# Patient Record
Sex: MALE | Race: OTHER | Employment: OTHER | URBAN - METROPOLITAN AREA
[De-identification: names, ages, dates, MRNs, and addresses within clinical notes are randomized per-mention and may not be internally consistent; named-entity substitution may affect disease eponyms.]

---

## 2018-03-29 ENCOUNTER — HOSPITAL ENCOUNTER (OUTPATIENT)
Age: 68
Discharge: HOME | End: 2018-03-31
Payer: COMMERCIAL

## 2018-03-29 PROCEDURE — 87088 URINE BACTERIA CULTURE: CPT

## 2018-03-29 PROCEDURE — 88112 CYTOPATH CELL ENHANCE TECH: CPT

## 2018-04-01 LAB — URINE CULTURE, ROUTINE: NORMAL

## 2018-10-18 ENCOUNTER — APPOINTMENT (OUTPATIENT)
Dept: CT IMAGING | Age: 68
End: 2018-10-18
Payer: COMMERCIAL

## 2018-10-18 ENCOUNTER — HOSPITAL ENCOUNTER (EMERGENCY)
Age: 68
Discharge: HOME | End: 2018-10-18
Attending: EMERGENCY MEDICINE
Payer: COMMERCIAL

## 2018-10-18 VITALS
DIASTOLIC BLOOD PRESSURE: 89 MMHG | HEIGHT: 74 IN | HEART RATE: 71 BPM | TEMPERATURE: 97.7 F | OXYGEN SATURATION: 97 % | SYSTOLIC BLOOD PRESSURE: 186 MMHG | RESPIRATION RATE: 18 BRPM | BODY MASS INDEX: 19.89 KG/M2 | WEIGHT: 155 LBS

## 2018-10-18 DIAGNOSIS — N20.0 KIDNEY STONES: Primary | ICD-10-CM

## 2018-10-18 LAB
ALBUMIN SERPL-MCNC: 4.1 G/DL (ref 3.5–5.2)
ALP BLD-CCNC: 145 U/L (ref 40–129)
ALT SERPL-CCNC: 24 U/L (ref 0–40)
ANION GAP SERPL CALCULATED.3IONS-SCNC: 17 MMOL/L (ref 7–16)
AST SERPL-CCNC: 32 U/L (ref 0–39)
BACTERIA: NORMAL /HPF
BASOPHILS ABSOLUTE: 0.03 E9/L (ref 0–0.2)
BASOPHILS RELATIVE PERCENT: 0.5 % (ref 0–2)
BILIRUB SERPL-MCNC: 0.4 MG/DL (ref 0–1.2)
BILIRUBIN URINE: NEGATIVE
BLOOD, URINE: ABNORMAL
BUN BLDV-MCNC: 35 MG/DL (ref 8–23)
CALCIUM SERPL-MCNC: 9.4 MG/DL (ref 8.6–10.2)
CHLORIDE BLD-SCNC: 101 MMOL/L (ref 98–107)
CLARITY: CLEAR
CO2: 19 MMOL/L (ref 22–29)
COLOR: YELLOW
CREAT SERPL-MCNC: 2.1 MG/DL (ref 0.7–1.2)
EOSINOPHILS ABSOLUTE: 0.02 E9/L (ref 0.05–0.5)
EOSINOPHILS RELATIVE PERCENT: 0.3 % (ref 0–6)
GFR AFRICAN AMERICAN: 38
GFR NON-AFRICAN AMERICAN: 38 ML/MIN/1.73
GLUCOSE BLD-MCNC: 202 MG/DL (ref 74–109)
GLUCOSE URINE: 100 MG/DL
HCT VFR BLD CALC: 35.5 % (ref 37–54)
HEMOGLOBIN: 12.2 G/DL (ref 12.5–16.5)
IMMATURE GRANULOCYTES #: 0.03 E9/L
IMMATURE GRANULOCYTES %: 0.5 % (ref 0–5)
KETONES, URINE: NEGATIVE MG/DL
LACTIC ACID: 2.9 MMOL/L (ref 0.5–2.2)
LEUKOCYTE ESTERASE, URINE: NEGATIVE
LIPASE: 108 U/L (ref 13–60)
LYMPHOCYTES ABSOLUTE: 0.58 E9/L (ref 1.5–4)
LYMPHOCYTES RELATIVE PERCENT: 9 % (ref 20–42)
MCH RBC QN AUTO: 30 PG (ref 26–35)
MCHC RBC AUTO-ENTMCNC: 34.4 % (ref 32–34.5)
MCV RBC AUTO: 87.4 FL (ref 80–99.9)
MONOCYTES ABSOLUTE: 0.47 E9/L (ref 0.1–0.95)
MONOCYTES RELATIVE PERCENT: 7.3 % (ref 2–12)
NEUTROPHILS ABSOLUTE: 5.35 E9/L (ref 1.8–7.3)
NEUTROPHILS RELATIVE PERCENT: 82.4 % (ref 43–80)
NITRITE, URINE: NEGATIVE
PDW BLD-RTO: 14 FL (ref 11.5–15)
PH UA: 7 (ref 5–9)
PLATELET # BLD: 188 E9/L (ref 130–450)
PMV BLD AUTO: 10.7 FL (ref 7–12)
POTASSIUM SERPL-SCNC: 4.2 MMOL/L (ref 3.5–5)
PROTEIN UA: 100 MG/DL
RBC # BLD: 4.06 E12/L (ref 3.8–5.8)
RBC # BLD: NORMAL 10*6/UL
RBC UA: NORMAL /HPF (ref 0–2)
SODIUM BLD-SCNC: 137 MMOL/L (ref 132–146)
SPECIFIC GRAVITY UA: 1.02 (ref 1–1.03)
TOTAL PROTEIN: 9 G/DL (ref 6.4–8.3)
UROBILINOGEN, URINE: 0.2 E.U./DL
WBC # BLD: 6.5 E9/L (ref 4.5–11.5)
WBC UA: NORMAL /HPF (ref 0–5)

## 2018-10-18 PROCEDURE — 74176 CT ABD & PELVIS W/O CONTRAST: CPT

## 2018-10-18 PROCEDURE — 6360000002 HC RX W HCPCS: Performed by: EMERGENCY MEDICINE

## 2018-10-18 PROCEDURE — 96375 TX/PRO/DX INJ NEW DRUG ADDON: CPT

## 2018-10-18 PROCEDURE — 83690 ASSAY OF LIPASE: CPT

## 2018-10-18 PROCEDURE — 96376 TX/PRO/DX INJ SAME DRUG ADON: CPT

## 2018-10-18 PROCEDURE — 2580000003 HC RX 258: Performed by: EMERGENCY MEDICINE

## 2018-10-18 PROCEDURE — 99284 EMERGENCY DEPT VISIT MOD MDM: CPT

## 2018-10-18 PROCEDURE — 96374 THER/PROPH/DIAG INJ IV PUSH: CPT

## 2018-10-18 PROCEDURE — 80053 COMPREHEN METABOLIC PANEL: CPT

## 2018-10-18 PROCEDURE — 85025 COMPLETE CBC W/AUTO DIFF WBC: CPT

## 2018-10-18 PROCEDURE — 83605 ASSAY OF LACTIC ACID: CPT

## 2018-10-18 PROCEDURE — 81001 URINALYSIS AUTO W/SCOPE: CPT

## 2018-10-18 RX ORDER — ONDANSETRON 2 MG/ML
4 INJECTION INTRAMUSCULAR; INTRAVENOUS ONCE
Status: COMPLETED | OUTPATIENT
Start: 2018-10-18 | End: 2018-10-18

## 2018-10-18 RX ORDER — ONDANSETRON 4 MG/1
4 TABLET, ORALLY DISINTEGRATING ORAL EVERY 8 HOURS PRN
Qty: 10 TABLET | Refills: 0 | Status: SHIPPED | OUTPATIENT
Start: 2018-10-18 | End: 2019-10-18

## 2018-10-18 RX ORDER — 0.9 % SODIUM CHLORIDE 0.9 %
1000 INTRAVENOUS SOLUTION INTRAVENOUS ONCE
Status: COMPLETED | OUTPATIENT
Start: 2018-10-18 | End: 2018-10-18

## 2018-10-18 RX ORDER — HYDROCODONE BITARTRATE AND ACETAMINOPHEN 5; 325 MG/1; MG/1
1 TABLET ORAL EVERY 6 HOURS PRN
Qty: 10 TABLET | Refills: 0 | Status: SHIPPED | OUTPATIENT
Start: 2018-10-18 | End: 2018-10-21

## 2018-10-18 RX ORDER — FENTANYL CITRATE 50 UG/ML
100 INJECTION, SOLUTION INTRAMUSCULAR; INTRAVENOUS ONCE
Status: COMPLETED | OUTPATIENT
Start: 2018-10-18 | End: 2018-10-18

## 2018-10-18 RX ORDER — TAMSULOSIN HYDROCHLORIDE 0.4 MG/1
0.4 CAPSULE ORAL DAILY
Qty: 10 CAPSULE | Refills: 0 | Status: ON HOLD | OUTPATIENT
Start: 2018-10-18 | End: 2021-12-07

## 2018-10-18 RX ORDER — KETOROLAC TROMETHAMINE 30 MG/ML
15 INJECTION, SOLUTION INTRAMUSCULAR; INTRAVENOUS ONCE
Status: COMPLETED | OUTPATIENT
Start: 2018-10-18 | End: 2018-10-18

## 2018-10-18 RX ORDER — DICLOFENAC SODIUM 75 MG/1
75 TABLET, DELAYED RELEASE ORAL 2 TIMES DAILY
Qty: 20 TABLET | Refills: 0 | Status: ON HOLD | OUTPATIENT
Start: 2018-10-18 | End: 2021-12-07

## 2018-10-18 RX ADMIN — HYDROMORPHONE HYDROCHLORIDE 1 MG: 1 INJECTION, SOLUTION INTRAMUSCULAR; INTRAVENOUS; SUBCUTANEOUS at 14:32

## 2018-10-18 RX ADMIN — ONDANSETRON 4 MG: 2 INJECTION INTRAMUSCULAR; INTRAVENOUS at 14:29

## 2018-10-18 RX ADMIN — KETOROLAC TROMETHAMINE 15 MG: 30 INJECTION INTRAMUSCULAR; INTRAVENOUS at 14:30

## 2018-10-18 RX ADMIN — FENTANYL CITRATE 100 MCG: 50 INJECTION, SOLUTION INTRAMUSCULAR; INTRAVENOUS at 16:21

## 2018-10-18 RX ADMIN — SODIUM CHLORIDE 1000 ML: 9 INJECTION, SOLUTION INTRAVENOUS at 14:28

## 2018-10-18 RX ADMIN — SODIUM CHLORIDE 1000 ML: 9 INJECTION, SOLUTION INTRAVENOUS at 15:19

## 2018-10-18 RX ADMIN — KETOROLAC TROMETHAMINE 15 MG: 30 INJECTION INTRAMUSCULAR; INTRAVENOUS at 16:19

## 2018-10-18 ASSESSMENT — PAIN SCALES - GENERAL
PAINLEVEL_OUTOF10: 4
PAINLEVEL_OUTOF10: 10
PAINLEVEL_OUTOF10: 4
PAINLEVEL_OUTOF10: 10
PAINLEVEL_OUTOF10: 10
PAINLEVEL_OUTOF10: 2

## 2018-10-18 ASSESSMENT — PAIN DESCRIPTION - LOCATION: LOCATION: ABDOMEN

## 2018-10-18 ASSESSMENT — PAIN DESCRIPTION - PAIN TYPE: TYPE: ACUTE PAIN

## 2018-10-18 NOTE — ED PROVIDER NOTES
34.4 32.0 - 34.5 %    RDW 14.0 11.5 - 15.0 fL    Platelets 656 983 - 571 E9/L    MPV 10.7 7.0 - 12.0 fL    Neutrophils % 82.4 (H) 43.0 - 80.0 %    Immature Granulocytes % 0.5 0.0 - 5.0 %    Lymphocytes % 9.0 (L) 20.0 - 42.0 %    Monocytes % 7.3 2.0 - 12.0 %    Eosinophils % 0.3 0.0 - 6.0 %    Basophils % 0.5 0.0 - 2.0 %    Neutrophils # 5.35 1.80 - 7.30 E9/L    Immature Granulocytes # 0.03 E9/L    Lymphocytes # 0.58 (L) 1.50 - 4.00 E9/L    Monocytes # 0.47 0.10 - 0.95 E9/L    Eosinophils # 0.02 (L) 0.05 - 0.50 E9/L    Basophils # 0.03 0.00 - 0.20 E9/L    RBC Morphology Normal    Comprehensive Metabolic Panel   Result Value Ref Range    Sodium 137 132 - 146 mmol/L    Potassium 4.2 3.5 - 5.0 mmol/L    Chloride 101 98 - 107 mmol/L    CO2 19 (L) 22 - 29 mmol/L    Anion Gap 17 (H) 7 - 16 mmol/L    Glucose 202 (H) 74 - 109 mg/dL    BUN 35 (H) 8 - 23 mg/dL    CREATININE 2.1 (H) 0.7 - 1.2 mg/dL    GFR Non-African American 38 >=60 mL/min/1.73    GFR African American 38     Calcium 9.4 8.6 - 10.2 mg/dL    Total Protein 9.0 (H) 6.4 - 8.3 g/dL    Alb 4.1 3.5 - 5.2 g/dL    Total Bilirubin 0.4 0.0 - 1.2 mg/dL    Alkaline Phosphatase 145 (H) 40 - 129 U/L    ALT 24 0 - 40 U/L    AST 32 0 - 39 U/L   Urinalysis   Result Value Ref Range    Color, UA Yellow Straw/Yellow    Clarity, UA Clear Clear    Glucose, Ur 100 (A) Negative mg/dL    Bilirubin Urine Negative Negative    Ketones, Urine Negative Negative mg/dL    Specific Gravity, UA 1.020 1.005 - 1.030    Blood, Urine MODERATE (A) Negative    pH, UA 7.0 5.0 - 9.0    Protein,  (A) Negative mg/dL    Urobilinogen, Urine 0.2 <2.0 E.U./dL    Nitrite, Urine Negative Negative    Leukocyte Esterase, Urine Negative Negative   Lipase   Result Value Ref Range    Lipase 108 (H) 13 - 60 U/L   Lactic Acid, Plasma   Result Value Ref Range    Lactic Acid 2.9 (H) 0.5 - 2.2 mmol/L   Microscopic Urinalysis   Result Value Ref Range    WBC, UA 0-1 0 - 5 /HPF    RBC, UA 1-3 0 - 2 /HPF    Bacteria,

## 2018-12-04 ENCOUNTER — HOSPITAL ENCOUNTER (OUTPATIENT)
Age: 68
Discharge: HOME | End: 2018-12-04
Payer: COMMERCIAL

## 2018-12-04 ENCOUNTER — HOSPITAL ENCOUNTER (OUTPATIENT)
Age: 68
Discharge: HOME | End: 2018-12-06
Payer: COMMERCIAL

## 2018-12-04 ENCOUNTER — HOSPITAL ENCOUNTER (OUTPATIENT)
Dept: ULTRASOUND IMAGING | Age: 68
Discharge: HOME | End: 2018-12-06
Payer: COMMERCIAL

## 2018-12-04 DIAGNOSIS — R18.8 CIRRHOSIS OF LIVER WITH ASCITES, UNSPECIFIED HEPATIC CIRRHOSIS TYPE (HCC): ICD-10-CM

## 2018-12-04 DIAGNOSIS — K74.60 CIRRHOSIS OF LIVER WITH ASCITES, UNSPECIFIED HEPATIC CIRRHOSIS TYPE (HCC): ICD-10-CM

## 2018-12-04 LAB
BACTERIA: NORMAL /HPF
BILIRUBIN URINE: NEGATIVE
BLOOD, URINE: ABNORMAL
CLARITY: CLEAR
COLOR: YELLOW
CREATININE URINE: 109 MG/DL (ref 40–278)
GLUCOSE URINE: NEGATIVE MG/DL
INR BLD: 1.1
KETONES, URINE: NEGATIVE MG/DL
LEUKOCYTE ESTERASE, URINE: NEGATIVE
NITRITE, URINE: NEGATIVE
PH UA: 6 (ref 5–9)
PROTEIN PROTEIN: 301 MG/DL (ref 0–12)
PROTEIN UA: >=300 MG/DL
PROTEIN/CREAT RATIO: 2.8
PROTEIN/CREAT RATIO: 2.8 (ref 0–0.2)
PROTHROMBIN TIME: 12.2 SEC (ref 9.3–12.4)
RBC UA: NORMAL /HPF (ref 0–2)
SPECIFIC GRAVITY UA: 1.02 (ref 1–1.03)
UROBILINOGEN, URINE: 0.2 E.U./DL
WBC UA: NORMAL /HPF (ref 0–5)

## 2018-12-04 PROCEDURE — 81001 URINALYSIS AUTO W/SCOPE: CPT

## 2018-12-04 PROCEDURE — 82247 BILIRUBIN TOTAL: CPT

## 2018-12-04 PROCEDURE — 36415 COLL VENOUS BLD VENIPUNCTURE: CPT

## 2018-12-04 PROCEDURE — 87340 HEPATITIS B SURFACE AG IA: CPT

## 2018-12-04 PROCEDURE — 84075 ASSAY ALKALINE PHOSPHATASE: CPT

## 2018-12-04 PROCEDURE — 84450 TRANSFERASE (AST) (SGOT): CPT

## 2018-12-04 PROCEDURE — 84460 ALANINE AMINO (ALT) (SGPT): CPT

## 2018-12-04 PROCEDURE — 76705 ECHO EXAM OF ABDOMEN: CPT

## 2018-12-04 PROCEDURE — 87522 HEPATITIS C REVRS TRNSCRPJ: CPT

## 2018-12-04 PROCEDURE — 84165 PROTEIN E-PHORESIS SERUM: CPT

## 2018-12-04 PROCEDURE — 84155 ASSAY OF PROTEIN SERUM: CPT

## 2018-12-04 PROCEDURE — 86803 HEPATITIS C AB TEST: CPT

## 2018-12-04 PROCEDURE — 82248 BILIRUBIN DIRECT: CPT

## 2018-12-04 PROCEDURE — 85610 PROTHROMBIN TIME: CPT

## 2018-12-04 PROCEDURE — 82570 ASSAY OF URINE CREATININE: CPT

## 2018-12-04 PROCEDURE — 82105 ALPHA-FETOPROTEIN SERUM: CPT

## 2018-12-04 PROCEDURE — 84156 ASSAY OF PROTEIN URINE: CPT

## 2018-12-04 PROCEDURE — 80069 RENAL FUNCTION PANEL: CPT

## 2018-12-04 PROCEDURE — 84166 PROTEIN E-PHORESIS/URINE/CSF: CPT

## 2018-12-04 PROCEDURE — 86706 HEP B SURFACE ANTIBODY: CPT

## 2018-12-05 LAB
ALBUMIN SERPL-MCNC: 3.4 G/DL (ref 3.5–4.7)
ALPHA-1-GLOBULIN: 0.3 G/DL (ref 0.2–0.4)
ALPHA-2-GLOBULIN: 1.2 G/FL (ref 0.5–1)
BETA GLOBULIN: 1.2 G/DL (ref 0.8–1.3)
ELECTROPHORESIS: ABNORMAL
GAMMA GLOBULIN: 2.4 G/DL (ref 0.7–1.6)
HBV SURFACE AB TITR SER: NORMAL {TITER}
HEPATITIS B SURFACE ANTIGEN INTERPRETATION: NORMAL
HEPATITIS C ANTIBODY INTERPRETATION: REACTIVE
TOTAL PROTEIN: 8.6 G/DL (ref 6.4–8.3)

## 2018-12-07 LAB
ADDENDUM ELECTROPHORESIS URINE RANDOM: NORMAL
AFP-TUMOR MARKER: 14 NG/ML (ref 0–9)
HCV QNT BY NAAT IU/ML: NOT DETECTED IU/ML
HCV QNT BY NAAT LOG IU/ML: NOT DETECTED LOG IU/ML
INTERPRETATION: NOT DETECTED

## 2018-12-09 LAB
ALBUMIN SERPL-MCNC: 4.2 G/DL (ref 3.5–5.2)
ALP BLD-CCNC: 160 U/L (ref 40–129)
ALT SERPL-CCNC: 16 U/L (ref 0–40)
ANION GAP SERPL CALCULATED.3IONS-SCNC: 20 MMOL/L (ref 7–16)
AST SERPL-CCNC: 24 U/L (ref 0–39)
BILIRUB SERPL-MCNC: 0.5 MG/DL (ref 0–1.2)
BILIRUBIN DIRECT: <0.2 MG/DL (ref 0–0.3)
BILIRUBIN, INDIRECT: ABNORMAL MG/DL (ref 0–1)
BUN BLDV-MCNC: 30 MG/DL (ref 8–23)
CALCIUM SERPL-MCNC: 9.8 MG/DL (ref 8.6–10.2)
CHLORIDE BLD-SCNC: 103 MMOL/L (ref 98–107)
CO2: 18 MMOL/L (ref 22–29)
CREAT SERPL-MCNC: 2.1 MG/DL (ref 0.7–1.2)
GFR AFRICAN AMERICAN: 38
GFR NON-AFRICAN AMERICAN: 38 ML/MIN/1.73
GLUCOSE BLD-MCNC: 152 MG/DL (ref 74–99)
PHOSPHORUS: 3.4 MG/DL (ref 2.5–4.5)
POTASSIUM SERPL-SCNC: 5.1 MMOL/L (ref 3.5–5)
SODIUM BLD-SCNC: 141 MMOL/L (ref 132–146)
TOTAL PROTEIN: 8.7 G/DL (ref 6.4–8.3)

## 2020-06-02 ENCOUNTER — HOSPITAL ENCOUNTER (OUTPATIENT)
Dept: ULTRASOUND IMAGING | Age: 70
Discharge: HOME | End: 2020-06-04
Payer: COMMERCIAL

## 2020-06-02 ENCOUNTER — HOSPITAL ENCOUNTER (OUTPATIENT)
Age: 70
Discharge: HOME | End: 2020-06-02
Payer: COMMERCIAL

## 2020-06-02 LAB
ALBUMIN SERPL-MCNC: 3.9 G/DL (ref 3.5–5.2)
ALP BLD-CCNC: 143 U/L (ref 40–129)
ALT SERPL-CCNC: 20 U/L (ref 0–40)
ANION GAP SERPL CALCULATED.3IONS-SCNC: 11 MMOL/L (ref 7–16)
AST SERPL-CCNC: 27 U/L (ref 0–39)
BACTERIA: ABNORMAL /HPF
BASOPHILS ABSOLUTE: 0.06 E9/L (ref 0–0.2)
BASOPHILS RELATIVE PERCENT: 1.3 % (ref 0–2)
BILIRUB SERPL-MCNC: 0.4 MG/DL (ref 0–1.2)
BILIRUBIN URINE: NEGATIVE
BLOOD, URINE: ABNORMAL
BUN BLDV-MCNC: 43 MG/DL (ref 8–23)
CALCIUM SERPL-MCNC: 8.5 MG/DL (ref 8.6–10.2)
CHLORIDE BLD-SCNC: 109 MMOL/L (ref 98–107)
CHOLESTEROL, FASTING: 250 MG/DL (ref 0–199)
CLARITY: CLEAR
CO2: 17 MMOL/L (ref 22–29)
COLOR: YELLOW
CREAT SERPL-MCNC: 3 MG/DL (ref 0.7–1.2)
CREATININE URINE: 97 MG/DL (ref 40–278)
EOSINOPHILS ABSOLUTE: 0.23 E9/L (ref 0.05–0.5)
EOSINOPHILS RELATIVE PERCENT: 5.1 % (ref 0–6)
EPITHELIAL CELLS, UA: ABNORMAL /HPF
GFR AFRICAN AMERICAN: 25
GFR NON-AFRICAN AMERICAN: 25 ML/MIN/1.73
GLUCOSE BLD-MCNC: 144 MG/DL (ref 74–99)
GLUCOSE URINE: 100 MG/DL
HCT VFR BLD CALC: 32.4 % (ref 37–54)
HDLC SERPL-MCNC: 38 MG/DL
HEMOGLOBIN: 11 G/DL (ref 12.5–16.5)
HYALINE CASTS: ABNORMAL /LPF (ref 0–2)
IMMATURE GRANULOCYTES #: 0.02 E9/L
IMMATURE GRANULOCYTES %: 0.4 % (ref 0–5)
INR BLD: 1
KETONES, URINE: NEGATIVE MG/DL
LDL CHOLESTEROL CALCULATED: 178 MG/DL (ref 0–99)
LEUKOCYTE ESTERASE, URINE: NEGATIVE
LYMPHOCYTES ABSOLUTE: 0.72 E9/L (ref 1.5–4)
LYMPHOCYTES RELATIVE PERCENT: 15.9 % (ref 20–42)
MAGNESIUM: 1.5 MG/DL (ref 1.6–2.6)
MCH RBC QN AUTO: 31.1 PG (ref 26–35)
MCHC RBC AUTO-ENTMCNC: 34 % (ref 32–34.5)
MCV RBC AUTO: 91.5 FL (ref 80–99.9)
MONOCYTES ABSOLUTE: 0.59 E9/L (ref 0.1–0.95)
MONOCYTES RELATIVE PERCENT: 13 % (ref 2–12)
NEUTROPHILS ABSOLUTE: 2.92 E9/L (ref 1.8–7.3)
NEUTROPHILS RELATIVE PERCENT: 64.3 % (ref 43–80)
NITRITE, URINE: NEGATIVE
PDW BLD-RTO: 13.8 FL (ref 11.5–15)
PH UA: 5.5 (ref 5–9)
PHOSPHORUS: 3.6 MG/DL (ref 2.5–4.5)
PLATELET # BLD: 174 E9/L (ref 130–450)
PMV BLD AUTO: 10.9 FL (ref 7–12)
POTASSIUM SERPL-SCNC: 4.5 MMOL/L (ref 3.5–5)
PROTEIN PROTEIN: ABNORMAL MG/DL (ref 0–12)
PROTEIN UA: >=300 MG/DL
PROTEIN/CREAT RATIO: ABNORMAL (ref 0–0.2)
PROTHROMBIN TIME: 11.2 SEC (ref 9.3–12.4)
RBC # BLD: 3.54 E12/L (ref 3.8–5.8)
RBC UA: ABNORMAL /HPF (ref 0–2)
SODIUM BLD-SCNC: 137 MMOL/L (ref 132–146)
SPECIFIC GRAVITY UA: 1.02 (ref 1–1.03)
TOTAL CK: 2112 U/L (ref 20–200)
TOTAL PROTEIN: 8 G/DL (ref 6.4–8.3)
TRIGLYCERIDE, FASTING: 170 MG/DL (ref 0–149)
TSH SERPL DL<=0.05 MIU/L-ACNC: 0.76 UIU/ML (ref 0.27–4.2)
UROBILINOGEN, URINE: 0.2 E.U./DL
VLDLC SERPL CALC-MCNC: 34 MG/DL
WBC # BLD: 4.5 E9/L (ref 4.5–11.5)
WBC UA: ABNORMAL /HPF (ref 0–5)

## 2020-06-02 PROCEDURE — 85610 PROTHROMBIN TIME: CPT

## 2020-06-02 PROCEDURE — 84443 ASSAY THYROID STIM HORMONE: CPT

## 2020-06-02 PROCEDURE — 82550 ASSAY OF CK (CPK): CPT

## 2020-06-02 PROCEDURE — 82570 ASSAY OF URINE CREATININE: CPT

## 2020-06-02 PROCEDURE — 85025 COMPLETE CBC W/AUTO DIFF WBC: CPT

## 2020-06-02 PROCEDURE — 87340 HEPATITIS B SURFACE AG IA: CPT

## 2020-06-02 PROCEDURE — 36415 COLL VENOUS BLD VENIPUNCTURE: CPT

## 2020-06-02 PROCEDURE — 84100 ASSAY OF PHOSPHORUS: CPT

## 2020-06-02 PROCEDURE — 83735 ASSAY OF MAGNESIUM: CPT

## 2020-06-02 PROCEDURE — 82105 ALPHA-FETOPROTEIN SERUM: CPT

## 2020-06-02 PROCEDURE — 84156 ASSAY OF PROTEIN URINE: CPT

## 2020-06-02 PROCEDURE — 76705 ECHO EXAM OF ABDOMEN: CPT

## 2020-06-02 PROCEDURE — 86706 HEP B SURFACE ANTIBODY: CPT

## 2020-06-02 PROCEDURE — 80061 LIPID PANEL: CPT

## 2020-06-02 PROCEDURE — 81001 URINALYSIS AUTO W/SCOPE: CPT

## 2020-06-02 PROCEDURE — 80053 COMPREHEN METABOLIC PANEL: CPT

## 2020-06-03 LAB
HBV SURFACE AB TITR SER: NORMAL {TITER}
HEPATITIS B SURFACE ANTIGEN INTERPRETATION: NORMAL

## 2020-06-04 LAB — AFP-TUMOR MARKER: 11 NG/ML (ref 0–9)

## 2020-06-26 ENCOUNTER — HOSPITAL ENCOUNTER (OUTPATIENT)
Age: 70
Discharge: HOME | End: 2020-06-26
Payer: COMMERCIAL

## 2020-06-26 LAB
ALBUMIN SERPL-MCNC: 4 G/DL (ref 3.5–5.2)
ALP BLD-CCNC: 142 U/L (ref 40–129)
ALT SERPL-CCNC: 20 U/L (ref 0–40)
ANION GAP SERPL CALCULATED.3IONS-SCNC: 9 MMOL/L (ref 7–16)
AST SERPL-CCNC: 26 U/L (ref 0–39)
BACTERIA: ABNORMAL /HPF
BASOPHILS ABSOLUTE: 0.09 E9/L (ref 0–0.2)
BASOPHILS RELATIVE PERCENT: 1.5 % (ref 0–2)
BILIRUB SERPL-MCNC: 0.5 MG/DL (ref 0–1.2)
BILIRUBIN URINE: NEGATIVE
BLOOD, URINE: ABNORMAL
BUN BLDV-MCNC: 46 MG/DL (ref 8–23)
CALCIUM SERPL-MCNC: 8.6 MG/DL (ref 8.6–10.2)
CHLORIDE BLD-SCNC: 105 MMOL/L (ref 98–107)
CLARITY: CLEAR
CO2: 24 MMOL/L (ref 22–29)
COLOR: YELLOW
CREAT SERPL-MCNC: 3.6 MG/DL (ref 0.7–1.2)
CREATININE URINE: 205 MG/DL (ref 40–278)
EOSINOPHILS ABSOLUTE: 0.17 E9/L (ref 0.05–0.5)
EOSINOPHILS RELATIVE PERCENT: 2.9 % (ref 0–6)
GFR AFRICAN AMERICAN: 20
GFR NON-AFRICAN AMERICAN: 20 ML/MIN/1.73
GLUCOSE BLD-MCNC: 103 MG/DL (ref 74–99)
GLUCOSE URINE: NEGATIVE MG/DL
HBA1C MFR BLD: 6 % (ref 4–5.6)
HCT VFR BLD CALC: 33.6 % (ref 37–54)
HEMOGLOBIN: 11.2 G/DL (ref 12.5–16.5)
IMMATURE GRANULOCYTES #: 0.02 E9/L
IMMATURE GRANULOCYTES %: 0.3 % (ref 0–5)
IRON SATURATION: 33 % (ref 20–55)
IRON: 96 MCG/DL (ref 59–158)
KETONES, URINE: NEGATIVE MG/DL
LEUKOCYTE ESTERASE, URINE: NEGATIVE
LYMPHOCYTES ABSOLUTE: 0.88 E9/L (ref 1.5–4)
LYMPHOCYTES RELATIVE PERCENT: 15.1 % (ref 20–42)
MAGNESIUM: 1.8 MG/DL (ref 1.6–2.6)
MCH RBC QN AUTO: 30.8 PG (ref 26–35)
MCHC RBC AUTO-ENTMCNC: 33.3 % (ref 32–34.5)
MCV RBC AUTO: 92.3 FL (ref 80–99.9)
MONOCYTES ABSOLUTE: 0.64 E9/L (ref 0.1–0.95)
MONOCYTES RELATIVE PERCENT: 11 % (ref 2–12)
NEUTROPHILS ABSOLUTE: 4.03 E9/L (ref 1.8–7.3)
NEUTROPHILS RELATIVE PERCENT: 69.2 % (ref 43–80)
NITRITE, URINE: NEGATIVE
PARATHYROID HORMONE INTACT: 272 PG/ML (ref 15–65)
PDW BLD-RTO: 13.2 FL (ref 11.5–15)
PH UA: 6 (ref 5–9)
PHOSPHORUS: 3.9 MG/DL (ref 2.5–4.5)
PLATELET # BLD: 185 E9/L (ref 130–450)
PMV BLD AUTO: 10.4 FL (ref 7–12)
POTASSIUM SERPL-SCNC: 4.5 MMOL/L (ref 3.5–5)
PROTEIN PROTEIN: 71 MG/DL (ref 0–12)
PROTEIN UA: >=300 MG/DL
PROTEIN/CREAT RATIO: 0.3
PROTEIN/CREAT RATIO: 0.3 (ref 0–0.2)
RBC # BLD: 3.64 E12/L (ref 3.8–5.8)
RBC UA: ABNORMAL /HPF (ref 0–2)
SODIUM BLD-SCNC: 138 MMOL/L (ref 132–146)
SPECIFIC GRAVITY UA: 1.02 (ref 1–1.03)
TOTAL CK: 1503 U/L (ref 20–200)
TOTAL IRON BINDING CAPACITY: 289 MCG/DL (ref 250–450)
TOTAL PROTEIN: 8.4 G/DL (ref 6.4–8.3)
URIC ACID, SERUM: 6.7 MG/DL (ref 3.4–7)
UROBILINOGEN, URINE: 0.2 E.U./DL
VITAMIN D 25-HYDROXY: 14 NG/ML (ref 30–100)
WBC # BLD: 5.8 E9/L (ref 4.5–11.5)
WBC UA: ABNORMAL /HPF (ref 0–5)

## 2020-06-26 PROCEDURE — 83970 ASSAY OF PARATHORMONE: CPT

## 2020-06-26 PROCEDURE — 84100 ASSAY OF PHOSPHORUS: CPT

## 2020-06-26 PROCEDURE — 82570 ASSAY OF URINE CREATININE: CPT

## 2020-06-26 PROCEDURE — 36415 COLL VENOUS BLD VENIPUNCTURE: CPT

## 2020-06-26 PROCEDURE — 83550 IRON BINDING TEST: CPT

## 2020-06-26 PROCEDURE — 82306 VITAMIN D 25 HYDROXY: CPT

## 2020-06-26 PROCEDURE — 85025 COMPLETE CBC W/AUTO DIFF WBC: CPT

## 2020-06-26 PROCEDURE — 83036 HEMOGLOBIN GLYCOSYLATED A1C: CPT

## 2020-06-26 PROCEDURE — 83874 ASSAY OF MYOGLOBIN: CPT

## 2020-06-26 PROCEDURE — 83735 ASSAY OF MAGNESIUM: CPT

## 2020-06-26 PROCEDURE — 80053 COMPREHEN METABOLIC PANEL: CPT

## 2020-06-26 PROCEDURE — 84550 ASSAY OF BLOOD/URIC ACID: CPT

## 2020-06-26 PROCEDURE — 82550 ASSAY OF CK (CPK): CPT

## 2020-06-26 PROCEDURE — 83540 ASSAY OF IRON: CPT

## 2020-06-26 PROCEDURE — 84156 ASSAY OF PROTEIN URINE: CPT

## 2020-06-26 PROCEDURE — 81001 URINALYSIS AUTO W/SCOPE: CPT

## 2020-06-26 PROCEDURE — 84166 PROTEIN E-PHORESIS/URINE/CSF: CPT

## 2020-06-30 LAB
ADDENDUM ELECTROPHORESIS URINE RANDOM: NORMAL
MYOGLOBIN URINE: 1 MG/L (ref 0–1)

## 2020-07-08 ENCOUNTER — HOSPITAL ENCOUNTER (OUTPATIENT)
Age: 70
Discharge: HOME | End: 2020-07-08
Payer: COMMERCIAL

## 2020-07-08 LAB
BASOPHILS ABSOLUTE: 0.07 E9/L (ref 0–0.2)
BASOPHILS RELATIVE PERCENT: 1.4 % (ref 0–2)
EOSINOPHILS ABSOLUTE: 0.12 E9/L (ref 0.05–0.5)
EOSINOPHILS RELATIVE PERCENT: 2.3 % (ref 0–6)
HCT VFR BLD CALC: 34 % (ref 37–54)
HEMOGLOBIN: 11.5 G/DL (ref 12.5–16.5)
IMMATURE GRANULOCYTES #: 0.02 E9/L
IMMATURE GRANULOCYTES %: 0.4 % (ref 0–5)
LYMPHOCYTES ABSOLUTE: 0.95 E9/L (ref 1.5–4)
LYMPHOCYTES RELATIVE PERCENT: 18.3 % (ref 20–42)
MCH RBC QN AUTO: 31.1 PG (ref 26–35)
MCHC RBC AUTO-ENTMCNC: 33.8 % (ref 32–34.5)
MCV RBC AUTO: 91.9 FL (ref 80–99.9)
MONOCYTES ABSOLUTE: 0.53 E9/L (ref 0.1–0.95)
MONOCYTES RELATIVE PERCENT: 10.2 % (ref 2–12)
NEUTROPHILS ABSOLUTE: 3.49 E9/L (ref 1.8–7.3)
NEUTROPHILS RELATIVE PERCENT: 67.4 % (ref 43–80)
PDW BLD-RTO: 13.2 FL (ref 11.5–15)
PLATELET # BLD: 196 E9/L (ref 130–450)
PMV BLD AUTO: 10.7 FL (ref 7–12)
RBC # BLD: 3.7 E12/L (ref 3.8–5.8)
WBC # BLD: 5.2 E9/L (ref 4.5–11.5)

## 2020-07-08 PROCEDURE — 82955 ASSAY OF G6PD ENZYME: CPT

## 2020-07-08 PROCEDURE — 85025 COMPLETE CBC W/AUTO DIFF WBC: CPT

## 2020-07-08 PROCEDURE — 36415 COLL VENOUS BLD VENIPUNCTURE: CPT

## 2020-07-11 LAB — G-6-PD, QUANT: 12.7 U/G HB (ref 9.9–16.6)

## 2020-08-18 ENCOUNTER — TELEPHONE (OUTPATIENT)
Dept: ADMINISTRATIVE | Age: 70
End: 2020-08-18

## 2020-08-24 NOTE — PROGRESS NOTES
[facility]    [facility]  Mac Purvis. DARCY Benavidez M.D. Nicolas Marine. The [employer].fmDARCY Kieran Row, M.D. Wynette Alt, Jhonnie Puller, M.D. MD Denisa Huerta [place]   1950  Deysi Contreras MD      This 56-year-old man is seen today for outpatient cardiac consultation. He has no history of cardiovascular disease. He may be a candidate for renal transplant eventually. He is under some stress recently because of the upcoming election. He reports gradually decreasing his exercise program because of fatigue and dyspnea. He has not had any typical anginal symptoms. He has noted ED. Medical History:  1. Diabetes  2 BPH  3 hypertension  4 hypothyroid  5 history of hepatitis C  6 DJD  7 lipid panel 2020: Total cholesterol 250, triglycerides 170, HDL 38,   8 CKD. History of medullary sponge kidney  9 surgical history: Tonsillectomy      The patient is . He has worked for the [employer] non-smoker. Denies use of any significant alcohol. Family history: Mother and father  at advanced age of unknown cause. Review of Systems:  Constitutional: negative for fever and chills  Respiratory: negative for cough and hemoptysis  Cardiovascular:   Gastrointestinal: negative for abdominal pain, diarrhea, nausea and vomiting  Genitourinary:negative for dysuria and hematuria  Derm: negative for rash and skin lesion(s)  Neurological: negative for seizures and tremors  Endocrine: negative for diabetic symptoms including polydipsia and polyuria  Musculoskeletal: negative for CTD  Psychiatric: negative for psychosis and major depression    On examination, he is an alert pleasant elderly -American man in no distress. Skin is warm and dry. Respirations are unlabored.   BP (!) 150/84   Pulse 101   Resp 16   Ht 6' 2\" (1.88 m)   Wt 166 lb (75.3 kg)   BMI 21.31 kg/m² . HEENT negative for scleral icterus. Extraocular muscles intact. No facial asymmetry or central cyanosis. Neck without masses or goiter. No bruit or JVD. Cardiac apex not displaced. Rhythm regular. Abdomen normal.  Extremities without edema. Lungs are clear    EKG today shows sinus mechanism 101. APCs. Borderline QRS voltage. The patient has risk factors for both ischemic and structural heart disease. He has dyspnea. He feels he cannot be evaluated a treadmill. Both a Lexiscan perfusion study and echo will be performed and further recommendations made.   It is recommended he return to his urologist, Dr. Julee De La O, for urological assessment prior to considering any intervention for ED Further recommendations regarding his cardiac status will be summarized following the completion of noninvasive testing    At completion of today's visit, medications include the following:    Current Outpatient Medications:     glimepiride (AMARYL) 1 MG tablet, Take 1 mg by mouth every morning (before breakfast), Disp: , Rfl:     dapsone 25 MG tablet, TK 1 T PO BID, Disp: , Rfl:     allopurinol (ZYLOPRIM) 100 MG tablet, TK 1 T PO D, Disp: , Rfl:     sodium bicarbonate 650 MG tablet, TK 2 TS PO Q 8 H, Disp: , Rfl:     levothyroxine (SYNTHROID) 112 MCG tablet, Take 112 mcg by mouth Daily, Disp: , Rfl:     PROCARDIA XL 60 MG extended release tablet, TK 1 T PO QAM, Disp: , Rfl:     magnesium oxide (MAG-OX) 400 MG tablet, Take 400 mg by mouth daily, Disp: , Rfl:     vitamin D (ERGOCALCIFEROL) 1.25 MG (51249 UT) CAPS capsule, TK 1 C PO 1 TIME Q WK, Disp: , Rfl:     clobetasol (TEMOVATE) 0.05 % ointment, JOHN EXT AA BID, Disp: , Rfl:     colchicine (COLCRYS) 0.6 MG tablet, Take 1 tablet by mouth 2 times daily (Patient taking differently: Take 0.6 mg by mouth as needed ), Disp: 20 tablet, Rfl: 0    calcium carbonate (TUMS) 500 MG chewable tablet, Take 1 tablet by mouth 3 times daily as needed for Heartburn., Disp: , Rfl:     diclofenac (VOLTAREN) 75 MG EC tablet, Take 1 tablet by mouth 2 times daily (Patient not taking: Reported on 8/25/2020), Disp: 20 tablet, Rfl: 0    tamsulosin (FLOMAX) 0.4 MG capsule, Take 1 capsule by mouth daily for 10 days, Disp: 10 capsule, Rfl: 0    glimepiride (AMARYL) 4 MG tablet, Take 1 tablet by mouth Daily with supper. (Patient not taking: Reported on 8/25/2020), Disp: 30 tablet, Rfl: 3    sitaGLIPtan (JANUVIA) 100 MG tablet, Take 1 tablet by mouth daily. (Patient not taking: Reported on 8/25/2020), Disp: 30 tablet, Rfl: 3    levothyroxine (SYNTHROID) 75 MCG tablet, Take 75 mcg by mouth Daily. , Disp: , Rfl:     amLODIPine (NORVASC) 5 MG tablet, Take 5 mg by mouth daily. , Disp: , Rfl:       Note: This report was completed utilizing computer voice recognition software. Every effort has been made to ensure accuracy, however; inadvertent computerized transcription errors may be present.     --García Tolentino MD on 8/25/2020 at 2:09 PM     CC Dr Naz Hamilton

## 2020-08-25 ENCOUNTER — OFFICE VISIT (OUTPATIENT)
Dept: CARDIOLOGY CLINIC | Age: 70
End: 2020-08-25
Payer: COMMERCIAL

## 2020-08-25 VITALS
RESPIRATION RATE: 16 BRPM | SYSTOLIC BLOOD PRESSURE: 150 MMHG | WEIGHT: 166 LBS | BODY MASS INDEX: 21.3 KG/M2 | HEART RATE: 101 BPM | DIASTOLIC BLOOD PRESSURE: 84 MMHG | HEIGHT: 74 IN

## 2020-08-25 PROCEDURE — 4040F PNEUMOC VAC/ADMIN/RCVD: CPT | Performed by: INTERNAL MEDICINE

## 2020-08-25 PROCEDURE — 1123F ACP DISCUSS/DSCN MKR DOCD: CPT | Performed by: INTERNAL MEDICINE

## 2020-08-25 PROCEDURE — G8420 CALC BMI NORM PARAMETERS: HCPCS | Performed by: INTERNAL MEDICINE

## 2020-08-25 PROCEDURE — 93000 ELECTROCARDIOGRAM COMPLETE: CPT | Performed by: INTERNAL MEDICINE

## 2020-08-25 PROCEDURE — 3017F COLORECTAL CA SCREEN DOC REV: CPT | Performed by: INTERNAL MEDICINE

## 2020-08-25 PROCEDURE — 99203 OFFICE O/P NEW LOW 30 MIN: CPT | Performed by: INTERNAL MEDICINE

## 2020-08-25 PROCEDURE — 1036F TOBACCO NON-USER: CPT | Performed by: INTERNAL MEDICINE

## 2020-08-25 PROCEDURE — G8427 DOCREV CUR MEDS BY ELIG CLIN: HCPCS | Performed by: INTERNAL MEDICINE

## 2020-08-25 RX ORDER — DAPSONE 25 MG/1
TABLET ORAL
Status: ON HOLD | COMMUNITY
Start: 2020-07-21 | End: 2021-12-07

## 2020-08-25 RX ORDER — ERGOCALCIFEROL 1.25 MG/1
50000 CAPSULE ORAL WEEKLY
COMMUNITY
Start: 2020-07-07

## 2020-08-25 RX ORDER — NIFEDIPINE 60 MG
TABLET, EXTENDED RELEASE 24 HR ORAL
Status: ON HOLD | COMMUNITY
Start: 2020-08-07 | End: 2021-12-07

## 2020-08-25 RX ORDER — ALLOPURINOL 100 MG/1
TABLET ORAL
COMMUNITY
Start: 2020-08-03 | End: 2021-12-22 | Stop reason: ALTCHOICE

## 2020-08-25 RX ORDER — GLIMEPIRIDE 1 MG/1
1 TABLET ORAL
Status: ON HOLD | COMMUNITY
End: 2021-12-13 | Stop reason: HOSPADM

## 2020-08-25 RX ORDER — LEVOTHYROXINE SODIUM 112 UG/1
112 TABLET ORAL DAILY
Status: ON HOLD | COMMUNITY
End: 2022-04-26 | Stop reason: HOSPADM

## 2020-08-25 RX ORDER — SODIUM BICARBONATE 650 MG/1
TABLET ORAL
Status: ON HOLD | COMMUNITY
Start: 2020-07-11 | End: 2022-08-16 | Stop reason: HOSPADM

## 2020-08-25 RX ORDER — MAGNESIUM OXIDE 400 MG/1
400 TABLET ORAL DAILY
Status: ON HOLD | COMMUNITY
End: 2021-12-07

## 2020-08-25 RX ORDER — CLOBETASOL PROPIONATE 0.5 MG/G
OINTMENT TOPICAL
Status: ON HOLD | COMMUNITY
Start: 2020-07-01 | End: 2021-12-07

## 2020-09-28 ENCOUNTER — TELEPHONE (OUTPATIENT)
Dept: CARDIOLOGY | Age: 70
End: 2020-09-28

## 2020-09-28 NOTE — TELEPHONE ENCOUNTER
Spoke with patient and confirmed Lexiscan stress test and echocardiogram appointments on Sept. 30, 2020 starting at 0800. Instructions for testing and COVID-19 checklist reviewed.

## 2020-09-29 ENCOUNTER — TELEPHONE (OUTPATIENT)
Dept: CARDIOLOGY | Age: 70
End: 2020-09-29

## 2020-09-30 ENCOUNTER — TELEPHONE (OUTPATIENT)
Dept: CARDIOLOGY | Age: 70
End: 2020-09-30

## 2020-09-30 NOTE — TELEPHONE ENCOUNTER
CALLED PATIENT TO RESCHEDULE STRESS TEST AND ECHO THAT WAS SCHEDULED FOR THIS MORNING. PATIENT WASN'T FEELING GOOD.  PATIENT WANTED TO RESCHEDULE AFTER THE ELECTION FOR 11-23-20    Electronically signed by Anita Bermeo on 9/30/2020 at 8:55 AM

## 2020-11-19 ENCOUNTER — TELEPHONE (OUTPATIENT)
Dept: CARDIOLOGY | Age: 70
End: 2020-11-19

## 2021-01-14 ENCOUNTER — TELEPHONE (OUTPATIENT)
Dept: CARDIOLOGY | Age: 71
End: 2021-01-14

## 2021-05-28 ENCOUNTER — TELEPHONE (OUTPATIENT)
Dept: CARDIOLOGY | Age: 71
End: 2021-05-28

## 2021-05-28 NOTE — TELEPHONE ENCOUNTER
Patient cancelled multiple appointments for Para Oliver and echocardiogram, send new request if needed.

## 2021-08-24 ENCOUNTER — HOSPITAL ENCOUNTER (OUTPATIENT)
Age: 71
Discharge: HOME | End: 2021-08-24
Payer: COMMERCIAL

## 2021-08-24 LAB
ALBUMIN SERPL-MCNC: 3.8 G/DL (ref 3.5–5.2)
ALP BLD-CCNC: 160 U/L (ref 40–129)
ALT SERPL-CCNC: 16 U/L (ref 0–40)
AMORPHOUS: PRESENT
ANION GAP SERPL CALCULATED.3IONS-SCNC: 16 MMOL/L (ref 7–16)
AST SERPL-CCNC: 22 U/L (ref 0–39)
BACTERIA: ABNORMAL /HPF
BILIRUB SERPL-MCNC: 0.3 MG/DL (ref 0–1.2)
BILIRUBIN URINE: NEGATIVE
BLOOD, URINE: ABNORMAL
BUN BLDV-MCNC: 73 MG/DL (ref 6–23)
CALCIUM SERPL-MCNC: 7.1 MG/DL (ref 8.6–10.2)
CHLORIDE BLD-SCNC: 103 MMOL/L (ref 98–107)
CHOLESTEROL, TOTAL: 200 MG/DL (ref 0–199)
CLARITY: CLEAR
CO2: 18 MMOL/L (ref 22–29)
COLOR: YELLOW
CREAT SERPL-MCNC: 8.6 MG/DL (ref 0.7–1.2)
CREATININE URINE: 133 MG/DL (ref 40–278)
GFR AFRICAN AMERICAN: 7
GFR NON-AFRICAN AMERICAN: 7 ML/MIN/1.73
GLUCOSE BLD-MCNC: 97 MG/DL (ref 74–99)
GLUCOSE URINE: NEGATIVE MG/DL
HBA1C MFR BLD: 5.1 % (ref 4–5.6)
HCT VFR BLD CALC: 23.3 % (ref 37–54)
HDLC SERPL-MCNC: 51 MG/DL
HEMOGLOBIN: 7.9 G/DL (ref 12.5–16.5)
INR BLD: 1.1
KETONES, URINE: NEGATIVE MG/DL
LDL CHOLESTEROL CALCULATED: 127 MG/DL (ref 0–99)
LEUKOCYTE ESTERASE, URINE: NEGATIVE
MCH RBC QN AUTO: 31.6 PG (ref 26–35)
MCHC RBC AUTO-ENTMCNC: 33.9 % (ref 32–34.5)
MCV RBC AUTO: 93.2 FL (ref 80–99.9)
NITRITE, URINE: NEGATIVE
PARATHYROID HORMONE INTACT: 386 PG/ML (ref 15–65)
PDW BLD-RTO: 14.6 FL (ref 11.5–15)
PH UA: 6 (ref 5–9)
PLATELET # BLD: 180 E9/L (ref 130–450)
PMV BLD AUTO: 10.7 FL (ref 7–12)
POTASSIUM SERPL-SCNC: 5 MMOL/L (ref 3.5–5)
PROSTATE SPECIFIC ANTIGEN: 19.48 NG/ML (ref 0–4)
PROTEIN UA: >=300 MG/DL
PROTHROMBIN TIME: 12.1 SEC (ref 9.3–12.4)
RBC # BLD: 2.5 E12/L (ref 3.8–5.8)
RBC UA: ABNORMAL /HPF (ref 0–2)
SODIUM BLD-SCNC: 137 MMOL/L (ref 132–146)
SPECIFIC GRAVITY UA: 1.02 (ref 1–1.03)
TOTAL PROTEIN: 8.4 G/DL (ref 6.4–8.3)
TRIGL SERPL-MCNC: 109 MG/DL (ref 0–149)
TSH SERPL DL<=0.05 MIU/L-ACNC: 9.75 UIU/ML (ref 0.27–4.2)
URIC ACID, SERUM: 6.4 MG/DL (ref 3.4–7)
UROBILINOGEN, URINE: 0.2 E.U./DL
VITAMIN D 25-HYDROXY: 13 NG/ML (ref 30–100)
VLDLC SERPL CALC-MCNC: 22 MG/DL
WBC # BLD: 5.3 E9/L (ref 4.5–11.5)
WBC UA: ABNORMAL /HPF (ref 0–5)

## 2021-08-24 PROCEDURE — 82306 VITAMIN D 25 HYDROXY: CPT

## 2021-08-24 PROCEDURE — 83036 HEMOGLOBIN GLYCOSYLATED A1C: CPT

## 2021-08-24 PROCEDURE — 84153 ASSAY OF PSA TOTAL: CPT

## 2021-08-24 PROCEDURE — 85027 COMPLETE CBC AUTOMATED: CPT

## 2021-08-24 PROCEDURE — 82105 ALPHA-FETOPROTEIN SERUM: CPT

## 2021-08-24 PROCEDURE — 81001 URINALYSIS AUTO W/SCOPE: CPT

## 2021-08-24 PROCEDURE — 84443 ASSAY THYROID STIM HORMONE: CPT

## 2021-08-24 PROCEDURE — 86341 ISLET CELL ANTIBODY: CPT

## 2021-08-24 PROCEDURE — 83970 ASSAY OF PARATHORMONE: CPT

## 2021-08-24 PROCEDURE — 36415 COLL VENOUS BLD VENIPUNCTURE: CPT

## 2021-08-24 PROCEDURE — 80061 LIPID PANEL: CPT

## 2021-08-24 PROCEDURE — 80053 COMPREHEN METABOLIC PANEL: CPT

## 2021-08-24 PROCEDURE — 82570 ASSAY OF URINE CREATININE: CPT

## 2021-08-24 PROCEDURE — 84550 ASSAY OF BLOOD/URIC ACID: CPT

## 2021-08-24 PROCEDURE — 85610 PROTHROMBIN TIME: CPT

## 2021-08-26 ENCOUNTER — HOSPITAL ENCOUNTER (OUTPATIENT)
Age: 71
Discharge: HOME | End: 2021-08-26
Payer: COMMERCIAL

## 2021-08-26 LAB
AFP-TUMOR MARKER: 8 NG/ML (ref 0–9)
ANION GAP SERPL CALCULATED.3IONS-SCNC: 19 MMOL/L (ref 7–16)
BUN BLDV-MCNC: 84 MG/DL (ref 6–23)
CALCIUM SERPL-MCNC: 7 MG/DL (ref 8.6–10.2)
CHLORIDE BLD-SCNC: 100 MMOL/L (ref 98–107)
CO2: 17 MMOL/L (ref 22–29)
CREAT SERPL-MCNC: 8.9 MG/DL (ref 0.7–1.2)
GFR AFRICAN AMERICAN: 7
GFR NON-AFRICAN AMERICAN: 7 ML/MIN/1.73
GLUCOSE BLD-MCNC: 69 MG/DL (ref 74–99)
POTASSIUM SERPL-SCNC: 4.8 MMOL/L (ref 3.5–5)
SODIUM BLD-SCNC: 136 MMOL/L (ref 132–146)

## 2021-08-26 PROCEDURE — 80048 BASIC METABOLIC PNL TOTAL CA: CPT

## 2021-08-26 PROCEDURE — 36415 COLL VENOUS BLD VENIPUNCTURE: CPT

## 2021-08-29 LAB
Lab: NORMAL
REPORT: NORMAL
THIS TEST SENT TO: NORMAL

## 2021-08-30 ENCOUNTER — HOSPITAL ENCOUNTER (OUTPATIENT)
Dept: ULTRASOUND IMAGING | Age: 71
Discharge: HOME | End: 2021-09-01
Payer: COMMERCIAL

## 2021-08-30 DIAGNOSIS — K74.60 HEPATIC CIRRHOSIS, UNSPECIFIED HEPATIC CIRRHOSIS TYPE, UNSPECIFIED WHETHER ASCITES PRESENT (HCC): ICD-10-CM

## 2021-08-30 DIAGNOSIS — B18.2 CHRONIC HEPATITIS C WITHOUT HEPATIC COMA (HCC): ICD-10-CM

## 2021-08-30 PROCEDURE — 76705 ECHO EXAM OF ABDOMEN: CPT

## 2021-11-12 ENCOUNTER — HOSPITAL ENCOUNTER (OUTPATIENT)
Age: 71
Discharge: HOME | End: 2021-11-12
Payer: COMMERCIAL

## 2021-11-12 LAB
ALBUMIN SERPL-MCNC: 3.6 G/DL (ref 3.5–5.2)
ANION GAP SERPL CALCULATED.3IONS-SCNC: 18 MMOL/L (ref 7–16)
BASOPHILS ABSOLUTE: 0.06 E9/L (ref 0–0.2)
BASOPHILS RELATIVE PERCENT: 1.1 % (ref 0–2)
BUN BLDV-MCNC: 81 MG/DL (ref 6–23)
CALCIUM SERPL-MCNC: 7.6 MG/DL (ref 8.6–10.2)
CHLORIDE BLD-SCNC: 108 MMOL/L (ref 98–107)
CHOLESTEROL, FASTING: 230 MG/DL (ref 0–199)
CO2: 14 MMOL/L (ref 22–29)
CREAT SERPL-MCNC: 7.8 MG/DL (ref 0.7–1.2)
CREATININE URINE: 89 MG/DL (ref 40–278)
EOSINOPHILS ABSOLUTE: 0.23 E9/L (ref 0.05–0.5)
EOSINOPHILS RELATIVE PERCENT: 4.3 % (ref 0–6)
FERRITIN: 425 NG/ML
GFR AFRICAN AMERICAN: 8
GFR NON-AFRICAN AMERICAN: 8 ML/MIN/1.73
GLUCOSE BLD-MCNC: 114 MG/DL (ref 74–99)
HBA1C MFR BLD: 5.3 % (ref 4–5.6)
HCT VFR BLD CALC: 23.7 % (ref 37–54)
HDLC SERPL-MCNC: 53 MG/DL
HEMOGLOBIN: 8 G/DL (ref 12.5–16.5)
IMMATURE GRANULOCYTES #: 0.01 E9/L
IMMATURE GRANULOCYTES %: 0.2 % (ref 0–5)
IRON SATURATION: 16 % (ref 20–55)
IRON: 38 MCG/DL (ref 59–158)
LDL CHOLESTEROL CALCULATED: 147 MG/DL (ref 0–99)
LYMPHOCYTES ABSOLUTE: 0.66 E9/L (ref 1.5–4)
LYMPHOCYTES RELATIVE PERCENT: 12.3 % (ref 20–42)
MCH RBC QN AUTO: 31.6 PG (ref 26–35)
MCHC RBC AUTO-ENTMCNC: 33.8 % (ref 32–34.5)
MCV RBC AUTO: 93.7 FL (ref 80–99.9)
MICROALBUMIN UR-MCNC: 3516.4 MG/L
MICROALBUMIN/CREAT UR-RTO: 3951 (ref 0–30)
MONOCYTES ABSOLUTE: 0.65 E9/L (ref 0.1–0.95)
MONOCYTES RELATIVE PERCENT: 12.1 % (ref 2–12)
NEUTROPHILS ABSOLUTE: 3.77 E9/L (ref 1.8–7.3)
NEUTROPHILS RELATIVE PERCENT: 70 % (ref 43–80)
PARATHYROID HORMONE INTACT: 420 PG/ML (ref 15–65)
PDW BLD-RTO: 15 FL (ref 11.5–15)
PHOSPHORUS: 5.9 MG/DL (ref 2.5–4.5)
PLATELET # BLD: 144 E9/L (ref 130–450)
PMV BLD AUTO: 10 FL (ref 7–12)
POTASSIUM SERPL-SCNC: 4.9 MMOL/L (ref 3.5–5)
RBC # BLD: 2.53 E12/L (ref 3.8–5.8)
SODIUM BLD-SCNC: 140 MMOL/L (ref 132–146)
TOTAL IRON BINDING CAPACITY: 240 MCG/DL (ref 250–450)
TRIGLYCERIDE, FASTING: 150 MG/DL (ref 0–149)
URIC ACID, SERUM: 9.7 MG/DL (ref 3.4–7)
VITAMIN D 25-HYDROXY: 13 NG/ML (ref 30–100)
VLDLC SERPL CALC-MCNC: 30 MG/DL
WBC # BLD: 5.4 E9/L (ref 4.5–11.5)

## 2021-11-12 PROCEDURE — 82728 ASSAY OF FERRITIN: CPT

## 2021-11-12 PROCEDURE — 83036 HEMOGLOBIN GLYCOSYLATED A1C: CPT

## 2021-11-12 PROCEDURE — 85025 COMPLETE CBC W/AUTO DIFF WBC: CPT

## 2021-11-12 PROCEDURE — 83550 IRON BINDING TEST: CPT

## 2021-11-12 PROCEDURE — 83970 ASSAY OF PARATHORMONE: CPT

## 2021-11-12 PROCEDURE — 80061 LIPID PANEL: CPT

## 2021-11-12 PROCEDURE — 80069 RENAL FUNCTION PANEL: CPT

## 2021-11-12 PROCEDURE — 82044 UR ALBUMIN SEMIQUANTITATIVE: CPT

## 2021-11-12 PROCEDURE — 82570 ASSAY OF URINE CREATININE: CPT

## 2021-11-12 PROCEDURE — 83540 ASSAY OF IRON: CPT

## 2021-11-12 PROCEDURE — 36415 COLL VENOUS BLD VENIPUNCTURE: CPT

## 2021-11-12 PROCEDURE — 84550 ASSAY OF BLOOD/URIC ACID: CPT

## 2021-11-12 PROCEDURE — 82306 VITAMIN D 25 HYDROXY: CPT

## 2021-11-19 ENCOUNTER — APPOINTMENT (OUTPATIENT)
Dept: GENERAL RADIOLOGY | Age: 71
DRG: 673 | End: 2021-11-19
Payer: MEDICARE

## 2021-11-19 ENCOUNTER — HOSPITAL ENCOUNTER (INPATIENT)
Age: 71
LOS: 4 days | Discharge: HOME | DRG: 673 | End: 2021-11-23
Attending: EMERGENCY MEDICINE | Admitting: INTERNAL MEDICINE
Payer: MEDICARE

## 2021-11-19 DIAGNOSIS — E87.20 METABOLIC ACIDOSIS: Primary | ICD-10-CM

## 2021-11-19 DIAGNOSIS — R77.8 ELEVATED TROPONIN: ICD-10-CM

## 2021-11-19 DIAGNOSIS — R06.02 SHORTNESS OF BREATH: ICD-10-CM

## 2021-11-19 DIAGNOSIS — N18.6 ESRD (END STAGE RENAL DISEASE) (HCC): ICD-10-CM

## 2021-11-19 DIAGNOSIS — I10 HYPERTENSION, UNSPECIFIED TYPE: ICD-10-CM

## 2021-11-19 PROBLEM — N18.9 ACUTE KIDNEY INJURY SUPERIMPOSED ON CKD (HCC): Status: ACTIVE | Noted: 2021-11-19

## 2021-11-19 PROBLEM — I16.1 HYPERTENSIVE EMERGENCY: Status: ACTIVE | Noted: 2021-11-19

## 2021-11-19 PROBLEM — E87.70 VOLUME OVERLOAD: Status: ACTIVE | Noted: 2021-11-19

## 2021-11-19 PROBLEM — J96.01 ACUTE RESPIRATORY FAILURE WITH HYPOXIA (HCC): Status: ACTIVE | Noted: 2021-11-19

## 2021-11-19 PROBLEM — E87.5 HYPERKALEMIA: Status: ACTIVE | Noted: 2021-11-19

## 2021-11-19 PROBLEM — R79.89 ELEVATED TROPONIN: Status: ACTIVE | Noted: 2021-11-19

## 2021-11-19 PROBLEM — N17.9 ACUTE KIDNEY INJURY SUPERIMPOSED ON CKD (HCC): Status: ACTIVE | Noted: 2021-11-19

## 2021-11-19 LAB
ALBUMIN SERPL-MCNC: 4.1 G/DL (ref 3.5–5.2)
ALP BLD-CCNC: 115 U/L (ref 40–129)
ALT SERPL-CCNC: 12 U/L (ref 0–40)
ANION GAP SERPL CALCULATED.3IONS-SCNC: 19 MMOL/L (ref 7–16)
AST SERPL-CCNC: 25 U/L (ref 0–39)
B.E.: -13.2 MMOL/L (ref -3–3)
BASOPHILS ABSOLUTE: 0.06 E9/L (ref 0–0.2)
BASOPHILS RELATIVE PERCENT: 1 % (ref 0–2)
BILIRUB SERPL-MCNC: 0.3 MG/DL (ref 0–1.2)
BUN BLDV-MCNC: 80 MG/DL (ref 6–23)
CALCIUM SERPL-MCNC: 8 MG/DL (ref 8.6–10.2)
CHLORIDE BLD-SCNC: 104 MMOL/L (ref 98–107)
CHP ED QC CHECK: NORMAL
CO2: 14 MMOL/L (ref 22–29)
COHB: 0.2 % (ref 0–1.5)
CREAT SERPL-MCNC: 7.9 MG/DL (ref 0.7–1.2)
CRITICAL: ABNORMAL
DATE ANALYZED: ABNORMAL
DATE OF COLLECTION: ABNORMAL
EOSINOPHILS ABSOLUTE: 0.03 E9/L (ref 0.05–0.5)
EOSINOPHILS RELATIVE PERCENT: 0.5 % (ref 0–6)
GFR AFRICAN AMERICAN: 8
GFR NON-AFRICAN AMERICAN: 8 ML/MIN/1.73
GLUCOSE BLD-MCNC: 148 MG/DL
GLUCOSE BLD-MCNC: 154 MG/DL (ref 74–99)
GLUCOSE BLD-MCNC: 215 MG/DL
GLUCOSE BLD-MCNC: 72 MG/DL
GLUCOSE BLD-MCNC: 90 MG/DL
HCO3: 12.9 MMOL/L (ref 22–26)
HCT VFR BLD CALC: 27.2 % (ref 37–54)
HEMOGLOBIN: 8.9 G/DL (ref 12.5–16.5)
HHB: 6 % (ref 0–5)
IMMATURE GRANULOCYTES #: 0.02 E9/L
IMMATURE GRANULOCYTES %: 0.3 % (ref 0–5)
LAB: ABNORMAL
LACTIC ACID, SEPSIS: 1.7 MMOL/L (ref 0.5–1.9)
LYMPHOCYTES ABSOLUTE: 0.74 E9/L (ref 1.5–4)
LYMPHOCYTES RELATIVE PERCENT: 12.3 % (ref 20–42)
Lab: ABNORMAL
MCH RBC QN AUTO: 30.8 PG (ref 26–35)
MCHC RBC AUTO-ENTMCNC: 32.7 % (ref 32–34.5)
MCV RBC AUTO: 94.1 FL (ref 80–99.9)
METER GLUCOSE: 148 MG/DL (ref 74–99)
METER GLUCOSE: 215 MG/DL (ref 74–99)
METER GLUCOSE: 72 MG/DL (ref 74–99)
METER GLUCOSE: 90 MG/DL (ref 74–99)
METHB: 0.2 % (ref 0–1.5)
MODE: ABNORMAL
MONOCYTES ABSOLUTE: 0.44 E9/L (ref 0.1–0.95)
MONOCYTES RELATIVE PERCENT: 7.3 % (ref 2–12)
NEUTROPHILS ABSOLUTE: 4.75 E9/L (ref 1.8–7.3)
NEUTROPHILS RELATIVE PERCENT: 78.6 % (ref 43–80)
O2 CONTENT: 11.4 ML/DL
O2 SATURATION: 94 % (ref 92–98.5)
O2HB: 93.6 % (ref 94–97)
OPERATOR ID: 1741
PATIENT TEMP: 37 C
PCO2: 30.2 MMHG (ref 35–45)
PDW BLD-RTO: 15.6 FL (ref 11.5–15)
PH BLOOD GAS: 7.25 (ref 7.35–7.45)
PLATELET # BLD: 311 E9/L (ref 130–450)
PMV BLD AUTO: 9.9 FL (ref 7–12)
PO2: 81.7 MMHG (ref 75–100)
POTASSIUM REFLEX MAGNESIUM: 5.8 MMOL/L (ref 3.5–5)
PRO-BNP: ABNORMAL PG/ML (ref 0–125)
RBC # BLD: 2.89 E12/L (ref 3.8–5.8)
SARS-COV-2, NAAT: NOT DETECTED
SODIUM BLD-SCNC: 137 MMOL/L (ref 132–146)
SOURCE, BLOOD GAS: ABNORMAL
THB: 8.6 G/DL (ref 11.5–16.5)
TIME ANALYZED: 1609
TOTAL PROTEIN: 8.6 G/DL (ref 6.4–8.3)
TROPONIN, HIGH SENSITIVITY: 864 NG/L (ref 0–11)
WBC # BLD: 6 E9/L (ref 4.5–11.5)

## 2021-11-19 PROCEDURE — 2060000000 HC ICU INTERMEDIATE R&B

## 2021-11-19 PROCEDURE — 82805 BLOOD GASES W/O2 SATURATION: CPT

## 2021-11-19 PROCEDURE — 5A1D70Z PERFORMANCE OF URINARY FILTRATION, INTERMITTENT, LESS THAN 6 HOURS PER DAY: ICD-10-PCS | Performed by: EMERGENCY MEDICINE

## 2021-11-19 PROCEDURE — 96374 THER/PROPH/DIAG INJ IV PUSH: CPT

## 2021-11-19 PROCEDURE — 90935 HEMODIALYSIS ONE EVALUATION: CPT

## 2021-11-19 PROCEDURE — 87635 SARS-COV-2 COVID-19 AMP PRB: CPT

## 2021-11-19 PROCEDURE — 87040 BLOOD CULTURE FOR BACTERIA: CPT

## 2021-11-19 PROCEDURE — 83880 ASSAY OF NATRIURETIC PEPTIDE: CPT

## 2021-11-19 PROCEDURE — 99223 1ST HOSP IP/OBS HIGH 75: CPT | Performed by: INTERNAL MEDICINE

## 2021-11-19 PROCEDURE — 82962 GLUCOSE BLOOD TEST: CPT

## 2021-11-19 PROCEDURE — 96375 TX/PRO/DX INJ NEW DRUG ADDON: CPT

## 2021-11-19 PROCEDURE — 93005 ELECTROCARDIOGRAM TRACING: CPT | Performed by: STUDENT IN AN ORGANIZED HEALTH CARE EDUCATION/TRAINING PROGRAM

## 2021-11-19 PROCEDURE — 6360000002 HC RX W HCPCS: Performed by: EMERGENCY MEDICINE

## 2021-11-19 PROCEDURE — 94660 CPAP INITIATION&MGMT: CPT

## 2021-11-19 PROCEDURE — 83605 ASSAY OF LACTIC ACID: CPT

## 2021-11-19 PROCEDURE — 99284 EMERGENCY DEPT VISIT MOD MDM: CPT

## 2021-11-19 PROCEDURE — 2500000003 HC RX 250 WO HCPCS: Performed by: EMERGENCY MEDICINE

## 2021-11-19 PROCEDURE — 2580000003 HC RX 258: Performed by: STUDENT IN AN ORGANIZED HEALTH CARE EDUCATION/TRAINING PROGRAM

## 2021-11-19 PROCEDURE — 36556 INSERT NON-TUNNEL CV CATH: CPT

## 2021-11-19 PROCEDURE — 2500000003 HC RX 250 WO HCPCS: Performed by: STUDENT IN AN ORGANIZED HEALTH CARE EDUCATION/TRAINING PROGRAM

## 2021-11-19 PROCEDURE — 71046 X-RAY EXAM CHEST 2 VIEWS: CPT

## 2021-11-19 PROCEDURE — 85025 COMPLETE CBC W/AUTO DIFF WBC: CPT

## 2021-11-19 PROCEDURE — 80053 COMPREHEN METABOLIC PANEL: CPT

## 2021-11-19 PROCEDURE — 84484 ASSAY OF TROPONIN QUANT: CPT

## 2021-11-19 PROCEDURE — 6370000000 HC RX 637 (ALT 250 FOR IP): Performed by: EMERGENCY MEDICINE

## 2021-11-19 RX ORDER — AMLODIPINE BESYLATE 5 MG/1
10 TABLET ORAL DAILY
Status: DISCONTINUED | OUTPATIENT
Start: 2021-11-20 | End: 2021-11-20 | Stop reason: ALTCHOICE

## 2021-11-19 RX ORDER — DEXTROSE MONOHYDRATE 25 G/50ML
12.5 INJECTION, SOLUTION INTRAVENOUS PRN
Status: DISCONTINUED | OUTPATIENT
Start: 2021-11-19 | End: 2021-11-20 | Stop reason: SDUPTHER

## 2021-11-19 RX ORDER — NICOTINE POLACRILEX 4 MG
15 LOZENGE BUCCAL PRN
Status: DISCONTINUED | OUTPATIENT
Start: 2021-11-19 | End: 2021-11-20 | Stop reason: SDUPTHER

## 2021-11-19 RX ORDER — BUMETANIDE 0.25 MG/ML
2 INJECTION, SOLUTION INTRAMUSCULAR; INTRAVENOUS ONCE
Status: COMPLETED | OUTPATIENT
Start: 2021-11-19 | End: 2021-11-19

## 2021-11-19 RX ORDER — DEXTROSE MONOHYDRATE 25 G/50ML
25 INJECTION, SOLUTION INTRAVENOUS ONCE
Status: COMPLETED | OUTPATIENT
Start: 2021-11-19 | End: 2021-11-19

## 2021-11-19 RX ORDER — CARVEDILOL 6.25 MG/1
6.25 TABLET ORAL 2 TIMES DAILY WITH MEALS
Status: DISCONTINUED | OUTPATIENT
Start: 2021-11-20 | End: 2021-11-23 | Stop reason: HOSPADM

## 2021-11-19 RX ORDER — CALCIUM GLUCONATE 94 MG/ML
1000 INJECTION, SOLUTION INTRAVENOUS ONCE
Status: COMPLETED | OUTPATIENT
Start: 2021-11-19 | End: 2021-11-19

## 2021-11-19 RX ORDER — BUMETANIDE 1 MG/1
2 TABLET ORAL 2 TIMES DAILY
Status: DISCONTINUED | OUTPATIENT
Start: 2021-11-20 | End: 2021-11-23 | Stop reason: HOSPADM

## 2021-11-19 RX ORDER — DEXTROSE MONOHYDRATE 50 MG/ML
100 INJECTION, SOLUTION INTRAVENOUS PRN
Status: DISCONTINUED | OUTPATIENT
Start: 2021-11-19 | End: 2021-11-20 | Stop reason: SDUPTHER

## 2021-11-19 RX ADMIN — BUMETANIDE 1 MG/HR: 0.25 INJECTION INTRAMUSCULAR; INTRAVENOUS at 17:51

## 2021-11-19 RX ADMIN — DEXTROSE MONOHYDRATE 25 G: 25 INJECTION, SOLUTION INTRAVENOUS at 17:33

## 2021-11-19 RX ADMIN — INSULIN HUMAN 10 UNITS: 100 INJECTION, SOLUTION PARENTERAL at 17:34

## 2021-11-19 RX ADMIN — BUMETANIDE 2 MG: 0.25 INJECTION INTRAMUSCULAR; INTRAVENOUS at 17:44

## 2021-11-19 RX ADMIN — SODIUM BICARBONATE: 84 INJECTION, SOLUTION INTRAVENOUS at 17:51

## 2021-11-19 RX ADMIN — CALCIUM GLUCONATE 1000 MG: 98 INJECTION, SOLUTION INTRAVENOUS at 17:30

## 2021-11-19 RX ADMIN — SODIUM BICARBONATE 50 MEQ: 84 INJECTION, SOLUTION INTRAVENOUS at 17:41

## 2021-11-19 ASSESSMENT — ENCOUNTER SYMPTOMS
BACK PAIN: 0
SHORTNESS OF BREATH: 1
SORE THROAT: 0
ABDOMINAL PAIN: 0
EYE PAIN: 0
SINUS PAIN: 0
NAUSEA: 0
COUGH: 1
VOMITING: 0
DIARRHEA: 0
RHINORRHEA: 0
CHEST TIGHTNESS: 0

## 2021-11-19 ASSESSMENT — PAIN DESCRIPTION - LOCATION: LOCATION: ABDOMEN

## 2021-11-19 NOTE — ED PROVIDER NOTES
25-year-old male recently diagnosed with end-stage renal disease presents today to the emergency department with complaints of shortness of breath onset 36 hours prior to arrival.  Symptoms are moderate in severity, sudden in onset and worse when laying down, better when sitting up. He has never had similar symptoms in the past.  He states that for the past couple weeks he has had mild URI symptoms which has been slowly getting better supportive care at home. Denies any CP, fevers or chills. He was seen by a nephrologist and informed that he needs to start on dialysis but he has elected to delay this as he would like a second opinion. He states that his diabetes has been well controlled and he is compliant with his other medications. He is not on any blood thinners and has not been vaccinated for COVID-19. He has never required oxygen. Review of Systems   Constitutional: Negative for chills, diaphoresis and fever. HENT: Negative for ear pain, rhinorrhea, sinus pain and sore throat. Eyes: Negative for pain. Respiratory: Positive for cough and shortness of breath. Negative for chest tightness. Cardiovascular: Negative for chest pain and leg swelling. Gastrointestinal: Negative for abdominal pain, diarrhea, nausea and vomiting. Genitourinary: Negative for dysuria, flank pain and frequency. Musculoskeletal: Negative for back pain, neck pain and neck stiffness. Neurological: Negative for dizziness, weakness, light-headedness and headaches. Psychiatric/Behavioral: Negative for agitation and confusion. Physical Exam  Vitals reviewed. Constitutional:       General: He is not in acute distress. Appearance: Normal appearance. He is not toxic-appearing. HENT:      Head: Normocephalic and atraumatic. Nose: No congestion or rhinorrhea. Eyes:      General: No scleral icterus. Right eye: No discharge. Left eye: No discharge.       Extraocular Movements: Extraocular movements intact. Pupils: Pupils are equal, round, and reactive to light. Cardiovascular:      Rate and Rhythm: Normal rate and regular rhythm. Heart sounds: Normal heart sounds. No murmur heard. No friction rub. No gallop. Pulmonary:      Effort: Pulmonary effort is normal. No respiratory distress. Breath sounds: No wheezing, rhonchi or rales. Abdominal:      General: Abdomen is flat. There is no distension. Tenderness: There is no abdominal tenderness. Musculoskeletal:         General: No deformity or signs of injury. Cervical back: Normal range of motion and neck supple. No rigidity or tenderness. Right lower leg: No edema. Left lower leg: No edema. Comments: Bilateral lower extremity edema   Skin:     General: Skin is warm and dry. Coloration: Skin is not jaundiced or pale. Neurological:      General: No focal deficit present. Mental Status: He is alert. Psychiatric:         Mood and Affect: Mood normal.          Procedures     PROCEDURE  11/19/21       Time: 2200    CENTRAL LINE INSERTION  Risks, benefits and alternatives (for applicable procedures below) described. Performed By: EM Attending Physician and EM Resident. Indication: Dialysis. Informed consent: Verbal consent obtained. The patient was counseled regarding the procedure in person, it's indications, risks, potential complications and alternatives and any questions were answered. Verbal consent was obtained. Procedure: After routine sterile preparation, local anesthesia obtained by infiltration using 1% Lidocaine without epinephrine. A right 3-Lumen 7F Central Venous Catheter was placed by femoral vein approach and secured by standard fashion. Ultrasound Guidance:   used. Number of Attempts: 1  Post-procedure Findings: A post procedural chest x-ray  was not indicated. Patient tolerated the procedure well. EKG: This EKG is signed and interpreted by me. Rate: 97  Rhythm: Sinus  Interpretation: T wave flattening, nonspecific. Normal intervals   Comparison: changes compared to previous EKG. MDM  Number of Diagnoses or Management Options  Elevated troponin  ESRD (end stage renal disease) (HCC)  Hypertension, unspecified type  Metabolic acidosis  Shortness of breath  Diagnosis management comments: This is a 65-year-old male with a past medical history of hypertension and diabetes, recently diagnosed with end-stage renal disease who presents today to the emergency department with complaints of shortness of breath onset 36 hours prior to arrival.  He states that he was that he needs to start dialysis but has elected to delay this as he would like a second opinion. On arrival to the emergency department patient is hypertensive with a blood pressure of 200/118. He was placed on 6 L of oxygen nasal cannula as he was satting 86% on room air. On exam he has bilateral lower extremity edema. Lungs are clear to auscultation bilaterally. Otherwise normal physical exam.  Lab work was obtained and CBC a hemoglobin of 8.9. This is improved from his previous hemoglobin 1 week ago which was 8. CMP showed hyperkalemia with a potassium of 5.8. BUN of 80 and creatinine elevated at 7.9. BG shows metabolic acidosis. Discussed findings with Dr. Enedina Padilla, nephrology. Recommended starting the patient on Bumex as well as bicarb and units of insulin. Hyperkalemia protocols were started. Patient will ultimately need a dialysis catheter to be initiated on dialysis in the emergency department. Discussed this with the patient and he agrees to dialysis at this time. Patient was reporting increased work of breathing. He was placed on BiPAP. He remains hemodynamically stable at this time. Alysis catheter was inserted in the emergency department. Patient was admitted to medicine for further management.        ED Course as of 11/19/21 1703   Fri Nov 19, 2021   1640 Labs reviewed. Call placed to Nephrology team [BP]   1648 Bumex drip   Hyperkalemia   Every   Loera catch   Bicarb 1 amp   2 mg bumex x1   1 mg bumex x hr   Localma   Bicarb drip 30 cc hr   10 units of insulin   BNP repeat 2 hours   Accucheck navdeep hour  [NS]      ED Course User Index  [BP] Romario Mendes DO  [NS] Sonia PriceDO          ED Course as of 11/19/21 2215 Fri Nov 19, 2021   1640 Labs reviewed. Call placed to Nephrology team [BP]   1648 Bumex drip   Hyperkalemia   Every   Loera catch   Bicarb 1 amp   2 mg bumex x1   1 mg bumex x hr   Localma   Bicarb drip 30 cc hr   10 units of insulin   BNP repeat 2 hours   Accucheck navdeep hour  [NS]      ED Course User Index  [BP] Romario Mendes DO  [NS] Sonia Price        --------------------------------------------- PAST HISTORY ---------------------------------------------  Past Medical History:  has a past medical history of Chronic kidney disease, Diabetes mellitus ([address] Hepatitis C, Hypertension, Liver disease, Thyroid disease, and Unspecified diseases of blood and blood-forming organs. Past Surgical History:  has a past surgical history that includes Tonsillectomy. Social History:  reports that he quit smoking about 48 years ago. His smoking use included cigarettes. He has a 20.00 pack-year smoking history. He has never used smokeless tobacco. He reports current drug use. He reports that he does not drink alcohol. Family History: family history includes Cancer in his father; Diabetes in his mother. The patients home medications have been reviewed.     Allergies: Penicillins    -------------------------------------------------- RESULTS -------------------------------------------------    LABS:  Results for orders placed or performed during the hospital encounter of 11/19/21   COVID-19, Rapid    Specimen: Nasopharyngeal Swab   Result Value Ref Range    SARS-CoV-2, NAAT Not Detected Not Detected   CBC Auto Differential   Result Value Ref Range WBC 6.0 4.5 - 11.5 E9/L    RBC 2.89 (L) 3.80 - 5.80 E12/L    Hemoglobin 8.9 (L) 12.5 - 16.5 g/dL    Hematocrit 27.2 (L) 37.0 - 54.0 %    MCV 94.1 80.0 - 99.9 fL    MCH 30.8 26.0 - 35.0 pg    MCHC 32.7 32.0 - 34.5 %    RDW 15.6 (H) 11.5 - 15.0 fL    Platelets 586 369 - 353 E9/L    MPV 9.9 7.0 - 12.0 fL    Neutrophils % 78.6 43.0 - 80.0 %    Immature Granulocytes % 0.3 0.0 - 5.0 %    Lymphocytes % 12.3 (L) 20.0 - 42.0 %    Monocytes % 7.3 2.0 - 12.0 %    Eosinophils % 0.5 0.0 - 6.0 %    Basophils % 1.0 0.0 - 2.0 %    Neutrophils Absolute 4.75 1.80 - 7.30 E9/L    Immature Granulocytes # 0.02 E9/L    Lymphocytes Absolute 0.74 (L) 1.50 - 4.00 E9/L    Monocytes Absolute 0.44 0.10 - 0.95 E9/L    Eosinophils Absolute 0.03 (L) 0.05 - 0.50 E9/L    Basophils Absolute 0.06 0.00 - 0.20 E9/L   Comprehensive Metabolic Panel w/ Reflex to MG   Result Value Ref Range    Sodium 137 132 - 146 mmol/L    Potassium reflex Magnesium 5.8 (H) 3.5 - 5.0 mmol/L    Chloride 104 98 - 107 mmol/L    CO2 14 (L) 22 - 29 mmol/L    Anion Gap 19 (H) 7 - 16 mmol/L    Glucose 154 (H) 74 - 99 mg/dL    BUN 80 (H) 6 - 23 mg/dL    CREATININE 7.9 (H) 0.7 - 1.2 mg/dL    GFR Non-African American 8 >=60 mL/min/1.73    GFR African American 8     Calcium 8.0 (L) 8.6 - 10.2 mg/dL    Total Protein 8.6 (H) 6.4 - 8.3 g/dL    Albumin 4.1 3.5 - 5.2 g/dL    Total Bilirubin 0.3 0.0 - 1.2 mg/dL    Alkaline Phosphatase 115 40 - 129 U/L    ALT 12 0 - 40 U/L    AST 25 0 - 39 U/L   Troponin   Result Value Ref Range    Troponin, High Sensitivity 864 (H) 0 - 11 ng/L   Brain Natriuretic Peptide   Result Value Ref Range    Pro-BNP >70,000 (H) 0 - 125 pg/mL   Lactate, Sepsis   Result Value Ref Range    Lactic Acid, Sepsis 1.7 0.5 - 1.9 mmol/L   Blood Gas, Arterial   Result Value Ref Range    Date Analyzed [id number]     Time Analyzed 1609     Source: Blood Arterial     pH, Blood Gas 7.248 (L) 7.350 - 7.450    PCO2 30.2 (L) 35.0 - 45.0 mmHg    PO2 81.7 75.0 - 100.0 mmHg    HCO3 12.9 (L) 22.0 - 26.0 mmol/L    B.E. -13.2 (L) -3.0 - 3.0 mmol/L    O2 Sat 94.0 92.0 - 98.5 %    O2Hb 93.6 (L) 94.0 - 97.0 %    COHb 0.2 0.0 - 1.5 %    MetHb 0.2 0.0 - 1.5 %    O2 Content 11.4 mL/dL    HHb 6.0 (H) 0.0 - 5.0 %    tHb (est) 8.6 (L) 11.5 - 16.5 g/dL    Mode NC-  6L     Date Of Collection      Time Collected      Pt Temp 37.0 C     ID [id number]     Lab [id number]     Critical(s) Notified . No Critical Values    POCT Glucose   Result Value Ref Range    Glucose 148 mg/dL    QC OK? ok    POCT Glucose   Result Value Ref Range    Meter Glucose 148 (H) 74 - 99 mg/dL   POCT Glucose   Result Value Ref Range    Glucose 215 mg/dL    QC OK? ok    POCT Glucose   Result Value Ref Range    Glucose 90 mg/dL    QC OK? ok    POCT Glucose   Result Value Ref Range    Meter Glucose 215 (H) 74 - 99 mg/dL   POCT Glucose   Result Value Ref Range    Meter Glucose 90 74 - 99 mg/dL   EKG 12 Lead   Result Value Ref Range    Ventricular Rate 97 BPM    Atrial Rate 97 BPM    P-R Interval 146 ms    QRS Duration 90 ms    Q-T Interval 388 ms    QTc Calculation (Bazett) 492 ms    P Axis 37 degrees    R Axis -28 degrees    T Axis 111 degrees       RADIOLOGY:  XR CHEST (2 VW)   Final Result   Pulmonary edema although superimposed pneumonia at the lung bases cannot be   excluded. Bilateral pleural effusions. Enlargement of the cardiac silhouette which is new and consistent with   cardiomegaly and/or pericardial effusion.                 ------------------------- NURSING NOTES AND VITALS REVIEWED ---------------------------  Date / Time Roomed:  11/19/2021  2:49 PM  ED Bed Assignment:  16/16    The nursing notes within the ED encounter and vital signs as below have been reviewed.      Patient Vitals for the past 24 hrs:   BP Temp Temp src Pulse Resp SpO2 Height Weight   11/19/21 2047 (!) 180/102 -- -- 91 -- 98 % -- --   11/19/21 1950 (!) 170/122 -- -- 101 20 98 % -- --   11/19/21 1947 -- -- -- -- 20 -- -- --   11/19/21 1814 (!) 193/117 -- -- 107 -- 96 % -- --   11/19/21 1745 (!) 202/107 -- -- 107 -- -- -- --   11/19/21 1503 -- -- -- -- -- 95 % -- --   11/19/21 1502 -- -- -- -- -- (!) 89 % -- --   11/19/21 1458 (!) 200/118 97.6 °F (36.4 °C) Oral 98 22 -- 6' 2\" (1.88 m) 155 lb (70.3 kg)       Oxygen Saturation Interpretation: Normal    ------------------------------------------ PROGRESS NOTES ------------------------------------------  Re-evaluation(s):  Time: 2000  Patients symptoms show no change  Repeat physical examination is not changed    Counseling:  I have spoken with the patient and discussed todays results, in addition to providing specific details for the plan of care and counseling regarding the diagnosis and prognosis. Their questions are answered at this time and they are agreeable with the plan of admission.    --------------------------------- ADDITIONAL PROVIDER NOTES ---------------------------------  Consultations:  Time: 2300. Spoke with Dr. Neftaly Daley. Discussed case. They will admit the patient. This patient's ED course included: a personal history and physicial examination, re-evaluation prior to disposition, multiple bedside re-evaluations, IV medications, cardiac monitoring, continuous pulse oximetry and complex medical decision making and emergency management    This patient has remained hemodynamically stable during their ED course. Diagnosis:  1. Metabolic acidosis    2. ESRD (end stage renal disease) ([address]    3. Shortness of breath    4. Elevated troponin    5. Hypertension, unspecified type        Disposition:  Patient's disposition: Admit to med/surg floor  Patient's condition is stable.          Paulina Nolasco DO  Resident  11/19/21 2886

## 2021-11-20 ENCOUNTER — ANESTHESIA EVENT (OUTPATIENT)
Dept: OPERATING ROOM | Age: 71
DRG: 673 | End: 2021-11-20
Payer: MEDICARE

## 2021-11-20 LAB
ALBUMIN SERPL-MCNC: 3.2 G/DL (ref 3.5–5.2)
ANION GAP SERPL CALCULATED.3IONS-SCNC: 14 MMOL/L (ref 7–16)
ANION GAP SERPL CALCULATED.3IONS-SCNC: 21 MMOL/L (ref 7–16)
BASOPHILS ABSOLUTE: 0.07 E9/L (ref 0–0.2)
BASOPHILS RELATIVE PERCENT: 1.5 % (ref 0–2)
BUN BLDV-MCNC: 47 MG/DL (ref 6–23)
BUN BLDV-MCNC: 64 MG/DL (ref 6–23)
CALCIUM SERPL-MCNC: 7.6 MG/DL (ref 8.6–10.2)
CALCIUM SERPL-MCNC: 8.1 MG/DL (ref 8.6–10.2)
CHLORIDE BLD-SCNC: 102 MMOL/L (ref 98–107)
CHLORIDE BLD-SCNC: 103 MMOL/L (ref 98–107)
CO2: 15 MMOL/L (ref 22–29)
CO2: 19 MMOL/L (ref 22–29)
CREAT SERPL-MCNC: 5.7 MG/DL (ref 0.7–1.2)
CREAT SERPL-MCNC: 6.7 MG/DL (ref 0.7–1.2)
EKG ATRIAL RATE: 97 BPM
EKG P AXIS: 37 DEGREES
EKG P-R INTERVAL: 146 MS
EKG Q-T INTERVAL: 388 MS
EKG QRS DURATION: 90 MS
EKG QTC CALCULATION (BAZETT): 492 MS
EKG R AXIS: -28 DEGREES
EKG T AXIS: 111 DEGREES
EKG VENTRICULAR RATE: 97 BPM
EOSINOPHILS ABSOLUTE: 0.07 E9/L (ref 0.05–0.5)
EOSINOPHILS RELATIVE PERCENT: 1.5 % (ref 0–6)
FERRITIN: 499 NG/ML
GFR AFRICAN AMERICAN: 10
GFR AFRICAN AMERICAN: 12
GFR NON-AFRICAN AMERICAN: 10 ML/MIN/1.73
GFR NON-AFRICAN AMERICAN: 12 ML/MIN/1.73
GLUCOSE BLD-MCNC: 148 MG/DL (ref 74–99)
GLUCOSE BLD-MCNC: 212 MG/DL (ref 74–99)
HCT VFR BLD CALC: 23.4 % (ref 37–54)
HEMOGLOBIN: 7.8 G/DL (ref 12.5–16.5)
IMMATURE GRANULOCYTES #: 0.01 E9/L
IMMATURE GRANULOCYTES %: 0.2 % (ref 0–5)
IRON SATURATION: 26 % (ref 20–55)
IRON: 80 MCG/DL (ref 59–158)
LYMPHOCYTES ABSOLUTE: 0.7 E9/L (ref 1.5–4)
LYMPHOCYTES RELATIVE PERCENT: 15.2 % (ref 20–42)
MCH RBC QN AUTO: 30.6 PG (ref 26–35)
MCHC RBC AUTO-ENTMCNC: 33.3 % (ref 32–34.5)
MCV RBC AUTO: 91.8 FL (ref 80–99.9)
METER GLUCOSE: 182 MG/DL (ref 74–99)
METER GLUCOSE: 188 MG/DL (ref 74–99)
METER GLUCOSE: 200 MG/DL (ref 74–99)
METER GLUCOSE: 89 MG/DL (ref 74–99)
MONOCYTES ABSOLUTE: 0.72 E9/L (ref 0.1–0.95)
MONOCYTES RELATIVE PERCENT: 15.6 % (ref 2–12)
NEUTROPHILS ABSOLUTE: 3.04 E9/L (ref 1.8–7.3)
NEUTROPHILS RELATIVE PERCENT: 66 % (ref 43–80)
PARATHYROID HORMONE INTACT: 447 PG/ML (ref 15–65)
PDW BLD-RTO: 15.1 FL (ref 11.5–15)
PHOSPHORUS: 4.5 MG/DL (ref 2.5–4.5)
PHOSPHORUS: 5 MG/DL (ref 2.5–4.5)
PLATELET # BLD: 226 E9/L (ref 130–450)
PMV BLD AUTO: 10 FL (ref 7–12)
POTASSIUM SERPL-SCNC: 4.8 MMOL/L (ref 3.5–5)
POTASSIUM SERPL-SCNC: 5 MMOL/L (ref 3.5–5)
RBC # BLD: 2.55 E12/L (ref 3.8–5.8)
SODIUM BLD-SCNC: 135 MMOL/L (ref 132–146)
SODIUM BLD-SCNC: 139 MMOL/L (ref 132–146)
T4 FREE: 0.7 NG/DL (ref 0.93–1.7)
TOTAL IRON BINDING CAPACITY: 307 MCG/DL (ref 250–450)
TSH SERPL DL<=0.05 MIU/L-ACNC: 106.6 UIU/ML (ref 0.27–4.2)
WBC # BLD: 4.6 E9/L (ref 4.5–11.5)

## 2021-11-20 PROCEDURE — 36415 COLL VENOUS BLD VENIPUNCTURE: CPT

## 2021-11-20 PROCEDURE — 6360000002 HC RX W HCPCS: Performed by: NURSE PRACTITIONER

## 2021-11-20 PROCEDURE — 6360000002 HC RX W HCPCS: Performed by: INTERNAL MEDICINE

## 2021-11-20 PROCEDURE — 82728 ASSAY OF FERRITIN: CPT

## 2021-11-20 PROCEDURE — 2060000000 HC ICU INTERMEDIATE R&B

## 2021-11-20 PROCEDURE — 83970 ASSAY OF PARATHORMONE: CPT

## 2021-11-20 PROCEDURE — 2580000003 HC RX 258: Performed by: INTERNAL MEDICINE

## 2021-11-20 PROCEDURE — 99233 SBSQ HOSP IP/OBS HIGH 50: CPT | Performed by: INTERNAL MEDICINE

## 2021-11-20 PROCEDURE — 94660 CPAP INITIATION&MGMT: CPT

## 2021-11-20 PROCEDURE — 82962 GLUCOSE BLOOD TEST: CPT

## 2021-11-20 PROCEDURE — 83550 IRON BINDING TEST: CPT

## 2021-11-20 PROCEDURE — 83540 ASSAY OF IRON: CPT

## 2021-11-20 PROCEDURE — 2500000003 HC RX 250 WO HCPCS: Performed by: NURSE PRACTITIONER

## 2021-11-20 PROCEDURE — 84439 ASSAY OF FREE THYROXINE: CPT

## 2021-11-20 PROCEDURE — 90935 HEMODIALYSIS ONE EVALUATION: CPT

## 2021-11-20 PROCEDURE — 84443 ASSAY THYROID STIM HORMONE: CPT

## 2021-11-20 PROCEDURE — 6370000000 HC RX 637 (ALT 250 FOR IP): Performed by: INTERNAL MEDICINE

## 2021-11-20 PROCEDURE — 2580000003 HC RX 258: Performed by: NURSE PRACTITIONER

## 2021-11-20 PROCEDURE — 84100 ASSAY OF PHOSPHORUS: CPT

## 2021-11-20 PROCEDURE — P9047 ALBUMIN (HUMAN), 25%, 50ML: HCPCS | Performed by: INTERNAL MEDICINE

## 2021-11-20 PROCEDURE — APPSS30 APP SPLIT SHARED TIME 16-30 MINUTES: Performed by: PHYSICIAN ASSISTANT

## 2021-11-20 PROCEDURE — 80048 BASIC METABOLIC PNL TOTAL CA: CPT

## 2021-11-20 PROCEDURE — 85025 COMPLETE CBC W/AUTO DIFF WBC: CPT

## 2021-11-20 PROCEDURE — 80069 RENAL FUNCTION PANEL: CPT

## 2021-11-20 RX ORDER — POLYETHYLENE GLYCOL 3350 17 G/17G
17 POWDER, FOR SOLUTION ORAL DAILY PRN
Status: DISCONTINUED | OUTPATIENT
Start: 2021-11-20 | End: 2021-11-23 | Stop reason: HOSPADM

## 2021-11-20 RX ORDER — ALBUMIN (HUMAN) 12.5 G/50ML
25 SOLUTION INTRAVENOUS ONCE
Status: COMPLETED | OUTPATIENT
Start: 2021-11-20 | End: 2021-11-20

## 2021-11-20 RX ORDER — SODIUM CHLORIDE 9 MG/ML
25 INJECTION, SOLUTION INTRAVENOUS PRN
Status: DISCONTINUED | OUTPATIENT
Start: 2021-11-20 | End: 2021-11-23 | Stop reason: HOSPADM

## 2021-11-20 RX ORDER — ACETAMINOPHEN 325 MG/1
650 TABLET ORAL EVERY 6 HOURS PRN
Status: DISCONTINUED | OUTPATIENT
Start: 2021-11-20 | End: 2021-11-23 | Stop reason: HOSPADM

## 2021-11-20 RX ORDER — METOPROLOL TARTRATE 5 MG/5ML
5 INJECTION INTRAVENOUS EVERY 6 HOURS PRN
Status: DISCONTINUED | OUTPATIENT
Start: 2021-11-20 | End: 2021-11-23 | Stop reason: HOSPADM

## 2021-11-20 RX ORDER — HEPARIN SODIUM 10000 [USP'U]/ML
5000 INJECTION, SOLUTION INTRAVENOUS; SUBCUTANEOUS EVERY 8 HOURS SCHEDULED
Status: DISCONTINUED | OUTPATIENT
Start: 2021-11-20 | End: 2021-11-23 | Stop reason: HOSPADM

## 2021-11-20 RX ORDER — SODIUM BICARBONATE 650 MG/1
1300 TABLET ORAL 3 TIMES DAILY
Status: DISCONTINUED | OUTPATIENT
Start: 2021-11-20 | End: 2021-11-22

## 2021-11-20 RX ORDER — SODIUM CHLORIDE 0.9 % (FLUSH) 0.9 %
5-40 SYRINGE (ML) INJECTION PRN
Status: DISCONTINUED | OUTPATIENT
Start: 2021-11-20 | End: 2021-11-23 | Stop reason: HOSPADM

## 2021-11-20 RX ORDER — TAMSULOSIN HYDROCHLORIDE 0.4 MG/1
0.4 CAPSULE ORAL DAILY
Status: DISCONTINUED | OUTPATIENT
Start: 2021-11-20 | End: 2021-11-23 | Stop reason: HOSPADM

## 2021-11-20 RX ORDER — AMLODIPINE BESYLATE 5 MG/1
5 TABLET ORAL DAILY
Status: DISCONTINUED | OUTPATIENT
Start: 2021-11-20 | End: 2021-11-23 | Stop reason: HOSPADM

## 2021-11-20 RX ORDER — NICOTINE POLACRILEX 4 MG
15 LOZENGE BUCCAL PRN
Status: DISCONTINUED | OUTPATIENT
Start: 2021-11-20 | End: 2021-11-23 | Stop reason: HOSPADM

## 2021-11-20 RX ORDER — LEVOTHYROXINE SODIUM 112 UG/1
112 TABLET ORAL DAILY
Status: DISCONTINUED | OUTPATIENT
Start: 2021-11-20 | End: 2021-11-23 | Stop reason: HOSPADM

## 2021-11-20 RX ORDER — ONDANSETRON 4 MG/1
4 TABLET, ORALLY DISINTEGRATING ORAL EVERY 8 HOURS PRN
Status: DISCONTINUED | OUTPATIENT
Start: 2021-11-20 | End: 2021-11-23 | Stop reason: HOSPADM

## 2021-11-20 RX ORDER — DEXTROSE MONOHYDRATE 50 MG/ML
100 INJECTION, SOLUTION INTRAVENOUS PRN
Status: DISCONTINUED | OUTPATIENT
Start: 2021-11-20 | End: 2021-11-23 | Stop reason: HOSPADM

## 2021-11-20 RX ORDER — ONDANSETRON 2 MG/ML
4 INJECTION INTRAMUSCULAR; INTRAVENOUS EVERY 6 HOURS PRN
Status: DISCONTINUED | OUTPATIENT
Start: 2021-11-20 | End: 2021-11-23 | Stop reason: HOSPADM

## 2021-11-20 RX ORDER — DEXTROSE MONOHYDRATE 25 G/50ML
12.5 INJECTION, SOLUTION INTRAVENOUS PRN
Status: DISCONTINUED | OUTPATIENT
Start: 2021-11-20 | End: 2021-11-23 | Stop reason: HOSPADM

## 2021-11-20 RX ORDER — SODIUM CHLORIDE 0.9 % (FLUSH) 0.9 %
5-40 SYRINGE (ML) INJECTION EVERY 12 HOURS SCHEDULED
Status: DISCONTINUED | OUTPATIENT
Start: 2021-11-20 | End: 2021-11-23 | Stop reason: HOSPADM

## 2021-11-20 RX ORDER — ACETAMINOPHEN 650 MG/1
650 SUPPOSITORY RECTAL EVERY 6 HOURS PRN
Status: DISCONTINUED | OUTPATIENT
Start: 2021-11-20 | End: 2021-11-23 | Stop reason: HOSPADM

## 2021-11-20 RX ADMIN — METOPROLOL TARTRATE 5 MG: 5 INJECTION INTRAVENOUS at 03:01

## 2021-11-20 RX ADMIN — CARVEDILOL 6.25 MG: 6.25 TABLET, FILM COATED ORAL at 11:52

## 2021-11-20 RX ADMIN — BUMETANIDE 2 MG: 1 TABLET ORAL at 22:22

## 2021-11-20 RX ADMIN — SODIUM CHLORIDE 100 MG: 9 INJECTION, SOLUTION INTRAVENOUS at 16:13

## 2021-11-20 RX ADMIN — LEVOTHYROXINE SODIUM 112 MCG: 0.11 TABLET ORAL at 05:58

## 2021-11-20 RX ADMIN — AMLODIPINE BESYLATE 5 MG: 5 TABLET ORAL at 11:52

## 2021-11-20 RX ADMIN — SODIUM BICARBONATE 1300 MG: 650 TABLET ORAL at 22:22

## 2021-11-20 RX ADMIN — HEPARIN SODIUM 5000 UNITS: 10000 INJECTION INTRAVENOUS; SUBCUTANEOUS at 12:38

## 2021-11-20 RX ADMIN — EPOETIN ALFA-EPBX 3000 UNITS: 3000 INJECTION, SOLUTION INTRAVENOUS; SUBCUTANEOUS at 03:24

## 2021-11-20 RX ADMIN — Medication 5 ML: at 11:53

## 2021-11-20 RX ADMIN — ALBUMIN (HUMAN) 25 G: 0.25 INJECTION, SOLUTION INTRAVENOUS at 18:21

## 2021-11-20 RX ADMIN — Medication 10 ML: at 22:28

## 2021-11-20 RX ADMIN — SODIUM BICARBONATE 1300 MG: 650 TABLET ORAL at 11:52

## 2021-11-20 RX ADMIN — BUMETANIDE 2 MG: 1 TABLET ORAL at 03:01

## 2021-11-20 RX ADMIN — CARVEDILOL 6.25 MG: 6.25 TABLET, FILM COATED ORAL at 16:27

## 2021-11-20 RX ADMIN — INSULIN LISPRO 1 UNITS: 100 INJECTION, SOLUTION INTRAVENOUS; SUBCUTANEOUS at 06:00

## 2021-11-20 RX ADMIN — BUMETANIDE 2 MG: 1 TABLET ORAL at 11:53

## 2021-11-20 RX ADMIN — INSULIN LISPRO 1 UNITS: 100 INJECTION, SOLUTION INTRAVENOUS; SUBCUTANEOUS at 22:23

## 2021-11-20 RX ADMIN — INSULIN LISPRO 2 UNITS: 100 INJECTION, SOLUTION INTRAVENOUS; SUBCUTANEOUS at 16:27

## 2021-11-20 RX ADMIN — SODIUM CHLORIDE 25 MG: 9 INJECTION, SOLUTION INTRAVENOUS at 15:24

## 2021-11-20 RX ADMIN — SODIUM ZIRCONIUM CYCLOSILICATE 10 G: 10 POWDER, FOR SUSPENSION ORAL at 11:59

## 2021-11-20 RX ADMIN — HEPARIN SODIUM 5000 UNITS: 10000 INJECTION INTRAVENOUS; SUBCUTANEOUS at 22:22

## 2021-11-20 RX ADMIN — SODIUM ZIRCONIUM CYCLOSILICATE 10 G: 10 POWDER, FOR SUSPENSION ORAL at 03:25

## 2021-11-20 RX ADMIN — HEPARIN SODIUM 5000 UNITS: 10000 INJECTION INTRAVENOUS; SUBCUTANEOUS at 06:00

## 2021-11-20 RX ADMIN — TAMSULOSIN HYDROCHLORIDE 0.4 MG: 0.4 CAPSULE ORAL at 11:52

## 2021-11-20 ASSESSMENT — PAIN SCALES - GENERAL
PAINLEVEL_OUTOF10: 0

## 2021-11-20 NOTE — ED NOTES
Dialysis called, talked to Elsy Lara, is coming down to see patient     Manan Braden RN  11/19/21 0034

## 2021-11-20 NOTE — H&P
[address] [place], [place] [address]ist Group   History and Physical      CHIEF COMPLAINT: Shortness of breath    History of Present Illness:  66 y.o. male with a history of stage 4 chronic kidney disease presents with progressive shortness of breath. Over the last couple of days, he has had worsening dyspnea on exertion and orthopnea. He also notes swelling in his lower extremities. He denies any chest pain, dizziness, or lightheadedness. He was previously following with nephrology, and it was recommended that he be prepared for hemodialysis, however he was very hesitant. He states he also stopped taking all of his medications several months ago. In the ED, vital signs significant for hypertension with blood pressure 200/118 and slight tachypnea with respiratory rate 22. Chest x-ray consistent with pulmonary edema. proBNP greater than 70,000. High-sensitivity troponin 864. EKG shows sinus rhythm with nonspecific T wave changes, but no acute ischemic changes. A right femoral temporary hemodialysis catheter was placed, and nephrology recommended the patient be dialyzed tonight. Informant(s) for H&P: Patient, ED physician, chart review    REVIEW OF SYSTEMS:  no fevers, chills, cp, n/v, ha, vision/hearing changes, wt changes, hot/cold flashes, other open skin lesions, diarrhea, constipation, dysuria/hematuria unless noted in HPI. Complete ROS performed with the patient and is otherwise negative. PMH:  Past Medical History:   Diagnosis Date    Chronic kidney disease     Diabetes mellitus ([address]     Hepatitis C     Hypertension     Liver disease     Thyroid disease     Unspecified diseases of blood and blood-forming organs        Surgical History:  Past Surgical History:   Procedure Laterality Date    TONSILLECTOMY         Medications Prior to Admission:    Prior to Admission medications    Medication Sig Start Date End Date Taking?  Authorizing Provider   glimepiride (AMARYL) 1 MG tablet Take 1 mg by mouth every morning (before breakfast)    Historical Provider, MD   dapsone 25 MG tablet TK 1 T PO BID 7/21/20   Historical Provider, MD   allopurinol (ZYLOPRIM) 100 MG tablet TK 1 T PO D 8/3/20   Historical Provider, MD   sodium bicarbonate 650 MG tablet TK 2 TS PO Q 8 H 7/11/20   Historical Provider, MD   levothyroxine (SYNTHROID) 112 MCG tablet Take 112 mcg by mouth Daily    Historical Provider, MD   PROCARDIA XL 60 MG extended release tablet TK 1 T PO QAM 8/7/20   Historical Provider, MD   magnesium oxide (MAG-OX) 400 MG tablet Take 400 mg by mouth daily    Historical Provider, MD   vitamin D (ERGOCALCIFEROL) 1.25 MG (33002 UT) CAPS capsule TK 1 C PO 1 TIME Q WK 7/7/20   Historical Provider, MD   clobetasol (TEMOVATE) 0.05 % ointment JOHN EXT AA BID 7/1/20   Historical Provider, MD   diclofenac (VOLTAREN) 75 MG EC tablet Take 1 tablet by mouth 2 times daily  Patient not taking: Reported on 8/25/2020 10/18/18   Zoraida Castillo MD   tamsulosin [facility]) 0.4 MG capsule Take 1 capsule by mouth daily for 10 days 10/18/18 10/28/18  Zoraida Castillo MD   colchicine (COLCRYS) 0.6 MG tablet Take 1 tablet by mouth 2 times daily  Patient taking differently: Take 0.6 mg by mouth as needed  4/28/16   KIANNA Szymanski - ANGIE   calcium carbonate (TUMS) 500 MG chewable tablet Take 1 tablet by mouth 3 times daily as needed for Heartburn. 6/11/13   Bettie Cabot, MD   glimepiride (AMARYL) 4 MG tablet Take 1 tablet by mouth Daily with supper. Patient not taking: Reported on 8/25/2020 6/11/13   Bettie Cabot, MD   sitaGLIPtan (JANUVIA) 100 MG tablet Take 1 tablet by mouth daily. Patient not taking: Reported on 8/25/2020 6/11/13   Bettie Cabot, MD   levothyroxine (SYNTHROID) 75 MCG tablet Take 75 mcg by mouth Daily. Historical Provider, MD   amLODIPine (NORVASC) 5 MG tablet Take 5 mg by mouth daily.     Historical Provider, MD       Allergies:    Penicillins    Social History:    reports that he quit smoking about 48 years ago. His smoking use included cigarettes. He has a 20.00 pack-year smoking history. He has never used smokeless tobacco. He reports current drug use. He reports that he does not drink alcohol. Family History:   family history includes Cancer in his father; Diabetes in his mother. PHYSICAL EXAM:  Vitals:  BP (!) 193/117   Pulse 94   Temp 97.6 °F (36.4 °C) (Oral)   Resp 20   Ht 6' 2\" (1.88 m)   Wt 154 lb 15.7 oz (70.3 kg)   SpO2 99%   BMI 19.90 kg/m²   General Appearance: alert and oriented to person, place and time. Appears somewhat anxious, on BiPAP  Skin: warm and dry  Head: normocephalic and atraumatic  Eyes: pupils equal, round, and reactive to light, extraocular eye movements intact, conjunctivae normal  Neck: neck supple and non tender without mass   Pulmonary/Chest: On BiPAP but without accessory muscle use. Bibasilar crackles and bilateral rhonchi. Cardiovascular: normal rate, normal S1 and S2 with S3 and no carotid bruits  Abdomen: soft, non-tender, non-distended, normal bowel sounds, no masses or organomegaly  Extremities: Mild bilateral lower extremity edema. No cyanosis or clubbing. Neurologic: no cranial nerve deficit and speech normal    LABS:  Recent Labs     11/19/21  1526 11/19/21  1545 11/19/21  1814 11/19/21  1946 11/19/21  2217     --   --   --   --    K 5.8*  --   --   --   --      --   --   --   --    CO2 14*  --   --   --   --    BUN 80*  --   --   --   --    CREATININE 7.9*  --   --   --   --    GLUCOSE 154*   < > 215 90 72   CALCIUM 8.0*  --   --   --   --     < > = values in this interval not displayed. Recent Labs     11/19/21  1526   WBC 6.0   RBC 2.89*   HGB 8.9*   HCT 27.2*   MCV 94.1   MCH 30.8   MCHC 32.7   RDW 15.6*      MPV 9.9       No results for input(s): POCGLU in the last 72 hours.         Radiology: XR CHEST (2 VW)    Result Date: 11/19/2021  EXAMINATION: TWO XRAY VIEWS OF THE CHEST 11/19/2021 4:44 pm COMPARISON: 06/06/2013 chest radiograph. HISTORY: ORDERING SYSTEM PROVIDED HISTORY: Shortness of breath TECHNOLOGIST PROVIDED HISTORY: Reason for shortness of breath FINDINGS: The lungs are mildly hypoinflated. There are interstitial and airspace opacities in a perihilar and lower lobe distribution. There is blunting of the costophrenic sulci. Cardiac silhouette is enlarged. Pulmonary vasculature is indistinct centrally. There is atherosclerotic calcification of the thoracic aorta. No pneumothorax. Pulmonary edema although superimposed pneumonia at the lung bases cannot be excluded. Bilateral pleural effusions. Enlargement of the cardiac silhouette which is new and consistent with cardiomegaly and/or pericardial effusion. EKG: Sinus rhythm with nonspecific T wave changes    ASSESSMENT/PLAN:      Principal Problem:    Acute respiratory failure with hypoxia (HCC)  Active Problems:    ESRD (end stage renal disease) (HCC)    Diabetes mellitus (HCC)    Metabolic acidosis    Hypothyroid    Volume overload    Elevated troponin    Hypertensive emergency    Hyperkalemia  Resolved Problems:    * No resolved hospital problems. *      1. Acute respiratory failure with hypoxia  -Covid negative in ED  -Most likely due to volume overload in the setting of progressive chronic kidney disease  -Dialysis per nephrology  -Obtain echocardiogram to assess for component of heart failure    2. Volume overload in the setting of newly diagnosed end-stage renal disease  -Volume removal with hemodialysis  -Started on Bumex drip in the ED  -echo as noted above    3. Hypertensive emergency  -Due to volume overload and medication nonadherence  -Dialysis per nephrology  -Restart antihypertensives    4. Metabolic acidosis  -Due to progression of chronic kidney disease  -Bicarbonate drip at low rate started in ED  -Dialysis per nephrology    5.   Elevated troponin  -No acute ischemic changes on EKG  -Likely due to hypertensive emergency and end-stage renal disease  -echo as noted above    6. Hypothyroidism  -Check TSH  -Restart levothyroxine    7. Hyperkalemia  -due to ESRD  -treated with IV insulin in the ED  -dialysis as noted above     8. Type II diabetes mellitus  -Most recent hemoglobin A1c earlier this month was 5.3 indicating very tight control  -Hold oral antihyperglycemic's and use corrective scale insulin for now    Code Status: Full code   DVT prophylaxis: subcutaneous heparin    NOTE: This report was transcribed using voice recognition software.  Every effort was made to ensure accuracy; however, inadvertent computerized transcription errors may be present.     Electronically signed by Mónica Romero DO on 11/19/2021 at 11:42 PM

## 2021-11-20 NOTE — PROGRESS NOTES
Department of Internal Medicine  Nephrology Progress Note    Events reviewed. SUBJECTIVE:  We are following Mr. Vola Brittle for uremia and hyperkalemia. He reports feeling tired.      PHYSICAL EXAM:      Vitals:    VITALS:  BP (!) 140/95   Pulse 95   Temp 98.8 °F (37.1 °C)   Resp 16   Ht 6' 2\" (1.88 m)   Wt 138 lb 0.1 oz (62.6 kg)   SpO2 100%   BMI 17.72 kg/m²   24HR INTAKE/OUTPUT:      Intake/Output Summary (Last 24 hours) at 11/20/2021 1136  Last data filed at 11/20/2021 0331  Gross per 24 hour   Intake 300 ml   Output 2500 ml   Net -2200 ml       Constitutional:  Awake in respiratory distress  HEENT:  PERRLA  Respiratory:  Decreased breath sounds at the bases  Cardiovascular/Edema:  Heart sounds regular  Gastrointestinal:  Abdomen  Neurologic:  Non focal   Skin:  No lesions   Other:  ++ edema     Scheduled Meds:   amLODIPine  5 mg Oral Daily    levothyroxine  112 mcg Oral Daily    sodium bicarbonate  1,300 mg Oral TID    tamsulosin  0.4 mg Oral Daily    sodium chloride flush  5-40 mL IntraVENous 2 times per day    heparin (porcine)  5,000 Units SubCUTAneous 3 times per day    insulin lispro  0-6 Units SubCUTAneous TID WC    insulin lispro  0-3 Units SubCUTAneous Nightly    bumetanide  2 mg Oral BID    carvedilol  6.25 mg Oral BID WC    epoetin sabiha-epbx  3,000 Units SubCUTAneous Once per day on Mon Wed Fri    sodium zirconium cyclosilicate  10 g Oral TID     Continuous Infusions:   dextrose      sodium chloride       PRN Meds:.glucose, dextrose, glucagon (rDNA), dextrose, sodium chloride flush, sodium chloride, ondansetron **OR** ondansetron, polyethylene glycol, acetaminophen **OR** acetaminophen, metoprolol, perflutren lipid microspheres    DATA:    CBC:   Lab Results   Component Value Date    WBC 6.0 11/19/2021    RBC 2.89 11/19/2021    HGB 8.9 11/19/2021    HCT 27.2 11/19/2021    MCV 94.1 11/19/2021    MCH 30.8 11/19/2021    MCHC 32.7 11/19/2021    RDW 15.6 11/19/2021     11/19/2021 MPV 9.9 11/19/2021     CMP:    Lab Results   Component Value Date     11/20/2021    K 5.0 11/20/2021    K 5.8 11/19/2021     11/20/2021    CO2 15 11/20/2021    BUN 64 11/20/2021    CREATININE 6.7 11/20/2021    GFRAA 10 11/20/2021    LABGLOM 10 11/20/2021    GLUCOSE 148 11/20/2021    PROT 8.6 11/19/2021    LABALBU 4.1 11/19/2021    CALCIUM 8.1 11/20/2021    BILITOT 0.3 11/19/2021    ALKPHOS 115 11/19/2021    AST 25 11/19/2021    ALT 12 11/19/2021     Magnesium:    Lab Results   Component Value Date    MG 1.8 06/26/2020     Phosphorus:    Lab Results   Component Value Date    PHOS 5.0 11/20/2021     Radiology Review:      CXR 11/19/2021   Pulmonary edema although superimposed pneumonia at the lung bases cannot be   excluded.       Bilateral pleural effusions.       Enlargement of the cardiac silhouette which is new and consistent with   cardiomegaly and/or pericardial effusion.             BRIEF SUMMARY OF INITIAL CONSULT:    Briefly Mr. Rosalee Chinchilla is a 66year old man with h/o CKD stage 5, with severe nephrotic proteinuria 2/2 to diabetic nephropathy, type 2 DM, HTN, hepatitis C, hypothyroidism, BPH, who was admitted on 11/19/2021 after he presented to the ER with increasing shortness of breath. He was found with pro-BNP levels of >70,000 and CXR consistent with pulmonary edema. His creatinine was 7.9 mg/dL, K 5.8 mEq/L, bicarbonate 14 mEq/L reasons for this consultation. IMPRESSION/RECOMMENDATIONS:      CKD stage 5, with severe nephrotic proteinuria, 2/2 diabetic nephropathy. Presented with severely volume overload, pulmonary edema, hyperkalemia and metabolic acidosis. Patient started on RRT on 11/19/21. S/p right groin temporary dialysis catheter. Hyperkalemia, 2/2 advanced CKD. Had dialysis last night and again today. High anion gap metabolic acidosis, 2/2 uremia, on sodium bicarbonate   Decompensated heart failure, unknown EF, with pulmonary edema, proBNP >70,000, on Bumex. For echo.    HTN, on amlodipine and carvedilol  History of hepatitis C  Normocytic anemia, 2/2 CKD. Ferritin 425, iron 38, and iron saturation 16%. On epoetin alpha. To start on IV iron.      Plan:    HD 2.5 hours tomorrow  Start IV iron   Continue sodium bicarbonate 1,300 mg PO TID  Continue Bumex 2 mg p.o. twice daily  Continue epoetin alpha 3000 units three times weekly   Continue amlodipine 10 mg PO daily  Continue carvedilol 6.35 mg twice daily   Continue to monitor potassium levels  Consult vascular for tunneled dialysis catheter placement  Awaiting echocardiogram  SPA 25 gm with each HD    Case and plan discussed with Dr. Scott Diamond    Electronically signed by KIANNA Hines CNP on 11/20/2021 at 1:21 PM    Agree as annotated  Garry Vargas MD

## 2021-11-20 NOTE — PROGRESS NOTES
[facility] Progress Note    Admitting Date and Time: 11/19/2021  2:49 PM  Admit Dx: Shortness of breath [H84.26]  Metabolic acidosis [Q81.1]  ESRD (end stage renal disease) ([facility] [address] [N18.6]  Elevated troponin [R77.8]  Acute kidney injury superimposed on CKD ([facility] [address] [N17.9, N18.9]  Hypertension, unspecified type [I10]    Subjective:  Patient is being followed for Shortness of breath [I01.12]  Metabolic acidosis [M36.8]  ESRD (end stage renal disease) ([facility] [address] [N18.6]  Elevated troponin [R77.8]  Acute kidney injury superimposed on CKD ([facility] [address] [N17.9, N18.9]  Hypertension, unspecified type [I10]     Patient was lying in bed in no acute distress. Denies any new concerns. Reports improvement of his SOB and chest tightness with dialysis. ROS: denies fever, chills, cp, sob, n/v, HA unless stated above.      amLODIPine  5 mg Oral Daily    levothyroxine  112 mcg Oral Daily    sodium bicarbonate  1,300 mg Oral TID    tamsulosin  0.4 mg Oral Daily    sodium chloride flush  5-40 mL IntraVENous 2 times per day    heparin (porcine)  5,000 Units SubCUTAneous 3 times per day    insulin lispro  0-6 Units SubCUTAneous TID     insulin lispro  0-3 Units SubCUTAneous Nightly    ferric gluconate (FERRLECIT) test dose IVPB  25 mg IntraVENous Once    Followed by    ferric gluconate (FERRLECIT) IVPB  100 mg IntraVENous Once    Followed by   Pippa García ON 11/21/2021] ferric gluconate (FERRLECIT) IVPB  125 mg IntraVENous Once    bumetanide  2 mg Oral BID    carvedilol  6.25 mg Oral BID     epoetin sabiha-epbx  3,000 Units SubCUTAneous Once per day on Mon Wed Fri    sodium zirconium cyclosilicate  10 g Oral TID     glucose, 15 g, PRN  dextrose, 12.5 g, PRN  glucagon (rDNA), 1 mg, PRN  dextrose, 100 mL/hr, PRN  sodium chloride flush, 5-40 mL, PRN  sodium chloride, 25 mL, PRN  ondansetron, 4 mg, Q8H PRN   Or  ondansetron, 4 mg, Q6H PRN  polyethylene glycol, 17 g, Daily PRN  acetaminophen, 650 mg, Q6H PRN Or  acetaminophen, 650 mg, Q6H PRN  metoprolol, 5 mg, Q6H PRN  perflutren lipid microspheres, 1.5 mL, ONCE PRN         Objective:    BP (!) 159/106   Pulse 92   Temp 97.7 °F (36.5 °C)   Resp 18   Ht 6' 2\" (1.88 m)   Wt 132 lb 11.5 oz (60.2 kg)   SpO2 100%   BMI 17.04 kg/m²   General Appearance: alert and oriented to person, place and time and in no acute distress  Skin: warm and dry  Head: normocephalic and atraumatic  Eyes: pupils equal, round, and reactive to light, extraocular eye movements intact, conjunctivae normal  Neck: neck supple and non tender without mass   Pulmonary/Chest: clear to auscultation bilaterally- no wheezes, rales or rhonchi, normal air movement, no respiratory distress  Cardiovascular: normal rate, normal S1 and S2 and no carotid bruits  Abdomen: soft, non-tender, non-distended, normal bowel sounds, no masses or organomegaly  Extremities: no cyanosis, no clubbing and no edema  Neurologic: no cranial nerve deficit and speech normal        Recent Labs     11/19/21  1526 11/19/21  1545 11/19/21  1946 11/19/21  2217 11/20/21  0356     --   --   --  139   K 5.8*  --   --   --  5.0     --   --   --  103   CO2 14*  --   --   --  15*   BUN 80*  --   --   --  64*   CREATININE 7.9*  --   --   --  6.7*   GLUCOSE 154*   < > 90 72 148*   CALCIUM 8.0*  --   --   --  8.1*    < > = values in this interval not displayed. Recent Labs     11/19/21  1526   WBC 6.0   RBC 2.89*   HGB 8.9*   HCT 27.2*   MCV 94.1   MCH 30.8   MCHC 32.7   RDW 15.6*      MPV 9.9       Radiology:   EXAMINATION:  TWO XRAY VIEWS OF THE CHEST     11/19/2021 4:44 pm     COMPARISON:  06/06/2013 chest radiograph.     HISTORY:  ORDERING SYSTEM PROVIDED HISTORY: Shortness of breath  TECHNOLOGIST PROVIDED HISTORY:  Reason for shortness of breath     FINDINGS:  The lungs are mildly hypoinflated. There are interstitial and airspace  opacities in a perihilar and lower lobe distribution.   There is blunting of  the costophrenic sulci. Cardiac silhouette is enlarged. Pulmonary  vasculature is indistinct centrally. There is atherosclerotic calcification  of the thoracic aorta. No pneumothorax.     IMPRESSION:  Pulmonary edema although superimposed pneumonia at the lung bases cannot be  excluded.     Bilateral pleural effusions.     Enlargement of the cardiac silhouette which is new and consistent with  cardiomegaly and/or pericardial effusion.       Assessment:    Principal Problem:    Acute respiratory failure with hypoxia (HCC)  Active Problems:    Diabetes mellitus (HCC)    Metabolic acidosis    Hypothyroid    ESRD (end stage renal disease) (HCC)    Volume overload    Elevated troponin    Hypertensive emergency    Hyperkalemia  Resolved Problems:    * No resolved hospital problems. *      Plan:  1. Acute respiratory failure with hypoxia: Patient was having SOB when presenting to the ED. Covid negative. Likely secondary to volume overload from progressive CKD. Has improved with dialysis. Echo pending.      2.  End-stage renal disease with volume overload: HD tomorrow. Continue Bumex 2 mg PO BID. Echo pending. Consult for tunneled dialysis catheter placement.      3. Hypertensive emergency: Secondary to volume overload and poor medication compliance. Continue Norvasc and Coreg. Lopressor prn SBP>170 and HR >70.      4.  Metabolic acidosis: Secondary to chronic kidney disease. Continue Sodium bicarbonate 1,3000 mg PO TID. Dialysis per nephrology     5.  Elevated troponin: Likely due to hypertensive emergency and end-stage renal disease. No acute ischemic changes on EKG. Echo pending.      6. Hypothyroidism: Continue levothyroxine. . T4 ordered. Consulted Endocrinology.      7. Hyperkalemia: secondary to ESRD. Treated with IV insulin in the ED. HD.      8. Type II diabetes mellitus: Hemoglobin A1c was 5.3. Holding oral agents.  Low dose ssi.      Code Status: Full code   DVT prophylaxis: subcutaneous heparin      NOTE: This report was transcribed using voice recognition software. Every effort was made to ensure accuracy; however, inadvertent computerized transcription errors may be present. Electronically signed by David Solo PA-C on 11/20/2021 at 1:32 PM     Addendum: I have personally participated in the history, exam, medical decision making with Sandra Glass on the date of service and I agree with all of the pertinent clinical information unless otherwise noted. I have also reviewed and agree with the past medical, family, and social history unless otherwise noted. Patient was admitted with shortness of breath     PHYSICAL EXAM:  Vitals:  BP (!) 159/106   Pulse 92   Temp 97.7 °F (36.5 °C)   Resp 18   Ht 6' 2\" (1.88 m)   Wt 132 lb 11.5 oz (60.2 kg)   SpO2 100%   BMI 17.04 kg/m²   Gen: awake, alert, NAD  Lungs: clear to auscultation bilaterally no crackles no wheezing. Heart: RRR, no murmur   Abdomen: soft nontender nondistended positive bowel sounds. Extremities: full range of motion no peripheral edema. Impression:  Principal Problem:    Acute respiratory failure with hypoxia (HCC)  Active Problems:    Diabetes mellitus (HCC)    Metabolic acidosis    Hypothyroid    ESRD (end stage renal disease) (HCC)    Volume overload    Elevated troponin    Hypertensive emergency    Hyperkalemia  Resolved Problems:    * No resolved hospital problems. *      My findings/plan include:    66year old male with shortness of breath due to fluid overload. He was found to be in hypertensive emergency, hyperkalemia, metabolic acidosis which have improved after dialysis. Patient is on sodium bicarb tablets. NSTEMI II likely from CKD. Will continue to monitor labs. NOTE: This report was transcribed using voice recognition software. Every effort was made to ensure accuracy; however, inadvertent computerized transcription errors may be present.   Electronically signed by Gus Escudero DO on 11/20/2021 at 3:25 PM

## 2021-11-20 NOTE — PROGRESS NOTES
On call nurse in, spoke with Radha Overton in ER ready for patient. Awaiting patient arrival at this time.

## 2021-11-20 NOTE — CONSULTS
Department of Internal Medicine  Nephrology Attending Consult Note      Reason for Consult:  Uremia, hyperkalemia   Requesting Physician:  Dr. Rene Neil:  Shortness of breath    History Obtained From:  patient, electronic medical record    HISTORY OF PRESENT ILLNESS:  Briefly Mr. Isaiah Santo is a 66year old man with h/o CKD stage 5, with severe nephrotic proteinuria 2/2 to diabetic nephropathy, type 2 DM, HTN, hepatitis C, hypothyroidism, BPH, who was admitted on 11/19/2021 after he presented to the ER with increasing shortness of breath. He was found with pro-BNP levels of >70,000 and CXR consistent with pulmonary edema. His creatinine was 7.9 mg/dL, K 5.8 mEq/L, bicarbonate 14 mEq/L reasons for this consultation. Past Medical History:        Diagnosis Date    Chronic kidney disease     Diabetes mellitus ([address]     Hepatitis C     Hypertension     Liver disease     Thyroid disease     Unspecified diseases of blood and blood-forming organs      Past Surgical History:        Procedure Laterality Date    TONSILLECTOMY       Current Medications:    No current facility-administered medications for this encounter. Allergies:  Penicillins    Social History:    TOBACCO:   reports that he quit smoking about 48 years ago. His smoking use included cigarettes. He has a 20.00 pack-year smoking history. He has never used smokeless tobacco.  ETOH:   reports no history of alcohol use.     Family History:       Problem Relation Age of Onset    Diabetes Mother     Cancer Father      REVIEW OF SYSTEMS:    CONSTITUTIONAL:  positive for  fatigue  EYES:  negative  HEENT:  negative  RESPIRATORY:  positive for  dyspnea  CARDIOVASCULAR:  positive for  dyspnea  GASTROINTESTINAL:  positive for nausea  GENITOURINARY:  negative  INTEGUMENT/BREAST:  negative  HEMATOLOGIC/LYMPHATIC:  negative  ALLERGIC/IMMUNOLOGIC:  positive for drug reactions  ENDOCRINE:  negative  MUSCULOSKELETAL:  negative  NEUROLOGICAL: negative  PHYSICAL EXAM:      Vitals:    VITALS:  BP (S) (!) 200/118 Comment: patient states he has been hypertensive since his renal failure for roughly 2 months  Pulse 98   Temp 97.6 °F (36.4 °C) (Oral)   Resp 22   Ht 6' 2\" (1.88 m)   Wt 155 lb (70.3 kg)   SpO2 95%   BMI 19.90 kg/m²   24HR INTAKE/OUTPUT:  No intake or output data in the 24 hours ending 11/19/21 1653    Constitutional:  Awake in respiratory distress  HEENT:  PERRLA  Respiratory:  Decreased breath sounds at the bases  Cardiovascular/Edema:  Heart sounds regular  Gastrointestinal:  Abdomen  Neurologic:  Non focal   Skin:  No lesions   Other:  ++ edema     DATA:    CBC:   Lab Results   Component Value Date    WBC 6.0 11/19/2021    RBC 2.89 11/19/2021    HGB 8.9 11/19/2021    HCT 27.2 11/19/2021    MCV 94.1 11/19/2021    MCH 30.8 11/19/2021    MCHC 32.7 11/19/2021    RDW 15.6 11/19/2021     11/19/2021    MPV 9.9 11/19/2021     CMP:    Lab Results   Component Value Date     11/19/2021    K 5.8 11/19/2021     11/19/2021    CO2 14 11/19/2021    BUN 80 11/19/2021    CREATININE 7.9 11/19/2021    GFRAA 8 11/19/2021    LABGLOM 8 11/19/2021    GLUCOSE 148 11/19/2021    PROT 8.6 11/19/2021    LABALBU 4.1 11/19/2021    CALCIUM 8.0 11/19/2021    BILITOT 0.3 11/19/2021    ALKPHOS 115 11/19/2021    AST 25 11/19/2021    ALT 12 11/19/2021     Magnesium:    Lab Results   Component Value Date    MG 1.8 06/26/2020     Phosphorus:    Lab Results   Component Value Date    PHOS 5.9 11/12/2021     Radiology Review:      CXR 11/19/2021   Pulmonary edema although superimposed pneumonia at the lung bases cannot be   excluded.       Bilateral pleural effusions.       Enlargement of the cardiac silhouette which is new and consistent with   cardiomegaly and/or pericardial effusion.               IMPRESSION/RECOMMENDATIONS:      Briefly Mr. Luis Alfredo Stone is a 66year old man with h/o CKD stage 5, with severe nephrotic proteinuria 2/2 to diabetic nephropathy, type 2 DM, HTN, hepatitis C, hypothyroidism, BPH, who was admitted on 11/19/2021 after he presented to the ER with increasing shortness of breath. He was found with pro-BNP levels of >70,000 and CXR consistent with pulmonary edema. His creatinine was 7.9 mg/dL, K 5.8 mEq/L, bicarbonate 14 mEq/L reasons for this consultation. CKD stage 5, with severe nephrotic proteinuria, 2/2 diabetic nephropathy. Presented with severely volume overload, pulmonary edema, hyperkalemia and metabolic acidosis. Patient is agreeable to start renal replacement therapy. Hyperkalemia, 2/2 advanced CKD  High anion gap metabolic acidosis, 2/2 uremia  Decompensated heart failure, unknown EF, with pulmonary edema, proBNP >70,000  HTN,   History of hepatitis C  Normocytic anemia, 2/2 CKD    Plan:    Urgent HD tonight x2 hours and 2.5 hours tomorrow  Temporary dialysis catheter placement by ER physician  Bumex 2 mg p.o. twice daily  Monitor potassium levels  Consult vascular for tunneled dialysis catheter placement  Obtain iron studies  Epoetin alpha 3000 units 3 times a week  Amlodipine 10 mg daily  Carvedilol 6.25 g twice daily  Obtain echocardiogram  Obtain PTH level, folate levels    Thank you very much Dr. Mook Lee for allowing us to participate in the care of Mr. Nikky Childers.

## 2021-11-20 NOTE — FLOWSHEET NOTE
11/20/21 0116   Vital Signs   BP (!) 178/98   Temp 97.8 °F (36.6 °C)   Pulse 88   Resp 20   Post-Hemodialysis Assessment   Post-Treatment Procedures Blood returned; Catheter capped, clamped and heparinized x 2 ports   Machine Disinfection Process Acid/Vinegar Clean; Bleach; Exterior Machine Disinfection   Rinseback Volume (ml) 300 ml   Dialyzer Clearance Heavily streaked   Duration of Treatment (minutes) 120 minutes   Hemodialysis Intake (ml) 300 ml   Hemodialysis Output (ml) 2300 ml   NET Removed (ml) 2000 ml   Tolerated Treatment Good   Patient Response to Treatment bri tx well with net uf of 2000cc. Post catheter care completed. Lines flushed, heparinized and end caps secured. Report given to floor MARCIA Soto from [address] [facility]. going to room 423   Bilateral Breath Sounds Diminished   Edema Right lower extremity; Left lower extremity   RLE Edema +1   LLE Edema +1   Physician Notified?  No

## 2021-11-21 ENCOUNTER — APPOINTMENT (OUTPATIENT)
Dept: GENERAL RADIOLOGY | Age: 71
DRG: 673 | End: 2021-11-21
Payer: MEDICARE

## 2021-11-21 ENCOUNTER — ANESTHESIA (OUTPATIENT)
Dept: OPERATING ROOM | Age: 71
DRG: 673 | End: 2021-11-21
Payer: MEDICARE

## 2021-11-21 ENCOUNTER — APPOINTMENT (OUTPATIENT)
Dept: ULTRASOUND IMAGING | Age: 71
DRG: 673 | End: 2021-11-21
Payer: MEDICARE

## 2021-11-21 VITALS — DIASTOLIC BLOOD PRESSURE: 81 MMHG | OXYGEN SATURATION: 97 % | SYSTOLIC BLOOD PRESSURE: 118 MMHG

## 2021-11-21 LAB
ALBUMIN SERPL-MCNC: 3.4 G/DL (ref 3.5–5.2)
ANION GAP SERPL CALCULATED.3IONS-SCNC: 17 MMOL/L (ref 7–16)
BUN BLDV-MCNC: 51 MG/DL (ref 6–23)
CALCIUM SERPL-MCNC: 7.6 MG/DL (ref 8.6–10.2)
CHLORIDE BLD-SCNC: 100 MMOL/L (ref 98–107)
CO2: 19 MMOL/L (ref 22–29)
CREAT SERPL-MCNC: 6.4 MG/DL (ref 0.7–1.2)
GFR AFRICAN AMERICAN: 10
GFR NON-AFRICAN AMERICAN: 10 ML/MIN/1.73
GLUCOSE BLD-MCNC: 91 MG/DL (ref 74–99)
LV EF: 28 %
LVEF MODALITY: NORMAL
METER GLUCOSE: 167 MG/DL (ref 74–99)
METER GLUCOSE: 179 MG/DL (ref 74–99)
METER GLUCOSE: 88 MG/DL (ref 74–99)
PHOSPHORUS: 5.1 MG/DL (ref 2.5–4.5)
POTASSIUM SERPL-SCNC: 4.6 MMOL/L (ref 3.5–5)
SODIUM BLD-SCNC: 136 MMOL/L (ref 132–146)

## 2021-11-21 PROCEDURE — 7100000000 HC PACU RECOVERY - FIRST 15 MIN: Performed by: SURGERY

## 2021-11-21 PROCEDURE — 2500000003 HC RX 250 WO HCPCS: Performed by: SURGERY

## 2021-11-21 PROCEDURE — 36415 COLL VENOUS BLD VENIPUNCTURE: CPT

## 2021-11-21 PROCEDURE — 71045 X-RAY EXAM CHEST 1 VIEW: CPT

## 2021-11-21 PROCEDURE — 6360000002 HC RX W HCPCS: Performed by: SURGERY

## 2021-11-21 PROCEDURE — 2060000000 HC ICU INTERMEDIATE R&B

## 2021-11-21 PROCEDURE — 80069 RENAL FUNCTION PANEL: CPT

## 2021-11-21 PROCEDURE — 2580000003 HC RX 258: Performed by: NURSE ANESTHETIST, CERTIFIED REGISTERED

## 2021-11-21 PROCEDURE — 99222 1ST HOSP IP/OBS MODERATE 55: CPT | Performed by: INTERNAL MEDICINE

## 2021-11-21 PROCEDURE — 99222 1ST HOSP IP/OBS MODERATE 55: CPT | Performed by: SURGERY

## 2021-11-21 PROCEDURE — 7100000001 HC PACU RECOVERY - ADDTL 15 MIN: Performed by: SURGERY

## 2021-11-21 PROCEDURE — 3700000001 HC ADD 15 MINUTES (ANESTHESIA): Performed by: SURGERY

## 2021-11-21 PROCEDURE — 3600000013 HC SURGERY LEVEL 3 ADDTL 15MIN: Performed by: SURGERY

## 2021-11-21 PROCEDURE — 3209999900 FLUORO FOR SURGICAL PROCEDURES

## 2021-11-21 PROCEDURE — 93970 EXTREMITY STUDY: CPT

## 2021-11-21 PROCEDURE — 6360000002 HC RX W HCPCS: Performed by: NURSE ANESTHETIST, CERTIFIED REGISTERED

## 2021-11-21 PROCEDURE — 6370000000 HC RX 637 (ALT 250 FOR IP): Performed by: SURGERY

## 2021-11-21 PROCEDURE — 82962 GLUCOSE BLOOD TEST: CPT

## 2021-11-21 PROCEDURE — 99233 SBSQ HOSP IP/OBS HIGH 50: CPT | Performed by: INTERNAL MEDICINE

## 2021-11-21 PROCEDURE — 02HV33Z INSERTION OF INFUSION DEVICE INTO SUPERIOR VENA CAVA, PERCUTANEOUS APPROACH: ICD-10-PCS | Performed by: SURGERY

## 2021-11-21 PROCEDURE — 2709999900 HC NON-CHARGEABLE SUPPLY: Performed by: SURGERY

## 2021-11-21 PROCEDURE — 3600000003 HC SURGERY LEVEL 3 BASE: Performed by: SURGERY

## 2021-11-21 PROCEDURE — 94660 CPAP INITIATION&MGMT: CPT

## 2021-11-21 PROCEDURE — 77001 FLUOROGUIDE FOR VEIN DEVICE: CPT | Performed by: SURGERY

## 2021-11-21 PROCEDURE — 2580000003 HC RX 258: Performed by: SURGERY

## 2021-11-21 PROCEDURE — C1750 CATH, HEMODIALYSIS,LONG-TERM: HCPCS | Performed by: SURGERY

## 2021-11-21 PROCEDURE — 0JH63XZ INSERTION OF TUNNELED VASCULAR ACCESS DEVICE INTO CHEST SUBCUTANEOUS TISSUE AND FASCIA, PERCUTANEOUS APPROACH: ICD-10-PCS | Performed by: SURGERY

## 2021-11-21 PROCEDURE — APPSS30 APP SPLIT SHARED TIME 16-30 MINUTES: Performed by: PHYSICIAN ASSISTANT

## 2021-11-21 PROCEDURE — 93306 TTE W/DOPPLER COMPLETE: CPT

## 2021-11-21 PROCEDURE — 2500000003 HC RX 250 WO HCPCS: Performed by: NURSE ANESTHETIST, CERTIFIED REGISTERED

## 2021-11-21 PROCEDURE — 3700000000 HC ANESTHESIA ATTENDED CARE: Performed by: SURGERY

## 2021-11-21 PROCEDURE — 36558 INSERT TUNNELED CV CATH: CPT | Performed by: SURGERY

## 2021-11-21 PROCEDURE — 76937 US GUIDE VASCULAR ACCESS: CPT | Performed by: SURGERY

## 2021-11-21 PROCEDURE — 02PAX3Z REMOVAL OF INFUSION DEVICE FROM HEART, EXTERNAL APPROACH: ICD-10-PCS | Performed by: SURGERY

## 2021-11-21 PROCEDURE — 80074 ACUTE HEPATITIS PANEL: CPT

## 2021-11-21 PROCEDURE — 90935 HEMODIALYSIS ONE EVALUATION: CPT

## 2021-11-21 DEVICE — 15.5F X 28CM PRE-CURVED15.5F X 2 TITAN HD CATHETERTITAN HD CATHET FULL SET W/SIDEHOLESFULL SET W/S (CUFF 23CM FROM TIP)(CUFF 23CM F
Type: IMPLANTABLE DEVICE | Status: FUNCTIONAL
Brand: MEDCOMP HEMO-FLOW DOUBLE LUMEN CATHETERMEDCOMP HEMO-FLOW DOUBLE LUMEN CATHETER

## 2021-11-21 RX ORDER — CLINDAMYCIN PHOSPHATE 600 MG/50ML
INJECTION INTRAVENOUS
Status: DISPENSED
Start: 2021-11-21 | End: 2021-11-21

## 2021-11-21 RX ORDER — HYDROCODONE BITARTRATE AND ACETAMINOPHEN 5; 325 MG/1; MG/1
1 TABLET ORAL ONCE
Status: COMPLETED | OUTPATIENT
Start: 2021-11-21 | End: 2021-11-21

## 2021-11-21 RX ORDER — SODIUM CHLORIDE 9 MG/ML
INJECTION, SOLUTION INTRAVENOUS CONTINUOUS PRN
Status: DISCONTINUED | OUTPATIENT
Start: 2021-11-21 | End: 2021-11-21 | Stop reason: SDUPTHER

## 2021-11-21 RX ORDER — LIDOCAINE HYDROCHLORIDE 20 MG/ML
INJECTION, SOLUTION EPIDURAL; INFILTRATION; INTRACAUDAL; PERINEURAL PRN
Status: DISCONTINUED | OUTPATIENT
Start: 2021-11-21 | End: 2021-11-21 | Stop reason: SDUPTHER

## 2021-11-21 RX ORDER — FENTANYL CITRATE 50 UG/ML
50 INJECTION, SOLUTION INTRAMUSCULAR; INTRAVENOUS EVERY 5 MIN PRN
Status: DISCONTINUED | OUTPATIENT
Start: 2021-11-21 | End: 2021-11-21 | Stop reason: HOSPADM

## 2021-11-21 RX ORDER — HEPARIN SODIUM 1000 [USP'U]/ML
INJECTION, SOLUTION INTRAVENOUS; SUBCUTANEOUS PRN
Status: DISCONTINUED | OUTPATIENT
Start: 2021-11-21 | End: 2021-11-21 | Stop reason: HOSPADM

## 2021-11-21 RX ORDER — PROPOFOL 10 MG/ML
INJECTION, EMULSION INTRAVENOUS PRN
Status: DISCONTINUED | OUTPATIENT
Start: 2021-11-21 | End: 2021-11-21 | Stop reason: SDUPTHER

## 2021-11-21 RX ORDER — CLINDAMYCIN PHOSPHATE 150 MG/ML
INJECTION, SOLUTION INTRAVENOUS PRN
Status: DISCONTINUED | OUTPATIENT
Start: 2021-11-21 | End: 2021-11-21 | Stop reason: SDUPTHER

## 2021-11-21 RX ORDER — LIDOCAINE HYDROCHLORIDE 10 MG/ML
2 INJECTION, SOLUTION EPIDURAL; INFILTRATION; INTRACAUDAL; PERINEURAL ONCE
Status: COMPLETED | OUTPATIENT
Start: 2021-11-21 | End: 2021-11-21

## 2021-11-21 RX ORDER — HYDROCODONE BITARTRATE AND ACETAMINOPHEN 5; 325 MG/1; MG/1
1 TABLET ORAL EVERY 6 HOURS PRN
Status: DISCONTINUED | OUTPATIENT
Start: 2021-11-21 | End: 2021-11-23 | Stop reason: HOSPADM

## 2021-11-21 RX ORDER — DIPHENHYDRAMINE HYDROCHLORIDE 50 MG/ML
12.5 INJECTION INTRAMUSCULAR; INTRAVENOUS
Status: DISCONTINUED | OUTPATIENT
Start: 2021-11-21 | End: 2021-11-21 | Stop reason: HOSPADM

## 2021-11-21 RX ORDER — PROMETHAZINE HYDROCHLORIDE 25 MG/ML
6.25 INJECTION, SOLUTION INTRAMUSCULAR; INTRAVENOUS
Status: DISCONTINUED | OUTPATIENT
Start: 2021-11-21 | End: 2021-11-21 | Stop reason: HOSPADM

## 2021-11-21 RX ORDER — HYDROCODONE BITARTRATE AND ACETAMINOPHEN 5; 325 MG/1; MG/1
1 TABLET ORAL
Status: DISCONTINUED | OUTPATIENT
Start: 2021-11-21 | End: 2021-11-21 | Stop reason: HOSPADM

## 2021-11-21 RX ORDER — MEPERIDINE HYDROCHLORIDE 25 MG/ML
12.5 INJECTION INTRAMUSCULAR; INTRAVENOUS; SUBCUTANEOUS EVERY 10 MIN PRN
Status: DISCONTINUED | OUTPATIENT
Start: 2021-11-21 | End: 2021-11-21 | Stop reason: HOSPADM

## 2021-11-21 RX ADMIN — PROPOFOL 150 MCG/KG/MIN: 10 INJECTION, EMULSION INTRAVENOUS at 11:11

## 2021-11-21 RX ADMIN — SODIUM BICARBONATE 1300 MG: 650 TABLET ORAL at 20:54

## 2021-11-21 RX ADMIN — LIDOCAINE HYDROCHLORIDE 100 MG: 20 INJECTION, SOLUTION EPIDURAL; INFILTRATION; INTRACAUDAL; PERINEURAL at 11:11

## 2021-11-21 RX ADMIN — LIDOCAINE HYDROCHLORIDE 2 ML: 10 INJECTION, SOLUTION EPIDURAL; INFILTRATION; INTRACAUDAL; PERINEURAL at 19:43

## 2021-11-21 RX ADMIN — CLINDAMYCIN PHOSPHATE 600 MG: 150 INJECTION, SOLUTION INTRAVENOUS at 11:04

## 2021-11-21 RX ADMIN — SODIUM CHLORIDE: 9 INJECTION, SOLUTION INTRAVENOUS at 11:05

## 2021-11-21 RX ADMIN — HYDROCODONE BITARTRATE AND ACETAMINOPHEN 1 TABLET: 5; 325 TABLET ORAL at 13:33

## 2021-11-21 RX ADMIN — CARVEDILOL 6.25 MG: 6.25 TABLET, FILM COATED ORAL at 17:51

## 2021-11-21 RX ADMIN — BUMETANIDE 2 MG: 1 TABLET ORAL at 20:55

## 2021-11-21 RX ADMIN — HYDROCODONE BITARTRATE AND ACETAMINOPHEN 1 TABLET: 5; 325 TABLET ORAL at 18:45

## 2021-11-21 RX ADMIN — SODIUM CHLORIDE 125 MG: 900 INJECTION INTRAVENOUS at 13:33

## 2021-11-21 RX ADMIN — SODIUM BICARBONATE 1300 MG: 650 TABLET ORAL at 13:33

## 2021-11-21 ASSESSMENT — PAIN SCALES - GENERAL
PAINLEVEL_OUTOF10: 1
PAINLEVEL_OUTOF10: 0
PAINLEVEL_OUTOF10: 3
PAINLEVEL_OUTOF10: 0
PAINLEVEL_OUTOF10: 1
PAINLEVEL_OUTOF10: 0
PAINLEVEL_OUTOF10: 0

## 2021-11-21 ASSESSMENT — PULMONARY FUNCTION TESTS
PIF_VALUE: 0
PIF_VALUE: 1
PIF_VALUE: 0
PIF_VALUE: 0
PIF_VALUE: 1
PIF_VALUE: 1
PIF_VALUE: 0
PIF_VALUE: 1
PIF_VALUE: 0
PIF_VALUE: 1
PIF_VALUE: 0
PIF_VALUE: 0
PIF_VALUE: 1
PIF_VALUE: 1
PIF_VALUE: 0
PIF_VALUE: 1

## 2021-11-21 ASSESSMENT — LIFESTYLE VARIABLES: SMOKING_STATUS: 0

## 2021-11-21 NOTE — PROGRESS NOTES
Nursing Transfer Note    Data:  Summary of patients progress: Dr Gayathri Douglas right chest tesio insertion, right temp groin cath removed  Reason for transfer: next level of cre    Action:  Explained reason for transfer to Patient. Report given to: Teodora Bailon RN, using RN Handoff Navigator.   Mode of transportation: bed    Response:  RN Recommendations: pain mgt

## 2021-11-21 NOTE — PROGRESS NOTES
Notified Dr. Gale Sr that tession placed in R. Chest continues to bleed. Have applied pressure dressing and reinforced twice. Also applied sandbag. Awaiting response from physician.

## 2021-11-21 NOTE — ANESTHESIA POSTPROCEDURE EVALUATION
Department of Anesthesiology  Postprocedure Note    Patient: Jovon Waddell  MRN: [id number]  YOB: 1950  Date of evaluation: 11/21/2021  Time:  11:43 AM     Procedure Summary     Date: 11/21/21 Room / Location: [facility] 01 / [facility]    Anesthesia Start: 1107 Anesthesia Stop:     Procedure: CATHETER INSERTION HEMODIALYSIS, REMOVAL OF FEMORAL CATHETER (N/A Neck) Diagnosis: (unknown)    Surgeons: Quentin Mcgowan MD Responsible Provider: Griselda Rich MD    Anesthesia Type: MAC ASA Status: 4          Anesthesia Type: No value filed. Angela Phase I:      Angela Phase II:      Last vitals: Reviewed and per EMR flowsheets.        Anesthesia Post Evaluation    Patient location during evaluation: PACU  Patient participation: complete - patient participated  Level of consciousness: awake  Airway patency: patent  Nausea & Vomiting: no nausea and no vomiting  Complications: no  Cardiovascular status: hemodynamically stable  Respiratory status: acceptable  Hydration status: euvolemic

## 2021-11-21 NOTE — CONSULTS
ENDOCRINOLOGY INITIAL CONSULTATION NOTE    Date of Admission: 11/19/2021  Date of Service: 11/21/2021  Admitting Physician: David Nguyen DO   Primary Care Physician: Marcus Prince MD  Consultant physician: Alan Kowalski MD     Reason for the consultation:  Hypothyroidism     History of Present Illness: The history is provided by the patient. Accuracy of the patient data is excellent. Suad Farrar is a very pleasant 66 y.o. old male with PMH primary hypothyroidism, DM type 2, HTN, and other listed below admitted to [facility] on 11/19/2021 because of progressive shortness of breath, endocrine service was consulted for management of hypothyroidism   The patient was in his usual state of health until few days before admission when started c/o progressive SOB. He denies CP, fever, chills, N/V/D   On admission BP was very high 200/118, CXR showed pulmonary edema. proBNP > 70,000. Femoral temporary hemodialysis catheter was placed and started on HD     Thyroid function test on admission showed markedly elevated TSH with slightly low FT4  Lab Results   Component Value Date/Time    .600 (H) 11/20/2021 03:56 AM    T4FREE 0.70 (L) 11/20/2021 02:20 PM     Past Medical History   Past Medical History:   Diagnosis Date    Chronic kidney disease     Diabetes mellitus ([address]     Hepatitis C     Hypertension     Liver disease     Thyroid disease     Unspecified diseases of blood and blood-forming organs        Past Surgical History   Past Surgical History:   Procedure Laterality Date    TONSILLECTOMY         Social history   Tobacco:   reports that he quit smoking about 48 years ago. His smoking use included cigarettes. He has a 20.00 pack-year smoking history. He has never used smokeless tobacco.  Alcohol:   reports no history of alcohol use. Drugs:   reports current drug use.   Family history   Family History   Problem Relation Age of Onset    Diabetes Mother     Cancer Father        Allergies and Drug reactions  Allergies   Allergen Reactions    Penicillins      Does not remember       Scheduled Meds:   clindamycin        amLODIPine  5 mg Oral Daily    levothyroxine  112 mcg Oral Daily    sodium bicarbonate  1,300 mg Oral TID    tamsulosin  0.4 mg Oral Daily    sodium chloride flush  5-40 mL IntraVENous 2 times per day    heparin (porcine)  5,000 Units SubCUTAneous 3 times per day    insulin lispro  0-6 Units SubCUTAneous TID WC    insulin lispro  0-3 Units SubCUTAneous Nightly    bumetanide  2 mg Oral BID    carvedilol  6.25 mg Oral BID WC    epoetin sabiha-epbx  3,000 Units SubCUTAneous Once per day on Mon Wed Fri     PRN Meds:   HYDROcodone 5 mg - acetaminophen, 1 tablet, Q6H PRN  glucose, 15 g, PRN  dextrose, 12.5 g, PRN  glucagon (rDNA), 1 mg, PRN  dextrose, 100 mL/hr, PRN  sodium chloride flush, 5-40 mL, PRN  sodium chloride, 25 mL, PRN  ondansetron, 4 mg, Q8H PRN   Or  ondansetron, 4 mg, Q6H PRN  polyethylene glycol, 17 g, Daily PRN  acetaminophen, 650 mg, Q6H PRN   Or  acetaminophen, 650 mg, Q6H PRN  metoprolol, 5 mg, Q6H PRN  perflutren lipid microspheres, 1.5 mL, ONCE PRN      Continuous Infusions:   dextrose      sodium chloride         Review of Systems  All systems reviewed. All negative except for symptoms mentioned in HPI     OBJECTIVE    BP (!) 158/88   Pulse 67   Temp 97.9 °F (36.6 °C) (Oral)   Resp 16   Ht 6' 2\" (1.88 m)   Wt 132 lb 11.5 oz (60.2 kg)   SpO2 99%   BMI 17.04 kg/m²   Wt Readings from Last 6 Encounters:   11/20/21 132 lb 11.5 oz (60.2 kg)   08/25/20 166 lb (75.3 kg)   10/18/18 155 lb (70.3 kg)   04/28/16 155 lb (70.3 kg)   01/14/16 160 lb (72.6 kg)   06/26/13 145 lb (65.8 kg)       Physical examination:  General: awake alert, oriented x3, no abnormal position or movements.    HEENT: normocephalic non traumatic,   Neck: supple,  no thyromegaly, no thyroid tenderness   Pulm: good equal air entry no added sounds   CVS: S1 + S2, + edema    Abd: soft lax, no tenderness,   Skin: warm, no lesions, no rash. Musculoskeletal: No back tenderness, no kyphosis/scoliosis    Neuro: CN intact, sensation notmal , muscle power normal.   Psych: normal mood, and affect    Review of Laboratory Data:  I personally reviewed the following labs:  Recent Labs     11/19/21  1526 11/20/21  1420   WBC 6.0 4.6   RBC 2.89* 2.55*   HGB 8.9* 7.8*   HCT 27.2* 23.4*   MCV 94.1 91.8   MCH 30.8 30.6   MCHC 32.7 33.3   RDW 15.6* 15.1*    226   MPV 9.9 10.0     Recent Labs     11/19/21  1526 11/19/21  1545 11/20/21  0356 11/20/21  1420 11/21/21  0341      < > 139 135 136   K 5.8*  --  5.0 4.8 4.6      < > 103 102 100   CO2 14*   < > 15* 19* 19*   BUN 80*   < > 64* 47* 51*   CREATININE 7.9*   < > 6.7* 5.7* 6.4*   GLUCOSE 154*   < > 148* 212* 91   CALCIUM 8.0*   < > 8.1* 7.6* 7.6*   PROT 8.6*  --   --   --   --    LABALBU 4.1  --   --  3.2* 3.4*   BILITOT 0.3  --   --   --   --    ALKPHOS 115  --   --   --   --    AST 25  --   --   --   --    ALT 12  --   --   --   --     < > = values in this interval not displayed. No results found for: BHYDRXBUT  Lab Results   Component Value Date    LABA1C 5.3 11/12/2021    LABA1C 5.1 08/24/2021    LABA1C 6.0 06/26/2020     Lab Results   Component Value Date/Time    .600 (H) 11/20/2021 03:56 AM    T4FREE 0.70 (L) 11/20/2021 02:20 PM     Lab Results   Component Value Date    LABA1C 5.3 11/12/2021    GLUCOSE 91 11/21/2021    MALBCR 3951.0 11/12/2021    LABMICR 3516.4 11/12/2021    LABCREA 89 11/12/2021     Lab Results   Component Value Date    TRIG 109 08/24/2021    HDL 53 11/12/2021    LDLCALC 147 11/12/2021    CHOL 200 08/24/2021       Blood culture   Lab Results   Component Value Date    BC 24 Hours no growth 11/19/2021       Radiology:  XR CHEST PORTABLE   Final Result   1. Satisfactory position of the right tunneled dialysis catheter. There is   no right pneumothorax   2. Improved aeration of the lungs   3.  Residual small bilateral pleural effusions. FLUORO FOR SURGICAL PROCEDURES   Final Result   Intraprocedural fluoroscopic spot images as above. See separate procedure   report for more information. XR CHEST (2 VW)   Final Result   Pulmonary edema although superimposed pneumonia at the lung bases cannot be   excluded. Bilateral pleural effusions. Enlargement of the cardiac silhouette which is new and consistent with   cardiomegaly and/or pericardial effusion. US DUP UPPER EXTREMITY MAPPING BILAT VENOUS    (Results Pending)       Medical Records/Labs/Images Review:   I personally reviewed and summarized previous records   All labs and imaging were reviewed independently    Temo, a 66 y.o.-old male seen in for management of Hypothyroidism     Profound Hypothyroidism   · Significantly elevated TSH with slightly low FT4 is consistent with medications noncompliance   · Agree with starting pt on Levothyroxine 112 mcg daily   · Will recheck Free T4 tomorrow morning to assess response and recheck his thyroid function test in 6-8 weeks after discharge     Fluid overload/ESRD   · Started on HD     The above issues were reviewed with the patient who understood and agreed with the plan. Thank you for allowing us to participate in the care of this patient. Please do not hesitate to contact us with any additional questions. Sally García MD  Endocrinologist, [place] [address] [facility]   [address] [facility] [address]   Phone: [phone]  Fax: [phone]  ---------------------------------  An electronic signature was used to authenticate this note.  Marimar Us MD on 11/21/2021 at 2:49 PM

## 2021-11-21 NOTE — CONSULTS
Vascular Surgery Consult Note      Chief Complaint: End-stage renal disease requesting hemodialysis catheter    HISTORY OF PRESENT ILLNESS:                The patient is a 66 y.o. male who presents to the hospital with multiple medical conditions. He has been recently placed on hemodialysis via femoral line. He is running well. He denies any history of prior arteriovenous access. He denies any pacers or central IV access. He is right-handed. Right-handed dominant. He denies any current complaints.     Past Medical History:   Diagnosis Date    Chronic kidney disease     Diabetes mellitus ([address]     Hepatitis C     Hypertension     Liver disease     Thyroid disease     Unspecified diseases of blood and blood-forming organs         Past Surgical History:   Procedure Laterality Date    TONSILLECTOMY         Current Medications:     Current Facility-Administered Medications:     amLODIPine (NORVASC) tablet 5 mg, 5 mg, Oral, Daily, Joel Raspovic, DO, 5 mg at 11/20/21 1152    levothyroxine (SYNTHROID) tablet 112 mcg, 112 mcg, Oral, Daily, Joel Raspovic, DO, 112 mcg at 11/20/21 0558    glucose (GLUTOSE) 40 % oral gel 15 g, 15 g, Oral, PRN, Ernestina Faes Raspovic, DO    dextrose 50 % IV solution, 12.5 g, IntraVENous, PRN, Ernestina Faes Raspovic, DO    glucagon (rDNA) injection 1 mg, 1 mg, IntraMUSCular, PRN, Ernestina Faes Raspovic, DO    dextrose 5 % solution, 100 mL/hr, IntraVENous, PRN, Ernestina Faes Raspovic, DO    sodium bicarbonate tablet 1,300 mg, 1,300 mg, Oral, TID, Joel Raspovic, DO, 1,300 mg at 11/20/21 2222    tamsulosin (FLOMAX) capsule 0.4 mg, 0.4 mg, Oral, Daily, Joel Raspovic, DO, 0.4 mg at 11/20/21 1152    sodium chloride flush 0.9 % injection 5-40 mL, 5-40 mL, IntraVENous, 2 times per day, Joel Raspovic, DO, 10 mL at 11/20/21 2228    sodium chloride flush 0.9 % injection 5-40 mL, 5-40 mL, IntraVENous, PRN, Joel Barrerapovic, DO    0.9 % sodium chloride infusion, 25 mL, IntraVENous, PRN, Lorriane Bowels, DO    heparin (porcine) injection 5,000 Units, 5,000 Units, SubCUTAneous, 3 times per day, Lorriane Bowels, DO, 5,000 Units at 11/20/21 2222    ondansetron (ZOFRAN-ODT) disintegrating tablet 4 mg, 4 mg, Oral, Q8H PRN **OR** ondansetron (ZOFRAN) injection 4 mg, 4 mg, IntraVENous, Q6H PRN, Michele Tejada Raspovic, DO    polyethylene glycol (GLYCOLAX) packet 17 g, 17 g, Oral, Daily PRN, Lorriane Bowels, DO    acetaminophen (TYLENOL) tablet 650 mg, 650 mg, Oral, Q6H PRN **OR** acetaminophen (TYLENOL) suppository 650 mg, 650 mg, Rectal, Q6H PRN, Michele Tejada Raspovic, DO    insulin lispro (HUMALOG) injection vial 0-6 Units, 0-6 Units, SubCUTAneous, TID WC, Joel Vallejo, DO, 2 Units at 11/20/21 1627    insulin lispro (HUMALOG) injection vial 0-3 Units, 0-3 Units, SubCUTAneous, Nightly, Joel Vallejo, DO, 1 Units at 11/20/21 2223    metoprolol (LOPRESSOR) injection 5 mg, 5 mg, IntraVENous, Q6H PRN, Tom Mckenzie APRN - CNP, 5 mg at 11/20/21 0301    [COMPLETED] ferric gluconate (FERRLECIT) 25 mg in sodium chloride 0.9 % 50 mL (test dose) IVPB, 25 mg, IntraVENous, Once, Stopped at 11/20/21 1613 **FOLLOWED BY** [COMPLETED] ferric gluconate (FERRLECIT) 100 mg in sodium chloride 0.9 % 100 mL IVPB, 100 mg, IntraVENous, Once, Stopped at 11/20/21 1822 **FOLLOWED BY** ferric gluconate (FERRLECIT) 125 mg in sodium chloride 0.9 % 100 mL IVPB, 125 mg, IntraVENous, Once, Carito Mcelroy APRN - CNP    perflutren lipid microspheres (DEFINITY) injection 1.65 mg, 1.5 mL, IntraVENous, ONCE PRN, Michele Barrerapovic, DO    bumetanide (BUMEX) tablet 2 mg, 2 mg, Oral, BID, Neo Orourke MD, 2 mg at 11/20/21 2222    carvedilol (COREG) tablet 6.25 mg, 6.25 mg, Oral, BID WC, Neo Orourke MD, 6.25 mg at 11/20/21 1627    epoetin saibha-epbx (RETACRIT) injection 3,000 Units, 3,000 Units, SubCUTAneous, Once per day on Mon Wed Fri, Leisa Castro MD, 3,000 Units at 11/20/21 0324    Allergies: Penicillins    Social History     Socioeconomic History    Marital status: Single     Spouse name: Not on file    Number of children: Not on file    Years of education: Not on file    Highest education level: Not on file   Occupational History    Not on file   Tobacco Use    Smoking status: Former Smoker     Packs/day: 2.00     Years: 10.00     Pack years: 20.00     Types: Cigarettes     Quit date: 1973     Years since quittin.2    Smokeless tobacco: Never Used   Substance and Sexual Activity    Alcohol use: No    Drug use: Yes     Comment: in early 's    Sexual activity: Yes     Partners: Female   Other Topics Concern    Not on file   Social History Narrative    Not on file     Social Determinants of Health     Financial Resource Strain:     Difficulty of Paying Living Expenses: Not on file   Food Insecurity:     Worried About Running Out of Food in the Last Year: Not on file    Danielle of Food in the Last Year: Not on file   Transportation Needs:     Lack of Transportation (Medical): Not on file    Lack of Transportation (Non-Medical):  Not on file   Physical Activity:     Days of Exercise per Week: Not on file    Minutes of Exercise per Session: Not on file   Stress:     Feeling of Stress : Not on file   Social Connections:     Frequency of Communication with Friends and Family: Not on file    Frequency of Social Gatherings with Friends and Family: Not on file    Attends [employer] Services: Not on file    Active Member of Clubs or Organizations: Not on file    Attends Club or Organization Meetings: Not on file    Marital Status: Not on file   Intimate Partner Violence:     Fear of Current or Ex-Partner: Not on file    Emotionally Abused: Not on file    Physically Abused: Not on file    Sexually Abused: Not on file   Housing Stability:     Unable to Pay for Housing in the Last Year: Not on file    Number of [place] in the Last Year: Not on file    Unstable Housing in the Last Year: Not on file        Family History   Problem Relation Age of Onset    Diabetes Mother     Cancer Father          REVIEW OF SYSTEMS:    Gen: Negative for nausea, vomiting, diarrhea, fever, chills, night sweats, no weight loss or weight gain  HEENT: Negative for double vision, blurred vision, sore throat   Heart: Negative for HTN, palpitations, chest pain, or pain radiating to the arm, jaw or teeth  Lungs: Negative for wheezes, shortness of breath while at rest or lying down  GI: Negative for nausea, vomiting, diarrhea, or constipation  : Negative for dysuria, hematuria, increased frequency or urgency  Endo: Negative for polydipsia, polyuria, or heat or cold intolerances. Heme: Negative leg swelling, blood or bleeding disorders  Psych: Negative for Depression or anxiety  Ortho: Negative for pain in the joints, arthritis or gout  Vascular: Negative for claudication, calf pain, ulcerations, or rest pain. He also denies any nonhealing lower extremity wounds        PHYSICAL EXAM:    Vitals:    11/21/21 0930   BP: 136/83   Pulse: 76   Resp:    Temp:    SpO2:      GENERAL APPEARANCE:  Well-developed well-nourished alert and oriented answers questions appropriately the patient does not appear in any acute distress. HEAD: Head is normocephalic atraumatic, with Normal range of motion. EYES: Inspection of the conjunctiva and lids demonstrated no abnormalities, no jaundice, no scleral icterus, PERRL, EOMI, and vision are grossly intact. EARS:  External auditory canals demonstrate no abnormalities. Ears are well set hearing is grossly intact. SKIN: Normal in color, texture, and turgor, no visible lesions, no jaundice. NECK: Supple, nontender no lymphadenopathy trachea is midline no jugular venous distention no carotid bruits auscultated. LUNGS:  Clear to auscultation bilaterally no wheezes rales or rhonchi good respiratory changes noted.    CARDIOVASCULAR: Currently regular rate and rhythm no murmur rub or gallop that I could appreciate. ABDOMEN: Soft nontender no rebound or guarding, positive bowel sounds no pulsatile abdominal masses. No organosplenomegaly that I can appreciate. EXTREMITIES: Bilateral palpable brachial radial pulses. Velasquez's test on the left extremity is unremarkable. Has palpable DP and PT pulses. The femoral line is in place and running well. MUSKULOSKELETAL  adequately aligned spine, range of motion appears to be intact with regards to the spine and upper and lower extremities. No joint tenderness or erythema. Normal muscular development. NEURO: Cranial nerves II through XII grossly intact. Strength and sensation are symmetric and intact throughout. Psychiatric: Mental examination revealed the patient was oriented to person, place, and time. The patient was able to demonstrate adequate judgment and reason, without any hallucinations abnormal affect or abnormal behavior on today's exam.      LABS:    Lab Results   Component Value Date    WBC 4.6 11/20/2021    HGB 7.8 (L) 11/20/2021    HCT 23.4 (L) 11/20/2021     11/20/2021    PROTIME 12.1 08/24/2021    INR 1.1 08/24/2021    K 4.6 11/21/2021    BUN 51 (H) 11/21/2021    CREATININE 6.4 (H) 11/21/2021       RADIOLOGY:  XR CHEST (2 VW)   Final Result   Pulmonary edema although superimposed pneumonia at the lung bases cannot be   excluded. Bilateral pleural effusions. Enlargement of the cardiac silhouette which is new and consistent with   cardiomegaly and/or pericardial effusion.              IMPRESSION:   Active Hospital Problems    Diagnosis     ESRD (end stage renal disease) ([facility] [address] [N18.6]     Acute respiratory failure with hypoxia (HCC) [J96.01]     Volume overload [E87.70]     Elevated troponin [R77.8]     Hypertensive emergency [I16.1]     Hyperkalemia [E87.5]     Hypothyroid [U70.5]     Metabolic acidosis [D64.7]     Diabetes mellitus ([facility] [address] [E11.9]        PLAN: #1 end-stage renal disease dialyzing through a temporary line. At this point were planning a tunneled catheter. He will also be worked up for a left arm arteriovenous access creation. Risk benefits and alternatives were discussed with the patient including but not limited to bleeding, infection, arteriovenous nerve injury, myocardial infarction, respiratory failure, pneumothorax, great vessel injury, DVT, failure of future access, anesthetic reaction, catheter associated infection, and/or death the patient understands and wishes to proceed all questions were answered according to their satisfaction.       Electronically signed by Wicho Abarca MD on 11/21/2021 at 10:20 AM

## 2021-11-21 NOTE — PROGRESS NOTES
Date: 11/20/2021    Time: 11:57 PM    Patient Placed On BIPAP/CPAP/ Non-Invasive Ventilation?   No    If no must comment      Comments: patient refusing to wear BIPAP at this time        Shane Chaney RCP

## 2021-11-21 NOTE — OP NOTE
Operative Note      Patient: Garfield Mack  YOB: 1950  MRN: [id number]    DATE OF PROCEDURE: 11/21/2021     SURGEON: Michelle Rodney M.D.     ASSISTANT: None     PREOPERATIVE DIAGNOSIS: Chronic renal failure. POSTOPERATIVE DIAGNOSIS: Same    Findings: Patent right internal jugular vein    OPERATION:  [id number] US guided access of right internal jugular vein  [id number]  Insertion of right internal jugular vein tunneled hemodialysis catheter    01343-28 with fluoroscopic guidance    Removal of femoral right temporary dialysis catheter     ANESTHESIA: MAC and local     ESTIMATED BLOOD LOSS: Minimal     COMPLICATIONS: None    DESCRIPTION OF PROCEDURE: The patient was identified and the procedure was confirmed. The right neck and chest were prepped and draped in the usual sterile fashion. Next, 1% lidocaine mixed with 0.25% Marcaine was used for local anesthesia. The right internal jugular vein was identified under US, noted to be patent, compressible and was percutaneously entered under US guidance. Image documented. A guidewire was advanced into the superior vena cava under fluoroscopic guidance. A small incision was made around the wire. The catheter was pulled through a subcutaneous tunnel from an inferior chest stab incision to the neck incision. The dilators were passed over the wire followed by the introducer. The catheter was passed through the introducer and the tip was positioned in the superior vena cava right atrial junction under fluoroscopic guidance. Both lumens were noted to withdrawal and flush blood easily and were flushed with heparinized saline solution. They catheter was secured to the skin with nylon sutures and the neck incision was approximated with an interrupted Vicryl suture. right groin prepped. Sutures cut, temporary catheter removed and pressure held. Sterile dressings were applied to the incisions in the operating room.  Needle, sponge, and instrument counts were reported as correct times two. The patient tolerated the procedure and was transferred back to the floor in satisfactory condition. CC : Adan Puente MD         Specimens:   * No specimens in log *    Implants:  Implant Name Type Inv.  Item Serial No.  Lot No. LRB No. Used Action   CATHETER HD PRECRV 15.5 [id number] CM LT DL BASIC SET TITAN HD  CATHETER HD PRECRV 15.5 FRX28 CM LT DL BASIC SET TITAN HD  MEDICAL COMPONENTS [facility]- [id number] N/A 1 Implanted         Drains: * No LDAs found *      Electronically signed by Michelle Rodney MD on 11/21/2021 at 12:16 PM

## 2021-11-21 NOTE — PROGRESS NOTES
[facility] Progress Note    Admitting Date and Time: 11/19/2021  2:49 PM  Admit Dx: Shortness of breath [G89.39]  Metabolic acidosis [R79.4]  ESRD (end stage renal disease) ([facility] [address] [N18.6]  Elevated troponin [R77.8]  Acute kidney injury superimposed on CKD ([facility] [address] [N17.9, N18.9]  Hypertension, unspecified type [I10]    Subjective:  Patient is being followed for Shortness of breath [F36.40]  Metabolic acidosis [V77.1]  ESRD (end stage renal disease) ([facility] [address] [N18.6]  Elevated troponin [R77.8]  Acute kidney injury superimposed on CKD ([facility] [N17.9, N18.9]  Hypertension, unspecified type [I10]     Patient was lying in bed in no acute distress. Denies any new concerns. Per RN, cath site was bleeding, placed packed dressing. ROS: denies fever, chills, cp, sob, n/v, HA unless stated above.      clindamycin        amLODIPine  5 mg Oral Daily    levothyroxine  112 mcg Oral Daily    sodium bicarbonate  1,300 mg Oral TID    tamsulosin  0.4 mg Oral Daily    sodium chloride flush  5-40 mL IntraVENous 2 times per day    heparin (porcine)  5,000 Units SubCUTAneous 3 times per day    insulin lispro  0-6 Units SubCUTAneous TID WC    insulin lispro  0-3 Units SubCUTAneous Nightly    bumetanide  2 mg Oral BID    carvedilol  6.25 mg Oral BID WC    epoetin sabiha-epbx  3,000 Units SubCUTAneous Once per day on Mon Wed Fri     HYDROcodone 5 mg - acetaminophen, 1 tablet, Q6H PRN  glucose, 15 g, PRN  dextrose, 12.5 g, PRN  glucagon (rDNA), 1 mg, PRN  dextrose, 100 mL/hr, PRN  sodium chloride flush, 5-40 mL, PRN  sodium chloride, 25 mL, PRN  ondansetron, 4 mg, Q8H PRN   Or  ondansetron, 4 mg, Q6H PRN  polyethylene glycol, 17 g, Daily PRN  acetaminophen, 650 mg, Q6H PRN   Or  acetaminophen, 650 mg, Q6H PRN  metoprolol, 5 mg, Q6H PRN  perflutren lipid microspheres, 1.5 mL, ONCE PRN         Objective:    BP (!) 154/82   Pulse 67   Temp 97.5 °F (36.4 °C) (Oral)   Resp 16   Ht 6' 2\" (1.88 m)   Wt 132 lb 11.5 oz (60.2 kg)   SpO2 99%   BMI 17.04 kg/m²   General Appearance: alert and oriented to person, place and time and in no acute distress  Skin: warm and dry  Head: normocephalic and atraumatic  Eyes: pupils equal, round, and reactive to light, extraocular eye movements intact, conjunctivae normal  Neck: neck supple and non tender without mass   Pulmonary/Chest: clear to auscultation bilaterally- no wheezes, rales or rhonchi, normal air movement, no respiratory distress, tunneled cath placed   Cardiovascular: normal rate, normal S1 and S2 and no carotid bruits  Abdomen: soft, non-tender, non-distended, normal bowel sounds, no masses or organomegaly  Extremities: no cyanosis, no clubbing and no edema  Neurologic: no cranial nerve deficit and speech normal        Recent Labs     11/20/21  0356 11/20/21  1420 11/21/21  0341    135 136   K 5.0 4.8 4.6    102 100   CO2 15* 19* 19*   BUN 64* 47* 51*   CREATININE 6.7* 5.7* 6.4*   GLUCOSE 148* 212* 91   CALCIUM 8.1* 7.6* 7.6*       Recent Labs     11/19/21  1526 11/20/21  1420   WBC 6.0 4.6   RBC 2.89* 2.55*   HGB 8.9* 7.8*   HCT 27.2* 23.4*   MCV 94.1 91.8   MCH 30.8 30.6   MCHC 32.7 33.3   RDW 15.6* 15.1*    226   MPV 9.9 10.0       Radiology:   EXAMINATION:  TWO XRAY VIEWS OF THE CHEST     11/19/2021 4:44 pm     COMPARISON:  06/06/2013 chest radiograph.     HISTORY:  ORDERING SYSTEM PROVIDED HISTORY: Shortness of breath  TECHNOLOGIST PROVIDED HISTORY:  Reason for shortness of breath     FINDINGS:  The lungs are mildly hypoinflated. There are interstitial and airspace  opacities in a perihilar and lower lobe distribution. There is blunting of  the costophrenic sulci. Cardiac silhouette is enlarged. Pulmonary  vasculature is indistinct centrally. There is atherosclerotic calcification  of the thoracic aorta.   No pneumothorax.     IMPRESSION:  Pulmonary edema although superimposed pneumonia at the lung bases cannot be  excluded.     Bilateral pleural MARGE Valdez on 11/21/2021 at 3:57 PM     Addendum: I have personally participated in the history, exam, medical decision making with Karla Courtney on the date of service and I agree with all of the pertinent clinical information unless otherwise noted. I have also reviewed and agree with the past medical, family, and social history unless otherwise noted. Patient was admitted with shortness of breath. PHYSICAL EXAM:  Vitals:  BP (!) 154/82   Pulse 67   Temp 97.5 °F (36.4 °C) (Oral)   Resp 16   Ht 6' 2\" (1.88 m)   Wt 132 lb 11.5 oz (60.2 kg)   SpO2 99%   BMI 17.04 kg/m²   Gen: awake, alert, NAD  Lungs: clear to auscultation bilaterally no crackles no wheezing. Heart: RRR, no murmur   Abdomen: soft nontender nondistended positive bowel sounds. Extremities: full range of motion no peripheral edema. Impression:  Principal Problem:    Acute respiratory failure with hypoxia (HCC)  Active Problems:    Diabetes mellitus (HCC)    Metabolic acidosis    Hypothyroid    ESRD (end stage renal disease) (HCC)    Volume overload    Elevated troponin    Hypertensive emergency    Hyperkalemia  Resolved Problems:    * No resolved hospital problems. *      My findings/plan include:    Patient had tessio insertion. Will have dailysis as per nephrology prior to discharge. Will continue to monitor prior to discharge. NOTE: This report was transcribed using voice recognition software. Every effort was made to ensure accuracy; however, inadvertent computerized transcription errors may be present.     Electronically signed by Nikkie Haynes DO on 11/21/2021 at 4:20 PM

## 2021-11-21 NOTE — FLOWSHEET NOTE
11/21/21 1005   Vital Signs   BP (!) 150/68   Temp 98 °F (36.7 °C)   Pulse 76   Resp 18   Post-Hemodialysis Assessment   Post-Treatment Procedures Blood returned; Catheter Capped, clamped with Saline x2 ports   Machine Disinfection Process Acid/Vinegar Clean; Bleach; Exterior Machine Disinfection; Machine Absence of Bleach Machine   Rinseback Volume (ml) 300 ml   Total Liters Processed (l/min) 35.2 l/min   Dialyzer Clearance Clear   Duration of Treatment (minutes) 120 minutes   Heparin amount administered during treatment (units) 0 units   Hemodialysis Intake (ml) 300 ml   Hemodialysis Output (ml) 1600 ml   NET Removed (ml) 1300 ml   Tolerated Treatment Good   Tolerated  2hr tx well on 2K 2.5Ca bath per orders tx ended 1000 due to called to OR for TDC, 1300 ml removed with out difficulty. HD CVC flushed, nss to dwell, clamped and capped  post tx per policy. Report to floor RN, remains in care of staff.  Dr Scarlet Gilford aware received 2hr tx

## 2021-11-21 NOTE — PROGRESS NOTES
Department of Internal Medicine  Nephrology Progress Note    Events reviewed. S/p right IJ tunneled catheter placement     SUBJECTIVE:  We are following Mr. Jw George for uremia and hyperkalemia. He reports feeling tired and bleeding from surgery site    PHYSICAL EXAM:      Vitals:    VITALS:  BP (!) 154/82   Pulse 67   Temp 97.5 °F (36.4 °C) (Oral)   Resp 16   Ht 6' 2\" (1.88 m)   Wt 132 lb 11.5 oz (60.2 kg)   SpO2 95%   BMI 17.04 kg/m²   24HR INTAKE/OUTPUT:      Intake/Output Summary (Last 24 hours) at 11/21/2021 1740  Last data filed at 11/21/2021 1142  Gross per 24 hour   Intake 550 ml   Output 1600 ml   Net -1050 ml       Constitutional:  Awake in respiratory distress  HEENT:  PERRLA  Respiratory:  Decreased breath sounds at the bases  Cardiovascular/Edema:  Heart sounds regular, right chest with dressing  Gastrointestinal:  Abdomen  Neurologic:  Non focal   Skin:  No lesions   Other:  ++ edema     Scheduled Meds:   clindamycin        amLODIPine  5 mg Oral Daily    levothyroxine  112 mcg Oral Daily    sodium bicarbonate  1,300 mg Oral TID    tamsulosin  0.4 mg Oral Daily    sodium chloride flush  5-40 mL IntraVENous 2 times per day    heparin (porcine)  5,000 Units SubCUTAneous 3 times per day    insulin lispro  0-6 Units SubCUTAneous TID WC    insulin lispro  0-3 Units SubCUTAneous Nightly    bumetanide  2 mg Oral BID    carvedilol  6.25 mg Oral BID WC    epoetin sabiha-epbx  3,000 Units SubCUTAneous Once per day on Mon Wed Fri     Continuous Infusions:   dextrose      sodium chloride       PRN Meds:. HYDROcodone 5 mg - acetaminophen, glucose, dextrose, glucagon (rDNA), dextrose, sodium chloride flush, sodium chloride, ondansetron **OR** ondansetron, polyethylene glycol, acetaminophen **OR** acetaminophen, metoprolol, perflutren lipid microspheres    DATA:    CBC:   Lab Results   Component Value Date    WBC 4.6 11/20/2021    RBC 2.55 11/20/2021    HGB 7.8 11/20/2021    HCT 23.4 11/20/2021    MCV 91.8 11/20/2021    MCH 30.6 11/20/2021    MCHC 33.3 11/20/2021    RDW 15.1 11/20/2021     11/20/2021    MPV 10.0 11/20/2021     CMP:    Lab Results   Component Value Date     11/21/2021    K 4.6 11/21/2021    K 5.8 11/19/2021     11/21/2021    CO2 19 11/21/2021    BUN 51 11/21/2021    CREATININE 6.4 11/21/2021    GFRAA 10 11/21/2021    LABGLOM 10 11/21/2021    GLUCOSE 91 11/21/2021    PROT 8.6 11/19/2021    LABALBU 3.4 11/21/2021    CALCIUM 7.6 11/21/2021    BILITOT 0.3 11/19/2021    ALKPHOS 115 11/19/2021    AST 25 11/19/2021    ALT 12 11/19/2021     Magnesium:    Lab Results   Component Value Date    MG 1.8 06/26/2020     Phosphorus:    Lab Results   Component Value Date    PHOS 5.1 11/21/2021     Radiology Review:      CXR 11/19/2021   Pulmonary edema although superimposed pneumonia at the lung bases cannot be   excluded.       Bilateral pleural effusions.       Enlargement of the cardiac silhouette which is new and consistent with   cardiomegaly and/or pericardial effusion.             BRIEF SUMMARY OF INITIAL CONSULT:    Briefly Mr. Ange Wadsworth is a 66year old man with h/o CKD stage 5, with severe nephrotic proteinuria 2/2 to diabetic nephropathy, type 2 DM, HTN, hepatitis C, hypothyroidism, BPH, who was admitted on 11/19/2021 after he presented to the ER with increasing shortness of breath. He was found with pro-BNP levels of >70,000 and CXR consistent with pulmonary edema. His creatinine was 7.9 mg/dL, K 5.8 mEq/L, bicarbonate 14 mEq/L reasons for this consultation. IMPRESSION/RECOMMENDATIONS:      CKD stage 5, with severe nephrotic proteinuria, 2/2 diabetic nephropathy. Presented with severely volume overload, pulmonary edema, hyperkalemia and metabolic acidosis. Patient started on RRT on 11/19/21. S/p right groin temporary dialysis catheter. Hyperkalemia, 2/2 advanced CKD. Had dialysis last night and again today.    High anion gap metabolic acidosis, 2/2 uremia, on sodium bicarbonate Decompensated heart failure, unknown EF, with pulmonary edema, proBNP >70,000, on Bumex. For echo. HTN, on amlodipine and carvedilol  History of hepatitis C  Normocytic anemia, 2/2 CKD. Ferritin 425, iron 38, and iron saturation 16%. On epoetin alpha. To start on IV iron.      Plan:    HD 3.5 hours tomorrow  continue IV iron   Continue sodium bicarbonate 1,300 mg PO TID  Continue Bumex 2 mg p.o. twice daily  Continue epoetin alpha 3000 units three times weekly   Continue amlodipine 10 mg PO daily  Continue carvedilol 6.25 mg twice daily   Continue to monitor potassium levels  Awaiting echocardiogram  SPA 25 gm with each HD  Pressure dressing and check pt/inr    Case discussed with RN    Electronically signed by Rajwinder Mirza MD on 11/21/2021 at 5:40 PM

## 2021-11-21 NOTE — ANESTHESIA PRE PROCEDURE
Department of Anesthesiology  Preprocedure Note       Name:  Roberto Ortega   Age:  66 y.o.  :  1950                                          MRN:  [id number]         Date:  2021      Surgeon: Kaveh Castro):  Emir Anguiano MD    Procedure: Procedure(s):  CATHETER INSERTION HEMODIALYSIS    Medications prior to admission:   Prior to Admission medications    Medication Sig Start Date End Date Taking? Authorizing Provider   allopurinol (ZYLOPRIM) 100 MG tablet TK 1 T PO D 8/3/20  Yes Historical Provider, MD   sodium bicarbonate 650 MG tablet TK 2 TS PO Q 8 H 20  Yes Historical Provider, MD   levothyroxine (SYNTHROID) 112 MCG tablet Take 112 mcg by mouth Daily   Yes Historical Provider, MD   magnesium oxide (MAG-OX) 400 MG tablet Take 400 mg by mouth daily   Yes Historical Provider, MD   levothyroxine (SYNTHROID) 75 MCG tablet Take 75 mcg by mouth Daily. Yes Historical Provider, MD   amLODIPine (NORVASC) 5 MG tablet Take 5 mg by mouth daily.    Yes Historical Provider, MD   glimepiride (AMARYL) 1 MG tablet Take 1 mg by mouth every morning (before breakfast)    Historical Provider, MD   dapsone 25 MG tablet TK 1 T PO BID 20   Historical Provider, MD   PROCARDIA XL 60 MG extended release tablet TK 1 T PO QAM 20   Historical Provider, MD   vitamin D (ERGOCALCIFEROL) 1.25 MG (01377 UT) CAPS capsule TK 1 C PO 1 TIME Q WK 20   Historical Provider, MD   clobetasol (TEMOVATE) 0.05 % ointment JOHN EXT AA BID 20   Historical Provider, MD   diclofenac (VOLTAREN) 75 MG EC tablet Take 1 tablet by mouth 2 times daily  Patient not taking: Reported on 2020 10/18/18   Giorgi Alex MD   tamsulosin [facility]) 0.4 MG capsule Take 1 capsule by mouth daily for 10 days 10/18/18 10/28/18  Giorgi Alex MD   colchicine (COLCRYS) 0.6 MG tablet Take 1 tablet by mouth 2 times daily  Patient taking differently: Take 0.6 mg by mouth as needed  16   Charla Nixon, APRN - CNP   calcium carbonate (TUMS) 500 MG chewable tablet Take 1 tablet by mouth 3 times daily as needed for Heartburn. 6/11/13   Lachelle Flores MD   glimepiride (AMARYL) 4 MG tablet Take 1 tablet by mouth Daily with supper. Patient not taking: Reported on 8/25/2020 6/11/13   Lachelle Flores MD   sitaGLIPtan (JANUVIA) 100 MG tablet Take 1 tablet by mouth daily.   Patient not taking: Reported on 8/25/2020 6/11/13   Lachelle Flores MD       Current medications:    Current Facility-Administered Medications   Medication Dose Route Frequency Provider Last Rate Last Admin    amLODIPine (NORVASC) tablet 5 mg  5 mg Oral Daily Joel Rasangela, DO   5 mg at 11/20/21 1152    levothyroxine (SYNTHROID) tablet 112 mcg  112 mcg Oral Daily Joel Raspojulianna, DO   112 mcg at 11/20/21 0558    glucose (GLUTOSE) 40 % oral gel 15 g  15 g Oral PRN Joel Contrerasvic, DO        dextrose 50 % IV solution  12.5 g IntraVENous PRN Joel Contrerasvic, DO        glucagon (rDNA) injection 1 mg  1 mg IntraMUSCular PRN Joel Vallejo, DO        dextrose 5 % solution  100 mL/hr IntraVENous PRN Joel Vallejo, DO        sodium bicarbonate tablet 1,300 mg  1,300 mg Oral TID [employer], DO   1,300 mg at 11/20/21 1152    tamsulosin (FLOMAX) capsule 0.4 mg  0.4 mg Oral Daily Joel Raspovic, DO   0.4 mg at 11/20/21 1152    sodium chloride flush 0.9 % injection 5-40 mL  5-40 mL IntraVENous 2 times per day Joel Raspovic, DO   5 mL at 11/20/21 1153    sodium chloride flush 0.9 % injection 5-40 mL  5-40 mL IntraVENous PRN Joel Contrerasvic, DO        0.9 % sodium chloride infusion  25 mL IntraVENous PRN Joel Contrerasvic, DO        heparin (porcine) injection 5,000 Units  5,000 Units SubCUTAneous 3 times per day [employer], DO   5,000 Units at 11/20/21 1238    ondansetron (ZOFRAN-ODT) disintegrating tablet 4 mg  4 mg Oral Q8H PRN Joel Vallejo DO        Or    ondansetron (ZOFRAN) injection 4 mg  4 mg IntraVENous Q6H PRN Merle Pain DO Genevieve        polyethylene glycol (GLYCOLAX) packet 17 g  17 g Oral Daily PRN Joel Vallejo DO        acetaminophen (TYLENOL) tablet 650 mg  650 mg Oral Q6H PRN Joel Vallejo DO        Or    acetaminophen (TYLENOL) suppository 650 mg  650 mg Rectal Q6H PRN Joel Vallejo DO        insulin lispro (HUMALOG) injection vial 0-6 Units  0-6 Units SubCUTAneous TID  Joel Vallejo DO   2 Units at 11/20/21 1627    insulin lispro (HUMALOG) injection vial 0-3 Units  0-3 Units SubCUTAneous Nightly Joel Vallejo DO        metoprolol (LOPRESSOR) injection 5 mg  5 mg IntraVENous Q6H PRN KIANNA Herron CNP   5 mg at 11/20/21 0301    [START ON 11/21/2021] ferric gluconate (FERRLECIT) 125 mg in sodium chloride 0.9 % 100 mL IVPB  125 mg IntraVENous Once SpiroKIANNA Thomas CNP        albumin human 25 % IV solution 25 g  25 g IntraVENous Once Lyric Dueñas  mL/hr at 11/20/21 1821 25 g at 11/20/21 1821    perflutren lipid microspheres (DEFINITY) injection 1.65 mg  1.5 mL IntraVENous ONCE PRN Joel Vallejo DO        bumetanide (BUMEX) tablet 2 mg  2 mg Oral BID Neo Orourke MD   2 mg at 11/20/21 1153    carvedilol (COREG) tablet 6.25 mg  6.25 mg Oral BID  Neo Orourke MD   6.25 mg at 11/20/21 1627    epoetin sabiha-epbx (RETACRIT) injection 3,000 Units  3,000 Units SubCUTAneous Once per day on Mon Wed Fri Neo Chung MD   3,000 Units at 11/20/21 0324    sodium zirconium cyclosilicate (LOKELMA) oral suspension 10 g  10 g Oral TID Neo Orourke MD   10 g at 11/20/21 1159       Allergies:     Allergies   Allergen Reactions    Penicillins      Does not remember       Problem List:    Patient Active Problem List   Diagnosis Code    Diabetes mellitus ([facility] [address] A74.2    Metabolic acidosis Q59.0    Hypertension I10    Uncontrolled diabetes mellitus ([facility] [address] E11.65    Hyperbilirubinemia E80.6    Thrombocytopenia (HCC) D69.6    Hypothyroid E03.9    ESRD (end stage renal disease) (HCC) N18.6    Acute respiratory failure with hypoxia (HCC) J96.01    Volume overload E87.70    Elevated troponin R77.8    Hypertensive emergency I16.1    Hyperkalemia E87.5       Past Medical History:        Diagnosis Date    Chronic kidney disease     Diabetes mellitus ([facility] [address]     Hepatitis C     Hypertension     Liver disease     Thyroid disease     Unspecified diseases of blood and blood-forming organs        Past Surgical History:        Procedure Laterality Date    TONSILLECTOMY         Social History:    Social History     Tobacco Use    Smoking status: Former Smoker     Packs/day: 2.00     Years: 10.00     Pack years: 20.00     Types: Cigarettes     Quit date: 1973     Years since quittin.2    Smokeless tobacco: Never Used   Substance Use Topics    Alcohol use: No                                Counseling given: Not Answered      Vital Signs (Current):   Vitals:    21 1100 21 1120 21 1150 21 1602   BP: (!) 140/95 (!) 158/87 (!) 159/106 124/64   Pulse: 95 82 92 77   Resp:     Temp:  98.9 °F (37.2 °C) 97.7 °F (36.5 °C) 98.2 °F (36.8 °C)   TempSrc:    Oral   SpO2:   100% 95%   Weight:  132 lb 11.5 oz (60.2 kg)     Height:                                                  BP Readings from Last 3 Encounters:   21 124/64   20 (!) 150/84   10/18/18 (!) 186/89       NPO Status:                                                                                 BMI:   Wt Readings from Last 3 Encounters:   21 132 lb 11.5 oz (60.2 kg)   20 166 lb (75.3 kg)   10/18/18 155 lb (70.3 kg)     Body mass index is 17.04 kg/m².     CBC:   Lab Results   Component Value Date    WBC 4.6 2021    RBC 2.55 2021    HGB 7.8 2021    HCT 23.4 2021    MCV 91.8 2021    RDW 15.1 2021     2021       CMP:   Lab Results   Component Value Date     2021    K 4.8 2021    K 5.8 2021  11/20/2021    CO2 19 11/20/2021    BUN 47 11/20/2021    CREATININE 5.7 11/20/2021    GFRAA 12 11/20/2021    LABGLOM 12 11/20/2021    GLUCOSE 212 11/20/2021    PROT 8.6 11/19/2021    CALCIUM 7.6 11/20/2021    BILITOT 0.3 11/19/2021    ALKPHOS 115 11/19/2021    AST 25 11/19/2021    ALT 12 11/19/2021       POC Tests: No results for input(s): POCGLU, POCNA, POCK, POCCL, POCBUN, POCHEMO, POCHCT in the last 72 hours. Coags:   Lab Results   Component Value Date    PROTIME 12.1 08/24/2021    INR 1.1 08/24/2021       HCG (If Applicable): No results found for: PREGTESTUR, PREGSERUM, HCG, HCGQUANT     ABGs: No results found for: PHART, PO2ART, OZM8URP, PZS0BGM, BEART, I3FSVDVB     Type & Screen (If Applicable):  No results found for: LABABO, LABRH    Drug/Infectious Status (If Applicable):  No results found for: HIV, HEPCAB    COVID-19 Screening (If Applicable):   Lab Results   Component Value Date    COVID19 Not Detected 11/19/2021           Anesthesia Evaluation  Patient summary reviewed and Nursing notes reviewed no history of anesthetic complications:   Airway: Mallampati: III  TM distance: >3 FB   Neck ROM: full  Mouth opening: > = 3 FB Dental: normal exam         Pulmonary: breath sounds clear to auscultation      (-) not a current smoker (former smoker)                           Cardiovascular:    (+) hypertension: severe,       ECG reviewed  Rhythm: regular  Rate: normal                    Neuro/Psych:   Negative Neuro/Psych ROS              GI/Hepatic/Renal:   (+) hepatitis: C, liver disease:, renal disease: ESRD and dialysis,           Endo/Other:    (+) DiabetesType II DM, poorly controlled, , hypothyroidism, blood dyscrasia: anemia:., .                 Abdominal:             Vascular: negative vascular ROS. Other Findings:             Anesthesia Plan      MAC     ASA 4       Induction: intravenous. Anesthetic plan and risks discussed with patient.                 Patient to be re-evaluated by DOS Anesthesiologist      Patient seen and evaluated  . arina Munguia MD   11/20/2021

## 2021-11-22 PROBLEM — E44.0 MODERATE PROTEIN MALNUTRITION (HCC): Chronic | Status: ACTIVE | Noted: 2021-11-22

## 2021-11-22 LAB
ALBUMIN SERPL-MCNC: 3.4 G/DL (ref 3.5–5.2)
ALP BLD-CCNC: 88 U/L (ref 40–129)
ALT SERPL-CCNC: 10 U/L (ref 0–40)
ANION GAP SERPL CALCULATED.3IONS-SCNC: 17 MMOL/L (ref 7–16)
AST SERPL-CCNC: 20 U/L (ref 0–39)
BILIRUB SERPL-MCNC: 0.3 MG/DL (ref 0–1.2)
BUN BLDV-MCNC: 47 MG/DL (ref 6–23)
CALCIUM IONIZED: 1 MMOL/L (ref 1.15–1.33)
CALCIUM SERPL-MCNC: 7.5 MG/DL (ref 8.6–10.2)
CHLORIDE BLD-SCNC: 101 MMOL/L (ref 98–107)
CO2: 21 MMOL/L (ref 22–29)
CREAT SERPL-MCNC: 6.5 MG/DL (ref 0.7–1.2)
GFR AFRICAN AMERICAN: 10
GFR NON-AFRICAN AMERICAN: 10 ML/MIN/1.73
GLUCOSE BLD-MCNC: 142 MG/DL (ref 74–99)
HAV IGM SER IA-ACNC: ABNORMAL
HCT VFR BLD CALC: 24.1 % (ref 37–54)
HEMOGLOBIN: 8 G/DL (ref 12.5–16.5)
HEPATITIS B CORE IGM ANTIBODY: ABNORMAL
HEPATITIS B SURFACE ANTIGEN INTERPRETATION: ABNORMAL
HEPATITIS C ANTIBODY INTERPRETATION: REACTIVE
MCH RBC QN AUTO: 31.3 PG (ref 26–35)
MCHC RBC AUTO-ENTMCNC: 33.2 % (ref 32–34.5)
MCV RBC AUTO: 94.1 FL (ref 80–99.9)
METER GLUCOSE: 116 MG/DL (ref 74–99)
METER GLUCOSE: 174 MG/DL (ref 74–99)
PDW BLD-RTO: 15.5 FL (ref 11.5–15)
PHOSPHORUS: 5.4 MG/DL (ref 2.5–4.5)
PLATELET # BLD: 233 E9/L (ref 130–450)
PMV BLD AUTO: 10.2 FL (ref 7–12)
POTASSIUM SERPL-SCNC: 5.1 MMOL/L (ref 3.5–5)
RBC # BLD: 2.56 E12/L (ref 3.8–5.8)
SODIUM BLD-SCNC: 139 MMOL/L (ref 132–146)
TOTAL PROTEIN: 6.9 G/DL (ref 6.4–8.3)
WBC # BLD: 5.3 E9/L (ref 4.5–11.5)

## 2021-11-22 PROCEDURE — 85027 COMPLETE CBC AUTOMATED: CPT

## 2021-11-22 PROCEDURE — 80053 COMPREHEN METABOLIC PANEL: CPT

## 2021-11-22 PROCEDURE — 82962 GLUCOSE BLOOD TEST: CPT

## 2021-11-22 PROCEDURE — 84100 ASSAY OF PHOSPHORUS: CPT

## 2021-11-22 PROCEDURE — 6360000002 HC RX W HCPCS: Performed by: INTERNAL MEDICINE

## 2021-11-22 PROCEDURE — 99233 SBSQ HOSP IP/OBS HIGH 50: CPT | Performed by: INTERNAL MEDICINE

## 2021-11-22 PROCEDURE — 6370000000 HC RX 637 (ALT 250 FOR IP): Performed by: SURGERY

## 2021-11-22 PROCEDURE — APPSS30 APP SPLIT SHARED TIME 16-30 MINUTES: Performed by: PHYSICIAN ASSISTANT

## 2021-11-22 PROCEDURE — 82330 ASSAY OF CALCIUM: CPT

## 2021-11-22 PROCEDURE — 99232 SBSQ HOSP IP/OBS MODERATE 35: CPT | Performed by: INTERNAL MEDICINE

## 2021-11-22 PROCEDURE — 90935 HEMODIALYSIS ONE EVALUATION: CPT

## 2021-11-22 PROCEDURE — 2580000003 HC RX 258: Performed by: INTERNAL MEDICINE

## 2021-11-22 PROCEDURE — 36415 COLL VENOUS BLD VENIPUNCTURE: CPT

## 2021-11-22 PROCEDURE — 2580000003 HC RX 258: Performed by: SURGERY

## 2021-11-22 PROCEDURE — 94660 CPAP INITIATION&MGMT: CPT

## 2021-11-22 PROCEDURE — 2060000000 HC ICU INTERMEDIATE R&B

## 2021-11-22 RX ADMIN — HYDROCODONE BITARTRATE AND ACETAMINOPHEN 1 TABLET: 5; 325 TABLET ORAL at 07:54

## 2021-11-22 RX ADMIN — SODIUM BICARBONATE 1300 MG: 650 TABLET ORAL at 15:10

## 2021-11-22 RX ADMIN — BUMETANIDE 2 MG: 1 TABLET ORAL at 21:14

## 2021-11-22 RX ADMIN — SODIUM BICARBONATE 1300 MG: 650 TABLET ORAL at 07:54

## 2021-11-22 RX ADMIN — CALCIUM GLUCONATE 1000 MG: 98 INJECTION, SOLUTION INTRAVENOUS at 21:15

## 2021-11-22 RX ADMIN — Medication 10 ML: at 07:55

## 2021-11-22 RX ADMIN — EPOETIN ALFA-EPBX 10000 UNITS: 10000 INJECTION, SOLUTION INTRAVENOUS; SUBCUTANEOUS at 07:55

## 2021-11-22 RX ADMIN — BUMETANIDE 2 MG: 1 TABLET ORAL at 07:54

## 2021-11-22 RX ADMIN — CARVEDILOL 6.25 MG: 6.25 TABLET, FILM COATED ORAL at 07:54

## 2021-11-22 RX ADMIN — LEVOTHYROXINE SODIUM 112 MCG: 0.11 TABLET ORAL at 05:57

## 2021-11-22 ASSESSMENT — PAIN - FUNCTIONAL ASSESSMENT: PAIN_FUNCTIONAL_ASSESSMENT: ACTIVITIES ARE NOT PREVENTED

## 2021-11-22 ASSESSMENT — PAIN DESCRIPTION - ORIENTATION: ORIENTATION: RIGHT

## 2021-11-22 ASSESSMENT — PAIN SCALES - GENERAL
PAINLEVEL_OUTOF10: 0
PAINLEVEL_OUTOF10: 2

## 2021-11-22 ASSESSMENT — PAIN DESCRIPTION - LOCATION: LOCATION: CHEST

## 2021-11-22 ASSESSMENT — PAIN DESCRIPTION - ONSET: ONSET: ON-GOING

## 2021-11-22 ASSESSMENT — PAIN DESCRIPTION - PAIN TYPE: TYPE: ACUTE PAIN;SURGICAL PAIN

## 2021-11-22 ASSESSMENT — PAIN DESCRIPTION - FREQUENCY: FREQUENCY: CONTINUOUS

## 2021-11-22 ASSESSMENT — PAIN DESCRIPTION - DESCRIPTORS: DESCRIPTORS: ACHING;DULL;DISCOMFORT

## 2021-11-22 NOTE — PROGRESS NOTES
ENDOCRINOLOGY PROGRESS NOTE    Date of Admission: 11/19/2021  Date of Service: 11/22/2021  Admitting Physician: Gabriel Pritchett DO   Primary Care Physician: Musa Wong MD  Consultant physician: Antonina Pulido MD     Reason for the consultation:  Hypothyroidism     History of Present Illness: The history is provided by the patient. Accuracy of the patient data is excellent. Jolynn Corbin is a very pleasant 66 y.o. old male with PMH primary hypothyroidism, DM type 2, HTN, and other listed below admitted [facility]/19/2021 because of progressive shortness of breath, endocrine service was consulted for management of hypothyroidism     Subjective  Patient was seen at bedside this AM. Pt admitted that he has been non-compliant with his Synthroid. Pt admits that he was to take Synthroid 112mcg at home. Patient states that he is willing to be compliant moving forward as when he is on his synthroid medication he feels better. Pt denies any recent weight changes, fatigue or acute hair loss. No acute concerns at this time.      Scheduled Meds:   epoetin sabiha-epbx  10,000 Units SubCUTAneous Once per day on Mon Wed Fri    ferric gluconate (FERRLECIT) IVPB  125 mg IntraVENous Daily    amLODIPine  5 mg Oral Daily    levothyroxine  112 mcg Oral Daily    sodium bicarbonate  1,300 mg Oral TID    tamsulosin  0.4 mg Oral Daily    sodium chloride flush  5-40 mL IntraVENous 2 times per day    [Held by provider] heparin (porcine)  5,000 Units SubCUTAneous 3 times per day    insulin lispro  0-6 Units SubCUTAneous TID WC    insulin lispro  0-3 Units SubCUTAneous Nightly    bumetanide  2 mg Oral BID    carvedilol  6.25 mg Oral BID WC     PRN Meds:   HYDROcodone 5 mg - acetaminophen, 1 tablet, Q6H PRN  glucose, 15 g, PRN  dextrose, 12.5 g, PRN  glucagon (rDNA), 1 mg, PRN  dextrose, 100 mL/hr, PRN  sodium chloride flush, 5-40 mL, PRN  sodium chloride, 25 mL, PRN  ondansetron, 4 mg, Q8H PRN   Or  ondansetron, 4 mg, Q6H PRN  polyethylene glycol, 17 g, Daily PRN  acetaminophen, 650 mg, Q6H PRN   Or  acetaminophen, 650 mg, Q6H PRN  metoprolol, 5 mg, Q6H PRN  perflutren lipid microspheres, 1.5 mL, ONCE PRN      Continuous Infusions:   dextrose      sodium chloride         Review of Systems  All systems reviewed. All negative except for symptoms mentioned in HPI     OBJECTIVE    BP (!) 143/83   Pulse 67   Temp 97.8 °F (36.6 °C)   Resp 16   Ht 6' 2\" (1.88 m)   Wt 136 lb 11 oz (62 kg)   SpO2 96%   BMI 17.55 kg/m²   Wt Readings from Last 6 Encounters:   11/22/21 136 lb 11 oz (62 kg)   08/25/20 166 lb (75.3 kg)   10/18/18 155 lb (70.3 kg)   04/28/16 155 lb (70.3 kg)   01/14/16 160 lb (72.6 kg)   06/26/13 145 lb (65.8 kg)       Physical examination:  General: awake alert, oriented x3, no abnormal position or movements. HEENT: normocephalic non traumatic,   Neck: supple,  no thyromegaly, no thyroid tenderness   Pulm: good equal air entry no added sounds   CVS: S1 + S2, + edema    Abd: soft lax, no tenderness,   Skin: warm, no lesions, no rash.     Musculoskeletal: No back tenderness, no kyphosis/scoliosis    Neuro: CN intact, sensation notmal , muscle power normal.   Psych: normal mood, and affect    Review of Laboratory Data:  I personally reviewed the following labs:  Recent Labs     11/19/21  1526 11/20/21  1420 11/22/21  0327   WBC 6.0 4.6 5.3   RBC 2.89* 2.55* 2.56*   HGB 8.9* 7.8* 8.0*   HCT 27.2* 23.4* 24.1*   MCV 94.1 91.8 94.1   MCH 30.8 30.6 31.3   MCHC 32.7 33.3 33.2   RDW 15.6* 15.1* 15.5*    226 233   MPV 9.9 10.0 10.2     Recent Labs     11/19/21  1526 11/19/21  1545 11/20/21  1420 11/21/21  0341 11/22/21  0327      < > 135 136 139   K 5.8*   < > 4.8 4.6 5.1*      < > 102 100 101   CO2 14*   < > 19* 19* 21*   BUN 80*   < > 47* 51* 47*   CREATININE 7.9*   < > 5.7* 6.4* 6.5*   GLUCOSE 154*   < > 212* 91 142*   CALCIUM 8.0*   < > 7.6* 7.6* 7.5*   PROT 8.6*  --   --   --  6.9   LABALBU 4.1   < > 3.2* 3.4* 3.4*   BILITOT 0.3  --   --   --  0.3   ALKPHOS 115  --   --   --  88   AST 25  --   --   --  20   ALT 12  --   --   --  10    < > = values in this interval not displayed. No results found for: BHYDRXBUT  Lab Results   Component Value Date    LABA1C 5.3 11/12/2021    LABA1C 5.1 08/24/2021    LABA1C 6.0 06/26/2020     Lab Results   Component Value Date/Time    .600 (H) 11/20/2021 03:56 AM    T4FREE 0.70 (L) 11/20/2021 02:20 PM     Lab Results   Component Value Date    LABA1C 5.3 11/12/2021    GLUCOSE 142 11/22/2021    MALBCR 3951.0 11/12/2021    LABMICR 3516.4 11/12/2021    LABCREA 89 11/12/2021     Lab Results   Component Value Date    TRIG 109 08/24/2021    HDL 53 11/12/2021    LDLCALC 147 11/12/2021    CHOL 200 08/24/2021       Blood culture   Lab Results   Component Value Date    BC 24 Hours no growth 11/19/2021       Radiology:  US DUP UPPER EXTREMITY MAPPING BILAT VENOUS   Final Result   1. Measurements for the superficial venous system of the right and left   upper extremity as above commented the.      2.  Presence of a thrombus with lack of proper compressibility but not   occlusion of the distal segment of the right cephalic vein and of the   antecubital segment of the right cephalic vein. 3.  This study does not evaluate the deep venous system of the right and left   upper extremities. XR CHEST PORTABLE   Final Result   1. Satisfactory position of the right tunneled dialysis catheter. There is   no right pneumothorax   2. Improved aeration of the lungs   3. Residual small bilateral pleural effusions. FLUORO FOR SURGICAL PROCEDURES   Final Result   Intraprocedural fluoroscopic spot images as above. See separate procedure   report for more information. XR CHEST (2 VW)   Final Result   Pulmonary edema although superimposed pneumonia at the lung bases cannot be   excluded. Bilateral pleural effusions.       Enlargement of the cardiac silhouette which is new and consistent with   cardiomegaly and/or pericardial effusion. Medical Records/Labs/Images Review:   I personally reviewed and summarized previous records   All labs and imaging were reviewed independently    Temo, a 66 y.o.-old male seen in for management of Hypothyroidism     Profound Hypothyroidism   · Significantly elevated TSH with slightly low FT4 is consistent with medications noncompliance   · Agree with starting pt on Levothyroxine 112 mcg daily   · Will recheck Free T4 tomorrow morning to assess response and recheck his thyroid function test in 6-8 weeks after discharge     Fluid overload/ESRD   · Started on HD     The above issues were reviewed with the patient who understood and agreed with the plan. Thank you for allowing us to participate in the care of this patient. Please do not hesitate to contact us with any additional questions. Merlene Glaser MD PGY-1    Jeramy Pierce MD  Endocrinologist, [facility] Endocrinology   [address] [facility] [address]   Phone: [phone]  Fax: [phone]  ---------------------------------  An electronic signature was used to authenticate this note.  Swapnil Glass MD on 11/22/2021 at 12:25 PM

## 2021-11-22 NOTE — PROGRESS NOTES
Comprehensive Nutrition Assessment    Type and Reason for Visit:  Initial, Positive Nutrition Screen    Nutrition Recommendations/Plan: Continue current diet and added ONS to help optimize nutrient needs. Nutrition Assessment:  PMHx of  stage 4 SKD presents with progressive shortness of breath to ED. Pt adm with Acute respiratory failure with hypoxia and CKD stage 4 with volume overload. Malnutrition Assessment:  Malnutrition Status: Moderate malnutrition    Context:  Chronic Illness     Findings of the 6 clinical characteristics of malnutrition:  Energy Intake:  7 - 75% or less estimated energy requirements for 1 month or longer  Weight Loss:  Unable to assess     Body Fat Loss:   (moderate malnutrition) Orbital, Triceps, Buccal region   Muscle Mass Loss:   (moderate malnutrition) Temples (temporalis), Clavicles (pectoralis & deltoids), Hand (interosseous)  Fluid Accumulation:  No significant fluid accumulation     Strength:  Not Performed    Estimated Daily Nutrient Needs:  Energy (kcal):  1900-2200kcal (30-35kcal/kg CBW); Weight Used for Energy Requirements:  Current     Protein (g):  100-120g (1.2-1.4g/kg CBW as tolerated d/t stage 4CKD); Weight Used for Protein Requirements:  Ideal        Fluid (ml/day):   ; Method Used for Fluid Requirements:         Nutrition Related Findings:  A&Ox4, BS active, Abd soft, -I/Os, Edema +1 BLE      Wounds:  Surgical Incision       Current Nutrition Therapies:    ADULT DIET; Regular; 4 carb choices (60 gm/meal); Low Potassium (Less than 3000 mg/day);  Low Phosphorus (Less than 1000 mg)    Anthropometric Measures:  · Height: 6' 2\" (188 cm)  · Current Body Weight: 136 lb (61.7 kg) (11/20)       · Usual Body Weight:  (UTO per EMR)     · Ideal Body Weight: 190 lbs; % Ideal Body Weight 71.6 %   · BMI: 17.5  · Adjusted Body Weight:  ; No Adjustment       · BMI Categories: Underweight (BMI less than 22) age over 72       Nutrition Diagnosis:   · Moderate malnutrition, In context of chronic illness related to renal dysfunction (CKD stage 4) as evidenced by intake 0-25%, poor intake prior to admission, moderate muscle loss, moderate loss of subcutaneous fat    Nutrition Interventions:   Food and/or Nutrient Delivery:  Continue Current Diet, Start Oral Nutrition Supplement (Magic cup BID)  Nutrition Education/Counseling:  No recommendation at this time   Coordination of Nutrition Care:  Continue to monitor while inpatient    Goals:  >50% of meals and ONS consumed       Nutrition Monitoring and Evaluation:   Behavioral-Environmental Outcomes:  None Identified   Food/Nutrient Intake Outcomes:  Food and Nutrient Intake, Supplement Intake  Physical Signs/Symptoms Outcomes:  Biochemical Data, Fluid Status or Edema, Nutrition Focused Physical Findings, Skin, Weight     Discharge Planning:     Too soon to determine     Electronically signed by Compa Qiu RD, LD on 11/22/21 at 9:58 AM EST    Contact: [phone]

## 2021-11-22 NOTE — PROGRESS NOTES
[facility] Progress Note    Admitting Date and Time: 11/19/2021  2:49 PM  Admit Dx: Shortness of breath [J28.39]  Metabolic acidosis [W49.9]  ESRD (end stage renal disease) ([facility] [address] [N18.6]  Elevated troponin [R77.8]  Acute kidney injury superimposed on CKD ([facility] [address] [N17.9, N18.9]  Hypertension, unspecified type [I10]    Subjective:  Patient is being followed for Shortness of breath [D13.47]  Metabolic acidosis [B80.1]  ESRD (end stage renal disease) ([facility] [address] [N18.6]  Elevated troponin [R77.8]  Acute kidney injury superimposed on CKD ([facility] [address] [N17.9, N18.9]  Hypertension, unspecified type [I10]     Patient was lying in bed in no acute distress. Denies any new concerns. ROS: denies fever, chills, cp, sob, n/v, HA unless stated above.      epoetin sabiha-epbx  10,000 Units SubCUTAneous Once per day on Mon Wed Fri    ferric gluconate (FERRLECIT) IVPB  125 mg IntraVENous Daily    amLODIPine  5 mg Oral Daily    levothyroxine  112 mcg Oral Daily    sodium bicarbonate  1,300 mg Oral TID    tamsulosin  0.4 mg Oral Daily    sodium chloride flush  5-40 mL IntraVENous 2 times per day    [Held by provider] heparin (porcine)  5,000 Units SubCUTAneous 3 times per day    insulin lispro  0-6 Units SubCUTAneous TID WC    insulin lispro  0-3 Units SubCUTAneous Nightly    bumetanide  2 mg Oral BID    carvedilol  6.25 mg Oral BID WC     HYDROcodone 5 mg - acetaminophen, 1 tablet, Q6H PRN  glucose, 15 g, PRN  dextrose, 12.5 g, PRN  glucagon (rDNA), 1 mg, PRN  dextrose, 100 mL/hr, PRN  sodium chloride flush, 5-40 mL, PRN  sodium chloride, 25 mL, PRN  ondansetron, 4 mg, Q8H PRN   Or  ondansetron, 4 mg, Q6H PRN  polyethylene glycol, 17 g, Daily PRN  acetaminophen, 650 mg, Q6H PRN   Or  acetaminophen, 650 mg, Q6H PRN  metoprolol, 5 mg, Q6H PRN  perflutren lipid microspheres, 1.5 mL, ONCE PRN         Objective:    /61   Pulse (!) 41   Temp 97.8 °F (36.6 °C)   Resp 16   Ht 6' 2\" (1.88 m)   Wt 136 lb 11 oz (62 kg)   SpO2 96%   BMI 17.55 kg/m²   General Appearance: alert and oriented to person, place and time and in no acute distress  Skin: warm and dry  Head: normocephalic and atraumatic  Eyes: pupils equal, round, and reactive to light, extraocular eye movements intact, conjunctivae normal  Neck: neck supple and non tender without mass   Pulmonary/Chest: clear to auscultation bilaterally- no wheezes, rales or rhonchi, normal air movement, no respiratory distress, tunneled cath placed   Cardiovascular: normal rate, normal S1 and S2 and no carotid bruits  Abdomen: soft, non-tender, non-distended, normal bowel sounds, no masses or organomegaly  Extremities: no cyanosis, no clubbing and no edema  Neurologic: no cranial nerve deficit and speech normal        Recent Labs     11/20/21  1420 11/21/21  0341 11/22/21  0327    136 139   K 4.8 4.6 5.1*    100 101   CO2 19* 19* 21*   BUN 47* 51* 47*   CREATININE 5.7* 6.4* 6.5*   GLUCOSE 212* 91 142*   CALCIUM 7.6* 7.6* 7.5*       Recent Labs     11/19/21  1526 11/20/21  1420 11/22/21  0327   WBC 6.0 4.6 5.3   RBC 2.89* 2.55* 2.56*   HGB 8.9* 7.8* 8.0*   HCT 27.2* 23.4* 24.1*   MCV 94.1 91.8 94.1   MCH 30.8 30.6 31.3   MCHC 32.7 33.3 33.2   RDW 15.6* 15.1* 15.5*    226 233   MPV 9.9 10.0 10.2       Radiology:   EXAMINATION:  TWO XRAY VIEWS OF THE CHEST     11/19/2021 4:44 pm     COMPARISON:  06/06/2013 chest radiograph.     HISTORY:  ORDERING SYSTEM PROVIDED HISTORY: Shortness of breath  TECHNOLOGIST PROVIDED HISTORY:  Reason for shortness of breath     FINDINGS:  The lungs are mildly hypoinflated. There are interstitial and airspace  opacities in a perihilar and lower lobe distribution. There is blunting of  the costophrenic sulci. Cardiac silhouette is enlarged. Pulmonary  vasculature is indistinct centrally. There is atherosclerotic calcification  of the thoracic aorta.   No pneumothorax.     IMPRESSION:  Pulmonary edema although superimposed pneumonia at the lung bases cannot be  excluded.     Bilateral pleural effusions.     Enlargement of the cardiac silhouette which is new and consistent with  cardiomegaly and/or pericardial effusion.       Assessment:    Principal Problem:    Acute respiratory failure with hypoxia (HCC)  Active Problems:    Diabetes mellitus (HCC)    Metabolic acidosis    Hypothyroid    ESRD (end stage renal disease) (HCC)    Volume overload    Elevated troponin    Hypertensive emergency    Hyperkalemia    Moderate protein malnutrition ([address]  Resolved Problems:    * No resolved hospital problems. *      Plan:  1. Acute respiratory failure with hypoxia: Patient was having SOB when presenting to the ED. Covid negative. Likely secondary to volume overload from progressive CKD. Has improved with dialysis. Echo 11/21 showed LVEF is 25-30 %.      2.  End-stage renal disease with volume overload: Received HD today, will also receive HD tomorrow. Continue Bumex 2 mg PO BID. Echo 11/21 showed LVEF is 25-30 %. Tunneled dialysis catheter placed yesterday. Instructed nursing to hold heparin due to bleeding from catheter. Waiting on chair time for dialysis today.      3. Hypertensive emergency: Secondary to volume overload and poor medication compliance. Continue Norvasc and Coreg. Lopressor prn SBP>170 and HR >70.      4.  Metabolic acidosis: Secondary to chronic kidney disease. Continue Sodium bicarbonate 1,3000 mg PO TID. Dialysis per nephrology     5.  Elevated troponin: Likely due to hypertensive emergency and end-stage renal disease. No acute ischemic changes on EKG. Echo 11/21 showed LVEF is 25-30 %.      6. Hypothyroidism: . T4 0.7. Consulted Endocrinology. Continue levothyroxine 112 mcg. Recheck T4. Recheck thyroid function in 6-8 weeks.      7. Hyperkalemia: secondary to ESRD. Treated with IV insulin in the ED. HD.      8. Type II diabetes mellitus: Hemoglobin A1c was 5.3. Holding oral agents.  Low dose ssi.      Code Status: Full code DVT prophylaxis: subcutaneous heparin      NOTE: This report was transcribed using voice recognition software. Every effort was made to ensure accuracy; however, inadvertent computerized transcription errors may be present. Electronically signed by Erin Mays PA-C on 11/22/2021 at 1:38 PM     Addendum: I have personally participated in the history, exam, medical decision making with Fin Quiver on the date of service and I agree with all of the pertinent clinical information unless otherwise noted. I have also reviewed and agree with the past medical, family, and social history unless otherwise noted. Patient was admitted with shortness of breath. PHYSICAL EXAM:  Vitals:  /61   Pulse (!) 41   Temp 97.8 °F (36.6 °C)   Resp 16   Ht 6' 2\" (1.88 m)   Wt 136 lb 11 oz (62 kg)   SpO2 96%   BMI 17.55 kg/m²   Gen: awake, alert, NAD  Lungs: clear to auscultation bilaterally no crackles no wheezing. Heart: RRR, no murmur   Abdomen: soft nontender nondistended positive bowel sounds. Extremities: full range of motion no peripheral edema. Impression:  Principal Problem:    Acute respiratory failure with hypoxia (HCC)  Active Problems:    Diabetes mellitus (HCC)    Metabolic acidosis    Hypothyroid    ESRD (end stage renal disease) (HCC)    Volume overload    Elevated troponin    Hypertensive emergency    Hyperkalemia    Moderate protein malnutrition ([address]  Resolved Problems:    * No resolved hospital problems. *      My findings/plan include:    Patient seems to be doing well after tessio being placed. Plan for dialysis today. Will likely be discharged. NOTE: This report was transcribed using voice recognition software. Every effort was made to ensure accuracy; however, inadvertent computerized transcription errors may be present.     Electronically signed by Tremaine Vaughn DO on 11/22/2021 at 2:10 PM

## 2021-11-22 NOTE — PROGRESS NOTES
Department of Internal Medicine  Nephrology Progress Note    Events reviewed. SUBJECTIVE:  We are following Mr. [facility] for uremia and hyperkalemia. Reports feeling much better. Has no complaints. PHYSICAL EXAM:      Vitals:    VITALS:  BP (!) 148/81   Pulse 82   Temp 98 °F (36.7 °C) (Oral)   Resp 18   Ht 6' 2\" (1.88 m)   Wt 136 lb (61.7 kg)   SpO2 96%   BMI 17.46 kg/m²   24HR INTAKE/OUTPUT:      Intake/Output Summary (Last 24 hours) at 11/22/2021 2243  Last data filed at 11/21/2021 1142  Gross per 24 hour   Intake 550 ml   Output 1600 ml   Net -1050 ml     Access: Right IJ tunneled dialysis catheter in place  Constitutional:  Awake, alert in no distress. HEENT:  PERRLA  Respiratory:  Decreased breath sounds at the bases  Cardiovascular/Edema:  Heart sounds regular, right chest with dressing  Gastrointestinal:  Abdomen  Neurologic:  Non focal   Skin:  No lesions   Other:  +edema     Scheduled Meds:   epoetin sabiha-epbx  10,000 Units SubCUTAneous Once per day on Mon Wed Fri    ferric gluconate (FERRLECIT) IVPB  125 mg IntraVENous Daily    amLODIPine  5 mg Oral Daily    levothyroxine  112 mcg Oral Daily    sodium bicarbonate  1,300 mg Oral TID    tamsulosin  0.4 mg Oral Daily    sodium chloride flush  5-40 mL IntraVENous 2 times per day    [Held by provider] heparin (porcine)  5,000 Units SubCUTAneous 3 times per day    insulin lispro  0-6 Units SubCUTAneous TID WC    insulin lispro  0-3 Units SubCUTAneous Nightly    bumetanide  2 mg Oral BID    carvedilol  6.25 mg Oral BID WC     Continuous Infusions:   dextrose      sodium chloride       PRN Meds:. HYDROcodone 5 mg - acetaminophen, glucose, dextrose, glucagon (rDNA), dextrose, sodium chloride flush, sodium chloride, ondansetron **OR** ondansetron, polyethylene glycol, acetaminophen **OR** acetaminophen, metoprolol, perflutren lipid microspheres    DATA:    CBC:   Lab Results   Component Value Date    WBC 5.3 11/22/2021    RBC 2.56 11/22/2021    HGB 8.0 11/22/2021    HCT 24.1 11/22/2021    MCV 94.1 11/22/2021    MCH 31.3 11/22/2021    MCHC 33.2 11/22/2021    RDW 15.5 11/22/2021     11/22/2021    MPV 10.2 11/22/2021     CMP:    Lab Results   Component Value Date     11/22/2021    K 5.1 11/22/2021    K 5.8 11/19/2021     11/22/2021    CO2 21 11/22/2021    BUN 47 11/22/2021    CREATININE 6.5 11/22/2021    GFRAA 10 11/22/2021    LABGLOM 10 11/22/2021    GLUCOSE 142 11/22/2021    PROT 6.9 11/22/2021    LABALBU 3.4 11/22/2021    CALCIUM 7.5 11/22/2021    BILITOT 0.3 11/22/2021    ALKPHOS 88 11/22/2021    AST 20 11/22/2021    ALT 10 11/22/2021     Magnesium:    Lab Results   Component Value Date    MG 1.8 06/26/2020     Phosphorus:    Lab Results   Component Value Date    PHOS 5.4 11/22/2021     Radiology Review:      CXR 11/19/2021   Pulmonary edema although superimposed pneumonia at the lung bases cannot be   excluded.       Bilateral pleural effusions.       Enlargement of the cardiac silhouette which is new and consistent with   cardiomegaly and/or pericardial effusion.             BRIEF SUMMARY OF INITIAL CONSULT:    Briefly Mr. Stewart Alvarado is a 66year old man with h/o CKD stage 5, with severe nephrotic proteinuria 2/2 to diabetic nephropathy, type 2 DM, HTN, hepatitis C, hypothyroidism, BPH, who was admitted on 11/19/2021 after he presented to the ER with increasing shortness of breath. He was found with pro-BNP levels of >70,000 and CXR consistent with pulmonary edema. His creatinine was 7.9 mg/dL, K 5.8 mEq/L, bicarbonate 14 mEq/L reasons for this consultation. IMPRESSION/RECOMMENDATIONS:      ESRD,  2/2 diabetic nephropathy. Started on RRT on 11/19/21. S/p right IJ vein tunneled dialysis catheter placement on November 21 by Dr. Maria Esther Valdovinos. Had dialysis yesterday. Hyperkalemia, 2/2 advanced CKD. Improved with dialysis.   High anion gap metabolic acidosis, 2/2 uremia, on sodium bicarbonate, anion gap improved with dialysis. Decompensated heart failure, unknown EF, with pulmonary edema, proBNP >70,000, on Bumex. Improved with fluid removal.  HTN, on amlodipine and carvedilol  MBD of CKD, PTH level 447 (secondary hyperparathyroidism) holding phosphate binders  Hypocalcemia, multifactorial  Anemia of CKD, on epoetin alpha 10,000 units 3 times a week  -----------------------------------------------------------------  History of hepatitis C, Ferritin 425, iron 38, and iron saturation 16%. Normocytic anemia, 2/2 CKD. On epoetin alpha and  IV iron.      Plan:    HD 3.5 hours today  Continue IV iron   Discontinue serum bicarbonate  Continue Bumex 2 mg p.o. twice daily  Continue epoetin alpha 10,000 units three times weekly   Continue amlodipine 10 mg PO daily  Continue carvedilol 6.25 mg twice daily   Continue to monitor potassium levels  Replace calcium  Monitor ionized calcium  Discharge planning      Electronically signed by Brown Hastings MD on 11/22/2021 at 9:03 AM

## 2021-11-22 NOTE — CARE COORDINATION
COVID - 11/19. Met w/ patient. Explained role of  and plan of care. Lives alone in an apartment- resides on 1st floor. Independent PTA- drives. NEW hemodialysis- initiated 11/20. Tunneled cath inserted 11/21. Pt is requesting [facility]/ afternoon chair time if possible- info faxed to [facility] [phone],  [phone]- awaiting scheduled chair time. Awaiting hepatitis panel results- fax to [facility] when obtained.  Will follow Aramis Hodges RN case manager    26: hepatitis panel results faxed to Lily Cabezas @ [facility] [phone] Aramis Hodges RN case manager

## 2021-11-22 NOTE — FLOWSHEET NOTE
11/22/21 1415   Vital Signs   /79   Temp 98 °F (36.7 °C)   Pulse 69   Resp 16   Weight 135 lb 2.3 oz (61.3 kg)   Weight Method Estimated   Percent Weight Change -1.13   Pain Assessment   Pain Assessment 0-10   Pain Level 0   Post-Hemodialysis Assessment   Post-Treatment Procedures Blood returned; Catheter capped, clamped and heparinized x 2 ports   Machine Disinfection Process Acid/Vinegar Clean; Heat Disinfect;  Exterior Machine Disinfection   Rinseback Volume (ml) 300 ml   Total Liters Processed (l/min) 60 l/min   Dialyzer Clearance Clear   Duration of Treatment (minutes) 180 minutes   Hemodialysis Intake (ml) 300 ml   Hemodialysis Output (ml) 2000 ml   NET Removed (ml) 1700 ml   Tolerated Treatment Good   Bilateral Breath Sounds Diminished

## 2021-11-22 NOTE — PROGRESS NOTES
Ferric gluconate was discontinued, pharmacy called questioning the second testing dose. Dr. Sudhir Denise called and says to reach out to nephrology in the morning that it is not an urgent matter.

## 2021-11-23 ENCOUNTER — APPOINTMENT (OUTPATIENT)
Dept: GENERAL RADIOLOGY | Age: 71
DRG: 673 | End: 2021-11-23
Payer: MEDICARE

## 2021-11-23 VITALS
RESPIRATION RATE: 18 BRPM | BODY MASS INDEX: 17.34 KG/M2 | HEART RATE: 84 BPM | WEIGHT: 135.14 LBS | SYSTOLIC BLOOD PRESSURE: 141 MMHG | OXYGEN SATURATION: 95 % | DIASTOLIC BLOOD PRESSURE: 82 MMHG | HEIGHT: 74 IN | TEMPERATURE: 98.6 F

## 2021-11-23 LAB
ALBUMIN SERPL-MCNC: 3.3 G/DL (ref 3.5–5.2)
ANION GAP SERPL CALCULATED.3IONS-SCNC: 13 MMOL/L (ref 7–16)
BUN BLDV-MCNC: 30 MG/DL (ref 6–23)
CALCIUM IONIZED: 1.1 MMOL/L (ref 1.15–1.33)
CALCIUM SERPL-MCNC: 8.4 MG/DL (ref 8.6–10.2)
CHLORIDE BLD-SCNC: 94 MMOL/L (ref 98–107)
CO2: 24 MMOL/L (ref 22–29)
CREAT SERPL-MCNC: 4.7 MG/DL (ref 0.7–1.2)
GFR AFRICAN AMERICAN: 15
GFR NON-AFRICAN AMERICAN: 15 ML/MIN/1.73
GLUCOSE BLD-MCNC: 112 MG/DL (ref 74–99)
METER GLUCOSE: 120 MG/DL (ref 74–99)
METER GLUCOSE: 123 MG/DL (ref 74–99)
PHOSPHORUS: 4.5 MG/DL (ref 2.5–4.5)
POTASSIUM SERPL-SCNC: 4.2 MMOL/L (ref 3.5–5)
SODIUM BLD-SCNC: 131 MMOL/L (ref 132–146)

## 2021-11-23 PROCEDURE — 94660 CPAP INITIATION&MGMT: CPT

## 2021-11-23 PROCEDURE — 2580000003 HC RX 258: Performed by: SURGERY

## 2021-11-23 PROCEDURE — 6370000000 HC RX 637 (ALT 250 FOR IP): Performed by: SURGERY

## 2021-11-23 PROCEDURE — 82962 GLUCOSE BLOOD TEST: CPT

## 2021-11-23 PROCEDURE — 73110 X-RAY EXAM OF WRIST: CPT

## 2021-11-23 PROCEDURE — APPSS30 APP SPLIT SHARED TIME 16-30 MINUTES: Performed by: PHYSICIAN ASSISTANT

## 2021-11-23 PROCEDURE — 2580000003 HC RX 258: Performed by: NURSE PRACTITIONER

## 2021-11-23 PROCEDURE — 99232 SBSQ HOSP IP/OBS MODERATE 35: CPT | Performed by: INTERNAL MEDICINE

## 2021-11-23 PROCEDURE — 99239 HOSP IP/OBS DSCHRG MGMT >30: CPT | Performed by: INTERNAL MEDICINE

## 2021-11-23 PROCEDURE — 6360000002 HC RX W HCPCS: Performed by: INTERNAL MEDICINE

## 2021-11-23 PROCEDURE — 80069 RENAL FUNCTION PANEL: CPT

## 2021-11-23 PROCEDURE — 36415 COLL VENOUS BLD VENIPUNCTURE: CPT

## 2021-11-23 PROCEDURE — 2580000003 HC RX 258: Performed by: INTERNAL MEDICINE

## 2021-11-23 PROCEDURE — 82330 ASSAY OF CALCIUM: CPT

## 2021-11-23 PROCEDURE — 6360000002 HC RX W HCPCS: Performed by: NURSE PRACTITIONER

## 2021-11-23 RX ORDER — CARVEDILOL 6.25 MG/1
6.25 TABLET ORAL 2 TIMES DAILY WITH MEALS
Qty: 60 TABLET | Refills: 3 | Status: ON HOLD | OUTPATIENT
Start: 2021-11-23 | End: 2021-12-07

## 2021-11-23 RX ORDER — BUMETANIDE 2 MG/1
2 TABLET ORAL 2 TIMES DAILY
Qty: 30 TABLET | Refills: 3 | Status: ON HOLD | OUTPATIENT
Start: 2021-11-23 | End: 2021-12-07

## 2021-11-23 RX ADMIN — SODIUM CHLORIDE 125 MG: 900 INJECTION INTRAVENOUS at 10:55

## 2021-11-23 RX ADMIN — ACETAMINOPHEN 650 MG: 325 TABLET ORAL at 07:42

## 2021-11-23 RX ADMIN — BUMETANIDE 2 MG: 1 TABLET ORAL at 07:42

## 2021-11-23 RX ADMIN — Medication 10 ML: at 07:44

## 2021-11-23 RX ADMIN — LEVOTHYROXINE SODIUM 112 MCG: 0.11 TABLET ORAL at 05:30

## 2021-11-23 RX ADMIN — CALCIUM GLUCONATE 1000 MG: 98 INJECTION, SOLUTION INTRAVENOUS at 12:24

## 2021-11-23 ASSESSMENT — PAIN DESCRIPTION - PAIN TYPE: TYPE: ACUTE PAIN

## 2021-11-23 ASSESSMENT — PAIN - FUNCTIONAL ASSESSMENT: PAIN_FUNCTIONAL_ASSESSMENT: PREVENTS OR INTERFERES SOME ACTIVE ACTIVITIES AND ADLS

## 2021-11-23 ASSESSMENT — PAIN SCALES - GENERAL
PAINLEVEL_OUTOF10: 6
PAINLEVEL_OUTOF10: 3

## 2021-11-23 ASSESSMENT — PAIN DESCRIPTION - DESCRIPTORS: DESCRIPTORS: DISCOMFORT;TENDER

## 2021-11-23 ASSESSMENT — PAIN DESCRIPTION - FREQUENCY: FREQUENCY: CONTINUOUS

## 2021-11-23 ASSESSMENT — PAIN DESCRIPTION - ORIENTATION: ORIENTATION: LEFT

## 2021-11-23 ASSESSMENT — PAIN DESCRIPTION - LOCATION: LOCATION: WRIST

## 2021-11-23 NOTE — DISCHARGE SUMMARY
[facility] Physician Discharge Summary       Kassy Sánchez, [address] [facility] [phone]    In 1 week  hospital follow up    Mayra Doyle MD  [address] [place] [id number] [phone]            Activity level: As tolerated     Dispo: Home      Condition on discharge: Stable    Patient ID:  Roberto Ortega  [id number]  45 y.o.  1950    Admit date: 11/19/2021    Discharge date and time:  11/23/2021  2:55 PM    Admission Diagnoses: Principal Problem:    Acute respiratory failure with hypoxia ([address]  Active Problems:    Diabetes mellitus ([address]    Metabolic acidosis    Hypothyroid    ESRD (end stage renal disease) (HCC)    Volume overload    Elevated troponin    Hypertensive emergency    Hyperkalemia    Moderate protein malnutrition ([address]  Resolved Problems:    * No resolved hospital problems. *      Discharge Diagnoses: Principal Problem:    Acute respiratory failure with hypoxia (HCC)  Active Problems:    Diabetes mellitus (HCC)    Metabolic acidosis    Hypothyroid    ESRD (end stage renal disease) (HCC)    Volume overload    Elevated troponin    Hypertensive emergency    Hyperkalemia    Moderate protein malnutrition ([address]  Resolved Problems:    * No resolved hospital problems. *      Consults:  IP CONSULT TO NEPHROLOGY  IP CONSULT TO IV TEAM  IP CONSULT TO VASCULAR SURGERY  IP CONSULT TO ENDOCRINOLOGY    Procedures: Right chest tesio insertion    Hospital Course:   Patient Roberto Ortega is a 66 y.o. presented with Shortness of breath [N86.66]  Metabolic acidosis [I43.5]  ESRD (end stage renal disease) ([address] [N18.6]  Elevated troponin [R77.8]  Acute kidney injury superimposed on CKD ([address] [N17.9, N18.9]  Hypertension, unspecified type [I8      66year old male with a history of CKD, DM, hepatitis C, hypertension and thyroid disease presented to the ED for worsening SOB. Chest x-ray consistent with pulmonary edema.   proBNP greater than 70,000. High-sensitivity troponin 864. EKG shows sinus rhythm with nonspecific T wave changes, but no acute ischemic changes. Patient was admitted for dialysis. Patient had dialysis which improved SOB, hypertension, acidosis and hyperkalemia. He had right chest tesio inserted. Patient began to report wrist pain this morning starting when he woke up. Wrist XR unremarkable. He is now hemodynamically stable and ready for discharge. He is to follow up with PCP and nephrology outpatient. Discharge Exam:  General Appearance: alert and oriented to person, place and time and in no acute distress  Skin: warm and dry  Head: normocephalic and atraumatic  Eyes: pupils equal, round, and reactive to light, extraocular eye movements intact, conjunctivae normal  Neck: neck supple and non tender without mass   Pulmonary/Chest: clear to auscultation bilaterally- no wheezes, rales or rhonchi, normal air movement, no respiratory distress  Cardiovascular: normal rate, normal S1 and S2 and no carotid bruits  Abdomen: soft, non-tender, non-distended, normal bowel sounds, no masses or organomegaly  Extremities: no cyanosis, no clubbing and no edema  Neurologic: no cranial nerve deficit and speech normal    I/O last 3 completed shifts: In: 300   Out: 2000   No intake/output data recorded. LABS:  Recent Labs     11/21/21  0341 11/22/21  0327 11/23/21  0250    139 131*   K 4.6 5.1* 4.2    101 94*   CO2 19* 21* 24   BUN 51* 47* 30*   CREATININE 6.4* 6.5* 4.7*   GLUCOSE 91 142* 112*   CALCIUM 7.6* 7.5* 8.4*       Recent Labs     11/22/21  0327   WBC 5.3   RBC 2.56*   HGB 8.0*   HCT 24.1*   MCV 94.1   MCH 31.3   MCHC 33.2   RDW 15.5*      MPV 10.2       No results for input(s): POCGLU in the last 72 hours. Imaging:  XR CHEST (2 VW)    Result Date: 11/19/2021  EXAMINATION: TWO XRAY VIEWS OF THE CHEST 11/19/2021 4:44 pm COMPARISON: 06/06/2013 chest radiograph.  HISTORY: ORDERING SYSTEM PROVIDED HISTORY: Shortness of breath TECHNOLOGIST PROVIDED HISTORY: Reason for shortness of breath FINDINGS: The lungs are mildly hypoinflated. There are interstitial and airspace opacities in a perihilar and lower lobe distribution. There is blunting of the costophrenic sulci. Cardiac silhouette is enlarged. Pulmonary vasculature is indistinct centrally. There is atherosclerotic calcification of the thoracic aorta. No pneumothorax. Pulmonary edema although superimposed pneumonia at the lung bases cannot be excluded. Bilateral pleural effusions. Enlargement of the cardiac silhouette which is new and consistent with cardiomegaly and/or pericardial effusion. Patient Instructions:      Medication List      START taking these medications    bumetanide 2 MG tablet  Commonly known as: BUMEX  Take 1 tablet by mouth 2 times daily     carvedilol 6.25 MG tablet  Commonly known as: COREG  Take 1 tablet by mouth 2 times daily (with meals)        CHANGE how you take these medications    colchicine 0.6 MG tablet  Commonly known as: Colcrys  Take 1 tablet by mouth 2 times daily  What changed:   · when to take this  · reasons to take this        CONTINUE taking these medications    allopurinol 100 MG tablet  Commonly known as: ZYLOPRIM     amLODIPine 5 MG tablet  Commonly known as: NORVASC     calcium carbonate 500 MG chewable tablet  Commonly known as: TUMS     clobetasol 0.05 % ointment  Commonly known as: TEMOVATE     dapsone 25 MG tablet     diclofenac 75 MG EC tablet  Commonly known as: VOLTAREN  Take 1 tablet by mouth 2 times daily     * glimepiride 1 MG tablet  Commonly known as: AMARYL     * glimepiride 4 MG tablet  Commonly known as: AMARYL  Take 1 tablet by mouth Daily with supper.      * levothyroxine 112 MCG tablet  Commonly known as: SYNTHROID     * levothyroxine 75 MCG tablet  Commonly known as: SYNTHROID     magnesium oxide 400 MG tablet  Commonly known as: MAG-OX     Procardia XL 60 MG extended release tablet  Generic drug: NIFEdipine     SITagliptin 100 MG tablet  Commonly known as: JANUVIA  Take 1 tablet by mouth daily. sodium bicarbonate 650 MG tablet     tamsulosin 0.4 MG capsule  Commonly known as: Flomax  Take 1 capsule by mouth daily for 10 days     vitamin D 1.25 MG (59392 UT) Caps capsule  Commonly known as: ERGOCALCIFEROL         * This list has 4 medication(s) that are the same as other medications prescribed for you. Read the directions carefully, and ask your doctor or other care provider to review them with you. Where to Get Your Medications      You can get these medications from any pharmacy    Bring a paper prescription for each of these medications  · bumetanide 2 MG tablet  · carvedilol 6.25 MG tablet           Note that more than 30 minutes was spent in preparing discharge papers, discussing discharge with patient, medication review, etc.    Signed:  Electronically signed by Dmitry Finch PA-C on 11/23/2021 at 2:55 PM     Addendum: I have personally participated in the history, exam, medical decision making with Karla Valdez on the date of service and I agree with all of the pertinent clinical information unless otherwise noted. I have also reviewed and agree with the past medical, family, and social history unless otherwise noted. Patient was admitted with shortness of breath    PHYSICAL EXAM:  Vitals:  BP (!) 141/82   Pulse 84   Temp 98.6 °F (37 °C) (Oral)   Resp 18   Ht 6' 2\" (1.88 m)   Wt 135 lb 2.3 oz (61.3 kg)   SpO2 95%   BMI 17.35 kg/m²   Gen: awake, alert, NAD  Lungs: clear to auscultation bilaterally no crackles no wheezing. Heart: RRR, no murmur   Abdomen: soft nontender nondistended positive bowel sounds. Extremities: full range of motion no peripheral edema.       Impression:  Principal Problem:    Acute respiratory failure with hypoxia (HCC)  Active Problems:    Diabetes mellitus (HCC)    Metabolic acidosis    Hypothyroid    ESRD (end stage renal disease) ([address]    Volume overload    Elevated troponin    Hypertensive emergency    Hyperkalemia    Moderate protein malnutrition ([address]  Resolved Problems:    * No resolved hospital problems. *      My findings/plan include:      66year old male presents with shortness of breath. He had CHF exacerbation. Nephrology was consulted and patient underwent dialysis. He had tession placed by vascular. Currently medically stable for discharge. NOTE: This report was transcribed using voice recognition software. Every effort was made to ensure accuracy; however, inadvertent computerized transcription errors may be present.     Electronically signed by Kassandra Chambers DO on 11/23/2021 at 3:22 PM

## 2021-11-23 NOTE — CARE COORDINATION
Dialysis chair time confirmed with [facility] [phone]. T-Th-Sat - due to the holiday, pt's first treatment will be on Wednesday 11/24( no treatment on Thursday)- pt needs to be @ [facility] @ 10:15 am on 11/24- pt informed- [facility] is aware pt is discharging today.   Gabriela Arzate RN case manager

## 2021-11-23 NOTE — PROGRESS NOTES
ENDOCRINOLOGY PROGRESS NOTE    Date of Admission: 11/19/2021  Date of Service: 11/23/2021  Admitting Physician: Liam Walter DO   Primary Care Physician: Frank Viera MD  Consultant physician: Cash Tinajero MD     Reason for the consultation:  Hypothyroidism     History of Present Illness: The history is provided by the patient. Accuracy of the patient data is excellent. Ursula Scales is a very pleasant 66 y.o. old male with PMH primary hypothyroidism, DM type 2, HTN, and other listed below admitted to [facility] on 11/19/2021 because of progressive shortness of breath, endocrine service was consulted for management of hypothyroidism     Subjective  Patient was seen at bedside this AM. Pt denies any acute concerns and states he feels well today. Scheduled Meds:   epoetin sabiha-epbx  10,000 Units SubCUTAneous Once per day on Mon Wed Fri    ferric gluconate (FERRLECIT) IVPB  125 mg IntraVENous Daily    amLODIPine  5 mg Oral Daily    levothyroxine  112 mcg Oral Daily    tamsulosin  0.4 mg Oral Daily    sodium chloride flush  5-40 mL IntraVENous 2 times per day    [Held by provider] heparin (porcine)  5,000 Units SubCUTAneous 3 times per day    insulin lispro  0-6 Units SubCUTAneous TID WC    insulin lispro  0-3 Units SubCUTAneous Nightly    bumetanide  2 mg Oral BID    carvedilol  6.25 mg Oral BID WC     PRN Meds:   HYDROcodone 5 mg - acetaminophen, 1 tablet, Q6H PRN  glucose, 15 g, PRN  dextrose, 12.5 g, PRN  glucagon (rDNA), 1 mg, PRN  dextrose, 100 mL/hr, PRN  sodium chloride flush, 5-40 mL, PRN  sodium chloride, 25 mL, PRN  ondansetron, 4 mg, Q8H PRN   Or  ondansetron, 4 mg, Q6H PRN  polyethylene glycol, 17 g, Daily PRN  acetaminophen, 650 mg, Q6H PRN   Or  acetaminophen, 650 mg, Q6H PRN  metoprolol, 5 mg, Q6H PRN  perflutren lipid microspheres, 1.5 mL, ONCE PRN      Continuous Infusions:   dextrose      sodium chloride         Review of Systems  All systems reviewed.  All negative except for symptoms mentioned in HPI     OBJECTIVE    BP (!) 141/82   Pulse 84   Temp 98.6 °F (37 °C) (Oral)   Resp 18   Ht 6' 2\" (1.88 m)   Wt 135 lb 2.3 oz (61.3 kg)   SpO2 95%   BMI 17.35 kg/m²   Wt Readings from Last 6 Encounters:   11/22/21 135 lb 2.3 oz (61.3 kg)   08/25/20 166 lb (75.3 kg)   10/18/18 155 lb (70.3 kg)   04/28/16 155 lb (70.3 kg)   01/14/16 160 lb (72.6 kg)   06/26/13 145 lb (65.8 kg)       Physical examination:  General: awake alert, oriented x3, no abnormal position or movements. HEENT: normocephalic non traumatic,   Neck: supple,  no thyromegaly, no thyroid tenderness   Pulm: good equal air entry no added sounds   CVS: S1 + S2, + edema    Abd: soft lax, no tenderness,   Skin: warm, no lesions, no rash.     Musculoskeletal: No back tenderness, no kyphosis/scoliosis    Neuro: CN intact, sensation notmal , muscle power normal.   Psych: normal mood, and affect    Review of Laboratory Data:  I personally reviewed the following labs:  Recent Labs     11/22/21  0327   WBC 5.3   RBC 2.56*   HGB 8.0*   HCT 24.1*   MCV 94.1   MCH 31.3   MCHC 33.2   RDW 15.5*      MPV 10.2     Recent Labs     11/21/21  0341 11/22/21  0327 11/23/21  0250    139 131*   K 4.6 5.1* 4.2    101 94*   CO2 19* 21* 24   BUN 51* 47* 30*   CREATININE 6.4* 6.5* 4.7*   GLUCOSE 91 142* 112*   CALCIUM 7.6* 7.5* 8.4*   PROT  --  6.9  --    LABALBU 3.4* 3.4* 3.3*   BILITOT  --  0.3  --    ALKPHOS  --  88  --    AST  --  20  --    ALT  --  10  --      No results found for: BHYDRXBUT  Lab Results   Component Value Date    LABA1C 5.3 11/12/2021    LABA1C 5.1 08/24/2021    LABA1C 6.0 06/26/2020     Lab Results   Component Value Date/Time    .600 (H) 11/20/2021 03:56 AM    T4FREE 0.70 (L) 11/20/2021 02:20 PM     Lab Results   Component Value Date    LABA1C 5.3 11/12/2021    GLUCOSE 112 11/23/2021    MALBCR 3951.0 11/12/2021    LABMICR 3516.4 11/12/2021    LABCREA 89 11/12/2021     Lab Results   Component Value Date    TRIG 109 08/24/2021    HDL 53 11/12/2021    LDLCALC 147 11/12/2021    CHOL 200 08/24/2021       Blood culture   Lab Results   Component Value Date    BC 24 Hours no growth 11/19/2021       Radiology:  XR WRIST LEFT (MIN 3 VIEWS)   Final Result   Soft tissue edema. Chronic appearing osseous findings as detailed above. Short-term follow-up   recommended if symptoms persist.         US DUP UPPER EXTREMITY MAPPING BILAT VENOUS   Final Result   1. Measurements for the superficial venous system of the right and left   upper extremity as above commented the.      2.  Presence of a thrombus with lack of proper compressibility but not   occlusion of the distal segment of the right cephalic vein and of the   antecubital segment of the right cephalic vein. 3.  This study does not evaluate the deep venous system of the right and left   upper extremities. XR CHEST PORTABLE   Final Result   1. Satisfactory position of the right tunneled dialysis catheter. There is   no right pneumothorax   2. Improved aeration of the lungs   3. Residual small bilateral pleural effusions. FLUORO FOR SURGICAL PROCEDURES   Final Result   Intraprocedural fluoroscopic spot images as above. See separate procedure   report for more information. XR CHEST (2 VW)   Final Result   Pulmonary edema although superimposed pneumonia at the lung bases cannot be   excluded. Bilateral pleural effusions. Enlargement of the cardiac silhouette which is new and consistent with   cardiomegaly and/or pericardial effusion.              Medical Records/Labs/Images Review:   I personally reviewed and summarized previous records   All labs and imaging were reviewed independently    Temo, a 66 y.o.-old male seen in for management of Hypothyroidism     Profound Hypothyroidism   · Significantly elevated TSH with slightly low FT4 is consistent with medications noncompliance · Continue on Levothyroxine 112 mcg daily   · Will recheck Free T4 tomorrow morning to assess response and recheck his thyroid function test in 6-8 weeks after discharge     Fluid overload/ESRD   · Started on HD     The above issues were reviewed with the patient who understood and agreed with the plan. Thank you for allowing us to participate in the care of this patient. Please do not hesitate to contact us with any additional questions. Ana Laura Mata MD PGY-1    I saw the patient and discussed the management with the resident physician Dr. Cecilia Mars, I reviewed and agree with the findings and plan as documented in the resident's note    Nayana Ching MD  Endocrinologist, [facility] Diabetes Care and Endocrinology   [address] [facility], [address] [place] [address],[facility], [place] [id number]   Phone: [phone]  Fax: [phone]  ---------------------------------  An electronic signature was used to authenticate this note.  Ismael Alfonso MD on 11/23/2021 at 3:47 PM

## 2021-11-23 NOTE — PROGRESS NOTES
11/23/21 0005   NIV Type   $NIV $Daily Charge   Mode Bilevel  (REFUSED AT THIS TIME , REMAINS STANDBY)

## 2021-11-23 NOTE — PROGRESS NOTES
Department of Internal Medicine  Nephrology Progress Note    Events reviewed. SUBJECTIVE:  We are following Mr. Xiomy Aiken for uremia and hyperkalemia. Reports feeling much better. Patient complains of right wrist pain. PHYSICAL EXAM:      Vitals:    VITALS:  BP (!) 141/82   Pulse 84   Temp 98.6 °F (37 °C) (Oral)   Resp 18   Ht 6' 2\" (1.88 m)   Wt 135 lb 2.3 oz (61.3 kg)   SpO2 95%   BMI 17.35 kg/m²   24HR INTAKE/OUTPUT:      Intake/Output Summary (Last 24 hours) at 11/23/2021 0844  Last data filed at 11/22/2021 1415  Gross per 24 hour   Intake 300 ml   Output 2000 ml   Net -1700 ml     Access: Right IJ tunneled dialysis catheter in place  Constitutional:  Awake, alert in no distress. HEENT:  PERRLA  Respiratory:  Decreased breath sounds at the bases  Cardiovascular/Edema:  Heart sounds regular, right chest with dressing  Gastrointestinal:  Abdomen  Neurologic:  Non focal   Skin:  No lesions   Other:  +edema     Scheduled Meds:   epoetin sabiha-epbx  10,000 Units SubCUTAneous Once per day on Mon Wed Fri    ferric gluconate (FERRLECIT) IVPB  125 mg IntraVENous Daily    amLODIPine  5 mg Oral Daily    levothyroxine  112 mcg Oral Daily    tamsulosin  0.4 mg Oral Daily    sodium chloride flush  5-40 mL IntraVENous 2 times per day    [Held by provider] heparin (porcine)  5,000 Units SubCUTAneous 3 times per day    insulin lispro  0-6 Units SubCUTAneous TID WC    insulin lispro  0-3 Units SubCUTAneous Nightly    bumetanide  2 mg Oral BID    carvedilol  6.25 mg Oral BID WC     Continuous Infusions:   dextrose      sodium chloride       PRN Meds:. HYDROcodone 5 mg - acetaminophen, glucose, dextrose, glucagon (rDNA), dextrose, sodium chloride flush, sodium chloride, ondansetron **OR** ondansetron, polyethylene glycol, acetaminophen **OR** acetaminophen, metoprolol, perflutren lipid microspheres    DATA:    CBC:   Lab Results   Component Value Date    WBC 5.3 11/22/2021    RBC 2.56 11/22/2021    HGB 8.0 11/22/2021    HCT 24.1 11/22/2021    MCV 94.1 11/22/2021    MCH 31.3 11/22/2021    MCHC 33.2 11/22/2021    RDW 15.5 11/22/2021     11/22/2021    MPV 10.2 11/22/2021     CMP:    Lab Results   Component Value Date     11/23/2021    K 4.2 11/23/2021    K 5.8 11/19/2021    CL 94 11/23/2021    CO2 24 11/23/2021    BUN 30 11/23/2021    CREATININE 4.7 11/23/2021    GFRAA 15 11/23/2021    LABGLOM 15 11/23/2021    GLUCOSE 112 11/23/2021    PROT 6.9 11/22/2021    LABALBU 3.3 11/23/2021    CALCIUM 8.4 11/23/2021    BILITOT 0.3 11/22/2021    ALKPHOS 88 11/22/2021    AST 20 11/22/2021    ALT 10 11/22/2021     Magnesium:    Lab Results   Component Value Date    MG 1.8 06/26/2020     Phosphorus:    Lab Results   Component Value Date    PHOS 4.5 11/23/2021     Radiology Review:      CXR 11/19/2021   Pulmonary edema although superimposed pneumonia at the lung bases cannot be   excluded.       Bilateral pleural effusions.       Enlargement of the cardiac silhouette which is new and consistent with   cardiomegaly and/or pericardial effusion.             BRIEF SUMMARY OF INITIAL CONSULT:    Briefly Mr. Kamaljit Park is a 66year old man with h/o CKD stage 5, with severe nephrotic proteinuria 2/2 to diabetic nephropathy, type 2 DM, HTN, hepatitis C, hypothyroidism, BPH, who was admitted on 11/19/2021 after he presented to the ER with increasing shortness of breath. He was found with pro-BNP levels of >70,000 and CXR consistent with pulmonary edema. His creatinine was 7.9 mg/dL, K 5.8 mEq/L, bicarbonate 14 mEq/L reasons for this consultation. Problems resolved. Hyperkalemia, 2/2 advanced CKD. Improved with dialysis. High anion gap metabolic acidosis, 2/2 uremia, on sodium bicarbonate, anion gap improved with dialysis. IMPRESSION/RECOMMENDATIONS:      ESRD,  2/2 diabetic nephropathy. Started on RRT on 11/19/21. S/p right IJ vein tunneled dialysis catheter placement on November 21 by Dr. Doug Omalley.   Had dialysis yesterday. Hypotonic hyponatremia, 2/2 to decreased free water excretion (ESRD)  Decompensated heart failure, unknown EF, with pulmonary edema, proBNP >70,000, on Bumex. Improved with fluid removal.  HTN, on amlodipine and carvedilol  MBD of CKD, PTH level 447 (secondary hyperparathyroidism) holding phosphate binders  Hypocalcemia, multifactorial  Anemia of CKD, on epoetin alpha 10,000 units 3 times a week  -----------------------------------------------------------------  History of hepatitis C, Ferritin 425, iron 38, and iron saturation 16%. Normocytic anemia, 2/2 CKD. On epoetin alpha and  IV iron. Plan:    Had dialysis yesterday, no dialysis today  Continue IV iron   Continue Bumex 2 mg p.o. twice daily  Continue epoetin alpha 10,000 units three times weekly   Continue amlodipine 10 mg PO daily  Continue carvedilol 6.25 mg twice daily   Continue to monitor potassium levels  Replace calcium  Continue to monitor ionized calcium  Low potassium diet  1L fluid restriction  Discharge planning, chair time at [facility] set up TTS at 10:45 am, can take him for tomorrows treatment.   OK to discharge from renal POV      Electronically signed by KIANNA Blanco CNP on 11/23/2021 at 8:44 AM

## 2021-11-24 LAB
BLOOD CULTURE, ROUTINE: NORMAL
CULTURE, BLOOD 2: NORMAL

## 2021-12-07 ENCOUNTER — HOSPITAL ENCOUNTER (INPATIENT)
Age: 71
LOS: 6 days | Discharge: HOME | DRG: 291 | End: 2021-12-13
Attending: EMERGENCY MEDICINE | Admitting: FAMILY MEDICINE
Payer: MEDICARE

## 2021-12-07 ENCOUNTER — APPOINTMENT (OUTPATIENT)
Dept: GENERAL RADIOLOGY | Age: 71
DRG: 291 | End: 2021-12-07
Payer: MEDICARE

## 2021-12-07 DIAGNOSIS — R94.31 T WAVE INVERSION IN EKG: ICD-10-CM

## 2021-12-07 DIAGNOSIS — U07.1 COVID: ICD-10-CM

## 2021-12-07 DIAGNOSIS — I16.1 HYPERTENSIVE EMERGENCY: ICD-10-CM

## 2021-12-07 DIAGNOSIS — N17.9 ACUTE RENAL FAILURE SUPERIMPOSED ON CHRONIC KIDNEY DISEASE, ON CHRONIC DIALYSIS, UNSPECIFIED ACUTE RENAL FAILURE TYPE (HCC): Primary | ICD-10-CM

## 2021-12-07 DIAGNOSIS — R77.8 ELEVATED TROPONIN: ICD-10-CM

## 2021-12-07 DIAGNOSIS — N18.9 ACUTE RENAL FAILURE SUPERIMPOSED ON CHRONIC KIDNEY DISEASE, ON CHRONIC DIALYSIS, UNSPECIFIED ACUTE RENAL FAILURE TYPE (HCC): Primary | ICD-10-CM

## 2021-12-07 DIAGNOSIS — E87.70 HYPERVOLEMIA, UNSPECIFIED HYPERVOLEMIA TYPE: ICD-10-CM

## 2021-12-07 DIAGNOSIS — N18.9 ANEMIA DUE TO CHRONIC KIDNEY DISEASE, UNSPECIFIED CKD STAGE: ICD-10-CM

## 2021-12-07 DIAGNOSIS — I10 HYPERTENSION, UNSPECIFIED TYPE: ICD-10-CM

## 2021-12-07 DIAGNOSIS — Z99.2 ACUTE RENAL FAILURE SUPERIMPOSED ON CHRONIC KIDNEY DISEASE, ON CHRONIC DIALYSIS, UNSPECIFIED ACUTE RENAL FAILURE TYPE (HCC): Primary | ICD-10-CM

## 2021-12-07 DIAGNOSIS — D63.1 ANEMIA DUE TO CHRONIC KIDNEY DISEASE, UNSPECIFIED CKD STAGE: ICD-10-CM

## 2021-12-07 LAB
ALBUMIN SERPL-MCNC: 3.8 G/DL (ref 3.5–5.2)
ALP BLD-CCNC: 110 U/L (ref 40–129)
ALT SERPL-CCNC: 16 U/L (ref 0–40)
ANION GAP SERPL CALCULATED.3IONS-SCNC: 14 MMOL/L (ref 7–16)
AST SERPL-CCNC: 23 U/L (ref 0–39)
BASOPHILS ABSOLUTE: 0.06 E9/L (ref 0–0.2)
BASOPHILS RELATIVE PERCENT: 1.6 % (ref 0–2)
BILIRUB SERPL-MCNC: 0.5 MG/DL (ref 0–1.2)
BUN BLDV-MCNC: 28 MG/DL (ref 6–23)
CALCIUM SERPL-MCNC: 9 MG/DL (ref 8.6–10.2)
CHLORIDE BLD-SCNC: 103 MMOL/L (ref 98–107)
CO2: 25 MMOL/L (ref 22–29)
CREAT SERPL-MCNC: 6.4 MG/DL (ref 0.7–1.2)
D DIMER: 3767 NG/ML DDU
EKG ATRIAL RATE: 91 BPM
EKG P AXIS: 27 DEGREES
EKG P-R INTERVAL: 146 MS
EKG Q-T INTERVAL: 424 MS
EKG QRS DURATION: 88 MS
EKG QTC CALCULATION (BAZETT): 521 MS
EKG R AXIS: -9 DEGREES
EKG T AXIS: -135 DEGREES
EKG VENTRICULAR RATE: 91 BPM
EOSINOPHILS ABSOLUTE: 0.07 E9/L (ref 0.05–0.5)
EOSINOPHILS RELATIVE PERCENT: 1.9 % (ref 0–6)
GFR AFRICAN AMERICAN: 10
GFR NON-AFRICAN AMERICAN: 10 ML/MIN/1.73
GLUCOSE BLD-MCNC: 115 MG/DL (ref 74–99)
HCT VFR BLD CALC: 28.9 % (ref 37–54)
HEMOGLOBIN: 9.1 G/DL (ref 12.5–16.5)
IMMATURE GRANULOCYTES #: 0.01 E9/L
IMMATURE GRANULOCYTES %: 0.3 % (ref 0–5)
LACTIC ACID: 1.4 MMOL/L (ref 0.5–2.2)
LYMPHOCYTES ABSOLUTE: 0.66 E9/L (ref 1.5–4)
LYMPHOCYTES RELATIVE PERCENT: 17.8 % (ref 20–42)
MCH RBC QN AUTO: 31.3 PG (ref 26–35)
MCHC RBC AUTO-ENTMCNC: 31.5 % (ref 32–34.5)
MCV RBC AUTO: 99.3 FL (ref 80–99.9)
METER GLUCOSE: 108 MG/DL (ref 74–99)
MONOCYTES ABSOLUTE: 0.52 E9/L (ref 0.1–0.95)
MONOCYTES RELATIVE PERCENT: 14.1 % (ref 2–12)
NEUTROPHILS ABSOLUTE: 2.38 E9/L (ref 1.8–7.3)
NEUTROPHILS RELATIVE PERCENT: 64.3 % (ref 43–80)
PDW BLD-RTO: 15.4 FL (ref 11.5–15)
PLATELET # BLD: 176 E9/L (ref 130–450)
PMV BLD AUTO: 10.3 FL (ref 7–12)
POTASSIUM REFLEX MAGNESIUM: 4.3 MMOL/L (ref 3.5–5)
PRO-BNP: ABNORMAL PG/ML (ref 0–125)
RBC # BLD: 2.91 E12/L (ref 3.8–5.8)
SARS-COV-2, NAAT: DETECTED
SODIUM BLD-SCNC: 142 MMOL/L (ref 132–146)
TOTAL PROTEIN: 7.8 G/DL (ref 6.4–8.3)
TROPONIN, HIGH SENSITIVITY: 591 NG/L (ref 0–11)
TROPONIN, HIGH SENSITIVITY: 647 NG/L (ref 0–11)
WBC # BLD: 3.7 E9/L (ref 4.5–11.5)

## 2021-12-07 PROCEDURE — 93010 ELECTROCARDIOGRAM REPORT: CPT | Performed by: INTERNAL MEDICINE

## 2021-12-07 PROCEDURE — 96374 THER/PROPH/DIAG INJ IV PUSH: CPT

## 2021-12-07 PROCEDURE — 83605 ASSAY OF LACTIC ACID: CPT

## 2021-12-07 PROCEDURE — 71046 X-RAY EXAM CHEST 2 VIEWS: CPT

## 2021-12-07 PROCEDURE — 84145 PROCALCITONIN (PCT): CPT

## 2021-12-07 PROCEDURE — 2580000003 HC RX 258: Performed by: FAMILY MEDICINE

## 2021-12-07 PROCEDURE — 84484 ASSAY OF TROPONIN QUANT: CPT

## 2021-12-07 PROCEDURE — 2060000000 HC ICU INTERMEDIATE R&B

## 2021-12-07 PROCEDURE — 6370000000 HC RX 637 (ALT 250 FOR IP): Performed by: FAMILY MEDICINE

## 2021-12-07 PROCEDURE — 85025 COMPLETE CBC W/AUTO DIFF WBC: CPT

## 2021-12-07 PROCEDURE — 6360000002 HC RX W HCPCS: Performed by: FAMILY MEDICINE

## 2021-12-07 PROCEDURE — 90935 HEMODIALYSIS ONE EVALUATION: CPT

## 2021-12-07 PROCEDURE — 87635 SARS-COV-2 COVID-19 AMP PRB: CPT

## 2021-12-07 PROCEDURE — 99222 1ST HOSP IP/OBS MODERATE 55: CPT | Performed by: FAMILY MEDICINE

## 2021-12-07 PROCEDURE — 83880 ASSAY OF NATRIURETIC PEPTIDE: CPT

## 2021-12-07 PROCEDURE — 82962 GLUCOSE BLOOD TEST: CPT

## 2021-12-07 PROCEDURE — 36415 COLL VENOUS BLD VENIPUNCTURE: CPT

## 2021-12-07 PROCEDURE — 86140 C-REACTIVE PROTEIN: CPT

## 2021-12-07 PROCEDURE — 93005 ELECTROCARDIOGRAM TRACING: CPT | Performed by: PHYSICIAN ASSISTANT

## 2021-12-07 PROCEDURE — 80053 COMPREHEN METABOLIC PANEL: CPT

## 2021-12-07 PROCEDURE — 85378 FIBRIN DEGRADE SEMIQUANT: CPT

## 2021-12-07 PROCEDURE — 5A1D70Z PERFORMANCE OF URINARY FILTRATION, INTERMITTENT, LESS THAN 6 HOURS PER DAY: ICD-10-PCS | Performed by: FAMILY MEDICINE

## 2021-12-07 PROCEDURE — 99283 EMERGENCY DEPT VISIT LOW MDM: CPT

## 2021-12-07 PROCEDURE — 6370000000 HC RX 637 (ALT 250 FOR IP): Performed by: INTERNAL MEDICINE

## 2021-12-07 PROCEDURE — 6370000000 HC RX 637 (ALT 250 FOR IP): Performed by: PHYSICIAN ASSISTANT

## 2021-12-07 PROCEDURE — 6360000002 HC RX W HCPCS: Performed by: EMERGENCY MEDICINE

## 2021-12-07 RX ORDER — ASCORBIC ACID 500 MG
500 TABLET ORAL DAILY
Status: DISCONTINUED | OUTPATIENT
Start: 2021-12-08 | End: 2021-12-13 | Stop reason: HOSPADM

## 2021-12-07 RX ORDER — NICOTINE POLACRILEX 4 MG
15 LOZENGE BUCCAL PRN
Status: DISCONTINUED | OUTPATIENT
Start: 2021-12-07 | End: 2021-12-13 | Stop reason: HOSPADM

## 2021-12-07 RX ORDER — GUAIFENESIN/DEXTROMETHORPHAN 100-10MG/5
5 SYRUP ORAL EVERY 4 HOURS PRN
Status: DISCONTINUED | OUTPATIENT
Start: 2021-12-07 | End: 2021-12-13 | Stop reason: HOSPADM

## 2021-12-07 RX ORDER — ACETAMINOPHEN 650 MG/1
650 SUPPOSITORY RECTAL EVERY 6 HOURS PRN
Status: DISCONTINUED | OUTPATIENT
Start: 2021-12-07 | End: 2021-12-13 | Stop reason: HOSPADM

## 2021-12-07 RX ORDER — METOLAZONE 2.5 MG/1
2.5 TABLET ORAL DAILY
Status: DISCONTINUED | OUTPATIENT
Start: 2021-12-07 | End: 2021-12-13 | Stop reason: HOSPADM

## 2021-12-07 RX ORDER — NIFEDIPINE 60 MG/1
60 TABLET, EXTENDED RELEASE ORAL DAILY
Status: DISCONTINUED | OUTPATIENT
Start: 2021-12-07 | End: 2021-12-08

## 2021-12-07 RX ORDER — LEVOTHYROXINE SODIUM 112 UG/1
112 TABLET ORAL DAILY
Status: DISCONTINUED | OUTPATIENT
Start: 2021-12-08 | End: 2021-12-13 | Stop reason: HOSPADM

## 2021-12-07 RX ORDER — POLYETHYLENE GLYCOL 3350 17 G/17G
17 POWDER, FOR SOLUTION ORAL DAILY PRN
Status: DISCONTINUED | OUTPATIENT
Start: 2021-12-07 | End: 2021-12-13 | Stop reason: HOSPADM

## 2021-12-07 RX ORDER — SODIUM CHLORIDE 9 MG/ML
25 INJECTION, SOLUTION INTRAVENOUS PRN
Status: DISCONTINUED | OUTPATIENT
Start: 2021-12-07 | End: 2021-12-13 | Stop reason: HOSPADM

## 2021-12-07 RX ORDER — LEVOTHYROXINE SODIUM 0.07 MG/1
75 TABLET ORAL DAILY
Status: DISCONTINUED | OUTPATIENT
Start: 2021-12-08 | End: 2021-12-07 | Stop reason: ALTCHOICE

## 2021-12-07 RX ORDER — COLCHICINE 0.6 MG/1
0.6 TABLET ORAL PRN
Status: DISCONTINUED | OUTPATIENT
Start: 2021-12-07 | End: 2021-12-13 | Stop reason: HOSPADM

## 2021-12-07 RX ORDER — DEXTROSE MONOHYDRATE 25 G/50ML
12.5 INJECTION, SOLUTION INTRAVENOUS PRN
Status: DISCONTINUED | OUTPATIENT
Start: 2021-12-07 | End: 2021-12-13 | Stop reason: HOSPADM

## 2021-12-07 RX ORDER — ONDANSETRON 4 MG/1
4 TABLET, ORALLY DISINTEGRATING ORAL EVERY 8 HOURS PRN
Status: DISCONTINUED | OUTPATIENT
Start: 2021-12-07 | End: 2021-12-08

## 2021-12-07 RX ORDER — CARVEDILOL 6.25 MG/1
6.25 TABLET ORAL 2 TIMES DAILY WITH MEALS
Status: DISCONTINUED | OUTPATIENT
Start: 2021-12-07 | End: 2021-12-08

## 2021-12-07 RX ORDER — ONDANSETRON 2 MG/ML
4 INJECTION INTRAMUSCULAR; INTRAVENOUS EVERY 6 HOURS PRN
Status: DISCONTINUED | OUTPATIENT
Start: 2021-12-07 | End: 2021-12-08

## 2021-12-07 RX ORDER — CALCIUM CARBONATE 200(500)MG
500 TABLET,CHEWABLE ORAL 3 TIMES DAILY PRN
Status: DISCONTINUED | OUTPATIENT
Start: 2021-12-07 | End: 2021-12-13 | Stop reason: HOSPADM

## 2021-12-07 RX ORDER — VITAMIN B COMPLEX
2000 TABLET ORAL DAILY
Status: DISCONTINUED | OUTPATIENT
Start: 2021-12-07 | End: 2021-12-13 | Stop reason: HOSPADM

## 2021-12-07 RX ORDER — CALCIUM GLUCONATE 94 MG/ML
1000 INJECTION, SOLUTION INTRAVENOUS ONCE
Status: COMPLETED | OUTPATIENT
Start: 2021-12-07 | End: 2021-12-07

## 2021-12-07 RX ORDER — TAMSULOSIN HYDROCHLORIDE 0.4 MG/1
0.4 CAPSULE ORAL DAILY
Status: DISCONTINUED | OUTPATIENT
Start: 2021-12-07 | End: 2021-12-13 | Stop reason: HOSPADM

## 2021-12-07 RX ORDER — SODIUM CHLORIDE 0.9 % (FLUSH) 0.9 %
5-40 SYRINGE (ML) INJECTION EVERY 12 HOURS SCHEDULED
Status: DISCONTINUED | OUTPATIENT
Start: 2021-12-07 | End: 2021-12-13 | Stop reason: HOSPADM

## 2021-12-07 RX ORDER — SODIUM CHLORIDE 0.9 % (FLUSH) 0.9 %
5-40 SYRINGE (ML) INJECTION PRN
Status: DISCONTINUED | OUTPATIENT
Start: 2021-12-07 | End: 2021-12-13 | Stop reason: HOSPADM

## 2021-12-07 RX ORDER — MAGNESIUM HYDROXIDE/ALUMINUM HYDROXICE/SIMETHICONE 120; 1200; 1200 MG/30ML; MG/30ML; MG/30ML
30 SUSPENSION ORAL EVERY 6 HOURS PRN
Status: DISCONTINUED | OUTPATIENT
Start: 2021-12-07 | End: 2021-12-13 | Stop reason: HOSPADM

## 2021-12-07 RX ORDER — HEPARIN SODIUM 10000 [USP'U]/ML
5000 INJECTION, SOLUTION INTRAVENOUS; SUBCUTANEOUS EVERY 8 HOURS SCHEDULED
Status: DISCONTINUED | OUTPATIENT
Start: 2021-12-07 | End: 2021-12-13 | Stop reason: HOSPADM

## 2021-12-07 RX ORDER — ACETAMINOPHEN 325 MG/1
650 TABLET ORAL EVERY 6 HOURS PRN
Status: DISCONTINUED | OUTPATIENT
Start: 2021-12-07 | End: 2021-12-13 | Stop reason: HOSPADM

## 2021-12-07 RX ORDER — BUMETANIDE 1 MG/1
2 TABLET ORAL 2 TIMES DAILY
Status: DISCONTINUED | OUTPATIENT
Start: 2021-12-07 | End: 2021-12-07

## 2021-12-07 RX ORDER — ALBUTEROL SULFATE 90 UG/1
2 AEROSOL, METERED RESPIRATORY (INHALATION) EVERY 6 HOURS PRN
Status: DISCONTINUED | OUTPATIENT
Start: 2021-12-07 | End: 2021-12-13 | Stop reason: HOSPADM

## 2021-12-07 RX ORDER — SODIUM BICARBONATE 650 MG/1
650 TABLET ORAL 3 TIMES DAILY
Status: DISCONTINUED | OUTPATIENT
Start: 2021-12-07 | End: 2021-12-13 | Stop reason: HOSPADM

## 2021-12-07 RX ORDER — AMLODIPINE BESYLATE 5 MG/1
5 TABLET ORAL DAILY
Status: DISCONTINUED | OUTPATIENT
Start: 2021-12-07 | End: 2021-12-07

## 2021-12-07 RX ORDER — BUMETANIDE 1 MG/1
2 TABLET ORAL 2 TIMES DAILY
Status: DISCONTINUED | OUTPATIENT
Start: 2021-12-07 | End: 2021-12-13 | Stop reason: HOSPADM

## 2021-12-07 RX ORDER — CLOBETASOL PROPIONATE 0.5 MG/G
OINTMENT TOPICAL 2 TIMES DAILY
Status: DISCONTINUED | OUTPATIENT
Start: 2021-12-07 | End: 2021-12-13 | Stop reason: HOSPADM

## 2021-12-07 RX ORDER — ASPIRIN 325 MG
325 TABLET ORAL ONCE
Status: COMPLETED | OUTPATIENT
Start: 2021-12-07 | End: 2021-12-07

## 2021-12-07 RX ORDER — ZINC SULFATE 50(220)MG
50 CAPSULE ORAL DAILY
Status: DISCONTINUED | OUTPATIENT
Start: 2021-12-08 | End: 2021-12-13 | Stop reason: HOSPADM

## 2021-12-07 RX ORDER — CARVEDILOL 6.25 MG/1
6.25 TABLET ORAL 2 TIMES DAILY WITH MEALS
Status: DISCONTINUED | OUTPATIENT
Start: 2021-12-07 | End: 2021-12-07

## 2021-12-07 RX ORDER — AMLODIPINE BESYLATE 5 MG/1
10 TABLET ORAL DAILY
Status: DISCONTINUED | OUTPATIENT
Start: 2021-12-07 | End: 2021-12-08

## 2021-12-07 RX ORDER — DEXTROSE MONOHYDRATE 50 MG/ML
100 INJECTION, SOLUTION INTRAVENOUS PRN
Status: DISCONTINUED | OUTPATIENT
Start: 2021-12-07 | End: 2021-12-13 | Stop reason: HOSPADM

## 2021-12-07 RX ADMIN — CALCIUM GLUCONATE 1000 MG: 98 INJECTION, SOLUTION INTRAVENOUS at 13:20

## 2021-12-07 RX ADMIN — SODIUM BICARBONATE 650 MG: 650 TABLET ORAL at 21:37

## 2021-12-07 RX ADMIN — HEPARIN SODIUM 5000 UNITS: 10000 INJECTION INTRAVENOUS; SUBCUTANEOUS at 21:59

## 2021-12-07 RX ADMIN — METOLAZONE 2.5 MG: 2.5 TABLET ORAL at 21:38

## 2021-12-07 RX ADMIN — ASPIRIN 325 MG: 325 TABLET ORAL at 13:20

## 2021-12-07 RX ADMIN — Medication 10 ML: at 21:37

## 2021-12-07 RX ADMIN — AMLODIPINE BESYLATE 10 MG: 5 TABLET ORAL at 21:37

## 2021-12-07 RX ADMIN — Medication 2000 UNITS: at 21:37

## 2021-12-07 RX ADMIN — CARVEDILOL 6.25 MG: 6.25 TABLET, FILM COATED ORAL at 21:38

## 2021-12-07 RX ADMIN — Medication 400 MG: at 21:37

## 2021-12-07 ASSESSMENT — PAIN SCALES - GENERAL
PAINLEVEL_OUTOF10: 0
PAINLEVEL_OUTOF10: 3

## 2021-12-07 NOTE — H&P
[facility] History and Physical          ASSESSMENT:      Active Problems:    Acute renal failure superimposed on chronic kidney disease, on chronic dialysis ([address]  Resolved Problems:    * No resolved hospital problems. *      PLAN:    1. COVID 19 infection : CXR on admission revealed fluid overload . Patient is non vaccinated . Fluid overload sec to ESRD . Patent is on room air and saturating 96-97% . Admit inpatient vitals telemetry supportive care trend inflammatory markers Vit D C and zinc     Hypervolemia :  CXR revealed fluid overload BNP >70,000 sec to ESRD . Nephrology consulted for HD today     Hypertensive Emergency ; sec to volume overload . C/w Coreg Norvasc Procardia     Elevated Trop : 647, 591 sec to ESRD EKG revealed inversion V3-V6 cardiology consulted 2D echo 11/2021 revealed EF 25-30% with hypokinesis and akinesis of apicals segment     Congestive Heart failure with reduced EF : EF 25-30% as seen on 2D echo  11/2021  C/w Bumex metolazone 2.5 mg daily       Type 2 Diabetes : A1c is 5.3 % sliding scale and accucheks     Hypothyroidism: C/w Synthroid     ESRD on HD : Tues , Thurs Sat  Nephrology consulted     Anemia secondary to CKD : stable     History of Hepatitis C             Code Status: Full   DVT prophylaxis: Heparin       CHIEF COMPLAINT:  Cough     History of Present Illness:  66year old male with past medical history of ESRD on HD , HTN Hep C DM . Patient presents to the the ED due to persistent cough that is no productive . He reports shortness of breath that is worse with laying down  , patient went to dialysis session toady and due to cough was told to come to ED Patient is non vaccinated . He reports no fever chills chest pain loss of taste or smell . He reports no abdominal pain nausea or diarrhea. In the ED patient was hypertensive at 192/109 , 96-97 % on room air afebrile .  Lab data reveal creatinine 6.4 WBC  3.7 Trop 647 ,591 EKG revealed T wave inversions V3-V6 HGB 9.1  COVID 19 positive CXR fluid overload BNP >70,000. Patient was sent to dialysis from ED . Informant(s) for H&P:patient     REVIEW OF SYSTEMS:  A comprehensive review of systems was negative except for: what is in the HPI      PMH:  Past Medical History:   Diagnosis Date    Chronic kidney disease     Diabetes mellitus ([address]     Hepatitis C     Hypertension     Liver disease     Thyroid disease     Unspecified diseases of blood and blood-forming organs        Surgical History:  Past Surgical History:   Procedure Laterality Date    TONSILLECTOMY      VASCULAR SURGERY N/A 11/21/2021    CATHETER INSERTION HEMODIALYSIS, REMOVAL OF FEMORAL CATHETER performed by Ruperto Hilton MD at [facility] OR       Medications Prior to Admission:    Prior to Admission medications    Medication Sig Start Date End Date Taking?  Authorizing Provider   carvedilol (COREG) 6.25 MG tablet Take 1 tablet by mouth 2 times daily (with meals) 11/23/21   Ernestina Loyd DO   bumetanide (BUMEX) 2 MG tablet Take 1 tablet by mouth 2 times daily 11/23/21   Ernestina Loyd DO   glimepiride (AMARYL) 1 MG tablet Take 1 mg by mouth every morning (before breakfast)    Historical Provider, MD   dapsone 25 MG tablet TK 1 T PO BID 7/21/20   Historical Provider, MD   allopurinol (ZYLOPRIM) 100 MG tablet TK 1 T PO D 8/3/20   Historical Provider, MD   sodium bicarbonate 650 MG tablet TK 2 TS PO Q 8 H 7/11/20   Historical Provider, MD   levothyroxine (SYNTHROID) 112 MCG tablet Take 112 mcg by mouth Daily    Historical Provider, MD   PROCARDIA XL 60 MG extended release tablet TK 1 T PO QAM 8/7/20   Historical Provider, MD   magnesium oxide (MAG-OX) 400 MG tablet Take 400 mg by mouth daily    Historical Provider, MD   vitamin D (ERGOCALCIFEROL) 1.25 MG (59041 UT) CAPS capsule TK 1 C PO 1 TIME Q WK 7/7/20   Historical Provider, MD   clobetasol (TEMOVATE) 0.05 % ointment JOHN EXT AA BID 7/1/20   Historical Provider, MD   diclofenac (VOLTAREN) 75 MG EC tablet Take 1 tablet by mouth 2 times daily  Patient not taking: Reported on 8/25/2020 10/18/18   Quentin Jay MD   tamsulosin [facility]) 0.4 MG capsule Take 1 capsule by mouth daily for 10 days 10/18/18 10/28/18  Quentin Jay MD   colchicine (COLCRYS) 0.6 MG tablet Take 1 tablet by mouth 2 times daily  Patient taking differently: Take 0.6 mg by mouth as needed  4/28/16   KIANNA Gomes CNP   calcium carbonate (TUMS) 500 MG chewable tablet Take 1 tablet by mouth 3 times daily as needed for Heartburn. 6/11/13   Pranav Shaikh MD   glimepiride (AMARYL) 4 MG tablet Take 1 tablet by mouth Daily with supper. Patient not taking: Reported on 8/25/2020 6/11/13   Pranav Shaikh MD   sitaGLIPtan (JANUVIA) 100 MG tablet Take 1 tablet by mouth daily. Patient not taking: Reported on 8/25/2020 6/11/13   Pranav Shaikh MD   levothyroxine (SYNTHROID) 75 MCG tablet Take 75 mcg by mouth Daily. Historical Provider, MD   amLODIPine (NORVASC) 5 MG tablet Take 5 mg by mouth daily. Historical Provider, MD       Allergies:    Penicillins    Social History:    reports that he quit smoking about 48 years ago. His smoking use included cigarettes. He has a 20.00 pack-year smoking history. He has never used smokeless tobacco. He reports current drug use. He reports that he does not drink alcohol. Family History:   family history includes Cancer in his father; Diabetes in his mother.        PHYSICAL EXAM:  Vitals:  BP (!) 171/111   Pulse 87   Temp 98.6 °F (37 °C)   Resp 20   Ht 6' 2\" (1.88 m)   Wt 150 lb 9.2 oz (68.3 kg)   SpO2 96%   BMI 19.33 kg/m²     General Appearance: alert and oriented to person, place and time and in no acute distress  Skin: warm and dry  Head: normocephalic and atraumatic  Eyes: pupils equal, round, and reactive to light, extraocular eye movements intact, conjunctivae normal  Neck: neck supple and non tender without mass   Pulmonary/Chest: clear to auscultation bilaterally- no wheezes, rales or rhonchi, normal air movement, no respiratory distress  Cardiovascular: normal rate, normal S1 and S2 and no carotid bruits  Abdomen: soft, non-tender, non-distended, normal bowel sounds, no masses or organomegaly  Extremities: no cyanosis, no clubbing and no edema  Neurologic: no cranial nerve deficit and speech normal        LABS:  Recent Labs     12/07/21  1302      K 4.3      CO2 25   BUN 28*   CREATININE 6.4*   GLUCOSE 115*   CALCIUM 9.0       Recent Labs     12/07/21  1302   WBC 3.7*   RBC 2.91*   HGB 9.1*   HCT 28.9*   MCV 99.3   MCH 31.3   MCHC 31.5*   RDW 15.4*      MPV 10.3       No results for input(s): POCGLU in the last 72 hours. Radiology:   XR CHEST (2 VW)   Final Result   1. Slightly limited exam      2. Constellation of findings suggestive of fluid overload, radiographically   minimally improved since 11/21/2021. Sherryle Moder RECOMMENDATION:   1. Recommend follow-up thoracic imaging, as directed clinically. .             EKG: inversion V3-V6       NOTE: This report was transcribed using voice recognition software. Every effort was made to ensure accuracy; however, inadvertent computerized transcription errors may be present.   Electronically signed by Kira Cheng MD on 12/7/2021 at 6:46 PM

## 2021-12-07 NOTE — ED NOTES
Pt ambulated around dept, min O2 95% RA, max . Denies increase in sob, denies all other sx.      Trisha Ramirez RN  12/07/21 5083

## 2021-12-07 NOTE — FLOWSHEET NOTE
Tolerated UF well. 2000 cc removed. For HD tomorrow. Orders in the computer already.    12/07/21 1820   Vital Signs   BP (!) 171/111   Temp 98.6 °F (37 °C)   Pulse 87   Resp 20   Weight 150 lb 9.2 oz (68.3 kg)   Percent Weight Change -2.84   Post-Hemodialysis Assessment   Post-Treatment Procedures Blood returned; Catheter capped, clamped and heparinized x 2 ports   Machine Disinfection Process Acid/Vinegar Clean; Heat Disinfect   Rinseback Volume (ml) 300 ml   Total Liters Processed (l/min) 0 l/min   Dialyzer Clearance Lightly streaked   Duration of Treatment (minutes) 120 minutes   Hemodialysis Intake (ml) 300 ml   Hemodialysis Output (ml) 2300 ml   NET Removed (ml) 2000 ml   Tolerated Treatment Good   Patient Response to Treatment tolerated well

## 2021-12-07 NOTE — CONSULTS
Department of Internal Medicine  Nephrology Attending Consult Note      Reason for Consult:  Uremia, hyperkalemia   Requesting Physician:  Dr. Pretty Garcia:  Shortness of breath    History Obtained From:  patient, electronic medical record    HISTORY OF PRESENT ILLNESS:  Briefly Mr. Evelyn Funk is a 66year old man with h/o ESRD 2/2 to diabetic nephropathy, recently started on renal replacement therapy, on intermittent HD 3 times a week Tuesday Thursday and Saturday, via right IJ vein tunneled dialysis catheter, type 2 DM, HTN, HFrEF 25-30% with stage II DD, hepatitis C, hypothyroidism, BPH, who was admitted on December 7, 2021 after he presented to the ER with increasing shortness of breath. He was found with pro-BNP levels of >70,000 and CXR suggestive of pulmonary edema, reason for this consultation. Past Medical History:        Diagnosis Date    Chronic kidney disease     [facility] mellitus ([facility]     Hepatitis C     Hypertension     Liver disease     Thyroid disease     Unspecified diseases of blood and blood-forming organs      Past Surgical History:        Procedure Laterality Date    TONSILLECTOMY      VASCULAR SURGERY N/A 11/21/2021    CATHETER INSERTION HEMODIALYSIS, REMOVAL OF FEMORAL CATHETER performed by Lawyer Pedro MD at [facility] OR     Current Medications:    No current facility-administered medications for this encounter. Allergies:  Penicillins    Social History:    TOBACCO:   reports that he quit smoking about 48 years ago. His smoking use included cigarettes. He has a 20.00 pack-year smoking history. He has never used smokeless tobacco.  ETOH:   reports no history of alcohol use.     Family History:       Problem Relation Age of Onset    Diabetes Mother     Cancer Father      REVIEW OF SYSTEMS:    CONSTITUTIONAL:  positive for  fatigue  EYES:  negative  HEENT:  negative  RESPIRATORY:  positive for  dyspnea  CARDIOVASCULAR:  positive for  dyspnea  GASTROINTESTINAL: positive for nausea  GENITOURINARY:  negative  INTEGUMENT/BREAST:  negative  HEMATOLOGIC/LYMPHATIC:  negative  ALLERGIC/IMMUNOLOGIC:  positive for drug reactions  ENDOCRINE:  negative  MUSCULOSKELETAL:  negative  NEUROLOGICAL:  Negative    PHYSICAL EXAM:      Vitals:    VITALS:  BP (!) 192/109   Pulse 94   Temp 98.4 °F (36.9 °C)   Resp 16   Ht 6' 2\" (1.88 m)   Wt 155 lb (70.3 kg)   SpO2 96%   BMI 19.90 kg/m²   24HR INTAKE/OUTPUT:  No intake or output data in the 24 hours ending 12/07/21 1525    Access: Right IJ vein tunneled dialysis catheter in place  Constitutional:  Awake in respiratory distress  HEENT:  PERRLA  Respiratory:  Decreased breath sounds at the bases  Cardiovascular/Edema:  Heart sounds regular  Gastrointestinal:  Abdomen  Neurologic:  Non focal   Skin:  No lesions   Other:  + edema     DATA:    CBC:   Lab Results   Component Value Date    WBC 3.7 12/07/2021    RBC 2.91 12/07/2021    HGB 9.1 12/07/2021    HCT 28.9 12/07/2021    MCV 99.3 12/07/2021    MCH 31.3 12/07/2021    MCHC 31.5 12/07/2021    RDW 15.4 12/07/2021     12/07/2021    MPV 10.3 12/07/2021     CMP:    Lab Results   Component Value Date     12/07/2021    K 4.3 12/07/2021     12/07/2021    CO2 25 12/07/2021    BUN 28 12/07/2021    CREATININE 6.4 12/07/2021    GFRAA 10 12/07/2021    LABGLOM 10 12/07/2021    GLUCOSE 115 12/07/2021    PROT 7.8 12/07/2021    LABALBU 3.8 12/07/2021    CALCIUM 9.0 12/07/2021    BILITOT 0.5 12/07/2021    ALKPHOS 110 12/07/2021    AST 23 12/07/2021    ALT 16 12/07/2021     Magnesium:    Lab Results   Component Value Date    MG 1.8 06/26/2020     Phosphorus:    Lab Results   Component Value Date    PHOS 4.5 11/23/2021     Radiology Review:      Chest x-ray December 7, 2021   1.  Slightly limited exam       2.  Constellation of findings suggestive of fluid overload, radiographically   minimally improved since 11/21/2021.       .       RECOMMENDATION:   1.   Recommend follow-up thoracic imaging, as directed clinically.       .             IMPRESSION/RECOMMENDATIONS:      Briefly Mr. Efren Aaron is a 66year old man with h/o ESRD 2/2 to diabetic nephropathy, recently started on renal replacement therapy, on intermittent HD 3 times a week Tuesday Thursday and Saturday, via right IJ vein tunneled dialysis catheter, type 2 DM, HTN, HFrEF 25-30% with stage II DD, hepatitis C, hypothyroidism, BPH, who was admitted on December 7, 2021 after he presented to the ER with increasing shortness of breath. He was found with pro-BNP levels of >70,000 and CXR suggestive of pulmonary edema, reason for this consultation. 1. ESRD 2/2 to diabetic nephropathy, recently started on renal replacement therapy, on intermittent HD 3 times a week Tuesday Thursday and Saturday, via right IJ vein tunneled dialysis. For ultrafiltration x2 hours today and HD tomorrow    2. HFrEF 25-30% with stage II DD, proBNP >70,000, for UF x2 hours today  3. HTN, on carvedilol, amlodipine at home  4. History of hepatitis C  5. MBD of CKD, obtain phosphorus level  6. Anemia of CKD, to start epoetin alpha    Plan:    · UF x2 hours today  · HD x4 hours tomorrow and 3 times a week  · Bumex 2 mg p.o. twice daily  · Metolazone 2.5 mg daily  · Epoetin alpha 3000 units 3 times a week  · Amlodipine 10 mg daily  · Carvedilol 6.25 g twice daily  ·     Thank you very much Dr. Lloyd Pritchett for allowing us to participate in the care of Mr. Ban Peck.

## 2021-12-07 NOTE — ED PROVIDER NOTES
FIRST PROVIDER CONTACT ASSESSMENT NOTE           Department of Emergency Medicine                 First Provider Note            21  11:55 AM EST    Date of Encounter: No admission date for patient encounter. Patient Name: Ramirez Faith  : 1950  MRN: [id number]    Chief Complaint: Covid Testing (pt states that he started to exhibit a cough yesterday, pt states that his dialysis center sent him in for covid swab prior to starting his treatment today, dialysis t,th,sat, denies other complaints) and Cough      History of Present Illness:   Ramirez Faith is a 66 y.o. male who presents to the ED for cough. Concern for DVT. Pt has been coughing for a few days. Not vaccinated. Sent over from dialysis. If negative he needs to go back to dialysis       Focused Physical Exam:  VS:    ED Triage Vitals [21 1135]   BP Temp Temp src Pulse Resp SpO2 Height Weight   (!) 190/107 98.4 °F (36.9 °C) -- 80 18 96 % 6' 2\" (1.88 m) 155 lb (70.3 kg)        Physical Ex: Constitutional: Alert and non-toxic. Medical History:  has a past medical history of Chronic kidney disease, Diabetes mellitus ([address] Hepatitis C, Hypertension, Liver disease, Thyroid disease, and Unspecified diseases of blood and blood-forming organs. Surgical History:  has a past surgical history that includes Tonsillectomy and vascular surgery (N/A, 2021). Social History:  reports that he quit smoking about 48 years ago. His smoking use included cigarettes. He has a 20.00 pack-year smoking history. He has never used smokeless tobacco. He reports current drug use. He reports that he does not drink alcohol. Family History: family history includes Cancer in his father; Diabetes in his mother.     Allergies: Penicillins     Initial Plan of Care: Initiate Treatment-Testing, Proceed toTreatment Area When Bed Available for ED Attending/MLP to Continue Care      ---END OF FIRST PROVIDER CONTACT ASSESSMENT NOTE---  Electronically signed by Michael Ly PA-C   DD: 12/7/21       Michael Ly PA-C  12/07/21 1156

## 2021-12-08 LAB
ALBUMIN SERPL-MCNC: 3.3 G/DL (ref 3.5–5.2)
ALP BLD-CCNC: 107 U/L (ref 40–129)
ALT SERPL-CCNC: 15 U/L (ref 0–40)
ANION GAP SERPL CALCULATED.3IONS-SCNC: 14 MMOL/L (ref 7–16)
AST SERPL-CCNC: 22 U/L (ref 0–39)
BASOPHILS ABSOLUTE: 0.05 E9/L (ref 0–0.2)
BASOPHILS RELATIVE PERCENT: 1.6 % (ref 0–2)
BILIRUB SERPL-MCNC: 0.3 MG/DL (ref 0–1.2)
BUN BLDV-MCNC: 36 MG/DL (ref 6–23)
C-REACTIVE PROTEIN: 0.9 MG/DL (ref 0–0.4)
C-REACTIVE PROTEIN: 1 MG/DL (ref 0–0.4)
CALCIUM SERPL-MCNC: 8.2 MG/DL (ref 8.6–10.2)
CHLORIDE BLD-SCNC: 101 MMOL/L (ref 98–107)
CO2: 23 MMOL/L (ref 22–29)
CREAT SERPL-MCNC: 7 MG/DL (ref 0.7–1.2)
D DIMER: 3086 NG/ML DDU
EOSINOPHILS ABSOLUTE: 0.19 E9/L (ref 0.05–0.5)
EOSINOPHILS RELATIVE PERCENT: 6 % (ref 0–6)
GFR AFRICAN AMERICAN: 9
GFR NON-AFRICAN AMERICAN: 9 ML/MIN/1.73
GLUCOSE BLD-MCNC: 194 MG/DL (ref 74–99)
HCT VFR BLD CALC: 28.5 % (ref 37–54)
HEMOGLOBIN: 9 G/DL (ref 12.5–16.5)
IMMATURE GRANULOCYTES #: 0.01 E9/L
IMMATURE GRANULOCYTES %: 0.3 % (ref 0–5)
L. PNEUMOPHILA SEROGP 1 UR AG: NORMAL
LYMPHOCYTES ABSOLUTE: 0.86 E9/L (ref 1.5–4)
LYMPHOCYTES RELATIVE PERCENT: 27.2 % (ref 20–42)
MCH RBC QN AUTO: 31.4 PG (ref 26–35)
MCHC RBC AUTO-ENTMCNC: 31.6 % (ref 32–34.5)
MCV RBC AUTO: 99.3 FL (ref 80–99.9)
METER GLUCOSE: 102 MG/DL (ref 74–99)
METER GLUCOSE: 174 MG/DL (ref 74–99)
METER GLUCOSE: 241 MG/DL (ref 74–99)
METER GLUCOSE: 94 MG/DL (ref 74–99)
MONOCYTES ABSOLUTE: 0.41 E9/L (ref 0.1–0.95)
MONOCYTES RELATIVE PERCENT: 13 % (ref 2–12)
NEUTROPHILS ABSOLUTE: 1.64 E9/L (ref 1.8–7.3)
NEUTROPHILS RELATIVE PERCENT: 51.9 % (ref 43–80)
PDW BLD-RTO: 15.5 FL (ref 11.5–15)
PLATELET # BLD: 179 E9/L (ref 130–450)
PMV BLD AUTO: 10.7 FL (ref 7–12)
POTASSIUM REFLEX MAGNESIUM: 4.1 MMOL/L (ref 3.5–5)
PROCALCITONIN: 0.39 NG/ML (ref 0–0.08)
RBC # BLD: 2.87 E12/L (ref 3.8–5.8)
SODIUM BLD-SCNC: 138 MMOL/L (ref 132–146)
STREP PNEUMONIAE ANTIGEN, URINE: NORMAL
TOTAL PROTEIN: 6.9 G/DL (ref 6.4–8.3)
TROPONIN, HIGH SENSITIVITY: 584 NG/L (ref 0–11)
WBC # BLD: 3.2 E9/L (ref 4.5–11.5)

## 2021-12-08 PROCEDURE — 87449 NOS EACH ORGANISM AG IA: CPT

## 2021-12-08 PROCEDURE — 90935 HEMODIALYSIS ONE EVALUATION: CPT | Performed by: INTERNAL MEDICINE

## 2021-12-08 PROCEDURE — 80053 COMPREHEN METABOLIC PANEL: CPT

## 2021-12-08 PROCEDURE — 6360000002 HC RX W HCPCS: Performed by: INTERNAL MEDICINE

## 2021-12-08 PROCEDURE — 36415 COLL VENOUS BLD VENIPUNCTURE: CPT

## 2021-12-08 PROCEDURE — 82962 GLUCOSE BLOOD TEST: CPT

## 2021-12-08 PROCEDURE — 99233 SBSQ HOSP IP/OBS HIGH 50: CPT | Performed by: INTERNAL MEDICINE

## 2021-12-08 PROCEDURE — 2580000003 HC RX 258: Performed by: FAMILY MEDICINE

## 2021-12-08 PROCEDURE — 6370000000 HC RX 637 (ALT 250 FOR IP): Performed by: FAMILY MEDICINE

## 2021-12-08 PROCEDURE — 84484 ASSAY OF TROPONIN QUANT: CPT

## 2021-12-08 PROCEDURE — 99223 1ST HOSP IP/OBS HIGH 75: CPT | Performed by: INTERNAL MEDICINE

## 2021-12-08 PROCEDURE — 6360000002 HC RX W HCPCS: Performed by: FAMILY MEDICINE

## 2021-12-08 PROCEDURE — 87040 BLOOD CULTURE FOR BACTERIA: CPT

## 2021-12-08 PROCEDURE — 86140 C-REACTIVE PROTEIN: CPT

## 2021-12-08 PROCEDURE — 6370000000 HC RX 637 (ALT 250 FOR IP): Performed by: INTERNAL MEDICINE

## 2021-12-08 PROCEDURE — 85378 FIBRIN DEGRADE SEMIQUANT: CPT

## 2021-12-08 PROCEDURE — 2060000000 HC ICU INTERMEDIATE R&B

## 2021-12-08 PROCEDURE — APPSS60 APP SPLIT SHARED TIME 46-60 MINUTES: Performed by: PHYSICIAN ASSISTANT

## 2021-12-08 PROCEDURE — 85025 COMPLETE CBC W/AUTO DIFF WBC: CPT

## 2021-12-08 PROCEDURE — 6370000000 HC RX 637 (ALT 250 FOR IP): Performed by: PHYSICIAN ASSISTANT

## 2021-12-08 RX ORDER — ATORVASTATIN CALCIUM 40 MG/1
40 TABLET, FILM COATED ORAL NIGHTLY
Status: DISCONTINUED | OUTPATIENT
Start: 2021-12-08 | End: 2021-12-13 | Stop reason: HOSPADM

## 2021-12-08 RX ORDER — ISOSORBIDE DINITRATE 10 MG/1
10 TABLET ORAL 3 TIMES DAILY
Status: DISCONTINUED | OUTPATIENT
Start: 2021-12-08 | End: 2021-12-13 | Stop reason: HOSPADM

## 2021-12-08 RX ORDER — ASPIRIN 325 MG
325 TABLET ORAL ONCE
Status: COMPLETED | OUTPATIENT
Start: 2021-12-08 | End: 2021-12-08

## 2021-12-08 RX ORDER — AMLODIPINE BESYLATE 5 MG/1
5 TABLET ORAL DAILY
Status: DISCONTINUED | OUTPATIENT
Start: 2021-12-09 | End: 2021-12-09

## 2021-12-08 RX ORDER — CARVEDILOL 6.25 MG/1
12.5 TABLET ORAL 2 TIMES DAILY WITH MEALS
Status: DISCONTINUED | OUTPATIENT
Start: 2021-12-08 | End: 2021-12-09

## 2021-12-08 RX ORDER — HYDRALAZINE HYDROCHLORIDE 25 MG/1
25 TABLET, FILM COATED ORAL EVERY 8 HOURS SCHEDULED
Status: DISCONTINUED | OUTPATIENT
Start: 2021-12-08 | End: 2021-12-13 | Stop reason: HOSPADM

## 2021-12-08 RX ORDER — CAFFEINE 200 MG
200 TABLET ORAL ONCE
Status: COMPLETED | OUTPATIENT
Start: 2021-12-08 | End: 2021-12-08

## 2021-12-08 RX ORDER — ACETAMINOPHEN 325 MG/1
650 TABLET ORAL ONCE
Status: COMPLETED | OUTPATIENT
Start: 2021-12-08 | End: 2021-12-08

## 2021-12-08 RX ORDER — ASPIRIN 81 MG/1
81 TABLET, CHEWABLE ORAL DAILY
Status: DISCONTINUED | OUTPATIENT
Start: 2021-12-08 | End: 2021-12-08

## 2021-12-08 RX ADMIN — SODIUM BICARBONATE 650 MG: 650 TABLET ORAL at 20:49

## 2021-12-08 RX ADMIN — HEPARIN SODIUM 5000 UNITS: 10000 INJECTION INTRAVENOUS; SUBCUTANEOUS at 20:49

## 2021-12-08 RX ADMIN — CARVEDILOL 12.5 MG: 6.25 TABLET, FILM COATED ORAL at 20:49

## 2021-12-08 RX ADMIN — AMLODIPINE BESYLATE 10 MG: 5 TABLET ORAL at 08:51

## 2021-12-08 RX ADMIN — EPOETIN ALFA-EPBX 3000 UNITS: 3000 INJECTION, SOLUTION INTRAVENOUS; SUBCUTANEOUS at 08:55

## 2021-12-08 RX ADMIN — OXYCODONE HYDROCHLORIDE AND ACETAMINOPHEN 500 MG: 500 TABLET ORAL at 08:50

## 2021-12-08 RX ADMIN — BUMETANIDE 2 MG: 1 TABLET ORAL at 20:50

## 2021-12-08 RX ADMIN — CARVEDILOL 6.25 MG: 6.25 TABLET, FILM COATED ORAL at 08:51

## 2021-12-08 RX ADMIN — CAFFEINE 200 MG: 200 TABLET ORAL at 11:00

## 2021-12-08 RX ADMIN — Medication 10 ML: at 21:00

## 2021-12-08 RX ADMIN — ZINC SULFATE 220 MG (50 MG) CAPSULE 50 MG: CAPSULE at 08:51

## 2021-12-08 RX ADMIN — ACETAMINOPHEN 650 MG: 325 TABLET ORAL at 11:00

## 2021-12-08 RX ADMIN — LEVOTHYROXINE SODIUM 112 MCG: 0.11 TABLET ORAL at 06:03

## 2021-12-08 RX ADMIN — ISOSORBIDE DINITRATE 10 MG: 10 TABLET ORAL at 11:00

## 2021-12-08 RX ADMIN — SODIUM BICARBONATE 650 MG: 650 TABLET ORAL at 08:51

## 2021-12-08 RX ADMIN — ASPIRIN 325 MG: 325 TABLET ORAL at 11:00

## 2021-12-08 RX ADMIN — METOLAZONE 2.5 MG: 2.5 TABLET ORAL at 08:51

## 2021-12-08 RX ADMIN — Medication 400 MG: at 08:51

## 2021-12-08 RX ADMIN — ISOSORBIDE DINITRATE 10 MG: 10 TABLET ORAL at 20:50

## 2021-12-08 RX ADMIN — Medication 10 ML: at 08:59

## 2021-12-08 RX ADMIN — ATORVASTATIN CALCIUM 40 MG: 40 TABLET, FILM COATED ORAL at 20:50

## 2021-12-08 RX ADMIN — HEPARIN SODIUM 5000 UNITS: 10000 INJECTION INTRAVENOUS; SUBCUTANEOUS at 06:00

## 2021-12-08 RX ADMIN — BUMETANIDE 2 MG: 1 TABLET ORAL at 08:57

## 2021-12-08 RX ADMIN — Medication 2000 UNITS: at 08:50

## 2021-12-08 ASSESSMENT — PAIN SCALES - GENERAL
PAINLEVEL_OUTOF10: 0
PAINLEVEL_OUTOF10: 0
PAINLEVEL_OUTOF10: 5
PAINLEVEL_OUTOF10: 0
PAINLEVEL_OUTOF10: 0

## 2021-12-08 NOTE — PROGRESS NOTES
Pharmacy Note    An order for Zofran has been discontinued for this patient due to the risk of QT prolongation. If an antiemetic is indicated for your patient, please consider use of Tigan or Compazine. Current QTc = 521  Please contact the Pharmacy with any questions.   ZHAO Bolden [facility]  12/8/2021  4:54 AM

## 2021-12-08 NOTE — CONSULTS
Inpatient Cardiology Consultation      Reason for Consult:  T wave inversions     Consulting Physician: Dr Shelby Mojica    Requesting Physician:  Homero Combs PA-C    Date of Consultation: 12/8/2021    HISTORY OF PRESENT ILLNESS:   Mr Davion Gibbons is a 67 yo male who was seen by Dr Jumana Oswald 8/2020 without follow up; otherwise not following with Cardiology. Past medical history includes cardiomyopathy / HFrEF (EF 25-30% TTE 11/2021), hypertension, hypothyroidism, end stage renal disease on HD, anemia of chronic disease, type 2 diabetes mellitus, hepatitis C, medical non compliance, previous tobacco abuse. Hospitalized 13/83/8308-16/82/5621 with metabolic acidosis, elevated troponin and SOB - bilateral pulmonary edema and pneumonia, acute renal failure. He was started on Bumex gtt and initiated on HD with right IJ dialysis catheter inserted. He had an echocardiogram done 11/21/2021 that revealed an EF 25-30% with stage II DD and VHD. Prior to his admission he was not taking his medications at home and his blood pressure significantly elevated SBP > 200 on admission. Presented to [facility] 12/7/2021 with non productive cough   VS: 98.4-80-18-96%RA-190/107   Labs: WBC 3.7, H&H 9.1/28.9, Plt 176. K+ 4.3, BUN/Scr 28/6.4, glucose 115. Troponin 647  --> 591   proBNP > 38937  COVID19 positive     CXR volume overload     He was given  in the ED and admitted to a telemetry monitoring floor. Nephrology was consulted, 2000 Cc removed with UF and plan for dialysis today 12/8/2021. Cardiology was called in the ED regarding the troponin level - no plans for interventions at this time. Cardiology was consulted for further evaluation and management T wave inversions on EKG. He states that since his discharge he was feeling better but developed a dry cough a few days ago with associated GARCIA/SOB, orthopnea, PND, and chest tightness when he lays flat.  The chest tightness resolves when sits up and he states that he can breath tablet Take 5 mg by mouth daily.    Yes Historical Provider, MD       Current Medications:    Current Facility-Administered Medications: bumetanide (BUMEX) tablet 2 mg, 2 mg, Oral, BID  calcium carbonate (TUMS) chewable tablet 500 mg, 500 mg, Oral, TID PRN  clobetasol (TEMOVATE) 0.05 % ointment, , Topical, BID  colchicine (COLCRYS) tablet 0.6 mg, 0.6 mg, Oral, PRN  levothyroxine (SYNTHROID) tablet 112 mcg, 112 mcg, Oral, Daily  magnesium oxide (MAG-OX) tablet 400 mg, 400 mg, Oral, Daily  NIFEdipine (PROCARDIA XL) extended release tablet 60 mg, 60 mg, Oral, Daily  sodium bicarbonate tablet 650 mg, 650 mg, Oral, TID  tamsulosin (FLOMAX) capsule 0.4 mg, 0.4 mg, Oral, Daily  sodium chloride flush 0.9 % injection 5-40 mL, 5-40 mL, IntraVENous, 2 times per day  sodium chloride flush 0.9 % injection 5-40 mL, 5-40 mL, IntraVENous, PRN  0.9 % sodium chloride infusion, 25 mL, IntraVENous, PRN  polyethylene glycol (GLYCOLAX) packet 17 g, 17 g, Oral, Daily PRN  acetaminophen (TYLENOL) tablet 650 mg, 650 mg, Oral, Q6H PRN **OR** acetaminophen (TYLENOL) suppository 650 mg, 650 mg, Rectal, Q6H PRN  aluminum & magnesium hydroxide-simethicone (MAALOX) 200-200-20 MG/5ML suspension 30 mL, 30 mL, Oral, Q6H PRN  heparin (porcine) injection 5,000 Units, 5,000 Units, SubCUTAneous, 3 times per day  guaiFENesin-dextromethorphan (ROBITUSSIN DM) 100-10 MG/5ML syrup 5 mL, 5 mL, Oral, Q4H PRN  Vitamin D (CHOLECALCIFEROL) tablet 2,000 Units, 2,000 Units, Oral, Daily  albuterol sulfate  (90 Base) MCG/ACT inhaler 2 puff, 2 puff, Inhalation, Q6H PRN  insulin lispro (HUMALOG) injection vial 0-6 Units, 0-6 Units, SubCUTAneous, TID WC  insulin lispro (HUMALOG) injection vial 0-3 Units, 0-3 Units, SubCUTAneous, Nightly  glucose (GLUTOSE) 40 % oral gel 15 g, 15 g, Oral, PRN  dextrose 50 % IV solution, 12.5 g, IntraVENous, PRN  glucagon (rDNA) injection 1 mg, 1 mg, IntraMUSCular, PRN  dextrose 5 % solution, 100 mL/hr, IntraVENous, PRN  epoetin sabiha-epbx (RETACRIT) injection 3,000 Units, 3,000 Units, SubCUTAneous, Once per day on Mon Wed Fri  carvedilol (COREG) tablet 6.25 mg, 6.25 mg, Oral, BID WC  amLODIPine (NORVASC) tablet 10 mg, 10 mg, Oral, Daily  metOLazone (ZAROXOLYN) tablet 2.5 mg, 2.5 mg, Oral, Daily  ascorbic acid (VITAMIN C) tablet 500 mg, 500 mg, Oral, Daily  zinc sulfate (ZINCATE) capsule 50 mg, 50 mg, Oral, Daily    Allergies:  Penicillins    Social History:    Lives independently. Does not require supplementla O2. Does not use ambulation assistance devices. Previous tobacco abut, quit 40 years ago   Denies alcohol or illicit drug use   Full code       Family History: This information was not obtained at this time as it is found noncontributory secondary to the patients advanced age. REVIEW OF SYSTEMS:     · Constitutional: Denies fevers, chills, night sweats, + fatigue  · HEENT: Denies headaches, nose bleeds, and blurred vision,oral pain, abscess or lesion. · Musculoskeletal: Denies falls, pain to BLE with ambulation and edema to BLE. · Neurological: Denies dizziness and lightheadedness, numbness and tingling  · Cardiovascular: Denies chest pain, palpitations, and feelings of heart racing. · Respiratory: + orthopnea and PND, cough, GARCIA  · Gastrointestinal: Denies heartburn, nausea/vomiting, diarrhea and constipation, black/bloody, and tarry stools. · Genitourinary: Denies dysuria and hematuria  · Hematologic: Denies excessive bruising or bleeding  · Lymphatic: Denies lumps and bumps to neck, axilla, breast, and groin      PHYSICAL EXAM:   BP (!) 141/77   Pulse 79   Temp 98.3 °F (36.8 °C) (Oral)   Resp 18   Ht 6' 2\" (1.88 m)   Wt 144 lb (65.3 kg)   SpO2 100%   BMI 18.49 kg/m²   Not performed at this time in an attempt to reduce potential exposure and conserve PPE as the patient is in COVID-19 isolation.        DATA:     ECG: SR HR 91; non specific ST T Wave changes, T wave inversions lateral leads   Tele strips:  SR Diagnostic:      Intake/Output Summary (Last 24 hours) at 12/8/2021 0752  Last data filed at 12/7/2021 1820  Gross per 24 hour   Intake 300 ml   Output 2300 ml   Net -2000 ml       Labs:   CBC:   Recent Labs     12/07/21  1302   WBC 3.7*   HGB 9.1*   HCT 28.9*        BMP:   Recent Labs     12/07/21  1302      K 4.3   CO2 25   BUN 28*   CREATININE 6.4*   LABGLOM 10   CALCIUM 9.0     HgA1c:   Lab Results   Component Value Date    LABA1C 5.3 11/12/2021     FASTING LIPID PANEL:  Lab Results   Component Value Date    CHOL 200 08/24/2021    HDL 53 11/12/2021    LDLCALC 147 11/12/2021    TRIG 109 08/24/2021     LIVER PROFILE:  Recent Labs     12/07/21  1302   AST 23   ALT 16   LABALBU 3.8       Assessment:  1. New cardiomyopathy, on TTE 11/21/2021 EF 25-30%. Etiology unknown, suspect combined ischemic / non ishcemic with risk factors and wall motion abnormalities and untreated hypertension / medication non compliance. 2. Acute on chronic HFrEF , on PO Bumex   3. Elevated troponin  / T wave inversions on EKG 12/7/2021. Previously elevated troponin 11/2021 in the setting of renal failure ; Pattern not consistent with ACS however cannot rule out CAD with risk factors and new cardiomyopathy. Denies anginal symptoms. No previous ischemic evaluation. 4. COVID 19 pneumonia   5. End stage renal disease on HD, initiated 11/2021 (T, Th, Sat) s/p dialysis 12/7/2021 2000, for dialysis today   6. Hypertension, poorly controlled (not taking antihypertensives)   7. Type 2 diabetes mellitus   8. Chronic anemia   9. Hypothyroidism  10. Medical non compliance   11.  Previous tobacco abuse    Plan:   · Continue PO diuresis with Bumex ; will defer fluid management to nephrology per HD   · Discontinue Nifedipine    · Decrease Amlodipine to 5 mg daily, will plan to stop this admission is BP controlled with other cardiac medications   · Initiate GDMT: increase Coreg 12.5mg BID, start Isordil 10 mg TID and Hydralazine 25 mg TID. Up titrate as tolerated   · Start Lipitor 40mg daily and ASA 81 mg daily   · Monitor telemetry   · Patient will need Lifevest prior to discharge  -- serial echocardiogram to assess LV function in 3 months with maximized GDMT   · Ischemic evaluation via cardiac catheterization as outpatient once clinically improved ; will have follow up with Dr Duane Fake   · Compliance with medication therapy strongly encouraged to patient   · Will follow     Above discussed with Dr Art Doll   Electronically signed by Monty Adam on 12/8/2021 at 7:52 AM   ______________________________________________________________________  Cardiology attending attestation:  I have independently interviewed and examined the patient. I personally reviewed pertinent laboratory values and diagnostic testing (including, if applicable, chest xray, electrocardiogram, telemetry, echocardiogram, stress testing, and coronary angiography). I have reviewed the above documentation completed by KISHA Adam including past medical, social, and family history and agree with the findings, assessment and plan except where noted. Plan formulated under my direct supervision. I participated in all aspects of the medical decision making. Please see my additional contributions to the HPI, physical exam, and assessment / medical decision making:  _______________________________________________________________________    Patient recently admitted with worsening renal failure and started on HD. Echo that admission showed EF of 25 to 30%, no cardiology consultation obtained at that time. No prior cardiac issues. Now presenting with cough, COVID-19 positive. Denies chest pain, shortness of breath, edema.     Physical Exam:  BP (!) 165/96   Pulse 85   Temp 98.9 °F (37.2 °C) (Oral)   Resp 18   Ht 6' 2\" (1.88 m)   Wt 144 lb (65.3 kg)   SpO2 93%   BMI 18.49 kg/m²   General appearance: Awake, alert, no acute respiratory distress  Skin: Intact, no rash  ENMT: Moist mucous membranes  Neck: Supple, no carotid bruits. Normal jugular venous pressure  Lungs: Bibasilar rales  Cardiac: Regular rhythm with a normal rate, normal S1&S2, no murmurs apparent  Right chest tunneled HD catheter  Abdomen: Soft, positive bowel sounds, nontender  Extremities: Moves all extremities x 4, no lower extremity edema  Neurologic: No focal motor deficits apparent, normal mood and affect    Telemetry: Sinus rhythm 80s  EKG:  Sinus rhythm 91 bpm with anterior T wave inversions. QTc 521 ms    Impression:   1. New onset cardiomyopathy EF 25 to 30%, suspect ischemic  2. Acute on chronic HFrEF  3. ESRD newly on HD  4. Abnormal EKG/prolonged QT  5. COVID-19    Recommendations:     Optimize guideline directed medical therapy; titrate off amlodipine to optimize heart failure meds   Outpatient heart cath, unless develops anginal symptoms or ischemic arrhythmias   Avoid QT prolonging medications   EKG tomorrow   Discussed wearable cardioverter defibrillator -patient overwhelmed with recent initiation dialysis, prefers to hold off at this time   Follow-up with me outpatient    Thank you for the consultation. Please do not hesitate to call with questions.     Cindy Lopez MD, [facility]

## 2021-12-08 NOTE — PROGRESS NOTES
Department of Internal Medicine  Nephrology Progress Note    Events reviewed. For dialysis today    SUBJECTIVE: We are following Mr. Sotero Russell for ESRD on HD. He reports feeling better.      PHYSICAL EXAM:      Vitals:    VITALS:  BP (!) 141/77   Pulse 79   Temp 98.3 °F (36.8 °C) (Oral)   Resp 18   Ht 6' 2\" (1.88 m)   Wt 144 lb (65.3 kg)   SpO2 100%   BMI 18.49 kg/m²   24HR INTAKE/OUTPUT:      Intake/Output Summary (Last 24 hours) at 12/8/2021 9089  Last data filed at 12/7/2021 1820  Gross per 24 hour   Intake 300 ml   Output 2300 ml   Net -2000 ml       Access: Right IJ vein tunneled dialysis catheter in place  Constitutional:  Awake in respiratory distress  HEENT:  PERRLA  Respiratory:  Decreased breath sounds at the bases  Cardiovascular/Edema:  Heart sounds regular  Gastrointestinal:  Abdomen  Neurologic:  Non focal   Skin:  No lesions   Other:  + edema     Scheduled Meds:   bumetanide  2 mg Oral BID    clobetasol   Topical BID    levothyroxine  112 mcg Oral Daily    magnesium oxide  400 mg Oral Daily    NIFEdipine  60 mg Oral Daily    sodium bicarbonate  650 mg Oral TID    tamsulosin  0.4 mg Oral Daily    sodium chloride flush  5-40 mL IntraVENous 2 times per day    heparin (porcine)  5,000 Units SubCUTAneous 3 times per day    Vitamin D  2,000 Units Oral Daily    insulin lispro  0-6 Units SubCUTAneous TID WC    insulin lispro  0-3 Units SubCUTAneous Nightly    epoetin sabiha-epbx  3,000 Units SubCUTAneous Once per day on Mon Wed Fri    carvedilol  6.25 mg Oral BID WC    amLODIPine  10 mg Oral Daily    metOLazone  2.5 mg Oral Daily    ascorbic acid  500 mg Oral Daily    zinc sulfate  50 mg Oral Daily     Continuous Infusions:   sodium chloride      dextrose       PRN Meds:.calcium carbonate, colchicine, sodium chloride flush, sodium chloride, polyethylene glycol, acetaminophen **OR** acetaminophen, aluminum & magnesium hydroxide-simethicone, guaiFENesin-dextromethorphan, albuterol sulfate HFA, glucose, dextrose, glucagon (rDNA), dextrose    DATA:    CBC:   Lab Results   Component Value Date    WBC 3.7 12/07/2021    RBC 2.91 12/07/2021    HGB 9.1 12/07/2021    HCT 28.9 12/07/2021    MCV 99.3 12/07/2021    MCH 31.3 12/07/2021    MCHC 31.5 12/07/2021    RDW 15.4 12/07/2021     12/07/2021    MPV 10.3 12/07/2021     CMP:    Lab Results   Component Value Date     12/07/2021    K 4.3 12/07/2021     12/07/2021    CO2 25 12/07/2021    BUN 28 12/07/2021    CREATININE 6.4 12/07/2021    GFRAA 10 12/07/2021    LABGLOM 10 12/07/2021    GLUCOSE 115 12/07/2021    PROT 7.8 12/07/2021    LABALBU 3.8 12/07/2021    CALCIUM 9.0 12/07/2021    BILITOT 0.5 12/07/2021    ALKPHOS 110 12/07/2021    AST 23 12/07/2021    ALT 16 12/07/2021     Magnesium:    Lab Results   Component Value Date    MG 1.8 06/26/2020     Phosphorus:    Lab Results   Component Value Date    PHOS 4.5 11/23/2021     Radiology Review:      Chest x-ray December 7, 2021   1.  Slightly limited exam       2.  Constellation of findings suggestive of fluid overload, radiographically   minimally improved since 11/21/2021.       .       RECOMMENDATION:   1.   Recommend follow-up thoracic imaging, as directed clinically.       .           BRIEF SUMMARY OF INITIAL CONSULT:    Briefly Mr. Moises Baum is a 66year old man with h/o ESRD 2/2 to diabetic nephropathy, recently started on renal replacement therapy, on intermittent HD 3 times a week Tuesday Thursday and Saturday, via right IJ vein tunneled dialysis catheter, type 2 DM, HTN, HFrEF 25-30% with stage II DD, hepatitis C, hypothyroidism, BPH, who was admitted on December 7, 2021 after he presented to the ER with increasing shortness of breath. He was found with pro-BNP levels of >70,000 and CXR suggestive of pulmonary edema, reason for this consultation. IMPRESSION/RECOMMENDATIONS:      1.  ESRD 2/2 to diabetic nephropathy, recently started on renal replacement therapy, on intermittent HD 3 times a week Tuesday Thursday and Saturday, via right IJ vein tunneled dialysis. For dialysis today    2. HFrEF 25-30% with stage II DD, proBNP >70,000, for UF x2 hours today  3. HTN, on carvedilol, amlodipine at home  4. History of hepatitis C  5. MBD of CKD, obtain phosphorus level  6.  Anemia of CKD, to start epoetin alpha    Plan:    · HD x4 hours today and 3 times a week TTS  · Continue Bumex 2 mg p.o. twice daily  · Continue Metolazone 2.5 mg daily  · Continue Epoetin alpha 3000 units 3 times a week  · Continue Amlodipine 10 mg daily  · Continue Carvedilol 6.25 g twice daily    Electronically signed by KIANNA Redman CNP on 12/8/2021 at 6:40 AM

## 2021-12-08 NOTE — FLOWSHEET NOTE
12/08/21 1800   Vital Signs   BP (!) 161/84   Temp 97.2 °F (36.2 °C)   Pulse 74   Resp 18   Weight 140 lb 3.4 oz (63.6 kg)   Weight Method Estimated   Percent Weight Change -2.6   Pain Assessment   Pain Assessment 0-10   Pain Level 0   Post-Hemodialysis Assessment   Post-Treatment Procedures Blood returned; Catheter capped, clamped and heparinized x 2 ports   Machine Disinfection Process Acid/Vinegar Clean; Heat Disinfect; Exterior Machine Disinfection   Rinseback Volume (ml) 300 ml   Total Liters Processed (l/min) 0 l/min   Dialyzer Clearance Lightly streaked   Duration of Treatment (minutes) 240 minutes   Heparin amount administered during treatment (units) 0 units   Hemodialysis Intake (ml) 300 ml   Hemodialysis Output (ml) 2300 ml   NET Removed (ml) 2000 ml   Tolerated Treatment Good   Patient Response to Treatment tolerated treatment   Bilateral Breath Sounds Clear; Diminished   Edema None   RLE Edema Trace   LLE Edema Trace   Physician Notified?  No   tolerated treatment; pt stable; net removal 2000 mL

## 2021-12-09 LAB
ADENOVIRUS BY PCR: NOT DETECTED
ANION GAP SERPL CALCULATED.3IONS-SCNC: 12 MMOL/L (ref 7–16)
BORDETELLA PARAPERTUSSIS BY PCR: NOT DETECTED
BORDETELLA PERTUSSIS BY PCR: NOT DETECTED
BUN BLDV-MCNC: 15 MG/DL (ref 6–23)
CALCIUM SERPL-MCNC: 8.1 MG/DL (ref 8.6–10.2)
CHLAMYDOPHILIA PNEUMONIAE BY PCR: NOT DETECTED
CHLORIDE BLD-SCNC: 101 MMOL/L (ref 98–107)
CO2: 27 MMOL/L (ref 22–29)
CORONAVIRUS 229E BY PCR: NOT DETECTED
CORONAVIRUS HKU1 BY PCR: NOT DETECTED
CORONAVIRUS NL63 BY PCR: NOT DETECTED
CORONAVIRUS OC43 BY PCR: NOT DETECTED
CREAT SERPL-MCNC: 4.1 MG/DL (ref 0.7–1.2)
EKG ATRIAL RATE: 68 BPM
EKG P AXIS: 55 DEGREES
EKG P-R INTERVAL: 176 MS
EKG Q-T INTERVAL: 520 MS
EKG QRS DURATION: 96 MS
EKG QTC CALCULATION (BAZETT): 552 MS
EKG R AXIS: -61 DEGREES
EKG T AXIS: -144 DEGREES
EKG VENTRICULAR RATE: 68 BPM
GFR AFRICAN AMERICAN: 17
GFR NON-AFRICAN AMERICAN: 17 ML/MIN/1.73
GLUCOSE BLD-MCNC: 159 MG/DL (ref 74–99)
HCT VFR BLD CALC: 26.6 % (ref 37–54)
HEMOGLOBIN: 8.5 G/DL (ref 12.5–16.5)
HUMAN METAPNEUMOVIRUS BY PCR: NOT DETECTED
HUMAN RHINOVIRUS/ENTEROVIRUS BY PCR: NOT DETECTED
INFLUENZA A BY PCR: NOT DETECTED
INFLUENZA B BY PCR: NOT DETECTED
MCH RBC QN AUTO: 31.3 PG (ref 26–35)
MCHC RBC AUTO-ENTMCNC: 32 % (ref 32–34.5)
MCV RBC AUTO: 97.8 FL (ref 80–99.9)
METER GLUCOSE: 103 MG/DL (ref 74–99)
METER GLUCOSE: 106 MG/DL (ref 74–99)
METER GLUCOSE: 206 MG/DL (ref 74–99)
METER GLUCOSE: 222 MG/DL (ref 74–99)
METER GLUCOSE: 226 MG/DL (ref 74–99)
MYCOPLASMA PNEUMONIAE BY PCR: NOT DETECTED
PARAINFLUENZA VIRUS 1 BY PCR: NOT DETECTED
PARAINFLUENZA VIRUS 2 BY PCR: NOT DETECTED
PARAINFLUENZA VIRUS 3 BY PCR: NOT DETECTED
PARAINFLUENZA VIRUS 4 BY PCR: NOT DETECTED
PDW BLD-RTO: 15.5 FL (ref 11.5–15)
PLATELET # BLD: 159 E9/L (ref 130–450)
PMV BLD AUTO: 11.4 FL (ref 7–12)
POTASSIUM SERPL-SCNC: 3.7 MMOL/L (ref 3.5–5)
RBC # BLD: 2.72 E12/L (ref 3.8–5.8)
RESPIRATORY SYNCYTIAL VIRUS BY PCR: NOT DETECTED
SARS-COV-2, PCR: NOT DETECTED
SODIUM BLD-SCNC: 140 MMOL/L (ref 132–146)
WBC # BLD: 2.8 E9/L (ref 4.5–11.5)

## 2021-12-09 PROCEDURE — 6370000000 HC RX 637 (ALT 250 FOR IP): Performed by: FAMILY MEDICINE

## 2021-12-09 PROCEDURE — 99233 SBSQ HOSP IP/OBS HIGH 50: CPT | Performed by: INTERNAL MEDICINE

## 2021-12-09 PROCEDURE — 80048 BASIC METABOLIC PNL TOTAL CA: CPT

## 2021-12-09 PROCEDURE — 86140 C-REACTIVE PROTEIN: CPT

## 2021-12-09 PROCEDURE — 93010 ELECTROCARDIOGRAM REPORT: CPT | Performed by: INTERNAL MEDICINE

## 2021-12-09 PROCEDURE — 6370000000 HC RX 637 (ALT 250 FOR IP): Performed by: PHYSICIAN ASSISTANT

## 2021-12-09 PROCEDURE — 0202U NFCT DS 22 TRGT SARS-COV-2: CPT

## 2021-12-09 PROCEDURE — 90935 HEMODIALYSIS ONE EVALUATION: CPT

## 2021-12-09 PROCEDURE — 6370000000 HC RX 637 (ALT 250 FOR IP): Performed by: INTERNAL MEDICINE

## 2021-12-09 PROCEDURE — 36415 COLL VENOUS BLD VENIPUNCTURE: CPT

## 2021-12-09 PROCEDURE — 85027 COMPLETE CBC AUTOMATED: CPT

## 2021-12-09 PROCEDURE — 82962 GLUCOSE BLOOD TEST: CPT

## 2021-12-09 PROCEDURE — 99232 SBSQ HOSP IP/OBS MODERATE 35: CPT | Performed by: FAMILY MEDICINE

## 2021-12-09 PROCEDURE — 2060000000 HC ICU INTERMEDIATE R&B

## 2021-12-09 PROCEDURE — 6360000002 HC RX W HCPCS: Performed by: FAMILY MEDICINE

## 2021-12-09 PROCEDURE — APPSS30 APP SPLIT SHARED TIME 16-30 MINUTES: Performed by: NURSE PRACTITIONER

## 2021-12-09 PROCEDURE — 2580000003 HC RX 258: Performed by: FAMILY MEDICINE

## 2021-12-09 PROCEDURE — 84145 PROCALCITONIN (PCT): CPT

## 2021-12-09 PROCEDURE — 93005 ELECTROCARDIOGRAM TRACING: CPT | Performed by: INTERNAL MEDICINE

## 2021-12-09 RX ORDER — CARVEDILOL 25 MG/1
25 TABLET ORAL 2 TIMES DAILY WITH MEALS
Status: DISCONTINUED | OUTPATIENT
Start: 2021-12-09 | End: 2021-12-13 | Stop reason: HOSPADM

## 2021-12-09 RX ORDER — ASPIRIN 81 MG/1
81 TABLET ORAL DAILY
Status: DISCONTINUED | OUTPATIENT
Start: 2021-12-09 | End: 2021-12-13 | Stop reason: HOSPADM

## 2021-12-09 RX ORDER — LOSARTAN POTASSIUM 25 MG/1
25 TABLET ORAL DAILY
Status: DISCONTINUED | OUTPATIENT
Start: 2021-12-09 | End: 2021-12-10

## 2021-12-09 RX ADMIN — Medication 10 ML: at 13:07

## 2021-12-09 RX ADMIN — HEPARIN SODIUM 5000 UNITS: 10000 INJECTION INTRAVENOUS; SUBCUTANEOUS at 06:42

## 2021-12-09 RX ADMIN — SODIUM BICARBONATE 650 MG: 650 TABLET ORAL at 22:31

## 2021-12-09 RX ADMIN — ATORVASTATIN CALCIUM 40 MG: 40 TABLET, FILM COATED ORAL at 22:29

## 2021-12-09 RX ADMIN — Medication 400 MG: at 12:55

## 2021-12-09 RX ADMIN — CARVEDILOL 25 MG: 25 TABLET, FILM COATED ORAL at 12:56

## 2021-12-09 RX ADMIN — ASPIRIN 81 MG: 81 TABLET, COATED ORAL at 12:57

## 2021-12-09 RX ADMIN — HEPARIN SODIUM 5000 UNITS: 10000 INJECTION INTRAVENOUS; SUBCUTANEOUS at 22:00

## 2021-12-09 RX ADMIN — TAMSULOSIN HYDROCHLORIDE 0.4 MG: 0.4 CAPSULE ORAL at 12:55

## 2021-12-09 RX ADMIN — Medication 2000 UNITS: at 12:55

## 2021-12-09 RX ADMIN — LEVOTHYROXINE SODIUM 112 MCG: 0.11 TABLET ORAL at 06:26

## 2021-12-09 RX ADMIN — ISOSORBIDE DINITRATE 10 MG: 10 TABLET ORAL at 22:30

## 2021-12-09 RX ADMIN — LOSARTAN POTASSIUM 25 MG: 25 TABLET, FILM COATED ORAL at 22:30

## 2021-12-09 RX ADMIN — BUMETANIDE 2 MG: 1 TABLET ORAL at 22:29

## 2021-12-09 RX ADMIN — Medication 10 ML: at 22:31

## 2021-12-09 RX ADMIN — OXYCODONE HYDROCHLORIDE AND ACETAMINOPHEN 500 MG: 500 TABLET ORAL at 12:57

## 2021-12-09 RX ADMIN — ZINC SULFATE 220 MG (50 MG) CAPSULE 50 MG: CAPSULE at 12:55

## 2021-12-09 RX ADMIN — SODIUM BICARBONATE 650 MG: 650 TABLET ORAL at 12:56

## 2021-12-09 RX ADMIN — ISOSORBIDE DINITRATE 10 MG: 10 TABLET ORAL at 12:57

## 2021-12-09 RX ADMIN — BUMETANIDE 2 MG: 1 TABLET ORAL at 12:56

## 2021-12-09 RX ADMIN — METOLAZONE 2.5 MG: 2.5 TABLET ORAL at 12:57

## 2021-12-09 RX ADMIN — HEPARIN SODIUM 5000 UNITS: 10000 INJECTION INTRAVENOUS; SUBCUTANEOUS at 13:07

## 2021-12-09 ASSESSMENT — PAIN SCALES - GENERAL
PAINLEVEL_OUTOF10: 0

## 2021-12-09 NOTE — PROGRESS NOTES
[facility] Progress Note    Admitting Date and Time: 12/7/2021 12:15 PM  Admit Dx: Elevated troponin [R77.8]  T wave inversion in EKG [R94.31]  Hypervolemia, unspecified hypervolemia type [E87.70]  Acute renal failure superimposed on chronic kidney disease, on chronic dialysis, unspecified acute renal failure type ([address] [N17.9, N18.9, Z99.2]  Anemia due to chronic kidney disease, unspecified CKD stage [N18.9, D63.1]  COVID [U07.1]    Subjective:  Patient is being followed for Elevated troponin [R77.8]  T wave inversion in EKG [R94.31]  Hypervolemia, unspecified hypervolemia type [E87.70]  Acute renal failure superimposed on chronic kidney disease, on chronic dialysis, unspecified acute renal failure type ([facility] [address] [N17.9, N18.9, Z99.2]  Anemia due to chronic kidney disease, unspecified CKD stage [N18.9, D63.1]  COVID [U07.1]     Pt seen resting in bed in no acute distress  Just completed HD  Reporting he feels fine  C/o some GI upset after eating lunch- he thinks he just may have eaten too fast  Pt reporting he does not think he has covid as he feels fine and rapid test not accurate  Reporting cough better         ROS: denies fever, chills, cp, sob, n/v, HA unless stated above.      carvedilol  25 mg Oral BID WC    aspirin  81 mg Oral Daily    isosorbide dinitrate  10 mg Oral TID    hydrALAZINE  25 mg Oral 3 times per day    atorvastatin  40 mg Oral Nightly    bumetanide  2 mg Oral BID    clobetasol   Topical BID    levothyroxine  112 mcg Oral Daily    magnesium oxide  400 mg Oral Daily    sodium bicarbonate  650 mg Oral TID    tamsulosin  0.4 mg Oral Daily    sodium chloride flush  5-40 mL IntraVENous 2 times per day    heparin (porcine)  5,000 Units SubCUTAneous 3 times per day    Vitamin D  2,000 Units Oral Daily    insulin lispro  0-6 Units SubCUTAneous TID WC    insulin lispro  0-3 Units SubCUTAneous Nightly    epoetin sabiha-epbx  3,000 Units SubCUTAneous Once per day on Mon Wed Fri    metOLazone  2.5 mg Oral Daily    ascorbic acid  500 mg Oral Daily    zinc sulfate  50 mg Oral Daily     calcium carbonate, 500 mg, TID PRN  colchicine, 0.6 mg, PRN  sodium chloride flush, 5-40 mL, PRN  sodium chloride, 25 mL, PRN  polyethylene glycol, 17 g, Daily PRN  acetaminophen, 650 mg, Q6H PRN   Or  acetaminophen, 650 mg, Q6H PRN  aluminum & magnesium hydroxide-simethicone, 30 mL, Q6H PRN  guaiFENesin-dextromethorphan, 5 mL, Q4H PRN  albuterol sulfate HFA, 2 puff, Q6H PRN  glucose, 15 g, PRN  dextrose, 12.5 g, PRN  glucagon (rDNA), 1 mg, PRN  dextrose, 100 mL/hr, PRN         Objective:    BP (!) 147/86   Pulse 65   Temp 98 °F (36.7 °C)   Resp 16   Ht 6' 2\" (1.88 m)   Wt 140 lb 3.4 oz (63.6 kg)   SpO2 99%   BMI 18.00 kg/m²   General Appearance: alert and oriented to person, place and time and in no acute distress  Skin: warm and dry  Head: normocephalic and atraumatic  Neck: neck supple and non tender without mass   Pulmonary/Chest: clear to auscultation bilaterally  Cardiovascular: normal rate, normal S1 and S2 and no carotid bruits  Abdomen: soft, non-tender, non-distended, normal bowel sounds, no masses or organomegaly  Extremities: no cyanosis, no clubbing and no edema  Neurologic: speech normal         Recent Labs     12/07/21  1302 12/08/21  1220    138   K 4.3 4.1    101   CO2 25 23   BUN 28* 36*   CREATININE 6.4* 7.0*   GLUCOSE 115* 194*   CALCIUM 9.0 8.2*       Recent Labs     12/07/21  1302 12/08/21  1220 12/09/21  0820   WBC 3.7* 3.2* 2.8*   RBC 2.91* 2.87* 2.72*   HGB 9.1* 9.0* 8.5*   HCT 28.9* 28.5* 26.6*   MCV 99.3 99.3 97.8   MCH 31.3 31.4 31.3   MCHC 31.5* 31.6* 32.0   RDW 15.4* 15.5* 15.5*    179 159   MPV 10.3 10.7 11.4         Assessment:    Active Problems:    Acute renal failure superimposed on chronic kidney disease, on chronic dialysis (HCC)  Resolved Problems:    * No resolved hospital problems. *      Plan:  1. COVID 19 infection- unvaccinated pt:  Pt

## 2021-12-09 NOTE — PROGRESS NOTES
Department of Internal Medicine  Nephrology Progress Note    Events reviewed. SUBJECTIVE: We are following Mr. Radha Goncalves for ESRD on HD. He reports feeling better. Seen on dialysis and tolerating well.     PHYSICAL EXAM:      Vitals:    VITALS:  /78   Pulse 77   Temp 98 °F (36.7 °C)   Resp 16   Ht 6' 2\" (1.88 m)   Wt 140 lb 3.4 oz (63.6 kg)   SpO2 99%   BMI 18.00 kg/m²   24HR INTAKE/OUTPUT:      Intake/Output Summary (Last 24 hours) at 12/9/2021 2237  Last data filed at 12/8/2021 2045  Gross per 24 hour   Intake 300 ml   Output 2700 ml   Net -2400 ml       Access: Right IJ vein tunneled dialysis catheter in place  Constitutional:  Awake in respiratory distress  HEENT:  PERRLA  Respiratory:  Decreased breath sounds at the bases  Cardiovascular/Edema:  Heart sounds regular  Gastrointestinal:  Abdomen  Neurologic:  Non focal   Skin:  No lesions   Other:  + edema     Scheduled Meds:   carvedilol  25 mg Oral BID WC    aspirin  81 mg Oral Daily    isosorbide dinitrate  10 mg Oral TID    hydrALAZINE  25 mg Oral 3 times per day    atorvastatin  40 mg Oral Nightly    bumetanide  2 mg Oral BID    clobetasol   Topical BID    levothyroxine  112 mcg Oral Daily    magnesium oxide  400 mg Oral Daily    sodium bicarbonate  650 mg Oral TID    tamsulosin  0.4 mg Oral Daily    sodium chloride flush  5-40 mL IntraVENous 2 times per day    heparin (porcine)  5,000 Units SubCUTAneous 3 times per day    Vitamin D  2,000 Units Oral Daily    insulin lispro  0-6 Units SubCUTAneous TID     insulin lispro  0-3 Units SubCUTAneous Nightly    epoetin sabiha-epbx  3,000 Units SubCUTAneous Once per day on Mon Wed Fri    metOLazone  2.5 mg Oral Daily    ascorbic acid  500 mg Oral Daily    zinc sulfate  50 mg Oral Daily     Continuous Infusions:   sodium chloride      dextrose       PRN Meds:.calcium carbonate, colchicine, sodium chloride flush, sodium chloride, polyethylene glycol, acetaminophen **OR** acetaminophen, aluminum & magnesium hydroxide-simethicone, guaiFENesin-dextromethorphan, albuterol sulfate HFA, glucose, dextrose, glucagon (rDNA), dextrose    DATA:    CBC:   Lab Results   Component Value Date    WBC 2.8 12/09/2021    RBC 2.72 12/09/2021    HGB 8.5 12/09/2021    HCT 26.6 12/09/2021    MCV 97.8 12/09/2021    MCH 31.3 12/09/2021    MCHC 32.0 12/09/2021    RDW 15.5 12/09/2021     12/09/2021    MPV 11.4 12/09/2021     CMP:    Lab Results   Component Value Date     12/08/2021    K 4.1 12/08/2021     12/08/2021    CO2 23 12/08/2021    BUN 36 12/08/2021    CREATININE 7.0 12/08/2021    GFRAA 9 12/08/2021    LABGLOM 9 12/08/2021    GLUCOSE 194 12/08/2021    PROT 6.9 12/08/2021    LABALBU 3.3 12/08/2021    CALCIUM 8.2 12/08/2021    BILITOT 0.3 12/08/2021    ALKPHOS 107 12/08/2021    AST 22 12/08/2021    ALT 15 12/08/2021     Magnesium:    Lab Results   Component Value Date    MG 1.8 06/26/2020     Phosphorus:    Lab Results   Component Value Date    PHOS 4.5 11/23/2021     Radiology Review:      Chest x-ray December 7, 2021   1.  Slightly limited exam       2.  Constellation of findings suggestive of fluid overload, radiographically   minimally improved since 11/21/2021.       .       RECOMMENDATION:   1.   Recommend follow-up thoracic imaging, as directed clinically.       .           BRIEF SUMMARY OF INITIAL CONSULT:    Briefly Mr. Ange Wadsworth is a 66year old man with h/o ESRD 2/2 to diabetic nephropathy, recently started on renal replacement therapy, on intermittent HD 3 times a week Tuesday Thursday and Saturday, via right IJ vein tunneled dialysis catheter, type 2 DM, HTN, HFrEF 25-30% with stage II DD, hepatitis C, hypothyroidism, BPH, who was admitted on December 7, 2021 after he presented to the ER with increasing shortness of breath. He was found with pro-BNP levels of >70,000 and CXR suggestive of pulmonary edema, reason for this consultation.     IMPRESSION/RECOMMENDATIONS: 1. ESRD 2/2 to diabetic nephropathy, recently started on renal replacement therapy, on intermittent HD 3 times a week Tuesday Thursday and Saturday, via right IJ vein tunneled dialysis. Seen on dialysis and tolerating well    2. HFrEF 25-30% with stage II DD, proBNP >70,000, on bumex, metolazone, hydralazine, to add losartan   3. HTN, on carvedilol and hydralazine   4. MBD of CKD, obtain phosphorus level  5. Anemia of CKD, on epoetin alpha  ----------------------------------  6. History of hepatitis C  7. Gout, on allopurinol  8. Covid 19 + without respiratory symptoms  9. HLD, on statin  10. Type II diabetes, on insulin  11. Nutrition, Low potassium diet    Plan:    · HD today and 3 times a week TTS.  Seen on dialysis and tolerating well  · Add losartan 25 mg daily   · Continue Bumex 2 mg p.o. twice daily  · Continue Metolazone 2.5 mg daily  · Continue Epoetin alpha 3000 units 3 times a week  · Continue Carvedilol 6.25 g twice daily  · Continue hydralazine 25 mg tid   · Obtain phosphorus level  · Obtain magnesium level  · Obtain ionized calcium level in am  · Low potassium diet  · Discharge planning     Electronically signed by KIANNA Grijalva CNP on 12/9/2021 at 9:18 AM

## 2021-12-09 NOTE — PROGRESS NOTES
INPATIENT CARDIOLOGY FOLLOW-UP    Name: Rhea Whaley    Age: 66 y.o. Date of Admission: 12/7/2021 12:15 PM    Date of Service: 12/9/2021    Primary Cardiologist: Known to me from this admission    Chief Complaint: Follow-up for cardiomyopathy    Interim History:  Feels well. Denies any complaints. No chest pain or shortness of breath. Remains on room air.     Review of Systems:   Negative except as described above    Problem List:  Patient Active Problem List   Diagnosis    Diabetes mellitus ([facility]    Metabolic acidosis    Hypertension    Uncontrolled diabetes mellitus ([facility]    Hyperbilirubinemia    Thrombocytopenia ([facility]    Hypothyroid    ESRD (end stage renal disease) ([facility]    Acute respiratory failure with hypoxia (HCC)    Volume overload    Elevated troponin    Hypertensive emergency    Hyperkalemia    Moderate protein malnutrition (HCC)    Acute renal failure superimposed on chronic kidney disease, on chronic dialysis (HCC)       Current Medications:    Current Facility-Administered Medications:     carvedilol (COREG) tablet 25 mg, 25 mg, Oral, BID WC, Lucy Jorge MD    isosorbide dinitrate (ISORDIL) tablet 10 mg, 10 mg, Oral, TID, Jeana Plant, [place], 10 mg at 12/08/21 2050    hydrALAZINE (APRESOLINE) tablet 25 mg, 25 mg, Oral, 3 times per day, [place], [place]    atorvastatin (LIPITOR) tablet 40 mg, 40 mg, Oral, Nightly, [place], [place], 40 mg at 12/08/21 2050    bumetanide (BUMEX) tablet 2 mg, 2 mg, Oral, BID, Bhavna Cooper MD, 2 mg at 12/08/21 2050    calcium carbonate (TUMS) chewable tablet 500 mg, 500 mg, Oral, TID PRN, Bhavna Cooper MD    clobetasol (TEMOVATE) 0.05 % ointment, , Topical, BID, Bhavna Cooper MD    colchicine (COLCRYS) tablet 0.6 mg, 0.6 mg, Oral, PRN, Bhavna Cooper MD    levothyroxine (SYNTHROID) tablet 112 mcg, 112 mcg, Oral, Daily, Bhavna Cooper MD, 112 mcg at 12/09/21 0626    magnesium oxide (MAG-OX) tablet 400 mg, 400 mg, Oral, Daily, Michele Amador MD, 400 mg at 12/08/21 0851    sodium bicarbonate tablet 650 mg, 650 mg, Oral, TID, Michele Amador MD, 650 mg at 12/08/21 2049    tamsulosin (FLOMAX) capsule 0.4 mg, 0.4 mg, Oral, Daily, Michele Amador MD    sodium chloride flush 0.9 % injection 5-40 mL, 5-40 mL, IntraVENous, 2 times per day, Michele Amador MD, 10 mL at 12/08/21 2100    sodium chloride flush 0.9 % injection 5-40 mL, 5-40 mL, IntraVENous, PRN, Michele Amador MD    0.9 % sodium chloride infusion, 25 mL, IntraVENous, PRN, Michele Amador MD    polyethylene glycol [facility]) packet 17 g, 17 g, Oral, Daily PRN, Michele Amador MD    acetaminophen (TYLENOL) tablet 650 mg, 650 mg, Oral, Q6H PRN **OR** acetaminophen (TYLENOL) suppository 650 mg, 650 mg, Rectal, Q6H PRN, Michele Amador MD    aluminum & magnesium hydroxide-simethicone (MAALOX) 200-200-20 MG/5ML suspension 30 mL, 30 mL, Oral, Q6H PRN, Michele Amador MD    heparin (porcine) injection 5,000 Units, 5,000 Units, SubCUTAneous, 3 times per day, Michele Amador MD, 5,000 Units at 12/09/21 0642    guaiFENesin-dextromethorphan (ROBITUSSIN DM) 100-10 MG/5ML syrup 5 mL, 5 mL, Oral, Q4H PRN, Michele Amador MD    Vitamin D (CHOLECALCIFEROL) tablet 2,000 Units, 2,000 Units, Oral, Daily, Michele Amador MD, 2,000 Units at 12/08/21 0850    albuterol sulfate  (90 Base) MCG/ACT inhaler 2 puff, 2 puff, Inhalation, Q6H PRN, Michele Amador MD    insulin lispro (HUMALOG) injection vial 0-6 Units, 0-6 Units, SubCUTAneous, TID WC, Michele Amador MD    insulin lispro (HUMALOG) injection vial 0-3 Units, 0-3 Units, SubCUTAneous, Nightly, Michele Hu, MD    glucose (GLUTOSE) 40 % oral gel 15 g, 15 g, Oral, PRN, Michele Amador MD    dextrose 50 % IV solution, 12.5 g, IntraVENous, PRN, Michele Amador MD    glucagon (rDNA) injection 1 mg, 1 mg, IntraMUSCular, PRN, Michele Amador MD    dextrose 5 % solution, 100 mL/hr, IntraVENous, PRN, Michele Amador, MD    epoetin sabiha-epbx (RETACRIT) injection 3,000 Units, 3,000 Units, SubCUTAneous, Once per day on Mon Wed Fri, Galilea Levi MD, 3,000 Units at 12/08/21 0855    metOLazone (ZAROXOLYN) tablet 2.5 mg, 2.5 mg, Oral, Daily, Neo Orourke MD, 2.5 mg at 12/08/21 5434    ascorbic acid (VITAMIN C) tablet 500 mg, 500 mg, Oral, Daily, Tammie Elkins MD, 500 mg at 12/08/21 0850    zinc sulfate (ZINCATE) capsule 50 mg, 50 mg, Oral, Daily, Tammie Elkins MD, 50 mg at 12/08/21 0851    Physical Exam:  BP (!) 150/90   Pulse 69   Temp 98.1 °F (36.7 °C) (Oral)   Resp 14   Ht 6' 2\" (1.88 m)   Wt 140 lb 3.4 oz (63.6 kg)   SpO2 99%   BMI 18.00 kg/m²   Wt Readings from Last 3 Encounters:   12/08/21 140 lb 3.4 oz (63.6 kg)   11/22/21 135 lb 2.3 oz (61.3 kg)   08/25/20 166 lb (75.3 kg)     Appearance: Awake, alert, no acute respiratory distress  Skin: Intact, no rash  Head: Normocephalic, atraumatic  Eyes: EOMI, no conjunctival erythema  ENMT: No pharyngeal erythema, MMM, no rhinorrhea  Neck: Supple, no elevated JVP, no carotid bruits  Lungs: Clear to auscultation bilaterally. No wheezes, rales, or rhonchi. Cardiac: PMI nondisplaced, Regular rhythm with a normal rate, S1 & S2 normal, no murmurs  Right chest tunneled HD catheter  Abdomen: Soft, nontender, +bowel sounds  Extremities: Moves all extremities x 4, no lower extremity edema  Neurologic: No focal motor deficits apparent, normal mood and affect  Peripheral Pulses: Intact posterior tibial pulses bilaterally    Intake/Output:    Intake/Output Summary (Last 24 hours) at 12/9/2021 0801  Last data filed at 12/8/2021 2045  Gross per 24 hour   Intake 300 ml   Output 2900 ml   Net -2600 ml     No intake/output data recorded.     Laboratory Tests:  Recent Labs     12/07/21  1302 12/08/21  1220    138   K 4.3 4.1    101   CO2 25 23   BUN 28* 36*   CREATININE 6.4* 7.0*   GLUCOSE 115* 194*   CALCIUM 9.0 8.2*     Lab Results   Component Value Date    MG 1.8 06/26/2020     Recent Labs     12/07/21  1302 12/08/21  1220   ALKPHOS 110 107   ALT 16 15   AST 23 22   PROT 7.8 6.9   BILITOT 0.5 0.3   LABALBU 3.8 3.3*     Recent Labs     12/07/21  1302 12/08/21  1220   WBC 3.7* 3.2*   RBC 2.91* 2.87*   HGB 9.1* 9.0*   HCT 28.9* 28.5*   MCV 99.3 99.3   MCH 31.3 31.4   MCHC 31.5* 31.6*   RDW 15.4* 15.5*    179   MPV 10.3 10.7     Lab Results   Component Value Date    CKTOTAL 1,503 (H) 06/26/2020     Lab Results   Component Value Date    INR 1.1 08/24/2021    INR 1.0 06/02/2020    INR 1.1 12/04/2018    PROTIME 12.1 08/24/2021    PROTIME 11.2 06/02/2020    PROTIME 12.2 12/04/2018     Lab Results   Component Value Date    .600 (H) 11/20/2021     Lab Results   Component Value Date    LABA1C 5.3 11/12/2021     No results found for: EAG  Lab Results   Component Value Date    CHOL 200 (H) 08/24/2021     Lab Results   Component Value Date    TRIG 109 08/24/2021     Lab Results   Component Value Date    HDL 53 11/12/2021    HDL 51 08/24/2021    HDL 38 06/02/2020     Lab Results   Component Value Date    LDLCALC 147 (H) 11/12/2021    LDLCALC 127 (H) 08/24/2021    LDLCALC 178 (H) 06/02/2020     Lab Results   Component Value Date    LABVLDL 30 11/12/2021    LABVLDL 22 08/24/2021    LABVLDL 34 06/02/2020     No results found for: CHOLHDLRATIO  Recent Labs     12/07/21  1302   PROBNP >70,000*       Cardiac Tests:    EKG:   Sinus rhythm 91 bpm with anterior T wave inversions. QTc 521 ms    Telemetry: Sinus rhythm 60s    Chest X-ray:   12/7/21    Impression   1.  Slightly limited exam       2.  Constellation of findings suggestive of fluid overload, radiographically   minimally improved since 11/21/2021. Echocardiogram:   TTE 11/21/21     Summary   Normal left ventricular chamber size. Normal left ventricular systolic   function. Visually estimated LVEF is 25-30 %. There is severe diffuse hypokinesis with akinesis of the apical segments.    Grade II diastolic dysfunction with elevated LA pressure. Normal right ventricle structure and function. Normal left atrium. Normal right atrium. Mild mitral regurgitation is present. Trace anterior pericardial effusion without tamponade. Unable to estimate PA pressure. Stress test:      Cardiac catheterization:     ----------------------------------------------------------------------------------------------------------------------------------------------------------------  IMPRESSION:  1. Acute on chronic heart failure with reduced ejection fraction. proBNP > 70,000. Net -4.6 L  2. New onset cardiomyopathy, EF 25-30% suspect ischemic  3. Abnormal EKG/prolonged QT  4. COVID-19 pneumonia, on room air  5. ESRD newly started on HD  6. Hypertension  7. Type 2 diabetes reported, though A1c is 5.3%  8. Hypothyroidism    RECOMMENDATIONS:  Stable from cardiac standpoint without anginal symptoms.  Continue to optimize GDMT  o Increase carvedilol to 25 mg twice daily  o Discontinue amlodipine  o Continue isosorbide and nitrate/hydralazine combination, uptitrate as needed  o Start low-dose sacubitril/valsartan if okay with nephrology   Add low-dose aspirin   Avoid QT prolonging medications   Diuretic/fluid management per nephrology   Plan for outpatient heart catheterization once recovered from Covid, or sooner if develops any anginal symptoms or ischemic arrhythmias   Discussed wearable cardioverter defibrillator, patient wants to hold off for now   Aggressive risk factor modification    Vicki Mendoza MD, [address] [facility] Cardiology    NOTE: This report was transcribed using voice recognition software. Every effort was made to ensure accuracy; however, inadvertent computerized transcription errors may be present.

## 2021-12-09 NOTE — FLOWSHEET NOTE
12/09/21 1233   Vital Signs   BP (!) 145/86   Temp 98 °F (36.7 °C)   Pulse 76   Resp 16   Weight 140 lb 3.4 oz (63.6 kg)   Weight Method Estimated   Percent Weight Change 0   Pain Assessment   Pain Assessment 0-10   Pain Level 0   Post-Hemodialysis Assessment   Post-Treatment Procedures Blood returned; Catheter capped, clamped with Citrate x 2 ports   Machine Disinfection Process Exterior Machine Disinfection   Rinseback Volume (ml) 300 ml   Dialyzer Clearance Clear   Duration of Treatment (minutes) 240 minutes   Hemodialysis Output (ml) 2300 ml   NET Removed (ml) 2000 ml   Tolerated Treatment Good   Patient Response to Treatment tolerated well   Bilateral Breath Sounds Diminished   Edema None   Physician Notified?  Yes

## 2021-12-10 ENCOUNTER — APPOINTMENT (OUTPATIENT)
Dept: GENERAL RADIOLOGY | Age: 71
DRG: 291 | End: 2021-12-10
Payer: MEDICARE

## 2021-12-10 LAB
ANION GAP SERPL CALCULATED.3IONS-SCNC: 12 MMOL/L (ref 7–16)
BUN BLDV-MCNC: 14 MG/DL (ref 6–23)
C-REACTIVE PROTEIN: 0.6 MG/DL (ref 0–0.4)
CALCIUM IONIZED: 1.09 MMOL/L (ref 1.15–1.33)
CALCIUM SERPL-MCNC: 8.3 MG/DL (ref 8.6–10.2)
CHLORIDE BLD-SCNC: 97 MMOL/L (ref 98–107)
CO2: 27 MMOL/L (ref 22–29)
CREAT SERPL-MCNC: 3.5 MG/DL (ref 0.7–1.2)
D DIMER: 2961 NG/ML DDU
GFR AFRICAN AMERICAN: 21
GFR NON-AFRICAN AMERICAN: 21 ML/MIN/1.73
GLUCOSE BLD-MCNC: 123 MG/DL (ref 74–99)
HCT VFR BLD CALC: 29.5 % (ref 37–54)
HEMOGLOBIN: 9.3 G/DL (ref 12.5–16.5)
MAGNESIUM: 1.9 MG/DL (ref 1.6–2.6)
MCH RBC QN AUTO: 30.8 PG (ref 26–35)
MCHC RBC AUTO-ENTMCNC: 31.5 % (ref 32–34.5)
MCV RBC AUTO: 97.7 FL (ref 80–99.9)
METER GLUCOSE: 104 MG/DL (ref 74–99)
METER GLUCOSE: 106 MG/DL (ref 74–99)
METER GLUCOSE: 118 MG/DL (ref 74–99)
METER GLUCOSE: 133 MG/DL (ref 74–99)
PDW BLD-RTO: 15.3 FL (ref 11.5–15)
PHOSPHORUS: 3 MG/DL (ref 2.5–4.5)
PLATELET # BLD: 157 E9/L (ref 130–450)
PMV BLD AUTO: 10.8 FL (ref 7–12)
POTASSIUM SERPL-SCNC: 4.1 MMOL/L (ref 3.5–5)
PROCALCITONIN: 0.45 NG/ML (ref 0–0.08)
RBC # BLD: 3.02 E12/L (ref 3.8–5.8)
SARS-COV-2, NAAT: NOT DETECTED
SODIUM BLD-SCNC: 136 MMOL/L (ref 132–146)
WBC # BLD: 3.2 E9/L (ref 4.5–11.5)

## 2021-12-10 PROCEDURE — 6370000000 HC RX 637 (ALT 250 FOR IP): Performed by: PHYSICIAN ASSISTANT

## 2021-12-10 PROCEDURE — 6360000002 HC RX W HCPCS: Performed by: NURSE PRACTITIONER

## 2021-12-10 PROCEDURE — 87635 SARS-COV-2 COVID-19 AMP PRB: CPT

## 2021-12-10 PROCEDURE — 6370000000 HC RX 637 (ALT 250 FOR IP): Performed by: INTERNAL MEDICINE

## 2021-12-10 PROCEDURE — 2060000000 HC ICU INTERMEDIATE R&B

## 2021-12-10 PROCEDURE — 82962 GLUCOSE BLOOD TEST: CPT

## 2021-12-10 PROCEDURE — 36415 COLL VENOUS BLD VENIPUNCTURE: CPT

## 2021-12-10 PROCEDURE — 84100 ASSAY OF PHOSPHORUS: CPT

## 2021-12-10 PROCEDURE — 85027 COMPLETE CBC AUTOMATED: CPT

## 2021-12-10 PROCEDURE — 2580000003 HC RX 258: Performed by: NURSE PRACTITIONER

## 2021-12-10 PROCEDURE — 83735 ASSAY OF MAGNESIUM: CPT

## 2021-12-10 PROCEDURE — 6360000002 HC RX W HCPCS: Performed by: INTERNAL MEDICINE

## 2021-12-10 PROCEDURE — 99232 SBSQ HOSP IP/OBS MODERATE 35: CPT | Performed by: FAMILY MEDICINE

## 2021-12-10 PROCEDURE — 71045 X-RAY EXAM CHEST 1 VIEW: CPT

## 2021-12-10 PROCEDURE — 6370000000 HC RX 637 (ALT 250 FOR IP): Performed by: FAMILY MEDICINE

## 2021-12-10 PROCEDURE — 99233 SBSQ HOSP IP/OBS HIGH 50: CPT | Performed by: INTERNAL MEDICINE

## 2021-12-10 PROCEDURE — 82330 ASSAY OF CALCIUM: CPT

## 2021-12-10 PROCEDURE — 80048 BASIC METABOLIC PNL TOTAL CA: CPT

## 2021-12-10 PROCEDURE — 6360000002 HC RX W HCPCS: Performed by: FAMILY MEDICINE

## 2021-12-10 PROCEDURE — 85378 FIBRIN DEGRADE SEMIQUANT: CPT

## 2021-12-10 PROCEDURE — 2580000003 HC RX 258: Performed by: FAMILY MEDICINE

## 2021-12-10 RX ADMIN — LOSARTAN POTASSIUM 25 MG: 25 TABLET, FILM COATED ORAL at 08:23

## 2021-12-10 RX ADMIN — CARVEDILOL 25 MG: 25 TABLET, FILM COATED ORAL at 08:23

## 2021-12-10 RX ADMIN — ZINC SULFATE 220 MG (50 MG) CAPSULE 50 MG: CAPSULE at 08:23

## 2021-12-10 RX ADMIN — SODIUM BICARBONATE 650 MG: 650 TABLET ORAL at 14:45

## 2021-12-10 RX ADMIN — SODIUM BICARBONATE 650 MG: 650 TABLET ORAL at 08:23

## 2021-12-10 RX ADMIN — HEPARIN SODIUM 5000 UNITS: 10000 INJECTION INTRAVENOUS; SUBCUTANEOUS at 22:03

## 2021-12-10 RX ADMIN — BUMETANIDE 2 MG: 1 TABLET ORAL at 08:23

## 2021-12-10 RX ADMIN — CALCIUM GLUCONATE 2000 MG: 98 INJECTION, SOLUTION INTRAVENOUS at 12:34

## 2021-12-10 RX ADMIN — OXYCODONE HYDROCHLORIDE AND ACETAMINOPHEN 500 MG: 500 TABLET ORAL at 08:23

## 2021-12-10 RX ADMIN — ISOSORBIDE DINITRATE 10 MG: 10 TABLET ORAL at 08:23

## 2021-12-10 RX ADMIN — Medication 10 ML: at 22:03

## 2021-12-10 RX ADMIN — METOLAZONE 2.5 MG: 2.5 TABLET ORAL at 08:23

## 2021-12-10 RX ADMIN — Medication 400 MG: at 08:23

## 2021-12-10 RX ADMIN — Medication 10 ML: at 12:36

## 2021-12-10 RX ADMIN — ISOSORBIDE DINITRATE 10 MG: 10 TABLET ORAL at 14:45

## 2021-12-10 RX ADMIN — EPOETIN ALFA-EPBX 3000 UNITS: 3000 INJECTION, SOLUTION INTRAVENOUS; SUBCUTANEOUS at 08:27

## 2021-12-10 RX ADMIN — HEPARIN SODIUM 5000 UNITS: 10000 INJECTION INTRAVENOUS; SUBCUTANEOUS at 14:45

## 2021-12-10 RX ADMIN — ATORVASTATIN CALCIUM 40 MG: 40 TABLET, FILM COATED ORAL at 20:41

## 2021-12-10 RX ADMIN — TAMSULOSIN HYDROCHLORIDE 0.4 MG: 0.4 CAPSULE ORAL at 08:23

## 2021-12-10 RX ADMIN — SODIUM BICARBONATE 650 MG: 650 TABLET ORAL at 20:41

## 2021-12-10 RX ADMIN — HEPARIN SODIUM 5000 UNITS: 10000 INJECTION INTRAVENOUS; SUBCUTANEOUS at 06:00

## 2021-12-10 RX ADMIN — ISOSORBIDE DINITRATE 10 MG: 10 TABLET ORAL at 20:41

## 2021-12-10 RX ADMIN — Medication 2000 UNITS: at 08:23

## 2021-12-10 RX ADMIN — ASPIRIN 81 MG: 81 TABLET, COATED ORAL at 08:23

## 2021-12-10 RX ADMIN — BUMETANIDE 2 MG: 1 TABLET ORAL at 20:41

## 2021-12-10 RX ADMIN — CARVEDILOL 25 MG: 25 TABLET, FILM COATED ORAL at 16:51

## 2021-12-10 RX ADMIN — LEVOTHYROXINE SODIUM 112 MCG: 0.11 TABLET ORAL at 06:28

## 2021-12-10 ASSESSMENT — PAIN SCALES - GENERAL
PAINLEVEL_OUTOF10: 0
PAINLEVEL_OUTOF10: 0

## 2021-12-10 NOTE — PROGRESS NOTES
[facility] Progress Note    Admitting Date and Time: 12/7/2021 12:15 PM  Admit Dx: Elevated troponin [R77.8]  T wave inversion in EKG [R94.31]  Hypervolemia, unspecified hypervolemia type [E87.70]  Acute renal failure superimposed on chronic kidney disease, on chronic dialysis, unspecified acute renal failure type ([address] [N17.9, N18.9, Z99.2]  Anemia due to chronic kidney disease, unspecified CKD stage [N18.9, D63.1]  COVID [U07.1]    Subjective:  Patient is being followed for Elevated troponin [R77.8]  T wave inversion in EKG [R94.31]  Hypervolemia, unspecified hypervolemia type [E87.70]  Acute renal failure superimposed on chronic kidney disease, on chronic dialysis, unspecified acute renal failure type ([address] [N17.9, N18.9, Z99.2]  Anemia due to chronic kidney disease, unspecified CKD stage [N18.9, D63.1]  COVID [U07.1]     Pt seen resting and sleeping comfortably- awakens easily  No complaints  Occasional cough  Pt removed from covid isolation due to concern for false positive rapid test. Resp panel PCR and repeat rapid covid test negative        ROS: denies fever, chills, cp, sob, n/v, HA unless stated above.       carvedilol  25 mg Oral BID WC    aspirin  81 mg Oral Daily    losartan  25 mg Oral Daily    isosorbide dinitrate  10 mg Oral TID    hydrALAZINE  25 mg Oral 3 times per day    atorvastatin  40 mg Oral Nightly    bumetanide  2 mg Oral BID    clobetasol   Topical BID    levothyroxine  112 mcg Oral Daily    magnesium oxide  400 mg Oral Daily    sodium bicarbonate  650 mg Oral TID    tamsulosin  0.4 mg Oral Daily    sodium chloride flush  5-40 mL IntraVENous 2 times per day    heparin (porcine)  5,000 Units SubCUTAneous 3 times per day    Vitamin D  2,000 Units Oral Daily    insulin lispro  0-6 Units SubCUTAneous TID WC    insulin lispro  0-3 Units SubCUTAneous Nightly    epoetin sabiha-epbx  3,000 Units SubCUTAneous Once per day on Mon Wed Fri    metOLazone  2.5 mg Oral infection-ruled out. Pt reporting yesterday he had doubts that he had  covid and he was feeling well. No hypoxia. Only real symptom occasional cough- being treated for fluid overload. He had a rapid test that was positive in ER. No evidence of viral pneumonia on CXR. Repeat respiratory panel PCR negative and repeat rapid test negative. Ok to [employer].     2. Fluid overload in ESRD pt: nephrology consulted for assistance with fluid management.      3. Acute on chronic CHF: EF 25-30% on echo from 11/21- cardiology following. consult dietician to discuss diet plan for chf/ esrd     4. New onset cardiomyopathy: EF 25-30%. Pt will need cardiac cath once recovered from covid due to concern for ischemia. Cardiology discussed life vest and pt wanting to \" hold off:\" cardio adjusting meds      5. HTN urgency: continue meds- nephrology following     6. HLD: statin     7. Hypothyroid disease: synthroid     8. DM: hga1c 5.3. from Nov. Insulin ss     9. Anemia secondary to CKD; monitor     10. H/o Hep c     11. Elevated troponin: cardiology following         Social work; following- poss dc in 24 hours          NOTE: This report was transcribed using voice recognition software. Every effort was made to ensure accuracy; however, inadvertent computerized transcription errors may be present.   Electronically signed by KATHARINE Murphy on 12/10/2021 at 7:40 AM

## 2021-12-10 NOTE — ACP (ADVANCE CARE PLANNING)
12/10/2021  Advance Care Planning     Advance Care Planning Activator (Inpatient)  Conversation Note      Date of ACP Conversation: 12/10/2021     Conversation Conducted with:Richard Jean    ACP Activator: JAY Reynolds        Health Care Decision Maker:     Current Designated Health Care Decision Maker: Kaitlin Portillo    Click here to complete Healthcare Decision Makers including section of the Healthcare Decision Maker Relationship (ie \"Primary\")      Care Preferences    Ventilation: \"If you were in your present state of health and suddenly became very ill and were unable to breathe on your own, what would your preference be about the use of a ventilator (breathing machine) if it were available to you? \"      Would the patient desire the use of ventilator (breathing machine)?: no    \"If your health worsens and it becomes clear that your chance of recovery is unlikely, what would your preference be about the use of a ventilator (breathing machine) if it were available to you? \"     Would the patient desire the use of ventilator (breathing machine)?: no      Resuscitation  \"CPR works best to restart the heart when there is a sudden event, like a heart attack, in someone who is otherwise healthy. Unfortunately, CPR does not typically restart the heart for people who have serious health conditions or who are very sick. \"    \"In the event your heart stopped as a result of an underlying serious health condition, would you want attempts to be made to restart your heart (answer \"yes\" for attempt to resuscitate) or would you prefer a natural death (answer \"no\" for do not attempt to resuscitate)? \" no       [] Yes   [x] No   Educated Patient / [place] regarding differences between Advance Directives and portable DNR orders.     Length of ACP Conversation in minutes:  10 min  Conversation Outcomes:  [x] ACP discussion completed  [] Existing advance directive reviewed with patient; no changes to patient's previously recorded wishes  [] New Advance Directive completed  [] Portable Do Not Rescitate prepared for Provider review and signature  [] POLST/POST/MOLST/MOST prepared for Provider review and signature      Follow-up plan:    [] Schedule follow-up conversation to continue planning  [x] Referred individual to Provider for additional questions/concerns   [] Advised patient/agent/surrogate to review completed ACP document and update if needed with changes in condition, patient preferences or care setting    [] This note routed to one or more involved healthcare providers

## 2021-12-10 NOTE — CONSULTS
Patient currently admitted with diagnosis of Systolic heart failure. Patient was awake and alert, sitting up in bed during the consultation. He was engaged and asked appropriate questions throughout the education session. He is agreeable to heart failure education and follow up in the CHF clinic after discharge. Future Appointments   Date Time Provider Yoel Munroei   12/22/2021  8:15 AM Darlyn Taylor MD [facility]            We reviewed the introduction to Heart Failure, the HF zones, signs and symptoms to report on day 1 of onset, medications, medication compliance, the importance of obtaining daily weights, following a low sodium diet, reading food labels for the sodium content, keeping physician appointments, and smoking cessation. We discussed writing / tracking daily weights on a calendar / log. You can also take your charted weights to your doctor appointments to be reviewed. Contributing risk factors for Heart Failure are identified as shortness of breath . I advised patient they can reduce the risk for Heart Failure exacerbations by modifying / controlling the risk factors. We discussed self-managed care which includes the following:  to take medications as prescribed (he has stopped all medications in the past), report any intolerable side effects of medications to the cardiologist / doctor, do not just stop taking the medication; follow a cardiac heart healthy / low sodium diet; weigh yourself daily, exercise regularly- per doctor recommendation and not to smoke or use an excess amount of alcohol. We discussed calling the cardiologist / doctor with a weight gain of 3 pounds in one day or a total of 5 pounds or more in one week. Also, if you should have a significant weight loss of 3# or more in one day to call the doctor, they may need to decrease or hold the diuretic dose.  On days you feels nauseated and not eating / drinking, having emesis or diarrhea,  informed to call the cardiologist  / doctor, they may need to decrease or hold diuretic to avoid dehydration. I stressed the importance of informing their cardiologist the first day of onset of any of the signs and symptoms in the \"Yellow Zone\" of the HF Zones. Patient verbalizes understanding. Greater than 30 minutes was spent educating patient. BNP:   Lab Results   Component Value Date    PROBNP >70,000 (H) 12/07/2021       History of:    has a past medical history of Chronic kidney disease, Diabetes mellitus ([address] Hepatitis C, Hypertension, Liver disease, Thyroid disease, and Unspecified diseases of blood and blood-forming organs. has a past surgical history that includes Tonsillectomy and vascular surgery (N/A, 11/21/2021). Medications:    [START ON 12/11/2021] sacubitril-valsartan  1 tablet Oral BID    calcium gluconate IVPB  2,000 mg IntraVENous Once    carvedilol  25 mg Oral BID WC    aspirin  81 mg Oral Daily    isosorbide dinitrate  10 mg Oral TID    hydrALAZINE  25 mg Oral 3 times per day    atorvastatin  40 mg Oral Nightly    bumetanide  2 mg Oral BID    clobetasol   Topical BID    levothyroxine  112 mcg Oral Daily    magnesium oxide  400 mg Oral Daily    sodium bicarbonate  650 mg Oral TID    tamsulosin  0.4 mg Oral Daily    sodium chloride flush  5-40 mL IntraVENous 2 times per day    heparin (porcine)  5,000 Units SubCUTAneous 3 times per day    Vitamin D  2,000 Units Oral Daily    insulin lispro  0-6 Units SubCUTAneous TID WC    insulin lispro  0-3 Units SubCUTAneous Nightly    epoetin sabiha-epbx  3,000 Units SubCUTAneous Once per day on Mon Wed Fri    metOLazone  2.5 mg Oral Daily    ascorbic acid  500 mg Oral Daily    zinc sulfate  50 mg Oral Daily      sodium chloride      dextrose       Code Status:Full Code    The patient is ordered:  Diet: ADULT DIET; Regular; 3 carb choices (45 gm/meal); Low Sodium (2 gm);  Low Potassium (Less than 3000 mg/day)   Sodium controlled diet Yes  Fluid restriction daily ordered (fluid restriction recommended if patient is hyponatremic and/or diuretic is initiated or increased) No  FR:   Daily Weights:   Patient Vitals for the past 96 hrs (Last 3 readings):   Weight   12/10/21 0630 141 lb 14.4 oz (64.4 kg)   12/09/21 1233 140 lb 3.4 oz (63.6 kg)   12/08/21 1800 140 lb 3.4 oz (63.6 kg)     I/O:     Intake/Output Summary (Last 24 hours) at 12/10/2021 1203  Last data filed at 12/9/2021 1233  Gross per 24 hour   Intake --   Output 2300 ml   Net -2300 ml      The Heart Failure Booklet given to the patient with additional patient education addressing:  · What is Heart Failure? · Things You Can Do to Live Well with HF  · How to Take Your Medications  · How to Eat Less Salt  · Exercising Well with Heart Failure  · Signs and symptoms of HF to report  · Weight Yourself Each Day  · Home Self Management- activity, weight tracking, taking medications as prescribed, meals /diet planning (sodium and fluid restriction), how to read food labels, keeping physician follow ups, smoking cessation, follow the Heart Failure Zones  · The Heart Failure zones  · Sodium content pamphlet  · What foods I should avoid tip sheet    Instructed  to call 911 if you have any of the following symptoms:    ·    Struggling to breathe unrelieved with rest,  ·    Having chest pain, confusion or can't think clearly  ·    Have confusion or cant think clearly    Readmission Risk Score: 19 ( )    Discharge Plan:  I placed the Heart Failure Home Instructions in patient's discharge instructions. Per Heart Failure GWTG, the patient should have a follow-up appointment made within 7 days of discharge.     Rani Goode RN BSN  Heart Failure Navigator    CONGESTIVE HEART FAILURE (CHF) GUIDELINES  (Must be completed for Primary Diagnosis CHF or History of CHF)    Type of CHF:    [] Acute   [] Chronic     [x] Acute on Chronic     Ventricular Function Assessment (check):          [] HFpEF  [] HFpEF, borderline  [] HFpEF, improved  [x] HFrEF    Echocardiogram: 11/21/2021  Ejection Fraction (%):  25-30%                                                    Angiotensin-Converting-Enzyme (ACE) inhibitor ordered:  [] Yes  [x] No (specify contraindication):  [x] Renal Insufficiency  [] Cough  [] Hypotension  [] Allergy/angioedema  [] No left ventricular systolic dysfunction (LVSD)  [] Hyperkalemia  [] Moderate to severe aortic stenosis  [] Other (Specify):     Angiotensin II receptor blockers (ARB) ordered:  [] Yes  [x] No (specify contraindication):  [x] Renal Insufficiency  [] Hypotension  [] Allergy/angioedema  [] No LVSD  [] Hyperkalemia  [] Moderate to severe aortic stenosis  [] Other (Specify):    ARNI - Angiotensin Receptor Neprilysin Inhibitor ordered:  [x] Yes  [] No    ACC/AHA Guidelines Beta Blocker (Carvedilol, Metoprolol Succinate, or Bisoprolol) ordered:    [x] Yes- Coreg   [] No (specify contraindication):  [] Bradycardia  [] Hypotension  [] LVD  [] 2nd or 3rd degree heart block  [] Bronchospastic airway disease  [] Decompensated CHF  [] Other (Specify):

## 2021-12-10 NOTE — CARE COORDINATION
12/10/2021  Social Work Discharge Planning:COVID POS. SW discussed discharge planning with Pt. Pt is from home alone. Independent with no DME prior to admit. Pt is on room air. Awaiting new chair time for dialysis from [address]. [facility] [place] [phone]. She will need to be called when Pt discharges. Pt drives himself to dialysis. Sw completed ACP with Pt and he stated \"NO\" to ventilation and CPR. He said he is a DNR but chart shows FULL code. Physician will need to discuss code status with Pt.  SW confirmed with Stacie at [facility] that they WILL NOT need an additional COVID test.Electronically signed by JAY Dunlap on 12/10/2021 at 10:17 AM

## 2021-12-10 NOTE — DISCHARGE INSTR - DIET
Good nutrition is important when healing from an illness, injury, or surgery. Follow any nutrition recommendations given to you during your hospital stay. If you were given an oral nutrition supplement while in the hospital, continue to take this supplement at home. You can take it with meals, in-between meals, and/or before bedtime. These supplements can be purchased at most local grocery stores, pharmacies, and chain super-stores. If you have any questions about your diet or nutrition, call the hospital and ask for the dietitian. Carbohydrate Controlled, Renal Diet:    WHAT'S A CARBOHYDRATE? Carbohydrates (carbs) are starches that turn into sugar in your body. Sugar is a fuel your cells use for energy. Not all carbs are the same, and theyre not all used in the same way in your body. Complex carbs can be good choices. Foods with complex carbs have vitamins, minerals, and some fiber (all of which are good for you). These foods include:    Whole grain breads, pastas, cereals, and rice  Fruits and vegetables  Simple carbs are sugars    White or brown sugar (sucrose)  Honey or syrups  Fruit sugar (fructose)  Milk sugar (lactose)    EAT REAL FOOD    We really are what we eat. We make each cell in our body out of the nutrients in our food! In [place] grocery stores, fresh foods, like fruits and vegetables, dairy, and meat, tend to be around the outside of the store. These do not tend to have ingredient labels. A peach is a peach. When you cook and eat real, fresh foods like these, you have control over what you put into your body. Dont know how to cook? There are lots of great, free cooking lesson videos on TV or YouTube. Just look for [employer]  [employer]IS to cook __________________.     The middle of the store has food in boxes or jars or cans or foil packets. These are processed foods that tend to have long lists of ingredients.  The fewer ingredients on a label, the closer a food is to real. Low-salt natural peanut butter may only list peanuts.  Or, frozen berries may only list strawberries.  These may be good choices for you. Frozen foods tend to have less salt than canned ones. Any food with a long list of chemicals is one you need to look at very closely. Processed foods may keep for months. But, they have additives that make them last so long--and these may be harmful to your health. So, if you do choose a processed food, be sure to read the label:    Choose foods with few (1-10) ingredients. Meat should NOT have an ingredient list. Some stores carry meat that has added potassium, and you need to know this. Look at the serving size and sodium amount. Avoid foods with potassium or phosphate in them, or the word broth. If you like to garden, you might want to try to grow some of your own fruits and vegetables. Even a kurtis balcony or window can let you grow fresh herbs that can help your food taste better    Here are some ideas for eating less sodium. Choose the ones that look like they might work for you:     Read all food labels! Packaged foods must list how much sodium is in each serving. Some foods dont taste salty--but have a lot of sodium. A low-sodium product has less than 140 mg per serving. Choose a cereal that has less than 280 mg of sodium per serving. Limit processed foods. Some common ones are frozen dinners, canned foods, seasoning packets, hard cheeses, pickles and olives, hot dogs, and other deli meats. Avoid salt substitutes. Most use potassium. (See the next section on potassium.)   Read drug labels. Some have lots of sodium. Use salt-free herbs and spices to flavor food. Rub the herbs in my hands to release more flavor, and increase the amounts I use. Garlic powder (not garlic salt) works very well. Cook without salt. One teaspoon of salt has 2,130 mg of sodium! Use vinegar, lemon juice, onion, garlic, and peppers to flavor my food instead of salt.

## 2021-12-10 NOTE — CONSULTS
12/10/2021  2:10 PM      Comprehensive Nutrition Assessment    Type and Reason for Visit:  Initial, Consult, Positive Nutrition Screen    Nutrition Recommendations/Plan: Continue current diet and ONS, as tolerated    Nutrition Assessment:  Pt admit from HD d/t cough and poss Covid+. Rapid test+ but PCR not. Pt recently started HD on T-Th-S schedule. PMH=DM, liver disease, ESRD. Current appetite is fairly good and K+/Phos WNL at this time. Current diet appropriate at this time, but pt's diet can be liberalized/individualized by HD unit Renal Dietitian who follows the patient chronically. Will add ONS while here and include basic written diet information in Discharge instructions. Malnutrition Assessment:  Malnutrition Status: At risk for malnutrition   Context:  Chronic Illness     Findings of the 6 clinical characteristics of malnutrition:  Energy Intake:  Mild decrease in energy intake (Comment)  Weight Loss:  No significant weight loss     Body Fat Loss:  Unable to assess     Muscle Mass Loss:  Unable to assess    Fluid Accumulation:  No significant fluid accumulation (renal component)     Strength:  Not Performed    Estimated Daily Nutrient Needs:  Energy (kcal):  ; Weight Used for Energy Requirements:  Current     Protein (g):  85-95 (1.3-1.5 g/kg); Weight Used for Protein Requirements:  Current        Fluid (ml/day):  per Renal Management; Method Used for Fluid Requirements:  Standard Renal      Nutrition Related Findings:  A&Ox4, cough improving, -I/O 6.9 L, no edema, soft abd +BS,      Wounds:  None       Current Nutrition Therapies:    ADULT DIET; Regular; 3 carb choices (45 gm/meal); Low Sodium (2 gm); Low Potassium (Less than 3000 mg/day)  ADULT ORAL NUTRITION SUPPLEMENT; Breakfast, Dinner; Renal Oral Supplement  ADULT ORAL NUTRITION SUPPLEMENT; Lunch, Dinner;  Other Oral Supplement; GELATEIN 20    Anthropometric Measures:  · Height: 6' 2\" (188 cm)  · Current Body Weight: 141 lb 14.4 oz (64.4 kg) (12/10 bedscale)   · Admission Body Weight: 154 lb (69.9 kg) (12/7 bedscale)    · Usual Body Weight: 157 lb (71.2 kg) (per EMR x 3 mo)     · Ideal Body Weight: 190 lbs; % Ideal Body Weight 74.7 %   · BMI: 18.2  · BMI Categories: Underweight (BMI less than 22) age over 72       Nutrition Diagnosis:   · Increased nutrient needs related to renal dysfunction (Known losses via HD) as evidenced by BMI, intake 51-75%, dialysis      Nutrition Interventions:   Food and/or Nutrient Delivery:  Continue Current Diet, Start Oral Nutrition Supplement (Renal ONS and Hi Pro Gelatin ONS)  Nutrition Education/Counseling:  Education initiated   Coordination of Nutrition Care:  Continue to monitor while inpatient    Goals:  PO >75% at meals/ONS       Nutrition Monitoring and Evaluation:   Behavioral-Environmental Outcomes:  None Identified   Food/Nutrient Intake Outcomes:  Food and Nutrient Intake, Supplement Intake  Physical Signs/Symptoms Outcomes:  Biochemical Data, GI Status, Fluid Status or Edema, Nutrition Focused Physical Findings, Skin, Weight     Discharge Planning:    Continue Oral Nutrition Supplement     Electronically signed by Natalia Nelson RD, CNSC, LD on 12/10/21 at 2:10 PM EST    Contact: [phone]

## 2021-12-10 NOTE — PROGRESS NOTES
INPATIENT CARDIOLOGY FOLLOW-UP    Name: China Horn    Age: 66 y.o. Date of Admission: 12/7/2021 12:15 PM    Date of Service: 12/10/2021    Primary Cardiologist: Known to me from this admission    Chief Complaint: Follow-up for cardiomyopathy    Interim History:  Continues to feel well. Denies any complaints. No chest pain or shortness of breath. Remains on room air. Again is declining wearable cardioverter defibrillator.     Review of Systems:   Negative except as described above    Problem List:  Patient Active Problem List   Diagnosis    [facility]    Metabolic acidosis    Hypertension    Uncontrolled diabetes mellitus ([facility]    Hyperbilirubinemia    Thrombocytopenia ([facility]    Hypothyroid    ESRD (end stage renal disease) ([facility]    Acute respiratory failure with hypoxia (HCC)    Volume overload    Elevated troponin    Hypertensive emergency    Hyperkalemia    Moderate protein malnutrition (HCC)    Acute renal failure superimposed on chronic kidney disease, on chronic dialysis ([facility]       Current Medications:    Current Facility-Administered Medications:     [START ON 12/11/2021] sacubitril-valsartan (ENTRESTO) 24-26 MG per tablet 1 tablet, 1 tablet, Oral, BID, Vicki Frank MD    calcium gluconate 2,000 mg in dextrose 5 % 100 mL IVPB, 2,000 mg, IntraVENous, Once, KIANNA Barrett - CNP    carvedilol (COREG) tablet 25 mg, 25 mg, Oral, BID WC, Vicki Frank MD, 25 mg at 12/10/21 5853    aspirin EC tablet 81 mg, 81 mg, Oral, Daily, Vicki Frank MD, 81 mg at 12/10/21 8296    isosorbide dinitrate (ISORDIL) tablet 10 mg, 10 mg, Oral, TID, Herman Sachin, [place], 10 mg at 12/10/21 6404    hydrALAZINE (APRESOLINE) tablet 25 mg, 25 mg, Oral, 3 times per day, Herman Sachin [place]    atorvastatin (LIPITOR) tablet 40 mg, 40 mg, Oral, Nightly, [place], [place], 40 mg at 12/09/21 2221    bumetanide (BUMEX) tablet 2 mg, 2 mg, Oral, BID, Joceline Tristan MD, 2 mg at 12/10/21 5076    calcium carbonate (TUMS) chewable tablet 500 mg, 500 mg, Oral, TID PRN, Walker Viveros MD    clobetasol (TEMOVATE) 0.05 % ointment, , Topical, BID, Walker Viveros MD    colchicine (COLCRYS) tablet 0.6 mg, 0.6 mg, Oral, PRN, Walker Viveros MD    levothyroxine (SYNTHROID) tablet 112 mcg, 112 mcg, Oral, Daily, Walker Viveros MD, 112 mcg at 12/10/21 8815    magnesium oxide (MAG-OX) tablet 400 mg, 400 mg, Oral, Daily, Walker Viveros MD, 400 mg at 12/10/21 8243    sodium bicarbonate tablet 650 mg, 650 mg, Oral, TID, Walker Viveros MD, 650 mg at 12/10/21 1054    tamsulosin (FLOMAX) capsule 0.4 mg, 0.4 mg, Oral, Daily, Walker Viveros MD, 0.4 mg at 12/10/21 3835    sodium chloride flush 0.9 % injection 5-40 mL, 5-40 mL, IntraVENous, 2 times per day, Walker Viveros MD, 10 mL at 12/09/21 2231    sodium chloride flush 0.9 % injection 5-40 mL, 5-40 mL, IntraVENous, PRN, Walker Viveros MD    0.9 % sodium chloride infusion, 25 mL, IntraVENous, PRN, Walker Viveros MD    polyethylene glycol [facility]) packet 17 g, 17 g, Oral, Daily PRN, Walker Viveros MD    acetaminophen (TYLENOL) tablet 650 mg, 650 mg, Oral, Q6H PRN **OR** acetaminophen (TYLENOL) suppository 650 mg, 650 mg, Rectal, Q6H PRN, Walker Viveros MD    aluminum & magnesium hydroxide-simethicone (MAALOX) 200-200-20 MG/5ML suspension 30 mL, 30 mL, Oral, Q6H PRN, Walker Viveros MD    heparin (porcine) injection 5,000 Units, 5,000 Units, SubCUTAneous, 3 times per day, Walker Viveros MD, 5,000 Units at 12/10/21 0600    guaiFENesin-dextromethorphan (ROBITUSSIN DM) 100-10 MG/5ML syrup 5 mL, 5 mL, Oral, Q4H PRN, Walker Viveros MD    Vitamin D (CHOLECALCIFEROL) tablet 2,000 Units, 2,000 Units, Oral, Daily, Walker Viveros MD, 2,000 Units at 12/10/21 0823    albuterol sulfate  (90 Base) MCG/ACT inhaler 2 puff, 2 puff, Inhalation, Q6H PRN, Walker Viveros MD    insulin lispro (HUMALOG) injection vial 0-6 Units, 0-6 Units, SubCUTAneous, TID WC, Isaiah Smalls MD    insulin lispro (HUMALOG) injection vial 0-3 Units, 0-3 Units, SubCUTAneous, Nightly, Isaiah Smalls MD    glucose (GLUTOSE) 40 % oral gel 15 g, 15 g, Oral, PRN, Isaiah Smalls MD    dextrose 50 % IV solution, 12.5 g, IntraVENous, PRN, Isaiah Smalls MD    glucagon (rDNA) injection 1 mg, 1 mg, IntraMUSCular, PRN, Isaiah Smalls MD    dextrose 5 % solution, 100 mL/hr, IntraVENous, PRN, Isaiah Smalls MD  Silviano Yousif  epoetin sabiha-epbx (RETACRIT) injection 3,000 Units, 3,000 Units, SubCUTAneous, Once per day on Mon Wed Fri, Ynes Narayanan MD, 3,000 Units at 12/10/21 0827    metOLazone (ZAROXOLYN) tablet 2.5 mg, 2.5 mg, Oral, Daily, Neo Orourke MD, 2.5 mg at 12/10/21 8339    ascorbic acid (VITAMIN C) tablet 500 mg, 500 mg, Oral, Daily, Isaiah Smalls MD, 500 mg at 12/10/21 7886    zinc sulfate (ZINCATE) capsule 50 mg, 50 mg, Oral, Daily, Isaiah Smalls MD, 50 mg at 12/10/21 7432    Physical Exam:  /76   Pulse 75   Temp 97.8 °F (36.6 °C) (Oral)   Resp 16   Ht 6' 2\" (1.88 m)   Wt 141 lb 14.4 oz (64.4 kg)   SpO2 95%   BMI 18.22 kg/m²   Wt Readings from Last 3 Encounters:   12/10/21 141 lb 14.4 oz (64.4 kg)   11/22/21 135 lb 2.3 oz (61.3 kg)   08/25/20 166 lb (75.3 kg)     Appearance: Awake, alert, no acute respiratory distress  Skin: Intact, no rash  Head: Normocephalic, atraumatic  Eyes: EOMI, no conjunctival erythema  ENMT: No pharyngeal erythema, MMM, no rhinorrhea  Neck: Supple, no elevated JVP, no carotid bruits  Lungs: Clear to auscultation bilaterally. No wheezes, rales, or rhonchi.   Cardiac: PMI nondisplaced, Regular rhythm with a normal rate, S1 & S2 normal, no murmurs  Right chest tunneled HD catheter  Abdomen: Soft, nontender, +bowel sounds  Extremities: Moves all extremities x 4, no lower extremity edema  Neurologic: No focal motor deficits apparent, normal mood and affect  Peripheral Pulses: Intact posterior tibial pulses bilaterally    Intake/Output:    Intake/Output Summary (Last 24 hours) at 12/10/2021 0929  Last data filed at 12/9/2021 1233  Gross per 24 hour   Intake --   Output 2300 ml   Net -2300 ml     No intake/output data recorded.     Laboratory Tests:  Recent Labs     12/08/21  1220 12/09/21  0820 12/10/21  0323    140 136   K 4.1 3.7 4.1    101 97*   CO2 23 27 27   BUN 36* 15 14   CREATININE 7.0* 4.1* 3.5*   GLUCOSE 194* 159* 123*   CALCIUM 8.2* 8.1* 8.3*     Lab Results   Component Value Date    MG 1.9 12/10/2021     Recent Labs     12/07/21  1302 12/08/21  1220   ALKPHOS 110 107   ALT 16 15   AST 23 22   PROT 7.8 6.9   BILITOT 0.5 0.3   LABALBU 3.8 3.3*     Recent Labs     12/08/21  1220 12/09/21  0820 12/10/21  0323   WBC 3.2* 2.8* 3.2*   RBC 2.87* 2.72* 3.02*   HGB 9.0* 8.5* 9.3*   HCT 28.5* 26.6* 29.5*   MCV 99.3 97.8 97.7   MCH 31.4 31.3 30.8   MCHC 31.6* 32.0 31.5*   RDW 15.5* 15.5* 15.3*    159 157   MPV 10.7 11.4 10.8     Lab Results   Component Value Date    CKTOTAL 1,503 (H) 06/26/2020     Lab Results   Component Value Date    INR 1.1 08/24/2021    INR 1.0 06/02/2020    INR 1.1 12/04/2018    PROTIME 12.1 08/24/2021    PROTIME 11.2 06/02/2020    PROTIME 12.2 12/04/2018     Lab Results   Component Value Date    .600 (H) 11/20/2021     Lab Results   Component Value Date    LABA1C 5.3 11/12/2021     No results found for: EAG  Lab Results   Component Value Date    CHOL 200 (H) 08/24/2021     Lab Results   Component Value Date    TRIG 109 08/24/2021     Lab Results   Component Value Date    HDL 53 11/12/2021    HDL 51 08/24/2021    HDL 38 06/02/2020     Lab Results   Component Value Date    LDLCALC 147 (H) 11/12/2021    LDLCALC 127 (H) 08/24/2021    LDLCALC 178 (H) 06/02/2020     Lab Results   Component Value Date    LABVLDL 30 11/12/2021    LABVLDL 22 08/24/2021    LABVLDL 34 06/02/2020     No results found for: CHOLHDLRATIO  Recent Labs     12/07/21  1302   PROBNP >70,000*       Cardiac Tests:    EKG:   Sinus rhythm 91 bpm with anterior T wave inversions. QTc 521 ms    Telemetry: Sinus rhythm 60s. Nonsustained SVT 120s likely PAT    Chest X-ray:   12/7/21    Impression   1.  Slightly limited exam       2.  Constellation of findings suggestive of fluid overload, radiographically   minimally improved since 11/21/2021. Echocardiogram:   TTE 11/21/21     Summary   Normal left ventricular chamber size. Normal left ventricular systolic   function. Visually estimated LVEF is 25-30 %. There is severe diffuse hypokinesis with akinesis of the apical segments. Grade II diastolic dysfunction with elevated LA pressure. Normal right ventricle structure and function. Normal left atrium. Normal right atrium. Mild mitral regurgitation is present. Trace anterior pericardial effusion without tamponade. Unable to estimate PA pressure. Stress test:      Cardiac catheterization:     ----------------------------------------------------------------------------------------------------------------------------------------------------------------  IMPRESSION:  1. Acute on chronic heart failure with reduced ejection fraction. proBNP > 70,000. Net -6.9 L  2. New onset cardiomyopathy, EF 25-30% suspect ischemic  3. Abnormal EKG/prolonged QT  4. COVID-19 pneumonia, on room air  5. ESRD newly started on HD  6. Hypertension, better controlled  7. Type 2 diabetes reported, though A1c is 5.3%  8. Hypothyroidism    RECOMMENDATIONS:  Stable from cardiac standpoint without anginal symptoms.      Continue to optimize GDMT  o Continue carvedilol to 25 mg twice daily  o Continue isosorbide dinitrate 10 mg 3 times daily, hydralazine 25 mg 3 times daily  o Discontinue losartan, start low-dose sacubitril/valsartan, discussed with Dr. Federico Wright Continue low-dose aspirin   Avoid QT prolonging medications   Diuretic/fluid management per nephrology   Plan for outpatient heart catheterization once recovered from Covid, or sooner if develops any anginal symptoms or ischemic arrhythmias   Again discussed wearable cardioverter defibrillator and its purpose to abort sudden cardiac death, patient voiced understanding and continues to decline for now understanding the risks   Aggressive risk factor modification   Consult to heart failure education nursing   May be discharged from cardiology standpoint   I will arrange close outpatient follow-up with me in the office and subsequent ischemic evaluation, though patient does voice hesitation for heart catheterization   Will be available as needed, please call with questions    Dulce Cota MD, [facility]    NOTE: This report was transcribed using voice recognition software. Every effort was made to ensure accuracy; however, inadvertent computerized transcription errors may be present.

## 2021-12-10 NOTE — CARE COORDINATION
ANDRES spoke with Stacie () at [facility] who advises having no Medicare 2728 form on file as patient is acute/new to dialysis. Stacie aware of COVID+ status and awaiting discharge date to make arrangements for HD by himself d/t COVID+.

## 2021-12-10 NOTE — PROGRESS NOTES
levels of >70,000 and CXR suggestive of pulmonary edema, reason for this consultation. IMPRESSION/RECOMMENDATIONS:      1. ESRD 2/2 to diabetic nephropathy, recently started on renal replacement therapy, on intermittent HD 3 times a week Tuesday Thursday and Saturday, via right IJ vein tunneled dialysis. 2. HFrEF 25-30% with stage II DD, proBNP >70,000, on bumex, metolazone, hydralazine and Entresto. Plan for outpatient cardiac cath. 3. HTN, on carvedilol and hydralazine, BP controled   4. MBD of CKD, obtain phosphorus level  5. Anemia of CKD, on epoetin alpha  ----------------------------------  6. History of hepatitis C  7. Gout, on allopurinol  8. Covid 19 + without respiratory symptoms  9. HLD, on statin  10. Type II diabetes, on insulin  11. Vitamin D deficiency, on cholecalciferol 2,000 units daily   12. Nutrition, Low potassium diet    Plan:    · HD 3 times a week TTS.    · Continue Entresto   · Continue Bumex 2 mg p.o. twice daily  · Continue Metolazone 2.5 mg daily  · Continue Epoetin alpha 3000 units 3 times a week  · Continue Carvedilol 6.25 g twice daily  · Continue hydralazine 25 mg tid   · Replace calcium  · Repeat ionized calcium in am  · Low potassium diet  · Discharge planning     Electronically signed by KIANNA Hdez CNP on 12/10/2021 at 11:51 AM

## 2021-12-10 NOTE — CARE COORDINATION
CASE MANAGEMENT. .. Noted patient was started on Entresto. Free 30 day Entresto card given to Mr Alvarez Mojica. Encouraged him to continue this medication as directed, to f/u with his physicians office to make sure his insurance will approve and to make sure it is affordable for him. He voices an understanding. Mr Adair Nicely + on 12/7 and has since received 2 neg results. Droplet isolation was discontinued. Called and spoke with Stacie at [facility] in [address] [place] [address]. Informed her of the above. I emailed her the results to [email or web address] per her request so that she can review with her supervisor. Patient will still receive his HD in [place] regardless. They are just trying to establish a chair time. Patient updated on the above. Will cont to follow. IN ADDENDUM. .. Email will not send to [address] [place] [address] Drive faxed to [facility] [phone]. Stacie is aware. IN ADDENDUM. Rowena Augustin  [facility] notes today from internal medicine stating that COVID was r/o and isolation discontinued  faxed to [facility]  per Sara Grimes request.

## 2021-12-11 LAB
ANION GAP SERPL CALCULATED.3IONS-SCNC: 14 MMOL/L (ref 7–16)
BUN BLDV-MCNC: 27 MG/DL (ref 6–23)
CALCIUM IONIZED: 1.1 MMOL/L (ref 1.15–1.33)
CALCIUM SERPL-MCNC: 8.2 MG/DL (ref 8.6–10.2)
CHLORIDE BLD-SCNC: 96 MMOL/L (ref 98–107)
CO2: 25 MMOL/L (ref 22–29)
CREAT SERPL-MCNC: 5.4 MG/DL (ref 0.7–1.2)
GFR AFRICAN AMERICAN: 13
GFR NON-AFRICAN AMERICAN: 13 ML/MIN/1.73
GLUCOSE BLD-MCNC: 108 MG/DL (ref 74–99)
HCT VFR BLD CALC: 28.6 % (ref 37–54)
HEMOGLOBIN: 9 G/DL (ref 12.5–16.5)
MCH RBC QN AUTO: 30.5 PG (ref 26–35)
MCHC RBC AUTO-ENTMCNC: 31.5 % (ref 32–34.5)
MCV RBC AUTO: 96.9 FL (ref 80–99.9)
METER GLUCOSE: 102 MG/DL (ref 74–99)
METER GLUCOSE: 126 MG/DL (ref 74–99)
METER GLUCOSE: 92 MG/DL (ref 74–99)
PDW BLD-RTO: 15.3 FL (ref 11.5–15)
PLATELET # BLD: 140 E9/L (ref 130–450)
PMV BLD AUTO: 10.5 FL (ref 7–12)
POTASSIUM SERPL-SCNC: 4.2 MMOL/L (ref 3.5–5)
RBC # BLD: 2.95 E12/L (ref 3.8–5.8)
SODIUM BLD-SCNC: 135 MMOL/L (ref 132–146)
WBC # BLD: 2.6 E9/L (ref 4.5–11.5)

## 2021-12-11 PROCEDURE — 82962 GLUCOSE BLOOD TEST: CPT

## 2021-12-11 PROCEDURE — 6360000002 HC RX W HCPCS: Performed by: FAMILY MEDICINE

## 2021-12-11 PROCEDURE — 6370000000 HC RX 637 (ALT 250 FOR IP): Performed by: PHYSICIAN ASSISTANT

## 2021-12-11 PROCEDURE — 90935 HEMODIALYSIS ONE EVALUATION: CPT | Performed by: INTERNAL MEDICINE

## 2021-12-11 PROCEDURE — 6360000002 HC RX W HCPCS: Performed by: NURSE PRACTITIONER

## 2021-12-11 PROCEDURE — 2060000000 HC ICU INTERMEDIATE R&B

## 2021-12-11 PROCEDURE — 99232 SBSQ HOSP IP/OBS MODERATE 35: CPT | Performed by: FAMILY MEDICINE

## 2021-12-11 PROCEDURE — 36415 COLL VENOUS BLD VENIPUNCTURE: CPT

## 2021-12-11 PROCEDURE — 80048 BASIC METABOLIC PNL TOTAL CA: CPT

## 2021-12-11 PROCEDURE — 82330 ASSAY OF CALCIUM: CPT

## 2021-12-11 PROCEDURE — 85027 COMPLETE CBC AUTOMATED: CPT

## 2021-12-11 PROCEDURE — 6370000000 HC RX 637 (ALT 250 FOR IP): Performed by: FAMILY MEDICINE

## 2021-12-11 PROCEDURE — 6370000000 HC RX 637 (ALT 250 FOR IP): Performed by: INTERNAL MEDICINE

## 2021-12-11 PROCEDURE — 2580000003 HC RX 258: Performed by: NURSE PRACTITIONER

## 2021-12-11 PROCEDURE — APPSS30 APP SPLIT SHARED TIME 16-30 MINUTES: Performed by: PHYSICIAN ASSISTANT

## 2021-12-11 RX ADMIN — TAMSULOSIN HYDROCHLORIDE 0.4 MG: 0.4 CAPSULE ORAL at 12:40

## 2021-12-11 RX ADMIN — HYDRALAZINE HYDROCHLORIDE 25 MG: 25 TABLET, FILM COATED ORAL at 12:40

## 2021-12-11 RX ADMIN — ISOSORBIDE DINITRATE 10 MG: 10 TABLET ORAL at 12:40

## 2021-12-11 RX ADMIN — METOLAZONE 2.5 MG: 2.5 TABLET ORAL at 12:40

## 2021-12-11 RX ADMIN — HEPARIN SODIUM 5000 UNITS: 10000 INJECTION INTRAVENOUS; SUBCUTANEOUS at 06:28

## 2021-12-11 RX ADMIN — ATORVASTATIN CALCIUM 40 MG: 40 TABLET, FILM COATED ORAL at 23:48

## 2021-12-11 RX ADMIN — HEPARIN SODIUM 5000 UNITS: 10000 INJECTION INTRAVENOUS; SUBCUTANEOUS at 23:46

## 2021-12-11 RX ADMIN — Medication 400 MG: at 12:39

## 2021-12-11 RX ADMIN — ASPIRIN 81 MG: 81 TABLET, COATED ORAL at 12:39

## 2021-12-11 RX ADMIN — SACUBITRIL AND VALSARTAN 1 TABLET: 24; 26 TABLET, FILM COATED ORAL at 23:47

## 2021-12-11 RX ADMIN — ISOSORBIDE DINITRATE 10 MG: 10 TABLET ORAL at 23:49

## 2021-12-11 RX ADMIN — CALCIUM GLUCONATE 2000 MG: 98 INJECTION, SOLUTION INTRAVENOUS at 14:27

## 2021-12-11 RX ADMIN — SODIUM BICARBONATE 650 MG: 650 TABLET ORAL at 23:47

## 2021-12-11 RX ADMIN — HEPARIN SODIUM 5000 UNITS: 10000 INJECTION INTRAVENOUS; SUBCUTANEOUS at 12:44

## 2021-12-11 RX ADMIN — LEVOTHYROXINE SODIUM 112 MCG: 0.11 TABLET ORAL at 06:28

## 2021-12-11 RX ADMIN — CLOBETASOL PROPIONATE: 0.5 OINTMENT TOPICAL at 23:57

## 2021-12-11 RX ADMIN — BUMETANIDE 2 MG: 1 TABLET ORAL at 23:46

## 2021-12-11 RX ADMIN — CARVEDILOL 25 MG: 25 TABLET, FILM COATED ORAL at 17:17

## 2021-12-11 RX ADMIN — HYDRALAZINE HYDROCHLORIDE 25 MG: 25 TABLET, FILM COATED ORAL at 06:28

## 2021-12-11 RX ADMIN — SODIUM BICARBONATE 650 MG: 650 TABLET ORAL at 12:39

## 2021-12-11 ASSESSMENT — PAIN SCALES - GENERAL
PAINLEVEL_OUTOF10: 0

## 2021-12-11 NOTE — FLOWSHEET NOTE
Pt completed 4hrs on a 2K bath with 2L of UF removed. 12/11/21 1139   Vital Signs   /63   Temp 98.1 °F (36.7 °C)   Pulse 61   Resp 16   Weight 136 lb 3.9 oz (61.8 kg)   Weight Method Bed scale   Percent Weight Change -3.13   Pain Assessment   Pain Assessment 0-10   Pain Level 0   Post-Hemodialysis Assessment   Post-Treatment Procedures Blood returned; Catheter capped, clamped with Citrate x 2 ports   Machine Disinfection Process Acid/Vinegar Clean; Heat Disinfect;  Exterior Machine Disinfection   Rinseback Volume (ml) 300 ml   Total Liters Processed (l/min) 90.9 l/min   Dialyzer Clearance Clear   Duration of Treatment (minutes) 240 minutes   Hemodialysis Output (ml) 2300 ml   NET Removed (ml) 2000 ml   Tolerated Treatment Good   Patient Response to Treatment tolerated well; cath care per [facility] policy; closed with citrate; post report to Carol KENNEDY   Bilateral Breath Sounds Clear

## 2021-12-11 NOTE — PROGRESS NOTES
Department of Internal Medicine  Nephrology Progress Note    Events reviewed. Seen on HD. SUBJECTIVE: We are following Mr. Adelita Dao for ESRD on HD. He reports no complaints.      PHYSICAL EXAM:      Vitals:    VITALS:  BP (!) 119/57   Pulse 64   Temp 98.3 °F (36.8 °C)   Resp 16   Ht 6' 2\" (1.88 m)   Wt 140 lb 10.5 oz (63.8 kg)   SpO2 98%   BMI 18.06 kg/m²   24HR INTAKE/OUTPUT:    No intake or output data in the 24 hours ending 12/11/21 0945    Access: Right IJ vein tunneled dialysis catheter in place  Constitutional:  Awake, alert and in no distress  HEENT:  PERRLA  Respiratory:  Decreased breath sounds at the bases  Cardiovascular/Edema:  Heart sounds regular  Gastrointestinal:  Abdomen  Neurologic:  Non focal   Skin:  No lesions   Other:  + edema     Scheduled Meds:   calcium gluconate IVPB  2,000 mg IntraVENous Once    sacubitril-valsartan  1 tablet Oral BID    carvedilol  25 mg Oral BID WC    aspirin  81 mg Oral Daily    isosorbide dinitrate  10 mg Oral TID    hydrALAZINE  25 mg Oral 3 times per day    atorvastatin  40 mg Oral Nightly    bumetanide  2 mg Oral BID    clobetasol   Topical BID    levothyroxine  112 mcg Oral Daily    magnesium oxide  400 mg Oral Daily    sodium bicarbonate  650 mg Oral TID    tamsulosin  0.4 mg Oral Daily    sodium chloride flush  5-40 mL IntraVENous 2 times per day    heparin (porcine)  5,000 Units SubCUTAneous 3 times per day    Vitamin D  2,000 Units Oral Daily    insulin lispro  0-6 Units SubCUTAneous TID WC    insulin lispro  0-3 Units SubCUTAneous Nightly    epoetin sabiha-epbx  3,000 Units SubCUTAneous Once per day on Mon Wed Fri    metOLazone  2.5 mg Oral Daily    ascorbic acid  500 mg Oral Daily    zinc sulfate  50 mg Oral Daily     Continuous Infusions:   sodium chloride      dextrose       PRN Meds:.calcium carbonate, colchicine, sodium chloride flush, sodium chloride, polyethylene glycol, acetaminophen **OR** acetaminophen, aluminum & magnesium hydroxide-simethicone, guaiFENesin-dextromethorphan, albuterol sulfate HFA, glucose, dextrose, glucagon (rDNA), dextrose    DATA:    CBC:   Lab Results   Component Value Date    WBC 2.6 12/11/2021    RBC 2.95 12/11/2021    HGB 9.0 12/11/2021    HCT 28.6 12/11/2021    MCV 96.9 12/11/2021    MCH 30.5 12/11/2021    MCHC 31.5 12/11/2021    RDW 15.3 12/11/2021     12/11/2021    MPV 10.5 12/11/2021     CMP:    Lab Results   Component Value Date     12/11/2021    K 4.2 12/11/2021    K 4.1 12/08/2021    CL 96 12/11/2021    CO2 25 12/11/2021    BUN 27 12/11/2021    CREATININE 5.4 12/11/2021    GFRAA 13 12/11/2021    LABGLOM 13 12/11/2021    GLUCOSE 108 12/11/2021    PROT 6.9 12/08/2021    LABALBU 3.3 12/08/2021    CALCIUM 8.2 12/11/2021    BILITOT 0.3 12/08/2021    ALKPHOS 107 12/08/2021    AST 22 12/08/2021    ALT 15 12/08/2021     Magnesium:    Lab Results   Component Value Date    MG 1.9 12/10/2021     Phosphorus:    Lab Results   Component Value Date    PHOS 3.0 12/10/2021     Radiology Review:      Chest x-ray December 7, 2021   1.  Slightly limited exam       2.  Constellation of findings suggestive of fluid overload, radiographically   minimally improved since 11/21/2021.       .       RECOMMENDATION:   1.   Recommend follow-up thoracic imaging, as directed clinically.       .           BRIEF SUMMARY OF INITIAL CONSULT:    Briefly Mr. Moises Baum is a 66year old man with h/o ESRD 2/2 to diabetic nephropathy, recently started on renal replacement therapy, on intermittent HD 3 times a week Tuesday Thursday and Saturday, via right IJ vein tunneled dialysis catheter, type 2 DM, HTN, HFrEF 25-30% with stage II DD, hepatitis C, hypothyroidism, BPH, who was admitted on December 7, 2021 after he presented to the ER with increasing shortness of breath. He was found with pro-BNP levels of >70,000 and CXR suggestive of pulmonary edema, reason for this consultation. IMPRESSION/RECOMMENDATIONS:      1.  ESRD 2/2 to diabetic nephropathy, recently started on renal replacement therapy, on intermittent HD 3 times a week Tuesday Thursday and Saturday, via right IJ vein tunneled dialysis. Seen on dialysis, tolerating well. 2. Hypocalcemia, secondary to vitamin D deficiency. To replace. 3. Vitamin D deficiency, on cholecalciferol   4. HFrEF 25-30% with stage II DD, proBNP >70,000, on bumex, metolazone, hydralazine and Entresto. Plan for outpatient cardiac cath. 5. HTN, on carvedilol and hydralazine, BP controlled   6. MBD of CKD, not on binders  7. Anemia of CKD, on epoetin alpha  ----------------------------------  8. History of hepatitis C  9. Gout, on allopurinol  10. Covid 19 + without respiratory symptoms, repeat test negative   11. HLD, on statin  12. Type II diabetes, on insulin  13.  Nutrition, low potassium diet    Plan:    · HD 3 times a week TTS  · Continue Entresto   · Continue Bumex 2 mg p.o. twice daily  · Continue Metolazone 2.5 mg daily  · Continue epoetin alpha 3000 units 3 times a week  · Continue carvedilol 6.25 g twice daily  · Continue hydralazine 25 mg tid   · Replace calcium  · Monitor ionized calcium level   · Low potassium diet  · Discharge planning       Electronically signed by KIANNA Dunham CNP on 12/11/2021 at 9:45 AM     Dr. Evelyne Durbin loc[facility] tenens physician covering for Dr. Jason Trujillo

## 2021-12-11 NOTE — PROGRESS NOTES
[facility] Progress Note    Admitting Date and Time: 12/7/2021 12:15 PM  Admit Dx: Elevated troponin [R77.8]  T wave inversion in EKG [R94.31]  Hypervolemia, unspecified hypervolemia type [E87.70]  Acute renal failure superimposed on chronic kidney disease, on chronic dialysis, unspecified acute renal failure type ([address] [N17.9, N18.9, Z99.2]  Anemia due to chronic kidney disease, unspecified CKD stage [N18.9, D63.1]  COVID [U07.1]      Assessment:    Active Problems:    Acute renal failure superimposed on chronic kidney disease, on chronic dialysis ([facility] [address]  Resolved Problems:    * No resolved hospital problems. *      Plan:  1. Acute on chronic CHF-repeat CXR same; still having mild to moderate bilateral pleural effusions; no hypoxia. 2.  ESRD with fluid overload-nephrology following  3.  COVID 19 infection without hypoxia-ruled out-repeat testing negative  4.  New onset cardiomyopathy-cardio following; to start entresto today  5.  Essential hypertension-controlled; continue to monitor    Patient remains on Room Air. He was safe for discharge today, however he apparently has not had his chair at dialysis set yet. He did have dialysis today and will be due again on Tuesday. Will discharge as soon as dialysis dates are set. Subjective:  Patient is being followed for Elevated troponin [R77.8]  T wave inversion in EKG [R94.31]  Hypervolemia, unspecified hypervolemia type [E87.70]  Acute renal failure superimposed on chronic kidney disease, on chronic dialysis, unspecified acute renal failure type ([address] [N17.9, N18.9, Z99.2]  Anemia due to chronic kidney disease, unspecified CKD stage [N18.9, D63.1]  COVID [U07.1]   Pt feels weak and a little dizzy when upright. He was worried that his BP was low. It was in actuality normal to high. He was also interested in the dietary suggestions. Nutrition was consulted yesterday, but has not had a chance to talk with him as of yet.   He would like some instruction from a dietician before going home. ROS: denies fever, chills, cp, sob, n/v, HA unless stated above.       sacubitril-valsartan  1 tablet Oral BID    carvedilol  25 mg Oral BID WC    aspirin  81 mg Oral Daily    isosorbide dinitrate  10 mg Oral TID    hydrALAZINE  25 mg Oral 3 times per day    atorvastatin  40 mg Oral Nightly    bumetanide  2 mg Oral BID    clobetasol   Topical BID    levothyroxine  112 mcg Oral Daily    magnesium oxide  400 mg Oral Daily    sodium bicarbonate  650 mg Oral TID    tamsulosin  0.4 mg Oral Daily    sodium chloride flush  5-40 mL IntraVENous 2 times per day    heparin (porcine)  5,000 Units SubCUTAneous 3 times per day    Vitamin D  2,000 Units Oral Daily    insulin lispro  0-6 Units SubCUTAneous TID WC    insulin lispro  0-3 Units SubCUTAneous Nightly    epoetin sabiha-epbx  3,000 Units SubCUTAneous Once per day on Mon Wed Fri    metOLazone  2.5 mg Oral Daily    ascorbic acid  500 mg Oral Daily    zinc sulfate  50 mg Oral Daily     calcium carbonate, 500 mg, TID PRN  colchicine, 0.6 mg, PRN  sodium chloride flush, 5-40 mL, PRN  sodium chloride, 25 mL, PRN  polyethylene glycol, 17 g, Daily PRN  acetaminophen, 650 mg, Q6H PRN   Or  acetaminophen, 650 mg, Q6H PRN  aluminum & magnesium hydroxide-simethicone, 30 mL, Q6H PRN  guaiFENesin-dextromethorphan, 5 mL, Q4H PRN  albuterol sulfate HFA, 2 puff, Q6H PRN  glucose, 15 g, PRN  dextrose, 12.5 g, PRN  glucagon (rDNA), 1 mg, PRN  dextrose, 100 mL/hr, PRN         Objective:    BP (!) 148/70   Pulse 69   Temp 97.8 °F (36.6 °C) (Oral)   Resp 16   Ht 6' 2\" (1.88 m)   Wt 136 lb 3.9 oz (61.8 kg)   SpO2 95%   BMI 17.49 kg/m²     General Appearance: alert and oriented to person, place and time and in no acute distress  Skin: warm and dry  Head: normocephalic and atraumatic  Eyes: pupils equal, round, and reactive to light, extraocular eye movements intact, conjunctivae normal  Neck: neck supple and non tender

## 2021-12-11 NOTE — PROGRESS NOTES
[facility] Progress Note    Admitting Date and Time: 12/7/2021 12:15 PM  Admit Dx: Elevated troponin [R77.8]  T wave inversion in EKG [R94.31]  Hypervolemia, unspecified hypervolemia type [E87.70]  Acute renal failure superimposed on chronic kidney disease, on chronic dialysis, unspecified acute renal failure type ([address] [N17.9, N18.9, Z99.2]  Anemia due to chronic kidney disease, unspecified CKD stage [N18.9, D63.1]  COVID [U07.1]    Subjective:  Patient is being followed for Elevated troponin [R77.8]  T wave inversion in EKG [R94.31]  Hypervolemia, unspecified hypervolemia type [E87.70]  Acute renal failure superimposed on chronic kidney disease, on chronic dialysis, unspecified acute renal failure type ([address] [N17.9, N18.9, Z99.2]  Anemia due to chronic kidney disease, unspecified CKD stage [N18.9, D63.1]  COVID [U07.1]     Patient was lying in bed in no acute distress. Reports dizziness during HD. Denies any new concerns. ROS: denies fever, chills, cp, sob, n/v, HA unless stated above.       sacubitril-valsartan  1 tablet Oral BID    carvedilol  25 mg Oral BID WC    aspirin  81 mg Oral Daily    isosorbide dinitrate  10 mg Oral TID    hydrALAZINE  25 mg Oral 3 times per day    atorvastatin  40 mg Oral Nightly    bumetanide  2 mg Oral BID    clobetasol   Topical BID    levothyroxine  112 mcg Oral Daily    magnesium oxide  400 mg Oral Daily    sodium bicarbonate  650 mg Oral TID    tamsulosin  0.4 mg Oral Daily    sodium chloride flush  5-40 mL IntraVENous 2 times per day    heparin (porcine)  5,000 Units SubCUTAneous 3 times per day    Vitamin D  2,000 Units Oral Daily    insulin lispro  0-6 Units SubCUTAneous TID WC    insulin lispro  0-3 Units SubCUTAneous Nightly    epoetin sabiha-epbx  3,000 Units SubCUTAneous Once per day on Mon Wed Fri    metOLazone  2.5 mg Oral Daily    ascorbic acid  500 mg Oral Daily    zinc sulfate  50 mg Oral Daily     calcium carbonate, 500 mg, TID PRN  colchicine, 0.6 mg, PRN  sodium chloride flush, 5-40 mL, PRN  sodium chloride, 25 mL, PRN  polyethylene glycol, 17 g, Daily PRN  acetaminophen, 650 mg, Q6H PRN   Or  acetaminophen, 650 mg, Q6H PRN  aluminum & magnesium hydroxide-simethicone, 30 mL, Q6H PRN  guaiFENesin-dextromethorphan, 5 mL, Q4H PRN  albuterol sulfate HFA, 2 puff, Q6H PRN  glucose, 15 g, PRN  dextrose, 12.5 g, PRN  glucagon (rDNA), 1 mg, PRN  dextrose, 100 mL/hr, PRN         Objective:    BP (!) 148/70   Pulse 69   Temp 97.8 °F (36.6 °C) (Oral)   Resp 16   Ht 6' 2\" (1.88 m)   Wt 136 lb 3.9 oz (61.8 kg)   SpO2 95%   BMI 17.49 kg/m²   General Appearance: alert and oriented to person, place and time and in no acute distress  Skin: warm and dry, HD cath placed  Head: normocephalic and atraumatic  Eyes: pupils equal, round, and reactive to light, extraocular eye movements intact, conjunctivae normal  Neck: neck supple and non tender without mass   Pulmonary/Chest: clear to auscultation bilaterally- no wheezes, rales or rhonchi, normal air movement, no respiratory distress  Cardiovascular: normal rate, normal S1 and S2 and no carotid bruits  Abdomen: soft, non-tender, non-distended, normal bowel sounds, no masses or organomegaly  Extremities: no cyanosis, no clubbing and no edema  Neurologic: no cranial nerve deficit and speech normal        Recent Labs     12/09/21  0820 12/10/21  0323 12/11/21  0320    136 135   K 3.7 4.1 4.2    97* 96*   CO2 27 27 25   BUN 15 14 27*   CREATININE 4.1* 3.5* 5.4*   GLUCOSE 159* 123* 108*   CALCIUM 8.1* 8.3* 8.2*       Recent Labs     12/09/21  0820 12/10/21  0323 12/11/21  0320   WBC 2.8* 3.2* 2.6*   RBC 2.72* 3.02* 2.95*   HGB 8.5* 9.3* 9.0*   HCT 26.6* 29.5* 28.6*   MCV 97.8 97.7 96.9   MCH 31.3 30.8 30.5   MCHC 32.0 31.5* 31.5*   RDW 15.5* 15.3* 15.3*    157 140   MPV 11.4 10.8 10.5       Radiology: None    Assessment:    Active Problems:    Acute renal failure superimposed on chronic kidney disease, on chronic dialysis [facility])  Resolved Problems:    * No resolved hospital problems. *      Plan:  1. Fluid overload with hx of ESRD: Patient receives HD. Nephrology consulted for fluid management. Dietitian consulted for diet. 2. Acute on chronic CHF: Echo 11/21 showed EF 25-30%. Cardiology following. Continue Bumex, Hydralazine, Entresto and Metolazone. 3. New onset cardiomyopathy: Echo showed EF 25-30%. Cardiology following. Continue Carvedilol, Isosorbide dinitrate, and hydralazine. Stopped Losartan. Started low dose Entresto. Dietitian consulted for diet    4. COVID infection ruled out. Positive rapid in ER. Negative PCR x2. Ok to discharge isolation. 5. HTN urgency: Continue home meds. Nephrology following     6. HLD:Continue Lipitor.      7. Hypothyroid disease: Continue Synthroid     8. DM: Hga1c in November was 5.3. Insulin ss     9. Anemia secondary to CKD: monitor     10. H/o Hep C     11. Elevated troponin: Cardiology following     12. Dizziness: Orthostatics ordered. Dispo: Ready for discharge today, but is waiting for chair time.        NOTE: This report was transcribed using voice recognition software. Every effort was made to ensure accuracy; however, inadvertent computerized transcription errors may be present.   Electronically signed by Lois Holter, PA-C on 12/11/2021 at 4:41 PM

## 2021-12-12 LAB
ANION GAP SERPL CALCULATED.3IONS-SCNC: 11 MMOL/L (ref 7–16)
BUN BLDV-MCNC: 20 MG/DL (ref 6–23)
CALCIUM IONIZED: 1.15 MMOL/L (ref 1.15–1.33)
CALCIUM SERPL-MCNC: 8.6 MG/DL (ref 8.6–10.2)
CHLORIDE BLD-SCNC: 95 MMOL/L (ref 98–107)
CO2: 28 MMOL/L (ref 22–29)
CREAT SERPL-MCNC: 4 MG/DL (ref 0.7–1.2)
GFR AFRICAN AMERICAN: 18
GFR NON-AFRICAN AMERICAN: 18 ML/MIN/1.73
GLUCOSE BLD-MCNC: 181 MG/DL (ref 74–99)
HCT VFR BLD CALC: 29.7 % (ref 37–54)
HEMOGLOBIN: 9.4 G/DL (ref 12.5–16.5)
MCH RBC QN AUTO: 30.7 PG (ref 26–35)
MCHC RBC AUTO-ENTMCNC: 31.6 % (ref 32–34.5)
MCV RBC AUTO: 97.1 FL (ref 80–99.9)
METER GLUCOSE: 113 MG/DL (ref 74–99)
METER GLUCOSE: 144 MG/DL (ref 74–99)
METER GLUCOSE: 149 MG/DL (ref 74–99)
METER GLUCOSE: 96 MG/DL (ref 74–99)
PDW BLD-RTO: 15 FL (ref 11.5–15)
PLATELET # BLD: 150 E9/L (ref 130–450)
PMV BLD AUTO: 10.8 FL (ref 7–12)
POTASSIUM SERPL-SCNC: 3.9 MMOL/L (ref 3.5–5)
RBC # BLD: 3.06 E12/L (ref 3.8–5.8)
SODIUM BLD-SCNC: 134 MMOL/L (ref 132–146)
WBC # BLD: 2.8 E9/L (ref 4.5–11.5)

## 2021-12-12 PROCEDURE — 36415 COLL VENOUS BLD VENIPUNCTURE: CPT

## 2021-12-12 PROCEDURE — 6370000000 HC RX 637 (ALT 250 FOR IP): Performed by: PHYSICIAN ASSISTANT

## 2021-12-12 PROCEDURE — 99232 SBSQ HOSP IP/OBS MODERATE 35: CPT | Performed by: FAMILY MEDICINE

## 2021-12-12 PROCEDURE — 2580000003 HC RX 258: Performed by: FAMILY MEDICINE

## 2021-12-12 PROCEDURE — 6360000002 HC RX W HCPCS: Performed by: FAMILY MEDICINE

## 2021-12-12 PROCEDURE — APPSS30 APP SPLIT SHARED TIME 16-30 MINUTES: Performed by: PHYSICIAN ASSISTANT

## 2021-12-12 PROCEDURE — 80048 BASIC METABOLIC PNL TOTAL CA: CPT

## 2021-12-12 PROCEDURE — 82330 ASSAY OF CALCIUM: CPT

## 2021-12-12 PROCEDURE — 6370000000 HC RX 637 (ALT 250 FOR IP): Performed by: INTERNAL MEDICINE

## 2021-12-12 PROCEDURE — 6370000000 HC RX 637 (ALT 250 FOR IP): Performed by: FAMILY MEDICINE

## 2021-12-12 PROCEDURE — 82962 GLUCOSE BLOOD TEST: CPT

## 2021-12-12 PROCEDURE — 2060000000 HC ICU INTERMEDIATE R&B

## 2021-12-12 PROCEDURE — 85027 COMPLETE CBC AUTOMATED: CPT

## 2021-12-12 RX ADMIN — CLOBETASOL PROPIONATE: 0.5 OINTMENT TOPICAL at 22:53

## 2021-12-12 RX ADMIN — ISOSORBIDE DINITRATE 10 MG: 10 TABLET ORAL at 09:23

## 2021-12-12 RX ADMIN — ZINC SULFATE 220 MG (50 MG) CAPSULE 50 MG: CAPSULE at 09:22

## 2021-12-12 RX ADMIN — SACUBITRIL AND VALSARTAN 1 TABLET: 24; 26 TABLET, FILM COATED ORAL at 09:22

## 2021-12-12 RX ADMIN — Medication 10 ML: at 09:29

## 2021-12-12 RX ADMIN — ISOSORBIDE DINITRATE 10 MG: 10 TABLET ORAL at 22:39

## 2021-12-12 RX ADMIN — BUMETANIDE 2 MG: 1 TABLET ORAL at 22:40

## 2021-12-12 RX ADMIN — SACUBITRIL AND VALSARTAN 1 TABLET: 24; 26 TABLET, FILM COATED ORAL at 22:39

## 2021-12-12 RX ADMIN — CARVEDILOL 25 MG: 25 TABLET, FILM COATED ORAL at 18:38

## 2021-12-12 RX ADMIN — Medication 400 MG: at 09:21

## 2021-12-12 RX ADMIN — SODIUM BICARBONATE 650 MG: 650 TABLET ORAL at 14:14

## 2021-12-12 RX ADMIN — CLOBETASOL PROPIONATE: 0.5 OINTMENT TOPICAL at 09:24

## 2021-12-12 RX ADMIN — CARVEDILOL 25 MG: 25 TABLET, FILM COATED ORAL at 09:22

## 2021-12-12 RX ADMIN — BUMETANIDE 2 MG: 1 TABLET ORAL at 09:23

## 2021-12-12 RX ADMIN — ISOSORBIDE DINITRATE 10 MG: 10 TABLET ORAL at 14:14

## 2021-12-12 RX ADMIN — Medication 10 ML: at 22:54

## 2021-12-12 RX ADMIN — OXYCODONE HYDROCHLORIDE AND ACETAMINOPHEN 500 MG: 500 TABLET ORAL at 09:22

## 2021-12-12 RX ADMIN — TAMSULOSIN HYDROCHLORIDE 0.4 MG: 0.4 CAPSULE ORAL at 09:22

## 2021-12-12 RX ADMIN — SODIUM BICARBONATE 650 MG: 650 TABLET ORAL at 09:23

## 2021-12-12 RX ADMIN — HEPARIN SODIUM 5000 UNITS: 10000 INJECTION INTRAVENOUS; SUBCUTANEOUS at 22:46

## 2021-12-12 RX ADMIN — LEVOTHYROXINE SODIUM 112 MCG: 0.11 TABLET ORAL at 09:21

## 2021-12-12 RX ADMIN — SODIUM BICARBONATE 650 MG: 650 TABLET ORAL at 22:39

## 2021-12-12 RX ADMIN — HEPARIN SODIUM 5000 UNITS: 10000 INJECTION INTRAVENOUS; SUBCUTANEOUS at 14:14

## 2021-12-12 RX ADMIN — ASPIRIN 81 MG: 81 TABLET, COATED ORAL at 09:22

## 2021-12-12 RX ADMIN — ATORVASTATIN CALCIUM 40 MG: 40 TABLET, FILM COATED ORAL at 22:40

## 2021-12-12 RX ADMIN — Medication 2000 UNITS: at 09:22

## 2021-12-12 RX ADMIN — METOLAZONE 2.5 MG: 2.5 TABLET ORAL at 09:22

## 2021-12-12 ASSESSMENT — PAIN SCALES - GENERAL
PAINLEVEL_OUTOF10: 0
PAINLEVEL_OUTOF10: 0

## 2021-12-12 NOTE — PROGRESS NOTES
[facility] Progress Note    Admitting Date and Time: 12/7/2021 12:15 PM  Admit Dx: Elevated troponin [R77.8]  T wave inversion in EKG [R94.31]  Hypervolemia, unspecified hypervolemia type [E87.70]  Acute renal failure superimposed on chronic kidney disease, on chronic dialysis, unspecified acute renal failure type ([address] [N17.9, N18.9, Z99.2]  Anemia due to chronic kidney disease, unspecified CKD stage [N18.9, D63.1]  COVID [U07.1]    Subjective:  Patient is being followed for Elevated troponin [R77.8]  T wave inversion in EKG [R94.31]  Hypervolemia, unspecified hypervolemia type [E87.70]  Acute renal failure superimposed on chronic kidney disease, on chronic dialysis, unspecified acute renal failure type ([facility] [address] [N17.9, N18.9, Z99.2]  Anemia due to chronic kidney disease, unspecified CKD stage [N18.9, D63.1]  COVID [U07.1]     Patient was lying in bed in no acute distress. Denies any new concerns. ROS: denies fever, chills, cp, sob, n/v, HA unless stated above.       sacubitril-valsartan  1 tablet Oral BID    carvedilol  25 mg Oral BID WC    aspirin  81 mg Oral Daily    isosorbide dinitrate  10 mg Oral TID    hydrALAZINE  25 mg Oral 3 times per day    atorvastatin  40 mg Oral Nightly    bumetanide  2 mg Oral BID    clobetasol   Topical BID    levothyroxine  112 mcg Oral Daily    magnesium oxide  400 mg Oral Daily    sodium bicarbonate  650 mg Oral TID    tamsulosin  0.4 mg Oral Daily    sodium chloride flush  5-40 mL IntraVENous 2 times per day    heparin (porcine)  5,000 Units SubCUTAneous 3 times per day    Vitamin D  2,000 Units Oral Daily    insulin lispro  0-6 Units SubCUTAneous TID WC    insulin lispro  0-3 Units SubCUTAneous Nightly    epoetin sabiha-epbx  3,000 Units SubCUTAneous Once per day on Mon Wed Fri    metOLazone  2.5 mg Oral Daily    ascorbic acid  500 mg Oral Daily    zinc sulfate  50 mg Oral Daily     calcium carbonate, 500 mg, TID PRN  colchicine, 0.6 mg, PRN  sodium chloride flush, 5-40 mL, PRN  sodium chloride, 25 mL, PRN  polyethylene glycol, 17 g, Daily PRN  acetaminophen, 650 mg, Q6H PRN   Or  acetaminophen, 650 mg, Q6H PRN  aluminum & magnesium hydroxide-simethicone, 30 mL, Q6H PRN  guaiFENesin-dextromethorphan, 5 mL, Q4H PRN  albuterol sulfate HFA, 2 puff, Q6H PRN  glucose, 15 g, PRN  dextrose, 12.5 g, PRN  glucagon (rDNA), 1 mg, PRN  dextrose, 100 mL/hr, PRN         Objective:    /70 Comment: manual   Pulse 77   Temp 97.8 °F (36.6 °C) (Oral)   Resp 18   Ht 6' 2\" (1.88 m)   Wt 136 lb 3.9 oz (61.8 kg)   SpO2 99%   BMI 17.49 kg/m²   General Appearance: alert and oriented to person, place and time and in no acute distress  Skin: warm and dry, HD cath placed  Head: normocephalic and atraumatic  Eyes: pupils equal, round, and reactive to light, extraocular eye movements intact, conjunctivae normal  Neck: neck supple and non tender without mass   Pulmonary/Chest: clear to auscultation bilaterally- no wheezes, rales or rhonchi, normal air movement, no respiratory distress  Cardiovascular: normal rate, normal S1 and S2 and no carotid bruits  Abdomen: soft, non-tender, non-distended, normal bowel sounds, no masses or organomegaly  Extremities: no cyanosis, no clubbing and no edema  Neurologic: no cranial nerve deficit and speech normal        Recent Labs     12/10/21  0323 12/11/21  0320 12/12/21  0329    135 134   K 4.1 4.2 3.9   CL 97* 96* 95*   CO2 27 25 28   BUN 14 27* 20   CREATININE 3.5* 5.4* 4.0*   GLUCOSE 123* 108* 181*   CALCIUM 8.3* 8.2* 8.6       Recent Labs     12/10/21  0323 12/11/21  0320 12/12/21  0329   WBC 3.2* 2.6* 2.8*   RBC 3.02* 2.95* 3.06*   HGB 9.3* 9.0* 9.4*   HCT 29.5* 28.6* 29.7*   MCV 97.7 96.9 97.1   MCH 30.8 30.5 30.7   MCHC 31.5* 31.5* 31.6*   RDW 15.3* 15.3* 15.0    140 150   MPV 10.8 10.5 10.8       Radiology: None    Assessment:    Active Problems:    Acute renal failure superimposed on chronic kidney disease, on chronic dialysis [facility])  Resolved Problems:    * No resolved hospital problems. *      Plan:  1. Fluid overload with hx of ESRD: Patient receives HD. Nephrology consulted for fluid management. Dietitian consulted for diet. 2. Acute on chronic CHF: Echo 11/21 showed EF 25-30%. Cardiology following. Continue Bumex, Hydralazine, Entresto and Metolazone. 3. New onset cardiomyopathy: Echo showed EF 25-30%. Cardiology following. Continue Carvedilol, Isosorbide dinitrate, and hydralazine. Stopped Losartan. Started low dose Entresto. Dietitian consulted for diet    4. COVID infection ruled out. Positive rapid in ER. Negative PCR x2. Ok to discharge isolation. 5. HTN urgency: Continue home meds. Nephrology following     6. HLD:Continue Lipitor.      7. Hypothyroid disease: Continue Synthroid     8. DM: Hga1c in November was 5.3. Insulin ss     9. Anemia secondary to CKD: monitor     10. H/o Hep C     11. Elevated troponin: Cardiology following     12. Dizziness: Orthostatics ordered. Dispo: Ready for discharge today, but is waiting for chair time. Spoke to social work, likely will not have chair time set until tomorrow.        NOTE: This report was transcribed using voice recognition software. Every effort was made to ensure accuracy; however, inadvertent computerized transcription errors may be present. Electronically signed by Vesna Carpenter PA-C on 12/12/2021 at 8:22 AM      Addendum: I have personally participated in the history, exam, medical decision making with Cecilia Callahan NP on the date of service, I have personally reviewed imaging and lab data as well as EKG and I agree with all of the pertinent clinical information unless otherwise noted. I have also reviewed and agree with the past medical, family, and social history unless otherwise noted.       PHYSICAL EXAM:  Vitals:  /78   Pulse 65   Temp 97.9 °F (36.6 °C) (Oral)   Resp 16   Ht 6' 2\" (1.88 m)   Wt 136 lb 3.9 oz (61.8 kg) SpO2 99%   BMI 17.49 kg/m²   Lungs are clear to auscultation bilaterally no crackles no wheezing. Heart S1-S2. Abdomen soft nontender nondistended positive bowel sounds. Extremities no peripheral edema. Impression:  Active Problems:    Acute renal failure superimposed on chronic kidney disease, on chronic dialysis ([address]  Resolved Problems:    * No resolved hospital problems. *      My findings/plan include:  1. Acute on chronic CHF-repeat CXR same; still having mild to moderate bilateral pleural effusions; no hypoxia. 2.  ESRD with fluid overload-nephrology following; continue bumex, entresto, metolazone, EPO, coreg  3.  COVID 19 infection without hypoxia-ruled out-repeat testing negative  4.  New onset cardiomyopathy-cardio following;start entresto 12/11/21; continue coreg  5.  Essential hypertension-controlled; continue to monitor     Patient remains on Room Air. He was safe for discharge again today, however he still does not have a chair at dialysis set yet for Tuesday. He did have dialysis Saturday here and will be due again on Tuesday. Will discharge as soon as dialysis dates are set.       NOTE: This report was transcribed using voice recognition software. Every effort was made to ensure accuracy; however, inadvertent computerized transcription errors may be present.   Electronically signed by Silvia Romano MD on 12/12/2021 at 2:32 PM

## 2021-12-12 NOTE — PROGRESS NOTES
chloride flush, sodium chloride, polyethylene glycol, acetaminophen **OR** acetaminophen, aluminum & magnesium hydroxide-simethicone, guaiFENesin-dextromethorphan, albuterol sulfate HFA, glucose, dextrose, glucagon (rDNA), dextrose    DATA:    CBC:   Lab Results   Component Value Date    WBC 2.8 12/12/2021    RBC 3.06 12/12/2021    HGB 9.4 12/12/2021    HCT 29.7 12/12/2021    MCV 97.1 12/12/2021    MCH 30.7 12/12/2021    MCHC 31.6 12/12/2021    RDW 15.0 12/12/2021     12/12/2021    MPV 10.8 12/12/2021     CMP:    Lab Results   Component Value Date     12/12/2021    K 3.9 12/12/2021    K 4.1 12/08/2021    CL 95 12/12/2021    CO2 28 12/12/2021    BUN 20 12/12/2021    CREATININE 4.0 12/12/2021    GFRAA 18 12/12/2021    LABGLOM 18 12/12/2021    GLUCOSE 181 12/12/2021    PROT 6.9 12/08/2021    LABALBU 3.3 12/08/2021    CALCIUM 8.6 12/12/2021    BILITOT 0.3 12/08/2021    ALKPHOS 107 12/08/2021    AST 22 12/08/2021    ALT 15 12/08/2021     Magnesium:    Lab Results   Component Value Date    MG 1.9 12/10/2021     Phosphorus:    Lab Results   Component Value Date    PHOS 3.0 12/10/2021     Radiology Review:      Chest x-ray December 7, 2021   1.  Slightly limited exam       2.  Constellation of findings suggestive of fluid overload, radiographically   minimally improved since 11/21/2021.       .       RECOMMENDATION:   1.   Recommend follow-up thoracic imaging, as directed clinically.       .           BRIEF SUMMARY OF INITIAL CONSULT:    Briefly Mr. Radha Goncalves is a 66year old man with h/o ESRD 2/2 to diabetic nephropathy, recently started on renal replacement therapy, on intermittent HD 3 times a week Tuesday Thursday and Saturday, via right IJ vein tunneled dialysis catheter, type 2 DM, HTN, HFrEF 25-30% with stage II DD, hepatitis C, hypothyroidism, BPH, who was admitted on December 7, 2021 after he presented to the ER with increasing shortness of breath.  He was found with pro-BNP levels of >70,000 and CXR suggestive of pulmonary edema, reason for this consultation. IMPRESSION/RECOMMENDATIONS:      1. ESRD 2/2 to diabetic nephropathy, recently started on renal replacement therapy, on intermittent HD 3 times a week Tuesday Thursday and Saturday, via right IJ vein tunneled dialysis. 2. Hypocalcemia, secondary to vitamin D deficiency. Improved. 3. Vitamin D deficiency, on cholecalciferol   4. HFrEF 25-30% with stage II DD, proBNP >70,000, on bumex, metolazone, hydralazine and Entresto. Plan for outpatient cardiac cath. 5. HTN, on carvedilol and hydralazine, BP controlled   6. MBD of CKD, not on binders  7. Anemia of CKD, on epoetin alpha  ----------------------------------  8. History of hepatitis C  9. Gout, on allopurinol  10. Covid 19 + without respiratory symptoms, repeat test negative   11. HLD, on statin  12. Type II diabetes, on insulin  13.  Nutrition, low potassium diet    Plan:    · HD 3 times a week TTS  · Continue Entresto   · Continue Bumex 2 mg p.o. twice daily  · Continue Metolazone 2.5 mg daily  · Continue epoetin alpha 3000 units 3 times a week  · Continue carvedilol 6.25 g twice daily  · Continue hydralazine 25 mg tid   · Continue to monitor ionized calcium level   · Low potassium diet  · Discharge planning       Electronically signed by KIANNA Simeon CNP on 12/12/2021 at 9:54 AM     Dr. Jhon Mars [employer] physician covering for Dr. Rneetta Singleton

## 2021-12-13 VITALS
HEIGHT: 74 IN | OXYGEN SATURATION: 97 % | DIASTOLIC BLOOD PRESSURE: 85 MMHG | SYSTOLIC BLOOD PRESSURE: 162 MMHG | BODY MASS INDEX: 17.49 KG/M2 | HEART RATE: 67 BPM | WEIGHT: 136.24 LBS | RESPIRATION RATE: 16 BRPM | TEMPERATURE: 97.8 F

## 2021-12-13 PROBLEM — E43 SEVERE PROTEIN-CALORIE MALNUTRITION (HCC): Status: ACTIVE | Noted: 2021-11-22

## 2021-12-13 PROBLEM — E43 SEVERE PROTEIN-CALORIE MALNUTRITION (HCC): Status: ACTIVE | Noted: 2021-12-13

## 2021-12-13 LAB
ANION GAP SERPL CALCULATED.3IONS-SCNC: 14 MMOL/L (ref 7–16)
BLOOD CULTURE, ROUTINE: NORMAL
BUN BLDV-MCNC: 41 MG/DL (ref 6–23)
CALCIUM IONIZED: 1.02 MMOL/L (ref 1.15–1.33)
CALCIUM SERPL-MCNC: 7.8 MG/DL (ref 8.6–10.2)
CHLORIDE BLD-SCNC: 95 MMOL/L (ref 98–107)
CO2: 26 MMOL/L (ref 22–29)
CREAT SERPL-MCNC: 5.8 MG/DL (ref 0.7–1.2)
CULTURE, BLOOD 2: NORMAL
GFR AFRICAN AMERICAN: 12
GFR NON-AFRICAN AMERICAN: 12 ML/MIN/1.73
GLUCOSE BLD-MCNC: 133 MG/DL (ref 74–99)
HCT VFR BLD CALC: 31.5 % (ref 37–54)
HEMOGLOBIN: 10 G/DL (ref 12.5–16.5)
MCH RBC QN AUTO: 30.6 PG (ref 26–35)
MCHC RBC AUTO-ENTMCNC: 31.7 % (ref 32–34.5)
MCV RBC AUTO: 96.3 FL (ref 80–99.9)
METER GLUCOSE: 100 MG/DL (ref 74–99)
METER GLUCOSE: 122 MG/DL (ref 74–99)
PDW BLD-RTO: 14.7 FL (ref 11.5–15)
PLATELET # BLD: 149 E9/L (ref 130–450)
PMV BLD AUTO: 10.9 FL (ref 7–12)
POTASSIUM SERPL-SCNC: 3.9 MMOL/L (ref 3.5–5)
RBC # BLD: 3.27 E12/L (ref 3.8–5.8)
SODIUM BLD-SCNC: 135 MMOL/L (ref 132–146)
WBC # BLD: 3 E9/L (ref 4.5–11.5)

## 2021-12-13 PROCEDURE — 6370000000 HC RX 637 (ALT 250 FOR IP): Performed by: PHYSICIAN ASSISTANT

## 2021-12-13 PROCEDURE — 6370000000 HC RX 637 (ALT 250 FOR IP): Performed by: INTERNAL MEDICINE

## 2021-12-13 PROCEDURE — APPSS45 APP SPLIT SHARED TIME 31-45 MINUTES: Performed by: NURSE PRACTITIONER

## 2021-12-13 PROCEDURE — 99239 HOSP IP/OBS DSCHRG MGMT >30: CPT | Performed by: FAMILY MEDICINE

## 2021-12-13 PROCEDURE — 6360000002 HC RX W HCPCS: Performed by: NURSE PRACTITIONER

## 2021-12-13 PROCEDURE — 82962 GLUCOSE BLOOD TEST: CPT

## 2021-12-13 PROCEDURE — 82330 ASSAY OF CALCIUM: CPT

## 2021-12-13 PROCEDURE — 36415 COLL VENOUS BLD VENIPUNCTURE: CPT

## 2021-12-13 PROCEDURE — 6370000000 HC RX 637 (ALT 250 FOR IP): Performed by: FAMILY MEDICINE

## 2021-12-13 PROCEDURE — 80048 BASIC METABOLIC PNL TOTAL CA: CPT

## 2021-12-13 PROCEDURE — 85027 COMPLETE CBC AUTOMATED: CPT

## 2021-12-13 PROCEDURE — 6360000002 HC RX W HCPCS: Performed by: INTERNAL MEDICINE

## 2021-12-13 PROCEDURE — 2580000003 HC RX 258: Performed by: FAMILY MEDICINE

## 2021-12-13 PROCEDURE — 2580000003 HC RX 258: Performed by: NURSE PRACTITIONER

## 2021-12-13 RX ORDER — CARVEDILOL 25 MG/1
25 TABLET ORAL 2 TIMES DAILY WITH MEALS
Qty: 60 TABLET | Refills: 0 | Status: SHIPPED | OUTPATIENT
Start: 2021-12-13 | End: 2021-12-22 | Stop reason: SDUPTHER

## 2021-12-13 RX ORDER — ASPIRIN 81 MG/1
81 TABLET ORAL DAILY
Qty: 30 TABLET | Refills: 0 | Status: SHIPPED | OUTPATIENT
Start: 2021-12-14 | End: 2021-12-22 | Stop reason: SDUPTHER

## 2021-12-13 RX ORDER — BUMETANIDE 2 MG/1
2 TABLET ORAL 2 TIMES DAILY
Qty: 60 TABLET | Refills: 0 | Status: SHIPPED | OUTPATIENT
Start: 2021-12-13 | End: 2021-12-22 | Stop reason: SDUPTHER

## 2021-12-13 RX ORDER — HYDRALAZINE HYDROCHLORIDE 25 MG/1
25 TABLET, FILM COATED ORAL EVERY 8 HOURS SCHEDULED
Qty: 90 TABLET | Refills: 0 | Status: SHIPPED | OUTPATIENT
Start: 2021-12-13 | End: 2021-12-22 | Stop reason: ALTCHOICE

## 2021-12-13 RX ORDER — ISOSORBIDE DINITRATE 10 MG/1
10 TABLET ORAL 3 TIMES DAILY
Qty: 90 TABLET | Refills: 0 | Status: SHIPPED | OUTPATIENT
Start: 2021-12-13 | End: 2021-12-22 | Stop reason: ALTCHOICE

## 2021-12-13 RX ORDER — ATORVASTATIN CALCIUM 40 MG/1
40 TABLET, FILM COATED ORAL NIGHTLY
Qty: 30 TABLET | Refills: 0 | Status: SHIPPED | OUTPATIENT
Start: 2021-12-13 | End: 2022-09-12

## 2021-12-13 RX ORDER — METOLAZONE 2.5 MG/1
2.5 TABLET ORAL DAILY
Qty: 30 TABLET | Refills: 0 | Status: SHIPPED | OUTPATIENT
Start: 2021-12-14 | End: 2021-12-22 | Stop reason: ALTCHOICE

## 2021-12-13 RX ADMIN — SODIUM BICARBONATE 650 MG: 650 TABLET ORAL at 09:17

## 2021-12-13 RX ADMIN — CALCIUM GLUCONATE 2000 MG: 98 INJECTION, SOLUTION INTRAVENOUS at 12:08

## 2021-12-13 RX ADMIN — SACUBITRIL AND VALSARTAN 1 TABLET: 24; 26 TABLET, FILM COATED ORAL at 09:17

## 2021-12-13 RX ADMIN — ASPIRIN 81 MG: 81 TABLET, COATED ORAL at 09:16

## 2021-12-13 RX ADMIN — Medication 400 MG: at 09:17

## 2021-12-13 RX ADMIN — METOLAZONE 2.5 MG: 2.5 TABLET ORAL at 09:17

## 2021-12-13 RX ADMIN — CARVEDILOL 25 MG: 25 TABLET, FILM COATED ORAL at 09:16

## 2021-12-13 RX ADMIN — TAMSULOSIN HYDROCHLORIDE 0.4 MG: 0.4 CAPSULE ORAL at 09:17

## 2021-12-13 RX ADMIN — BUMETANIDE 2 MG: 1 TABLET ORAL at 09:16

## 2021-12-13 RX ADMIN — EPOETIN ALFA-EPBX 3000 UNITS: 3000 INJECTION, SOLUTION INTRAVENOUS; SUBCUTANEOUS at 09:18

## 2021-12-13 RX ADMIN — Medication 10 ML: at 09:57

## 2021-12-13 RX ADMIN — ZINC SULFATE 220 MG (50 MG) CAPSULE 50 MG: CAPSULE at 09:17

## 2021-12-13 RX ADMIN — OXYCODONE HYDROCHLORIDE AND ACETAMINOPHEN 500 MG: 500 TABLET ORAL at 09:17

## 2021-12-13 RX ADMIN — Medication 2000 UNITS: at 09:17

## 2021-12-13 RX ADMIN — ISOSORBIDE DINITRATE 10 MG: 10 TABLET ORAL at 09:17

## 2021-12-13 RX ADMIN — LEVOTHYROXINE SODIUM 112 MCG: 0.11 TABLET ORAL at 06:53

## 2021-12-13 NOTE — PROGRESS NOTES
CLINICAL PHARMACY NOTE: MEDS TO BEDS    Total # of Prescriptions Filled: 7   The following medications were delivered to the patient:  · Carvedilol 25mg  · Atorvastatin 40mg  · entresto 24-26mg  · Isosorbide dinitrate 10mg  · Hydralazine 25mg  · Bumetanide 2mg  · Metolazone 2.5mg    Additional Documentation:

## 2021-12-13 NOTE — DISCHARGE SUMMARY
[facility] Physician Discharge Summary       [facility] [address] [facility]  [address] [address]  [phone]  Call today  Heart failure follow up and education. Inessa Forest, [address]  [phone]    Schedule an appointment as soon as possible for a visit in 1 week  Make an appointment to follow up with PCP in 1 week to go over hospitalization, medications and follow up. Vinh Guzman, [address] [place] [address]  [phone]    Schedule an appointment as soon as possible for a visit in 2 weeks  Cardiology     Hemodialysis      Continue hemodialysis T/TH/S      Activity level: As tolerated     Dispo: Home  Continue HD as directed T/Th/S    Condition on discharge: Stable    Patient ID:  Yovany Barton  [id number]  62 y.o.  1950    Admit date: 12/7/2021    Discharge date and time:  12/13/2021  10:42 AM    Admission Diagnoses: Active Problems:    Severe protein-calorie malnutrition (HCC)    Acute renal failure superimposed on chronic kidney disease, on chronic dialysis ([address]  Resolved Problems:    * No resolved hospital problems. *      Discharge Diagnoses: Active Problems:    Severe protein-calorie malnutrition (HCC)    Acute renal failure superimposed on chronic kidney disease, on chronic dialysis ([address]  Resolved Problems:    * No resolved hospital problems.  *      Consults:  IP CONSULT TO NEPHROLOGY  IP CONSULT TO CARDIOLOGY  IP CONSULT TO IV TEAM  IP CONSULT TO IV TEAM  IP CONSULT TO HEART FAILURE NURSE/COORDINATOR  IP CONSULT TO DIETITIAN  IP CONSULT TO [facility] Course:   Patient Yovany Barton is a 66 y.o. presented with Elevated troponin [R77.8]  T wave inversion in EKG [R94.31]  Hypervolemia, unspecified hypervolemia type [E87.70]  Acute renal failure superimposed on chronic kidney disease, on chronic dialysis, unspecified acute renal failure type ([address] [N17.9, N18.9, Z99.2]  Anemia due to chronic kidney disease, unspecified CKD stage [N18.9, D63.1]  COVID [U07.1]   Pt was sent to ER from HD due to concern that pt was coughing. Nephrology and cardiology were consulted and followed. Pt was treated for;    1. COVID 19 infection-ruled out. Pt reporting yesterday he had doubts that he had  covid and he was feeling well. No hypoxia. Only real symptom occasional cough- being treated for fluid overload. He had a rapid test that was positive in ER. No evidence of viral pneumonia on CXR. Repeat respiratory panel PCR negative and repeat rapid test negative. Ok to [employer].     2. Fluid overload in ESRD pt: nephrology consulted for assistance with fluid management. Continue bumex.      3. Acute on chronic CHF: EF 25-30% on echo from 11/21- cardiology following. consult dietician to discuss diet plan for chf/ esrd. Bumex BID and zaroxolyn.     4. New onset cardiomyopathy: EF 25-30%. Pt will need cardiac cath outpt . Cardiology discussed life vest and pt wanting to \" hold off:\" cardio adjusting meds. Meds adjusted per cardiology     5. HTN urgency: continue meds- nephrology following meds adjusted by cardiology .     6. HLD: statin     7. Hypothyroid disease: synthroid     8. DM: hga1c 5.3. from Nov. Insulin ss     9. Anemia secondary to CKD; monitor     10. H/o Hep c     11. Elevated troponin: cardiology following- pt to follow up for possible outpt cardiac cath       PT improved and was eager to dc. He was then discharged in stable condition with the following medications, instructions and follow up.  He is declining PT/OT be set up at dc      Discharge Exam:  General Appearance: alert and oriented to person, place and time and in no acute distress  Skin: warm and dry  Head: normocephalic and atraumatic  Neck: neck supple and non tender without mass   Pulmonary/Chest: clear to auscultation bilaterally-  Cardiovascular: normal rate, normal S1 and S2 and no carotid bruits  Abdomen: soft, non-tender, non-distended, normal bowel sounds  Extremities: no cyanosis, no clubbing and no edema  Neurologic: speech normal     I/O last 3 completed shifts: In: 360 [P.O.:360]  Out: 400 [Urine:400]  No intake/output data recorded. LABS:  Recent Labs     12/11/21 0320 12/12/21 0329 12/13/21  0945    134 135   K 4.2 3.9 3.9   CL 96* 95* 95*   CO2 25 28 26   BUN 27* 20 41*   CREATININE 5.4* 4.0* 5.8*   GLUCOSE 108* 181* 133*   CALCIUM 8.2* 8.6 7.8*       Recent Labs     12/11/21 0320 12/12/21 0329 12/13/21  0945   WBC 2.6* 2.8* 3.0*   RBC 2.95* 3.06* 3.27*   HGB 9.0* 9.4* 10.0*   HCT 28.6* 29.7* 31.5*   MCV 96.9 97.1 96.3   MCH 30.5 30.7 30.6   MCHC 31.5* 31.6* 31.7*   RDW 15.3* 15.0 14.7    150 149   MPV 10.5 10.8 10.9       No results for input(s): POCGLU in the last 72 hours. Imaging:  XR CHEST (2 VW)    Result Date: 12/7/2021  EXAMINATION: TWO XRAY VIEWS OF THE CHEST  12/7/2021 12:44 COMPARISON: AP portable chest radiograph 11/21/2021 HISTORY: ORDERING SYSTEM PROVIDED HISTORY: Cough TECHNOLOGIST PROVIDED HISTORY: Reason for exam:->Cough FINDINGS: This exam is slightly limited by patient positioning and probable redundant fabric surrounding the patient's neck. Given these factors: A right-internal-jugular-approach dual-lumen hemodialysis catheter appears grossly unchanged . There is borderline cardiomegaly and mild/moderate central pulmonary vascular congestion, with small dependent bibasilar pleural effusions and mild adjacent subsegmental atelectasis versus infiltrate, suggestive of fluid overload, radiographically minimally improved since 11/21/2021. No other clinically-significant changes are noted. .     1. Slightly limited exam 2. Constellation of findings suggestive of fluid overload, radiographically minimally improved since 11/21/2021. Lawernce Roughen RECOMMENDATION: 1. Recommend follow-up thoracic imaging, as directed clinically.  .       Patient Instructions:      Medication List      START taking these medications    aspirin 81 MG EC tablet  Take 1 tablet by mouth daily  Start taking on: December 14, 2021     atorvastatin 40 MG tablet  Commonly known as: LIPITOR  Take 1 tablet by mouth nightly     carvedilol 25 MG tablet  Commonly known as: COREG  Take 1 tablet by mouth 2 times daily (with meals)     hydrALAZINE 25 MG tablet  Commonly known as: APRESOLINE  Take 1 tablet by mouth every 8 hours     isosorbide dinitrate 10 MG tablet  Commonly known as: ISORDIL  Take 1 tablet by mouth 3 times daily     metOLazone 2.5 MG tablet  Commonly known as: ZAROXOLYN  Take 1 tablet by mouth daily  Start taking on: December 14, 2021     sacubitril-valsartan 24-26 MG per tablet  Commonly known as: ENTRESTO  Take 1 tablet by mouth 2 times daily        CONTINUE taking these medications    allopurinol 100 MG tablet  Commonly known as: ZYLOPRIM     bumetanide 2 MG tablet  Commonly known as: BUMEX  Take 1 tablet by mouth 2 times daily     levothyroxine 112 MCG tablet  Commonly known as: SYNTHROID     sodium bicarbonate 650 MG tablet     vitamin D 1.25 MG (15078 UT) Caps capsule  Commonly known as: ERGOCALCIFEROL        STOP taking these medications    amLODIPine 5 MG tablet  Commonly known as: NORVASC     glimepiride 1 MG tablet  Commonly known as: AMARYL           Where to Get Your Medications      These medications were sent to [address] [place] [address] [phone] - F [phone]  [address] [place] [address]    Phone: [phone]   · aspirin 81 MG EC tablet  · atorvastatin 40 MG tablet  · bumetanide 2 MG tablet  · carvedilol 25 MG tablet  · hydrALAZINE 25 MG tablet  · isosorbide dinitrate 10 MG tablet  · metOLazone 2.5 MG tablet  · sacubitril-valsartan 24-26 MG per tablet           Note that more than 30 minutes was spent in preparing discharge papers, discussing discharge with patient, medication review, etc.    Signed:  Electronically signed by KATHARINE Clay on 12/13/2021 at 10:42 AM

## 2021-12-13 NOTE — PROGRESS NOTES
12/13/2021  9:21 AM      Nutrition Consult:    Type and Reason for Visit:  Consult, Patient Education    Nutrition Recommendations/Plan: Continue current diet and ONS, as tolerated    Nutrition Assessment:  Instructed pt on current Diabetic, Renal Diet diet. provided a diet copy and RD contact information. Also encouraged the use of ONS,d/t pt malnutrition and known losses via HD. Since pt lives my himself, he finds following the diet restrictions difficult. Discussed home delivered meals to promote good intake and compliance with diet restrictions. Also, encouraged pt to discuss this option with his HD SW, if he does not get information/set up prior to D/C from here. Malnutrition Assessment:  Malnutrition Status:  Severe malnutrition    Context:  Chronic Illness     Findings of the 6 clinical characteristics of malnutrition:  Energy Intake:  Mild decrease in energy intake (Comment)  Weight Loss:  No significant weight loss     Body Fat Loss:  7 - Severe body fat loss     Muscle Mass Loss:  7 - Severe muscle mass loss    Fluid Accumulation:  No significant fluid accumulation     Strength:  Not Performed    Estimated Daily Nutrient Needs:  Energy (kcal):  ; Weight Used for Energy Requirements:  Current     Protein (g):  85-95 (1.3-1.5 g/kg); Weight Used for Protein Requirements:  Current        Fluid (ml/day):  per Renal Management; Method Used for Fluid Requirements:  Standard Renal      Current Nutrition Therapies:    ADULT DIET; Regular; 3 carb choices (45 gm/meal); Low Sodium (2 gm); Low Potassium (Less than 3000 mg/day)  ADULT ORAL NUTRITION SUPPLEMENT; Breakfast, Dinner; Renal Oral Supplement  ADULT ORAL NUTRITION SUPPLEMENT; Lunch, Dinner;  Other Oral Supplement; GELATEIN 20    Anthropometric Measures:  · Height: 6' 2\" (188 cm)  · Current Body Weight: 141 lb 14.4 oz (64.4 kg) (12/10 bedscale)   · Admission Body Weight: 154 lb (69.9 kg) (12/7 bedscale)    · Usual Body Weight: 157 lb (71.2 kg) (per EMR x 3 mo)     · Ideal Body Weight: 190 lbs; % Ideal Body Weight 74.7 %   · BMI: 18.2  · BMI Categories: Underweight (BMI less than 22) age over 72       Nutrition Interventions:   Food and/or Nutrient Delivery:  Continue Current Diet, Start Oral Nutrition Supplement (Renal ONS and Hi Pro Gelatin ONS)  Nutrition Education/Counseling:  Education Completed 12/13   Coordination of Nutrition Care:  Continue to monitor while inpatient    Goals:  PO >75% at meals/ONS       Nutrition Monitoring and Evaluation:   Behavioral-Environmental Outcomes:  None Identified   Food/Nutrient Intake Outcomes:  Food and Nutrient Intake, Supplement Intake  Physical Signs/Symptoms Outcomes:  Biochemical Data, GI Status, Fluid Status or Edema, Nutrition Focused Physical Findings, Skin, Weight     Discharge Planning:    Continue Oral Nutrition Supplement     Electronically signed by Jonathan Armenta RD, CNSC, LD on 12/13/21 at 9:22 AM EST    Contact: [phone]

## 2021-12-13 NOTE — PROGRESS NOTES
Department of Internal Medicine  Nephrology Progress Note    Events reviewed. SUBJECTIVE: We are following MrTami Rodney for ESRD on HD. He reports no complaints.      PHYSICAL EXAM:      Vitals:    VITALS:  BP (!) 162/85   Pulse 67   Temp 97.8 °F (36.6 °C) (Oral)   Resp 16   Ht 6' 2\" (1.88 m)   Wt 136 lb 3.9 oz (61.8 kg)   SpO2 97%   BMI 17.49 kg/m²   24HR INTAKE/OUTPUT:      Intake/Output Summary (Last 24 hours) at 12/13/2021 1038  Last data filed at 12/12/2021 1843  Gross per 24 hour   Intake 360 ml   Output 400 ml   Net -40 ml       Access: Right IJ vein tunneled dialysis catheter in place  Constitutional:  Awake, alert and in no distress  HEENT:  PERRLA  Respiratory:  Decreased breath sounds at the bases  Cardiovascular/Edema:  Heart sounds regular  Gastrointestinal:  Abdomen  Neurologic:  Non focal   Skin:  No lesions   Other:  + edema     Scheduled Meds:   calcium gluconate IVPB  2,000 mg IntraVENous Once    sacubitril-valsartan  1 tablet Oral BID    carvedilol  25 mg Oral BID WC    aspirin  81 mg Oral Daily    isosorbide dinitrate  10 mg Oral TID    hydrALAZINE  25 mg Oral 3 times per day    atorvastatin  40 mg Oral Nightly    bumetanide  2 mg Oral BID    clobetasol   Topical BID    levothyroxine  112 mcg Oral Daily    magnesium oxide  400 mg Oral Daily    sodium bicarbonate  650 mg Oral TID    tamsulosin  0.4 mg Oral Daily    sodium chloride flush  5-40 mL IntraVENous 2 times per day    heparin (porcine)  5,000 Units SubCUTAneous 3 times per day    Vitamin D  2,000 Units Oral Daily    insulin lispro  0-6 Units SubCUTAneous TID     insulin lispro  0-3 Units SubCUTAneous Nightly    epoetin sabiha-epbx  3,000 Units SubCUTAneous Once per day on Mon Wed Fri    metOLazone  2.5 mg Oral Daily    ascorbic acid  500 mg Oral Daily    zinc sulfate  50 mg Oral Daily     Continuous Infusions:   sodium chloride      dextrose       PRN Meds:.calcium carbonate, colchicine, sodium chloride flush, sodium chloride, polyethylene glycol, acetaminophen **OR** acetaminophen, aluminum & magnesium hydroxide-simethicone, guaiFENesin-dextromethorphan, albuterol sulfate HFA, glucose, dextrose, glucagon (rDNA), dextrose    DATA:    CBC:   Lab Results   Component Value Date    WBC 3.0 12/13/2021    RBC 3.27 12/13/2021    HGB 10.0 12/13/2021    HCT 31.5 12/13/2021    MCV 96.3 12/13/2021    MCH 30.6 12/13/2021    MCHC 31.7 12/13/2021    RDW 14.7 12/13/2021     12/13/2021    MPV 10.9 12/13/2021     CMP:    Lab Results   Component Value Date     12/13/2021    K 3.9 12/13/2021    K 4.1 12/08/2021    CL 95 12/13/2021    CO2 26 12/13/2021    BUN 41 12/13/2021    CREATININE 5.8 12/13/2021    GFRAA 12 12/13/2021    LABGLOM 12 12/13/2021    GLUCOSE 133 12/13/2021    PROT 6.9 12/08/2021    LABALBU 3.3 12/08/2021    CALCIUM 7.8 12/13/2021    BILITOT 0.3 12/08/2021    ALKPHOS 107 12/08/2021    AST 22 12/08/2021    ALT 15 12/08/2021     Magnesium:    Lab Results   Component Value Date    MG 1.9 12/10/2021     Phosphorus:    Lab Results   Component Value Date    PHOS 3.0 12/10/2021     Radiology Review:      Chest x-ray December 7, 2021   1.  Slightly limited exam       2.  Constellation of findings suggestive of fluid overload, radiographically   minimally improved since 11/21/2021.       .       RECOMMENDATION:   1.   Recommend follow-up thoracic imaging, as directed clinically.       .           BRIEF SUMMARY OF INITIAL CONSULT:    Briefly Mr. Tommy Bolton is a 66year old man with h/o ESRD 2/2 to diabetic nephropathy, recently started on renal replacement therapy, on intermittent HD 3 times a week Tuesday Thursday and Saturday, via right IJ vein tunneled dialysis catheter, type 2 DM, HTN, HFrEF 25-30% with stage II DD, hepatitis C, hypothyroidism, BPH, who was admitted on December 7, 2021 after he presented to the ER with increasing shortness of breath.  He was found with pro-BNP levels of >70,000 and CXR suggestive of pulmonary edema, reason for this consultation. IMPRESSION/RECOMMENDATIONS:      1. ESRD 2/2 to diabetic nephropathy, recently started on renal replacement therapy, on intermittent HD 3 times a week Tuesday Thursday and Saturday, via right IJ vein tunneled dialysis. 2. Hypocalcemia, secondary to vitamin D deficiency. Replace calcium  3. Vitamin D deficiency, on cholecalciferol   4. HFrEF 25-30% with stage II DD, proBNP >70,000, on bumex, metolazone, hydralazine and Entresto. Plan for outpatient cardiac cath. 5. HTN, on carvedilol and hydralazine, BP controlled   6. MBD of CKD, not on binders  7. Anemia of CKD, on epoetin alpha  ----------------------------------  8. History of hepatitis C  9. Gout, on allopurinol  10. Covid 19 + without respiratory symptoms, repeat test negative   11. HLD, on statin  12. Type II diabetes, on insulin  13.  Nutrition, low potassium diet    Plan:    · HD 3 times a week TTS, for dialysis tomorrow  · Continue Entresto   · Continue Bumex 2 mg p.o. twice daily  · Continue Metolazone 2.5 mg daily  · Continue epoetin alpha 3000 units 3 times a week  · Continue carvedilol 6.25 g twice daily  · Continue hydralazine 25 mg tid   · Replace calcium  · Continue to monitor ionized calcium level   · Low potassium diet  · Discharge planning      Electronically signed by Juliane Mcardle, APRN - CNP on 12/13/2021 at 10:38 AM

## 2021-12-14 ENCOUNTER — TELEPHONE (OUTPATIENT)
Dept: CARDIOLOGY CLINIC | Age: 71
End: 2021-12-14

## 2021-12-14 ENCOUNTER — CARE COORDINATION (OUTPATIENT)
Dept: CASE MANAGEMENT | Age: 71
End: 2021-12-14

## 2021-12-14 NOTE — CARE COORDINATION
[facility] Transitions Initial Follow Up Call    Call within 2 business days of discharge: Yes    Patient: Vickie  Patient : 1950   MRN: <[id number]>  Reason for Admission: 2021 - 2021 [address] [place], [place] [address]vd. Acute on chronic CHF, New onset cardiomyopathy, ESRD on HD. Discharge Date: 21 RARS: Readmission Risk Score: 25 ( )   [address] Discharge [facility]       Complaint Diagnosis Description Type Department Provider    21 Covid Testing; Cough Acute renal failure superimposed on chronic kidney disease, on chronic dialysis, unspecified acute renal failure type ([facility] . .. ED to Hosp-Admission (Discharged) (ADMITTED) JOSSIE Pena MD; Samuel Phillips. .. Pt states he is in a meeting. Request call back tomorrow. Dr Darci Pat  8:15  Dr Anurag Ramos  10:30    PMH: ESRD on HD, DM, HTN, Cirrhosis, Hep C.  ED presentation: Cough w/ SOB x 1 wk.        Care Transitions 24 Hour Call    Care Transitions Interventions         Follow Up  Future Appointments   Date Time Provider Yoel Villanueva   2021  8:15 AM Jacob Glynn MD [facility]/MED [facility]   2021 10:30 AM KIANNA Madsen - CNP [facility] CARDIO [facility]       Chilo Yao RN

## 2021-12-14 NOTE — TELEPHONE ENCOUNTER
----- Message from Gennaro Ledezma MD sent at 12/13/2021  4:51 PM EST -----  Needs 1 week chf follow up then me in a few weeks

## 2021-12-15 ENCOUNTER — CARE COORDINATION (OUTPATIENT)
Dept: CARE COORDINATION | Age: 71
End: 2021-12-15

## 2021-12-15 ENCOUNTER — CARE COORDINATION (OUTPATIENT)
Dept: CASE MANAGEMENT | Age: 71
End: 2021-12-15

## 2021-12-15 ENCOUNTER — TELEPHONE (OUTPATIENT)
Dept: PHARMACY | Facility: CLINIC | Age: 71
End: 2021-12-15

## 2021-12-15 DIAGNOSIS — Z99.2 ACUTE RENAL FAILURE SUPERIMPOSED ON CHRONIC KIDNEY DISEASE, ON CHRONIC DIALYSIS, UNSPECIFIED ACUTE RENAL FAILURE TYPE (HCC): Primary | ICD-10-CM

## 2021-12-15 DIAGNOSIS — N18.9 ACUTE RENAL FAILURE SUPERIMPOSED ON CHRONIC KIDNEY DISEASE, ON CHRONIC DIALYSIS, UNSPECIFIED ACUTE RENAL FAILURE TYPE (HCC): Primary | ICD-10-CM

## 2021-12-15 DIAGNOSIS — N17.9 ACUTE RENAL FAILURE SUPERIMPOSED ON CHRONIC KIDNEY DISEASE, ON CHRONIC DIALYSIS, UNSPECIFIED ACUTE RENAL FAILURE TYPE (HCC): Primary | ICD-10-CM

## 2021-12-15 NOTE — CARE COORDINATION
Juliet Roberson  December 15, 2021    Initial Referral Reason: CHF and History of Malnutrition     Patient Care Team:  Von Cabrales MD as PCP - [place] [id number], RN as Care Transitions Nurse  Tatiana Rowland RD, LD as Dietitian (Dietitian)    Past Medical History:    Current Outpatient Medications   Medication Sig Dispense Refill    aspirin 81 MG EC tablet Take 1 tablet by mouth daily 30 tablet 0    isosorbide dinitrate (ISORDIL) 10 MG tablet Take 1 tablet by mouth 3 times daily 90 tablet 0    atorvastatin (LIPITOR) 40 MG tablet Take 1 tablet by mouth nightly 30 tablet 0    carvedilol (COREG) 25 MG tablet Take 1 tablet by mouth 2 times daily (with meals) 60 tablet 0    metOLazone (ZAROXOLYN) 2.5 MG tablet Take 1 tablet by mouth daily 30 tablet 0    bumetanide (BUMEX) 2 MG tablet Take 1 tablet by mouth 2 times daily 60 tablet 0    sacubitril-valsartan (ENTRESTO) 24-26 MG per tablet Take 1 tablet by mouth 2 times daily 60 tablet 0    hydrALAZINE (APRESOLINE) 25 MG tablet Take 1 tablet by mouth every 8 hours 90 tablet 0    allopurinol (ZYLOPRIM) 100 MG tablet TK 1 T PO D      sodium bicarbonate 650 MG tablet TK 2 TS PO Q 8 H      levothyroxine (SYNTHROID) 112 MCG tablet Take 112 mcg by mouth Daily      vitamin D (ERGOCALCIFEROL) 1.25 MG (89211 UT) CAPS capsule TK 1 C PO 1 TIME Q WK       No current facility-administered medications for this visit.        Biochemical Data, Medical Tests and Procedures:    Lab Results   Component Value Date    LABA1C 5.3 11/12/2021     No results found for: EAG    Lab Results   Component Value Date    CHOL 200 (H) 08/24/2021     Lab Results   Component Value Date    TRIG 109 08/24/2021     Lab Results   Component Value Date    HDL 53 11/12/2021    HDL 51 08/24/2021    HDL 38 06/02/2020     Lab Results   Component Value Date    LDLCALC 147 (H) 11/12/2021    LDLCALC 127 (H) 08/24/2021    LDLCALC 178 (H) 06/02/2020     Lab Results   Component Value Date    LABVLDL 30 11/12/2021    LABVLDL 22 08/24/2021    LABVLDL 34 06/02/2020     No results found for: [facility]    Lab Results   Component Value Date    WBC 3.0 (L) 12/13/2021    HGB 10.0 (L) 12/13/2021    HCT 31.5 (L) 12/13/2021    MCV 96.3 12/13/2021     12/13/2021       Lab Results   Component Value Date    CREATININE 5.8 (H) 12/13/2021    BUN 41 (H) 12/13/2021     12/13/2021    K 3.9 12/13/2021    CL 95 (L) 12/13/2021    CO2 26 12/13/2021         Anthropometric Measurements:    Height: 74 inches (187.96 cm)  Weight: 136 lb (61.8 kg)  BMI: 17.49 (underweight)  IBW: 190 lb (86.4 kg) +-10%  %IBW: 71.6%     Physical Exam Findings:  Deferred    Nutrition Interview: RD called pt, explained reason for call and role in care. Pt states appetite is \"not bad\"- pt explains his appetite has gotten better. No examples of meals provided per pt. Per chart review, RD consulted inpatient and patient's diet order is: ADULT DIET; Regular; 3 carb choices (45 gm/meal); Low Sodium (2 gm); Low Potassium (Less than 3000 mg/day); ADULT ORAL NUTRITION SUPPLEMENT; Breakfast, Dinner; Renal Oral Supplement; ADULT ORAL NUTRITION SUPPLEMENT; Lunch, Dinner; Other Oral Supplement; GELATEIN 20. RD reviewed patient's diet order- patient explains he was not aware of nutrition supplement order, RD offered to mail Nepro coupons to patient and discussed supplementing with Nepro BID. RD reiterated the importance of meeting estimated nutrition needs and supplementing with Nepro to help patient better meet his needs. Pt verbalizes understanding. RD explained the importance of watching sodium to prevent body from holding extra fluid. RD explained the nutrition plan for heart failure usually limits the sodium from food and beverages to no more than 2000 mg per day. Discussed avoiding the salt shaker when eating/cooking.  Explained how sodium is hidden in a lot of foods and the importance of reading food labels-choosing foods with 140 mg of sodium or education regarding value of adherence to cardiac diet and renal diet. Discussed ways to establish applying concepts of alternatives and choices regarding implementation of diet. Explored ideas for small, incremental goals to initiate behavior change. Monitoring and Evaluation:    Indicator/Goal Criteria   #1 Eat balanced meals consistently throughout the day. #1 Discuss home delivered meals and food resources with SW to promote good oral intake. #2 Supplement with Nepro BID to help better meet estimated nutrition needs. #2 Once coupons are received, buy Nepro. Drink Nepro BID. Follow Up: RD will call pt in 3 weeks to follow up and make sure pt received handouts in mail. RD will answer any nutrition related questions at this time.      [address] [facility], [facility] 14

## 2021-12-15 NOTE — TELEPHONE ENCOUNTER
Received a referral:  from Care Coordinator to review patients medications. CHF, Cardiomyopathy, ESRD on HD. Pt states he does not understand his medications and needs teaching    Called patient to schedule a time to speak with a pharmacist over the telephone. No answer and mailbox is full. Aline Ceja CP.    [facility] free: [phone]

## 2021-12-16 ENCOUNTER — CARE COORDINATION (OUTPATIENT)
Dept: CARE COORDINATION | Age: 71
End: 2021-12-16

## 2021-12-16 NOTE — CARE COORDINATION
Call to patient to initiate SW referral for home delivered meals. Patient does not qualify for meals through [employer] because he is not homebound. He does not qualify for [facility] because he is in independent and not eligible for Medicaid. Discussed private pay options. Pt states he is able to pay out of pocket. Offered to send various options by email. Pt confirmed email received. Final outreach.

## 2021-12-17 ENCOUNTER — SCHEDULED TELEPHONE ENCOUNTER (OUTPATIENT)
Dept: PHARMACY | Facility: CLINIC | Age: 71
End: 2021-12-17

## 2021-12-17 DIAGNOSIS — Z71.89 ENCOUNTER FOR MEDICATION REVIEW AND COUNSELING: Primary | ICD-10-CM

## 2021-12-17 PROCEDURE — 1111F DSCHRG MED/CURRENT MED MERGE: CPT | Performed by: FAMILY MEDICINE

## 2021-12-17 NOTE — TELEPHONE ENCOUNTER
[facility] CLINICAL PHARMACY REVIEW: Post-Discharge Transitions of Care (ZACH)  Subjective/Objective:  Ayanna Mcfarlane is a 66 y.o. male. Patient was discharged from [facility] on 12/13/2021. Patient outreach to review discharge medications and provide medication review and management. Spoke with patient. Allergies   Allergen Reactions    Penicillins      Does not remember       Discharge Medications (as per discharging medication list found in AVS): There are NEW medications for you.  START taking them after you leave the hospital:  Medication Sig    aspirin 81 MG EC tablet Take 1 tablet by mouth daily  -Has not picked up OTC yet    isosorbide dinitrate (ISORDIL) 10 MG tablet Take 1 tablet by mouth 3 times daily  -Got Rx and taking    atorvastatin (LIPITOR) 40 MG tablet Take 1 tablet by mouth nightly  -Got Rx and taking    carvedilol (COREG) 25 MG tablet Take 1 tablet by mouth 2 times daily (with meals)  -Got Rx and taking    metOLazone (ZAROXOLYN) 2.5 MG tablet Take 1 tablet by mouth daily  -Got Rx and taking    sacubitril-valsartan (ENTRESTO) 24-26 MG per tablet Take 1 tablet by mouth 2 times daily  -Got Rx and taking    hydrALAZINE (APRESOLINE) 25 MG tablet Take 1 tablet by mouth every 8 hours  -Got Rx and taking     You told us you were taking these medications at home, but the amount or how often you take this medication has CHANGED:  N/A  These are medications you told us you were taking at home, CONTINUE taking them after you leave the hospital:  Medication Sig    bumetanide (BUMEX) 2 MG tablet Take 1 tablet by mouth 2 times daily    allopurinol (ZYLOPRIM) 100 MG tablet TK 1 T PO D    sodium bicarbonate 650 MG tablet TK 2 TS PO Q 8 H    levothyroxine (SYNTHROID) 112 MCG tablet Take 112 mcg by mouth Daily    vitamin D (ERGOCALCIFEROL) 1.25 MG (46696 UT) CAPS capsule TK 1 C PO 1 TIME Q WK     These are the medications you have told us you were taking at home, STOP taking them after you leave the hospital:  · amLODIPine 5 MG tablet; stop taking at discharge - confirmed not taking  · glimepiride 1 MG tablet; stop taking at discharge - confirmed not taking    Additional Medications:   N/A    Estimated Creatinine Clearance: 10 mL/min (A) (based on SCr of 5.8 mg/dL (H)). Assessment/Plan:  - Medication reconciliation completed. Patient has filled new medications ordered after this hospital discharge and is taking medications as directed by discharging provider except for aspirin 81mg. - Instructions per discharge list provided except per below documentation. Identified medication discrepancies/issues:   · Medication list updated as appropriate/noted    - Identified Potential Medication Interactions: No clinically significant interactions identified via Harir Interaction Analysis as category D or higher. · Patient has BP cuff at home and checks BP regularly. No s/sx of hypotension noted.      - Renal Dosing: No renal adjustments necessary.     - Follow up appointment(s):  · Reminded of upcoming appointment(s)  · Encouraged to schedule follow up as advised at discharge  Future Appointments   Date Time Provider Yoel Villanueva   12/17/2021  1:00 PM SCHEDULE, S CLINICAL PHARMACY S Clin Rx None   12/22/2021  8:15 AM Magdaleno Cordova MD [facility]   12/22/2021 10:30 AM KIANNA Mace - CNP [facility] CARDIO [facility]     Holger Em, PharmD  Population Health Fellow  [address] // Department, toll free [phone], Option 1     =======================================================  For Pharmacy Admin Tracking Only     Gap Closed?: Yes    Time Spent (min): 90

## 2021-12-19 PROBLEM — R77.8 ELEVATED TROPONIN: Status: RESOLVED | Noted: 2021-11-19 | Resolved: 2021-12-19

## 2021-12-19 PROBLEM — R79.89 ELEVATED TROPONIN: Status: RESOLVED | Noted: 2021-11-19 | Resolved: 2021-12-19

## 2021-12-21 ENCOUNTER — TELEPHONE (OUTPATIENT)
Dept: VASCULAR SURGERY | Age: 71
End: 2021-12-21

## 2021-12-22 ENCOUNTER — OFFICE VISIT (OUTPATIENT)
Dept: CARDIOLOGY CLINIC | Age: 71
End: 2021-12-22
Payer: MEDICARE

## 2021-12-22 ENCOUNTER — OFFICE VISIT (OUTPATIENT)
Dept: VASCULAR SURGERY | Age: 71
End: 2021-12-22
Payer: MEDICARE

## 2021-12-22 VITALS
RESPIRATION RATE: 18 BRPM | BODY MASS INDEX: 18.43 KG/M2 | OXYGEN SATURATION: 96 % | HEART RATE: 70 BPM | WEIGHT: 143.6 LBS | DIASTOLIC BLOOD PRESSURE: 80 MMHG | SYSTOLIC BLOOD PRESSURE: 176 MMHG | HEIGHT: 74 IN

## 2021-12-22 VITALS — BODY MASS INDEX: 18.61 KG/M2 | WEIGHT: 145 LBS | HEIGHT: 74 IN

## 2021-12-22 DIAGNOSIS — J96.01 ACUTE RESPIRATORY FAILURE WITH HYPOXIA (HCC): Primary | ICD-10-CM

## 2021-12-22 DIAGNOSIS — F32.A DEPRESSION, UNSPECIFIED DEPRESSION TYPE: ICD-10-CM

## 2021-12-22 DIAGNOSIS — N18.6 ESRD (END STAGE RENAL DISEASE) (HCC): Primary | ICD-10-CM

## 2021-12-22 DIAGNOSIS — R69 CHRONIC ILLNESS: ICD-10-CM

## 2021-12-22 PROCEDURE — G8484 FLU IMMUNIZE NO ADMIN: HCPCS | Performed by: NURSE PRACTITIONER

## 2021-12-22 PROCEDURE — 1111F DSCHRG MED/CURRENT MED MERGE: CPT | Performed by: NURSE PRACTITIONER

## 2021-12-22 PROCEDURE — 4040F PNEUMOC VAC/ADMIN/RCVD: CPT | Performed by: SURGERY

## 2021-12-22 PROCEDURE — G8419 CALC BMI OUT NRM PARAM NOF/U: HCPCS | Performed by: SURGERY

## 2021-12-22 PROCEDURE — 4040F PNEUMOC VAC/ADMIN/RCVD: CPT | Performed by: NURSE PRACTITIONER

## 2021-12-22 PROCEDURE — 99213 OFFICE O/P EST LOW 20 MIN: CPT | Performed by: SURGERY

## 2021-12-22 PROCEDURE — G8427 DOCREV CUR MEDS BY ELIG CLIN: HCPCS | Performed by: SURGERY

## 2021-12-22 PROCEDURE — G8484 FLU IMMUNIZE NO ADMIN: HCPCS | Performed by: SURGERY

## 2021-12-22 PROCEDURE — G8419 CALC BMI OUT NRM PARAM NOF/U: HCPCS | Performed by: NURSE PRACTITIONER

## 2021-12-22 PROCEDURE — 1036F TOBACCO NON-USER: CPT | Performed by: NURSE PRACTITIONER

## 2021-12-22 PROCEDURE — 1111F DSCHRG MED/CURRENT MED MERGE: CPT | Performed by: SURGERY

## 2021-12-22 PROCEDURE — 99214 OFFICE O/P EST MOD 30 MIN: CPT | Performed by: NURSE PRACTITIONER

## 2021-12-22 PROCEDURE — 1123F ACP DISCUSS/DSCN MKR DOCD: CPT | Performed by: NURSE PRACTITIONER

## 2021-12-22 PROCEDURE — 3017F COLORECTAL CA SCREEN DOC REV: CPT | Performed by: NURSE PRACTITIONER

## 2021-12-22 PROCEDURE — 1036F TOBACCO NON-USER: CPT | Performed by: SURGERY

## 2021-12-22 PROCEDURE — 3017F COLORECTAL CA SCREEN DOC REV: CPT | Performed by: SURGERY

## 2021-12-22 PROCEDURE — 93000 ELECTROCARDIOGRAM COMPLETE: CPT | Performed by: INTERNAL MEDICINE

## 2021-12-22 PROCEDURE — 1123F ACP DISCUSS/DSCN MKR DOCD: CPT | Performed by: SURGERY

## 2021-12-22 PROCEDURE — G8427 DOCREV CUR MEDS BY ELIG CLIN: HCPCS | Performed by: NURSE PRACTITIONER

## 2021-12-22 RX ORDER — ASPIRIN 81 MG/1
81 TABLET ORAL DAILY
Qty: 30 TABLET | Refills: 0 | Status: SHIPPED
Start: 2021-12-22 | End: 2022-08-16

## 2021-12-22 RX ORDER — CARVEDILOL 25 MG/1
25 TABLET ORAL 2 TIMES DAILY WITH MEALS
Qty: 60 TABLET | Refills: 0 | Status: SHIPPED
Start: 2021-12-22 | End: 2022-09-12

## 2021-12-22 RX ORDER — BUMETANIDE 2 MG/1
2 TABLET ORAL 2 TIMES DAILY
Qty: 60 TABLET | Refills: 0 | Status: ON HOLD
Start: 2021-12-22 | End: 2022-04-26 | Stop reason: HOSPADM

## 2021-12-22 NOTE — PROGRESS NOTES
[facility] Cardiology  Office Visit         Reason for Visit: Heart Failure    Primary Cardiologist: Dr. Gabo Paz         History of Present Illness:     Mr. Vibha Farah is a 66year old male with a PMHx of cardiomyopathy / HFrEF (EF 25-30% TTE 11/2021), hypertension, hypothyroidism, end stage renal disease on HD, anemia of chronic disease, type 2 diabetes mellitus, hepatitis C, medical non compliance, previous tobacco abuse. Hospitalized [id number]/[id number]/54/0672 with metabolic acidosis, elevated troponin and SOB - bilateral pulmonary edema and pneumonia, acute renal failure. He was started on Bumex gtt and initiated on HD with right IJ dialysis catheter inserted. He had an echocardiogram done 11/21/2021 that revealed an EF 25-30% with stage II DD and VHD. Prior to his admission he was not taking his medications at home and his blood pressure significantly elevated SBP > 200 on admission. He recently represented to the ED with complaints of persistent cough and was admitted with covid-19, uncontrolled HTN and acute heart failure. During hospitalization he was noted to have elevated troponin with anterior T wave inversions on EKG. Given that he was hospitalized for COVID-19 ischemia evaluation was deferred and GDMT was titrated. Fluid removal per HD and nephrology agreeable to initiating Entresto. He was discharged with subjective improvement. He presents today in post hospital follow-up, since discharge from the hospital unfortunately he has not been compliant with his current cardiac medication. He is hypertensive with weight gain. He has chronic dyspnea with exertion, shortness of breath, or decline in overall functional capacity. He denies orthopnea, PND, nocturnal cough or hemoptysis. He denies abdominal distention or bloating, early satiety, anorexia/change in appetite, unintentional weight loss. He does lower extremity edema. He denies exertional lightheadedness.   He denies palpitations, syncope or near syncope. Review of systems is negative for chest pain, pressure, discomfort. When ambulating on an incline, He does not leg claudication. History is negative for neurological symptoms including transient loss of vision, asymmetric weakness, aphasia, dysphasia, numbness, tingling. Patient Active Problem List    Diagnosis Date Noted    Severe protein-calorie malnutrition ([facility] 12/13/2021    Acute renal failure superimposed on chronic kidney disease, on chronic dialysis ([address] 12/07/2021    ESRD (end stage renal disease) ([address] 11/19/2021    Acute respiratory failure with hypoxia ([address] 11/19/2021    Volume overload 11/19/2021    Hypertensive emergency 11/19/2021    Hyperkalemia 11/19/2021    Hyperbilirubinemia 06/09/2013    Thrombocytopenia ([address] 06/09/2013    Hypothyroid 06/09/2013    Diabetes mellitus ([facility] 35/69/9652    Metabolic acidosis 28/91/6314    Hypertension 06/06/2013    Uncontrolled diabetes mellitus ([address] 06/06/2013     Past Medical History:    1. New cardiomyopathy dx during admission 11/2021 - etiology unknown   2. TTE 11/21/2021 Louis Rodriguez): Normal left ventricular chamber size. Normal left ventricular systolic  function. Visually estimated LVEF is 25-30 %.   There is severe diffuse hypokinesis with akinesis of the apical segments.  Grade II diastolic dysfunction with elevated LA pressure.  Normal right ventricle structure and function.   Normal left atrium.  Normal right atrium.  Mild mitral regurgitation is present.  Trace anterior pericardial effusion without tamponade.  Unable to  estimate PA pressure. 3. Hypertension   4. Hypothyroidism    5. Type 2 diabetes mellitus   6. End stage renal disease on HD - initiated 11/2021 s/p right IJ catheter 11/21/2021  7. H/o hepatitis C  8. Medical non complaicne   9. Previous tobacco abuse   10.  H/o tonsillectomy,         Past Medical History:   Diagnosis Date    Chronic kidney disease     Diabetes mellitus ([address]     Hepatitis C     Hypertension     Liver disease     Thyroid disease     Unspecified diseases of blood and blood-forming organs            Past Surgical History:   Procedure Laterality Date    TONSILLECTOMY      VASCULAR SURGERY N/A 11/21/2021    CATHETER INSERTION HEMODIALYSIS, REMOVAL OF FEMORAL CATHETER performed by Apollo Kaplan MD at [address]   Allergen Reactions    Penicillins      Does not remember         Outpatient Medications Marked as Taking for the 12/22/21 encounter (Office Visit) with KIANNA Zamora CNP   Medication Sig Dispense Refill    carvedilol (COREG) 25 MG tablet Take 1 tablet by mouth 2 times daily (with meals) 60 tablet 0    aspirin 81 MG EC tablet Take 1 tablet by mouth daily 30 tablet 0    sacubitril-valsartan (ENTRESTO)  MG per tablet Take 1 tablet by mouth 2 times daily 60 tablet 3    bumetanide (BUMEX) 2 MG tablet Take 1 tablet by mouth 2 times daily 60 tablet 0         Review of Systems:   Cardiac: As per HPI  General: No fever, chills, rigors  Pulmonary: As per HPI  HEENT: No visual disturbances, difficult swallowing  GI: No nausea, vomiting, abdominal pain  : No dysuria or hematuria  Endocrine: No thyroid disease or diabetes  Musculoskeletal: YARBROUGH x 4, no focal motor deficits  Skin: Intact, no rashes  Neuro/Psych: No headache or seizures          Weights: Wt Readings from Last 3 Encounters:   01/19/22 150 lb (68 kg)   12/22/21 143 lb 9.6 oz (65.1 kg)   12/22/21 145 lb (65.8 kg)             Physical Examination:     BP (!) 176/80   Pulse 70   Resp 18   Ht 6' 2\" (1.88 m)   Wt 143 lb 9.6 oz (65.1 kg)   SpO2 96%   BMI 18.44 kg/m²     CONSTITUTIONAL: Alert and oriented times 3, no acute distress and cooperative to examination with proper mood and affect. SKIN: Skin color, texture, turgor normal. No rashes or lesions. LYMPH: no cervical nodes, no inguinal nodes  HEENT: Head is normocephalic, atraumatic. EOMI, PERRLA.   NECK: Supple, symmetrical, trachea midline, no adenopathy, thyroid symmetric, not enlarged and no tenderness, skin normal.  CHEST/LUNGS: chest symmetric with normal A/P diameter, normal respiratory rate and rhythm, lungs clear to auscultation without wheezes, rales or rhonchi. No accessory muscle use. Scars None   CARDIOVASCULAR: Heart sounds are normal.  Regular rate and rhythm without murmur, gallop or rub. Normal S1 and S2. . Carotid and femoral pulses 2+/4 and equal bilaterally. ABDOMEN: Normal shape. No and Laparoscopic scar(s) present. Normal bowel sounds. No bruits. soft, nondistended, no masses or organomegaly. no evidence of hernia. Percussion: Normal without hepatosplenomegally. Tenderness: absent. RECTAL: deferred, not clinically indicated  NEUROLOGIC: There are no focalizing motor or sensory deficits. CN II-XII are grossly intact. Aditya Skates EXTREMITIES: no cyanosis, no clubbing. Trace-1+ bilateral lower extremity edema. Warm and well perfused. All the following diagnostics were personally reviewed and interpreted by me. LAB DATA:     12/13/2021 09:45   Sodium 135   Potassium 3.9   Chloride 95 (L)   CO2 26   BUN 41 (H)   Creatinine 5.8 (H)   Anion Gap 14   Calcium, Ion 1.02 (L)   GFR Non- 12   GFR African American 12   Glucose 133 (H)   CALCIUM, SERUM, 707876 7.8 (L)   WBC 3.0 (L)   RBC 3.27 (L)   Hemoglobin Quant 10.0 (L)   Hematocrit 31.5 (L)   MCV 96.3   MCH 30.6   MCHC 31.7 (L)   MPV 10.9   RDW 14.7   Platelet Count 597       IMAGING:    CXR (12/10/2021)  Impression:  No significant changes since the previous study December 7.  Mild to moderate  bilateral pleural effusions. CARDIAC TESTING:    TTE (11/21/2021)   Summary   Normal left ventricular chamber size. Normal left ventricular systolic   function. Visually estimated LVEF is 25-30 %. There is severe diffuse hypokinesis with akinesis of the apical segments. Grade II diastolic dysfunction with elevated LA pressure.    Normal right ventricle structure and function. Normal left atrium. Normal right atrium. Mild mitral regurgitation is present. Trace anterior pericardial effusion without tamponade. Unable to estimate PA pressure. EKG  Sinus Rhythm   Nonspecific ST depression  Extensive T-abnormality        ASSESSMENT:  1. Acute on chronic HFrEF  2. ACC stage C / NYHA class III  3. Hypervolemic  4. New onset cardiomyopathy, EF 25-30% suspect ischemic  5. LVEF 25-30%, LVEDD 4.7, LVMI 111  6. HTN, uncontrolled  7. ESRD newly started on HD (T - R - Sat)  8. T2DM  9. Hypothyroidism  10. COVID-19 pneumonia  6. Medical noncompliance    PLAN:  1. Restart Entresto 97/103 mg twice daily, Coreg 25 mg twice daily, Aspirin 81 mg daily and Bumex 2 mg twice daily     2. Fluid removal per HD    3. Patient deferring ischemia evaluation at this time    4. Referral to psychology     5.  Follow up with Dr. Bri Goodson in 1 month    [address] [facility] Cardiology

## 2021-12-22 NOTE — PATIENT INSTRUCTIONS
1. Start Entresto 97/103 mg twice daily     2. Start Coreg 25 mg twice daily     3. Aspirin 81 mg daily     4. Bumex 2 mg twice daily     5. Referral to psychology     6.  Follow up with Dr. Janeth Wilkes in 1 month

## 2021-12-22 NOTE — PROGRESS NOTES
Vascular Surgery Outpatient Progress Note      Chief Complaint   Patient presents with    Post-Op Check     s/p tasia       HISTORY OF PRESENT ILLNESS:                The patient is a 66 y.o. male who returns for follow-up evaluation of arteriovenous access. He otherwise is doing well. He is dialyzing through a right-sided tunneled catheter he complains of some discomfort with the sutures. This was evaluated and some of the sutures were trimmed. He has a history of recent vein mapping and some thrombus on the right side in the superficial system. He presents to evaluate arteriovenous access creation. Right now he does not want to proceed with any access until least after the holidays. He does not were to be punctured with any needle if all possible and we have gone over the options of tunneled dialysis catheter dialysis alone and those associated complications and risk as well as peritoneal dialysis catheter placement. Past Medical History:        Diagnosis Date    Chronic kidney disease     Diabetes mellitus ([address]     Hepatitis C     Hypertension     Liver disease     Thyroid disease     Unspecified diseases of blood and blood-forming organs      Past Surgical History:        Procedure Laterality Date    TONSILLECTOMY      VASCULAR SURGERY N/A 11/21/2021    CATHETER INSERTION HEMODIALYSIS, REMOVAL OF FEMORAL CATHETER performed by Nereida Scanlon MD at [facility] OR     Current Medications:   Prior to Admission medications    Medication Sig Start Date End Date Taking?  Authorizing Provider   atorvastatin (LIPITOR) 40 MG tablet Take 1 tablet by mouth nightly  Patient not taking: Reported on 12/22/2021 12/13/21 1/12/22 Yes KATHARINE Dumont   sodium bicarbonate 650 MG tablet TK 2 TS PO Q 8 H  Patient not taking: Reported on 12/22/2021 7/11/20  Yes Historical Provider, MD   levothyroxine (SYNTHROID) 112 MCG tablet Take 112 mcg by mouth Daily  Patient not taking: Reported on 12/22/2021   Yes Historical Provider, MD   vitamin D (ERGOCALCIFEROL) 1.25 MG (34113 UT) CAPS capsule TK 1 C PO 1 TIME Q WK  Patient not taking: Reported on 2021  Yes Historical Provider, MD   carvedilol (COREG) 25 MG tablet Take 1 tablet by mouth 2 times daily (with meals) 21  KIANNA Rodriguez CNP   aspirin 81 MG EC tablet Take 1 tablet by mouth daily 21  KIANNA Rodriguez  CNP   sacubitril-valsartan (ENTRESTO)  MG per tablet Take 1 tablet by mouth 2 times daily 21   KIANNA Rodriguez CNP   bumetanide Francie Muscat) 2 MG tablet Take 1 tablet by mouth 2 times daily 21  KIANNA Rodriguez CNP     Allergies:  Penicillins    Social History     Socioeconomic History    Marital status: Single     Spouse name: Not on file    Number of children: Not on file    Years of education: Not on file    Highest education level: Not on file   Occupational History    Not on file   Tobacco Use    Smoking status: Former Smoker     Packs/day: 2.00     Years: 10.00     Pack years: 20.00     Types: Cigarettes     Quit date: 1973     Years since quittin.3    Smokeless tobacco: Never Used   Substance and Sexual Activity    Alcohol use: No    Drug use: Yes     Comment: in early     Sexual activity: Yes     Partners: Female   Other Topics Concern    Not on file   Social History Narrative    Not on file     Social Determinants of Health     Financial Resource Strain:     Difficulty of Paying Living Expenses: Not on file   Food Insecurity:     Worried About Running Out of Food in the Last Year: Not on file    Danielle of Food in the Last Year: Not on file   Transportation Needs:     Lack of Transportation (Medical): Not on file    Lack of Transportation (Non-Medical):  Not on file   Physical Activity:     Days of Exercise per Week: Not on file    Minutes of Exercise per Session: Not on file   Stress:     Feeling of Stress : Not on file   Social Connections:     Frequency of Communication with Friends and Family: Not on file    Frequency of Social Gatherings with Friends and Family: Not on file    Attends Mormonism Services: Not on file    Active Member of Clubs or Organizations: Not on file    Attends Club or Organization Meetings: Not on file    Marital Status: Not on file   Intimate Partner Violence:     Fear of Current or Ex-Partner: Not on file    Emotionally Abused: Not on file    Physically Abused: Not on file    Sexually Abused: Not on file   Housing Stability:     Unable to Pay for Housing in the Last Year: Not on file    Number of [place] in the Last Year: Not on file    Unstable Housing in the Last Year: Not on file        Family History   Problem Relation Age of Onset    Diabetes Mother     Cancer Father        REVIEW OF SYSTEMS:    Constitutional: Negative for activity change, appetite change, chills, diaphoresis, fatigue, fever and unexpected weight change. HEENT: Negative for congestion, ear discharge, ear pain, hearing loss, nosebleeds, rhinorrhea, sinus pain, sore throat, tinnitus, trouble swallowing and voice change. Eyes: Negative for photophobia, pain, discharge, redness, itching and visual disturbance. Respiratory: Negative for apnea, cough, chest tightness, shortness of breath and wheezing. Cardiovascular: Negative for chest pain, palpitations and leg swelling. Gastrointestinal: Negative for abdominal distention, abdominal pain, blood in stool, constipation, diarrhea, nausea and vomiting. Endocrine: Negative for cold intolerance, heat intolerance, polydipsia, polyphagia and polyuria. Genitourinary: Negative for decreased urine volume, difficulty urinating, dysuria, frequency, hematuria and urgency. Musculoskeletal: Negative for arthralgias, back pain, gait problem, joint swelling and neck pain. Skin: Negative for color change, pallor, rash and wound.    Allergic/Immunologic: Negative for environmental allergies, food allergies and immunocompromised state. Neurological: Negative for dizziness, syncope, facial asymmetry, speech difficulty, weakness, light-headedness, numbness and headaches. Hematological: Negative for adenopathy. Does not bruise/bleed easily. Psychiatric/Behavioral: Negative for agitation, confusion, sleep disturbance and suicidal ideas. The patient is not nervous/anxious. PHYSICAL EXAM:  There were no vitals filed for this visit. General Appearance: alert and oriented to person, place and time, well developed and well- nourished, in no acute distress  Skin: warm and dry, no rash or erythema  Head: normocephalic and atraumatic  Eyes: extraocular eye movements intact, conjunctivae normal  ENT: external ear and ear canal normal bilaterally, nose without deformity  Pulmonary/Chest: clear to auscultation bilaterally- no wheezes, rales or rhonchi, normal air movement, no respiratory distress  Cardiovascular: normal rate, regular rhythm, normal S1 and S2, no murmurs, no carotid bruits  Abdomen: soft, non-tender, non-distended, normal bowel sounds, no masses or organomegaly  Musculoskeletal: normal range of motion, no joint swelling, deformity or tenderness  Neurologic: no cranial nerve deficit, gait, coordination and speech normal  Extremities: I did a bedside ultrasound examination. The left arm demonstrates some partially occlusive thrombus in the proximal median antecubital vein extending into the cephalic vein. The deep system is unremarkable the arterial anatomy is unremarkable. The basilic vein is of adequate quality. I have examined his tunneled catheter. And trimmed some of the sutures    Problem List Items Addressed This Visit     ESRD (end stage renal disease) ([facility] - Primary          #1 end-stage renal disease. At this point he is here to discuss arteriovenous access. He may be able to undergo a left arm brachiobasilic access.   He does have some thrombus in the median antecubital vein which extends into the cephalic vein. This is nonocclusive. His arterial anatomy is unremarkable. Right now he does not wish to proceed with an access secondary to the holiday as well as his fear of being punctured on a regular basis. I went over his options such as continued dialysis through a tunneled catheter and associated riskas well as peritoneal dialysis. He will be talking to his nephrologist did discuss the best option for him    No follow-ups on file.

## 2021-12-23 ENCOUNTER — CARE COORDINATION (OUTPATIENT)
Dept: CASE MANAGEMENT | Age: 71
End: 2021-12-23

## 2021-12-23 NOTE — CARE COORDINATION
[facility] Transitions Follow Up Call    2021    Patient: Raymonde Lesches  Patient : 1950   MRN: <[id number]>  Reason for Admission: 2021 - 2021 [facility] on chronic CHF, New onset cardiomyopathy, ESRD on HD. Discharge Date: 21 RARS: Readmission Risk Score: 22 ( )  BPCI    Subsequent BPCI outreach. Left Hippa VM. Dr Jyoti Hendricks  8:15  Dr Ayanna Gonzalez  10:30    Care Transitions Subsequent and Final Call    Subsequent and Final Calls  Care Transitions Interventions  Other Interventions:            Follow Up  Future Appointments   Date Time Provider Yoel Villanueva   1/3/2022  2:00 PM [facility] CHF ROOM 1 [facility]   2022 10:30 AM Wicho Abarca MD [facility]KATRIN Lala RN

## 2022-01-03 ENCOUNTER — HOSPITAL ENCOUNTER (OUTPATIENT)
Dept: OTHER | Age: 72
Setting detail: THERAPIES SERIES
Discharge: HOME | End: 2022-01-03

## 2022-01-03 ASSESSMENT — EJECTION FRACTION
EF_VALUE: 25-30%
EF_SOURCE: 2D ECHO

## 2022-01-06 ENCOUNTER — CARE COORDINATION (OUTPATIENT)
Dept: CARE COORDINATION | Age: 72
End: 2022-01-06

## 2022-01-06 NOTE — CARE COORDINATION
Alta Kussmaul  1/6/2022    Registered Dietitian Progress Note for Care Coordination    Assessment: Jace Orozco is a 66 y.o. male. RD referred for CHF and malnutrition. RD spoke with patient for initial nutrition assessment on 12/15. RD called to follow up with pt today 1/6. Pt states he has not yet checked his mail- RD encouraged patient to check mail for handouts and Nepro coupons. RD discussed previous goals with pt. Patient states he is eating balanced meals throughout the day. Pt explains he is interested in home delivered meals- RD noted SW spoke with patient on 12/16 and emailed patient private pay options. RD encouraged patient to check his e-mail. Reiterated the importance of meeting estimated nutrition needs and supplementing with Nepro daily. Pt states he is not drinking Nepro. Patient is at dialysis at time of call- RD recommended patient ask to see RD at dialysis. Pt has no nutrition related questions at this time. Barriers to meeting goals: lack of support, overwhelmed by complexity of regimen and stress      Nutrition Monitoring and Evaluation  Indicator/Goal Criteria Progress   #1 Eat balanced meals consistently throughout the day. #1 Discuss home delivered meals and food resources with SW to promote good oral intake #1 Pt is eating balanced meals. Pt will look into home delivered private pay options. #2  Supplement with Nepro BID to help better meet estimated nutrition needs. #2 Once coupons are received, buy Nepro. Drink Nepro BID. #2 Pt is not supplementing with Nepro. Plan of Care:  RD encouraged pt to keep working toward goals set. RD will follow up with pt to discuss any questions pt has and check the progress toward goals. Follow Up:    RD will call pt in 3 weeks to follow up and answer any nutrition related questions at that time.      [address] [facility], [address] [place] [address]

## 2022-01-18 ENCOUNTER — TELEPHONE (OUTPATIENT)
Dept: VASCULAR SURGERY | Age: 72
End: 2022-01-18

## 2022-01-19 ENCOUNTER — CARE COORDINATION (OUTPATIENT)
Dept: CASE MANAGEMENT | Age: 72
End: 2022-01-19

## 2022-01-19 ENCOUNTER — OFFICE VISIT (OUTPATIENT)
Dept: VASCULAR SURGERY | Age: 72
End: 2022-01-19
Payer: MEDICARE

## 2022-01-19 VITALS — HEIGHT: 74 IN | BODY MASS INDEX: 19.25 KG/M2 | WEIGHT: 150 LBS

## 2022-01-19 DIAGNOSIS — N18.6 ESRD (END STAGE RENAL DISEASE) (HCC): Primary | ICD-10-CM

## 2022-01-19 PROCEDURE — G8427 DOCREV CUR MEDS BY ELIG CLIN: HCPCS | Performed by: SURGERY

## 2022-01-19 PROCEDURE — G8420 CALC BMI NORM PARAMETERS: HCPCS | Performed by: SURGERY

## 2022-01-19 PROCEDURE — 99211 OFF/OP EST MAY X REQ PHY/QHP: CPT | Performed by: SURGERY

## 2022-01-19 NOTE — PROGRESS NOTES
Vascular Surgery Outpatient Progress Note      Chief Complaint   Patient presents with    Follow-up     check port       HISTORY OF PRESENT ILLNESS:                The patient is a 66 y.o. male who returns for follow-up evaluation of arteriovenous access. He otherwise is doing well. He is dialyzing through a right-sided tunneled catheter. He has a history of recent vein mapping and some thrombus on the right side in the superficial system. He presents to evaluate arteriovenous access creation. Again he does not want to proceed with any access     Past Medical History:        Diagnosis Date    Chronic kidney disease     Diabetes mellitus ([address]     Hepatitis C     Hypertension     Liver disease     Thyroid disease     Unspecified diseases of blood and blood-forming organs      Past Surgical History:        Procedure Laterality Date    TONSILLECTOMY      VASCULAR SURGERY N/A 11/21/2021    CATHETER INSERTION HEMODIALYSIS, REMOVAL OF FEMORAL CATHETER performed by Karly Jean MD at [facility] OR     Current Medications:   Prior to Admission medications    Medication Sig Start Date End Date Taking?  Authorizing Provider   carvedilol (COREG) 25 MG tablet Take 1 tablet by mouth 2 times daily (with meals) 12/22/21 1/21/22 Yes KIANNA Nicole CNP   aspirin 81 MG EC tablet Take 1 tablet by mouth daily 12/22/21 1/21/22 Yes KIANNA Nicole CNP   sacubitril-valsartan (ENTRESTO)  MG per tablet Take 1 tablet by mouth 2 times daily 12/22/21  Yes KIANNA Nicole CNP   bumetanide (BUMEX) 2 MG tablet Take 1 tablet by mouth 2 times daily 12/22/21 1/21/22 Yes KIANNA Nicole CNP   sodium bicarbonate 650 MG tablet TK 2 TS PO Q 8 H 7/11/20  Yes Historical Provider, MD   levothyroxine (SYNTHROID) 112 MCG tablet Take 112 mcg by mouth Daily    Yes Historical Provider, MD   vitamin D (ERGOCALCIFEROL) 1.25 MG (03679 UT) CAPS capsule TK 1 C PO 1 TIME Q WK 7/7/20  Yes Historical Provider, MD atorvastatin (LIPITOR) 40 MG tablet Take 1 tablet by mouth nightly  Patient not taking: Reported on 2021  KATHARINE Leong     Allergies:  Penicillins    Social History     Socioeconomic History    Marital status: Single     Spouse name: Not on file    Number of children: Not on file    Years of education: Not on file    Highest education level: Not on file   Occupational History    Not on file   Tobacco Use    Smoking status: Former Smoker     Packs/day: 2.00     Years: 10.00     Pack years: 20.00     Types: Cigarettes     Quit date: 1973     Years since quittin.4    Smokeless tobacco: Never Used   Substance and Sexual Activity    Alcohol use: No    Drug use: Yes     Comment: in early     Sexual activity: Yes     Partners: Female   Other Topics Concern    Not on file   Social History Narrative    Not on file     Social Determinants of Health     Financial Resource Strain:     Difficulty of Paying Living Expenses: Not on file   Food Insecurity:     Worried About [address] in the Last Year: Not on file    Danielle of Food in the Last Year: Not on file   Transportation Needs:     Lack of Transportation (Medical): Not on file    Lack of Transportation (Non-Medical):  Not on file   Physical Activity:     Days of Exercise per Week: Not on file    Minutes of Exercise per Session: Not on file   Stress:     Feeling of Stress : Not on file   Social Connections:     Frequency of Communication with Friends and Family: Not on file    Frequency of Social Gatherings with Friends and Family: Not on file    Attends Islam Services: Not on file    Active Member of Clubs or Organizations: Not on file    Attends Club or Organization Meetings: Not on file    Marital Status: Not on file   Intimate Partner Violence:     Fear of Current or Ex-Partner: Not on file    Emotionally Abused: Not on file    Physically Abused: Not on file    Sexually Abused: Not on file   Housing Stability:     Unable to Pay for Housing in the Last Year: Not on file    Number of Places Lived in the Last Year: Not on file    Unstable Housing in the Last Year: Not on file        Family History   Problem Relation Age of Onset    Diabetes Mother     Cancer Father        REVIEW OF SYSTEMS:    Constitutional: Negative for activity change, appetite change, chills, diaphoresis, fatigue, fever and unexpected weight change. PHYSICAL EXAM:  There were no vitals filed for this visit. General Appearance: alert and oriented to person, place and time, well developed and well- nourished, in no acute distress  Skin: warm and dry, no rash or erythema, the tunneled catheter site is clean and dry  Head: normocephalic and atraumatic  Eyes: extraocular eye movements intact, conjunctivae normal  ENT: external ear and ear canal normal bilaterally, nose without deformity  Pulmonary/Chest: No labored breathing good diaphragmatic excursion   cardiovascular: RRR  Neurologic: no cranial nerve deficit, gait, coordination and speech normal  Extremities: Lateral palpable brachial radial pulses. I did a prior examination on him the last time his venous anatomy was small with thrombus noted. Problem List Items Addressed This Visit     ESRD (end stage renal disease) ([facility] - Primary          #1 end-stage renal disease. At this point we have gone over options such as arterial graft placement versus PD catheter we also discussed long-term use of the dialysis catheter. At this point he does not wish to proceed with any type of additional surgical intervention he understands the risk associated with a tunneled catheter I am gone over these at length. I also again discussed options on today's visit. Right now he is feeling pretty well and does not wish to be punctured during dialysis and has no intention right now and proceeding with any type of arteriovenous access creation.     From our standpoint he can

## 2022-01-19 NOTE — CARE COORDINATION
[facility] Transitions Follow Up Call    2022    Patient: Alhaji Patel  Patient : 1950   MRN: <[id number]>  Reason for Admission: 2021 - 2021 [facility] on chronic CHF, New onset cardiomyopathy, ESRD on HD. Discharge Date: 21 RARS: Readmission Risk Score: 22 ( )  BPCI     Subsequent BPCI outreach. Left Hippa VM. Dr Kathy Han  8:15  Dr Devante Lopez  10:30           Care Transitions Subsequent and Final Call    Subsequent and Final Calls  Care Transitions Interventions  Other Interventions:            Follow Up  Future Appointments   Date Time Provider Yoel Villanueva   2022 10:30 AM [facility]

## 2022-01-27 ENCOUNTER — CARE COORDINATION (OUTPATIENT)
Dept: CARE COORDINATION | Age: 72
End: 2022-01-27

## 2022-01-27 NOTE — CARE COORDINATION
Alta Kussmaul  1/27/2022    Registered Dietitian Progress Note for Care Coordination    Assessment: Jace Orozco is a 66 y.o. male. RD referred for CHF and history of malnutrition. RD spoke with patient for initial nutrition assessment on 12/15 and has been following up with patient. RD called to follow up with pt today 1/27. RD discussed previous goals with pt. Patient states he has not received the information or coupons in the mail- RD verified address and will resend information. Patient is trying to eat balanced meals consistently. Patient is interested in home delivered meals and will look at e-mail sent by SW on 12/16 with private pay options. RD explained the importance of meeting estimated nutrition needs daily and discussed food is fuel for our bodies. Patient is not supplementing with Nepro. RD encouraged patient to make small changes one at a time. Patient states he has met with a RD at dialysis. No nutrition related questions at this time. Barriers to meeting goals: lack of support, overwhelmed by complexity of regimen and stress        Nutrition Monitoring and Evaluation  Indicator/Goal Criteria Progress   #1 Eat balanced meals consistently throughout the day. #1 Discuss home delivered meals and food resources with SW to promote good oral intake #1 Pt is eating balanced meals. Pt will look into home delivered private pay options. #2  Supplement with Nepro BID to help better meet estimated nutrition needs. #2 Once coupons are received, buy Nepro. Drink Nepro BID. #2 Pt is not supplementing with Nepro. Plan of Care:  RD encouraged pt to keep working toward goals set. RD explained to pt this is final follow up call and provided contact information to pt. Encouraged pt to call RD in future with any nutrition related questions or concerns. Follow Up:    Final follow up call today 1/27/22. RD will continue to follow/assist with patient return call.         Gallo Gaviria RDN,   [phone]

## 2022-02-09 ENCOUNTER — OFFICE VISIT (OUTPATIENT)
Dept: VASCULAR SURGERY | Age: 72
End: 2022-02-09
Payer: MEDICARE

## 2022-02-09 VITALS — SYSTOLIC BLOOD PRESSURE: 172 MMHG | DIASTOLIC BLOOD PRESSURE: 108 MMHG

## 2022-02-09 DIAGNOSIS — Z99.2 ENCOUNTER REGARDING VASCULAR ACCESS FOR DIALYSIS FOR ESRD (HCC): Primary | ICD-10-CM

## 2022-02-09 DIAGNOSIS — N18.6 ENCOUNTER REGARDING VASCULAR ACCESS FOR DIALYSIS FOR ESRD (HCC): Primary | ICD-10-CM

## 2022-02-09 PROCEDURE — 1036F TOBACCO NON-USER: CPT | Performed by: PHYSICIAN ASSISTANT

## 2022-02-09 PROCEDURE — 3017F COLORECTAL CA SCREEN DOC REV: CPT | Performed by: PHYSICIAN ASSISTANT

## 2022-02-09 PROCEDURE — 4040F PNEUMOC VAC/ADMIN/RCVD: CPT | Performed by: PHYSICIAN ASSISTANT

## 2022-02-09 PROCEDURE — G8420 CALC BMI NORM PARAMETERS: HCPCS | Performed by: PHYSICIAN ASSISTANT

## 2022-02-09 PROCEDURE — 1123F ACP DISCUSS/DSCN MKR DOCD: CPT | Performed by: PHYSICIAN ASSISTANT

## 2022-02-09 PROCEDURE — 99212 OFFICE O/P EST SF 10 MIN: CPT | Performed by: PHYSICIAN ASSISTANT

## 2022-02-09 PROCEDURE — G8484 FLU IMMUNIZE NO ADMIN: HCPCS | Performed by: PHYSICIAN ASSISTANT

## 2022-02-09 PROCEDURE — G8427 DOCREV CUR MEDS BY ELIG CLIN: HCPCS | Performed by: PHYSICIAN ASSISTANT

## 2022-02-09 NOTE — PROGRESS NOTES
Vascular Surgery Outpatient Progress Note      Chief Complaint   Patient presents with    Circulatory Problem     Check dialysis catheter. HISTORY OF PRESENT ILLNESS:                The patient is a 66 y.o. male who returns for follow-up evaluation of arteriovenous access. He overall is doing well and states he is feeling better now that he is on dialysis. He is dialyzing through a right-sided tunneled catheter. He has sutures around the catheter that are rubbing on his skin causing pain and irritation and would like these removed. He still is against any surgical procedures for AV access despite understanding risks for infection long term. Past Medical History:        Diagnosis Date    Chronic kidney disease     Diabetes mellitus ([address]     Hepatitis C     Hypertension     Liver disease     Thyroid disease     Unspecified diseases of blood and blood-forming organs      Past Surgical History:        Procedure Laterality Date    TONSILLECTOMY      VASCULAR SURGERY N/A 11/21/2021    CATHETER INSERTION HEMODIALYSIS, REMOVAL OF FEMORAL CATHETER performed by Obey Apple MD at [facility] OR     Current Medications:   Prior to Admission medications    Medication Sig Start Date End Date Taking?  Authorizing Provider   carvedilol (COREG) 25 MG tablet Take 1 tablet by mouth 2 times daily (with meals) 12/22/21 1/21/22  KIANNA Cuellar CNP   aspirin 81 MG EC tablet Take 1 tablet by mouth daily 12/22/21 1/21/22  KIANNA Cuellar CNP   sacubitril-valsartan (ENTRESTO)  MG per tablet Take 1 tablet by mouth 2 times daily  Patient not taking: Reported on 2/9/2022 12/22/21   KIANNA Cuellar CNP   bumetanide (BUMEX) 2 MG tablet Take 1 tablet by mouth 2 times daily 12/22/21 1/21/22  KIANNA Cuellar CNP   atorvastatin (LIPITOR) 40 MG tablet Take 1 tablet by mouth nightly  Patient not taking: Reported on 12/22/2021 12/13/21 1/12/22  KATHARINE Negrete   sodium bicarbonate 650 MG tablet TK 2 TS PO Q 8 H  Patient not taking: Reported on 2022   Historical Provider, MD   levothyroxine (SYNTHROID) 112 MCG tablet Take 112 mcg by mouth Daily   Patient not taking: Reported on 2022    Historical Provider, MD   vitamin D (ERGOCALCIFEROL) 1.25 MG (32496 UT) CAPS capsule TK 1 C PO 1 TIME Q WK  Patient not taking: Reported on 2022   Historical Provider, MD     Allergies:  Penicillins    Social History     Socioeconomic History    Marital status: Single     Spouse name: Not on file    Number of children: Not on file    Years of education: Not on file    Highest education level: Not on file   Occupational History    Not on file   Tobacco Use    Smoking status: Former Smoker     Packs/day: 2.00     Years: 10.00     Pack years: 20.00     Types: Cigarettes     Quit date: 1973     Years since quittin.4    Smokeless tobacco: Never Used   Substance and Sexual Activity    Alcohol use: No    Drug use: Yes     Comment: in early     Sexual activity: Yes     Partners: Female   Other Topics Concern    Not on file   Social History Narrative    Not on file     Social Determinants of Health     Financial Resource Strain:     Difficulty of Paying Living Expenses: Not on file   Food Insecurity:     Worried About [address] in the Last Year: Not on file    [address] in the Last Year: Not on file   Transportation Needs:     Lack of Transportation (Medical): Not on file    Lack of Transportation (Non-Medical):  Not on file   Physical Activity:     Days of Exercise per Week: Not on file    Minutes of Exercise per Session: Not on file   Stress:     Feeling of Stress : Not on file   Social Connections:     Frequency of Communication with Friends and Family: Not on file    Frequency of Social Gatherings with Friends and Family: Not on file    Attends Religion Services: Not on file    Active Member of Clubs or Organizations: Not on file    Attends Club or Organization Meetings: Not on file    Marital Status: Not on file   Intimate Partner Violence:     Fear of Current or Ex-Partner: Not on file    Emotionally Abused: Not on file    Physically Abused: Not on file    Sexually Abused: Not on file   Housing Stability:     Unable to Pay for Housing in the Last Year: Not on file    Number of [place] in the Last Year: Not on file    Unstable Housing in the Last Year: Not on file        Family History   Problem Relation Age of Onset    Diabetes Mother     Cancer Father        REVIEW OF SYSTEMS:    Constitutional: Negative for activity change, appetite change, chills, diaphoresis, fatigue, fever and unexpected weight change. PHYSICAL EXAM:  Vitals:    02/09/22 1026   BP: (!) 172/108     General Appearance: alert and oriented to person, place and time, well developed and well- nourished, in no acute distress  Skin: warm and dry, no rash or erythema, the tunneled catheter site is clean and dry, sutures intact  Head: normocephalic and atraumatic  Eyes: extraocular eye movements intact, conjunctivae normal  ENT: external ear and ear canal normal bilaterally, nose without deformity  Pulmonary/Chest: No labored breathing good diaphragmatic excursion   cardiovascular: RRR  Neurologic: no cranial nerve deficit, gait, coordination and speech normal  Extremities: Bilateral palpable brachial radial pulses. Problem List Items Addressed This Visit     None      Visit Diagnoses     Encounter regarding vascular access for dialysis for ESRD [facility])    -  Primary          #1 end-stage renal disease. At this point we have gone over options such as arterial graft placement versus PD catheter we also discussed long-term use of the dialysis catheter. He continues to be against proceeding with any type of additional surgical intervention; he understands the risk associated with a tunneled catheter as this has been gone over multiple times.   He has no intention right now of proceeding with any type of arteriovenous access creation. Sutures were removed to prevent irritation. Cuff well adhered. I asked him to call with any new or worsening issues or if he chooses to proceed with UE AVF creation. Pt seen and plan reviewed with Dr. Salina Mathews. Joey Boles PA-C         Return if symptoms worsen or fail to improve.

## 2022-02-21 ENCOUNTER — CARE COORDINATION (OUTPATIENT)
Dept: CASE MANAGEMENT | Age: 72
End: 2022-02-21

## 2022-02-21 ENCOUNTER — TELEPHONE (OUTPATIENT)
Dept: PHARMACY | Facility: CLINIC | Age: 72
End: 2022-02-21

## 2022-02-21 DIAGNOSIS — I10 HYPERTENSION, UNSPECIFIED TYPE: Primary | ICD-10-CM

## 2022-02-21 NOTE — CARE COORDINATION
[facility] Transitions Follow Up Call    2022    Patient: Chahco Barillas  Patient : 1950   MRN: <[id number]>  Reason for Admission: 2021 - 2021 [facility] on chronic CHF, New onset cardiomyopathy, ESRD on HD. Discharge Date: 21 RARS: Readmission Risk Score: 22 ( )  BPCI     Care Transitions Follow Up Call    Needs to be reviewed by the provider   Additional needs identified to be addressed with provider: No  none             Method of communication with provider : none      Care Transition Nurse (CTN) contacted the patient by telephone to follow up after admission. Verified name and  with patient as identifiers. Addressed changes since last contact: Gustavo Yousif reports he has lost 10 lbs. No SOB, edema, or chest pain events. States he is attending HD appts and is urinating 2-3 times daily. Reports good appetitie and not concerned he takes in too much fluid. Pt admits he is not taking prescriptions as prescribed because he is hypersensitive to medication side effects. He shares he is having daily home SBP in 200 range. He is also having HTN at HD. Shares yesterday result 210/105 at HD and today's 220/120. States he often wakes w/ HA at base of skull. He is not taking his coreg or entresto as prescribed. Sometimes takes half dose of his coreg but not taking BID. Advised he needs to take his meds as prescribed to get full benefit and discussed risks of uncontrolled HTN including heart attack and stroke, v/u. He is agreeable to review meds w/ [facility] for education and side effect review. He has cardiology appt Dr Hugo Lai 3/9 10:45. Pt states he is intolerant of lower BP and states he has even felt nauseous w/ and SBP in 130 range. States his SBP has been in the 190-200 range for years. Reviewed vascular damage risks of uncontrolled BP, v/u. Strongly enc to follow medication regimen as prescribed, v/u. Declines PCP appt.  Enc to call me w/ any questions/needs. Discussed follow-up appointments. If no appointment was previously scheduled, appointment scheduling offered: Yes. Is follow up appointment scheduled within 7 days of discharge? Dr Elizabeth Aase 3/9 10:45. Advance Care Planning:   Does patient have an Advance Directive: Juan Pablo Garibay primary decision maker  [phone]   CTN reviewed discharge instructions, medical action plan and red flags with patient and discussed any barriers to care and/or understanding of plan of care after discharge. Discussed appropriate site of care based on symptoms and resources available to patient including: When to call 911. The patient agrees to contact the PCP office for questions related to their healthcare. Patients top risk factors for readmission: HTN  Interventions to address risk factors: Enc to limit fluid intake to 1.5L daily, eat no added low sodium diet, and to be compliant w/ medications. [facility] referral placed and pt agreeable. Non-BS follow up appointment(s):     CTN provided contact information for future needs. Plan for follow-up call in 10-14 days based on severity of symptoms and risk factors. Plan for next call: BP check, Check medication compliance. Care Transitions Subsequent and Final Call    Subsequent and Final Calls  Do you have any ongoing symptoms?: Yes  Patient-reported symptoms: Other  Interventions for patient-reported symptoms: Other  Do you currently have any active services?: Yes  Are you currently active with any services?: Outpatient/Community Services  Do you have any needs or concerns that I can assist you with?: No  Identified Barriers: Lack of Education  Care Transitions Interventions    Pharmacist: Completed    Other Interventions:            Follow Up  Future Appointments   Date Time Provider Yoel Villanueva   3/9/2022 10:45 AM Fani Lopez MD [facility], [address] [place] [address]

## 2022-02-21 NOTE — LETTER
[place]  [address], [address]        [facility]   [address]           03/01/22     Dear Ricardo Lindo are eligible for a complete medication therapy review performed by a [facility]) [facility] licensed clinical pharmacist. This review helps ensure that you are getting the most benefit from the medications you receive and includes the following:  - Review of your medications, including over-the-counter and herbal medications. - Answering questions about your medications and how to get the most benefit from them. - Identifying and helping to prevent potential drug interactions or side effects.  - Possibly identifying less costly alternatives that are equally effective. Under this program, [facility] will work with you and your doctor to manage your drug therapy. Please contact the [address] [place] [address] office to set up a time for your medication review with one of our clinical pharmacists. To contact us call [phone] and select Option 2 . This will be a phone consult and therefore will not require a trip to the medical office. Please note: This is an optional program.  It is a free service provided to help ensure that your medicines are safe, necessary, and effective. Your participation is encouraged, but not required.     Sincerely,  [facility] [address] free: [phone]

## 2022-02-21 NOTE — TELEPHONE ENCOUNTER
Received a referral:  from Care Coordinator to review patients medications. Pt is not taking meds as directed. States he has side effect issues. Daily home BPs 210/105. Is attending HD. Please check side effect concerns and enc compliance. Advise me of any concerns    Called patient to schedule a time to speak with a pharmacist over the telephone. Spoke to patient and advised them of the above message. Determined patient was driving. Informed him I would call back-perhaps tomorrow. Patient agreed. Aline Ceja CP.    [address] [place] [address] free: [phone]

## 2022-02-23 NOTE — TELEPHONE ENCOUNTER
2nd Attempt Documentation:  2nd attempt to contact this patient regarding the previous message  CLINICAL PHARMACY: REFERRAL  Patient unavailable at the time of call. Left following message on home TAD: please call back at toll-free [phone] to retrieve previous message.     [employer] message sent

## 2022-03-01 NOTE — TELEPHONE ENCOUNTER
MyChart message unread. Left another message.     Will send letter to home    Dain Maldonado in place:  No   Recommendation Provided To: Patient/Caregiver: 1 via Telephone   Intervention Detail: Scheduled Appointment   Gap Closed?: No    Intervention Accepted By: Patient/Caregiver: 0   Time Spent (min): 10

## 2022-03-10 ENCOUNTER — CARE COORDINATION (OUTPATIENT)
Dept: CASE MANAGEMENT | Age: 72
End: 2022-03-10

## 2022-04-20 ENCOUNTER — APPOINTMENT (OUTPATIENT)
Dept: GENERAL RADIOLOGY | Age: 72
DRG: 291 | End: 2022-04-20
Payer: MEDICARE

## 2022-04-20 LAB
ALBUMIN SERPL-MCNC: 4.3 G/DL (ref 3.5–5.2)
ALP BLD-CCNC: 98 U/L (ref 40–129)
ALT SERPL-CCNC: 12 U/L (ref 0–40)
ANION GAP SERPL CALCULATED.3IONS-SCNC: 16 MMOL/L (ref 7–16)
ANISOCYTOSIS: ABNORMAL
APTT: 28.8 SEC (ref 24.5–35.1)
AST SERPL-CCNC: 20 U/L (ref 0–39)
BASOPHILS ABSOLUTE: 0.06 E9/L (ref 0–0.2)
BASOPHILS RELATIVE PERCENT: 1.5 % (ref 0–2)
BILIRUB SERPL-MCNC: 1.3 MG/DL (ref 0–1.2)
BILIRUBIN DIRECT: 0.3 MG/DL (ref 0–0.3)
BILIRUBIN, INDIRECT: 1 MG/DL (ref 0–1)
BUN BLDV-MCNC: 53 MG/DL (ref 6–23)
CALCIUM SERPL-MCNC: 9 MG/DL (ref 8.6–10.2)
CHLORIDE BLD-SCNC: 103 MMOL/L (ref 98–107)
CO2: 19 MMOL/L (ref 22–29)
CREAT SERPL-MCNC: 8.3 MG/DL (ref 0.7–1.2)
EOSINOPHILS ABSOLUTE: 0.09 E9/L (ref 0.05–0.5)
EOSINOPHILS RELATIVE PERCENT: 2.2 % (ref 0–6)
GFR AFRICAN AMERICAN: 8
GFR NON-AFRICAN AMERICAN: 6 ML/MIN/1.73
GLUCOSE BLD-MCNC: 119 MG/DL (ref 74–99)
HCT VFR BLD CALC: 35.9 % (ref 37–54)
HEMOGLOBIN: 11.9 G/DL (ref 12.5–16.5)
IMMATURE GRANULOCYTES #: 0 E9/L
IMMATURE GRANULOCYTES %: 0 % (ref 0–5)
INFLUENZA A BY PCR: NOT DETECTED
INFLUENZA B BY PCR: NOT DETECTED
LACTIC ACID: 1.8 MMOL/L (ref 0.5–2.2)
LYMPHOCYTES ABSOLUTE: 0.88 E9/L (ref 1.5–4)
LYMPHOCYTES RELATIVE PERCENT: 21.7 % (ref 20–42)
MCH RBC QN AUTO: 29.8 PG (ref 26–35)
MCHC RBC AUTO-ENTMCNC: 33.1 % (ref 32–34.5)
MCV RBC AUTO: 89.8 FL (ref 80–99.9)
MONOCYTES ABSOLUTE: 0.46 E9/L (ref 0.1–0.95)
MONOCYTES RELATIVE PERCENT: 11.4 % (ref 2–12)
NEUTROPHILS ABSOLUTE: 2.56 E9/L (ref 1.8–7.3)
NEUTROPHILS RELATIVE PERCENT: 63.2 % (ref 43–80)
OVALOCYTES: ABNORMAL
PDW BLD-RTO: 23.6 FL (ref 11.5–15)
PLATELET # BLD: 184 E9/L (ref 130–450)
PMV BLD AUTO: 9.6 FL (ref 7–12)
POIKILOCYTES: ABNORMAL
POLYCHROMASIA: ABNORMAL
POTASSIUM REFLEX MAGNESIUM: 4.5 MMOL/L (ref 3.5–5)
RBC # BLD: 4 E12/L (ref 3.8–5.8)
SARS-COV-2, NAAT: NOT DETECTED
SODIUM BLD-SCNC: 138 MMOL/L (ref 132–146)
TOTAL PROTEIN: 8.1 G/DL (ref 6.4–8.3)
TROPONIN, HIGH SENSITIVITY: 1078 NG/L (ref 0–11)
WBC # BLD: 4.1 E9/L (ref 4.5–11.5)

## 2022-04-20 PROCEDURE — 85025 COMPLETE CBC W/AUTO DIFF WBC: CPT

## 2022-04-20 PROCEDURE — 87502 INFLUENZA DNA AMP PROBE: CPT

## 2022-04-20 PROCEDURE — 80076 HEPATIC FUNCTION PANEL: CPT

## 2022-04-20 PROCEDURE — 71045 X-RAY EXAM CHEST 1 VIEW: CPT

## 2022-04-20 PROCEDURE — 87635 SARS-COV-2 COVID-19 AMP PRB: CPT

## 2022-04-20 PROCEDURE — 87040 BLOOD CULTURE FOR BACTERIA: CPT

## 2022-04-20 PROCEDURE — 83605 ASSAY OF LACTIC ACID: CPT

## 2022-04-20 PROCEDURE — 80048 BASIC METABOLIC PNL TOTAL CA: CPT

## 2022-04-20 PROCEDURE — 83036 HEMOGLOBIN GLYCOSYLATED A1C: CPT

## 2022-04-20 PROCEDURE — 36415 COLL VENOUS BLD VENIPUNCTURE: CPT

## 2022-04-20 PROCEDURE — 93005 ELECTROCARDIOGRAM TRACING: CPT | Performed by: NURSE PRACTITIONER

## 2022-04-20 PROCEDURE — 85730 THROMBOPLASTIN TIME PARTIAL: CPT

## 2022-04-20 PROCEDURE — 99285 EMERGENCY DEPT VISIT HI MDM: CPT

## 2022-04-20 PROCEDURE — 84484 ASSAY OF TROPONIN QUANT: CPT

## 2022-04-20 ASSESSMENT — PAIN - FUNCTIONAL ASSESSMENT: PAIN_FUNCTIONAL_ASSESSMENT: NONE - DENIES PAIN

## 2022-04-20 NOTE — ED PROVIDER NOTES
FIRST PROVIDER CONTACT ASSESSMENT NOTE           Department of Emergency Medicine                 First Provider Note            22  7:32 PM EDT    Date of Encounter: No admission date for patient encounter. Patient Name: Darci Menjivar  : 1950  MRN: [id number]    Chief Complaint: Shortness of Breath (missed dialysis es now sob, cough, weakness. last dialysis Saturday) and Nasal Congestion (feels short of breath when lying down)      History of Present Illness:   Darci Menjivar is a 67 y.o. male who presents to the ED for complaints of nasal congestion and shortness of breath. Patient reports that his last dialysis was 2022 and he only did 2 hours of it because he has been sick for the past week and has missed dialysis and usually goes Tuesday/Thursday and Saturday and Dr. King Hansen is his nephrologist.  He has a Tesio catheter in the right upper subclavian site. He does make urine. He denies any chest pain or palpitations and does complain of a cough denies any leg swelling and denies any anticoagulation. He complains of being extremely fatigued. Focused Physical Exam:  VS:    ED Triage Vitals [22]   BP Temp Temp Source Pulse Resp SpO2 Height Weight   (!) 221/128 97 °F (36.1 °C) Temporal 98 16 95 % 6' 2\" (1.88 m) 145 lb (65.8 kg)        Physical Ex: Constitutional: Alert and non-toxic. Medical History:  has a past medical history of Chronic kidney disease, Diabetes mellitus ([address] Hepatitis C, Hypertension, Liver disease, Thyroid disease, and Unspecified diseases of blood and blood-forming organs. Surgical History:  has a past surgical history that includes Tonsillectomy and vascular surgery (N/A, 2021). Social History:  reports that he quit smoking about 48 years ago. His smoking use included cigarettes. He has a 20.00 pack-year smoking history. He has never used smokeless tobacco. He reports current drug use. He reports that he does not drink alcohol.   Family History: family history includes Cancer in his father; Diabetes in his mother.     Allergies: Penicillins     Initial Plan of Care: Initiate Treatment-Testing, Proceed toTreatment Area When Bed Available for ED Attending/MLP to Continue Care      ---END OF FIRST PROVIDER CONTACT ASSESSMENT NOTE---  Electronically signed by KIANNA Christianson CNP   DD: 4/20/22      KIANNA Christianson CNP  04/20/22 1933

## 2022-04-21 ENCOUNTER — HOSPITAL ENCOUNTER (INPATIENT)
Age: 72
LOS: 3 days | Discharge: NURSING, REHAB OR LONG-TERM CARE | DRG: 291 | End: 2022-04-26
Attending: STUDENT IN AN ORGANIZED HEALTH CARE EDUCATION/TRAINING PROGRAM | Admitting: FAMILY MEDICINE
Payer: MEDICARE

## 2022-04-21 DIAGNOSIS — Z91.199 H/O NONCOMPLIANCE WITH MEDICAL TREATMENT, PRESENTING HAZARDS TO HEALTH: ICD-10-CM

## 2022-04-21 DIAGNOSIS — Z99.2 ENCOUNTER FOR DIALYSIS (HCC): Primary | ICD-10-CM

## 2022-04-21 DIAGNOSIS — I10 ESSENTIAL HYPERTENSION: ICD-10-CM

## 2022-04-21 PROBLEM — R53.1 GENERALIZED WEAKNESS: Status: ACTIVE | Noted: 2022-04-21

## 2022-04-21 PROBLEM — R91.8 LUNG INFILTRATE: Status: ACTIVE | Noted: 2022-04-21

## 2022-04-21 PROBLEM — R79.89 ELEVATED SERUM CREATININE: Status: ACTIVE | Noted: 2022-04-21

## 2022-04-21 PROBLEM — R05.9 COUGH: Status: ACTIVE | Noted: 2022-04-21

## 2022-04-21 LAB
ADENOVIRUS BY PCR: NOT DETECTED
BACTERIA: ABNORMAL /HPF
BILIRUBIN URINE: NEGATIVE
BLOOD, URINE: ABNORMAL
BORDETELLA PARAPERTUSSIS BY PCR: NOT DETECTED
BORDETELLA PERTUSSIS BY PCR: NOT DETECTED
C-REACTIVE PROTEIN: 0.3 MG/DL (ref 0–0.4)
CHLAMYDOPHILIA PNEUMONIAE BY PCR: NOT DETECTED
CLARITY: CLEAR
COLOR: YELLOW
CORONAVIRUS 229E BY PCR: NOT DETECTED
CORONAVIRUS HKU1 BY PCR: NOT DETECTED
CORONAVIRUS NL63 BY PCR: NOT DETECTED
CORONAVIRUS OC43 BY PCR: NOT DETECTED
EKG ATRIAL RATE: 90 BPM
EKG P AXIS: 54 DEGREES
EKG P-R INTERVAL: 170 MS
EKG Q-T INTERVAL: 396 MS
EKG QRS DURATION: 88 MS
EKG QTC CALCULATION (BAZETT): 484 MS
EKG R AXIS: -35 DEGREES
EKG T AXIS: 165 DEGREES
EKG VENTRICULAR RATE: 90 BPM
GLUCOSE URINE: 100 MG/DL
HBA1C MFR BLD: 5.4 % (ref 4–5.6)
HUMAN METAPNEUMOVIRUS BY PCR: NOT DETECTED
HUMAN RHINOVIRUS/ENTEROVIRUS BY PCR: NOT DETECTED
INFLUENZA A BY PCR: NOT DETECTED
INFLUENZA B BY PCR: NOT DETECTED
KETONES, URINE: NEGATIVE MG/DL
LEUKOCYTE ESTERASE, URINE: NEGATIVE
MYCOPLASMA PNEUMONIAE BY PCR: NOT DETECTED
NITRITE, URINE: NEGATIVE
PARAINFLUENZA VIRUS 1 BY PCR: NOT DETECTED
PARAINFLUENZA VIRUS 2 BY PCR: NOT DETECTED
PARAINFLUENZA VIRUS 3 BY PCR: NOT DETECTED
PARAINFLUENZA VIRUS 4 BY PCR: NOT DETECTED
PH UA: 6.5 (ref 5–9)
PROCALCITONIN: 0.25 NG/ML (ref 0–0.08)
PROCALCITONIN: 0.29 NG/ML (ref 0–0.08)
PROTEIN UA: >=300 MG/DL
RBC UA: ABNORMAL /HPF (ref 0–2)
RESPIRATORY SYNCYTIAL VIRUS BY PCR: NOT DETECTED
SARS-COV-2, PCR: NOT DETECTED
SPECIFIC GRAVITY UA: 1.02 (ref 1–1.03)
T4 FREE: 1.06 NG/DL (ref 0.93–1.7)
TROPONIN, HIGH SENSITIVITY: 1038 NG/L (ref 0–11)
TROPONIN, HIGH SENSITIVITY: 851 NG/L (ref 0–11)
TSH SERPL DL<=0.05 MIU/L-ACNC: 114.3 UIU/ML (ref 0.27–4.2)
UROBILINOGEN, URINE: 0.2 E.U./DL
WBC UA: ABNORMAL /HPF (ref 0–5)

## 2022-04-21 PROCEDURE — 0202U NFCT DS 22 TRGT SARS-COV-2: CPT

## 2022-04-21 PROCEDURE — G0378 HOSPITAL OBSERVATION PER HR: HCPCS

## 2022-04-21 PROCEDURE — 99223 1ST HOSP IP/OBS HIGH 75: CPT | Performed by: INTERNAL MEDICINE

## 2022-04-21 PROCEDURE — 84439 ASSAY OF FREE THYROXINE: CPT

## 2022-04-21 PROCEDURE — 6370000000 HC RX 637 (ALT 250 FOR IP): Performed by: FAMILY MEDICINE

## 2022-04-21 PROCEDURE — 93010 ELECTROCARDIOGRAM REPORT: CPT | Performed by: INTERNAL MEDICINE

## 2022-04-21 PROCEDURE — 84484 ASSAY OF TROPONIN QUANT: CPT

## 2022-04-21 PROCEDURE — 94640 AIRWAY INHALATION TREATMENT: CPT

## 2022-04-21 PROCEDURE — 36415 COLL VENOUS BLD VENIPUNCTURE: CPT

## 2022-04-21 PROCEDURE — 5A1D70Z PERFORMANCE OF URINARY FILTRATION, INTERMITTENT, LESS THAN 6 HOURS PER DAY: ICD-10-PCS | Performed by: INTERNAL MEDICINE

## 2022-04-21 PROCEDURE — 94664 DEMO&/EVAL PT USE INHALER: CPT

## 2022-04-21 PROCEDURE — 6360000002 HC RX W HCPCS: Performed by: FAMILY MEDICINE

## 2022-04-21 PROCEDURE — 96376 TX/PRO/DX INJ SAME DRUG ADON: CPT

## 2022-04-21 PROCEDURE — 84443 ASSAY THYROID STIM HORMONE: CPT

## 2022-04-21 PROCEDURE — 86140 C-REACTIVE PROTEIN: CPT

## 2022-04-21 PROCEDURE — 6360000002 HC RX W HCPCS: Performed by: INTERNAL MEDICINE

## 2022-04-21 PROCEDURE — 96372 THER/PROPH/DIAG INJ SC/IM: CPT

## 2022-04-21 PROCEDURE — 90935 HEMODIALYSIS ONE EVALUATION: CPT | Performed by: INTERNAL MEDICINE

## 2022-04-21 PROCEDURE — 81001 URINALYSIS AUTO W/SCOPE: CPT

## 2022-04-21 PROCEDURE — 6370000000 HC RX 637 (ALT 250 FOR IP): Performed by: INTERNAL MEDICINE

## 2022-04-21 PROCEDURE — APPSS60 APP SPLIT SHARED TIME 46-60 MINUTES: Performed by: NURSE PRACTITIONER

## 2022-04-21 PROCEDURE — 84145 PROCALCITONIN (PCT): CPT

## 2022-04-21 PROCEDURE — 99226 PR SBSQ OBSERVATION CARE/DAY 35 MINUTES: CPT | Performed by: FAMILY MEDICINE

## 2022-04-21 PROCEDURE — 6370000000 HC RX 637 (ALT 250 FOR IP): Performed by: NURSE PRACTITIONER

## 2022-04-21 PROCEDURE — 6370000000 HC RX 637 (ALT 250 FOR IP)

## 2022-04-21 PROCEDURE — 96374 THER/PROPH/DIAG INJ IV PUSH: CPT

## 2022-04-21 PROCEDURE — 6360000002 HC RX W HCPCS: Performed by: STUDENT IN AN ORGANIZED HEALTH CARE EDUCATION/TRAINING PROGRAM

## 2022-04-21 PROCEDURE — 96375 TX/PRO/DX INJ NEW DRUG ADDON: CPT

## 2022-04-21 PROCEDURE — 87088 URINE BACTERIA CULTURE: CPT

## 2022-04-21 PROCEDURE — 99222 1ST HOSP IP/OBS MODERATE 55: CPT | Performed by: INTERNAL MEDICINE

## 2022-04-21 PROCEDURE — 90792 PSYCH DIAG EVAL W/MED SRVCS: CPT

## 2022-04-21 RX ORDER — ERGOCALCIFEROL 1.25 MG/1
50000 CAPSULE ORAL WEEKLY
Status: DISCONTINUED | OUTPATIENT
Start: 2022-04-21 | End: 2022-04-21

## 2022-04-21 RX ORDER — ATORVASTATIN CALCIUM 40 MG/1
40 TABLET, FILM COATED ORAL NIGHTLY
Status: DISCONTINUED | OUTPATIENT
Start: 2022-04-21 | End: 2022-04-26 | Stop reason: HOSPADM

## 2022-04-21 RX ORDER — HYDRALAZINE HYDROCHLORIDE 20 MG/ML
10 INJECTION INTRAMUSCULAR; INTRAVENOUS EVERY 4 HOURS PRN
Status: DISCONTINUED | OUTPATIENT
Start: 2022-04-21 | End: 2022-04-21

## 2022-04-21 RX ORDER — CARVEDILOL 25 MG/1
25 TABLET ORAL 2 TIMES DAILY WITH MEALS
Status: DISCONTINUED | OUTPATIENT
Start: 2022-04-21 | End: 2022-04-26 | Stop reason: HOSPADM

## 2022-04-21 RX ORDER — HYDRALAZINE HYDROCHLORIDE 20 MG/ML
5 INJECTION INTRAMUSCULAR; INTRAVENOUS ONCE
Status: COMPLETED | OUTPATIENT
Start: 2022-04-21 | End: 2022-04-21

## 2022-04-21 RX ORDER — CARVEDILOL 25 MG/1
25 TABLET ORAL 2 TIMES DAILY WITH MEALS
Status: DISCONTINUED | OUTPATIENT
Start: 2022-04-21 | End: 2022-04-21

## 2022-04-21 RX ORDER — LEVOTHYROXINE SODIUM 112 UG/1
112 TABLET ORAL DAILY
Status: DISCONTINUED | OUTPATIENT
Start: 2022-04-21 | End: 2022-04-22

## 2022-04-21 RX ORDER — SODIUM BICARBONATE 650 MG/1
650 TABLET ORAL 2 TIMES DAILY
Status: DISCONTINUED | OUTPATIENT
Start: 2022-04-21 | End: 2022-04-22

## 2022-04-21 RX ORDER — HYDRALAZINE HYDROCHLORIDE 20 MG/ML
20 INJECTION INTRAMUSCULAR; INTRAVENOUS ONCE
Status: COMPLETED | OUTPATIENT
Start: 2022-04-21 | End: 2022-04-21

## 2022-04-21 RX ORDER — HEPARIN SODIUM 10000 [USP'U]/ML
5000 INJECTION, SOLUTION INTRAVENOUS; SUBCUTANEOUS EVERY 8 HOURS
Status: DISCONTINUED | OUTPATIENT
Start: 2022-04-21 | End: 2022-04-26 | Stop reason: HOSPADM

## 2022-04-21 RX ORDER — SODIUM BICARBONATE 650 MG/1
325 TABLET ORAL 2 TIMES DAILY
Status: DISCONTINUED | OUTPATIENT
Start: 2022-04-21 | End: 2022-04-21

## 2022-04-21 RX ORDER — BUMETANIDE 1 MG/1
2 TABLET ORAL 2 TIMES DAILY WITH MEALS
Status: DISCONTINUED | OUTPATIENT
Start: 2022-04-21 | End: 2022-04-26 | Stop reason: HOSPADM

## 2022-04-21 RX ORDER — HYDRALAZINE HYDROCHLORIDE 20 MG/ML
10 INJECTION INTRAMUSCULAR; INTRAVENOUS EVERY 4 HOURS PRN
Status: DISCONTINUED | OUTPATIENT
Start: 2022-04-21 | End: 2022-04-26 | Stop reason: HOSPADM

## 2022-04-21 RX ORDER — ASPIRIN 81 MG/1
81 TABLET ORAL DAILY
Status: DISCONTINUED | OUTPATIENT
Start: 2022-04-21 | End: 2022-04-26 | Stop reason: HOSPADM

## 2022-04-21 RX ORDER — IPRATROPIUM BROMIDE AND ALBUTEROL SULFATE 2.5; .5 MG/3ML; MG/3ML
1 SOLUTION RESPIRATORY (INHALATION)
Status: DISCONTINUED | OUTPATIENT
Start: 2022-04-21 | End: 2022-04-26 | Stop reason: HOSPADM

## 2022-04-21 RX ADMIN — HYDRALAZINE HYDROCHLORIDE 20 MG: 20 INJECTION INTRAMUSCULAR; INTRAVENOUS at 08:34

## 2022-04-21 RX ADMIN — ASPIRIN 81 MG: 81 TABLET, COATED ORAL at 14:20

## 2022-04-21 RX ADMIN — BUMETANIDE 2 MG: 1 TABLET ORAL at 17:01

## 2022-04-21 RX ADMIN — ATORVASTATIN CALCIUM 40 MG: 40 TABLET, FILM COATED ORAL at 21:18

## 2022-04-21 RX ADMIN — HEPARIN SODIUM 5000 UNITS: 10000 INJECTION INTRAVENOUS; SUBCUTANEOUS at 14:18

## 2022-04-21 RX ADMIN — SERTRALINE HYDROCHLORIDE 25 MG: 50 TABLET ORAL at 14:19

## 2022-04-21 RX ADMIN — IPRATROPIUM BROMIDE AND ALBUTEROL SULFATE 1 AMPULE: .5; 2.5 SOLUTION RESPIRATORY (INHALATION) at 20:06

## 2022-04-21 RX ADMIN — CARVEDILOL 25 MG: 25 TABLET, FILM COATED ORAL at 17:01

## 2022-04-21 RX ADMIN — SACUBITRIL AND VALSARTAN 1 TABLET: 97; 103 TABLET, FILM COATED ORAL at 21:18

## 2022-04-21 RX ADMIN — IPRATROPIUM BROMIDE AND ALBUTEROL SULFATE 1 AMPULE: .5; 2.5 SOLUTION RESPIRATORY (INHALATION) at 15:50

## 2022-04-21 RX ADMIN — HEPARIN SODIUM 5000 UNITS: 10000 INJECTION INTRAVENOUS; SUBCUTANEOUS at 22:02

## 2022-04-21 RX ADMIN — SODIUM BICARBONATE 650 MG: 650 TABLET ORAL at 21:18

## 2022-04-21 RX ADMIN — HYDRALAZINE HYDROCHLORIDE 5 MG: 20 INJECTION INTRAMUSCULAR; INTRAVENOUS at 06:22

## 2022-04-21 ASSESSMENT — ENCOUNTER SYMPTOMS
SORE THROAT: 0
CONSTIPATION: 0
VOMITING: 0
BACK PAIN: 0
DIARRHEA: 1
BLOOD IN STOOL: 0
RHINORRHEA: 0
COUGH: 1
NAUSEA: 0
SHORTNESS OF BREATH: 1
ABDOMINAL PAIN: 0

## 2022-04-21 NOTE — PROGRESS NOTES
Spoke to psych NP, informed of new consult and that patient is currently down in HD. Psych, NP states that she would try and see the patient in HD shortly.

## 2022-04-21 NOTE — ED PROVIDER NOTES
Cece Ferreira is a 67 y.o. male with a PMHx significant for ESRD on HD tues/thurs/sat, HTN, DM, hypothyroidism,  who presents for evaluation of cough, shortness of breath, beginning prior to arrival.  The complaint has been persistent, moderate in severity, and worsened by laying flat. The patient states that he follows with Dr. Chauncey Deutsch Tues/Thurs/Sat. On occasion the dialysis will make him feel very weak as if he was wasting away. He will skip treatments so he can have a little strength. Notes that his last full session was Saturday and he skipped on Tuesday and was due to go today. Notes that three days ago he started having some post nasal drip, congestion, nausea, and cough with laying down. Notes that yesterday afternoon, he was resting and felt as if he was gasping for air. Notes he does feel improved at this point in time. He does feel short of breath when he lays flat. The history is provided by the patient and medical records. Review of Systems   Constitutional: Negative for chills and fever. HENT: Positive for congestion. Negative for postnasal drip, rhinorrhea and sore throat. Eyes: Negative for visual disturbance. Respiratory: Positive for cough and shortness of breath (laying flat). Cardiovascular: Negative for chest pain. Gastrointestinal: Positive for diarrhea. Negative for abdominal pain, blood in stool, constipation, nausea and vomiting. Genitourinary: Negative for difficulty urinating, dysuria and urgency. Musculoskeletal: Negative for back pain. Skin: Negative for rash. Neurological: Negative for dizziness, numbness and headaches. Physical Exam  Vitals and nursing note reviewed. Constitutional:       General: He is not in acute distress. Appearance: He is well-developed. He is not ill-appearing. HENT:      Head: Normocephalic and atraumatic.       Right Ear: External ear normal.      Left Ear: External ear normal.   Eyes:      General: Right eye: No discharge. Left eye: No discharge. Extraocular Movements: Extraocular movements intact. Conjunctiva/sclera: Conjunctivae normal.   Cardiovascular:      Rate and Rhythm: Normal rate and regular rhythm. Heart sounds: Normal heart sounds. No murmur heard. Pulmonary:      Effort: Pulmonary effort is normal. No respiratory distress. Breath sounds: Normal breath sounds. No stridor. Abdominal:      General: There is no distension. Palpations: Abdomen is soft. There is no mass. Tenderness: There is no abdominal tenderness. Hernia: No hernia is present. Musculoskeletal:         General: No swelling or tenderness. Cervical back: Normal range of motion and neck supple. Comments: Dialysis catheter noted to right chest   Skin:     General: Skin is warm and dry. Neurological:      Mental Status: He is alert and oriented to person, place, and time. Cranial Nerves: No cranial nerve deficit. Coordination: Coordination normal.          Procedures     [facility]     ED Course as of 04/23/22 0551   Thu Apr 21, 2022   6243 Spoke with Dr. Amisha Ashraf, discussed the patient. States that the patient is noncompliant his medication he should be admitted so he can be dialyzed and they can adjust his medications. [BB]   2272 Spoke with Dr. Stewart Ambrosio, discussed the patient. He will admit the patient. [BB]      ED Course User Index  [BB] DO Phil Marin Manual presents to the ED for evaluation of shortness of breath and congestion. Patient missed his last dialysis session secondary to feeling fatigued. Notes he has not been complaint with his medication either. Workup in the ED revealed labs demonstrating normal sodium and potassium, however elevated Cr of 8.3. CXR demonstrating b/l lower lobe opacities consistent with atelectasis and/or infiltrate. The patient with cough and congestion, therefore will be treated as pneumonia.  Due to the patient being non-compliant and not going to dialysis case was discussed with nephrology who recommends the patient be admitted, to be dialyzed and have medication reconciliation. Patient requires continued workup and management of their symptoms and will be admitted to the hospital for further evaluation and treatment.      --------------------------------------------- PAST HISTORY ---------------------------------------------  Past Medical History:  has a past medical history of Chronic kidney disease, Diabetes mellitus ([address] Hepatitis C, Hypertension, Liver disease, Thyroid disease, and Unspecified diseases of blood and blood-forming organs. Past Surgical History:  has a past surgical history that includes Tonsillectomy and vascular surgery (N/A, 11/21/2021). Social History:  reports that he quit smoking about 48 years ago. His smoking use included cigarettes. He has a 20.00 pack-year smoking history. He has never used smokeless tobacco. He reports current drug use. He reports that he does not drink alcohol. Family History: family history includes Cancer in his father; Diabetes in his mother. The patients home medications have been reviewed.     Allergies: Penicillins    -------------------------------------------------- RESULTS -------------------------------------------------    LABS:  Results for orders placed or performed during the hospital encounter of 04/21/22   COVID-19, Rapid    Specimen: Nasopharyngeal Swab   Result Value Ref Range    SARS-CoV-2, NAAT Not Detected Not Detected   RAPID INFLUENZA A/B ANTIGENS    Specimen: Nasopharyngeal   Result Value Ref Range    Influenza A by PCR Not Detected Not Detected    Influenza B by PCR Not Detected Not Detected   Culture, Urine    Specimen: Urine, clean catch   Result Value Ref Range    Urine Culture, Routine Growth not present, incubation continues    Culture, Blood 1    Specimen: Blood   Result Value Ref Range    Blood Culture, Routine 24 Hours no growth Culture, Blood 2    Specimen: Blood   Result Value Ref Range    Culture, Blood 2 24 Hours no growth    Respiratory Panel, Molecular, with COVID-19 (Restricted: peds pts or suitable admitted adults)    Specimen: Nasopharyngeal   Result Value Ref Range    Adenovirus by PCR Not Detected Not Detected    Bordetella parapertussis by PCR Not Detected Not Detected    Bordetella pertussis by PCR Not Detected Not Detected    Chlamydophilia pneumoniae by PCR Not Detected Not Detected    Coronavirus 229E by PCR Not Detected Not Detected    Coronavirus HKU1 by PCR Not Detected Not Detected    Coronavirus NL63 by PCR Not Detected Not Detected    Coronavirus OC43 by PCR Not Detected Not Detected    SARS-CoV-2, PCR Not Detected Not Detected    Human Metapneumovirus by PCR Not Detected Not Detected    Human Rhinovirus/Enterovirus by PCR Not Detected Not Detected    Influenza A by PCR Not Detected Not Detected    Influenza B by PCR Not Detected Not Detected    Mycoplasma pneumoniae by PCR Not Detected Not Detected    Parainfluenza Virus 1 by PCR Not Detected Not Detected    Parainfluenza Virus 2 by PCR Not Detected Not Detected    Parainfluenza Virus 3 by PCR Not Detected Not Detected    Parainfluenza Virus 4 by PCR Not Detected Not Detected    Respiratory Syncytial Virus by PCR Not Detected Not Detected   CBC with Auto Differential   Result Value Ref Range    WBC 4.1 (L) 4.5 - 11.5 E9/L    RBC 4.00 3.80 - 5.80 E12/L    Hemoglobin 11.9 (L) 12.5 - 16.5 g/dL    Hematocrit 35.9 (L) 37.0 - 54.0 %    MCV 89.8 80.0 - 99.9 fL    MCH 29.8 26.0 - 35.0 pg    MCHC 33.1 32.0 - 34.5 %    RDW 23.6 (H) 11.5 - 15.0 fL    Platelets 341 218 - 498 E9/L    MPV 9.6 7.0 - 12.0 fL    Neutrophils % 63.2 43.0 - 80.0 %    Immature Granulocytes % 0.0 0.0 - 5.0 %    Lymphocytes % 21.7 20.0 - 42.0 %    Monocytes % 11.4 2.0 - 12.0 %    Eosinophils % 2.2 0.0 - 6.0 %    Basophils % 1.5 0.0 - 2.0 %    Neutrophils Absolute 2.56 1.80 - 7.30 E9/L    Immature Granulocytes # 0.00 E9/L    Lymphocytes Absolute 0.88 (L) 1.50 - 4.00 E9/L    Monocytes Absolute 0.46 0.10 - 0.95 E9/L    Eosinophils Absolute 0.09 0.05 - 0.50 E9/L    Basophils Absolute 0.06 0.00 - 0.20 E9/L    Anisocytosis 3+     Polychromasia 1+     Poikilocytes 1+     Ovalocytes 1+    Basic Metabolic Panel w/ Reflex to MG   Result Value Ref Range    Sodium 138 132 - 146 mmol/L    Potassium reflex Magnesium 4.5 3.5 - 5.0 mmol/L    Chloride 103 98 - 107 mmol/L    CO2 19 (L) 22 - 29 mmol/L    Anion Gap 16 7 - 16 mmol/L    Glucose 119 (H) 74 - 99 mg/dL    BUN 53 (H) 6 - 23 mg/dL    CREATININE 8.3 (HH) 0.7 - 1.2 mg/dL    GFR Non-African American 6 >=60 mL/min/1.73    GFR African American 8     Calcium 9.0 8.6 - 10.2 mg/dL   Troponin   Result Value Ref Range    Troponin, High Sensitivity 1,078 (H) 0 - 11 ng/L   Urinalysis   Result Value Ref Range    Color, UA Yellow Straw/Yellow    Clarity, UA Clear Clear    Glucose, Ur 100 (A) Negative mg/dL    Bilirubin Urine Negative Negative    Ketones, Urine Negative Negative mg/dL    Specific Gravity, UA 1.020 1.005 - 1.030    Blood, Urine MODERATE (A) Negative    pH, UA 6.5 5.0 - 9.0    Protein, UA >=300 (A) Negative mg/dL    Urobilinogen, Urine 0.2 <2.0 E.U./dL    Nitrite, Urine Negative Negative    Leukocyte Esterase, Urine Negative Negative   Hepatic Function Panel   Result Value Ref Range    Total Protein 8.1 6.4 - 8.3 g/dL    Albumin 4.3 3.5 - 5.2 g/dL    Alkaline Phosphatase 98 40 - 129 U/L    ALT 12 0 - 40 U/L    AST 20 0 - 39 U/L    Total Bilirubin 1.3 (H) 0.0 - 1.2 mg/dL    Bilirubin, Direct 0.3 0.0 - 0.3 mg/dL    Bilirubin, Indirect 1.0 0.0 - 1.0 mg/dL   APTT   Result Value Ref Range    aPTT 28.8 24.5 - 35.1 sec   Lactic Acid   Result Value Ref Range    Lactic Acid 1.8 0.5 - 2.2 mmol/L   Microscopic Urinalysis   Result Value Ref Range    WBC, UA NONE 0 - 5 /HPF    RBC, UA 5-10 (A) 0 - 2 /HPF    Bacteria, UA NONE SEEN None Seen /HPF   Troponin   Result Value Ref Range Troponin, High Sensitivity 851 (H) 0 - 11 ng/L   T4, Free   Result Value Ref Range    T4 Free 1.06 0.93 - 1.70 ng/dL   TSH   Result Value Ref Range    .300 (H) 0.270 - 4.200 uIU/mL   Troponin   Result Value Ref Range    Troponin, High Sensitivity 1,038 (H) 0 - 11 ng/L   Procalcitonin   Result Value Ref Range    Procalcitonin 0.29 (H) 0.00 - 0.08 ng/mL   C-Reactive Protein   Result Value Ref Range    CRP 0.3 0.0 - 0.4 mg/dL   Procalcitonin   Result Value Ref Range    Procalcitonin 0.25 (H) 0.00 - 0.08 ng/mL   Hemoglobin A1C   Result Value Ref Range    Hemoglobin A1C 5.4 4.0 - 5.6 %   Comprehensive Metabolic Panel w/ Reflex to MG   Result Value Ref Range    Sodium 132 132 - 146 mmol/L    Potassium reflex Magnesium 4.1 3.5 - 5.0 mmol/L    Chloride 93 (L) 98 - 107 mmol/L    CO2 27 22 - 29 mmol/L    Anion Gap 12 7 - 16 mmol/L    Glucose 126 (H) 74 - 99 mg/dL    BUN 26 (H) 6 - 23 mg/dL    CREATININE 5.1 (H) 0.7 - 1.2 mg/dL    GFR Non-African American 11 >=60 mL/min/1.73    GFR African American 14     Calcium 8.4 (L) 8.6 - 10.2 mg/dL    Total Protein 7.0 6.4 - 8.3 g/dL    Albumin 3.8 3.5 - 5.2 g/dL    Total Bilirubin 1.4 (H) 0.0 - 1.2 mg/dL    Alkaline Phosphatase 83 40 - 129 U/L    ALT 9 0 - 40 U/L    AST 19 0 - 39 U/L   Phosphorus   Result Value Ref Range    Phosphorus 3.0 2.5 - 4.5 mg/dL   CBC with Auto Differential   Result Value Ref Range    WBC 3.6 (L) 4.5 - 11.5 E9/L    RBC 3.94 3.80 - 5.80 E12/L    Hemoglobin 11.4 (L) 12.5 - 16.5 g/dL    Hematocrit 34.7 (L) 37.0 - 54.0 %    MCV 88.1 80.0 - 99.9 fL    MCH 28.9 26.0 - 35.0 pg    MCHC 32.9 32.0 - 34.5 %    RDW 23.1 (H) 11.5 - 15.0 fL    Platelets 353 239 - 643 E9/L    MPV 9.9 7.0 - 12.0 fL    Neutrophils % 60.2 43.0 - 80.0 %    Immature Granulocytes % 0.3 0.0 - 5.0 %    Lymphocytes % 23.8 20.0 - 42.0 %    Monocytes % 12.4 (H) 2.0 - 12.0 %    Eosinophils % 2.2 0.0 - 6.0 %    Basophils % 1.1 0.0 - 2.0 %    Neutrophils Absolute 2.18 1.80 - 7.30 E9/L    Immature Granulocytes # 0.01 E9/L    Lymphocytes Absolute 0.86 (L) 1.50 - 4.00 E9/L    Monocytes Absolute 0.45 0.10 - 0.95 E9/L    Eosinophils Absolute 0.08 0.05 - 0.50 E9/L    Basophils Absolute 0.04 0.00 - 0.20 E9/L    Anisocytosis 3+     Poikilocytes 1+     Ovalocytes 1+     Tear Drop Cells 1+    Comprehensive Metabolic Panel w/ Reflex to MG   Result Value Ref Range    Sodium 133 132 - 146 mmol/L    Potassium reflex Magnesium 4.3 3.5 - 5.0 mmol/L    Chloride 95 (L) 98 - 107 mmol/L    CO2 25 22 - 29 mmol/L    Anion Gap 13 7 - 16 mmol/L    Glucose 109 (H) 74 - 99 mg/dL    BUN 43 (H) 6 - 23 mg/dL    CREATININE 6.7 (H) 0.7 - 1.2 mg/dL    GFR Non-African American 8 >=60 mL/min/1.73    GFR African American 10     Calcium 7.7 (L) 8.6 - 10.2 mg/dL    Total Protein 6.5 6.4 - 8.3 g/dL    Albumin 3.5 3.5 - 5.2 g/dL    Total Bilirubin 1.1 0.0 - 1.2 mg/dL    Alkaline Phosphatase 75 40 - 129 U/L    ALT 9 0 - 40 U/L    AST 18 0 - 39 U/L   Phosphorus   Result Value Ref Range    Phosphorus 4.4 2.5 - 4.5 mg/dL   CBC with Auto Differential   Result Value Ref Range    WBC 3.5 (L) 4.5 - 11.5 E9/L    RBC 3.60 (L) 3.80 - 5.80 E12/L    Hemoglobin 10.8 (L) 12.5 - 16.5 g/dL    Hematocrit 32.3 (L) 37.0 - 54.0 %    MCV 89.7 80.0 - 99.9 fL    MCH 30.0 26.0 - 35.0 pg    MCHC 33.4 32.0 - 34.5 %    RDW 22.8 (H) 11.5 - 15.0 fL    Platelets 237 260 - 680 E9/L    MPV 9.8 7.0 - 12.0 fL    Neutrophils % 50.7 43.0 - 80.0 %    Immature Granulocytes % 0.3 0.0 - 5.0 %    Lymphocytes % 28.1 20.0 - 42.0 %    Monocytes % 15.4 (H) 2.0 - 12.0 %    Eosinophils % 4.1 0.0 - 6.0 %    Basophils % 1.4 0.0 - 2.0 %    Neutrophils Absolute 1.75 (L) 1.80 - 7.30 E9/L    Immature Granulocytes # 0.01 E9/L    Lymphocytes Absolute 0.97 (L) 1.50 - 4.00 E9/L    Monocytes Absolute 0.53 0.10 - 0.95 E9/L    Eosinophils Absolute 0.14 0.05 - 0.50 E9/L    Basophils Absolute 0.05 0.00 - 0.20 E9/L    Anisocytosis 1+     Poikilocytes 1+     Ovalocytes 1+    EKG 12 Lead   Result Value Ref Range    Ventricular Rate 90 BPM    Atrial Rate 90 BPM    P-R Interval 170 ms    QRS Duration 88 ms    Q-T Interval 396 ms    QTc Calculation (Bazett) 484 ms    P Axis 54 degrees    R Axis -35 degrees    T Axis 165 degrees       RADIOLOGY:  XR CHEST PORTABLE   Final Result   Bilateral lower lobe opacities consistent with atelectasis and/or infiltrate. EKG:  This EKG is signed and interpreted by me. Rate: 90  Rhythm: Sinus  Interpretation: non-specific EKG, T-wave inversions in V3-V6, nonspecific T-waves , No ST-Elevations or Depressions; , QRS 88, QTc 484  Comparison: changes compared to previous EKG      ------------------------- NURSING NOTES AND VITALS REVIEWED ---------------------------  Date / Time Roomed:  4/21/2022  1:02 AM  ED Bed Assignment:  [id number]    The nursing notes within the ED encounter and vital signs as below have been reviewed. Patient Vitals for the past 24 hrs:   BP Temp Temp src Pulse Resp SpO2 Weight   04/23/22 0049 115/67 98 °F (36.7 °C) Oral 65 17 94 % 141 lb 1.6 oz (64 kg)   04/22/22 2041 109/63 98.1 °F (36.7 °C) Oral 63 17 95 % --   04/22/22 1512 127/75 98.1 °F (36.7 °C) Oral 60 16 -- --   04/22/22 0800 135/81 99 °F (37.2 °C) Oral 77 16 96 % --       Oxygen Saturation Interpretation: Normal    ------------------------------------------ PROGRESS NOTES ------------------------------------------  Counseling:  I have spoken with the patient and discussed todays results, in addition to providing specific details for the plan of care and counseling regarding the diagnosis and prognosis. Their questions are answered at this time and they are agreeable with the plan of admission.    --------------------------------- ADDITIONAL PROVIDER NOTES ---------------------------------  Consultations:  Time: 8622. Spoke with Dr. Ayaz Carey. Discussed case. They will admit the patient.   This patient's ED course included: a personal history and physicial examination,

## 2022-04-21 NOTE — CONSULTS
Department of Internal Medicine  Nephrology Nurse Practitioner Consult Note      Reason for Consult:  End-Stage Renal Disease  Requesting Physician:  Raúl Ramey DO    CHIEF COMPLAINT:  Shortness of breath    History Obtained From:  patient, electronic medical record    HISTORY OF PRESENT ILLNESS:                The patient is a 67 y.o. male with significant past medical history of end stage renal disease secondary to nephrosclerosis and/or diabetic nephropathy, on hemodialysis Rmcndtx-Fgvicnvs-Pnlixpoc via Catheter, last hemodialysis treatment on 4/16/22, HTN, Type II DM, Hypothyroidism who presents to the ER on 4/21/22 with cough and shortness of breath. He is very noncompliant with his outpatient dialysis sessions. Initially he was only staying for 1.5-2 hours of treatment. Recently he has only been coming every other treatment. He states he feels like the dialysis makes him \"feel very weak as if he was wasting away. \"  He also was noncompliant with his medications. Often times not taking them at all. He is complaining of orthopnea. Lab work was significant for CO2 19, BUN 53, Creatinine, Troponin 1,078. Renal was consulted for ESRD on HD.      Past Medical History:        Diagnosis Date    Chronic kidney disease     Diabetes mellitus ([address]     Hepatitis C     Hypertension     Liver disease     Thyroid disease     Unspecified diseases of blood and blood-forming organs      Past Surgical History:        Procedure Laterality Date    TONSILLECTOMY      VASCULAR SURGERY N/A 11/21/2021    CATHETER INSERTION HEMODIALYSIS, REMOVAL OF FEMORAL CATHETER performed by Felton Garg MD at [facility] OR     Current Medications:    Current Facility-Administered Medications: aspirin EC tablet 81 mg, 81 mg, Oral, Daily  atorvastatin (LIPITOR) tablet 40 mg, 40 mg, Oral, Nightly  bumetanide (BUMEX) tablet 2 mg, 2 mg, Oral, BID WC  carvedilol (COREG) tablet 25 mg, 25 mg, Oral, BID WC  [Held by provider] levothyroxine (SYNTHROID) tablet 112 mcg, 112 mcg, Oral, Daily  sacubitril-valsartan (ENTRESTO)  MG per tablet 1 tablet, 1 tablet, Oral, BID  heparin (porcine) injection 5,000 Units, 5,000 Units, SubCUTAneous, Q8H  perflutren lipid microspheres (DEFINITY) injection 1.65 mg, 1.5 mL, IntraVENous, ONCE PRN  ipratropium-albuterol (DUONEB) nebulizer solution 1 ampule, 1 ampule, Inhalation, Q4H WA  hydrALAZINE (APRESOLINE) injection 10 mg, 10 mg, IntraVENous, Q4H PRN  sodium bicarbonate tablet 650 mg, 650 mg, Oral, BID  Allergies:  Penicillins    Social History:    TOBACCO:   reports that he quit smoking about 48 years ago. His smoking use included cigarettes. He has a 20.00 pack-year smoking history. He has never used smokeless tobacco.  ETOH:   reports no history of alcohol use. DRUGS:   reports current drug use. Family History:       Problem Relation Age of Onset    Diabetes Mother     Cancer Father      REVIEW OF SYSTEMS:    Constitutional: Negative for chills and fever. HENT: Positive for congestion. Negative for postnasal drip, rhinorrhea and sore throat. Eyes: Negative for visual disturbance. Respiratory: Positive for cough and shortness of breath (laying flat). Cardiovascular: Negative for chest pain. Gastrointestinal: Positive for diarrhea. Negative for abdominal pain, blood in stool, constipation, nausea and vomiting. Genitourinary: Negative for difficulty urinating, dysuria and urgency. Musculoskeletal: Negative for back pain. Skin: Negative for rash. Neurological: Negative for dizziness, numbness and headaches.      PHYSICAL EXAM:      Vitals:    VITALS:  BP (!) 97/59   Pulse 77   Temp 98 °F (36.7 °C)   Resp 18   Ht 6' 2\" (1.88 m)   Wt 144 lb 2.9 oz (65.4 kg)   SpO2 97%   BMI 18.51 kg/m²   24HR INTAKE/OUTPUT:      Intake/Output Summary (Last 24 hours) at 4/21/2022 1343  Last data filed at 4/21/2022 1310  Gross per 24 hour   Intake --   Output 2900 ml   Net -2900 ml       Access: Right chest Tessio catheter   Constitutional: Lethargic, No acute distress. Chronically ill appearing  HEENT:  PERRLA, Normocephalic atraumatic  Respiratory:  CTA bilaterally  Cardiovascular/Edema:  RRR, S1/S2. Gastrointestinal:  Soft, nontender, nondistended. + BS  Musculoskeletal: Full ROM,   Neurologic:  Lethargic, had to be woken up multiple times during assessment. DATA:    CBC with Differential:    Lab Results   Component Value Date    WBC 4.1 04/20/2022    RBC 4.00 04/20/2022    HGB 11.9 04/20/2022    HCT 35.9 04/20/2022     04/20/2022    MCV 89.8 04/20/2022    MCH 29.8 04/20/2022    MCHC 33.1 04/20/2022    RDW 23.6 04/20/2022    SEGSPCT 55 06/10/2013    LYMPHOPCT 21.7 04/20/2022    MONOPCT 11.4 04/20/2022    BASOPCT 1.5 04/20/2022    MONOSABS 0.46 04/20/2022    LYMPHSABS 0.88 04/20/2022    EOSABS 0.09 04/20/2022    BASOSABS 0.06 04/20/2022     CMP:    Lab Results   Component Value Date     04/20/2022    K 4.5 04/20/2022     04/20/2022    CO2 19 04/20/2022    BUN 53 04/20/2022    CREATININE 8.3 04/20/2022    GFRAA 8 04/20/2022    LABGLOM 6 04/20/2022    GLUCOSE 119 04/20/2022    PROT 8.1 04/20/2022    LABALBU 4.3 04/20/2022    CALCIUM 9.0 04/20/2022    BILITOT 1.3 04/20/2022    ALKPHOS 98 04/20/2022    AST 20 04/20/2022    ALT 12 04/20/2022     Magnesium:    Lab Results   Component Value Date    MG 1.9 12/10/2021     Phosphorus:    Lab Results   Component Value Date    PHOS 3.0 12/10/2021     Radiology Review:          Chest XR 4/21/22   Bilateral lower lobe opacities consistent with atelectasis and/or infiltrate. IMPRESSION/RECOMMENDATIONS:      1. ESRD on HD Zcytjpy-Zedrxbqy-Rgiksigv schedule. We will continue while inpatient, Seen on HD. Patient adamantly refusing surgical intervention with no intention for AV access creation. 2. Metabolic acidosis. On sodium bicarbonate   3. HTN, on Coreg,   4. Type II DM. Hgb A1c 5.4  5. HFpEF, LVEF 25-30% on Entresto, Bumex  6.  Anemia of CKD, not on NIXON as Hgb > 11  7. Depression, psychiatry following. Started onb Zoloft    Plan:     · Continue HD on TTS schedule. On HD now  · Increase Sodium bicarbonate to 650 mg PO BID. · Obtain Magnesium and phosphorus in the morning   · Continue to monitor labs       Thank you very much Dr. Lam Villeda DO for allowing us to participate in the care of Mr. Elier Mcadams.

## 2022-04-21 NOTE — PROGRESS NOTES
This nurse called and notified Dr Giuseppe Valentine that patient had arrived on the unit to room 635 from the ED. Upon arrival to the unit this nurse checked patients vitals, /127. Patient denies chest pain/headache. Dr Giuseppe Valentine made aware that patient received 5 MG IV hydralazine prior to transfer to the unit from the ED. Patient reports, \"my BP is always high\". When patient asked about his home medications he reports, \"I dont take anything at home because I dont like the way the medications make me feel\". Dr Giuseppe Valentine ordered to give the patient 20 mg IV hydralazine x1 now. Order placed per this nurse.

## 2022-04-21 NOTE — PROGRESS NOTES
Patient currently out of room. Will attempt tomorrow to see patient and complete palliative medicine consult.

## 2022-04-21 NOTE — PROGRESS NOTES
[facility] Progress Note    Admitting Date and Time: 4/21/2022  1:02 AM  Admit Dx: Essential hypertension [I10]  Encounter for dialysis ([facility] [Z99.2]  Elevated serum creatinine [R79.89]  H/O noncompliance with medical treatment, presenting hazards to health [Z91.19]    Subjective:  Patient is being followed for Essential hypertension [I10]  Encounter for dialysis ([facility] [Z99.2]  Elevated serum creatinine [R79.89]  H/O noncompliance with medical treatment, presenting hazards to health [Z91.19]   Pt felt chest pain this am.  /127. Stopped all meds including BP. Made feel dizzy. ESRD on HD. Hydralazine 20 mg IV x 1 given. Per RN: For HD.    ROS: denies fever, chills, cp, sob, n/v, HA unless stated above.       aspirin  81 mg Oral Daily    atorvastatin  40 mg Oral Nightly    bumetanide  2 mg Oral BID WC    carvedilol  25 mg Oral BID WC    levothyroxine  112 mcg Oral Daily    sacubitril-valsartan  1 tablet Oral BID    sodium bicarbonate  325 mg Oral BID    vitamin D  50,000 Units Oral Weekly    heparin (porcine)  5,000 Units SubCUTAneous Q8H     Objective:    BP (!) 220/127   Pulse 88   Temp 97.7 °F (36.5 °C) (Axillary)   Resp 18   Ht 6' 2\" (1.88 m)   Wt 150 lb (68 kg)   SpO2 97%   BMI 19.26 kg/m²     General Appearance: alert and oriented to person, place and time and in no acute distress  Skin: warm and dry  Head: normocephalic and atraumatic  Eyes: pupils equal, round, and reactive to light, extraocular eye movements intact, conjunctivae normal  Neck: neck supple and non tender without mass   Pulmonary/Chest: clear to auscultation bilaterally- no wheezes, rales or rhonchi, normal air movement, no respiratory distress  Cardiovascular: normal rate, normal S1 and S2 and no carotid bruits  Abdomen: soft, non-tender, non-distended, normal bowel sounds, no masses or organomegaly  Extremities: no cyanosis, no clubbing and no edema  Neurologic: no cranial nerve deficit and speech normal    Recent Labs     04/20/22 1939      K 4.5      CO2 19*   BUN 53*   CREATININE 8.3*   GLUCOSE 119*   CALCIUM 9.0       Recent Labs     04/20/22 1939   WBC 4.1*   RBC 4.00   HGB 11.9*   HCT 35.9*   MCV 89.8   MCH 29.8   MCHC 33.1   RDW 23.6*      MPV 9.6     Radiology:   XR CHEST PORTABLE    Result Date: 4/20/2022  EXAMINATION: ONE XRAY VIEW OF THE CHEST 4/20/2022 8:35 pm COMPARISON: 12/10/2021 HISTORY: ORDERING SYSTEM PROVIDED HISTORY: shortness of breath TECHNOLOGIST PROVIDED HISTORY: Reason for exam:->shortness of breath FINDINGS: Bilateral lower lobe opacities. .  There is no effusion or pneumothorax. The cardiomediastinal silhouette is without acute process. The osseous structures are without acute process. Right-sided hemodialysis catheter tip at the caval atrial junction. Bilateral lower lobe opacities consistent with atelectasis and/or infiltrate. Assessment:    Principal Problem:    Elevated troponin  Active Problems:    Elevated serum creatinine    Lung infiltrate    Generalized weakness    Cough    Hypothyroidism    ESRD (end stage renal disease) on dialysis (HCC)    Volume overload    Hypertensive emergency    Severe protein-calorie malnutrition ([facility]  Resolved Problems:    * No resolved hospital problems. *    Plan:  1. Accelerated HTN - restarted on Entresto, Coreg and Bumex. Monitor vitals and check for side effects. 2.   ESRD on HD - Renal consult. HD for T/TH/Sat  3. T2DM - glucose 119. Glucose POCT. Hypoglycemia protocol. Trend and treat accordingly. 4.   Hypothyroidism - off meds. Recheck TSH. Endocrinology consult. Await recommendations from Endocrinology. 5.   Elevated Troponin - 1078 on admit; now 851. Cardiology consult. Non compliant on meds. Await recommendations from Cardiology. ECHO last done November 2021. EF 25 - 30%. Hypokinesis and akinesis of the apical segments. Recheck off meds.   6.  Cough - CXR shows bilateral atelectasis. Checking Procalcitonin. Incentive Spirometry. Duonebs. NOTE: This report was transcribed using voice recognition software. Every effort was made to ensure accuracy; however, inadvertent computerized transcription errors may be present.     Electronically signed by Abdulkadir Fowler MD on 4/21/2022 at 8:41 AM

## 2022-04-21 NOTE — CONSULTS
Department of Internal Medicine  Nephrology Nurse Practitioner Consult Note      Reason for Consult:  End-Stage Renal Disease  Requesting Physician:  Malachi Bravo DO    CHIEF COMPLAINT:  Shortness of breath    History Obtained From:  patient, electronic medical record    HISTORY OF PRESENT ILLNESS:                The patient is a 67 y.o. male with significant past medical history of end stage renal disease secondary to nephrosclerosis and/or diabetic nephropathy, on hemodialysis Rvoryam-Cssekybo-Egqjxyfx via Catheter, last hemodialysis treatment on 4/16/22, HTN, Type II DM, Hypothyroidism who presents to the ER on 4/21/22 with cough and shortness of breath. He is very noncompliant with his outpatient dialysis sessions. Initially he was only staying for 1.5-2 hours of treatment. Recently he has only been coming every other treatment. He states he feels like the dialysis makes him \"feel very weak as if he was wasting away. \"  He also was noncompliant with his medications. Often times not taking them at all. He is complaining of orthopnea. Lab work was significant for CO2 19, BUN 53, Creatinine, Troponin 1,078. Renal was consulted for ESRD on HD.      Past Medical History:        Diagnosis Date    Chronic kidney disease     Diabetes mellitus ([address]     Hepatitis C     Hypertension     Liver disease     Thyroid disease     Unspecified diseases of blood and blood-forming organs      Past Surgical History:        Procedure Laterality Date    TONSILLECTOMY      VASCULAR SURGERY N/A 11/21/2021    CATHETER INSERTION HEMODIALYSIS, REMOVAL OF FEMORAL CATHETER performed by Simi Pimentel MD at [facility] OR     Current Medications:    Current Facility-Administered Medications: aspirin EC tablet 81 mg, 81 mg, Oral, Daily  atorvastatin (LIPITOR) tablet 40 mg, 40 mg, Oral, Nightly  bumetanide (BUMEX) tablet 2 mg, 2 mg, Oral, BID WC  carvedilol (COREG) tablet 25 mg, 25 mg, Oral, BID WC  [Held by provider] levothyroxine (SYNTHROID) tablet 112 mcg, 112 mcg, Oral, Daily  sacubitril-valsartan (ENTRESTO)  MG per tablet 1 tablet, 1 tablet, Oral, BID  sodium bicarbonate tablet 325 mg, 325 mg, Oral, BID  heparin (porcine) injection 5,000 Units, 5,000 Units, SubCUTAneous, Q8H  perflutren lipid microspheres (DEFINITY) injection 1.65 mg, 1.5 mL, IntraVENous, ONCE PRN  ipratropium-albuterol (DUONEB) nebulizer solution 1 ampule, 1 ampule, Inhalation, Q4H WA  hydrALAZINE (APRESOLINE) injection 10 mg, 10 mg, IntraVENous, Q4H PRN  Allergies:  Penicillins    Social History:    TOBACCO:   reports that he quit smoking about 48 years ago. His smoking use included cigarettes. He has a 20.00 pack-year smoking history. He has never used smokeless tobacco.  ETOH:   reports no history of alcohol use. DRUGS:   reports current drug use. Family History:       Problem Relation Age of Onset    Diabetes Mother     Cancer Father      REVIEW OF SYSTEMS:    Constitutional: Negative for chills and fever. HENT: Positive for congestion. Negative for postnasal drip, rhinorrhea and sore throat. Eyes: Negative for visual disturbance. Respiratory: Positive for cough and shortness of breath (laying flat). Cardiovascular: Negative for chest pain. Gastrointestinal: Positive for diarrhea. Negative for abdominal pain, blood in stool, constipation, nausea and vomiting. Genitourinary: Negative for difficulty urinating, dysuria and urgency. Musculoskeletal: Negative for back pain. Skin: Negative for rash. Neurological: Negative for dizziness, numbness and headaches. PHYSICAL EXAM:      Vitals:    VITALS:  /67   Pulse 77   Temp 98 °F (36.7 °C)   Resp 18   Ht 6' 2\" (1.88 m)   Wt 149 lb 14.6 oz (68 kg)   SpO2 97%   BMI 19.25 kg/m²   24HR INTAKE/OUTPUT:  No intake or output data in the 24 hours ending 04/21/22 1321    Access: Right chest Tessio catheter   Constitutional:  Alert, No acute distress.  Chronically ill appearing  HEENT: PERRLA, Normocephalic atraumatic  Respiratory:  CTA bilaterally  Cardiovascular/Edema:  RRR, S1/S2. Gastrointestinal:  Soft, nontender, nondistended. + BS  Musculoskeletal: Full ROM,   Neurologic:  He is alert and oriented to person, place, and time. No cranial nerve deficit. DATA:    CBC with Differential:    Lab Results   Component Value Date    WBC 4.1 04/20/2022    RBC 4.00 04/20/2022    HGB 11.9 04/20/2022    HCT 35.9 04/20/2022     04/20/2022    MCV 89.8 04/20/2022    MCH 29.8 04/20/2022    MCHC 33.1 04/20/2022    RDW 23.6 04/20/2022    SEGSPCT 55 06/10/2013    LYMPHOPCT 21.7 04/20/2022    MONOPCT 11.4 04/20/2022    BASOPCT 1.5 04/20/2022    MONOSABS 0.46 04/20/2022    LYMPHSABS 0.88 04/20/2022    EOSABS 0.09 04/20/2022    BASOSABS 0.06 04/20/2022     CMP:    Lab Results   Component Value Date     04/20/2022    K 4.5 04/20/2022     04/20/2022    CO2 19 04/20/2022    BUN 53 04/20/2022    CREATININE 8.3 04/20/2022    GFRAA 8 04/20/2022    LABGLOM 6 04/20/2022    GLUCOSE 119 04/20/2022    PROT 8.1 04/20/2022    LABALBU 4.3 04/20/2022    CALCIUM 9.0 04/20/2022    BILITOT 1.3 04/20/2022    ALKPHOS 98 04/20/2022    AST 20 04/20/2022    ALT 12 04/20/2022     Magnesium:    Lab Results   Component Value Date    MG 1.9 12/10/2021     Phosphorus:    Lab Results   Component Value Date    PHOS 3.0 12/10/2021     Radiology Review:      Chest XR    Bilateral lower lobe opacities consistent with atelectasis and/or infiltrate. IMPRESSION/RECOMMENDATIONS:      1. ESRD on HD [facility] schedule. We will continue while inpatient, Seen on HD. 2. Metabolic acidosis. On sodium bicarbonate   3. HTN, on Coreg,   4. Type II DM. Hgb A1c 5.4  5. CHF, on Entresto, Bumex  6. Anemia of CKD, not on NIXON as Hgb > 11          Plan:     · Continue HD on TTS schedule. · Increase Sodium bicarbonate to 650 mg PO BID.            Thank you very much Dr. Jan DO for allowing us to participate in the care of Mr. Danilo Ying.

## 2022-04-21 NOTE — H&P
[facility] History and Physical        Chief Complaint:  pulm edema  History of Present Illness   The patient is a 67 y.o. male    PMHx significant for ESRD on HD tues/thurs/sat, HTN, DM, hypothyroidism  HD-- follows with Dr. Rani Lynch Tues/Thurs/Sat. On occasion the dialysis will make him feel very weak so he skips occasionally--last full session was Saturday and he skipped on Tuesday     He also reports noncompliance with BP meds, BP very elevated 240 in ER  Moderate sob at rest, worse lying flat  3-4 days ago he started having some post nasal drip,chest  congestion, nausea, and NON productive cough worse with laying down. Notes that yesterday afternoon, he was resting and felt as if he was gasping for air      Noted that this admission is simlar to complaints on last admission on 12/7/21 by our group:  \"sent to ER from HD due to concern that pt was coughing. Nephrology and cardiology were consulted and followed. Pt was treated for;   . COVID 19 infection-ruled out. Pt reporting yesterday he had doubts that he had  covid and he was feeling well. No hypoxia. Only real symptom occasional cough- being treated for fluid overload. He had a rapid test that was positive in ER. No evidence of viral pneumonia on CXR. Repeat respiratory panel PCR negative and repeat rapid test negative.  Fluid overload in ESRD . Continue bumex.     Acute on chronic CHF: EF 25-30%\"  - hx taken from the patient  REVIEW OF SYSTEMS:  no fevers, chills, cp, sob, n/v, ha, vision/hearing changes, wt changes, hot/cold flashes, other open skin lesions, diarrhea, constipation, dysuria/hematuria unless noted in HPI. Complete ROS performed with the patient and is otherwise negative.     Past Medical History:      Diagnosis Date    Chronic kidney disease     Diabetes mellitus ([address]     Hepatitis C     Hypertension     Liver disease     Thyroid disease     Unspecified diseases of blood and blood-forming organs        Past Surgical History:        Procedure Laterality Date    TONSILLECTOMY      VASCULAR SURGERY N/A 11/21/2021    CATHETER INSERTION HEMODIALYSIS, REMOVAL OF FEMORAL CATHETER performed by Miroslava Bang MD at [place] [address] Medications:  Prior to Admission medications    Medication Sig Start Date End Date Taking? Authorizing Provider   carvedilol (COREG) 25 MG tablet Take 1 tablet by mouth 2 times daily (with meals) 12/22/21 1/21/22  KIANNA Cardoza CNP   aspirin 81 MG EC tablet Take 1 tablet by mouth daily 12/22/21 1/21/22  KIANNA Cardoza CNP   sacubitril-valsartan (ENTRESTO)  MG per tablet Take 1 tablet by mouth 2 times daily  Patient not taking: Reported on 2/9/2022 12/22/21   KIANNA Cardoza CNP   bumetanide (BUMEX) 2 MG tablet Take 1 tablet by mouth 2 times daily 12/22/21 1/21/22  KIANNA Cardoza CNP   atorvastatin (LIPITOR) 40 MG tablet Take 1 tablet by mouth nightly  Patient not taking: Reported on 12/22/2021 12/13/21 1/12/22  KATHARINE Humphries   sodium bicarbonate 650 MG tablet TK 2 TS PO Q 8 H  Patient not taking: Reported on 2/9/2022 7/11/20   Historical Provider, MD   levothyroxine (SYNTHROID) 112 MCG tablet Take 112 mcg by mouth Daily   Patient not taking: Reported on 2/9/2022    Historical Provider, MD   vitamin D (ERGOCALCIFEROL) 1.25 MG (04603 UT) CAPS capsule TK 1 C PO 1 TIME Q WK  Patient not taking: Reported on 2/9/2022 7/7/20   Historical Provider, MD       Allergies:  Penicillins    Social History:   TOBACCO:   reports that he quit smoking about 48 years ago. His smoking use included cigarettes. He has a 20.00 pack-year smoking history. He has never used smokeless tobacco.  ETOH:   reports no history of alcohol use.     Family History:       Problem Relation Age of Onset    Diabetes Mother     Cancer Father       Or deferred/otherwise considered non contributory to current admission  PHYSICAL EXAM:    VS: BP (!) 194/111   Pulse 88   Temp 97 °F (36.1 °C) (Temporal)   Resp 16   Ht 6' 2\" (1.88 m)   Wt 145 lb (65.8 kg)   SpO2 97%   BMI 18.62 kg/m²     General Appearance:     no acute distress. +cachexia   Psych:  HEENT:    A.O. As per HPI details  NC/AT, PERRL, no pallor no icterus, lips/ext mucous membrane grossly dry    Neck:   Supple, trachea midline, no obvious JVD   Resp:     Dec BS bases  No wheezes, trace basilar rhonchi   Chest wall:    No tenderness or deformity  Dialysis catheter noted to right chest    Heart:    Regular rate and rhythm, S1 and S2 normal, no rub or gallop. Abdomen:     Soft, non-tender, bowel sounds active    no suspicious obvious masses/organomegaly   Genitalia & Rectal:    Deferred.    Extremities x4:   Extremities normal, atraumatic, no cyanosis, no clubbing   Musculoskeletal:      NO active synovitis or swollen b/l wrists, 2-5 MCPs examined   Skin:   Skin color, texture, turgor dry   Lymph nodes:   Cervical nodes grossly normal   Neurologic:  .Grossly symmetric  Grossly weak b/l strength in UEs and LEs with symmetric grossly intact to light touch sensation     LABS:  CBC:   Lab Results   Component Value Date    WBC 4.1 04/20/2022    RBC 4.00 04/20/2022    HGB 11.9 04/20/2022    HCT 35.9 04/20/2022     04/20/2022    MCV 89.8 04/20/2022     BMP:    Lab Results   Component Value Date     04/20/2022    K 4.5 04/20/2022     04/20/2022    CO2 19 04/20/2022    BUN 53 04/20/2022    CREATININE 8.3 04/20/2022    GLUCOSE 119 04/20/2022    CALCIUM 9.0 04/20/2022     Hepatic Function Panel:    Lab Results   Component Value Date    ALKPHOS 98 04/20/2022    AST 20 04/20/2022    ALT 12 04/20/2022    PROT 8.1 04/20/2022    LABALBU 4.3 04/20/2022    BILITOT 1.3 04/20/2022     Magnesium:    Lab Results   Component Value Date    MG 1.9 12/10/2021       PT/INR:    Lab Results   Component Value Date    PROTIME 12.1 08/24/2021    INR 1.1 08/24/2021     U/A:   Lab Results   Component Value Date    LEUKOCYTESUR Negative meds, BP very elevated 240 in ER  Moderate sob at rest, worse lying flat  3-4 days ago he started having some post nasal drip,chest  congestion, nausea, and NON productive cough worse with laying down. Notes that yesterday afternoon, he was resting and felt as if he was gasping for air  HTN emergency - pulm edema +volume overload, missed HD  Consult nephrology  Restart meds  cxr  \"Bilateral lower lobe opacities consistent with atelectasis and/or infiltrate\"  Doubt bacterial pneumonia-- no fevers/chills, WBC 4.1  But will check viral panel    Elevated troponin-- hx of this  ---seen by cardio last admission for this  As above, noncompliant with goal directed med Tx  He refused ischemic workup also on hospital fu with cardiology  --ESRD and cardiomyopathy -- ?desire to workup or continue Tx discussion requested per cardiology--a psych consult +depression  Noted NON compliance even with thyroid meds-- last admission Tsh 106, free t4 0.7  complianing of severe weakness though    Will also consult palliative    Severe symptomatic hypothyroidism- restart thyroid  2 hyperparathyroid --   HTN emergency - HD and med   - resume statin  Etc.   ACD -- but hgb payal 11.9    Nora Adams MD   Night Officer, overnight admitting doctor at [facility] call day time doctor   for questions after 7:30am    Covering for [address] [employer]  If Qs please call [phone]  Electronically signed by Dorian Caballero MD on 4/21/2022 at 5:52 AM

## 2022-04-21 NOTE — ED NOTES
Notified by lab of critical Cr of 8.2. Dr. Sumi Carter notified.        Tremaine Reese, RN  04/20/22 2030

## 2022-04-21 NOTE — CONSULTS
Inpatient Cardiology Consultation      Reason for Consult:  Elevated troponin    Consulting Physician: Dr. Mimi Meeks    Requesting Physician: Dr. Sona Paulino     Date of Consultation: 4/21/2022    HISTORY OF PRESENT ILLNESS:   Mr. Evelyn Sheets is a 50-year-old male who is known to Dr. Alison Mcdaniel (previously known to Dr. Dunaway); most recently seen in outpatient with Kristyn HUTCHISON on 12/22/2021 for follow-up for chronic HFrEF --> restarted Entresto 97/103 mg twice daily, Coreg 25 mg twice daily, aspirin and Bumex. Patient was offered ischemic evaluation but had declined at that time. Recent Hospital Encounters:   ·  Patient was hospitalized from 11/19/2021-11/23/2021 +[id number], with metabolic acidosis in the setting of bilateral pulmonary edema, shortness of breath and pneumonia. He was noted to have elevated troponins with EKG showing anterior T wave inversions --> evaluation was deferred at that time due to patient in strict isolation for COVID-19. During admission he was started on hemodialysis through a right IJ dialysis catheter---> fluid removal managed by renal.  Echocardiogram at that time revealed an LVEF of 25-30% with stage II diastolic dysfunction and VHD. He was initiated on GDMT and was titrated upon discharge. He was discharged on Entresto. · Patient was hospitalized from 12/7/2021-12/10/2021 for acute on chronic HFrEF.  proBNP >70K. Patient was diuresed -6.9L. Patient was initiated on Coreg 25 mg twice daily, isosorbide dinitrate 10 mg 3 times daily, hydralazine 25 mg 3 times daily. Patient was started on Entresto per nephrology. Recommendations for heart catheterization once recovered from COVID-19. He was also recommended to have a wearable cardioverter defibrillator (patient declined).     PMH: Cardiomyopathy (etiology unknown)/chronic HFrEF (LVEF 25-30% on TTE 11/2021), hypertension, hypothyroidism, end-stage renal disease on hemodialysis, anemia of chronic disease, type 2 diabetes mellitus, hepatitis C (treated), medical noncompliance and previous tobacco abuse. Patient reported that over the past 1.5 months he has been \"severely depressed\" and denies suicidal ideation. He stopped taking all of his medications approximately 1 month ago and intermittently goes to dialysis. He feels at times he has \"given up. \"  He reported over the past week he has been having a dry cough, increased shortness of breath (ambulating approximately 10 feet), orthopnea (now requiring him to sleep on 2 pillows), abdominal bloating and increased lower extremity edema. At times he feels \"chest tightness\" 4/10 in the middle of his chest, nonradiating, occasionally associated with shortness of breath, resolving at rest.  Reports increased fatigue over the past week and has missed last 2 dialysis sessions due to \"I just did not feel like it. \"  He continues to make urine 3-4 times per day. Due to missing dialysis and increased shortness of breath he presented to the [facility] on 4/20/2022. Patient states that he no longer wants any \"surgical intervention, invasive procedure or wearable cardioverter defibrillator. Upon arrival to the ED: /120, heart rate 98, afebrile, 95% on room air. Labs: Sodium 138, potassium 4.5, BUN 50, creatinine 8.3, lactic acid 1.8. High-sensitivity troponin 1078>> 851 (prior high-sensitivity troponin 584 on 12/8/2021). Bilirubin slightly elevated 1.3. WBC 4.1, H/H11.9/35.9, platelet count 742. Blood cultures: Pending. SARS-CoV-2: Negative. Influenza A and B: Negative. UA: Moderate blood, proteinuria. CXR: Bilateral lower lobe opacities consistent with atelectasis and or infiltrate. ER medications: Hydralazine 5 mg IV x1. BP overnight ranging from 184/107-198/107. Please note: past medical records were reviewed per electronic medical record (EMR) - see detailed reports under Past Medical/ Surgical History. Past Medical History:    1.  New cardiomyopathy dx during admission 11/2021 - etiology unknown (suspect combined ischemic/nonischemic with risk factors and wall motion abnormalities and untreated hypertension/medication noncompliance  2. TTE 11/21/2021 ([facility]): Normal left ventricular chamber size. Normal left ventricular systolic  function. Visually estimated LVEF is 25-30 %.   There is severe diffuse hypokinesis with akinesis of the apical segments.  Grade II diastolic dysfunction with elevated LA pressure.  Normal right ventricle structure and function.   Normal left atrium.  Normal right atrium.  Mild mitral regurgitation is present.  Trace anterior pericardial effusion without tamponade.  Unable to  estimate PA pressure. 3. Longstanding history of hypertension  4. Hypothyroidism, on replacement therapy. ype 2 diabetes mellitus   5. End stage renal disease on HD (secondary to diabetic nephropathy) - initiated 11/2021 s/p right IJ catheter 11/21/2021  · Follow-up with vascular surgery on 2/9/2022: Patient adamantly refusing surgical intervention with no intention for AV access creation. · Follows with Dr. Jono Peters. 6. H/o hepatitis C (treated per patient)   7. Medical non complaicne   8. Previous tobacco abuse   9. H/o tonsillectomy      Medications Prior to admit:  Prior to Admission medications    Medication Sig Start Date End Date Taking?  Authorizing Provider   carvedilol (COREG) 25 MG tablet Take 1 tablet by mouth 2 times daily (with meals) 12/22/21 1/21/22  KIANNA Porras CNP   aspirin 81 MG EC tablet Take 1 tablet by mouth daily 12/22/21 1/21/22  KIANNA Porras - CNP   sacubitril-valsartan (ENTRESTO)  MG per tablet Take 1 tablet by mouth 2 times daily  Patient not taking: Reported on 2/9/2022 12/22/21   KIANNA Porras CNP   bumetanide [facility]) 2 MG tablet Take 1 tablet by mouth 2 times daily 12/22/21 1/21/22  KIANNA Porras CNP   atorvastatin (LIPITOR) 40 MG tablet Take 1 tablet by mouth nightly  Patient not taking: Reported on 12/22/2021 12/13/21 1/12/22  KATHARINE Nunez   sodium bicarbonate 650 MG tablet TK 2 TS PO Q 8 H  Patient not taking: Reported on 2/9/2022 7/11/20   Historical Provider, MD   levothyroxine (SYNTHROID) 112 MCG tablet Take 112 mcg by mouth Daily   Patient not taking: Reported on 2/9/2022    Historical Provider, MD   vitamin D (ERGOCALCIFEROL) 1.25 MG (69469 UT) CAPS capsule TK 1 C PO 1 TIME Q WK  Patient not taking: Reported on 2/9/2022 7/7/20   Historical Provider, MD       Current Medications:    Current Facility-Administered Medications: aspirin EC tablet 81 mg, 81 mg, Oral, Daily  atorvastatin (LIPITOR) tablet 40 mg, 40 mg, Oral, Nightly  bumetanide (BUMEX) tablet 2 mg, 2 mg, Oral, BID WC  carvedilol (COREG) tablet 25 mg, 25 mg, Oral, BID WC  levothyroxine (SYNTHROID) tablet 112 mcg, 112 mcg, Oral, Daily  sacubitril-valsartan (ENTRESTO)  MG per tablet 1 tablet, 1 tablet, Oral, BID  sodium bicarbonate tablet 325 mg, 325 mg, Oral, BID  vitamin D (ERGOCALCIFEROL) capsule 50,000 Units, 50,000 Units, Oral, Weekly  heparin (porcine) injection 5,000 Units, 5,000 Units, SubCUTAneous, Q8H    Allergies:  Penicillins (rash)    Social History: Former tobacco use: Quit 1973, 20 pack years. Denies alcohol and illicit drug use. Family History: Noncontributory due to advanced age. REVIEW OF SYSTEMS:     · Constitutional: + fatigue. Denies fevers, chills or night sweats  · Eyes: Denies visual changes or drainage  · ENT: Denies headaches or hearing loss. No mouth sores or sore throat. No epistaxis   · Cardiovascular: See HPI. · Respiratory: + Chronic GARCIA, +dry  cough, +orthopnea, +PND. No hemoptysis   · Gastrointestinal: Denies hematemesis or anorexia. No hematochezia or melena    · Genitourinary: Denies urgency, dysuria or hematuria. · Musculoskeletal: Denies gait disturbance, weakness or joint complaints  · Integumentary: Denies rash, hives or pruritis   · Neurological: Denies dizziness, headaches or seizures.  No numbness or tingling  · Psychiatric: See HPI. · Endocrine: Denies temperature intolerance. No recent weight change. .  · Hematologic/Lymphatic: Denies abnormal bruising or bleeding. No swollen lymph nodes    PHYSICAL EXAM:   BP (!) 198/107   Pulse 88   Temp 98.6 °F (37 °C) (Oral)   Resp 16   Ht 6' 2\" (1.88 m)   Wt 145 lb (65.8 kg)   SpO2 98%   BMI 18.62 kg/m²   CONST:  Chronically ill appearing elderly AA male who appears of stated age. Awake, alert and cooperative. No apparent distress. HEENT:   Head- Normocephalic, atraumatic   Eyes- Conjunctivae pink, anicteric  Throat- Oral mucosa pink and moist  Neck-  No stridor, trachea midline, + jugular venous distention. No carotid bruit. CHEST: Chest symmetrical and non-tender to palpation. No accessory muscle use or intercostal retractions. +Right Chest wall temp HD catheter. RESPIRATORY: Lung sounds - Diminished in bases. On RA. CARDIOVASCULAR:     Heart Inspection- shows no noted pulsations  Heart Palpation- no heaves or thrills; PMI is non-displaced   Heart Ausculation- Regular rate and rhythm, no murmur. No s3, s4 or rub   PV: 1+ RLE edema and trace LLE edema. No varicosities. Pedal pulses palpable, no clubbing or cyanosis   ABDOMEN: Soft, non-tender to light palpation. Bowel sounds present. No palpable masses no organomegaly; no abdominal bruit  MS: Good muscle strength and tone. No atrophy or abnormal movements. : Deferred  SKIN: Warm and dry no statis dermatitis or ulcers   NEURO / PSYCH: Oriented to person, place and time. Speech clear and appropriate. Follows all commands. Flat affect and appears depressed.      DATA:    EC2022 NSR, possible left atrial enlargement, left axis deviation, nonspecific ST to T wave changes, rate 90 bpm.  Tele strips: Sinus rhythm with T wave inversions with heart rate 80-90 bpm.  Diagnostic:      Labs:   CBC:   Recent Labs     22   WBC 4.1*   HGB 11.9*   HCT 35.9*        BMP:   Recent Labs     04/20/22 1939      K 4.5   CO2 19*   BUN 53*   CREATININE 8.3*   LABGLOM 6   CALCIUM 9.0     Mag: No results for input(s): MG in the last 72 hours. Phos: No results for input(s): PHOS in the last 72 hours. TFT:   Lab Results   Component Value Date    .600 (H) 11/20/2021    T4FREE 0.70 (L) 11/20/2021      HgA1c:   Lab Results   Component Value Date    LABA1C 5.3 11/12/2021     APTT:  Recent Labs     04/20/22 1939   APTT 28.8     CARDIAC ENZYMES:  Recent Labs     04/20/22 1939 04/21/22  0244   TROPHS 1,078* 851*     FASTING LIPID PANEL:  Lab Results   Component Value Date    CHOL 200 08/24/2021    HDL 53 11/12/2021    LDLCALC 147 11/12/2021    TRIG 109 08/24/2021     LIVER PROFILE:  Recent Labs     04/20/22 1939   AST 20   ALT 12   LABALBU 4.3     CXR: 4/20/2022:  Bilateral lower lobe opacities consistent with atelectasis and/or infiltrate. Assessment/Plan:  1. Elevated hs-cTnT (1078>>851): Most likely secondary to myocardial injury in the setting of acute renal insufficiency and acute on chronic HFrEF. No acute EKG changes or evidence of ACS. 2. Acute on chronic HFrEF  3. ACC stage C/NYHA class III  4. Hypervolemic  5. Cardiomyopathy (diagnosed in 11/2021) --> etiology unknown. 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 Patient continues to decline ischemic evaluation. 6. LVEF 25-30%, LVEDD 4.7, LVMI 111.   7. End-stage renal disease, newly started on HD (11/2021): T, TH, S. Noncompliance with HD.   8. Hypertension, poorly controlled. 9. Type 2 diabetes mellitus  10. Hypothyroidism on replacement therapy. Abnormal .600; Free T4 0.7) --> 11/2021. 11. COVID-19 pneumonia (12/2021)  12. Severe depression  13.  Medical noncompliance    · Continue ASA and Lipitor   · Agree with continuing Coreg (25 mg twice daily) and Entresto ( mg twice daily)  · Start hydralazine 10 mg IV every 4 hours as needed for BP > 140 mmHg. · Monitor BP  · Consider starting hydralazine/Isordil combination if BP remains poorly controlled. · No ischemic evaluation at this time per patient's request.   · Recommend palliative consultation to assess patient goals. · Will follow. The above assessment and plan was discussed with Dr. Raine Swenson.   Electronically signed by KIANNA Torres CNP on 4/21/22 at 2:01 PM EDT

## 2022-04-21 NOTE — PROGRESS NOTES
P Quality Flow/Interdisciplinary Rounds Progress Note        Quality Flow Rounds held on April 21, 2022    Disciplines Attending:  Bedside Nurse, ,  and Nursing Unit Garth Gramajo was admitted on 4/21/2022  1:02 AM    Anticipated Discharge Date:   04/23/2022    Disposition:    Kory Score:  Kory Scale Score: 20    Readmission Risk              Risk of Unplanned Readmission:  0           Discussed patient goal for the day, patient clinical progression, and barriers to discharge. The following Goal(s) of the Day/Commitment(s) have been identified: Dialysis today, Endcrinology consult.     Gena Cruz RN  April 21, 2022

## 2022-04-21 NOTE — CONSULTS
Palliative Care Department  [phone]  Palliative Care Initial Consult  Provider Tim Roland, APRN - CNP     Tiffanie Benítez  [id number]  Hospital Day: 1  Date of Initial Consult: 4/21/2022  Referring Provider: Eh Puentes MD  Palliative Medicine was consulted for assistance with: Goals of Care, Code Status Discussion, Family Support    HPI:   Tiffanie Benítez is a 67 y.o. with a medical history of ESRD on HD, hypothyroidism who was admitted on 4/21/2022 from home with a CHIEF COMPLAINT of shortness of breath. Patient has a history of noncompliance with his outpatient dialysis sessions. Patient's last dialysis treatment was 4/16/2022 and patient only did 2 hours of it. Patient was admitted for further management. Palliative medicine consulted for goals of care, CODE STATUS discussion, and family support. ASSESSMENT/PLAN:     Pertinent Hospital Diagnoses      ESRD on HD   History of noncompliance      Palliative Care Encounter / Counseling Regarding Goals of Care  Please see detailed goals of care discussion as below   At this time, Tiffanie Benítez, Does have capacity for medical decision-making.   Capacity is time limited and situation/question specific   Outcome of goals of care meeting: Continue DNR CCA, continue current management   Code status DNR-CCA   Advanced Directives: no POA or living will in epic   Surrogate/Legal NOK:  Rommel Hernandez, child, [phone]        Spiritual assessment: no spiritual distress identified  Bereavement and grief: to be determined  Referrals to: none today  SUBJECTIVE:     Current medical issues leading to Palliative Medicine involvement include   Active Hospital Problems    Diagnosis Date Noted    Elevated serum creatinine [R79.89] 04/21/2022     Priority: Medium    Lung infiltrate [R91.8] 04/21/2022     Priority: Medium    Generalized weakness [R53.1] 04/21/2022     Priority: Medium    Cough [R05.9] 04/21/2022     Priority: Medium    Severe protein[facility]calorie malnutrition ([facility] [E43] 12/13/2021    ESRD (end stage renal disease) on dialysis ([facility] [N18.6, Z99.2] 11/19/2021    Elevated troponin [R77.8] 11/19/2021    Volume overload [E87.70] 11/19/2021    Hypertensive emergency [I16.1] 11/19/2021    Hypothyroidism [E03.9] 06/09/2013       Details of Conversation: Chart reviewed and patient seen. Patient sitting up in bed, no family present at bedside. Patient is alert and oriented, able to carry on a meaningful medical conversation. Discussion regarding goals of care and CODE STATUS. Patient states that he intentionally skipped dialysis because he was feeling so \" worn out\" after his treatments and he did not feel that he had any quality of life. Patient states that he wishes to continue with dialysis further stating that he feels this is the only option. Patient wishes to continue DNR-CCA at this time. Patient states he would not want long-term ventilatory support. Support offered and questions answered. Palliative medicine will sign off.     OBJECTIVE:   Prognosis: unknown    Physical Exam:  /79   Pulse 83   Temp 98 °F (36.7 °C)   Resp 18   Ht 6' 2\" (1.88 m)   Wt 149 lb 14.6 oz (68 kg)   SpO2 97%   BMI 19.25 kg/m²   Constitutional:  Elderly, thin, NAD, awake, alert  Eyes: no scleral icterus, normal lids, no discharge  ENMT:  Normocephalic, atraumatic, mucosa moist, EOMI  Neck:  trachea midline, no JVD  Lungs:  CTA bilaterally, no audible rhonchi or wheezes noted, respirations unlabored, no retractions  Heart:  RRR, distant heart tones, no murmur, rub, or gallop noted during exam  Abd:  Soft, non tender, non distended, bowel sounds present  Ext:  Moving all extremities, no edema, pulses present  Skin:  Warm and dry  Neuro: Alert, grossly nonfocal; following commands    Objective data reviewed: labs, images, records, medication use, vitals and chart    Discussed patient and the plan of care with the other IDT members: Palliative Medicine IDT Team    Time/Communication  Greater than 50% of time spent, total 50 minutes in counseling and coordination of care at the bedside regarding goals of care, diagnosis and prognosis and see above. Thank you for allowing Palliative Medicine to participate in the care of Fabi Saldivar.

## 2022-04-21 NOTE — CONSULTS
ENDOCRINOLOGY INITIAL CONSULTATION NOTE    Date of Admission: 4/21/2022  Date of Service: 4/21/2022  Admitting Physician: Suedep Smith MD   Primary Care Physician: Chritsina Pereira MD  Consultant physician: Osbaldo Castro MD     Reason for the consultation:  Hypothyroidism     History of Present Illness:  Chata Landers is a very pleasant 67 y.o. old male with PMH DM type 2, primary hypothyroidism, HTN, ESRD and other listed below admitted to [facility] on 4/21/2022 because of cough and shortness of breath, endocrine service was consulted for management of hypothyroidism   The patient is very non-complaint with with taking medication and outpatient dialysis sessions. Admission labs showed   Lab Results   Component Value Date/Time    .300 (H) 04/21/2022 02:15 PM    T4FREE 1.06 04/21/2022 02:15 PM     The patient was diagnosed with hypothyroidism long time ago. Pt was supposed to be levothyroxine 112 mcg daily but admit poor compliance with taking levothyroxine at home     Past Medical History   Past Medical History:   Diagnosis Date    Chronic kidney disease     Diabetes mellitus ([address]     Hepatitis C     Hypertension     Liver disease     Thyroid disease     Unspecified diseases of blood and blood-forming organs        Past Surgical History   Past Surgical History:   Procedure Laterality Date    TONSILLECTOMY      VASCULAR SURGERY N/A 11/21/2021    CATHETER INSERTION HEMODIALYSIS, REMOVAL OF FEMORAL CATHETER performed by Charla Barrera MD at [address] [place] [address] history   Tobacco:   reports that he quit smoking about 48 years ago. His smoking use included cigarettes. He has a 20.00 pack-year smoking history. He has never used smokeless tobacco.  Alcohol:   reports no history of alcohol use. Drugs:   reports current drug use.   Family history   Family History   Problem Relation Age of Onset    Diabetes Mother     Cancer Father        Allergies and Drug reactions  Allergies   Allergen Reactions    Penicillins      Does not remember       Scheduled Meds:   aspirin  81 mg Oral Daily    atorvastatin  40 mg Oral Nightly    bumetanide  2 mg Oral BID     [Held by provider] levothyroxine  112 mcg Oral Daily    heparin (porcine)  5,000 Units SubCUTAneous Q8H    ipratropium-albuterol  1 ampule Inhalation Q4H WA    sodium bicarbonate  650 mg Oral BID    sertraline  25 mg Oral Daily    carvedilol  25 mg Oral BID     sacubitril-valsartan  1 tablet Oral BID     PRN Meds:   perflutren lipid microspheres, 1.5 mL, ONCE PRN  hydrALAZINE, 10 mg, Q4H PRN      Continuous Infusions:      Review of Systems  All systems reviewed. All negative except for symptoms mentioned in HPI     OBJECTIVE    /80   Pulse 91   Temp 98 °F (36.7 °C) (Oral)   Resp 18   Ht 6' 2\" (1.88 m)   Wt 144 lb 2.9 oz (65.4 kg)   SpO2 96%   BMI 18.51 kg/m²   Wt Readings from Last 6 Encounters:   04/21/22 144 lb 2.9 oz (65.4 kg)   01/19/22 150 lb (68 kg)   12/22/21 143 lb 9.6 oz (65.1 kg)   12/22/21 145 lb (65.8 kg)   12/11/21 136 lb 3.9 oz (61.8 kg)   11/22/21 135 lb 2.3 oz (61.3 kg)       Physical examination:  General: awake alert, oriented x3, no abnormal position or movements. HEENT: normocephalic non traumatic, no exophthalmos, no lid lag, no lid retraction   Neck: supple, no LN enlargement, no thyromegaly, no thyroid tenderness, no thyroid bruit, no JVD. Pulm: good equal air entry no added sounds   CVS: S1 + S2   Abd: soft lax, no tenderness,   Skin: warm, no lesions, no rash.  No palmar erythema, no onycholysis, no pretibial Myxoedema, no acropachy   Musculoskeletal: No back tenderness, no kyphosis/scoliosis    Neuro: CN intact, sensation notmal , muscle power normal. No tremors   Psych: normal mood, and affect    Review of Laboratory Data:  I personally reviewed the following labs:  Recent Labs     04/20/22 1939   WBC 4.1*   RBC 4.00   HGB 11.9*   HCT 35.9*   MCV 89.8   MCH 29.8   MCHC 33.1   RDW 23.6*   PLT 184   MPV 9.6     Recent Labs     04/20/22  1939      K 4.5      CO2 19*   BUN 53*   CREATININE 8.3*   GLUCOSE 119*   CALCIUM 9.0   PROT 8.1   LABALBU 4.3   BILITOT 1.3*   ALKPHOS 98   AST 20   ALT 12     No results found for: BHYDRXBUT  Lab Results   Component Value Date    LABA1C 5.4 04/20/2022    LABA1C 5.3 11/12/2021    LABA1C 5.1 08/24/2021     Lab Results   Component Value Date/Time    .300 (H) 04/21/2022 02:15 PM    T4FREE 1.06 04/21/2022 02:15 PM     Lab Results   Component Value Date    LABA1C 5.4 04/20/2022    GLUCOSE 119 04/20/2022    MALBCR 3951.0 11/12/2021    LABMICR 3516.4 11/12/2021    LABCREA 89 11/12/2021     Lab Results   Component Value Date    TRIG 109 08/24/2021    HDL 53 11/12/2021    LDLCALC 147 11/12/2021    CHOL 200 08/24/2021       Blood culture   Lab Results   Component Value Date    BC 5 Days no growth 12/08/2021    BC 5 Days no growth 11/19/2021       Radiology:  XR CHEST PORTABLE   Final Result   Bilateral lower lobe opacities consistent with atelectasis and/or infiltrate. Medical Records/Labs/Images Review:   I personally reviewed and summarized previous records   All labs and imaging were reviewed independently    Temo, a 67 y.o.-old male seen in for management of Hypothyroidism     Primary hypothyroidism   · Normal free T4 with high TSH (.3, FT4 1.06) are very consistent with medication non-compliance   · We are concerning that current levothyroxine dose is high for his Wt and his age   · Will adjust dose to 100 mcg daily   · Recheck TFT in 6-8 weeks     SOB/cough  · Management per primary service    With normal free T4 we recommend continue current dose of levothyroxine     The above issues were reviewed with the patient who understood and agreed with the plan. Thank you for allowing us to participate in the care of this patient. Please do not hesitate to contact us with any additional questions.

## 2022-04-21 NOTE — CONSULTS
PSYCHIATRY ATTENDING CONSULT    REASON FOR CONSULT:  depression    REQUESTING PHYSICIAN:  Dr. Hammad Brito: \"Tired. \"    HISTORY OF PRESENT ILLNESS:  Jennifer Cullen  is a 67 y.o. male who was admitted today 4/21/22 due to cough and SOB prior to arrival. Patient is ESRD on HD Tues/thur/sat. Chart Reviewed. Note no home psychotropics. . Psychiatry consulted for depression. Spoke with RN and she states that patient has not been taking care of himself and skipping dialysis due to being drained and very tired. On assessment patient is resting in bed with eyes closed on dialysis unit. Patient expressed that he is very tired and was falling asleep during assessment and had to be awakened many times. Patient is slow to respond, and drowsy. Patient is alert to person and place at this time. When asked if he was depressed due to his medical condition he was able to nod yes and fell back to sleep. Attempted to call emergency contact 2 times for collateral but got voicemail each time. PAST PSYCHIATRIC HISTORY: Unable to obtain due to patient's lethargy.     PAST MEDICAL HISTORY:       Diagnosis Date    Chronic kidney disease     Diabetes mellitus ([facility] [address]     Hepatitis C     Hypertension     Liver disease     Thyroid disease     Unspecified diseases of blood and blood-forming organs        MEDICAL ROS: General - negative, neurological - negative, ENT - negative, CV - negative, pulmonary - negative, GI - negative, dermatologic - negative, rheumatologic - negative, musculoskeletal - negative,  - negative, hematologic - negative    PAST SURGICAL HISTORY:       Procedure Laterality Date    TONSILLECTOMY      VASCULAR SURGERY N/A 11/21/2021    CATHETER INSERTION HEMODIALYSIS, REMOVAL OF FEMORAL CATHETER performed by Ashley Clemons MD at [address] [place] [address]: Current Facility-Administered Medications: aspirin EC tablet 81 mg, 81 mg, Oral, Daily  atorvastatin (LIPITOR) tablet 40 mg, 40 mg, Oral, Nightly  bumetanide (BUMEX) tablet 2 mg, 2 mg, Oral, BID WC  carvedilol (COREG) tablet 25 mg, 25 mg, Oral, BID WC  [Held by provider] levothyroxine (SYNTHROID) tablet 112 mcg, 112 mcg, Oral, Daily  sacubitril-valsartan (ENTRESTO)  MG per tablet 1 tablet, 1 tablet, Oral, BID  sodium bicarbonate tablet 325 mg, 325 mg, Oral, BID  heparin (porcine) injection 5,000 Units, 5,000 Units, SubCUTAneous, Q8H  perflutren lipid microspheres (DEFINITY) injection 1.65 mg, 1.5 mL, IntraVENous, ONCE PRN  ipratropium-albuterol (DUONEB) nebulizer solution 1 ampule, 1 ampule, Inhalation, Q4H WA  hydrALAZINE (APRESOLINE) injection 10 mg, 10 mg, IntraVENous, Q4H PRN    ALLERGIES:  Penicillins    FAMILY PSYCHIATRIC HISTORY: Unable to obtain due to patient's lethargy. SOCIAL HISTORY: Unable to obtain due to patient's lethargy. SUBSTANCE ABUSE HISTORY:  reports that he quit smoking about 48 years ago. His smoking use included cigarettes. He has a 20.00 pack-year smoking history. He has never used smokeless tobacco. He reports current drug use. He reports that he does not drink alcohol. VITALS:   Vitals:    04/21/22 1230   BP: 108/67   Pulse: 77   Resp:    Temp:    SpO2:        LABS:CBC:  Recent Labs     04/20/22 1939   WBC 4.1*   RBC 4.00   HGB 11.9*   HCT 35.9*   MCV 89.8   RDW 23.6*      CHEMISTRIES:  Recent Labs     04/20/22 1939      K 4.5      CO2 19*   BUN 53*   CREATININE 8.3*   GLUCOSE 119*   PT/INR:No results for input(s): PROTIME, INR in the last 72 hours. APTT:  Recent Labs     04/20/22 1939   APTT 28.8   LIVER PROFILE:  Recent Labs     04/20/22 1939   AST 20   ALT 12   BILIDIR 0.3   BILITOT 1.3*   ALKPHOS 80       MENTAL STATUS EXAMINATION  -American male appears age. Withdrawn, lethargic. Diminished psychomotor activity, strength, tone, eye contact. Gait not assessed. Speech soft, long latency of response. Mood depressed. Affect flat. Thoughts unable to assess. Content difficult to assess. No voiced suicidal or homicidal ideations. No paranoia, delusions or hallucinations voiced. Orientation alert to person and place. Concentration distracted. Recent and remote memory are limited. Fund of knowledge limited. Language use poor. Insight and judgment limited. ASSESSMENT:  Adjustment disorder with depressed mood        PLAN & RECOMMENDATIONS: Patient admitted to depression due to his medical condition. Per RN he has not been taking care of himself at home and skipping dialysis due to being drained and tired. I suspect the patient would benefit from an antidepressant. Will put a low dose of Zoloft on for depression symptoms. No indication for psychiatric admission. Please call if any other issues. Will follow peripherally.     Time spent 40 min      KIANNA Packer CNP 4/21/2022 1:03 PM

## 2022-04-21 NOTE — ED NOTES
Patient resting SaO2 95-96%, HR:93:Ambulated patient in the hallway, patient oxygen remained SaO2: 92-93%, HR:107, Patient stateed weakness and shortness of breath during the walk.       Leonela Barron RN  04/21/22 7638

## 2022-04-21 NOTE — FLOWSHEET NOTE
Pt completed 4hrs of HD on a 3K bath with 2.6L of UF removed safely.    04/21/22 1310   Vital Signs   BP (!) 97/59   Temp 98 °F (36.7 °C)   Pulse 77   Resp 18   Weight 144 lb 2.9 oz (65.4 kg)   Weight Method Bed scale   Percent Weight Change -3.82   Pain Assessment   Pain Assessment None - Denies Pain   Post-Hemodialysis Assessment   Post-Treatment Procedures Blood returned;Catheter capped, clamped with Citrate x 2 ports   Machine Disinfection Process Acid/Vinegar Clean;Heat Disinfect   Rinseback Volume (ml) 300 ml   Total Liters Processed (l/min) 90.9 l/min   Dialyzer Clearance Lightly streaked   Duration of Treatment (minutes) 240 minutes   Hemodialysis Output (ml) 2900 ml   NET Removed (ml) 2600 ml   Tolerated Treatment Good   Patient Response to Treatment tolerated well; post report to Naomie KENNEDY   Bilateral Breath Sounds Diminished   Patient Disposition Return to room

## 2022-04-21 NOTE — ED NOTES
Patient states has not been taking home blood pressure medications approximately 1.5 months ago due to \" I am not feeling good. \" and last dialysis was last Saturday and missed  This Tuesday dialysis . Patient said\" they took so much out from me, and make me feel sick. \"         Samantha Husain, MARCIA  04/21/22 6924

## 2022-04-21 NOTE — PROGRESS NOTES
Physical Therapy  Facility/Department: [address] INTERMEDIATE      Name: Toni Meyers  : 1950  MRN: [id number]  Date of Service: 2022    Attempted to see pt for PT evaluation, pt off unit for dialysis.    Vandana LEE [id number]

## 2022-04-22 LAB
ALBUMIN SERPL-MCNC: 3.8 G/DL (ref 3.5–5.2)
ALP BLD-CCNC: 83 U/L (ref 40–129)
ALT SERPL-CCNC: 9 U/L (ref 0–40)
ANION GAP SERPL CALCULATED.3IONS-SCNC: 12 MMOL/L (ref 7–16)
ANISOCYTOSIS: ABNORMAL
AST SERPL-CCNC: 19 U/L (ref 0–39)
BASOPHILS ABSOLUTE: 0.04 E9/L (ref 0–0.2)
BASOPHILS RELATIVE PERCENT: 1.1 % (ref 0–2)
BILIRUB SERPL-MCNC: 1.4 MG/DL (ref 0–1.2)
BUN BLDV-MCNC: 26 MG/DL (ref 6–23)
CALCIUM SERPL-MCNC: 8.4 MG/DL (ref 8.6–10.2)
CHLORIDE BLD-SCNC: 93 MMOL/L (ref 98–107)
CO2: 27 MMOL/L (ref 22–29)
CREAT SERPL-MCNC: 5.1 MG/DL (ref 0.7–1.2)
EOSINOPHILS ABSOLUTE: 0.08 E9/L (ref 0.05–0.5)
EOSINOPHILS RELATIVE PERCENT: 2.2 % (ref 0–6)
GFR AFRICAN AMERICAN: 14
GFR NON-AFRICAN AMERICAN: 11 ML/MIN/1.73
GLUCOSE BLD-MCNC: 126 MG/DL (ref 74–99)
HCT VFR BLD CALC: 34.7 % (ref 37–54)
HEMOGLOBIN: 11.4 G/DL (ref 12.5–16.5)
IMMATURE GRANULOCYTES #: 0.01 E9/L
IMMATURE GRANULOCYTES %: 0.3 % (ref 0–5)
LV EF: 38 %
LVEF MODALITY: NORMAL
LYMPHOCYTES ABSOLUTE: 0.86 E9/L (ref 1.5–4)
LYMPHOCYTES RELATIVE PERCENT: 23.8 % (ref 20–42)
MCH RBC QN AUTO: 28.9 PG (ref 26–35)
MCHC RBC AUTO-ENTMCNC: 32.9 % (ref 32–34.5)
MCV RBC AUTO: 88.1 FL (ref 80–99.9)
MONOCYTES ABSOLUTE: 0.45 E9/L (ref 0.1–0.95)
MONOCYTES RELATIVE PERCENT: 12.4 % (ref 2–12)
NEUTROPHILS ABSOLUTE: 2.18 E9/L (ref 1.8–7.3)
NEUTROPHILS RELATIVE PERCENT: 60.2 % (ref 43–80)
OVALOCYTES: ABNORMAL
PDW BLD-RTO: 23.1 FL (ref 11.5–15)
PHOSPHORUS: 3 MG/DL (ref 2.5–4.5)
PLATELET # BLD: 166 E9/L (ref 130–450)
PMV BLD AUTO: 9.9 FL (ref 7–12)
POIKILOCYTES: ABNORMAL
POTASSIUM REFLEX MAGNESIUM: 4.1 MMOL/L (ref 3.5–5)
RBC # BLD: 3.94 E12/L (ref 3.8–5.8)
SODIUM BLD-SCNC: 132 MMOL/L (ref 132–146)
TEAR DROP CELLS: ABNORMAL
TOTAL PROTEIN: 7 G/DL (ref 6.4–8.3)
WBC # BLD: 3.6 E9/L (ref 4.5–11.5)

## 2022-04-22 PROCEDURE — 97161 PT EVAL LOW COMPLEX 20 MIN: CPT

## 2022-04-22 PROCEDURE — 85025 COMPLETE CBC W/AUTO DIFF WBC: CPT

## 2022-04-22 PROCEDURE — 96372 THER/PROPH/DIAG INJ SC/IM: CPT

## 2022-04-22 PROCEDURE — 36415 COLL VENOUS BLD VENIPUNCTURE: CPT

## 2022-04-22 PROCEDURE — 99232 SBSQ HOSP IP/OBS MODERATE 35: CPT | Performed by: INTERNAL MEDICINE

## 2022-04-22 PROCEDURE — 6370000000 HC RX 637 (ALT 250 FOR IP): Performed by: FAMILY MEDICINE

## 2022-04-22 PROCEDURE — 6370000000 HC RX 637 (ALT 250 FOR IP): Performed by: INTERNAL MEDICINE

## 2022-04-22 PROCEDURE — 93306 TTE W/DOPPLER COMPLETE: CPT

## 2022-04-22 PROCEDURE — G0378 HOSPITAL OBSERVATION PER HR: HCPCS

## 2022-04-22 PROCEDURE — 6370000000 HC RX 637 (ALT 250 FOR IP)

## 2022-04-22 PROCEDURE — 80053 COMPREHEN METABOLIC PANEL: CPT

## 2022-04-22 PROCEDURE — 94640 AIRWAY INHALATION TREATMENT: CPT

## 2022-04-22 PROCEDURE — 96376 TX/PRO/DX INJ SAME DRUG ADON: CPT

## 2022-04-22 PROCEDURE — 99226 PR SBSQ OBSERVATION CARE/DAY 35 MINUTES: CPT | Performed by: FAMILY MEDICINE

## 2022-04-22 PROCEDURE — 84100 ASSAY OF PHOSPHORUS: CPT

## 2022-04-22 PROCEDURE — 99222 1ST HOSP IP/OBS MODERATE 55: CPT | Performed by: NURSE PRACTITIONER

## 2022-04-22 PROCEDURE — 6370000000 HC RX 637 (ALT 250 FOR IP): Performed by: NURSE PRACTITIONER

## 2022-04-22 PROCEDURE — 6360000002 HC RX W HCPCS: Performed by: INTERNAL MEDICINE

## 2022-04-22 RX ORDER — SODIUM BICARBONATE 650 MG/1
325 TABLET ORAL 2 TIMES DAILY
Status: DISCONTINUED | OUTPATIENT
Start: 2022-04-22 | End: 2022-04-26 | Stop reason: HOSPADM

## 2022-04-22 RX ORDER — LEVOTHYROXINE SODIUM 0.1 MG/1
100 TABLET ORAL DAILY
Status: DISCONTINUED | OUTPATIENT
Start: 2022-04-22 | End: 2022-04-26 | Stop reason: HOSPADM

## 2022-04-22 RX ADMIN — IPRATROPIUM BROMIDE AND ALBUTEROL SULFATE 1 AMPULE: .5; 2.5 SOLUTION RESPIRATORY (INHALATION) at 16:08

## 2022-04-22 RX ADMIN — ATORVASTATIN CALCIUM 40 MG: 40 TABLET, FILM COATED ORAL at 20:46

## 2022-04-22 RX ADMIN — IPRATROPIUM BROMIDE AND ALBUTEROL SULFATE 1 AMPULE: .5; 2.5 SOLUTION RESPIRATORY (INHALATION) at 09:44

## 2022-04-22 RX ADMIN — BUMETANIDE 2 MG: 1 TABLET ORAL at 08:13

## 2022-04-22 RX ADMIN — CARVEDILOL 25 MG: 25 TABLET, FILM COATED ORAL at 08:12

## 2022-04-22 RX ADMIN — HEPARIN SODIUM 5000 UNITS: 10000 INJECTION INTRAVENOUS; SUBCUTANEOUS at 06:44

## 2022-04-22 RX ADMIN — SACUBITRIL AND VALSARTAN 1 TABLET: 97; 103 TABLET, FILM COATED ORAL at 08:12

## 2022-04-22 RX ADMIN — LEVOTHYROXINE SODIUM 100 MCG: 100 TABLET ORAL at 08:12

## 2022-04-22 RX ADMIN — SODIUM BICARBONATE 325 MG: 650 TABLET ORAL at 20:46

## 2022-04-22 RX ADMIN — HEPARIN SODIUM 5000 UNITS: 10000 INJECTION INTRAVENOUS; SUBCUTANEOUS at 15:27

## 2022-04-22 RX ADMIN — BUMETANIDE 2 MG: 1 TABLET ORAL at 17:02

## 2022-04-22 RX ADMIN — SACUBITRIL AND VALSARTAN 1 TABLET: 97; 103 TABLET, FILM COATED ORAL at 20:46

## 2022-04-22 RX ADMIN — ASPIRIN 81 MG: 81 TABLET, COATED ORAL at 08:12

## 2022-04-22 RX ADMIN — SERTRALINE HYDROCHLORIDE 25 MG: 50 TABLET ORAL at 08:12

## 2022-04-22 RX ADMIN — SODIUM BICARBONATE 650 MG: 650 TABLET ORAL at 08:12

## 2022-04-22 RX ADMIN — CARVEDILOL 25 MG: 25 TABLET, FILM COATED ORAL at 17:02

## 2022-04-22 RX ADMIN — IPRATROPIUM BROMIDE AND ALBUTEROL SULFATE 1 AMPULE: .5; 2.5 SOLUTION RESPIRATORY (INHALATION) at 19:56

## 2022-04-22 RX ADMIN — IPRATROPIUM BROMIDE AND ALBUTEROL SULFATE 1 AMPULE: .5; 2.5 SOLUTION RESPIRATORY (INHALATION) at 13:05

## 2022-04-22 NOTE — PROGRESS NOTES
[facility] Quality Flow/Interdisciplinary Rounds Progress Note        Quality Flow Rounds held on April 22, 2022    Disciplines Attending:  Bedside Nurse, ,  and Nursing Unit Garth Gramajo was admitted on 4/21/2022  1:02 AM    Anticipated Discharge Date:       Disposition:    Kory Score:  Kory Scale Score: 20    Readmission Risk              Risk of Unplanned Readmission:  0           Discussed patient goal for the day, patient clinical progression, and barriers to discharge. The following Goal(s) of the Day/Commitment(s) have been identified:  check cardiology consult's plan.       Britni Marino RN  April 22, 2022

## 2022-04-22 NOTE — PROGRESS NOTES
Patient prefers to have phlebotomy or iv team draw his labs as he is a hard (dialysis) stick.  If phlebotomy is unable to obtain morning labs, I will consult iv team.

## 2022-04-22 NOTE — PROGRESS NOTES
Department of Internal Medicine  Nephrology Nurse Practitioner Progress Note    Events Reviewed. Subjective: We are following Mr. Erven Jeans for ESRD on Dialysis. PHYSICAL EXAM:      Vitals:    VITALS:  /81   Pulse 77   Temp 99 °F (37.2 °C) (Oral)   Resp 16   Ht 6' 2\" (1.88 m)   Wt 142 lb 6.4 oz (64.6 kg)   SpO2 96%   BMI 18.28 kg/m²   24HR INTAKE/OUTPUT:      Intake/Output Summary (Last 24 hours) at 4/22/2022 1200  Last data filed at 4/22/2022 1139  Gross per 24 hour   Intake 220 ml   Output 3075 ml   Net -2855 ml       Access: Right chest Tessio catheter   Constitutional: Lethargic, No acute distress. Chronically ill appearing  HEENT:  PERRLA, Normocephalic atraumatic  Respiratory:  CTA bilaterally  Cardiovascular/Edema:  RRR, S1/S2. Gastrointestinal:  Soft, nontender, nondistended. + BS  Musculoskeletal: Full ROM,   Neurologic:  Lethargic, had to be woken up multiple times during assessment.       Scheduled Meds:   levothyroxine  100 mcg Oral Daily    aspirin  81 mg Oral Daily    atorvastatin  40 mg Oral Nightly    bumetanide  2 mg Oral BID WC    heparin (porcine)  5,000 Units SubCUTAneous Q8H    ipratropium-albuterol  1 ampule Inhalation Q4H WA    sodium bicarbonate  650 mg Oral BID    sertraline  25 mg Oral Daily    carvedilol  25 mg Oral BID     sacubitril-valsartan  1 tablet Oral BID     Continuous Infusions:  PRN Meds:.perflutren lipid microspheres, hydrALAZINE      DATA:    CBC with Differential:    Lab Results   Component Value Date    WBC 3.6 04/22/2022    RBC 3.94 04/22/2022    HGB 11.4 04/22/2022    HCT 34.7 04/22/2022     04/22/2022    MCV 88.1 04/22/2022    MCH 28.9 04/22/2022    MCHC 32.9 04/22/2022    RDW 23.1 04/22/2022    SEGSPCT 55 06/10/2013    LYMPHOPCT 23.8 04/22/2022    MONOPCT 12.4 04/22/2022    BASOPCT 1.1 04/22/2022    MONOSABS 0.45 04/22/2022    LYMPHSABS 0.86 04/22/2022    EOSABS 0.08 04/22/2022    BASOSABS 0.04 04/22/2022     CMP:    Lab Results   Component Value Date     04/22/2022    K 4.1 04/22/2022    CL 93 04/22/2022    CO2 27 04/22/2022    BUN 26 04/22/2022    CREATININE 5.1 04/22/2022    GFRAA 14 04/22/2022    LABGLOM 11 04/22/2022    GLUCOSE 126 04/22/2022    PROT 7.0 04/22/2022    LABALBU 3.8 04/22/2022    CALCIUM 8.4 04/22/2022    BILITOT 1.4 04/22/2022    ALKPHOS 83 04/22/2022    AST 19 04/22/2022    ALT 9 04/22/2022     Magnesium:    Lab Results   Component Value Date    MG 1.9 12/10/2021     Phosphorus:    Lab Results   Component Value Date    PHOS 3.0 04/22/2022     Radiology Review:          Chest XR 4/21/22   Bilateral lower lobe opacities consistent with atelectasis and/or infiltrate. IMPRESSION/RECOMMENDATIONS:  The patient is a 67 y.o. male with significant past medical history of end stage renal disease secondary to nephrosclerosis and/or diabetic nephropathy, on hemodialysis Ghffnks-Emnwbefv-Qdcqmzze via Catheter, last hemodialysis treatment on 4/16/22, HTN, Type II DM, Hypothyroidism who presents to the ER on 4/21/22 with cough and shortness of breath. He is very noncompliant with his outpatient dialysis sessions. Initially he was only staying for 1.5-2 hours of treatment. Recently he has only been coming every other treatment. He states he feels like the dialysis makes him \"feel very weak as if he was wasting away. \"  He also was noncompliant with his medications. Often times not taking them at all. He is complaining of orthopnea. Lab work was significant for CO2 19, BUN 53, Creatinine, Troponin 1,078. Renal was consulted for ESRD on HD. 1. ESRD on HD Ubpiajk-Qremlwht-Kjftbmxv schedule. We will continue while inpatient. Patient adamantly refusing surgical intervention with no intention for AV access creation. 2. Metabolic acidosis. On sodium bicarbonate   3. HTN, on Coreg,   4. Type II DM. Hgb A1c 5.4  5. HFpEF, LVEF 25-30% on Entresto, Bumex  6. Anemia of CKD, not on NIXON as Hgb > 11  7.  Depression, psychiatry following. Started onb Zoloft    Plan:     · Continue HD on TTS schedule. · Continue Sodium bicarbonate 325 mg PO BID.    · Continue to monitor bicarbonate level  · Continue to monitor labs

## 2022-04-22 NOTE — CARE COORDINATION
Social Work / Discharge Planning :SW noted therapy am//pac 16/24. SW followed up with patient to discuss goals at discharge. Patient in agreement for SNF placement. SNF list provided and will obtain three choices. Patient will review and discuss with family. AWait choices. NO pre-cert needed. Will need to make sure facility aware of HD at [facility]. SW to follow.  Electronically signed by JAY Patel on 4/22/22 at 3:11 PM EDT

## 2022-04-22 NOTE — PROGRESS NOTES
INPATIENT CARDIOLOGY FOLLOW-UP    Name: Fabi Saldivar    Age: 67 y.o. Date of Admission: 4/21/2022  1:02 AM    Date of Service: 4/22/2022    Chief Complaint: Follow-up for Elevated troponin    Interim History:  No new overnight cardiac complaints. Today, is feeling much better and breathing is improved no other cardiac complaints. He told me that he was evaluated by the endocrinologist and his Synthroid dose was adjusted. Now, he is willing to take his medications on a regular basis. He is still refusing ischemic work-up. Currently with no complaints of CP, SOB, palpitations, dizziness, or lightheadedness. SR on telemetry. Review of Systems:   Cardiac: As per HPI  General: No fever, chills  Pulmonary: As per HPI  HEENT: No visual disturbances, difficult swallowing  GI: No nausea, vomiting  Endocrine: No thyroid disease or DM  Musculoskeletal: YARBROUGH x 4, no focal motor deficits  Skin: Intact, no rashes  Neuro/Psych: No headache or seizures    Problem List:  Patient Active Problem List   Diagnosis    Diabetes mellitus ([facility] [address]    Metabolic acidosis    Hypertension    Uncontrolled diabetes mellitus ([address]    Hyperbilirubinemia    Thrombocytopenia (HCC)    Hypothyroidism    ESRD (end stage renal disease) on dialysis ([facility] [address]    Acute respiratory failure with hypoxia (HCC)    Volume overload    Elevated troponin    Hypertensive emergency    Hyperkalemia    Severe protein-calorie malnutrition (HCC)    Acute renal failure superimposed on chronic kidney disease, on chronic dialysis (HCC)    Elevated serum creatinine    Lung infiltrate    Generalized weakness    Cough    Encounter for dialysis ([facility] [address]       Allergies:   Allergies   Allergen Reactions    Penicillins      Does not remember       Current Medications:  Current Facility-Administered Medications   Medication Dose Route Frequency Provider Last Rate Last Admin    levothyroxine (SYNTHROID) tablet 100 mcg  100 mcg Oral Daily [address] Tarik Rubi MD   100 mcg at 04/22/22 7239    aspirin EC tablet 81 mg  81 mg Oral Daily Darion Dawkins MD   81 mg at 04/22/22 9698    atorvastatin (LIPITOR) tablet 40 mg  40 mg Oral Nightly Darion Dawkins MD   40 mg at 04/21/22 2118    bumetanide (BUMEX) tablet 2 mg  2 mg Oral BID  Darion Dawkins MD   2 mg at 04/22/22 0813    heparin (porcine) injection 5,000 Units  5,000 Units SubCUTAneous Q8H Darion Dawkins MD   5,000 Units at 04/22/22 7668    perflutren lipid microspheres (DEFINITY) injection 1.65 mg  1.5 mL IntraVENous ONCE PRN Jojo Paredes MD        ipratropium-albuterol (DUONEB) nebulizer solution 1 ampule  1 ampule Inhalation Q4H WA Jojo Paredes MD   1 ampule at 04/21/22 2006    sodium bicarbonate tablet 650 mg  650 mg Oral BID Eldonna Lyman, APRN - CNP   650 mg at 04/22/22 8736    sertraline (ZOLOFT) tablet 25 mg  25 mg Oral Daily Carilybjorn Oliverain, APRN - CNP   25 mg at 04/22/22 1131    hydrALAZINE (APRESOLINE) injection 10 mg  10 mg IntraVENous Q4H PRN Jojo Paredes MD        carvedilol (COREG) tablet 25 mg  25 mg Oral BID  Jojo Paredes MD   25 mg at 04/22/22 0812    sacubitril-valsartan (ENTRESTO)  MG per tablet 1 tablet  1 tablet Oral BID Jojo Paredes MD   1 tablet at 04/22/22 7895         Physical Exam:  /81   Pulse 77   Temp 99 °F (37.2 °C) (Oral)   Resp 16   Ht 6' 2\" (1.88 m)   Wt 142 lb 6.4 oz (64.6 kg)   SpO2 96%   BMI 18.28 kg/m²   Wt Readings from Last 3 Encounters:   04/21/22 142 lb 6.4 oz (64.6 kg)   01/19/22 150 lb (68 kg)   12/22/21 143 lb 9.6 oz (65.1 kg)     Appearance: Awake, alert, no acute respiratory distress  Skin: Intact, no rash  Head: Normocephalic, atraumatic  Eyes: EOMI, no conjunctival erythema  ENMT: No pharyngeal erythema, MMM, no rhinorrhea  Neck: Supple, no elevated JVP, no carotid bruits  Lungs: Clear to auscultation bilaterally. No wheezes, rales, or rhonchi.   Has a dialysis catheter over the right upper chest.  Cardiac: Regular rate and rhythm, +S1S2, no murmurs apparent  Abdomen: Soft, nontender, +bowel sounds  Extremities: Moves all extremities x 4, no lower extremity edema  Neurologic: No focal motor deficits apparent, normal mood and affect  Peripheral Pulses: Intact posterior tibial pulses bilaterally    Intake/Output:    Intake/Output Summary (Last 24 hours) at 4/22/2022 0844  Last data filed at 4/21/2022 1955  Gross per 24 hour   Intake --   Output 3075 ml   Net -3075 ml     No intake/output data recorded.     Laboratory Tests:  Recent Labs     04/20/22 1939 04/22/22 0252    132   K 4.5 4.1    93*   CO2 19* 27   BUN 53* 26*   CREATININE 8.3* 5.1*   GLUCOSE 119* 126*   CALCIUM 9.0 8.4*     Lab Results   Component Value Date    MG 1.9 12/10/2021     Recent Labs     04/20/22 1939 04/22/22 0252   ALKPHOS 98 83   ALT 12 9   AST 20 19   PROT 8.1 7.0   BILITOT 1.3* 1.4*   BILIDIR 0.3  --    LABALBU 4.3 3.8     Recent Labs     04/20/22 1939 04/22/22 0252   WBC 4.1* 3.6*   RBC 4.00 3.94   HGB 11.9* 11.4*   HCT 35.9* 34.7*   MCV 89.8 88.1   MCH 29.8 28.9   MCHC 33.1 32.9   RDW 23.6* 23.1*    166   MPV 9.6 9.9     Lab Results   Component Value Date    CKTOTAL 1,503 (H) 06/26/2020     Lab Results   Component Value Date    INR 1.1 08/24/2021    INR 1.0 06/02/2020    INR 1.1 12/04/2018    PROTIME 12.1 08/24/2021    PROTIME 11.2 06/02/2020    PROTIME 12.2 12/04/2018     Lab Results   Component Value Date    .300 (H) 04/21/2022     Lab Results   Component Value Date    LABA1C 5.4 04/20/2022     No results found for: EAG  Lab Results   Component Value Date    CHOL 200 (H) 08/24/2021     Lab Results   Component Value Date    TRIG 109 08/24/2021     Lab Results   Component Value Date    HDL 53 11/12/2021    HDL 51 08/24/2021    HDL 38 06/02/2020     Lab Results   Component Value Date    LDLCALC 147 (H) 11/12/2021    LDLCALC 127 (H) 08/24/2021    LDLCALC 178 (H) 06/02/2020     Lab Results   Component Value Date LABVLDL 30 11/12/2021    LABVLDL 22 08/24/2021    LABVLDL 34 06/02/2020     No results found for: CHOLHDLRATIO    Cardiac Tests:  Telemetry findings reviewed: SR at rate 80s no new tachy/bradyarrhythmias overnight     EKG: Normal sinus rhythm, left atrial enlargement, left axis deviation, T wave changes suggestive anterolateral ischemia.     Labs and vitals were reviewed: Blood pressure 221/128 >>97/59>> 135/81, heart rate 77, sats 97% on room air     Labs-BUN/creatinine 53/8.3>> 26/5.1, troponin, high-sensitivity 1078>> 851>> 1038. AST/ALT 20/12, , H&H 11.9/35.9     1. TTE 11/21/2021 ([facility]): Normal left ventricular chamber size. Normal left ventricular systolic  function. Visually estimated LVEF is 25-30 %.   There is severe diffuse hypokinesis with akinesis of the apical segments.  Grade II diastolic dysfunction with elevated LA pressure.  Normal right ventricle structure and function.   Normal left atrium.  Normal right atrium.  Mild mitral regurgitation is present.  Trace anterior pericardial effusion without tamponade.  Unable to  estimate      Assessment:  · Elevated troponin secondary to underlying end-stage renal disease and underlying ischemia cannot be ruled out. Patient declined ischemic work up. · Acute on chronic HFpEF, improved  · Cardiomyopathy of unknown etiology EF 25 to 30% most likely ischemic in etiology based on echo results. · ACC/AHA stage C.,  NYHA functional class III  · Elevated TSH secondary to underlying hypothyroidism secondary to medication noncompliance. Continue  · ESRD on hemodialysis  · Hypertension poorly controlled now improved with hemodialysis  · Noncompliance with her medications as well as with hemodialysis  · History of COVID-19 pneumonia  · Severe depression           Plan:   · Continue guideline directed medical therapy with Coreg 25 mg p.o. twice daily and Entresto 97/103 mg p.o. twice daily.   If he remains hemodynamically stable then consider adding hydralazine and Isordil. · Continue hydralazine 10 mg every 4 hours as needed if the blood pressure remains more than 140/90  · Consider palliative care consult  · No ischemic work-up is planned at the request with the patient  · Volume management as per nephrology service  · Endocrinology input was noted regarding hypothyroidism and medication recommendations and appreciated. · Patient refused LifeVest for primary prophylaxis. · He is stable from the cardiology standpoint. We will see him as needed. Manju Patton MD., Chrystal Ashby.   [facility]) Cardiology

## 2022-04-22 NOTE — PROGRESS NOTES
Physical Therapy  Facility/Department: [address] INTERMEDIATE  Physical Therapy Initial Assessment    Name: Tiffanie Benítez  : 1950  MRN: [id number]  Date of Service: 2022      Patient Diagnosis(es): The primary encounter diagnosis was Encounter for dialysis [facility]). Diagnoses of H/O noncompliance with medical treatment, presenting hazards to health and Essential hypertension were also pertinent to this visit. Past Medical History:  has a past medical history of Chronic kidney disease, Diabetes mellitus ([address] Hepatitis C, Hypertension, Liver disease, Thyroid disease, and Unspecified diseases of blood and blood-forming organs. Past Surgical History:  has a past surgical history that includes Tonsillectomy and vascular surgery (N/A, 2021). Evaluating Therapist: Patrick Auguste PT      Equipment Recommendation wheeled walker    Room #:  7172/5726-U  Diagnosis:  Essential hypertension [I10]  Encounter for dialysis [facility]) [Z99.2]  Elevated serum creatinine [R79.89]  H/O noncompliance with medical treatment, presenting hazards to health [Z91.19]  PMHx/PSHx:  CKD, DM, hepatitis C  Precautions:  Falls, alarm      Social:  Pt lives alone in a 1 floor plan 3 steps  to enter. Prior to admission independent. Reports recent increased weakness     Initial Evaluation  Date: 22 Treatment      Short Term/ Long Term   Goals   Was pt agreeable to Eval/treatment? yes     Does pt have pain?  No c/o pain     Bed Mobility  Rolling: min assist  Supine to sit: min assist  Sit to supine: min assist  Scooting: min assist  independent   Transfers Sit to stand: min assist  Stand to sit: min assist  Stand pivot: min assist  SBA   Ambulation    75 feet with ww with min assist  100 feet with ww with SBA   Stair Negotiation  Ascended and descended  NT   3 steps with 1 rail with min assist   LE strength     3+/5    4/   balance      fair     AM-PAC Raw score               1624         Pt is alert and Oriented   LE ROM: WFL    Edema: none  Endurance: fair  Chair alarm: yes     ASSESSMENT:    Pt displays functional ability as noted in the objective portion of this evaluation. Patient education  Pt educated on PT objectives    Patient response to education:   Pt verbalized understanding Pt demonstrated skill Pt requires further education in this area   yes           Comments:  Pt unsteady with ambulation. B LE weakness with high risk for falls. Attempted ambulation without device, pt unable. Relines on UE support on walker to compensate for LE weakness. Fatigued with mobility. SpO2 on room air 97%      Pt's/ family goals   1. To get stronger    Conditions Requiring Skilled Therapeutic Intervention:    [x]Decreased strength     []Decreased ROM  [x]Decreased functional mobility  [x]Decreased balance   [x]Decreased endurance   []Decreased posture  []Decreased sensation  []Decreased coordination   []Decreased vision  []Decreased safety awareness   []Increased pain       Patient and or family understand(s) diagnosis, prognosis, and plan of care.     Prognosis is good for reaching above PT goals    PHYSICAL THERAPY PLAN OF CARE:    PT POC is established based on physician order and patient diagnosis     Referring provider/PT Order: Diamante Montanez MD/ PT eval and treat      Current Treatment Recommendations:     [x] Strengthening to improve independence with functional mobility   [] ROM to improve independence with functional mobility   [x] Balance Training to improve static/dynamic balance and to reduce fall risk  [x] Endurance Training to improve activity tolerance during functional mobility   [x] Transfer Training to improve safety and independence with all functional transfers   [x] Gait Training to improve gait mechanics, endurance and assess need for appropriate assistive device  [x] Stair Training in preparation for safe discharge home and/or into the community   [] Positioning to prevent skin breakdown and contractures  [x] Safety and Education Training   [x] Patient/Caregiver Education   [] HEP  [] Other     PT long term treatment goals are located in above grid    Frequency of treatments: 2-5x/week x 5 days. Time in  0845  Time out  0900    Evaluation Time includes thorough review of current medical information, gathering information on past medical history/social history and prior level of function, completion of standardized testing/informal observation of tasks, assessment of data and education on plan of care and goals.       CPT codes:  [x] Low Complexity PT evaluation 43375  [] Moderate Complexity PT evaluation 28445  [] High Complexity PT evaluation 92645  [] PT Re-evaluation 27547  [] Gait training 72190 minutes  [] Manual therapy 19900 minutes  [] Therapeutic activities 79458 minutes  [] Therapeutic exercises 27718 minutes  [] Neuromuscular reeducation 74181 minutes     [facility] PSYCHIATRY PT [id number]

## 2022-04-22 NOTE — PLAN OF CARE
Problem: Chronic Conditions and Co-morbidities  Goal: Patient's chronic conditions and co-morbidity symptoms are monitored and maintained or improved  4/22/2022 0741 by Quinton Blakely RN  Outcome: Progressing  4/22/2022 0741 by Quinton Blakely RN  Outcome: Not Progressing     Problem: Safety - Adult  Goal: Free from fall injury  4/22/2022 0741 by Quinton Blakely RN  Outcome: Progressing  4/22/2022 0741 by Quinton Blakely RN  Outcome: Not Progressing

## 2022-04-22 NOTE — PROGRESS NOTES
[facility] Progress Note    Admitting Date and Time: 4/21/2022  1:02 AM  Admit Dx: Essential hypertension [I10]  Encounter for dialysis ([facility] [Z99.2]  Elevated serum creatinine [R79.89]  H/O noncompliance with medical treatment, presenting hazards to health [Z91.19]    Subjective:  Patient is being followed for Essential hypertension [I10]  Encounter for dialysis ([facility] [Z99.2]  Elevated serum creatinine [R79.89]  H/O noncompliance with medical treatment, presenting hazards to health [Z91.19]   Pt feels fine. Not completely convinced that he needs the medication chronically. Does admit to feeling better. Per RN:     ROS: denies fever, chills, cp, sob, n/v, HA unless stated above.       levothyroxine  100 mcg Oral Daily    aspirin  81 mg Oral Daily    atorvastatin  40 mg Oral Nightly    bumetanide  2 mg Oral BID WC    heparin (porcine)  5,000 Units SubCUTAneous Q8H    ipratropium-albuterol  1 ampule Inhalation Q4H WA    sodium bicarbonate  650 mg Oral BID    sertraline  25 mg Oral Daily    carvedilol  25 mg Oral BID WC    sacubitril-valsartan  1 tablet Oral BID     perflutren lipid microspheres, 1.5 mL, ONCE PRN  hydrALAZINE, 10 mg, Q4H PRN    Objective:    /81   Pulse 77   Temp 99 °F (37.2 °C) (Oral)   Resp 16   Ht 6' 2\" (1.88 m)   Wt 142 lb 6.4 oz (64.6 kg)   SpO2 96%   BMI 18.28 kg/m²     General Appearance: alert and oriented to person, place and time and in no acute distress  Skin: warm and dry  Head: normocephalic and atraumatic  Eyes: pupils equal, round, and reactive to light, extraocular eye movements intact, conjunctivae normal  Neck: neck supple and non tender without mass   Pulmonary/Chest: clear to auscultation bilaterally- no wheezes, rales or rhonchi, normal air movement, no respiratory distress  Cardiovascular: normal rate, normal S1 and S2 and no carotid bruits  Abdomen: soft, non-tender, non-distended, normal bowel sounds, no masses or organomegaly  Extremities: no cyanosis, no clubbing and no edema  Neurologic: no cranial nerve deficit and speech normal    Recent Labs     04/20/22 1939 04/22/22  0252    132   K 4.5 4.1    93*   CO2 19* 27   BUN 53* 26*   CREATININE 8.3* 5.1*   GLUCOSE 119* 126*   CALCIUM 9.0 8.4*       Recent Labs     04/20/22 1939 04/22/22  0252   WBC 4.1* 3.6*   RBC 4.00 3.94   HGB 11.9* 11.4*   HCT 35.9* 34.7*   MCV 89.8 88.1   MCH 29.8 28.9   MCHC 33.1 32.9   RDW 23.6* 23.1*    166   MPV 9.6 9.9     Radiology:   XR CHEST PORTABLE    Result Date: 4/20/2022  EXAMINATION: ONE XRAY VIEW OF THE CHEST 4/20/2022 8:35 pm COMPARISON: 12/10/2021 HISTORY: ORDERING SYSTEM PROVIDED HISTORY: shortness of breath TECHNOLOGIST PROVIDED HISTORY: Reason for exam:->shortness of breath FINDINGS: Bilateral lower lobe opacities. .  There is no effusion or pneumothorax. The cardiomediastinal silhouette is without acute process. The osseous structures are without acute process. Right-sided hemodialysis catheter tip at the caval atrial junction. Bilateral lower lobe opacities consistent with atelectasis and/or infiltrate. Assessment:    Principal Problem:    Elevated troponin  Active Problems:    Elevated serum creatinine    Lung infiltrate    Generalized weakness    Cough    Encounter for dialysis ([facility]    Hypothyroidism    ESRD (end stage renal disease) on dialysis (HCC)    Volume overload    Hypertensive emergency    Severe protein-calorie malnutrition ([facility]  Resolved Problems:    * No resolved hospital problems. *    Plan:  1. Accelerated HTN, resolved - restarted on Entresto, Coreg and Bumex. BP much better. Continue same meds. Monitor vitals and check for side effects. 2.   ESRD on HD - Renal consult. HD for T/TH/Sat  3. T2DM - glucose 126. Glucose POCT. Hypoglycemia protocol. Trend and treat accordingly. 4.   Hypothyroidism - off meds. Recheck TSH. Endocrinology consult.   Await recommendations from Endocrinology. 5.   Elevated Troponin - 1078 on admit; now 1038. Cardiology consult. Non compliant on meds. Await recommendations from Cardiology. ECHO last done November 2021. EF 25 - 30%. Hypokinesis and akinesis of the apical segments. Recheck off meds. 6.  Cough - CXR shows bilateral atelectasis. Checking Procalcitonin. Incentive Spirometry. Duonebs. NOTE: This report was transcribed using voice recognition software. Every effort was made to ensure accuracy; however, inadvertent computerized transcription errors may be present.     Electronically signed by Tobin Callahan MD on 4/22/2022 at 11:32 AM

## 2022-04-22 NOTE — PROGRESS NOTES
Occupational Therapy  Date:4/22/2022  Patient Name: Shashi De Anda  Room: 4160/3494-R     Occupational Therapy (OT) order received, patient's medical record reviewed, and OT evaluation attempted this morning; patient declined to participate in [address] [place] [address] activities. OT evaluation to be re-attempted at later time/date, as able/appropriate. Ila Mckinney, OTR/L  License Number: [id number]

## 2022-04-22 NOTE — PROGRESS NOTES
ENDOCRINOLOGY PROGRESS NOTE    Date of Admission: 4/21/2022  Date of Service: 4/22/2022  Admitting Physician: Bill Lyn MD   Primary Care Physician: Cruz Quinonez MD  Consultant physician: Azucena Hahn MD     Reason for the consultation:  Hypothyroidism     History of Present Illness:  Toni Meyers is a very pleasant 67 y.o. old male with PMH DM type 2, primary hypothyroidism, HTN, ESRD and other listed below admitted to [facility] on 4/21/2022 because of cough and shortness of breath, endocrine service was consulted for management of hypothyroidism     Subjective   Seen and examined, no acute events, feels better      Scheduled Meds:   levothyroxine  100 mcg Oral Daily    aspirin  81 mg Oral Daily    atorvastatin  40 mg Oral Nightly    bumetanide  2 mg Oral BID WC    heparin (porcine)  5,000 Units SubCUTAneous Q8H    ipratropium-albuterol  1 ampule Inhalation Q4H WA    sodium bicarbonate  650 mg Oral BID    sertraline  25 mg Oral Daily    carvedilol  25 mg Oral BID WC    sacubitril-valsartan  1 tablet Oral BID     PRN Meds:   perflutren lipid microspheres, 1.5 mL, ONCE PRN  hydrALAZINE, 10 mg, Q4H PRN      Continuous Infusions:      Review of Systems  All systems reviewed. All negative except for symptoms mentioned in HPI     OBJECTIVE    /81   Pulse 77   Temp 99 °F (37.2 °C) (Oral)   Resp 16   Ht 6' 2\" (1.88 m)   Wt 142 lb 6.4 oz (64.6 kg)   SpO2 96%   BMI 18.28 kg/m²   Wt Readings from Last 6 Encounters:   04/21/22 142 lb 6.4 oz (64.6 kg)   01/19/22 150 lb (68 kg)   12/22/21 143 lb 9.6 oz (65.1 kg)   12/22/21 145 lb (65.8 kg)   12/11/21 136 lb 3.9 oz (61.8 kg)   11/22/21 135 lb 2.3 oz (61.3 kg)       Physical examination:  General: awake alert, oriented x3, no abnormal position or movements.    HEENT: normocephalic non traumatic, no exophthalmos, no lid lag, no lid retraction   Neck: supple, no LN enlargement, no thyromegaly, no thyroid tenderness, no thyroid bruit, no JVD.  Pulm: good equal air entry no added sounds   CVS: S1 + S2   Abd: soft lax, no tenderness,   Skin: warm, no lesions, no rash. No palmar erythema, no onycholysis, no pretibial Myxoedema, no acropachy   Musculoskeletal: No back tenderness, no kyphosis/scoliosis    Neuro: CN intact, sensation notmal , muscle power normal. No tremors   Psych: normal mood, and affect    Review of Laboratory Data:  I personally reviewed the following labs:  Recent Labs     04/20/22 1939 04/22/22 0252   WBC 4.1* 3.6*   RBC 4.00 3.94   HGB 11.9* 11.4*   HCT 35.9* 34.7*   MCV 89.8 88.1   MCH 29.8 28.9   MCHC 33.1 32.9   RDW 23.6* 23.1*    166   MPV 9.6 9.9     Recent Labs     04/20/22 1939 04/22/22 0252    132   K 4.5 4.1    93*   CO2 19* 27   BUN 53* 26*   CREATININE 8.3* 5.1*   GLUCOSE 119* 126*   CALCIUM 9.0 8.4*   PROT 8.1 7.0   LABALBU 4.3 3.8   BILITOT 1.3* 1.4*   ALKPHOS 98 83   AST 20 19   ALT 12 9     No results found for: BHYDRXBUT  Lab Results   Component Value Date    LABA1C 5.4 04/20/2022    LABA1C 5.3 11/12/2021    LABA1C 5.1 08/24/2021     Lab Results   Component Value Date/Time    .300 (H) 04/21/2022 02:15 PM    T4FREE 1.06 04/21/2022 02:15 PM     Lab Results   Component Value Date    LABA1C 5.4 04/20/2022    GLUCOSE 126 04/22/2022    MALBCR 3951.0 11/12/2021    LABMICR 3516.4 11/12/2021    LABCREA 89 11/12/2021     Lab Results   Component Value Date    TRIG 109 08/24/2021    HDL 53 11/12/2021    LDLCALC 147 11/12/2021    CHOL 200 08/24/2021       Blood culture   Lab Results   Component Value Date    BC 24 Hours no growth 04/20/2022    BC 5 Days no growth 12/08/2021    BC 5 Days no growth 11/19/2021       Radiology:  XR CHEST PORTABLE   Final Result   Bilateral lower lobe opacities consistent with atelectasis and/or infiltrate.              Medical Records/Labs/Images Review:   I personally reviewed and summarized previous records   All labs and imaging were reviewed independently    Temo, a 67 y.o.-old male seen in for management of Hypothyroidism     Primary hypothyroidism   · Normal free T4 with high TSH (.3, FT4 1.06) are very consistent with medication non-compliance   · We recommend levothyroxine 100 mcg po daily   · Recheck TFT in 6-8 weeks   · Patient was instructed to take levothyroxine in the morning at empty stomach, wait one hour before eating , avoid multivitamins containing calcium  or iron with it. Medications non-compliance   · I have discussed with him the great importance of following the treatment plan exactly as directed in order to achieve a good medical outcome. SOB/cough  · Management per primary service    With normal free T4 we recommend continue current dose of levothyroxine     The above issues were reviewed with the patient who understood and agreed with the plan. Thank you for allowing us to participate in the care of this patient. Please do not hesitate to contact us with any additional questions. Dewitte Alpers MD  Endocrinologist, [facility] and Endocrinology   [address] [place], [place] [address]   Phone: [phone]  Fax: [phone]  ---------------------------------  An electronic signature was used to authenticate this note.  Kimber Phan MD on 4/22/2022 at 10:19 AM

## 2022-04-23 LAB
ALBUMIN SERPL-MCNC: 3.5 G/DL (ref 3.5–5.2)
ALP BLD-CCNC: 75 U/L (ref 40–129)
ALT SERPL-CCNC: 9 U/L (ref 0–40)
ANION GAP SERPL CALCULATED.3IONS-SCNC: 13 MMOL/L (ref 7–16)
ANISOCYTOSIS: ABNORMAL
AST SERPL-CCNC: 18 U/L (ref 0–39)
BASOPHILS ABSOLUTE: 0.05 E9/L (ref 0–0.2)
BASOPHILS RELATIVE PERCENT: 1.4 % (ref 0–2)
BILIRUB SERPL-MCNC: 1.1 MG/DL (ref 0–1.2)
BUN BLDV-MCNC: 43 MG/DL (ref 6–23)
CALCIUM SERPL-MCNC: 7.7 MG/DL (ref 8.6–10.2)
CHLORIDE BLD-SCNC: 95 MMOL/L (ref 98–107)
CO2: 25 MMOL/L (ref 22–29)
CREAT SERPL-MCNC: 6.7 MG/DL (ref 0.7–1.2)
EOSINOPHILS ABSOLUTE: 0.14 E9/L (ref 0.05–0.5)
EOSINOPHILS RELATIVE PERCENT: 4.1 % (ref 0–6)
GFR AFRICAN AMERICAN: 10
GFR NON-AFRICAN AMERICAN: 8 ML/MIN/1.73
GLUCOSE BLD-MCNC: 109 MG/DL (ref 74–99)
HCT VFR BLD CALC: 32.3 % (ref 37–54)
HEMOGLOBIN: 10.8 G/DL (ref 12.5–16.5)
IMMATURE GRANULOCYTES #: 0.01 E9/L
IMMATURE GRANULOCYTES %: 0.3 % (ref 0–5)
LYMPHOCYTES ABSOLUTE: 0.97 E9/L (ref 1.5–4)
LYMPHOCYTES RELATIVE PERCENT: 28.1 % (ref 20–42)
MCH RBC QN AUTO: 30 PG (ref 26–35)
MCHC RBC AUTO-ENTMCNC: 33.4 % (ref 32–34.5)
MCV RBC AUTO: 89.7 FL (ref 80–99.9)
MONOCYTES ABSOLUTE: 0.53 E9/L (ref 0.1–0.95)
MONOCYTES RELATIVE PERCENT: 15.4 % (ref 2–12)
NEUTROPHILS ABSOLUTE: 1.75 E9/L (ref 1.8–7.3)
NEUTROPHILS RELATIVE PERCENT: 50.7 % (ref 43–80)
OVALOCYTES: ABNORMAL
PDW BLD-RTO: 22.8 FL (ref 11.5–15)
PHOSPHORUS: 4.4 MG/DL (ref 2.5–4.5)
PLATELET # BLD: 130 E9/L (ref 130–450)
PMV BLD AUTO: 9.8 FL (ref 7–12)
POIKILOCYTES: ABNORMAL
POTASSIUM REFLEX MAGNESIUM: 4.3 MMOL/L (ref 3.5–5)
RBC # BLD: 3.6 E12/L (ref 3.8–5.8)
SODIUM BLD-SCNC: 133 MMOL/L (ref 132–146)
TOTAL PROTEIN: 6.5 G/DL (ref 6.4–8.3)
URINE CULTURE, ROUTINE: NORMAL
WBC # BLD: 3.5 E9/L (ref 4.5–11.5)

## 2022-04-23 PROCEDURE — 6370000000 HC RX 637 (ALT 250 FOR IP): Performed by: INTERNAL MEDICINE

## 2022-04-23 PROCEDURE — 80053 COMPREHEN METABOLIC PANEL: CPT

## 2022-04-23 PROCEDURE — 6370000000 HC RX 637 (ALT 250 FOR IP): Performed by: NURSE PRACTITIONER

## 2022-04-23 PROCEDURE — 36415 COLL VENOUS BLD VENIPUNCTURE: CPT

## 2022-04-23 PROCEDURE — 85025 COMPLETE CBC W/AUTO DIFF WBC: CPT

## 2022-04-23 PROCEDURE — 6360000002 HC RX W HCPCS: Performed by: INTERNAL MEDICINE

## 2022-04-23 PROCEDURE — 6370000000 HC RX 637 (ALT 250 FOR IP)

## 2022-04-23 PROCEDURE — 96372 THER/PROPH/DIAG INJ SC/IM: CPT

## 2022-04-23 PROCEDURE — 6370000000 HC RX 637 (ALT 250 FOR IP): Performed by: FAMILY MEDICINE

## 2022-04-23 PROCEDURE — 94640 AIRWAY INHALATION TREATMENT: CPT

## 2022-04-23 PROCEDURE — 2060000000 HC ICU INTERMEDIATE R&B

## 2022-04-23 PROCEDURE — G0378 HOSPITAL OBSERVATION PER HR: HCPCS

## 2022-04-23 PROCEDURE — 99232 SBSQ HOSP IP/OBS MODERATE 35: CPT | Performed by: INTERNAL MEDICINE

## 2022-04-23 PROCEDURE — 96376 TX/PRO/DX INJ SAME DRUG ADON: CPT

## 2022-04-23 PROCEDURE — 99233 SBSQ HOSP IP/OBS HIGH 50: CPT | Performed by: FAMILY MEDICINE

## 2022-04-23 PROCEDURE — 2580000003 HC RX 258

## 2022-04-23 PROCEDURE — 84100 ASSAY OF PHOSPHORUS: CPT

## 2022-04-23 PROCEDURE — 90935 HEMODIALYSIS ONE EVALUATION: CPT

## 2022-04-23 RX ORDER — SODIUM CHLORIDE 0.9 % (FLUSH) 0.9 %
SYRINGE (ML) INJECTION
Status: COMPLETED
Start: 2022-04-23 | End: 2022-04-23

## 2022-04-23 RX ORDER — HEPARIN SODIUM 1000 [USP'U]/ML
4300 INJECTION, SOLUTION INTRAVENOUS; SUBCUTANEOUS PRN
Status: DISCONTINUED | OUTPATIENT
Start: 2022-04-23 | End: 2022-04-26 | Stop reason: HOSPADM

## 2022-04-23 RX ADMIN — CARVEDILOL 25 MG: 25 TABLET, FILM COATED ORAL at 12:06

## 2022-04-23 RX ADMIN — ATORVASTATIN CALCIUM 40 MG: 40 TABLET, FILM COATED ORAL at 20:11

## 2022-04-23 RX ADMIN — CARVEDILOL 25 MG: 25 TABLET, FILM COATED ORAL at 20:12

## 2022-04-23 RX ADMIN — HEPARIN SODIUM 5000 UNITS: 10000 INJECTION INTRAVENOUS; SUBCUTANEOUS at 00:49

## 2022-04-23 RX ADMIN — SODIUM BICARBONATE 325 MG: 650 TABLET ORAL at 12:06

## 2022-04-23 RX ADMIN — SODIUM BICARBONATE 325 MG: 650 TABLET ORAL at 20:12

## 2022-04-23 RX ADMIN — SODIUM CHLORIDE, PRESERVATIVE FREE: 5 INJECTION INTRAVENOUS at 10:45

## 2022-04-23 RX ADMIN — SACUBITRIL AND VALSARTAN 1 TABLET: 97; 103 TABLET, FILM COATED ORAL at 12:06

## 2022-04-23 RX ADMIN — BUMETANIDE 2 MG: 1 TABLET ORAL at 20:12

## 2022-04-23 RX ADMIN — IPRATROPIUM BROMIDE AND ALBUTEROL SULFATE 1 AMPULE: .5; 2.5 SOLUTION RESPIRATORY (INHALATION) at 20:21

## 2022-04-23 RX ADMIN — IPRATROPIUM BROMIDE AND ALBUTEROL SULFATE 1 AMPULE: .5; 2.5 SOLUTION RESPIRATORY (INHALATION) at 12:12

## 2022-04-23 RX ADMIN — LEVOTHYROXINE SODIUM 100 MCG: 100 TABLET ORAL at 06:44

## 2022-04-23 RX ADMIN — SACUBITRIL AND VALSARTAN 1 TABLET: 97; 103 TABLET, FILM COATED ORAL at 20:12

## 2022-04-23 RX ADMIN — BUMETANIDE 2 MG: 1 TABLET ORAL at 12:06

## 2022-04-23 RX ADMIN — ASPIRIN 81 MG: 81 TABLET, COATED ORAL at 12:06

## 2022-04-23 RX ADMIN — SERTRALINE HYDROCHLORIDE 25 MG: 50 TABLET ORAL at 12:05

## 2022-04-23 NOTE — PROGRESS NOTES
OT EVALUATION ATTEMPT     Date:2022  Patient Name: Darci Menjivar  MRN: [id number]  : 1950  Room: [address] [place] [address]     Attempted OT evaluation this date:    [] unavailable due to other medical staff currently with pt   [] on hold per nursing staff   [] on hold per nursing staff secondary to lab / radiology results    [] declined treatment  this date due to ____. Benefits of participation in therapy reviewed with pt. [x] off unit   [] Other:     Attempt OT assessment at a later time.     Zoraida Vyas, OTR/L [id number]

## 2022-04-23 NOTE — FLOWSHEET NOTE
04/23/22 1152   Vital Signs   /71   Temp 98.2 °F (36.8 °C)   Pulse 60   Resp 18   Weight 133 lb 9.6 oz (60.6 kg)   Percent Weight Change -3.2   Post-Hemodialysis Assessment   Post-Treatment Procedures Blood returned;Catheter capped, clamped and heparinized x 2 ports   Machine Disinfection Process Acid/Vinegar Clean;Heat Disinfect; Exterior Machine Disinfection   Rinseback Volume (ml) 300 ml   Total Liters Processed (l/min) 89.4 l/min   Dialyzer Clearance Lightly streaked   Duration of Treatment (minutes) 240 minutes   Heparin amount administered during treatment (units) 0 units   Hemodialysis Intake (ml) 300 ml   Hemodialysis Output (ml) 2300 ml   NET Removed (ml) 2000 ml   Tolerated Treatment Good   Patient Response to Treatment 2000ml removed   Tolerated  HD per orders,  2000ml removed with out difficulty. HD CVC flushed, heparin to dwell, clamped and capped  post tx per policy. Report to floor RN, remains in care of staff.

## 2022-04-23 NOTE — PROGRESS NOTES
Department of Internal Medicine  Nephrology Progress Note    Events Reviewed. Subjective: We are following Mr. Kp Salazar for ESRD on Dialysis. PHYSICAL EXAM:      Vitals:    VITALS:  /71   Pulse 60   Temp 98.2 °F (36.8 °C)   Resp 18   Ht 6' 2\" (1.88 m)   Wt 133 lb 9.6 oz (60.6 kg)   SpO2 94%   BMI 17.15 kg/m²   24HR INTAKE/OUTPUT:      Intake/Output Summary (Last 24 hours) at 4/23/2022 1621  Last data filed at 4/23/2022 1152  Gross per 24 hour   Intake 300 ml   Output 2300 ml   Net -2000 ml       Access: Right chest Tessio catheter   Constitutional: Lethargic, No acute distress. Chronically ill appearing  HEENT:  PERRLA, Normocephalic atraumatic  Respiratory:  CTA bilaterally  Cardiovascular/Edema:  RRR, S1/S2. Gastrointestinal:  Soft, nontender, nondistended. + BS  Musculoskeletal: Full ROM,   Neurologic:  Lethargic, had to be woken up multiple times during assessment.       Scheduled Meds:   levothyroxine  100 mcg Oral Daily    sodium bicarbonate  325 mg Oral BID    aspirin  81 mg Oral Daily    atorvastatin  40 mg Oral Nightly    bumetanide  2 mg Oral BID     heparin (porcine)  5,000 Units SubCUTAneous Q8H    ipratropium-albuterol  1 ampule Inhalation Q4H WA    sertraline  25 mg Oral Daily    carvedilol  25 mg Oral BID     sacubitril-valsartan  1 tablet Oral BID     Continuous Infusions:  PRN Meds:.heparin (porcine), perflutren lipid microspheres, hydrALAZINE      DATA:    CBC with Differential:    Lab Results   Component Value Date    WBC 3.5 04/23/2022    RBC 3.60 04/23/2022    HGB 10.8 04/23/2022    HCT 32.3 04/23/2022     04/23/2022    MCV 89.7 04/23/2022    MCH 30.0 04/23/2022    MCHC 33.4 04/23/2022    RDW 22.8 04/23/2022    SEGSPCT 55 06/10/2013    LYMPHOPCT 28.1 04/23/2022    MONOPCT 15.4 04/23/2022    BASOPCT 1.4 04/23/2022    MONOSABS 0.53 04/23/2022    LYMPHSABS 0.97 04/23/2022    EOSABS 0.14 04/23/2022    BASOSABS 0.05 04/23/2022     CMP:    Lab Results Component Value Date     04/23/2022    K 4.3 04/23/2022    CL 95 04/23/2022    CO2 25 04/23/2022    BUN 43 04/23/2022    CREATININE 6.7 04/23/2022    GFRAA 10 04/23/2022    LABGLOM 8 04/23/2022    GLUCOSE 109 04/23/2022    PROT 6.5 04/23/2022    LABALBU 3.5 04/23/2022    CALCIUM 7.7 04/23/2022    BILITOT 1.1 04/23/2022    ALKPHOS 75 04/23/2022    AST 18 04/23/2022    ALT 9 04/23/2022     Magnesium:    Lab Results   Component Value Date    MG 1.9 12/10/2021     Phosphorus:    Lab Results   Component Value Date    PHOS 4.4 04/23/2022     Radiology Review:          Chest XR 4/21/22   Bilateral lower lobe opacities consistent with atelectasis and/or infiltrate. IMPRESSION/RECOMMENDATIONS:  The patient is a 67 y.o. male with significant past medical history of end stage renal disease secondary to nephrosclerosis and/or diabetic nephropathy, on hemodialysis Owqppxq-Eeitpjts-Ixoihlox via Catheter, last hemodialysis treatment on 4/16/22, HTN, Type II DM, Hypothyroidism who presents to the ER on 4/21/22 with cough and shortness of breath. He is very noncompliant with his outpatient dialysis sessions. Initially he was only staying for 1.5-2 hours of treatment. Recently he has only been coming every other treatment. He states he feels like the dialysis makes him \"feel very weak as if he was wasting away. \"  He also was noncompliant with his medications. Often times not taking them at all. He is complaining of orthopnea. Lab work was significant for CO2 19, BUN 53, Creatinine, Troponin 1,078. Renal was consulted for ESRD on HD. 1. ESRD on HD Wmzmnwf-Zsmwucyp-Gkoywhmi schedule. We will continue while inpatient. Patient adamantly refusing surgical intervention with no intention for AV access creation. 2. Metabolic acidosis. On sodium bicarbonate   3. HTN, on Coreg,   4. Type II DM. Hgb A1c 5.4  5. HFpEF, LVEF 25-30% on Entresto, Bumex  6. Anemia of CKD, not on NIXON as Hgb > 11  7.  Depression, psychiatry following. Started on Zoloft    Plan:     · Continue HD on TTS schedule. Tolerating well   · Continue Sodium bicarbonate 325 mg PO BID.    · Continue to monitor bicarbonate level  · Continue to monitor labs

## 2022-04-23 NOTE — PROGRESS NOTES
ENDOCRINOLOGY PROGRESS NOTE    Date of Admission: 4/21/2022  Date of Service: 4/23/2022  Admitting Physician: Jojo Paredes MD   Primary Care Physician: Efra Augustin MD  Consultant physician: Abby Joe MD     Reason for the consultation:  Hypothyroidism     History of Present Illness:  Imani Lopez is a very pleasant 67 y.o. old male with PMH DM type 2, primary hypothyroidism, HTN, ESRD and other listed below admitted to [facility] on 4/21/2022 because of cough and shortness of breath, endocrine service was consulted for management of hypothyroidism     Subjective   Seen and examined, no acute events,      Scheduled Meds:   sodium chloride flush        levothyroxine  100 mcg Oral Daily    sodium bicarbonate  325 mg Oral BID    aspirin  81 mg Oral Daily    atorvastatin  40 mg Oral Nightly    bumetanide  2 mg Oral BID WC    heparin (porcine)  5,000 Units SubCUTAneous Q8H    ipratropium-albuterol  1 ampule Inhalation Q4H WA    sertraline  25 mg Oral Daily    carvedilol  25 mg Oral BID WC    sacubitril-valsartan  1 tablet Oral BID     PRN Meds:   heparin (porcine), 4,300 Units, PRN  perflutren lipid microspheres, 1.5 mL, ONCE PRN  hydrALAZINE, 10 mg, Q4H PRN      Continuous Infusions:      Review of Systems  All systems reviewed. All negative except for symptoms mentioned in HPI     OBJECTIVE    /61   Pulse 66   Temp 98.4 °F (36.9 °C)   Resp 18   Ht 6' 2\" (1.88 m)   Wt 138 lb 0.1 oz (62.6 kg)   SpO2 94%   BMI 17.72 kg/m²   Wt Readings from Last 6 Encounters:   04/23/22 138 lb 0.1 oz (62.6 kg)   01/19/22 150 lb (68 kg)   12/22/21 143 lb 9.6 oz (65.1 kg)   12/22/21 145 lb (65.8 kg)   12/11/21 136 lb 3.9 oz (61.8 kg)   11/22/21 135 lb 2.3 oz (61.3 kg)       Physical examination:  General: awake alert, oriented x3, no abnormal position or movements.    HEENT: normocephalic non traumatic, no exophthalmos, no lid lag, no lid retraction   Neck: supple, no LN enlargement, no thyromegaly, no infiltrate. Medical Records/Labs/Images Review:   I personally reviewed and summarized previous records   All labs and imaging were reviewed independently    Temo, a 67 y.o.-old male seen in for management of Hypothyroidism     Primary hypothyroidism   · Normal free T4 and high TSH (.3, FT4 1.06) are very consistent with medication non-compliance   · Continue Levothyroxine 100 mcg po daily and recheck TFT in 6-8 weeks   · Patient was instructed to take levothyroxine in the morning at empty stomach, wait one hour before eating , avoid multivitamins containing calcium  or iron with it. Medications non-compliance   · I have discussed with him the great importance of following the treatment plan exactly as directed in order to achieve a good medical outcome. SOB/cough  · Management per primary service    With normal free T4 we recommend continue current dose of levothyroxine     The above issues were reviewed with the patient who understood and agreed with the plan. Thank you for allowing us to participate in the care of this patient. Please do not hesitate to contact us with any additional questions. Meryle Laurence MD  Endocrinologist, [place] [address] [facility]   [address] [place] [id number]   Phone: [phone]  Fax: [phone]  ---------------------------------  An electronic signature was used to authenticate this note.  Gudelia Roa MD on 4/23/2022 at 10:43 AM

## 2022-04-23 NOTE — PLAN OF CARE
Problem: Chronic Conditions and Co-morbidities  Goal: Patient's chronic conditions and co-morbidity symptoms are monitored and maintained or improved  Outcome: Progressing     Problem: Safety - Adult  Goal: Free from fall injury  Outcome: Progressing

## 2022-04-23 NOTE — PROGRESS NOTES
INPATIENT CARDIOLOGY FOLLOW-UP    Name: Jt Friedman    Age: 67 y.o. Date of Admission: 4/21/2022  1:02 AM    Date of Service: 4/23/2022    Chief Complaint: Follow-up for Elevated troponin    Interim History:  No new overnight cardiac complaints. Today, is feeling sleepy and had HD this morning much better and breathing is improved no other cardiac complaints. He is still refusing ischemic work-up. Currently with no complaints of CP, SOB, palpitations, dizziness, or lightheadedness. SR on telemetry. Review of Systems:   Cardiac: As per HPI  General: No fever, chills  Pulmonary: As per HPI  HEENT: No visual disturbances, difficult swallowing  GI: No nausea, vomiting  Endocrine: No thyroid disease or DM  Musculoskeletal: YARBROUGH x 4, no focal motor deficits  Skin: Intact, no rashes  Neuro/Psych: No headache or seizures    Problem List:  Patient Active Problem List   Diagnosis    [facility]    Metabolic acidosis    Hypertension    Uncontrolled diabetes mellitus ([facility]    Hyperbilirubinemia    Thrombocytopenia (HCC)    Hypothyroidism    ESRD (end stage renal disease) on dialysis ([facility]    Acute respiratory failure with hypoxia (HCC)    Volume overload    Elevated troponin    Hypertensive emergency    Hyperkalemia    Severe protein-calorie malnutrition (HCC)    Acute renal failure superimposed on chronic kidney disease, on chronic dialysis (HCC)    Elevated serum creatinine    Lung infiltrate    Generalized weakness    Cough    DNR (do not resuscitate) discussion       Allergies:   Allergies   Allergen Reactions    Penicillins      Does not remember       Current Medications:  Current Facility-Administered Medications   Medication Dose Route Frequency Provider Last Rate Last Admin    heparin (porcine) injection 4,300 Units  4,300 Units IntraCATHeter PRN Ewelina Ledezma MD        levothyroxine (SYNTHROID) tablet 100 mcg  100 mcg Oral Daily Homero Phipps MD   100 mcg at 04/23/22 0644    sodium bicarbonate tablet 325 mg  325 mg Oral BID Shirin Yen, APRN - CNP   325 mg at 04/23/22 1206    aspirin EC tablet 81 mg  81 mg Oral Daily Lindaann Holstein, MD   81 mg at 04/23/22 1206    atorvastatin (LIPITOR) tablet 40 mg  40 mg Oral Nightly Lindaann Holstein, MD   40 mg at 04/22/22 2046    bumetanide (BUMEX) tablet 2 mg  2 mg Oral BID  Lindaann Holstein, MD   2 mg at 04/23/22 1206    heparin (porcine) injection 5,000 Units  5,000 Units SubCUTAneous Q8H Lindaann Holstein, MD   5,000 Units at 04/23/22 0049    perflutren lipid microspheres (DEFINITY) injection 1.65 mg  1.5 mL IntraVENous ONCE PRN Tobin Callahan MD        ipratropium-albuterol (DUONEB) nebulizer solution 1 ampule  1 ampule Inhalation Q4H WA Tobin Callahan MD   1 ampule at 04/23/22 1212    sertraline (ZOLOFT) tablet 25 mg  25 mg Oral Daily Mere KooKIANNA soria - CNP   25 mg at 04/23/22 1205    hydrALAZINE (APRESOLINE) injection 10 mg  10 mg IntraVENous Q4H PRN Tobin Callahan MD        carvedilol (COREG) tablet 25 mg  25 mg Oral BID  Tobin Callahan MD   25 mg at 04/23/22 1206    sacubitril-valsartan (ENTRESTO)  MG per tablet 1 tablet  1 tablet Oral BID Tobin Callahan MD   1 tablet at 04/23/22 1206         Physical Exam:  /71   Pulse 60   Temp 98.2 °F (36.8 °C)   Resp 18   Ht 6' 2\" (1.88 m)   Wt 133 lb 9.6 oz (60.6 kg)   SpO2 94%   BMI 17.15 kg/m²   Wt Readings from Last 3 Encounters:   04/23/22 133 lb 9.6 oz (60.6 kg)   01/19/22 150 lb (68 kg)   12/22/21 143 lb 9.6 oz (65.1 kg)     Appearance: Awake, alert, no acute respiratory distress  Skin: Intact, no rash  Head: Normocephalic, atraumatic  Eyes: EOMI, no conjunctival erythema  ENMT: No pharyngeal erythema, MMM, no rhinorrhea  Neck: Supple, no elevated JVP, no carotid bruits  Lungs: Clear to auscultation bilaterally. No wheezes, rales, or rhonchi.   Has a dialysis catheter over the right upper chest.  Cardiac: Regular rate and rhythm, +S1S2, no murmurs apparent  Abdomen: Soft, nontender, +bowel sounds  Extremities: Moves all extremities x 4, no lower extremity edema  Neurologic: No focal motor deficits apparent, normal mood and affect  Peripheral Pulses: Intact posterior tibial pulses bilaterally    Intake/Output:    Intake/Output Summary (Last 24 hours) at 4/23/2022 1619  Last data filed at 4/23/2022 1152  Gross per 24 hour   Intake 300 ml   Output 2300 ml   Net -2000 ml     I/O this shift:  In: 300   Out: 2300     Laboratory Tests:  Recent Labs     04/20/22 1939 04/22/22 0252 04/23/22 0215    132 133   K 4.5 4.1 4.3    93* 95*   CO2 19* 27 25   BUN 53* 26* 43*   CREATININE 8.3* 5.1* 6.7*   GLUCOSE 119* 126* 109*   CALCIUM 9.0 8.4* 7.7*     Lab Results   Component Value Date    MG 1.9 12/10/2021     Recent Labs     04/20/22 1939 04/22/22 0252 04/23/22 0215   ALKPHOS 98 83 75   ALT 12 9 9   AST 20 19 18   PROT 8.1 7.0 6.5   BILITOT 1.3* 1.4* 1.1   BILIDIR 0.3  --   --    LABALBU 4.3 3.8 3.5     Recent Labs     04/20/22 1939 04/22/22 0252 04/23/22 0215   WBC 4.1* 3.6* 3.5*   RBC 4.00 3.94 3.60*   HGB 11.9* 11.4* 10.8*   HCT 35.9* 34.7* 32.3*   MCV 89.8 88.1 89.7   MCH 29.8 28.9 30.0   MCHC 33.1 32.9 33.4   RDW 23.6* 23.1* 22.8*    166 130   MPV 9.6 9.9 9.8     Lab Results   Component Value Date    CKTOTAL 1,503 (H) 06/26/2020     Lab Results   Component Value Date    INR 1.1 08/24/2021    INR 1.0 06/02/2020    INR 1.1 12/04/2018    PROTIME 12.1 08/24/2021    PROTIME 11.2 06/02/2020    PROTIME 12.2 12/04/2018     Lab Results   Component Value Date    .300 (H) 04/21/2022     Lab Results   Component Value Date    LABA1C 5.4 04/20/2022     No results found for: EAG  Lab Results   Component Value Date    CHOL 200 (H) 08/24/2021     Lab Results   Component Value Date    TRIG 109 08/24/2021     Lab Results   Component Value Date    HDL 53 11/12/2021    HDL 51 08/24/2021    HDL 38 06/02/2020     Lab Results   Component Value Date directed medical therapy with Coreg 25 mg p.o. twice daily and Entresto 97/103 mg p.o. twice daily. If he remains hemodynamically stable then consider adding low-dose hydralazine and Isordil. · Continue aspirin and statin  · Volume status and hemodialysis as per nephrology service. · Consider palliative care consult  · No ischemic work-up is planned at the request with the patient  · Volume management as per nephrology service  · Endocrinology input was noted regarding hypothyroidism and medication recommendations and appreciated. · Patient refused LifeVest for primary prophylaxis. He is stable from the cardiology standpoint for discharge. We will will sign off, please call us if having further questions or concerns. Follow-up in heart failure clinic in 1 week and follow-up with cardiology service in 2 weeks. Gemma Naylor MD., Tamar Nguyễn.   [facility]) Cardiology

## 2022-04-23 NOTE — PROGRESS NOTES
[facility] Progress Note    Admitting Date and Time: 4/21/2022  1:02 AM  Admit Dx: Essential hypertension [I10]  Encounter for dialysis ([facility] [address] [Z99.2]  Elevated serum creatinine [R79.89]  H/O noncompliance with medical treatment, presenting hazards to health [Z91.19]    Subjective:  Patient is being followed for Essential hypertension [I10]  Encounter for dialysis ([facility] [address] [Z99.2]  Elevated serum creatinine [R79.89]  H/O noncompliance with medical treatment, presenting hazards to health [Z91.19]   Pt feels much better today. Leg weakness bilaterally. Wants to have therapy. Convinced meds for BP and thyroid replacement working and will take on discharge. Per RN: Seen at HD.    ROS: denies fever, chills, cp, sob, n/v, HA unless stated above.       levothyroxine  100 mcg Oral Daily    sodium bicarbonate  325 mg Oral BID    aspirin  81 mg Oral Daily    atorvastatin  40 mg Oral Nightly    bumetanide  2 mg Oral BID WC    heparin (porcine)  5,000 Units SubCUTAneous Q8H    ipratropium-albuterol  1 ampule Inhalation Q4H WA    sertraline  25 mg Oral Daily    carvedilol  25 mg Oral BID WC    sacubitril-valsartan  1 tablet Oral BID     perflutren lipid microspheres, 1.5 mL, ONCE PRN  hydrALAZINE, 10 mg, Q4H PRN    Objective:    /67   Pulse 65   Temp 98 °F (36.7 °C) (Oral)   Resp 17   Ht 6' 2\" (1.88 m)   Wt 141 lb 1.6 oz (64 kg)   SpO2 94%   BMI 18.12 kg/m²     General Appearance: alert and oriented to person, place and time and in no acute distress  Skin: warm and dry  Head: normocephalic and atraumatic  Eyes: pupils equal, round, and reactive to light, extraocular eye movements intact, conjunctivae normal  Neck: neck supple and non tender without mass   Pulmonary/Chest: clear to auscultation bilaterally- no wheezes, rales or rhonchi, normal air movement, no respiratory distress  Cardiovascular: normal rate, normal S1 and S2 and no carotid bruits  Abdomen: soft, non-tender, non-distended, normal bowel sounds, no masses or organomegaly  Extremities: no cyanosis, no clubbing and no edema  Neurologic: no cranial nerve deficit and speech normal    Recent Labs     04/20/22 1939 04/22/22 0252 04/23/22 0215    132 133   K 4.5 4.1 4.3    93* 95*   CO2 19* 27 25   BUN 53* 26* 43*   CREATININE 8.3* 5.1* 6.7*   GLUCOSE 119* 126* 109*   CALCIUM 9.0 8.4* 7.7*       Recent Labs     04/20/22 1939 04/22/22 0252 04/23/22 0215   WBC 4.1* 3.6* 3.5*   RBC 4.00 3.94 3.60*   HGB 11.9* 11.4* 10.8*   HCT 35.9* 34.7* 32.3*   MCV 89.8 88.1 89.7   MCH 29.8 28.9 30.0   MCHC 33.1 32.9 33.4   RDW 23.6* 23.1* 22.8*    166 130   MPV 9.6 9.9 9.8     Radiology:   Echo Complete    Result Date: 4/22/2022  Transthoracic Echocardiography Report (TTE)  Demographics   Patient Name    Kleber Tomas        Gender            Male                  [id number]      Room Number       7441  Number   Account #       [de-identified]     Procedure Date    04/22/2022   Corporate ID                  Ordering          Maricarmen Quintero MD                                Physician   Accession       [id number]    Referring  Number                        Physician   Date of Birth   1950    Sonographer       Carlos Tamayo [facility]   Age             67 year(s)    Interpreting      [address] [place] [address]                                Physician         Physician Cardiology                                                  Cally Anne MD                                 Any Other  Procedure Type of Study   TTE procedure  Procedure Date Date: 04/22/2022 Start: 02:28 PM Study Location: Portable Technical Quality: Adequate visualization Indications:Abnormal cardiac enzymes. Patient Status: Routine Height: 74 inches Weight: 150 pounds BSA: 1.92 m^2 BMI: 19.26 kg/m^2 BP: 163/84 mmHg  Findings   Left Ventricle  Left ventricle size is normal.  Ejection fraction is visually estimated at 35-40%.   Overall ejection fraction moderately decreased . No regional wall motion abnormalities seen. Concentric remodelling. Stage I diastolic dysfunction. Right Ventricle  Normal right ventricular size and function. TAPSE 18 mm. Left Atrium  Left atrium is of normal size. Right Atrium  Normal right atrium size. Mitral Valve  Mild mitral annular calcification. No evidence of mitral valve stenosis. Physiologic and/or trace mitral regurgitation is present. Tricuspid Valve  The tricuspid valve appears structurally normal.  Physiologic and/or trace tricuspid regurgitation. Unable to estimate PA systolic pressure. Aortic Valve  The aortic valve appears mildly sclerotic. The aortic valve is trileaflet. No hemodynamically significant aortic stenosis is present. Physiologic and/or trace aortic regurgitation is noted. Pulmonic Valve  The pulmonic valve was not well visualized. No evidence of any pulmonic regurgitation. No evidence of pulmonic valve stenosis. Pericardial Effusion  No evidence for hemodynamically significant pericardial effusion. Pleural Effusion  No evidence of pleural effusion. Aorta  Normal aortic root and ascending aorta. Miscellaneous  The inferior vena cava diameter is normal with normal respiratory  variation. Conclusions   Summary  Left ventricle size is normal.  Ejection fraction is visually estimated at 35-40%. Overall ejection fraction moderately decreased . No regional wall motion abnormalities seen. Concentric remodelling. Stage I diastolic dysfunction. Normal right ventricular size and function. TAPSE 18 mm. Physiologic and/or trace mitral regurgitation is present. No hemodynamically significant aortic stenosis is present. Unable to estimate PA systolic pressure. No evidence for hemodynamically significant pericardial effusion.    Signature   ----------------------------------------------------------------  Electronically signed by Mono Paez MD(Interpreting  physician) on 04/22/2022 Elevated troponin  Active Problems:    Elevated serum creatinine    Lung infiltrate    Generalized weakness    Cough    DNR (do not resuscitate) discussion    Hypothyroidism    ESRD (end stage renal disease) on dialysis ([facility] [address]    Volume overload    Hypertensive emergency    Severe protein-calorie malnutrition ([facility] [address]  Resolved Problems:    * No resolved hospital problems. *    Plan:  1. Accelerated HTN, resolved - restarted on Entresto, Coreg and Bumex. BP much better. Continue same meds. Monitor vitals and check for side effects.    2.   ESRD on HD - Renal consult.  HD for T/TH/Sat  3.   T2DM - glucose 109.  Glucose POCT.  Hypoglycemia protocol.  Trend and treat accordingly.    4.   Hypothyroidism - doing much better on the Levothyroxine 100 mcg po daily. Continue same. 5.   Elevated Troponin - 1078 on admit; now 1038.  Cardiology consult.  Non compliant on meds.  2D  ECHO last done November 2021.  EF 25 - 30%.  Hypokinesis and akinesis of the apical segments.  Patient has declined ischemic workup. 6.  Cough - CXR shows bilateral atelectasis.  Checking Procalcitonin.  Incentive Spirometry.  Duonebs. Oxygen prn.    7.  Leg weakness - PT/OT ordered. Would be interested in therapy if indicated. NOTE: This report was transcribed using voice recognition software. Every effort was made to ensure accuracy; however, inadvertent computerized transcription errors may be present.     Electronically signed by Michelet Mendoza MD on 4/23/2022 at 7:59 AM

## 2022-04-24 LAB
ALBUMIN SERPL-MCNC: 3.8 G/DL (ref 3.5–5.2)
ALP BLD-CCNC: 89 U/L (ref 40–129)
ALT SERPL-CCNC: 13 U/L (ref 0–40)
ANION GAP SERPL CALCULATED.3IONS-SCNC: 13 MMOL/L (ref 7–16)
ANISOCYTOSIS: ABNORMAL
AST SERPL-CCNC: 24 U/L (ref 0–39)
BASOPHILS ABSOLUTE: 0.08 E9/L (ref 0–0.2)
BASOPHILS RELATIVE PERCENT: 2.1 % (ref 0–2)
BILIRUB SERPL-MCNC: 0.9 MG/DL (ref 0–1.2)
BUN BLDV-MCNC: 32 MG/DL (ref 6–23)
CALCIUM SERPL-MCNC: 7.8 MG/DL (ref 8.6–10.2)
CHLORIDE BLD-SCNC: 91 MMOL/L (ref 98–107)
CO2: 27 MMOL/L (ref 22–29)
CREAT SERPL-MCNC: 5.2 MG/DL (ref 0.7–1.2)
EOSINOPHILS ABSOLUTE: 0.09 E9/L (ref 0.05–0.5)
EOSINOPHILS RELATIVE PERCENT: 2.3 % (ref 0–6)
GFR AFRICAN AMERICAN: 13
GFR NON-AFRICAN AMERICAN: 11 ML/MIN/1.73
GLUCOSE BLD-MCNC: 155 MG/DL (ref 74–99)
HCT VFR BLD CALC: 34.3 % (ref 37–54)
HEMOGLOBIN: 11.4 G/DL (ref 12.5–16.5)
IMMATURE GRANULOCYTES #: 0.01 E9/L
IMMATURE GRANULOCYTES %: 0.3 % (ref 0–5)
LYMPHOCYTES ABSOLUTE: 0.9 E9/L (ref 1.5–4)
LYMPHOCYTES RELATIVE PERCENT: 23.1 % (ref 20–42)
MCH RBC QN AUTO: 29.9 PG (ref 26–35)
MCHC RBC AUTO-ENTMCNC: 33.2 % (ref 32–34.5)
MCV RBC AUTO: 90 FL (ref 80–99.9)
MONOCYTES ABSOLUTE: 0.51 E9/L (ref 0.1–0.95)
MONOCYTES RELATIVE PERCENT: 13.1 % (ref 2–12)
NEUTROPHILS ABSOLUTE: 2.31 E9/L (ref 1.8–7.3)
NEUTROPHILS RELATIVE PERCENT: 59.1 % (ref 43–80)
OVALOCYTES: ABNORMAL
PDW BLD-RTO: 22.4 FL (ref 11.5–15)
PHOSPHORUS: 4 MG/DL (ref 2.5–4.5)
PLATELET # BLD: 159 E9/L (ref 130–450)
PMV BLD AUTO: 10.4 FL (ref 7–12)
POIKILOCYTES: ABNORMAL
POLYCHROMASIA: ABNORMAL
POTASSIUM REFLEX MAGNESIUM: 4.1 MMOL/L (ref 3.5–5)
RBC # BLD: 3.81 E12/L (ref 3.8–5.8)
SODIUM BLD-SCNC: 131 MMOL/L (ref 132–146)
TOTAL PROTEIN: 7 G/DL (ref 6.4–8.3)
WBC # BLD: 3.9 E9/L (ref 4.5–11.5)

## 2022-04-24 PROCEDURE — 6370000000 HC RX 637 (ALT 250 FOR IP): Performed by: NURSE PRACTITIONER

## 2022-04-24 PROCEDURE — 36415 COLL VENOUS BLD VENIPUNCTURE: CPT

## 2022-04-24 PROCEDURE — 6370000000 HC RX 637 (ALT 250 FOR IP): Performed by: INTERNAL MEDICINE

## 2022-04-24 PROCEDURE — 94640 AIRWAY INHALATION TREATMENT: CPT

## 2022-04-24 PROCEDURE — 85025 COMPLETE CBC W/AUTO DIFF WBC: CPT

## 2022-04-24 PROCEDURE — 2060000000 HC ICU INTERMEDIATE R&B

## 2022-04-24 PROCEDURE — 99233 SBSQ HOSP IP/OBS HIGH 50: CPT | Performed by: FAMILY MEDICINE

## 2022-04-24 PROCEDURE — 6370000000 HC RX 637 (ALT 250 FOR IP): Performed by: FAMILY MEDICINE

## 2022-04-24 PROCEDURE — 6370000000 HC RX 637 (ALT 250 FOR IP)

## 2022-04-24 PROCEDURE — 99231 SBSQ HOSP IP/OBS SF/LOW 25: CPT | Performed by: INTERNAL MEDICINE

## 2022-04-24 PROCEDURE — 80053 COMPREHEN METABOLIC PANEL: CPT

## 2022-04-24 PROCEDURE — 6360000002 HC RX W HCPCS: Performed by: INTERNAL MEDICINE

## 2022-04-24 PROCEDURE — 84100 ASSAY OF PHOSPHORUS: CPT

## 2022-04-24 RX ADMIN — ATORVASTATIN CALCIUM 40 MG: 40 TABLET, FILM COATED ORAL at 20:07

## 2022-04-24 RX ADMIN — LEVOTHYROXINE SODIUM 100 MCG: 100 TABLET ORAL at 05:28

## 2022-04-24 RX ADMIN — IPRATROPIUM BROMIDE AND ALBUTEROL SULFATE 1 AMPULE: .5; 2.5 SOLUTION RESPIRATORY (INHALATION) at 08:38

## 2022-04-24 RX ADMIN — IPRATROPIUM BROMIDE AND ALBUTEROL SULFATE 1 AMPULE: .5; 2.5 SOLUTION RESPIRATORY (INHALATION) at 16:30

## 2022-04-24 RX ADMIN — CARVEDILOL 25 MG: 25 TABLET, FILM COATED ORAL at 17:07

## 2022-04-24 RX ADMIN — HEPARIN SODIUM 5000 UNITS: 10000 INJECTION INTRAVENOUS; SUBCUTANEOUS at 01:48

## 2022-04-24 RX ADMIN — SACUBITRIL AND VALSARTAN 1 TABLET: 97; 103 TABLET, FILM COATED ORAL at 20:07

## 2022-04-24 RX ADMIN — ASPIRIN 81 MG: 81 TABLET, COATED ORAL at 08:09

## 2022-04-24 RX ADMIN — CARVEDILOL 25 MG: 25 TABLET, FILM COATED ORAL at 08:09

## 2022-04-24 RX ADMIN — BUMETANIDE 2 MG: 1 TABLET ORAL at 08:09

## 2022-04-24 RX ADMIN — BUMETANIDE 2 MG: 1 TABLET ORAL at 17:07

## 2022-04-24 RX ADMIN — SERTRALINE HYDROCHLORIDE 25 MG: 50 TABLET ORAL at 08:09

## 2022-04-24 RX ADMIN — SODIUM BICARBONATE 325 MG: 650 TABLET ORAL at 20:07

## 2022-04-24 RX ADMIN — IPRATROPIUM BROMIDE AND ALBUTEROL SULFATE 1 AMPULE: .5; 2.5 SOLUTION RESPIRATORY (INHALATION) at 20:17

## 2022-04-24 RX ADMIN — SACUBITRIL AND VALSARTAN 1 TABLET: 97; 103 TABLET, FILM COATED ORAL at 08:09

## 2022-04-24 RX ADMIN — SODIUM BICARBONATE 325 MG: 650 TABLET ORAL at 08:09

## 2022-04-24 RX ADMIN — IPRATROPIUM BROMIDE AND ALBUTEROL SULFATE 1 AMPULE: .5; 2.5 SOLUTION RESPIRATORY (INHALATION) at 12:21

## 2022-04-24 NOTE — PLAN OF CARE
Problem: Chronic Conditions and Co-morbidities  Goal: Patient's chronic conditions and co-morbidity symptoms are monitored and maintained or improved  Outcome: Progressing     Problem: Safety - Adult  Goal: Free from fall injury  Outcome: Progressing     Problem: ABCDS Injury Assessment  Goal: Absence of physical injury  Outcome: Progressing

## 2022-04-24 NOTE — PROGRESS NOTES
[facility] Progress Note    Admitting Date and Time: 4/21/2022  1:02 AM  Admit Dx: Essential hypertension [I10]  Encounter for dialysis ([facility] [address] [Z99.2]  Elevated serum creatinine [R79.89]  H/O noncompliance with medical treatment, presenting hazards to health [Z91.19]    Subjective:  Patient is being followed for Essential hypertension [I10]  Encounter for dialysis ([facility] [address] [Z99.2]  Elevated serum creatinine [R79.89]  H/O noncompliance with medical treatment, presenting hazards to health [Z91.19]   Pt feels better. Wants to go to rehab. Son is visiting from the [address] [place] [address]. Patient states he eats better and does better in the facility. Per RN: No new concerns. ROS: denies fever, chills, cp, sob, n/v, HA unless stated above.       levothyroxine  100 mcg Oral Daily    sodium bicarbonate  325 mg Oral BID    aspirin  81 mg Oral Daily    atorvastatin  40 mg Oral Nightly    bumetanide  2 mg Oral BID WC    heparin (porcine)  5,000 Units SubCUTAneous Q8H    ipratropium-albuterol  1 ampule Inhalation Q4H WA    sertraline  25 mg Oral Daily    carvedilol  25 mg Oral BID WC    sacubitril-valsartan  1 tablet Oral BID     heparin (porcine), 4,300 Units, PRN  perflutren lipid microspheres, 1.5 mL, ONCE PRN  hydrALAZINE, 10 mg, Q4H PRN    Objective:    BP (!) 144/84   Pulse 72   Temp 98 °F (36.7 °C) (Oral)   Resp 18   Ht 6' 2\" (1.88 m)   Wt 139 lb 15.9 oz (63.5 kg)   SpO2 96%   BMI 17.97 kg/m²     General Appearance: alert and oriented to person, place and time and in no acute distress  Skin: warm and dry  Head: normocephalic and atraumatic  Eyes: pupils equal, round, and reactive to light, extraocular eye movements intact, conjunctivae normal  Neck: neck supple and non tender without mass   Pulmonary/Chest: clear to auscultation bilaterally- no wheezes, rales or rhonchi, normal air movement, no respiratory distress  Cardiovascular: normal rate, normal S1 and S2 and no carotid bruits  Abdomen: soft, non-tender, non-distended, normal bowel sounds, no masses or organomegaly  Extremities: no cyanosis, no clubbing and no edema  Neurologic: no cranial nerve deficit and speech normal    Recent Labs     04/22/22  0252 04/23/22  0215    133   K 4.1 4.3   CL 93* 95*   CO2 27 25   BUN 26* 43*   CREATININE 5.1* 6.7*   GLUCOSE 126* 109*   CALCIUM 8.4* 7.7*       Recent Labs     04/22/22  0252 04/23/22  0215 04/24/22  1017   WBC 3.6* 3.5* 3.9*   RBC 3.94 3.60* 3.81   HGB 11.4* 10.8* 11.4*   HCT 34.7* 32.3* 34.3*   MCV 88.1 89.7 90.0   MCH 28.9 30.0 29.9   MCHC 32.9 33.4 33.2   RDW 23.1* 22.8* 22.4*    130 159   MPV 9.9 9.8 10.4     Radiology:   No results found. Assessment:    Principal Problem:    Elevated troponin  Active Problems:    Elevated serum creatinine    Lung infiltrate    Generalized weakness    Cough    DNR (do not resuscitate) discussion    Hypothyroidism    ESRD (end stage renal disease) on dialysis ([facility] [address]    Volume overload    Hypertensive emergency    Severe protein-calorie malnutrition ([facility] [address]  Resolved Problems:    * No resolved hospital problems. *    Plan:  1. Accelerated HTN, resolved - restarted on Entresto, Coreg and Bumex.   BP much better.  /62 today. Continue same meds. Monitor vitals and check for side effects.    2.   ESRD on HD - Renal consult.  HD for T/TH/Sat  3.   T2DM - glucose 109.  Glucose POCT.  Hypoglycemia protocol.  Trend and treat accordingly.    4.   Hypothyroidism - doing much better on the Levothyroxine 100 mcg po daily. Continue same. 5.   Elevated Troponin - 1078 on admit; now 1038.  Cardiology consult.  Non compliant on meds.  2D  ECHO last done November 2021.  EF 25 - 30%.  Hypokinesis and akinesis of the apical segments.  Patient has declined ischemic workup. 6.  Cough - CXR shows bilateral atelectasis.  Procalcitonin was last checked 4/21/2022 and was 0.29.  Incentive Spirometry.  Duonebs.   Oxygen prn.    7.  Leg weakness - PT/OT ordered. Would be interested in rehab/ therapy if indicated and qualifies. NOTE: This report was transcribed using voice recognition software. Every effort was made to ensure accuracy; however, inadvertent computerized transcription errors may be present.     Electronically signed by Radha Farr MD on 4/24/2022 at 10:35 AM

## 2022-04-24 NOTE — PROGRESS NOTES
Department of Internal Medicine  Nephrology Progress Note    Events Reviewed. Subjective: We are following Mr. Erendira Lucia for ESRD on Dialysis. Still depressed and sleepy    PHYSICAL EXAM:      Vitals:    VITALS:  BP (!) 144/84   Pulse 72   Temp 98 °F (36.7 °C) (Oral)   Resp 18   Ht 6' 2\" (1.88 m)   Wt 139 lb 15.9 oz (63.5 kg)   SpO2 96%   BMI 17.97 kg/m²   24HR INTAKE/OUTPUT:    No intake or output data in the 24 hours ending 04/24/22 1527    Access: Right chest Tessio catheter   Constitutional: Lethargic, No acute distress. Chronically ill appearing  HEENT:  PERRLA, Normocephalic atraumatic  Respiratory:  CTA bilaterally  Cardiovascular/Edema:  RRR, S1/S2. Gastrointestinal:  Soft, nontender, nondistended. + BS  Musculoskeletal: Full ROM,   Neurologic:  Lethargic, had to be woken up multiple times during assessment.       Scheduled Meds:   levothyroxine  100 mcg Oral Daily    sodium bicarbonate  325 mg Oral BID    aspirin  81 mg Oral Daily    atorvastatin  40 mg Oral Nightly    bumetanide  2 mg Oral BID WC    heparin (porcine)  5,000 Units SubCUTAneous Q8H    ipratropium-albuterol  1 ampule Inhalation Q4H WA    sertraline  25 mg Oral Daily    carvedilol  25 mg Oral BID     sacubitril-valsartan  1 tablet Oral BID     Continuous Infusions:  PRN Meds:.heparin (porcine), perflutren lipid microspheres, hydrALAZINE      DATA:    CBC with Differential:    Lab Results   Component Value Date    WBC 3.9 04/24/2022    RBC 3.81 04/24/2022    HGB 11.4 04/24/2022    HCT 34.3 04/24/2022     04/24/2022    MCV 90.0 04/24/2022    MCH 29.9 04/24/2022    MCHC 33.2 04/24/2022    RDW 22.4 04/24/2022    SEGSPCT 55 06/10/2013    LYMPHOPCT 23.1 04/24/2022    MONOPCT 13.1 04/24/2022    BASOPCT 2.1 04/24/2022    MONOSABS 0.51 04/24/2022    LYMPHSABS 0.90 04/24/2022    EOSABS 0.09 04/24/2022    BASOSABS 0.08 04/24/2022     CMP:    Lab Results   Component Value Date     04/24/2022    K 4.1 04/24/2022 CL 91 04/24/2022    CO2 27 04/24/2022    BUN 32 04/24/2022    CREATININE 5.2 04/24/2022    GFRAA 13 04/24/2022    LABGLOM 11 04/24/2022    GLUCOSE 155 04/24/2022    PROT 7.0 04/24/2022    LABALBU 3.8 04/24/2022    CALCIUM 7.8 04/24/2022    BILITOT 0.9 04/24/2022    ALKPHOS 89 04/24/2022    AST 24 04/24/2022    ALT 13 04/24/2022     Magnesium:    Lab Results   Component Value Date    MG 1.9 12/10/2021     Phosphorus:    Lab Results   Component Value Date    PHOS 4.0 04/24/2022     Radiology Review:          Chest XR 4/21/22   Bilateral lower lobe opacities consistent with atelectasis and/or infiltrate. IMPRESSION/RECOMMENDATIONS:  The patient is a 67 y.o. male with significant past medical history of end stage renal disease secondary to nephrosclerosis and/or diabetic nephropathy, on hemodialysis Rqpbjoa-Dapbtykk-Uevqpuxr via Catheter, last hemodialysis treatment on 4/16/22, HTN, Type II DM, Hypothyroidism who presents to the ER on 4/21/22 with cough and shortness of breath. He is very noncompliant with his outpatient dialysis sessions. Initially he was only staying for 1.5-2 hours of treatment. Recently he has only been coming every other treatment. He states he feels like the dialysis makes him \"feel very weak as if he was wasting away. \"  He also was noncompliant with his medications. Often times not taking them at all. He is complaining of orthopnea. Lab work was significant for CO2 19, BUN 53, Creatinine, Troponin 1,078. Renal was consulted for ESRD on HD. 1. ESRD on HD Rwcdpht-Rjelkiyf-Mzxiufmk schedule. We will continue while inpatient. Patient adamantly refusing surgical intervention with no intention for AV access creation. 2. Metabolic acidosis. On sodium bicarbonate   3. HTN, on Coreg,   4. Type II DM. Hgb A1c 5.4  5. HFpEF, LVEF 25-30% on Entresto, Bumex  6. Anemia of CKD, not on NIXON as Hgb > 11  7. Depression, psychiatry following.  Started on Zoloft    Plan:     · Continue HD on TTS schedule  · Continue Sodium bicarbonate 325 mg PO BID.    · Continue to monitor bicarbonate level  · Continue to monitor labs

## 2022-04-25 LAB
ALBUMIN SERPL-MCNC: 3.5 G/DL (ref 3.5–5.2)
ALP BLD-CCNC: 82 U/L (ref 40–129)
ALT SERPL-CCNC: 12 U/L (ref 0–40)
ANION GAP SERPL CALCULATED.3IONS-SCNC: 15 MMOL/L (ref 7–16)
ANISOCYTOSIS: ABNORMAL
AST SERPL-CCNC: 20 U/L (ref 0–39)
BASOPHILS ABSOLUTE: 0.06 E9/L (ref 0–0.2)
BASOPHILS RELATIVE PERCENT: 1.7 % (ref 0–2)
BILIRUB SERPL-MCNC: 0.9 MG/DL (ref 0–1.2)
BLOOD CULTURE, ROUTINE: NORMAL
BUN BLDV-MCNC: 53 MG/DL (ref 6–23)
CALCIUM SERPL-MCNC: 7.6 MG/DL (ref 8.6–10.2)
CHLORIDE BLD-SCNC: 90 MMOL/L (ref 98–107)
CO2: 26 MMOL/L (ref 22–29)
CORTISOL TOTAL: 8.31 MCG/DL (ref 2.68–18.4)
CREAT SERPL-MCNC: 6.7 MG/DL (ref 0.7–1.2)
CULTURE, BLOOD 2: NORMAL
EOSINOPHILS ABSOLUTE: 0.11 E9/L (ref 0.05–0.5)
EOSINOPHILS RELATIVE PERCENT: 3.1 % (ref 0–6)
GFR AFRICAN AMERICAN: 10
GFR NON-AFRICAN AMERICAN: 8 ML/MIN/1.73
GLUCOSE BLD-MCNC: 85 MG/DL (ref 74–99)
HCT VFR BLD CALC: 32.6 % (ref 37–54)
HEMOGLOBIN: 10.8 G/DL (ref 12.5–16.5)
IMMATURE GRANULOCYTES #: 0.01 E9/L
IMMATURE GRANULOCYTES %: 0.3 % (ref 0–5)
LYMPHOCYTES ABSOLUTE: 0.89 E9/L (ref 1.5–4)
LYMPHOCYTES RELATIVE PERCENT: 25.2 % (ref 20–42)
MCH RBC QN AUTO: 29.8 PG (ref 26–35)
MCHC RBC AUTO-ENTMCNC: 33.1 % (ref 32–34.5)
MCV RBC AUTO: 90.1 FL (ref 80–99.9)
MONOCYTES ABSOLUTE: 0.54 E9/L (ref 0.1–0.95)
MONOCYTES RELATIVE PERCENT: 15.3 % (ref 2–12)
NEUTROPHILS ABSOLUTE: 1.92 E9/L (ref 1.8–7.3)
NEUTROPHILS RELATIVE PERCENT: 54.4 % (ref 43–80)
PDW BLD-RTO: 21.9 FL (ref 11.5–15)
PHOSPHORUS: 5.9 MG/DL (ref 2.5–4.5)
PLATELET # BLD: 138 E9/L (ref 130–450)
PMV BLD AUTO: 10.8 FL (ref 7–12)
POIKILOCYTES: ABNORMAL
POLYCHROMASIA: ABNORMAL
POTASSIUM REFLEX MAGNESIUM: 4.6 MMOL/L (ref 3.5–5)
RBC # BLD: 3.62 E12/L (ref 3.8–5.8)
ROULEAUX: ABNORMAL
SCHISTOCYTES: ABNORMAL
SODIUM BLD-SCNC: 131 MMOL/L (ref 132–146)
TOTAL PROTEIN: 6.6 G/DL (ref 6.4–8.3)
WBC # BLD: 3.5 E9/L (ref 4.5–11.5)

## 2022-04-25 PROCEDURE — 97530 THERAPEUTIC ACTIVITIES: CPT

## 2022-04-25 PROCEDURE — 6370000000 HC RX 637 (ALT 250 FOR IP): Performed by: FAMILY MEDICINE

## 2022-04-25 PROCEDURE — 36415 COLL VENOUS BLD VENIPUNCTURE: CPT

## 2022-04-25 PROCEDURE — 6370000000 HC RX 637 (ALT 250 FOR IP): Performed by: INTERNAL MEDICINE

## 2022-04-25 PROCEDURE — 85025 COMPLETE CBC W/AUTO DIFF WBC: CPT

## 2022-04-25 PROCEDURE — 2060000000 HC ICU INTERMEDIATE R&B

## 2022-04-25 PROCEDURE — 99232 SBSQ HOSP IP/OBS MODERATE 35: CPT | Performed by: STUDENT IN AN ORGANIZED HEALTH CARE EDUCATION/TRAINING PROGRAM

## 2022-04-25 PROCEDURE — 82533 TOTAL CORTISOL: CPT

## 2022-04-25 PROCEDURE — 80053 COMPREHEN METABOLIC PANEL: CPT

## 2022-04-25 PROCEDURE — 99232 SBSQ HOSP IP/OBS MODERATE 35: CPT | Performed by: INTERNAL MEDICINE

## 2022-04-25 PROCEDURE — 94640 AIRWAY INHALATION TREATMENT: CPT

## 2022-04-25 PROCEDURE — 6370000000 HC RX 637 (ALT 250 FOR IP): Performed by: NURSE PRACTITIONER

## 2022-04-25 PROCEDURE — 6360000002 HC RX W HCPCS: Performed by: INTERNAL MEDICINE

## 2022-04-25 PROCEDURE — 97165 OT EVAL LOW COMPLEX 30 MIN: CPT

## 2022-04-25 PROCEDURE — 84100 ASSAY OF PHOSPHORUS: CPT

## 2022-04-25 RX ADMIN — SERTRALINE HYDROCHLORIDE 50 MG: 50 TABLET ORAL at 08:18

## 2022-04-25 RX ADMIN — IPRATROPIUM BROMIDE AND ALBUTEROL SULFATE 1 AMPULE: .5; 2.5 SOLUTION RESPIRATORY (INHALATION) at 11:29

## 2022-04-25 RX ADMIN — SODIUM BICARBONATE 325 MG: 650 TABLET ORAL at 08:18

## 2022-04-25 RX ADMIN — CARVEDILOL 25 MG: 25 TABLET, FILM COATED ORAL at 08:18

## 2022-04-25 RX ADMIN — ATORVASTATIN CALCIUM 40 MG: 40 TABLET, FILM COATED ORAL at 20:39

## 2022-04-25 RX ADMIN — IPRATROPIUM BROMIDE AND ALBUTEROL SULFATE 1 AMPULE: .5; 2.5 SOLUTION RESPIRATORY (INHALATION) at 19:44

## 2022-04-25 RX ADMIN — BUMETANIDE 2 MG: 1 TABLET ORAL at 08:16

## 2022-04-25 RX ADMIN — LEVOTHYROXINE SODIUM 100 MCG: 100 TABLET ORAL at 06:00

## 2022-04-25 RX ADMIN — HEPARIN SODIUM 5000 UNITS: 10000 INJECTION INTRAVENOUS; SUBCUTANEOUS at 08:16

## 2022-04-25 RX ADMIN — SACUBITRIL AND VALSARTAN 1 TABLET: 97; 103 TABLET, FILM COATED ORAL at 08:18

## 2022-04-25 RX ADMIN — BUMETANIDE 2 MG: 1 TABLET ORAL at 16:46

## 2022-04-25 RX ADMIN — IPRATROPIUM BROMIDE AND ALBUTEROL SULFATE 1 AMPULE: .5; 2.5 SOLUTION RESPIRATORY (INHALATION) at 08:52

## 2022-04-25 RX ADMIN — SACUBITRIL AND VALSARTAN 1 TABLET: 97; 103 TABLET, FILM COATED ORAL at 20:39

## 2022-04-25 RX ADMIN — SODIUM BICARBONATE 325 MG: 650 TABLET ORAL at 20:39

## 2022-04-25 RX ADMIN — ASPIRIN 81 MG: 81 TABLET, COATED ORAL at 08:17

## 2022-04-25 RX ADMIN — HEPARIN SODIUM 5000 UNITS: 10000 INJECTION INTRAVENOUS; SUBCUTANEOUS at 01:19

## 2022-04-25 RX ADMIN — IPRATROPIUM BROMIDE AND ALBUTEROL SULFATE 1 AMPULE: .5; 2.5 SOLUTION RESPIRATORY (INHALATION) at 16:31

## 2022-04-25 RX ADMIN — HEPARIN SODIUM 5000 UNITS: 10000 INJECTION INTRAVENOUS; SUBCUTANEOUS at 16:47

## 2022-04-25 ASSESSMENT — PAIN SCALES - GENERAL: PAINLEVEL_OUTOF10: 0

## 2022-04-25 NOTE — PROGRESS NOTES
Occupational Therapy  OCCUPATIONAL THERAPY INITIAL EVALUATION      Date:2022  Patient Name: Shashi De Anda  MRN: [id number]  : 1950  Room: 1639/6975-J    OCCUPATIONAL THERAPY INITIAL EVALUATION    [facility] [address]                                                  Patient Name: Shashi De Anda    MRN: [id number]    : 1950    Room: 9569/0376-U      Evaluating OT: Aston Sommer OTR/L   [id number]      Referring Chandu Ko MD    Specific Provider Orders/Date:OT eval and treat 2022      Diagnosis:  Essential hypertension [I10]  Encounter for dialysis ([facility] [address] [Z99.2]  Elevated serum creatinine [R79.89]  H/O noncompliance with medical treatment, presenting hazards to health [Z91.19]     Pertinent Medical History: ESRD, Hep C    Precautions:  Fall Risk, alarm      Assessment of current deficits    [x] Functional mobility  [x]ADLs  [x] Strength               []Cognition    [x] Functional transfers   [x] IADLs         [x] Safety Awareness   [x]Endurance    [] Fine Coordination              [x] Balance      [] Vision/perception   []Sensation     []Gross Motor Coordination  [] ROM  [] Delirium                   [] Motor Control     OT PLAN OF CARE   OT POC based on physician orders, patient diagnosis and results of clinical assessment    Frequency/Duration  2-4 days/wk for 2 weeks PRN   Specific OT Treatment Interventions to include:   ADL retraining/adapted techniques and AE recommendations to increase functional independence within precautions                    Energy conservation techniques to improve tolerance for selfcare routine   Functional transfer/mobility training/DME recommendations for increased independence, safety and fall prevention         Patient/family education to increase safety and functional independence             Environmental modifications for safe mobility and completion of ADLs                             Therapeutic activity to improve functional performance during ADLs. Therapeutic exercise to improve tolerance and functional strength for ADLs    Balance retraining/tolerance tasks for facilitation of postural control with dynamic challenges during ADLs . Positioning to improve functional independence      Recommended Adaptive Equipment: TBD     Home Living: Pt lives alone, 1 floor with 3 steps/no rail to enter  Bathroom setup: tub/shower     Prior Level of Function: Independent  with ADLs , Independent  with IADLs; ambulated with no device   Driving: yes     Pain Level: no pain ; just feeling tired   Cognition: A&O: x3. Pleasant and conversing.   Following commands   Memory:  Fair +   Sequencing:  Fair    Problem solving:  Fair    Judgement/safety:  Fair      Functional Assessment:  AM-PAC Daily Activity Raw Score: 16/24   Initial Eval Status  Date: 4/25/22 Treatment Status  Date: STGs = LTGs  Time frame: 10-14 days   Feeding Independent      Grooming Beverly   Supervision    UB Dressing Beverly   Supervision    LB Dressing Mod a   SBA    Bathing Mod A   SBA    Toileting Min A  Supervision    Bed Mobility  Min A  Supine to sit   Supervision    Functional Transfers Beverly   Sit-stand from bed   SBA/supervision    Functional Mobility Min A,w/walker  Ambulated to bathroom   SBA/superivsion  with good tolerance    Balance Sitting:     Static:  SBA- EOB     Dynamic:Min A  Standing: Beverly   SBA/supervision    Activity Tolerance Fair - with light activity   Patient feeling fatigued - wanting to return to bed   Fair +  with ADL activity    Visual/  Perceptual Glasses: none by bedside                 Hand Dominance right   AROM (PROM) Strength Additional Info:    RUE  WFL Fair +  good  and wfl FMC/dexterity noted during ADL tasks       LUE WFL Fair + good  and wfl FMC/dexterity noted during ADL tasks       [facility]  Sensation:  No c/o numbness or tingling   Tone: WFL   Edema: none observed    Comments: Upon arrival patient lying in bed . At end of session, patient returned to bed  with call light and phone within reach, all lines and tubes intact. *ALARM ON     Overall patient demonstrated  decreased independence and safety during completion of ADL/functional transfer/mobility tasks. Pt would benefit from continued skilled OT to increase safety and independence with completion of ADL/IADL tasks for functional independence and quality of life. Treatment: completed throughout assessment and OT session:      Instruction/training on safety and adapted techniques for completion of ADLs: standing at sink - patient completed hand hygiene - Beverly/CGA- standing. Min A [facility] gown    Assist adjusting socks     Instruction/training on safe functional mobility/transfer techniques: to promote and focus on safety, body mechanics, and precautions to increase safety and independence   Ambulated to/from bathroom Min  A using w/walker. LOB and unsteadiness noted. Overll decrease strength and endurance         Rehab Potential: good for established goals     Patient / Family Goal: none stated       Patient and/or family were instructed on functional diagnosis, prognosis/goals and OT plan of care. Demonstrated fair + understanding.      Eval Complexity: Low    Time In: 1420  Time Out: 1445  Total Treatment Time: 10    Min Units   OT Eval Low 97165 x  1   OT Eval Medium 46136      OT Eval High 18498      OT Re-Eval [id number]       Therapeutic Ex 58523       Therapeutic Activities 07986  10  1   ADL/Self Care 81911       Orthotic Management 53045       Manual 34804     Neuro Re-Ed 85683       Non-Billable Time         Evaluation Time additionally includes thorough review of current medical information, gathering information on past medical history/social history and prior level of function, interpretation of standardized testing/informal observation of tasks, assessment of data and development of plan of care and goals.             Js Rivas  OTR/L  OT [id number]

## 2022-04-25 NOTE — PROGRESS NOTES
ENDOCRINOLOGY PROGRESS NOTE    Date of Admission: 4/21/2022  Date of Service: 4/25/2022  Admitting Physician: Rama Henrdicks MD   Primary Care Physician: Karla Mahajan MD  Consultant physician: Jannie Ernandez MD     Reason for the consultation:  Hypothyroidism     History of Present Illness:  Minh Upton is a very pleasant 67 y.o. old male with PMH DM type 2, primary hypothyroidism, HTN, ESRD and other listed below admitted to [facility] on 4/21/2022 because of cough and shortness of breath, endocrine service was consulted for management of hypothyroidism     Subjective   no acute events, feels very tired, on levothyroxine 100 mcg po daily      Scheduled Meds:   sertraline  50 mg Oral Daily    levothyroxine  100 mcg Oral Daily    sodium bicarbonate  325 mg Oral BID    aspirin  81 mg Oral Daily    atorvastatin  40 mg Oral Nightly    bumetanide  2 mg Oral BID WC    heparin (porcine)  5,000 Units SubCUTAneous Q8H    ipratropium-albuterol  1 ampule Inhalation Q4H WA    carvedilol  25 mg Oral BID WC    sacubitril-valsartan  1 tablet Oral BID     PRN Meds:   heparin (porcine), 4,300 Units, PRN  perflutren lipid microspheres, 1.5 mL, ONCE PRN  hydrALAZINE, 10 mg, Q4H PRN      Continuous Infusions:      Review of Systems  All systems reviewed. All negative except for symptoms mentioned in HPI     OBJECTIVE    /62   Pulse 63   Temp 98.2 °F (36.8 °C) (Oral)   Resp 18   Ht 6' 2\" (1.88 m)   Wt 139 lb 14.4 oz (63.5 kg)   SpO2 94%   BMI 17.96 kg/m²   Wt Readings from Last 6 Encounters:   04/25/22 139 lb 14.4 oz (63.5 kg)   01/19/22 150 lb (68 kg)   12/22/21 143 lb 9.6 oz (65.1 kg)   12/22/21 145 lb (65.8 kg)   12/11/21 136 lb 3.9 oz (61.8 kg)   11/22/21 135 lb 2.3 oz (61.3 kg)       Physical examination:  General: awake alert, oriented x3, no abnormal position or movements.    HEENT: normocephalic non traumatic, no exophthalmos, no lid lag, no lid retraction   Neck: supple, no LN enlargement, no thyromegaly, no thyroid tenderness, no thyroid bruit, no JVD. Pulm: good equal air entry no added sounds   CVS: S1 + S2   Abd: soft lax, no tenderness,   Skin: warm, no lesions, no rash.  No palmar erythema, no onycholysis, no pretibial Myxoedema, no acropachy   Musculoskeletal: No back tenderness, no kyphosis/scoliosis    Neuro: CN intact, sensation notmal , muscle power normal. No tremors   Psych: normal mood, and affect    Review of Laboratory Data:  I personally reviewed the following labs:  Recent Labs     04/23/22 0215 04/24/22  1017 04/25/22  0347   WBC 3.5* 3.9* 3.5*   RBC 3.60* 3.81 3.62*   HGB 10.8* 11.4* 10.8*   HCT 32.3* 34.3* 32.6*   MCV 89.7 90.0 90.1   MCH 30.0 29.9 29.8   MCHC 33.4 33.2 33.1   RDW 22.8* 22.4* 21.9*    159 138   MPV 9.8 10.4 10.8     Recent Labs     04/23/22 0215 04/24/22  1017 04/25/22  0347    131* 131*   K 4.3 4.1 4.6   CL 95* 91* 90*   CO2 25 27 26   BUN 43* 32* 53*   CREATININE 6.7* 5.2* 6.7*   GLUCOSE 109* 155* 85   CALCIUM 7.7* 7.8* 7.6*   PROT 6.5 7.0 6.6   LABALBU 3.5 3.8 3.5   BILITOT 1.1 0.9 0.9   ALKPHOS 75 89 82   AST 18 24 20   ALT 9 13 12     No results found for: BHYDRXBUT  Lab Results   Component Value Date    LABA1C 5.4 04/20/2022    LABA1C 5.3 11/12/2021    LABA1C 5.1 08/24/2021     Lab Results   Component Value Date/Time    .300 (H) 04/21/2022 02:15 PM    T4FREE 1.06 04/21/2022 02:15 PM     Lab Results   Component Value Date    LABA1C 5.4 04/20/2022    GLUCOSE 85 04/25/2022    MALBCR 3951.0 11/12/2021    LABMICR 3516.4 11/12/2021    LABCREA 89 11/12/2021     Lab Results   Component Value Date    TRIG 109 08/24/2021    HDL 53 11/12/2021    LDLCALC 147 11/12/2021    CHOL 200 08/24/2021       Blood culture   Lab Results   Component Value Date    BC 24 Hours no growth 04/20/2022    BC 5 Days no growth 12/08/2021    BC 5 Days no growth 11/19/2021       Radiology:  XR CHEST PORTABLE   Final Result   Bilateral lower lobe opacities consistent with atelectasis and/or infiltrate. Medical Records/Labs/Images Review:   I personally reviewed and summarized previous records   All labs and imaging were reviewed independently    Temo, a 67 y.o.-old male seen in for management of Hypothyroidism     Primary hypothyroidism   · Normal free T4 and high TSH (.3, FT4 1.06) are very consistent with medication non-compliance   · Currently on Levothyroxine 100 mcg po daily  · To recheck TFT in 6-8 weeks   · Patient was instructed to take levothyroxine in the morning at empty stomach, wait one hour before eating , avoid multivitamins containing calcium  or iron with it. Fatigue   · With thyroid disease, will check random cortisol today     Medications non-compliance   · I have discussed with him the great importance of following the treatment plan exactly as directed in order to achieve a good medical outcome. SOB/cough  · Improved   · Management per primary service    With normal free T4 we recommend continue current dose of levothyroxine     The above issues were reviewed with the patient who understood and agreed with the plan. Thank you for allowing us to participate in the care of this patient. Please do not hesitate to contact us with any additional questions. Melisa Hernandez MD  Endocrinologist, [facility]   [address] [facility] [address]   Phone: [phone]  Fax: [phone]  ---------------------------------  An electronic signature was used to authenticate this note.  Shweta Martin MD on 4/25/2022 at 7:10 PM

## 2022-04-25 NOTE — PROGRESS NOTES
P Quality Flow/Interdisciplinary Rounds Progress Note        Quality Flow Rounds held on April 25, 2022    Disciplines Attending:  Bedside Nurse, ,  and Nursing Unit Garth Gramajo was admitted on 4/21/2022  1:02 AM    Anticipated Discharge Date:       Disposition:    Kory Score:  Kory Scale Score: 19    Readmission Risk              Risk of Unplanned Readmission:  23           Discussed patient goal for the day, patient clinical progression, and barriers to discharge. The following Goal(s) of the Day/Commitment(s) have been identified:  discharge planning.       Rambo Mendez RN  April 25, 2022

## 2022-04-25 NOTE — PROGRESS NOTES
ENDOCRINOLOGY PROGRESS NOTE    Date of Admission: 4/21/2022  Date of Service: 4/24/2022  Admitting Physician: Chepe Johnson MD   Primary Care Physician: Dulce Valladares MD  Consultant physician: Tim Roman MD     Reason for the consultation:  Hypothyroidism     History of Present Illness:  Ken Jackson is a very pleasant 67 y.o. old male with PMH DM type 2, primary hypothyroidism, HTN, ESRD and other listed below admitted to [address] [place] [address] on 4/21/2022 because of cough and shortness of breath, endocrine service was consulted for management of hypothyroidism     Subjective   no acute events, on levothyroxine 100 mcg po daily      Scheduled Meds:   [START ON 4/25/2022] sertraline  50 mg Oral Daily    levothyroxine  100 mcg Oral Daily    sodium bicarbonate  325 mg Oral BID    aspirin  81 mg Oral Daily    atorvastatin  40 mg Oral Nightly    bumetanide  2 mg Oral BID WC    heparin (porcine)  5,000 Units SubCUTAneous Q8H    ipratropium-albuterol  1 ampule Inhalation Q4H WA    carvedilol  25 mg Oral BID WC    sacubitril-valsartan  1 tablet Oral BID     PRN Meds:   heparin (porcine), 4,300 Units, PRN  perflutren lipid microspheres, 1.5 mL, ONCE PRN  hydrALAZINE, 10 mg, Q4H PRN      Continuous Infusions:      Review of Systems  All systems reviewed. All negative except for symptoms mentioned in HPI     OBJECTIVE    /71   Pulse 66   Temp 98.1 °F (36.7 °C) (Oral)   Resp 16   Ht 6' 2\" (1.88 m)   Wt 139 lb 15.9 oz (63.5 kg)   SpO2 96%   BMI 17.97 kg/m²   Wt Readings from Last 6 Encounters:   04/24/22 139 lb 15.9 oz (63.5 kg)   01/19/22 150 lb (68 kg)   12/22/21 143 lb 9.6 oz (65.1 kg)   12/22/21 145 lb (65.8 kg)   12/11/21 136 lb 3.9 oz (61.8 kg)   11/22/21 135 lb 2.3 oz (61.3 kg)       Physical examination:  General: awake alert, oriented x3, no abnormal position or movements.    HEENT: normocephalic non traumatic, no exophthalmos, no lid lag, no lid retraction   Neck: supple, no LN enlargement, no thyromegaly, no thyroid tenderness, no thyroid bruit, no JVD. Pulm: good equal air entry no added sounds   CVS: S1 + S2   Abd: soft lax, no tenderness,   Skin: warm, no lesions, no rash.  No palmar erythema, no onycholysis, no pretibial Myxoedema, no acropachy   Musculoskeletal: No back tenderness, no kyphosis/scoliosis    Neuro: CN intact, sensation notmal , muscle power normal. No tremors   Psych: normal mood, and affect    Review of Laboratory Data:  I personally reviewed the following labs:  Recent Labs     04/22/22  0252 04/23/22 0215 04/24/22  1017   WBC 3.6* 3.5* 3.9*   RBC 3.94 3.60* 3.81   HGB 11.4* 10.8* 11.4*   HCT 34.7* 32.3* 34.3*   MCV 88.1 89.7 90.0   MCH 28.9 30.0 29.9   MCHC 32.9 33.4 33.2   RDW 23.1* 22.8* 22.4*    130 159   MPV 9.9 9.8 10.4     Recent Labs     04/22/22  0252 04/23/22 0215 04/24/22  1017    133 131*   K 4.1 4.3 4.1   CL 93* 95* 91*   CO2 27 25 27   BUN 26* 43* 32*   CREATININE 5.1* 6.7* 5.2*   GLUCOSE 126* 109* 155*   CALCIUM 8.4* 7.7* 7.8*   PROT 7.0 6.5 7.0   LABALBU 3.8 3.5 3.8   BILITOT 1.4* 1.1 0.9   ALKPHOS 83 75 89   AST 19 18 24   ALT 9 9 13     No results found for: BHYDRXBUT  Lab Results   Component Value Date    LABA1C 5.4 04/20/2022    LABA1C 5.3 11/12/2021    LABA1C 5.1 08/24/2021     Lab Results   Component Value Date/Time    .300 (H) 04/21/2022 02:15 PM    T4FREE 1.06 04/21/2022 02:15 PM     Lab Results   Component Value Date    LABA1C 5.4 04/20/2022    GLUCOSE 155 04/24/2022    MALBCR 3951.0 11/12/2021    LABMICR 3516.4 11/12/2021    LABCREA 89 11/12/2021     Lab Results   Component Value Date    TRIG 109 08/24/2021    HDL 53 11/12/2021    LDLCALC 147 11/12/2021    CHOL 200 08/24/2021       Blood culture   Lab Results   Component Value Date    BC 24 Hours no growth 04/20/2022    BC 5 Days no growth 12/08/2021    BC 5 Days no growth 11/19/2021       Radiology:  XR CHEST PORTABLE   Final Result   Bilateral lower lobe opacities consistent with atelectasis and/or infiltrate. Medical Records/Labs/Images Review:   I personally reviewed and summarized previous records   All labs and imaging were reviewed independently    Temo, a 67 y.o.-old male seen in for management of Hypothyroidism     Primary hypothyroidism   · Normal free T4 and high TSH (.3, FT4 1.06) are very consistent with medication non-compliance   · Currently on Levothyroxine 100 mcg po daily  · To recheck TFT in 6-8 weeks   · Patient was instructed to take levothyroxine in the morning at empty stomach, wait one hour before eating , avoid multivitamins containing calcium  or iron with it. Medications non-compliance   · I have discussed with him the great importance of following the treatment plan exactly as directed in order to achieve a good medical outcome. SOB/cough  · Management per primary service    With normal free T4 we recommend continue current dose of levothyroxine     The above issues were reviewed with the patient who understood and agreed with the plan. Thank you for allowing us to participate in the care of this patient. Please do not hesitate to contact us with any additional questions. Tim Roman MD  Endocrinologist, [facility]   [address] [facility] [address]   Phone: [phone]  Fax: [phone]  ---------------------------------  An electronic signature was used to authenticate this note.  Fab Hernandez MD on 4/24/2022 at 9:32 PM

## 2022-04-25 NOTE — CARE COORDINATION
Social Work / Discharge Planning ; SW and CM followed up with patient for SNF choices. Patient would like [facility] as first choice and 2.) [facility] . CM made referral to [facility]. NO pre-cert needed. Will need COVID. AWait acceptance/ Denial. SW to follow.  Electronically signed by JAY Tamez on 4/25/22 at 10:59 AM EDT

## 2022-04-25 NOTE — PROGRESS NOTES
[facility] Progress Note    Admitting Date and Time: 4/21/2022  1:02 AM  Admit Dx: Essential hypertension [I10]  Encounter for dialysis ([facility] [address] [Z99.2]  Elevated serum creatinine [R79.89]  H/O noncompliance with medical treatment, presenting hazards to health [Z91.19]    Subjective:  Patient is being followed for Essential hypertension [I10]  Encounter for dialysis ([address] [Z99.2]  Elevated serum creatinine [R79.89]  H/O noncompliance with medical treatment, presenting hazards to health [Z91.19]   Pt feels \"the same'. Agreeable to CODY. Per RN: No major concerns. ROS: denies fever, chills, cp, sob, n/v, HA unless stated above.       sertraline  50 mg Oral Daily    levothyroxine  100 mcg Oral Daily    sodium bicarbonate  325 mg Oral BID    aspirin  81 mg Oral Daily    atorvastatin  40 mg Oral Nightly    bumetanide  2 mg Oral BID WC    heparin (porcine)  5,000 Units SubCUTAneous Q8H    ipratropium-albuterol  1 ampule Inhalation Q4H WA    carvedilol  25 mg Oral BID WC    sacubitril-valsartan  1 tablet Oral BID     heparin (porcine), 4,300 Units, PRN  perflutren lipid microspheres, 1.5 mL, ONCE PRN  hydrALAZINE, 10 mg, Q4H PRN         Objective:    /65   Pulse 64   Temp 98.3 °F (36.8 °C) (Oral)   Resp 18   Ht 6' 2\" (1.88 m)   Wt 139 lb 14.4 oz (63.5 kg)   SpO2 93%   BMI 17.96 kg/m²     General Appearance: alert and oriented to person, place and time and in no acute distress  Skin: warm and dry  Head: normocephalic and atraumatic  Eyes: pupils equal, round, and reactive to light, extraocular eye movements intact, conjunctivae normal  Neck: neck supple and non tender without mass   Pulmonary/Chest: clear to auscultation bilaterally- no wheezes, rales or rhonchi, normal air movement, no respiratory distress, rt HD cath  Cardiovascular: normal rate, normal S1 and S2 and no carotid bruits  Abdomen: soft, non-tender, non-distended, normal bowel sounds, no masses or organomegaly  Extremities: no cyanosis, no clubbing and no edema  Neurologic: no cranial nerve deficit and speech normal        Recent Labs     04/23/22 0215 04/24/22  1017 04/25/22  0347    131* 131*   K 4.3 4.1 4.6   CL 95* 91* 90*   CO2 25 27 26   BUN 43* 32* 53*   CREATININE 6.7* 5.2* 6.7*   GLUCOSE 109* 155* 85   CALCIUM 7.7* 7.8* 7.6*       Recent Labs     04/23/22 0215 04/24/22  1017 04/25/22  0347   WBC 3.5* 3.9* 3.5*   RBC 3.60* 3.81 3.62*   HGB 10.8* 11.4* 10.8*   HCT 32.3* 34.3* 32.6*   MCV 89.7 90.0 90.1   MCH 30.0 29.9 29.8   MCHC 33.4 33.2 33.1   RDW 22.8* 22.4* 21.9*    159 138   MPV 9.8 10.4 10.8       Micro:  No components found for: [facility])    Radiology:   No results found. Assessment:    Principal Problem:    Elevated troponin  Active Problems:    Elevated serum creatinine    Lung infiltrate    Generalized weakness    Cough    DNR (do not resuscitate) discussion    Hypothyroidism    ESRD (end stage renal disease) on dialysis ([facility] [address]    Volume overload    Hypertensive emergency    Severe protein-calorie malnutrition ([facility] [address]  Resolved Problems:    * No resolved hospital problems. *      Plan:  1. Accelerated HTN, resolved - restarted on Entresto, Coreg and Bumex. Continue same meds. Monitor vitals.    2.   ESRD on HD - Renal consulted.  HD for T/TH/Sat  3.   T2DM - glucose 109.  Glucose POCT.  Hypoglycemia protocol.  Trend and treat accordingly.    4.   Hypothyroidism - doing much better on the Levothyroxine 100 mcg po daily.  Continue same. Endo on board. 5.   Elevated Troponin - 1078 on admit -->1038.  Cardiology consulted, signed off.  Non compliant on meds.  2D  ECHO last done November 2021.  EF 25 - 30%.   Hypokinesis and akinesis of the apical segments.  Patient has declined ischemic workup.    6.  Cough - CXR shows bilateral atelectasis.  Procalcitonin was last checked 4/21/2022 and was 0.29.  Incentive Spirometry.  Duonebs.  Oxygen prn.    7.  Physical deconditioning - PT/OT ordered.  Would be interested in rehab. DVT prophylaxis - Heparin SQ    NOTE: This report was transcribed using voice recognition software. Every effort was made to ensure accuracy; however, inadvertent computerized transcription errors may be present.   Electronically signed by Adaglisa Soliman MD on 4/25/2022 at 1:21 PM

## 2022-04-25 NOTE — PROGRESS NOTES
Department of Internal Medicine  Nephrology Progress Note      Subjective: We are following Mr. Dany Ocampo for ESRD on Dialysis. Still depressed and sleepy    PHYSICAL EXAM:      Vitals:    VITALS:  /65   Pulse 64   Temp 98.3 °F (36.8 °C) (Oral)   Resp 18   Ht 6' 2\" (1.88 m)   Wt 139 lb 14.4 oz (63.5 kg)   SpO2 93%   BMI 17.96 kg/m²   24HR INTAKE/OUTPUT:    No intake or output data in the 24 hours ending 04/25/22 1442    Access: Right chest Tessio catheter   Constitutional: Lethargic, No acute distress. Chronically ill appearing  HEENT:  PERRLA, Normocephalic atraumatic  Respiratory:  CTA bilaterally  Cardiovascular/Edema:  RRR, S1/S2. Gastrointestinal:  Soft, nontender, nondistended. + BS  Musculoskeletal: Full ROM,   Neurologic:  Lethargic, had to be woken up multiple times during assessment.       Scheduled Meds:   sertraline  50 mg Oral Daily    levothyroxine  100 mcg Oral Daily    sodium bicarbonate  325 mg Oral BID    aspirin  81 mg Oral Daily    atorvastatin  40 mg Oral Nightly    bumetanide  2 mg Oral BID     heparin (porcine)  5,000 Units SubCUTAneous Q8H    ipratropium-albuterol  1 ampule Inhalation Q4H WA    carvedilol  25 mg Oral BID     sacubitril-valsartan  1 tablet Oral BID     Continuous Infusions:  PRN Meds:.heparin (porcine), perflutren lipid microspheres, hydrALAZINE      DATA:    CBC with Differential:    Lab Results   Component Value Date    WBC 3.5 04/25/2022    RBC 3.62 04/25/2022    HGB 10.8 04/25/2022    HCT 32.6 04/25/2022     04/25/2022    MCV 90.1 04/25/2022    MCH 29.8 04/25/2022    MCHC 33.1 04/25/2022    RDW 21.9 04/25/2022    SEGSPCT 55 06/10/2013    LYMPHOPCT 25.2 04/25/2022    MONOPCT 15.3 04/25/2022    BASOPCT 1.7 04/25/2022    MONOSABS 0.54 04/25/2022    LYMPHSABS 0.89 04/25/2022    EOSABS 0.11 04/25/2022    BRENNAN 0.06 04/25/2022     CMP:    Lab Results   Component Value Date     04/25/2022    K 4.6 04/25/2022    CL 90 04/25/2022 CO2 26 04/25/2022    BUN 53 04/25/2022    CREATININE 6.7 04/25/2022    GFRAA 10 04/25/2022    LABGLOM 8 04/25/2022    GLUCOSE 85 04/25/2022    PROT 6.6 04/25/2022    LABALBU 3.5 04/25/2022    CALCIUM 7.6 04/25/2022    BILITOT 0.9 04/25/2022    ALKPHOS 82 04/25/2022    AST 20 04/25/2022    ALT 12 04/25/2022     Magnesium:    Lab Results   Component Value Date    MG 1.9 12/10/2021     Phosphorus:    Lab Results   Component Value Date    PHOS 5.9 04/25/2022     Radiology Review:          Chest XR 4/21/22   Bilateral lower lobe opacities consistent with atelectasis and/or infiltrate. IMPRESSION/RECOMMENDATIONS:  The patient is a 67 y.o. male with significant past medical history of end stage renal disease secondary to nephrosclerosis and/or diabetic nephropathy, on hemodialysis Lwngajl-Klgyxnlr-Heitiiyr via Catheter, last hemodialysis treatment on 4/16/22, HTN, Type II DM, Hypothyroidism who presents to the ER on 4/21/22 with cough and shortness of breath. He is very noncompliant with his outpatient dialysis sessions. Initially he was only staying for 1.5-2 hours of treatment. Recently he has only been coming every other treatment. He states he feels like the dialysis makes him \"feel very weak as if he was wasting away. \"  He also was noncompliant with his medications. Often times not taking them at all. He is complaining of orthopnea. Lab work was significant for CO2 19, BUN 53, Creatinine, Troponin 1,078. Renal was consulted for ESRD on HD. 1. ESRD on HD Wwjaztx-Zjqolfyq-Vljmtowo schedule. We will continue while inpatient. Patient adamantly refusing surgical intervention with no intention for AV access creation. 2. Metabolic acidosis. On sodium bicarbonate   3. HTN, on Coreg,   4. Type II DM. Hgb A1c 5.4  5. HFpEF, LVEF 25-30% on Entresto, Bumex  6. Anemia of CKD, not on NIXON as Hgb > 11  7. Depression, psychiatry following.  Started on Zoloft    Plan:     · Continue HD on TTS schedule  · Continue Sodium

## 2022-04-26 VITALS
BODY MASS INDEX: 17.71 KG/M2 | DIASTOLIC BLOOD PRESSURE: 88 MMHG | SYSTOLIC BLOOD PRESSURE: 145 MMHG | TEMPERATURE: 98.7 F | OXYGEN SATURATION: 98 % | RESPIRATION RATE: 18 BRPM | WEIGHT: 138.01 LBS | HEIGHT: 74 IN | HEART RATE: 65 BPM

## 2022-04-26 LAB
ALBUMIN SERPL-MCNC: 3.6 G/DL (ref 3.5–5.2)
ALP BLD-CCNC: 97 U/L (ref 40–129)
ALT SERPL-CCNC: 15 U/L (ref 0–40)
ANION GAP SERPL CALCULATED.3IONS-SCNC: 17 MMOL/L (ref 7–16)
ANISOCYTOSIS: ABNORMAL
AST SERPL-CCNC: 23 U/L (ref 0–39)
BASOPHILS ABSOLUTE: 0.05 E9/L (ref 0–0.2)
BASOPHILS RELATIVE PERCENT: 1.5 % (ref 0–2)
BILIRUB SERPL-MCNC: 0.7 MG/DL (ref 0–1.2)
BUN BLDV-MCNC: 74 MG/DL (ref 6–23)
CALCIUM SERPL-MCNC: 7 MG/DL (ref 8.6–10.2)
CHLORIDE BLD-SCNC: 93 MMOL/L (ref 98–107)
CO2: 24 MMOL/L (ref 22–29)
CREAT SERPL-MCNC: 8.5 MG/DL (ref 0.7–1.2)
EOSINOPHILS ABSOLUTE: 0.13 E9/L (ref 0.05–0.5)
EOSINOPHILS RELATIVE PERCENT: 3.9 % (ref 0–6)
GFR AFRICAN AMERICAN: 7
GFR NON-AFRICAN AMERICAN: 6 ML/MIN/1.73
GLUCOSE BLD-MCNC: 88 MG/DL (ref 74–99)
HCT VFR BLD CALC: 30.9 % (ref 37–54)
HEMOGLOBIN: 10.4 G/DL (ref 12.5–16.5)
IMMATURE GRANULOCYTES #: 0.02 E9/L
IMMATURE GRANULOCYTES %: 0.6 % (ref 0–5)
LYMPHOCYTES ABSOLUTE: 0.99 E9/L (ref 1.5–4)
LYMPHOCYTES RELATIVE PERCENT: 29.6 % (ref 20–42)
MCH RBC QN AUTO: 30.4 PG (ref 26–35)
MCHC RBC AUTO-ENTMCNC: 33.7 % (ref 32–34.5)
MCV RBC AUTO: 90.4 FL (ref 80–99.9)
MONOCYTES ABSOLUTE: 0.49 E9/L (ref 0.1–0.95)
MONOCYTES RELATIVE PERCENT: 14.6 % (ref 2–12)
NEUTROPHILS ABSOLUTE: 1.67 E9/L (ref 1.8–7.3)
NEUTROPHILS RELATIVE PERCENT: 49.8 % (ref 43–80)
OVALOCYTES: ABNORMAL
PDW BLD-RTO: 21.6 FL (ref 11.5–15)
PHOSPHORUS: 7.1 MG/DL (ref 2.5–4.5)
PLATELET # BLD: 130 E9/L (ref 130–450)
PMV BLD AUTO: 10.3 FL (ref 7–12)
POIKILOCYTES: ABNORMAL
POTASSIUM REFLEX MAGNESIUM: 4.8 MMOL/L (ref 3.5–5)
RBC # BLD: 3.42 E12/L (ref 3.8–5.8)
SARS-COV-2, NAAT: NOT DETECTED
SODIUM BLD-SCNC: 134 MMOL/L (ref 132–146)
TOTAL PROTEIN: 6.2 G/DL (ref 6.4–8.3)
WBC # BLD: 3.4 E9/L (ref 4.5–11.5)

## 2022-04-26 PROCEDURE — 6370000000 HC RX 637 (ALT 250 FOR IP): Performed by: NURSE PRACTITIONER

## 2022-04-26 PROCEDURE — 87635 SARS-COV-2 COVID-19 AMP PRB: CPT

## 2022-04-26 PROCEDURE — 6360000002 HC RX W HCPCS: Performed by: INTERNAL MEDICINE

## 2022-04-26 PROCEDURE — 36415 COLL VENOUS BLD VENIPUNCTURE: CPT

## 2022-04-26 PROCEDURE — 80053 COMPREHEN METABOLIC PANEL: CPT

## 2022-04-26 PROCEDURE — 90935 HEMODIALYSIS ONE EVALUATION: CPT | Performed by: INTERNAL MEDICINE

## 2022-04-26 PROCEDURE — 6370000000 HC RX 637 (ALT 250 FOR IP): Performed by: FAMILY MEDICINE

## 2022-04-26 PROCEDURE — 6370000000 HC RX 637 (ALT 250 FOR IP): Performed by: INTERNAL MEDICINE

## 2022-04-26 PROCEDURE — 2500000003 HC RX 250 WO HCPCS

## 2022-04-26 PROCEDURE — 99239 HOSP IP/OBS DSCHRG MGMT >30: CPT | Performed by: STUDENT IN AN ORGANIZED HEALTH CARE EDUCATION/TRAINING PROGRAM

## 2022-04-26 PROCEDURE — 85025 COMPLETE CBC W/AUTO DIFF WBC: CPT

## 2022-04-26 PROCEDURE — 97530 THERAPEUTIC ACTIVITIES: CPT

## 2022-04-26 PROCEDURE — 94640 AIRWAY INHALATION TREATMENT: CPT

## 2022-04-26 PROCEDURE — 80074 ACUTE HEPATITIS PANEL: CPT

## 2022-04-26 PROCEDURE — 84100 ASSAY OF PHOSPHORUS: CPT

## 2022-04-26 PROCEDURE — 97535 SELF CARE MNGMENT TRAINING: CPT

## 2022-04-26 RX ORDER — BUMETANIDE 2 MG/1
2 TABLET ORAL 2 TIMES DAILY WITH MEALS
Qty: 30 TABLET | Refills: 3 | Status: SHIPPED | OUTPATIENT
Start: 2022-04-26 | End: 2022-08-16

## 2022-04-26 RX ORDER — CALCIUM ACETATE 667 MG/1
1 CAPSULE ORAL
Status: DISCONTINUED | OUTPATIENT
Start: 2022-04-26 | End: 2022-04-26 | Stop reason: HOSPADM

## 2022-04-26 RX ORDER — LEVOTHYROXINE SODIUM 0.1 MG/1
100 TABLET ORAL DAILY
Qty: 30 TABLET | Refills: 3 | Status: SHIPPED | OUTPATIENT
Start: 2022-04-27

## 2022-04-26 RX ADMIN — CALCIUM ACETATE 667 MG: 667 CAPSULE ORAL at 12:06

## 2022-04-26 RX ADMIN — BUMETANIDE 2 MG: 1 TABLET ORAL at 16:51

## 2022-04-26 RX ADMIN — CALCIUM ACETATE 667 MG: 667 CAPSULE ORAL at 16:51

## 2022-04-26 RX ADMIN — IPRATROPIUM BROMIDE AND ALBUTEROL SULFATE 1 AMPULE: .5; 2.5 SOLUTION RESPIRATORY (INHALATION) at 12:14

## 2022-04-26 RX ADMIN — LEVOTHYROXINE SODIUM 100 MCG: 100 TABLET ORAL at 05:49

## 2022-04-26 RX ADMIN — ASPIRIN 81 MG: 81 TABLET, COATED ORAL at 12:05

## 2022-04-26 RX ADMIN — IPRATROPIUM BROMIDE AND ALBUTEROL SULFATE 1 AMPULE: .5; 2.5 SOLUTION RESPIRATORY (INHALATION) at 17:12

## 2022-04-26 RX ADMIN — SERTRALINE HYDROCHLORIDE 50 MG: 50 TABLET ORAL at 12:05

## 2022-04-26 RX ADMIN — SACUBITRIL AND VALSARTAN 1 TABLET: 97; 103 TABLET, FILM COATED ORAL at 12:06

## 2022-04-26 RX ADMIN — CARVEDILOL 25 MG: 25 TABLET, FILM COATED ORAL at 16:51

## 2022-04-26 RX ADMIN — SODIUM BICARBONATE 325 MG: 650 TABLET ORAL at 12:05

## 2022-04-26 RX ADMIN — HEPARIN SODIUM 5000 UNITS: 10000 INJECTION INTRAVENOUS; SUBCUTANEOUS at 01:03

## 2022-04-26 ASSESSMENT — PAIN SCALES - GENERAL: PAINLEVEL_OUTOF10: 0

## 2022-04-26 NOTE — PROGRESS NOTES
P Quality Flow/Interdisciplinary Rounds Progress Note        Quality Flow Rounds held on April 26, 2022    Disciplines Attending:  Bedside Nurse, ,  and Nursing Unit Garth Gramajo was admitted on 4/21/2022  1:02 AM    Anticipated Discharge Date:       Disposition:    Kory Score:  Kory Scale Score: 19    Readmission Risk              Risk of Unplanned Readmission:  23           Discussed patient goal for the day, patient clinical progression, and barriers to discharge. The following Goal(s) of the Day/Commitment(s) have been identified:  discharge planning.       Christ Larsen RN  April 26, 2022

## 2022-04-26 NOTE — PROGRESS NOTES
Physical Therapy  Facility/Department: [address] INTERMEDIATE  Physical Therapy Treatment Note    Name: Karin Alston  : 1950  MRN: [id number]  Date of Service: 2022      Patient Diagnosis(es): The primary encounter diagnosis was Encounter for dialysis [facility]). Diagnoses of H/O noncompliance with medical treatment, presenting hazards to health and Essential hypertension were also pertinent to this visit. Past Medical History:  has a past medical history of Chronic kidney disease, Diabetes mellitus ([address] Hepatitis C, Hypertension, Liver disease, Thyroid disease, and Unspecified diseases of blood and blood-forming organs. Past Surgical History:  has a past surgical history that includes Tonsillectomy and vascular surgery (N/A, 2021). Evaluating Therapist: [facility] PT      Equipment Recommendation wheeled walker    Room #:  6052/9079-V  Diagnosis:  Essential hypertension [I10]  Encounter for dialysis [facility]) [Z99.2]  Elevated serum creatinine [R79.89]  H/O noncompliance with medical treatment, presenting hazards to health [Z91.19]  PMHx/PSHx:  CKD, DM, hepatitis C  Precautions:  Falls, alarm      Social:  Pt lives alone in a 1 floor plan 3 steps  to enter. Prior to admission independent. Reports recent increased weakness     Initial Evaluation  Date: 22 Treatment  2022    Short Term/ Long Term   Goals   Was pt agreeable to Eval/treatment? yes yes    Does pt have pain?  No c/o pain No complaints    Bed Mobility  Rolling: min assist  Supine to sit: min assist  Sit to supine: min assist  Scooting: min assist Rolling: Min A  Supine to sit: Min A  Sit to supine: SBA  Scooting: Min A seated to EOB independent   Transfers Sit to stand: min assist  Stand to sit: min assist  Stand pivot: min assist Sit to stand; SBA EOB, Min A low chair  Stand to sit: Min A SBA   Ambulation    75 feet with ww with min assist 80 feet x 2 using [employer] for support Min A for balance 100 feet with ww with SBA   Stair Negotiation  Ascended and descended  NT NT  3 steps with 1 rail with min assist   LE strength     3+/5    4/   balance      fair     AM-PAC Raw score               16/24 16/24          Pt is alert, able to follow instruction, states fatigued from AM dialysis  Balance: poor dynamic using [employer] for support    Pt performed therapeutic exercise of the following: NT    Patient education  Pt was educated on UE usage to assist with transfers, gait promoting staying within [employer] base of support and increased LE base of support    Patient response to education:   Pt verbalized understanding Pt demonstrated skill Pt requires further education in this area   yes With prompt for transfer safety yes     ASSESSMENT:   Comments: Pt found in bed, agreed to rx. Pt assisted to EOB, sat EOB a prolonged time, states feels light headed, this passed. Gait to the hallway, mamie slow, laboring and consistent. Pt unsteady throughout, required constant hands on assist for balance and safety. Noted intermittent shuffle and scissor gait. Pt fatigued after activity, requested back to bed. Pt a strong fall risk without support, states has fallen at home after dialysis in the past.   Treatment: Pt practiced and was instructed in the following treatment: transfer safety, gait mechanics    Pt was left in bed per request due to fatigue with call light in reach    Chair/bed alarm: bed alarm active    Time in 1312   Time out 1335   Total Treatment Time 23 minutes   CPT codes:     Therapeutic activities 63271 23 minutes   Therapeutic exercises 57506 0 minutes       Pt is making minimal progress toward established Physical Therapy goals. Continue with physical therapy current plan of care.     Kelsi Islas PTA   License Number: [id number]

## 2022-04-26 NOTE — FLOWSHEET NOTE
Pt completed 4 hrs of HD on a 3K bath with 2.2L of UF removed. 04/26/22 1135   Vital Signs   /76   Temp 97.9 °F (36.6 °C)   Pulse 58   Resp 18   Weight 138 lb 0.1 oz (62.6 kg)   Weight Method Bed scale   Percent Weight Change -3.4   Pain Assessment   Pain Assessment None - Denies Pain   Post-Hemodialysis Assessment   Post-Treatment Procedures Blood returned;Catheter capped, clamped with Citrate x 2 ports   Machine Disinfection Process Acid/Vinegar Clean;Heat Disinfect; Exterior Machine Disinfection   Rinseback Volume (ml) 300 ml   Total Liters Processed (l/min) 91.7 l/min   Dialyzer Clearance Lightly streaked   Duration of Treatment (minutes) 240 minutes   Hemodialysis Output (ml) 2500 ml   NET Removed (ml) 2200 ml   Tolerated Treatment Good   Patient Response to Treatment tolerated well; post report to [facility] RN   Bilateral Breath Sounds Diminished   Patient Disposition Return to room

## 2022-04-26 NOTE — PROGRESS NOTES
Department of Internal Medicine  Nephrology Progress Note      Subjective: We are following Mr. Carney Running for ESRD on Dialysis. Patient seen on HD, tolerating treatment. Denies any complaints or concerns. PHYSICAL EXAM:      Vitals:    VITALS:  /62   Pulse 60   Temp 98.2 °F (36.8 °C)   Resp 18   Ht 6' 2\" (1.88 m)   Wt 142 lb 13.7 oz (64.8 kg)   SpO2 95%   BMI 18.34 kg/m²   24HR INTAKE/OUTPUT:      Intake/Output Summary (Last 24 hours) at 4/26/2022 0933  Last data filed at 4/26/2022 1064  Gross per 24 hour   Intake --   Output 450 ml   Net -450 ml       Access: R IJV tunneled cathter   Constitutional: Lethargic, No acute distress. Chronically ill appearing  HEENT:  PERRLA, Normocephalic atraumatic  Respiratory:  CTA bilaterally  Cardiovascular/Edema:  RRR, S1/S2. Gastrointestinal:  Soft, nontender, nondistended. + BS  Musculoskeletal: Full ROM,   Neurologic:   AxOx2, no focal deficits  Skin: no rash or lesions  Other: no edema     Scheduled Meds:   sertraline  50 mg Oral Daily    levothyroxine  100 mcg Oral Daily    sodium bicarbonate  325 mg Oral BID    aspirin  81 mg Oral Daily    atorvastatin  40 mg Oral Nightly    bumetanide  2 mg Oral BID     heparin (porcine)  5,000 Units SubCUTAneous Q8H    ipratropium-albuterol  1 ampule Inhalation Q4H WA    carvedilol  25 mg Oral BID     sacubitril-valsartan  1 tablet Oral BID     Continuous Infusions:  PRN Meds:.heparin (porcine), perflutren lipid microspheres, hydrALAZINE      DATA:    CBC with Differential:    Lab Results   Component Value Date    WBC 3.4 04/26/2022    RBC 3.42 04/26/2022    HGB 10.4 04/26/2022    HCT 30.9 04/26/2022     04/26/2022    MCV 90.4 04/26/2022    MCH 30.4 04/26/2022    MCHC 33.7 04/26/2022    RDW 21.6 04/26/2022    SEGSPCT 55 06/10/2013    LYMPHOPCT 29.6 04/26/2022    MONOPCT 14.6 04/26/2022    BASOPCT 1.5 04/26/2022    MONOSABS 0.49 04/26/2022    LYMPHSABS 0.99 04/26/2022    EOSABS 0.13 04/26/2022 BASOSABS 0.05 04/26/2022     CMP:    Lab Results   Component Value Date     04/26/2022    K 4.8 04/26/2022    CL 93 04/26/2022    CO2 24 04/26/2022    BUN 74 04/26/2022    CREATININE 8.5 04/26/2022    GFRAA 7 04/26/2022    LABGLOM 6 04/26/2022    GLUCOSE 88 04/26/2022    PROT 6.2 04/26/2022    LABALBU 3.6 04/26/2022    CALCIUM 7.0 04/26/2022    BILITOT 0.7 04/26/2022    ALKPHOS 97 04/26/2022    AST 23 04/26/2022    ALT 15 04/26/2022     Magnesium:    Lab Results   Component Value Date    MG 1.9 12/10/2021     Phosphorus:    Lab Results   Component Value Date    PHOS 7.1 04/26/2022     Radiology Review:    Chest XR 4/21/22   Bilateral lower lobe opacities consistent with atelectasis and/or infiltrate. BRIEF SUMMARY OF INITIAL CONSULT:   The patient is a 67 y.o. male with significant past medical history of end stage renal disease secondary to nephrosclerosis and/or diabetic nephropathy, on hemodialysis Yespsso-Nbowxjva-Psiikjar via Catheter, last hemodialysis treatment on 4/16/22, HTN, Type II DM, Hypothyroidism who presents to the ER on 4/21/22 with cough and shortness of breath. He is very noncompliant with his outpatient dialysis sessions. Initially he was only staying for 1.5-2 hours of treatment. Recently he has only been coming every other treatment. He states he feels like the dialysis makes him \"feel very weak as if he was wasting away. \"  He also was noncompliant with his medications. Often times not taking them at all. He is complaining of orthopnea. Lab work was significant for CO2 19, BUN 53, Creatinine, Troponin 1,078. Renal was consulted for ESRD on HD. IMPRESSION/RECOMMENDATIONS:    1. ESRD on HD Xvipjvr-Mtpvhhsi-Dkpwrekv schedule, continue while inpatient. Patient adamantly refusing surgical intervention with no intention for AV access creation. 2. JEAN PIERRE 2/2 uremia, patient for HD today  3.  Hypotonic hyponatremia 2/2 lack of free water excretion, for HD today and continue 1L fluid restriction  4. HTN, on carvedilol, hydralazine  5. MDB of CKD, phosphorus 7.1, will start calcium acetate, obtain ionized calcium  6. Hypocalcemia 2/2 vitamin d deficiency? Obtain vitamin d 25 level  7. HFpEF, LVEF 25-30% on sacubitril-valsartan, bumetanide  8. Anemia of CKD, not currently on NIXON, Hgb >10.   ---------------------------------------------------------------------------------------------  9. Type II DM, on insulin  10. Depression, psychiatry following. On SSRI  11. Hypothyroidism, on levothyroxine  12. Nutrition: low potassium with 1L fluid restriction    Plan:     · Continue HD on TTS schedule, for HD today. · Continue sodium bicarbonate 325 mg PO BID.    · Continue to monitor daily labs  · Obtain ionized calcium  · Start calcium acetate 667 mg TID with meals  · Continue to monitor phosphorus  · Obtain PTH  · Obtain vitamin D 25 level

## 2022-04-26 NOTE — PROGRESS NOTES
[facility] Progress Note    Admitting Date and Time: 4/21/2022  1:02 AM  Admit Dx: Essential hypertension [I10]  Encounter for dialysis ([facility] [address] [Z99.2]  Elevated serum creatinine [R79.89]  H/O noncompliance with medical treatment, presenting hazards to health [Z91.19]    Subjective:  Patient is being followed for Essential hypertension [I10]  Encounter for dialysis ([facility] [address] [Z99.2]  Elevated serum creatinine [R79.89]  H/O noncompliance with medical treatment, presenting hazards to health [Z91.19]   Pt feels weak with HD. Agreeable to CODY. Per RN: No major concerns. ROS: denies fever, chills, cp, sob, n/v, HA unless stated above.       calcium acetate  1 capsule Oral TID WC    sertraline  50 mg Oral Daily    levothyroxine  100 mcg Oral Daily    sodium bicarbonate  325 mg Oral BID    aspirin  81 mg Oral Daily    atorvastatin  40 mg Oral Nightly    bumetanide  2 mg Oral BID WC    heparin (porcine)  5,000 Units SubCUTAneous Q8H    ipratropium-albuterol  1 ampule Inhalation Q4H WA    carvedilol  25 mg Oral BID WC    sacubitril-valsartan  1 tablet Oral BID     heparin (porcine), 4,300 Units, PRN  perflutren lipid microspheres, 1.5 mL, ONCE PRN  hydrALAZINE, 10 mg, Q4H PRN         Objective:    /70   Pulse 62   Temp 98.1 °F (36.7 °C) (Oral)   Resp 18   Ht 6' 2\" (1.88 m)   Wt 138 lb 0.1 oz (62.6 kg)   SpO2 97%   BMI 17.72 kg/m²     General Appearance: alert and oriented to person, place and time and in no acute distress  Skin: warm and dry  Head: normocephalic and atraumatic  Eyes: pupils equal, round, and reactive to light, extraocular eye movements intact, conjunctivae normal  Neck: neck supple and non tender without mass   Pulmonary/Chest: clear to auscultation bilaterally- no wheezes, rales or rhonchi, normal air movement, no respiratory distress, rt HD cath  Cardiovascular: normal rate, normal S1 and S2 and no carotid bruits  Abdomen: soft, non-tender, non-distended, normal bowel sounds, no masses or organomegaly  Extremities: no cyanosis, no clubbing and no edema  Neurologic: no cranial nerve deficit and speech normal        Recent Labs     04/24/22  1017 04/25/22  0347 04/26/22  0527   * 131* 134   K 4.1 4.6 4.8   CL 91* 90* 93*   CO2 27 26 24   BUN 32* 53* 74*   CREATININE 5.2* 6.7* 8.5*   GLUCOSE 155* 85 88   CALCIUM 7.8* 7.6* 7.0*       Recent Labs     04/24/22  1017 04/25/22  0347 04/26/22  0527   WBC 3.9* 3.5* 3.4*   RBC 3.81 3.62* 3.42*   HGB 11.4* 10.8* 10.4*   HCT 34.3* 32.6* 30.9*   MCV 90.0 90.1 90.4   MCH 29.9 29.8 30.4   MCHC 33.2 33.1 33.7   RDW 22.4* 21.9* 21.6*    138 130   MPV 10.4 10.8 10.3       Micro:  No components found for: [facility])    Radiology:   No results found. Assessment:    Principal Problem:    Elevated troponin  Active Problems:    Elevated serum creatinine    Lung infiltrate    Generalized weakness    Cough    DNR (do not resuscitate) discussion    Hypothyroidism    ESRD (end stage renal disease) on dialysis ([facility] [address]    Volume overload    Hypertensive emergency    Severe protein-calorie malnutrition ([facility] [address]  Resolved Problems:    * No resolved hospital problems. *      Plan:  1. Accelerated HTN, resolved - restarted on Entresto, Coreg and Bumex. Continue same meds. Monitor vitals.    2.   ESRD on HD - Renal consulted.  HD for T/TH/Sat  3.   T2DM - glucose 109.  Glucose POCT.  Hypoglycemia protocol.  Trend and treat accordingly.    4.   Hypothyroidism - doing much better on the Levothyroxine 100 mcg po daily.  Continue same. Endo on board. 5.   Elevated Troponin - 1078 on admit -->1038.  Cardiology consulted, signed off.  Non compliant on meds.  2D  ECHO last done November 2021.  EF 25 - 30%.  Hypokinesis and akinesis of the apical segments.  Patient has declined ischemic workup.    6.  Cough - CXR shows bilateral atelectasis.  Procalcitonin was last checked 4/21/2022 and was 0.29.  Incentive Spirometry.  Duonebs.  Oxygen prn.    7.  Physical deconditioning - PT/OT ordered.  Would be interested in rehab, waiting on precert. DVT prophylaxis - Heparin SQ    NOTE: This report was transcribed using voice recognition software. Every effort was made to ensure accuracy; however, inadvertent computerized transcription errors may be present.   Electronically signed by Iona Suresh MD on 4/26/2022 at 12:27 PM

## 2022-04-26 NOTE — DISCHARGE INSTR - COC
Continuity of Care Form    Patient Name: Radha Moseley   :  1950  MRN:  [id number]    [facility] date:  2022  Discharge date:  2022    Code Status Order: DNR-CCA   Advance Directives:      Admitting Physician:  Lucien Christianson MD  PCP: Manuel Ivory MD    Discharging Nurse: Loraine Bella 7[address] Unit/Room#: 0454/9532-O  Discharging Unit Phone Number: [phone]    Emergency Contact:   Extended Emergency Contact Information  Primary Emergency Contact: [facility]  Mobile Phone: [phone]  Relation: Child  Secondary Emergency Contact: Belem Villalobos  Home Phone: [phone]  Mobile Phone: [phone]  Relation: Other  Preferred language: English   needed? No    Past Surgical History:  Past Surgical History:   Procedure Laterality Date    TONSILLECTOMY      VASCULAR SURGERY N/A 2021    CATHETER INSERTION HEMODIALYSIS, REMOVAL OF FEMORAL CATHETER performed by Cele Mayorga MD at [facility]       Immunization History: There is no immunization history on file for this patient.     Active Problems:  Patient Active Problem List   Diagnosis Code    Diabetes mellitus ([facility] [address] Q06.5    Metabolic acidosis O80.7    Hypertension I10    Uncontrolled diabetes mellitus ([address] E11.65    Hyperbilirubinemia E80.6    Thrombocytopenia ([facility]) D69.6    Hypothyroidism E03.9    ESRD (end stage renal disease) on dialysis (HCC) N18.6, Z99.2    Acute respiratory failure with hypoxia ([facility]) J96.01    Volume overload E87.70    Elevated troponin R77.8    Hypertensive emergency I16.1    Hyperkalemia E87.5    Severe protein-calorie malnutrition ([facility] [address] E43    Acute renal failure superimposed on chronic kidney disease, on chronic dialysis (HCC) N17.9, N18.9, Z99.2    Elevated serum creatinine R79.89    Lung infiltrate R91.8    Generalized weakness R53.1    Cough R05.9    DNR (do not resuscitate) discussion Z71.89       Isolation/Infection:   Isolation            No Isolation Patient Infection Status       Infection Onset Added Last Indicated Last Indicated By Review Planned Expiration Resolved Resolved By    None active    Resolved    COVID-19 (Rule Out) 04/21/22 04/21/22 04/21/22 Respiratory Panel, Molecular, with COVID-19 (Restricted: peds pts or suitable admitted adults) (Ordered)   04/21/22 Rule-Out Test Resulted    COVID-19 (Rule Out) 04/20/22 04/20/22 04/20/22 COVID-19, Rapid (Ordered)   04/20/22 Rule-Out Test Resulted    COVID-19 12/07/21 12/07/21 12/07/21 COVID-19, Rapid   12/10/21 Heraclio Marte, RN    2 tests resulted not detected 24 hours apart, no fever, room air, asymptomatic    COVID-19 (Rule Out) 12/07/21 12/07/21 12/07/21 COVID-19, Rapid (Ordered)   12/07/21 Rule-Out Test Resulted    COVID-19 (Rule Out) 11/19/21 11/19/21 11/19/21 COVID-19, Rapid (Ordered)   11/19/21 Rule-Out Test Resulted            Nurse Assessment:  Last Vital Signs: /70   Pulse 62   Temp 98.1 °F (36.7 °C) (Oral)   Resp 18   Ht 6' 2\" (1.88 m)   Wt 138 lb 0.1 oz (62.6 kg)   SpO2 97%   BMI 17.72 kg/m²     Last documented pain score (0-10 scale): Pain Level: 0  Last Weight:   Wt Readings from Last 1 Encounters:   04/26/22 138 lb 0.1 oz (62.6 kg)     Mental Status:  oriented and alert    IV Access:  - Dialysis Catheter  - site  right and subclavian, insertion date: 11/21/21    Nursing Mobility/ADLs:  Walking   Assisted  Transfer  Assisted  Bathing  Assisted  Dressing  Assisted  Toileting  Assisted  Feeding  Independent  Med Admin  Independent  Med Delivery   whole    Wound Care Documentation and Therapy:  Incision 11/21/21 Internal jugular Right (Active)   Number of days: 156        Elimination:  Continence:    Bowel: Yes  Bladder: Yes  Urinary Catheter: None   Colostomy/Ileostomy/Ileal Conduit: No       Date of Last BM: 4/25    Intake/Output Summary (Last 24 hours) at 4/26/2022 1409  Last data filed at 4/26/2022 1135  Gross per 24 hour   Intake --   Output 2950 ml   Net -2950 ml     I/O last 3 completed shifts:  In: -   Out: 450 [Urine:450]    Safety Concerns: At Risk for Falls    Impairments/Disabilities:      Vision    Nutrition Therapy:  Current Nutrition Therapy:   - Oral Diet:  Carb Control 5 carbs/meal (2000kcals/day), Low Sodium (3-4gm), and Low Potassium (less than 3,000 mg/day), Low Phosphorus (less than 1,000 mg)    Routes of Feeding: Oral  Liquids: No Restrictions  Daily Fluid Restriction: no  Last Modified Barium Swallow with Video (Video Swallowing Test): not done    Treatments at the Time of Hospital Discharge:   Respiratory Treatments: Duoneb inhalation 1 ampule q4 hrs while awake  Oxygen Therapy:  is not on home oxygen therapy.   Ventilator:    - No ventilator support    Rehab Therapies: PT and OT eval and treat  Weight Bearing Status/Restrictions: No weight bearing restrictions  Other Medical Equipment (for information only, NOT a DME order):  bedside commode  Other Treatments: Hemodialysis Tue-Thur-Sat    Patient's personal belongings (please select all that are sent with patient):  Glasses, clothing, wallet, coat    RN SIGNATURE:  Electronically signed by Silverio Dejesus RN on 4/26/22 at 5:12 PM EDT    CASE MANAGEMENT/SOCIAL WORK SECTION    Inpatient Status Date: ***    Readmission Risk Assessment Score:  Readmission Risk              Risk of Unplanned Readmission:  24           Discharging to Facility/ Agency   Name: [address] [place] [address]  XKBZS:[phone]  Fax:[phone]    Dialysis Facility (if applicable)   Name:  Address:  Dialysis Schedule:  Phone:  Fax:    / signature: {Esignature:[id number]}Electronically signed by Lorita Halsted, LSW on 4/26/22 at 2:10 PM EDT     PHYSICIAN SECTION    Prognosis: {Prognosis:0740309774}    Condition at Discharge: Stable    Rehab Potential (if transferring to Rehab): {Prognosis:6008251303}    Recommended Labs or Other Treatments After Discharge: ***    Physician Certification: I certify the above information and transfer of Radha Moseley  is necessary for the continuing treatment of the diagnosis listed and that he requires Dain Reid for less than 30 days.      Update Admission H&P: {CHP DME Changes in LUNIJ:353088948}    PHYSICIAN SIGNATURE:  {Esignature:[id number]}

## 2022-04-26 NOTE — PROGRESS NOTES
Called nurse to nurse to Courtney Baum at [facility]. Answered all questions.

## 2022-04-26 NOTE — FLOWSHEET NOTE
Pt completed 4 hrs of HD on a 3K bath with 2.2L of UF removed safely. 04/26/22 1135   Vital Signs   /76   Temp 97.9 °F (36.6 °C)   Pulse 58   Resp 18   Weight 138 lb 0.1 oz (62.6 kg)   Weight Method Bed scale   Percent Weight Change -3.4   Pain Assessment   Pain Assessment None - Denies Pain   Post-Hemodialysis Assessment   Post-Treatment Procedures Blood returned;Catheter capped, clamped with Citrate x 2 ports   Machine Disinfection Process Acid/Vinegar Clean;Heat Disinfect; Exterior Machine Disinfection   Rinseback Volume (ml) 300 ml   Total Liters Processed (l/min) 91.7 l/min   Dialyzer Clearance Lightly streaked   Duration of Treatment (minutes) 240 minutes   Hemodialysis Output (ml) 2500 ml   NET Removed (ml) 2200 ml   Tolerated Treatment Good   Patient Response to Treatment tolerated well; post report to [facility] RN   Bilateral Breath Sounds Diminished   Patient Disposition Return to room

## 2022-04-26 NOTE — PROGRESS NOTES
Occupational Therapy  OT BEDSIDE TREATMENT NOTE      Date:2022  Patient Name: Helga Herzog  MRN: [id number]  : 1950  Room: [address] [place] [address]         Evaluating OT: Adrianna Tucker OTR/L   [id number]       Referring Lb Rodas MD    Specific Provider Orders/Date:OT eval and treat 2022       Diagnosis:  Essential hypertension [I10]  Encounter for dialysis ([facility] [address] [Z99.2]  Elevated serum creatinine [R79.89]  H/O noncompliance with medical treatment, presenting hazards to health [Z91.19]     Pertinent Medical History: ESRD, Hep C    Precautions:  Fall Risk       Assessment of current deficits    [x]? Functional mobility            [x]?ADLs           [x]? Strength                  []?Cognition    [x]? Functional transfers          [x]? IADLs         [x]? Safety Awareness   [x]? Endurance    []? Fine Coordination                         [x]? Balance      []? Vision/perception   []? Sensation      []? Gross Motor Coordination             []? ROM           []? Delirium                   []? Motor Control      OT PLAN OF CARE   OT POC based on physician orders, patient diagnosis and results of clinical assessment     Frequency/Duration  2-4 days/wk for 2 weeks PRN   Specific OT Treatment Interventions to include:   ADL retraining/adapted techniques and AE recommendations to increase functional independence within precautions                    Energy conservation techniques to improve tolerance for selfcare routine   Functional transfer/mobility training/DME recommendations for increased independence, safety and fall prevention         Patient/family education to increase safety and functional independence             Environmental modifications for safe mobility and completion of ADLs                             Therapeutic activity to improve functional performance during ADLs.                                          Therapeutic exercise to improve tolerance and functional strength for ADLs    Balance retraining/tolerance tasks for facilitation of postural control with dynamic challenges during ADLs .       Positioning to improve functional independence        Recommended Adaptive Equipment: continue to assess      Home Living: Pt lives alone, 1 floor with 3 steps/no rail to enter  Bathroom setup: tub/shower      Prior Level of Function: Independent  with ADLs , Independent  with IADLs; ambulated with no device   Driving: yes      Pain Level: Pt did not report level of pain. Cognition: Awake and alert. Functional Assessment:  AM-PAC Daily Activity Raw Score: 16/24    Initial Eval Status  Date: 4/25/22 Treatment Status  Date: 4/26/22  STGs = LTGs  Time frame: 10-14 days   Feeding Independent        Grooming Beverly  Limited tolerance for grooming while standing due to fatigue. Setup at seated level.     Supervision    UB Dressing Beevrly  Min A   Supervision    LB Dressing Mod a   min A while seated on the side of the bed. Min A for balance while standing due to pt unsteady. SBA    Bathing Mod A    SBA    Toileting Min A  declined need during this session. Supervision    Bed Mobility  Min A  Supine to sit   SBA supine <> sit  Supervision    Functional Transfers Beverly   Sit-stand from bed  CGA to stand from bed surface.    SBA/supervision    Functional Mobility Min A,w/walker  Ambulated to bathroom   min A during functional mobility limited distance in the room. Pt unsteady when standing. SBA/superivsion  with good tolerance    Balance Sitting:     Static:  SBA- EOB     Dynamic:Min A  Standing: Beverly    Sit balance SBA. Pt with LOB backwards across the bed while attempting to don slipper socks.      Min A stand balance during simple ADL activity.     SBA/supervision    Activity Tolerance Fair - with light activity   Patient feeling fatigued - wanting to return to bed  Poor+   Fair +  with ADL activity        Comments:  Pt initially declined therapy due to fatigue however agreeable to limited activity when notified of need for updated therapy notes. Fatigue with minimal activity. Unsteady when standing. Pt returned to bed at the end of the session. Education/treatment:  ADL retraining with facilitation of movement to increase self care skills. Therapeutic activity to address balance and endurance for ADL and transfers. Pt education of transfer safety. · Pt has made  progress towards set goals.        Time In: 1:20   Time Out: 1:35     Min Units   Therapeutic Ex 52091     Therapeutic Activities [id number] 5    ADL/Self Care 01272 10 1   Orthotic Management 83531     Neuro Re-Ed [id number]     Non-Billable Time     TOTAL TIMED TREATMENT 15 [address] [facility] CARRIE/ [id number]

## 2022-04-26 NOTE — CARE COORDINATION
Spoke to Anastasia Robertson at [facility]- they are able to accept patient. 1530: Per Anastasia Robertson from [address] 234 beds at the [facility] HD at [facility] 12 has been approved. Pt can admit today. Updated Charge RN. Spoke with patient- he is agreeable. SW set up ambulette transport set up for 530 pm via [facility] Nursing and CODY aware.

## 2022-04-26 NOTE — PROGRESS NOTES
Occupational Therapy  Patient treatment attempted this PM.  Patient laying in the bed. Pleasantly declined activity at this time. States he is fatigued from PT session. Requesting to rest.  Will attempt at a later time.   [facility] [place] [id number]

## 2022-04-26 NOTE — PLAN OF CARE
Problem: Chronic Conditions and Co-morbidities  Goal: Patient's chronic conditions and co-morbidity symptoms are monitored and maintained or improved  Outcome: Progressing     Problem: Safety - Adult  Goal: Free from fall injury  4/25/2022 2320 by Mina Stovall RN  Outcome: Progressing     Problem: ABCDS Injury Assessment  Goal: Absence of physical injury  4/25/2022 2320 by Mina Stovall RN  Outcome: Progressing     Problem: Pain  Goal: Verbalizes/displays adequate comfort level or baseline comfort level  Outcome: Progressing

## 2022-04-27 ENCOUNTER — TELEPHONE (OUTPATIENT)
Dept: CARDIOLOGY CLINIC | Age: 72
End: 2022-04-27

## 2022-04-27 LAB
HAV IGM SER IA-ACNC: ABNORMAL
HEPATITIS B CORE IGM ANTIBODY: ABNORMAL
HEPATITIS B SURFACE ANTIGEN INTERPRETATION: ABNORMAL
HEPATITIS C ANTIBODY INTERPRETATION: REACTIVE

## 2022-05-04 NOTE — TELEPHONE ENCOUNTER
Contacted patient to schedule CHF HFU. Patient states he is in a facility and will not be discharged for another 10 days or so. He does not want to schedule an appointment until he is discharged and will contact our office at that time.

## 2022-05-16 NOTE — ADT AUTH CERT
Hypertension - Care Day 5 (4/24/2022) by Lucrecia Rodriguez RN       Review Status Review Entered   Completed 5/13/2022 12:26      Criteria Review      Care Day: 5 Care Date: 4/24/2022 Level of Care: Telemetry    Guideline Day 2    Level Of Care    (X) Possible floor    5/13/2022 12:26 PM EDT by Anat sandoval    Clinical Status    (X) * Renal function at baseline or improved    5/13/2022 12:26 PM EDT by Anat Valentine      baseline - dialysis    (X) * Mental status at baseline or improved    (X) * Pulmonary edema absent or improved    5/13/2022 12:26 PM EDT by Lesly Rebollar Pulmonary/Chest: clear to auscultation bilaterally- no wheezes, rales or rhonchi, normal air movement, no respiratory distress    (X) * Blood pressure improved    Activity    (X) * Increasing activity tolerated    Routes    (X) Oral hydration    (X) Parenteral or oral medications    5/13/2022 12:26 PM EDT by Anat Valentine      po    (X) Oral diet    5/13/2022 12:26 PM EDT by Anat Valentine      carb control, bishop, low potassium, low phosphorus    Interventions    (X) Possible cardiac monitoring    5/13/2022 12:26 PM EDT by Anat Valentine      continuous    Medications    (X) Transition to oral antihypertensives    * Milestone   Additional Notes   DATE:   4/24   CD5               Vitals:   98. 4    18     72      144/84     96% on RA         Physical Exam:       General Appearance: alert and oriented to person, place and time and in no acute distress   Skin: warm and dry   Head: normocephalic and atraumatic   Eyes: pupils equal, round, and reactive to light, extraocular eye movements intact, conjunctivae normal   Neck: neck supple and non tender without mass    Pulmonary/Chest: clear to auscultation bilaterally- no wheezes, rales or rhonchi, normal air movement, no respiratory distress   Cardiovascular: normal rate, normal S1 and S2 and no carotid bruits   Abdomen: soft, non-tender, non-distended, normal bowel sounds, no masses or organomegaly   Extremities: no cyanosis, no clubbing and no edema   Neurologic: no cranial nerve deficit and speech normal      Abnl/Pertinent Labs/Radiology/Diagnostic Studies:   sodium 131   chloride 91   bun 32   cretinine 5.2   glucose 155   calcium 7.8   wbc 3.9   hgb 11.4   hct 34.3   rdw 22.4            Imaging:   None today      Medications:   Aspirin 81mg po daily   Lipitor 40mg po nightly   Bumex 2mgj po bid   Coreg 25mg po bid   Heparin 5,000units sc q8hrs   Duoneb 1 ampule in q4hrs   Synthroid 100mcg po daily   Entresto 97-103mg po bid   Zoloft 25mg po daily   Sodium bicarbonate 325mg po bid      Orders:   No new orders      MD Consults/Assessments & Plans:   IM PN:      Assessment:       Principal Problem:     Elevated troponin   Active Problems:     Elevated serum creatinine     Lung infiltrate     Generalized weakness     Cough     DNR (do not resuscitate) discussion     Hypothyroidism     ESRD (end stage renal disease) on dialysis ([facility])     Volume overload     Hypertensive emergency     Severe protein-calorie malnutrition ([facility] [address]   Resolved Problems:     * No resolved hospital problems. *       Plan:   1.    Accelerated HTN, resolved - restarted on Entresto, Coreg and Bumex.   BP much better.  /62 today.   Continue same meds. Monitor vitals and check for side effects.     2.   ESRD on HD - Renal consult.  HD for T/TH/Sat   3.   T2DM - glucose 109.  Glucose POCT.  Hypoglycemia protocol.  Trend and treat accordingly.     4.   Hypothyroidism - doing much better on the Levothyroxine 100 mcg po daily.  Continue same. 5.   Elevated Troponin - 1078 on admit; now 1038.  Cardiology consult.  Non compliant on meds.  2D  ECHO last done November 2021.  EF 25 - 30%.  Hypokinesis and akinesis of the apical segments.  Patient has declined ischemic workup.     6.  Cough - CXR shows bilateral atelectasis.  Procalcitonin was last checked 4/21/2022 and was 0.29.  Incentive Spirometry.  Summerbs.  Oxygen prn.     7.  Leg weakness - PT/OT ordered.  Would be interested in rehab/ therapy if indicated and qualifies.          Nephrology PN:       IMPRESSION/RECOMMENDATIONS:  The patient is a 67 y.o. male with significant past medical history of end stage renal disease secondary to nephrosclerosis and/or diabetic nephropathy, on hemodialysis Pnlylcl-Hzcanosc-Gxcddcwg via Catheter, last hemodialysis treatment on 4/16/22, HTN, Type II DM, Hypothyroidism who presents to the ER on 4/21/22 with cough and shortness of breath. He is very noncompliant with his outpatient dialysis sessions. Initially he was only staying for 1.5-2 hours of treatment. Recently he has only been coming every other treatment. He states he feels like the dialysis makes him \"feel very weak as if he was wasting away. \" [facility] also was noncompliant with his medications. Often times not taking them at all. He is complaining of orthopnea. Lab work was significant for CO2 19, BUN 53, Creatinine, Troponin 1,078. Renal was consulted for ESRD on HD.            1. ESRD on HD Vzxjoko-Cfoyqksg-Qunhqbyd schedule. We will continue while inpatient. Patient adamantly refusing surgical intervention with no intention for AV access creation. 2. Metabolic acidosis. On sodium bicarbonate    3. HTN, on Coreg,    4. Type II DM. Hgb A1c 5.4   5. HFpEF, LVEF 25-30% on Entresto, Bumex   6. Anemia of CKD, not on NIXON as Hgb > 11   7. Depression, psychiatry following. Started on Zoloft       Plan:        · Continue HD on TTS schedule   · Continue Sodium bicarbonate 325 mg PO BID.     · Continue to monitor bicarbonate level   · Continue to monitor labs               Endocrinology PN:   ASSESSMENT & RECOMMENDATIONS    Yolanda Diaz, a 67 y.o.-old male seen in for management of Hypothyroidism        Primary hypothyroidism    · Normal free T4 and high TSH (.3, FT4 1.06) are very consistent with medication non-compliance    · Currently on Levothyroxine 100 mcg po daily   · To recheck TFT in 6-8 weeks    · Patient was instructed to take levothyroxine in the morning at empty stomach, wait one hour before eating , avoid multivitamins containing calcium  or iron with it.       Medications non-compliance    · I have discussed with him the great importance of following the treatment plan exactly as directed in order to achieve a good medical outcome.       SOB/cough   · Management per primary service       With normal free T4 we recommend continue current dose of levothyroxine               Hypertension - Care Day 4 (4/23/2022) by Willian Corbin RN       Review Status Review Entered   Completed 5/13/2022 12:12      Criteria Review      Care Day: 4 Care Date: 4/23/2022 Level of Care: Telemetry    Guideline Day 2    Level Of Care    (X) Possible floor    5/13/2022 12:12 PM EDT by Bethany Bhakta      tele    Clinical Status    ( ) * Renal function at baseline or improved    5/13/2022 12:12 PM EDT by Pankaj Richardson bun/creatinine increased    (X) * Mental status at baseline or improved    (X) * Pulmonary edema absent or improved    5/13/2022 12:12 PM EDT by Pankaj Richardson Pulmonary/Chest: clear to auscultation bilaterally- no wheezes, rales or rhonchi, normal air movement, no respiratory distress    (X) * Blood pressure improved    Activity    ( ) * Increasing activity tolerated    Routes    (X) Oral hydration    (X) Parenteral or oral medications    5/13/2022 12:12 PM EDT by Bethany Bhakta      po    (X) Oral diet    5/13/2022 12:12 PM EDT by Bethany Bhakta      carb control, bishop, low potassium, low phosphorus    Interventions    (X) Possible cardiac monitoring    5/13/2022 12:12 PM EDT by Bethany Bhakta      continuous    Medications    (X) Transition to oral antihypertensives    * Milestone   Additional Notes   DATE:   4/23   CD4            Pertinent Updates:   BUN/Creatinine up      Vitals:  98.9    18     78     142/80    94% on RA Physical Exam:       General Appearance: alert and oriented to person, place and time and in no acute distress   Skin: warm and dry   Head: normocephalic and atraumatic   Eyes: pupils equal, round, and reactive to light, extraocular eye movements intact, conjunctivae normal   Neck: neck supple and non tender without mass    Pulmonary/Chest: clear to auscultation bilaterally- no wheezes, rales or rhonchi, normal air movement, no respiratory distress   Cardiovascular: normal rate, normal S1 and S2 and no carotid bruits   Abdomen: soft, non-tender, non-distended, normal bowel sounds, no masses or organomegaly   Extremities: no cyanosis, no clubbing and no edema   Neurologic: no cranial nerve deficit and speech normal      Abnl/Pertinent Labs/Radiology/Diagnostic Studies:   chloride 95   bun 43   cretinine 6.7   glucose 109   calcium 7.7   wbc 3.5   rbc 3.60   hgb 10.8   hct 32.3   rdw 22.8            Imaging:   None today      Medications:            Orders:   PT eval and treat      MD Consults/Assessments & Plans:   IM PN:   Assessment:       Principal Problem:     Elevated troponin   Active Problems:     Elevated serum creatinine     Lung infiltrate     Generalized weakness     Cough     DNR (do not resuscitate) discussion     Hypothyroidism     ESRD (end stage renal disease) on dialysis ([facility])     Volume overload     Hypertensive emergency     Severe protein-calorie malnutrition ([facility] [address]   Resolved Problems:     * No resolved hospital problems. *       Plan:   1.  Accelerated HTN, resolved - restarted on Entresto, Coreg and Bumex.   BP much better.  Continue same meds. Monitor vitals and check for side effects.     2.   ESRD on HD - Renal consult.  HD for T/TH/Sat   3.   T2DM - glucose 109.  Glucose POCT.  Hypoglycemia protocol.  Trend and treat accordingly.     4.   Hypothyroidism - doing much better on the Levothyroxine 100 mcg po daily.  Continue same.    5.   Elevated Troponin - 1078 on admit; now 1038.  Cardiology consult.  Non compliant on meds.  2D  ECHO last done November 2021.  EF 25 - 30%.  Hypokinesis and akinesis of the apical segments.  Patient has declined ischemic workup.     6.  Cough - CXR shows bilateral atelectasis.  Checking Procalcitonin.  Incentive Spirometry.  Duonebs.  Oxygen prn.     7.  Leg weakness - PT/OT ordered.  Would be interested in therapy if indicated. Endocrinology PN:       ASSESSMENT & RECOMMENDATIONS    Odilia Love, a 67 y.o.-old male seen in for management of Hypothyroidism        Primary hypothyroidism    · Normal free T4 and high TSH (.3, FT4 1.06) are very consistent with medication non-compliance    · Continue Levothyroxine 100 mcg po daily and recheck TFT in 6-8 weeks    · Patient was instructed to take levothyroxine in the morning at empty stomach, wait one hour before eating , avoid multivitamins containing calcium  or iron with it.       Medications non-compliance    · I have discussed with him the great importance of following the treatment plan exactly as directed in order to achieve a good medical outcome.       SOB/cough   · Management per primary service       With normal free T4 we recommend continue current dose of levothyroxine       Cardiology PN:       Assessment:   · Elevated troponin secondary to underlying end-stage renal disease and underlying ischemia cannot be ruled out.  Patient declined ischemic work up. · Acute on chronic HFpEF, improved   · Cardiomyopathy of unknown etiology EF 25 to 30% most likely ischemic in etiology based on echo results.    · ACC/AHA stage C.,  NYHA functional class III   · Elevated TSH secondary to underlying hypothyroidism secondary to medication noncompliance.  Continue   · ESRD on hemodialysis   · Hypertension poorly controlled now improved with hemodialysis   · Noncompliance with her medications as well as with hemodialysis   · History of COVID-19 pneumonia   · Severe depression               Plan:    · Continue guideline directed medical therapy with Coreg 25 mg p.o. twice daily and Entresto 97/103 mg p.o. twice daily.  If he remains hemodynamically stable then consider adding low-dose hydralazine and Isordil. · Continue aspirin and statin   · Volume status and hemodialysis as per nephrology service. · Consider palliative care consult   · No ischemic work-up is planned at the request with the patient   · Volume management as per nephrology service   · Endocrinology input was noted regarding hypothyroidism and medication recommendations and appreciated. · Patient refused LifeVest for primary prophylaxis. Nephrology PN:       IMPRESSION/RECOMMENDATIONS:  The patient is a 67 y.o. male with significant past medical history of end stage renal disease secondary to nephrosclerosis and/or diabetic nephropathy, on hemodialysis Rznzytz-Phpmrwze-Wkqeszmq via Catheter, last hemodialysis treatment on 4/16/22, HTN, Type II DM, Hypothyroidism who presents to the ER on 4/21/22 with cough and shortness of breath. He is very noncompliant with his outpatient dialysis sessions. Initially he was only staying for 1.5-2 hours of treatment. Recently he has only been coming every other treatment. He states he feels like the dialysis makes him \"feel very weak as if he was wasting away. \" Rosemary Pacheco also was noncompliant with his medications. Often times not taking them at all. He is complaining of orthopnea. Lab work was significant for CO2 19, BUN 53, Creatinine, Troponin 1,078. Renal was consulted for ESRD on HD.            1. ESRD on HD Sxhukap-Qtghbjpe-Acmplirj schedule. We will continue while inpatient. Patient adamantly refusing surgical intervention with no intention for AV access creation. 2. Metabolic acidosis. On sodium bicarbonate    3. HTN, on Coreg,    4. Type II DM. Hgb A1c 5.4   5. HFpEF, LVEF 25-30% on Entresto, Bumex   6. Anemia of CKD, not on NIXON as Hgb > 11   7. Depression, psychiatry following.  Started on Zoloft     Plan:        · Continue HD on TTS schedule. Tolerating well    · Continue Sodium bicarbonate 325 mg PO BID. · Continue to monitor bicarbonate level   · Continue to monitor labs                  Phy advisor recommendation by Rosana Peña LPN       Review Status Review Entered   In Primary 2022 12:37      Criteria Review   obs list upgrade      We recommend that the following pt's current hospitalization under Observation status Is upgraded to INPATIENT if you agree, please place a new ADMIT order in [facility] as recommended. .       Name: Toni Meyers   : 1950   CSN: [id number]   Medicare  Unit: [facility]          Clinical summary 66 yo non compliant pt with sob and accelerated htn, dialysis pt who misses every other tt and for half the tt. Vitals bmi 18 bp stable after med changes  Labs and Imaging Renal / cardio following , on iv apresoline, med management required pt/ot   MCG criteria applies yes, 2mn  Comments :upgrade to inpt due to active medical tt and education for compliance       This chart was reviewed at 12:31 PM 2022    Jony Ramos MD   [address] [place] [address]   CELL : [phone]  _______________________________________________________________________________________    Medicare pt already crossed two Midnights : This patient is at above high risk of deterioration based on documented presenting clinical data, comorbid conditions, high risk of adverse events and current acute care course. Mr. Toni Meyers now meets Inpatient Admission status criteria in accordance with CMS regulation Section 43 .3. Specifically, due to medical necessity the patient's stay now exceeds Two Midnights. The final decision of the patient's hospitalization status depends on the  attending physician's judgment       The information in this document is a recommendation to be used for utilization review and utilization management purposes only.  This recommendation is not an order. The recommendation is made based on the information reviewed at the time of the referral, is pursuant to the [facility] Conditions of Participation (42 CFR Part 482), and is neither a judgment nor an assessment with regard to the appropriateness or quality of clinical care. Nothing in this document may be used to limit, alter, or  affect clinical services provided to the patient named below. The provider of services is ultimately responsible for the submission of a claim that has met all requirements for correct coding, billing, and reimbursement.

## 2022-05-21 PROBLEM — R79.89 ELEVATED TROPONIN: Status: RESOLVED | Noted: 2021-11-19 | Resolved: 2022-05-21

## 2022-05-21 PROBLEM — R77.8 ELEVATED TROPONIN: Status: RESOLVED | Noted: 2021-11-19 | Resolved: 2022-05-21

## 2022-05-21 PROBLEM — R05.9 COUGH: Status: RESOLVED | Noted: 2022-04-21 | Resolved: 2022-05-21

## 2022-05-31 ENCOUNTER — OFFICE VISIT (OUTPATIENT)
Dept: ENDOCRINOLOGY | Age: 72
End: 2022-05-31
Payer: MEDICARE

## 2022-05-31 VITALS
HEART RATE: 68 BPM | SYSTOLIC BLOOD PRESSURE: 168 MMHG | DIASTOLIC BLOOD PRESSURE: 82 MMHG | BODY MASS INDEX: 18.1 KG/M2 | RESPIRATION RATE: 18 BRPM | OXYGEN SATURATION: 100 % | WEIGHT: 141 LBS | HEIGHT: 74 IN

## 2022-05-31 DIAGNOSIS — N62 GYNECOMASTIA: ICD-10-CM

## 2022-05-31 DIAGNOSIS — E03.9 HYPOTHYROIDISM, UNSPECIFIED TYPE: Primary | ICD-10-CM

## 2022-05-31 DIAGNOSIS — E03.9 HYPOTHYROIDISM, UNSPECIFIED TYPE: ICD-10-CM

## 2022-05-31 DIAGNOSIS — E78.5 HYPERLIPIDEMIA, UNSPECIFIED HYPERLIPIDEMIA TYPE: ICD-10-CM

## 2022-05-31 DIAGNOSIS — E55.9 VITAMIN D DEFICIENCY: ICD-10-CM

## 2022-05-31 LAB
FOLLICLE STIMULATING HORMONE: 6.1 MIU/ML
HCG QUALITATIVE: NEGATIVE
LUTEINIZING HORMONE: 17.5 MIU/ML
T4 FREE: 1.53 NG/DL (ref 0.93–1.7)
TSH SERPL DL<=0.05 MIU/L-ACNC: 1.85 UIU/ML (ref 0.27–4.2)

## 2022-05-31 PROCEDURE — 1036F TOBACCO NON-USER: CPT | Performed by: INTERNAL MEDICINE

## 2022-05-31 PROCEDURE — 1123F ACP DISCUSS/DSCN MKR DOCD: CPT | Performed by: INTERNAL MEDICINE

## 2022-05-31 PROCEDURE — G8419 CALC BMI OUT NRM PARAM NOF/U: HCPCS | Performed by: INTERNAL MEDICINE

## 2022-05-31 PROCEDURE — 3017F COLORECTAL CA SCREEN DOC REV: CPT | Performed by: INTERNAL MEDICINE

## 2022-05-31 PROCEDURE — 99214 OFFICE O/P EST MOD 30 MIN: CPT | Performed by: INTERNAL MEDICINE

## 2022-05-31 PROCEDURE — G8428 CUR MEDS NOT DOCUMENT: HCPCS | Performed by: INTERNAL MEDICINE

## 2022-05-31 NOTE — PATIENT INSTRUCTIONS
Recommendations for today's visit  · Continue Levothyroxine 100 mcg daily until we get blood work   · Blood work today to check thyroid level   · Please take levothyroxine in the morning at empty stomach, wait one hour before eating , avoid multivitamins containing calcium  or iron with it. I you have any questions please call Dr. Christopher Carl office     Lawson Mixon MD  Endocrinologist, [facility])   [facility] [address]   Phone: [phone]  Fax: [phone]  Email: [email or web address]

## 2022-05-31 NOTE — PROGRESS NOTES
[address] [place] [address] Department of Endocrinology Diabetes and Metabolism   [address] [facility] [address]   Phone: [phone]  Fax: [phone]    Date of Service: 5/31/2022  Primary Care Physician: Coleen Anderson MD  Provider : Tracy Nowak MD     Reason for the visit:  Hypothyroidism    History of Present Illness:  Rosalia Sharma is a very pleasant 67 y.o. old male with PMH  primary hypothyroidism, HTN, ESRD seen today for follow up visit     The patient was recently admitted to the hospital in May/2022 for SOB and found to have markedly elevated TSH with normal FT4. Pt admit poor compliance with both thyroid and DM medications prior to recent hospitalization   Lab Results   Component Value Date/Time    .300 (H) 04/21/2022 02:15 PM    T4FREE 1.06 04/21/2022 02:15 PM     LT4 dose was recently adjusted to 100 mcg daily. Patient takes levothyroxine in the morning at empty stomach, wait one hour before eating , avoid multivitamins containing calcium  or iron with it. Due for labs in few weeks     Past Medical History   Past Medical History:   Diagnosis Date    Chronic kidney disease     Diabetes mellitus ([facility] [address]     Hepatitis C     Hypertension     Liver disease     Thyroid disease     Unspecified diseases of blood and blood-forming organs        Past Surgical History   Past Surgical History:   Procedure Laterality Date    TONSILLECTOMY      VASCULAR SURGERY N/A 11/21/2021    CATHETER INSERTION HEMODIALYSIS, REMOVAL OF FEMORAL CATHETER performed by Lanita Riedel, MD at [address] [place] [address] history   Tobacco:   reports that he quit smoking about 48 years ago. His smoking use included cigarettes. He has a 20.00 pack-year smoking history. He has never used smokeless tobacco.  Alcohol:   reports no history of alcohol use. Drugs:   reports current drug use.   Family history   Family History   Problem Relation Age of Onset    Diabetes Mother     Cancer Father Allergies and Drug reactions  Allergies   Allergen Reactions    Penicillins      Does not remember     Current Outpatient Medications   Medication Sig Dispense Refill    sertraline (ZOLOFT) 50 MG tablet Take 1 tablet by mouth daily 30 tablet 1    bumetanide (BUMEX) 2 MG tablet Take 1 tablet by mouth 2 times daily (with meals) 30 tablet 3    levothyroxine (SYNTHROID) 100 MCG tablet Take 1 tablet by mouth Daily 30 tablet 3    sacubitril-valsartan (ENTRESTO)  MG per tablet Take 1 tablet by mouth 2 times daily 60 tablet 3    sodium bicarbonate 650 MG tablet TK 2 TS PO Q 8 H      vitamin D (ERGOCALCIFEROL) 1.25 MG (56878 UT) CAPS capsule TK 1 C PO 1 TIME Q WK      carvedilol (COREG) 25 MG tablet Take 1 tablet by mouth 2 times daily (with meals) (Patient not taking: Reported on 4/21/2022) 60 tablet 0    aspirin 81 MG EC tablet Take 1 tablet by mouth daily 30 tablet 0    atorvastatin (LIPITOR) 40 MG tablet Take 1 tablet by mouth nightly (Patient not taking: Reported on 12/22/2021) 30 tablet 0     No current facility-administered medications for this visit. Review of Systems  All systems reviewed. All negative except for symptoms mentioned in HPI     OBJECTIVE    BP (!) 168/82   Pulse 68   Resp 18   Ht 6' 2\" (1.88 m)   Wt 141 lb (64 kg)   SpO2 100%   BMI 18.10 kg/m²   Wt Readings from Last 6 Encounters:   05/31/22 141 lb (64 kg)   04/26/22 138 lb 0.1 oz (62.6 kg)   01/19/22 150 lb (68 kg)   12/22/21 143 lb 9.6 oz (65.1 kg)   12/22/21 145 lb (65.8 kg)   12/11/21 136 lb 3.9 oz (61.8 kg)       Physical examination:  General: awake alert, oriented x3, no abnormal position or movements. HEENT: normocephalic non traumatic, no exophthalmos, no lid lag, no lid retraction   Neck: supple, no LN enlargement, no thyromegaly, no thyroid tenderness, no thyroid bruit, no JVD. Pulm: good equal air entry no added sounds   CVS: S1 + S2   Abd: soft lax, no tenderness,   Skin: warm, no lesions, no rash.  No palmar erythema, no onycholysis, no pretibial Myxoedema, no acropachy   Musculoskeletal: No back tenderness, no kyphosis/scoliosis    Neuro: CN intact, sensation notmal , muscle power normal. No tremors   Psych: normal mood, and affect    Review of Laboratory Data:  I personally reviewed the following labs:  Lab Results   Component Value Date/Time    WBC 3.4 (L) 04/26/2022 05:27 AM    RBC 3.42 (L) 04/26/2022 05:27 AM    HGB 10.4 (L) 04/26/2022 05:27 AM    HCT 30.9 (L) 04/26/2022 05:27 AM    MCV 90.4 04/26/2022 05:27 AM    MCH 30.4 04/26/2022 05:27 AM    MCHC 33.7 04/26/2022 05:27 AM    RDW 21.6 (H) 04/26/2022 05:27 AM     04/26/2022 05:27 AM    MPV 10.3 04/26/2022 05:27 AM    BANDS 1 06/06/2013 06:30 PM      Lab Results   Component Value Date/Time     04/26/2022 05:27 AM    K 4.8 04/26/2022 05:27 AM    CO2 24 04/26/2022 05:27 AM    BUN 74 (H) 04/26/2022 05:27 AM    CREATININE 8.5 (HH) 04/26/2022 05:27 AM    CALCIUM 7.0 (L) 04/26/2022 05:27 AM    LABGLOM 6 04/26/2022 05:27 AM    GFRAA 7 04/26/2022 05:27 AM      Lab Results   Component Value Date/Time    TSH 1.850 05/31/2022 11:44 AM    T4FREE 1.53 05/31/2022 11:44 AM     Lab Results   Component Value Date    LABA1C 5.4 04/20/2022    GLUCOSE 88 04/26/2022    MALBCR 3951.0 11/12/2021    LABMICR 3516.4 11/12/2021    LABCREA 89 11/12/2021     Lab Results   Component Value Date    LABA1C 5.4 04/20/2022    LABA1C 5.3 11/12/2021    LABA1C 5.1 08/24/2021     Lab Results   Component Value Date    TRIG 109 08/24/2021    HDL 53 11/12/2021    LDLCALC 147 11/12/2021    CHOL 200 08/24/2021     Lab Results   Component Value Date    VITD25 13 11/12/2021    VITD25 13 08/24/2021       ASSESSMENT & RECOMMENDATIONS   Odilia Love, a 67 y.o.-old male seen in for management of Hypothyroidism    Diagnosis Orders   1. Hypothyroidism, unspecified type  TSH    T4, Free   2.  Gynecomastia  Testosterone, free, total    Follicle Stimulating Hormone    Luteinizing Hormone    HCG, QUANTITATIVE, PREGNANCY   3. Vitamin D deficiency     4. Hyperlipidemia, unspecified hyperlipidemia type         Primary hypothyroidism   · H/o poor compliance with taking thyroid medication. Doing better now   · Levothyroxine dose was adjusted to 100 mcg daily during recent hospitalization in 5/2022   · Will recheck TFT in few weeks and adjust dose if needed   · Normal free T4 with high TSH (.3, FT4 1.06) are very consistent with medication non-compliance   · Pt was instructed to take levothyroxine in the morning at empty stomach, wait one hour before eating , avoid multivitamins containing calcium  or iron with it.    vitD deficiency   · Encourage votD supplement     HLD  · Continue Lipitor 40 mg daily     Bilateral Gynecomastia  · Check Testosterone, HCG   · Not on medication known to cause gynecomastia     I personally reviewed external notes from PCP and other patient's care team providers, and personally interpreted labs associated with the above diagnosis. I also ordered labs to further assess and manage the above addressed medical conditions    The above issues were reviewed with the patient who understood and agreed with the plan. Thank you for allowing us to participate in the care of this patient. Please do not hesitate to contact us with any additional questions. Gustavo Faith MD  Endocrinologist, [facility] Diabetes Care and Endocrinology   [address] [facility] [address]   Phone: [phone]  Fax: [phone]  ---------------------------------  An electronic signature was used to authenticate this note.  Christiano Hewitt MD on 5/31/2022 at 11:22 AM

## 2022-06-02 LAB
SEX HORMONE BINDING GLOBULIN: 55 NMOL/L (ref 11–80)
TESTOSTERONE FREE-NONMALE: 73.1 PG/ML (ref 47–244)
TESTOSTERONE TOTAL: 492 NG/DL (ref 220–1000)

## 2022-06-04 ENCOUNTER — TELEPHONE (OUTPATIENT)
Dept: ENDOCRINOLOGY | Age: 72
End: 2022-06-04

## 2022-08-10 ENCOUNTER — APPOINTMENT (OUTPATIENT)
Dept: GENERAL RADIOLOGY | Age: 72
DRG: 291 | End: 2022-08-10
Payer: MEDICARE

## 2022-08-10 ENCOUNTER — HOSPITAL ENCOUNTER (INPATIENT)
Age: 72
LOS: 4 days | Discharge: NURSING, REHAB OR LONG-TERM CARE | DRG: 291 | End: 2022-08-16
Attending: EMERGENCY MEDICINE | Admitting: FAMILY MEDICINE
Payer: MEDICARE

## 2022-08-10 DIAGNOSIS — R62.7 FAILURE TO THRIVE IN ADULT: ICD-10-CM

## 2022-08-10 DIAGNOSIS — I10 ESSENTIAL HYPERTENSION: ICD-10-CM

## 2022-08-10 DIAGNOSIS — R77.8 ELEVATED TROPONIN: ICD-10-CM

## 2022-08-10 DIAGNOSIS — Z91.14 NON COMPLIANCE W MEDICATION REGIMEN: ICD-10-CM

## 2022-08-10 DIAGNOSIS — I10 HYPERTENSION, UNSPECIFIED TYPE: ICD-10-CM

## 2022-08-10 DIAGNOSIS — Z99.2 END STAGE RENAL FAILURE ON DIALYSIS (HCC): Primary | ICD-10-CM

## 2022-08-10 DIAGNOSIS — N18.6 END STAGE RENAL FAILURE ON DIALYSIS (HCC): Primary | ICD-10-CM

## 2022-08-10 PROBLEM — Z91.148 NON COMPLIANCE W MEDICATION REGIMEN: Status: ACTIVE | Noted: 2022-08-10

## 2022-08-10 PROBLEM — R79.89 ELEVATED TROPONIN: Status: ACTIVE | Noted: 2022-08-10

## 2022-08-10 PROBLEM — M62.81 MUSCLE WEAKNESS: Status: ACTIVE | Noted: 2022-08-10

## 2022-08-10 LAB
ALBUMIN SERPL-MCNC: 4 G/DL (ref 3.5–5.2)
ALP BLD-CCNC: 99 U/L (ref 40–129)
ALT SERPL-CCNC: 16 U/L (ref 0–40)
ANION GAP SERPL CALCULATED.3IONS-SCNC: 16 MMOL/L (ref 7–16)
AST SERPL-CCNC: 31 U/L (ref 0–39)
BASOPHILS ABSOLUTE: 0.06 E9/L (ref 0–0.2)
BASOPHILS RELATIVE PERCENT: 1.7 % (ref 0–2)
BILIRUB SERPL-MCNC: 1 MG/DL (ref 0–1.2)
BUN BLDV-MCNC: 38 MG/DL (ref 6–23)
CALCIUM SERPL-MCNC: 8.9 MG/DL (ref 8.6–10.2)
CHLORIDE BLD-SCNC: 97 MMOL/L (ref 98–107)
CHP ED QC CHECK: NORMAL
CO2: 24 MMOL/L (ref 22–29)
CREAT SERPL-MCNC: 6 MG/DL (ref 0.7–1.2)
EOSINOPHILS ABSOLUTE: 0.05 E9/L (ref 0.05–0.5)
EOSINOPHILS RELATIVE PERCENT: 1.4 % (ref 0–6)
GFR AFRICAN AMERICAN: 11
GFR NON-AFRICAN AMERICAN: 9 ML/MIN/1.73
GLUCOSE BLD-MCNC: 74 MG/DL
GLUCOSE BLD-MCNC: 94 MG/DL (ref 74–99)
HCT VFR BLD CALC: 42.7 % (ref 37–54)
HEMOGLOBIN: 14.4 G/DL (ref 12.5–16.5)
IMMATURE GRANULOCYTES #: 0.01 E9/L
IMMATURE GRANULOCYTES %: 0.3 % (ref 0–5)
LYMPHOCYTES ABSOLUTE: 0.78 E9/L (ref 1.5–4)
LYMPHOCYTES RELATIVE PERCENT: 21.9 % (ref 20–42)
MAGNESIUM: 1.9 MG/DL (ref 1.6–2.6)
MCH RBC QN AUTO: 30.7 PG (ref 26–35)
MCHC RBC AUTO-ENTMCNC: 33.7 % (ref 32–34.5)
MCV RBC AUTO: 91 FL (ref 80–99.9)
METER GLUCOSE: 74 MG/DL (ref 74–99)
MONOCYTES ABSOLUTE: 0.41 E9/L (ref 0.1–0.95)
MONOCYTES RELATIVE PERCENT: 11.5 % (ref 2–12)
NEUTROPHILS ABSOLUTE: 2.25 E9/L (ref 1.8–7.3)
NEUTROPHILS RELATIVE PERCENT: 63.2 % (ref 43–80)
PDW BLD-RTO: 13.4 FL (ref 11.5–15)
PHOSPHORUS: 4 MG/DL (ref 2.5–4.5)
PLATELET # BLD: 144 E9/L (ref 130–450)
PMV BLD AUTO: 11.9 FL (ref 7–12)
POTASSIUM REFLEX MAGNESIUM: 5.2 MMOL/L (ref 3.5–5)
POTASSIUM SERPL-SCNC: 4.2 MMOL/L (ref 3.5–5)
RBC # BLD: 4.69 E12/L (ref 3.8–5.8)
REASON FOR REJECTION: NORMAL
REJECTED TEST: NORMAL
SARS-COV-2, NAAT: NOT DETECTED
SODIUM BLD-SCNC: 137 MMOL/L (ref 132–146)
TOTAL PROTEIN: 8.1 G/DL (ref 6.4–8.3)
TROPONIN, HIGH SENSITIVITY: 715 NG/L (ref 0–11)
TROPONIN, HIGH SENSITIVITY: 742 NG/L (ref 0–11)
WBC # BLD: 3.6 E9/L (ref 4.5–11.5)

## 2022-08-10 PROCEDURE — 82962 GLUCOSE BLOOD TEST: CPT

## 2022-08-10 PROCEDURE — 99220 PR INITIAL OBSERVATION CARE/DAY 70 MINUTES: CPT | Performed by: FAMILY MEDICINE

## 2022-08-10 PROCEDURE — 96374 THER/PROPH/DIAG INJ IV PUSH: CPT

## 2022-08-10 PROCEDURE — 84100 ASSAY OF PHOSPHORUS: CPT

## 2022-08-10 PROCEDURE — 71045 X-RAY EXAM CHEST 1 VIEW: CPT

## 2022-08-10 PROCEDURE — 85025 COMPLETE CBC W/AUTO DIFF WBC: CPT

## 2022-08-10 PROCEDURE — 96375 TX/PRO/DX INJ NEW DRUG ADDON: CPT

## 2022-08-10 PROCEDURE — G0378 HOSPITAL OBSERVATION PER HR: HCPCS

## 2022-08-10 PROCEDURE — 93005 ELECTROCARDIOGRAM TRACING: CPT

## 2022-08-10 PROCEDURE — 6360000002 HC RX W HCPCS: Performed by: EMERGENCY MEDICINE

## 2022-08-10 PROCEDURE — 6370000000 HC RX 637 (ALT 250 FOR IP)

## 2022-08-10 PROCEDURE — 80053 COMPREHEN METABOLIC PANEL: CPT

## 2022-08-10 PROCEDURE — 2580000003 HC RX 258: Performed by: FAMILY MEDICINE

## 2022-08-10 PROCEDURE — 83735 ASSAY OF MAGNESIUM: CPT

## 2022-08-10 PROCEDURE — 99285 EMERGENCY DEPT VISIT HI MDM: CPT

## 2022-08-10 PROCEDURE — 84132 ASSAY OF SERUM POTASSIUM: CPT

## 2022-08-10 PROCEDURE — 87635 SARS-COV-2 COVID-19 AMP PRB: CPT

## 2022-08-10 PROCEDURE — 2500000003 HC RX 250 WO HCPCS

## 2022-08-10 PROCEDURE — 84484 ASSAY OF TROPONIN QUANT: CPT

## 2022-08-10 PROCEDURE — 36415 COLL VENOUS BLD VENIPUNCTURE: CPT

## 2022-08-10 RX ORDER — ACETAMINOPHEN 650 MG/1
650 SUPPOSITORY RECTAL EVERY 6 HOURS PRN
Status: DISCONTINUED | OUTPATIENT
Start: 2022-08-10 | End: 2022-08-10 | Stop reason: SDUPTHER

## 2022-08-10 RX ORDER — SODIUM CHLORIDE 9 MG/ML
INJECTION, SOLUTION INTRAVENOUS PRN
Status: DISCONTINUED | OUTPATIENT
Start: 2022-08-10 | End: 2022-08-10 | Stop reason: SDUPTHER

## 2022-08-10 RX ORDER — ACETAMINOPHEN 325 MG/1
650 TABLET ORAL EVERY 6 HOURS PRN
Status: DISCONTINUED | OUTPATIENT
Start: 2022-08-10 | End: 2022-08-10 | Stop reason: SDUPTHER

## 2022-08-10 RX ORDER — ACETAMINOPHEN 650 MG/1
650 SUPPOSITORY RECTAL EVERY 6 HOURS PRN
Status: DISCONTINUED | OUTPATIENT
Start: 2022-08-10 | End: 2022-08-16 | Stop reason: HOSPADM

## 2022-08-10 RX ORDER — PROCHLORPERAZINE EDISYLATE 5 MG/ML
10 INJECTION INTRAMUSCULAR; INTRAVENOUS EVERY 6 HOURS PRN
Status: DISCONTINUED | OUTPATIENT
Start: 2022-08-10 | End: 2022-08-16 | Stop reason: HOSPADM

## 2022-08-10 RX ORDER — SODIUM CHLORIDE 9 MG/ML
INJECTION, SOLUTION INTRAVENOUS PRN
Status: DISCONTINUED | OUTPATIENT
Start: 2022-08-10 | End: 2022-08-16 | Stop reason: HOSPADM

## 2022-08-10 RX ORDER — ATORVASTATIN CALCIUM 40 MG/1
40 TABLET, FILM COATED ORAL NIGHTLY
Status: DISCONTINUED | OUTPATIENT
Start: 2022-08-10 | End: 2022-08-16 | Stop reason: HOSPADM

## 2022-08-10 RX ORDER — HYDRALAZINE HYDROCHLORIDE 20 MG/ML
10 INJECTION INTRAMUSCULAR; INTRAVENOUS ONCE
Status: COMPLETED | OUTPATIENT
Start: 2022-08-10 | End: 2022-08-10

## 2022-08-10 RX ORDER — HEPARIN SODIUM 10000 [USP'U]/ML
5000 INJECTION, SOLUTION INTRAVENOUS; SUBCUTANEOUS EVERY 8 HOURS SCHEDULED
Status: DISCONTINUED | OUTPATIENT
Start: 2022-08-10 | End: 2022-08-16 | Stop reason: HOSPADM

## 2022-08-10 RX ORDER — SODIUM CHLORIDE 0.9 % (FLUSH) 0.9 %
5-40 SYRINGE (ML) INJECTION EVERY 12 HOURS SCHEDULED
Status: DISCONTINUED | OUTPATIENT
Start: 2022-08-10 | End: 2022-08-10 | Stop reason: SDUPTHER

## 2022-08-10 RX ORDER — SODIUM CHLORIDE 0.9 % (FLUSH) 0.9 %
5-40 SYRINGE (ML) INJECTION PRN
Status: DISCONTINUED | OUTPATIENT
Start: 2022-08-10 | End: 2022-08-16 | Stop reason: HOSPADM

## 2022-08-10 RX ORDER — LABETALOL HYDROCHLORIDE 5 MG/ML
10 INJECTION, SOLUTION INTRAVENOUS ONCE
Status: COMPLETED | OUTPATIENT
Start: 2022-08-10 | End: 2022-08-10

## 2022-08-10 RX ORDER — ACETAMINOPHEN 325 MG/1
650 TABLET ORAL EVERY 6 HOURS PRN
Status: DISCONTINUED | OUTPATIENT
Start: 2022-08-10 | End: 2022-08-16 | Stop reason: HOSPADM

## 2022-08-10 RX ORDER — SODIUM CHLORIDE 0.9 % (FLUSH) 0.9 %
5-40 SYRINGE (ML) INJECTION EVERY 12 HOURS SCHEDULED
Status: DISCONTINUED | OUTPATIENT
Start: 2022-08-10 | End: 2022-08-16 | Stop reason: HOSPADM

## 2022-08-10 RX ORDER — HEPARIN SODIUM 10000 [USP'U]/ML
5000 INJECTION, SOLUTION INTRAVENOUS; SUBCUTANEOUS EVERY 8 HOURS SCHEDULED
Status: DISCONTINUED | OUTPATIENT
Start: 2022-08-10 | End: 2022-08-10 | Stop reason: SDUPTHER

## 2022-08-10 RX ORDER — LEVOTHYROXINE SODIUM 0.1 MG/1
100 TABLET ORAL DAILY
Status: DISCONTINUED | OUTPATIENT
Start: 2022-08-11 | End: 2022-08-16 | Stop reason: HOSPADM

## 2022-08-10 RX ORDER — SODIUM CHLORIDE 0.9 % (FLUSH) 0.9 %
5-40 SYRINGE (ML) INJECTION PRN
Status: DISCONTINUED | OUTPATIENT
Start: 2022-08-10 | End: 2022-08-10 | Stop reason: SDUPTHER

## 2022-08-10 RX ORDER — POLYETHYLENE GLYCOL 3350 17 G/17G
17 POWDER, FOR SOLUTION ORAL DAILY PRN
Status: DISCONTINUED | OUTPATIENT
Start: 2022-08-10 | End: 2022-08-16 | Stop reason: HOSPADM

## 2022-08-10 RX ADMIN — Medication 10 ML: at 21:44

## 2022-08-10 RX ADMIN — HYDRALAZINE HYDROCHLORIDE 10 MG: 20 INJECTION INTRAMUSCULAR; INTRAVENOUS at 18:59

## 2022-08-10 RX ADMIN — LABETALOL HYDROCHLORIDE 10 MG: 5 INJECTION INTRAVENOUS at 21:44

## 2022-08-10 RX ADMIN — ATORVASTATIN CALCIUM 40 MG: 40 TABLET, FILM COATED ORAL at 21:44

## 2022-08-10 ASSESSMENT — ENCOUNTER SYMPTOMS
ABDOMINAL PAIN: 0
EYE DISCHARGE: 0
NAUSEA: 1
SHORTNESS OF BREATH: 0
CONSTIPATION: 0
EYE REDNESS: 0
SINUS PRESSURE: 0
VOMITING: 1
WHEEZING: 0
PHOTOPHOBIA: 0
DIARRHEA: 0
COUGH: 0
BACK PAIN: 0

## 2022-08-10 ASSESSMENT — PAIN SCALES - GENERAL: PAINLEVEL_OUTOF10: 0

## 2022-08-10 ASSESSMENT — PAIN - FUNCTIONAL ASSESSMENT: PAIN_FUNCTIONAL_ASSESSMENT: NONE - DENIES PAIN

## 2022-08-10 NOTE — ED PROVIDER NOTES
[facility]  Department of Emergency Medicine     Written by: Jos Whittaker MD  Patient Name: Heidy Sloomon  Attending Provider: Clemencia Roland. Swapna Lyle Date: 8/10/2022  1:49 PM  MRN: [id number]                   : 1950        Chief Complaint   Patient presents with    Hypertension     Nv and headache      - Chief complaint    The patient is a 48-year-old male with past medical history of diabetes and end-stage renal disease on hemodialysis Tuesday, Wednesday, Thursday, and Friday presenting with dizziness, nausea, and high blood pressure. The patient's last hemodialysis treatment was yesterday. This morning the patient attempted to get out of bed but was unable to stand up from a sitting position on the edge of the bed due to bilateral leg weakness. The patient also has associated nausea with vomiting new this morning. The patient took his blood pressure and it was high this morning. The patient has never had an episode of weakness in the past.  The patient usually ambulates without assistance. The patient lives alone in a house with no stairs. The patient denies trauma, fall, headache, loss of sensation, numbness, chest pain, palpitations, shortness of breath, abdominal pain, dysuria, increasing or decreasing urinary frequency, constipation, diarrhea, leg swelling, or leg pain. Review of Systems   Constitutional:  Negative for activity change, chills, fatigue and fever. HENT:  Negative for congestion, postnasal drip and sinus pressure. Eyes:  Negative for photophobia, discharge, redness and visual disturbance. Respiratory:  Negative for cough, shortness of breath and wheezing. Cardiovascular:  Negative for chest pain and leg swelling. Gastrointestinal:  Positive for nausea and vomiting. Negative for abdominal pain, constipation and diarrhea. Endocrine: Negative for cold intolerance and heat intolerance.    Genitourinary:  Negative for difficulty urinating, dysuria, flank pain, frequency and hematuria. Musculoskeletal:  Negative for arthralgias, back pain, joint swelling and myalgias. Skin:  Negative for rash and wound. Allergic/Immunologic: Negative for immunocompromised state. Neurological:  Positive for weakness. Negative for dizziness, facial asymmetry, light-headedness, numbness and headaches. Hematological:  Does not bruise/bleed easily. Psychiatric/Behavioral:  Negative for behavioral problems and hallucinations. Physical Exam  Constitutional:       General: He is not in acute distress. Appearance: Normal appearance. He is not toxic-appearing. HENT:      Head: Normocephalic and atraumatic. Right Ear: Hearing normal.      Left Ear: Hearing normal.      Nose: Nose normal.      Mouth/Throat:      Lips: Pink. Mouth: Mucous membranes are moist.      Pharynx: Oropharynx is clear. Eyes:      Extraocular Movements: Extraocular movements intact. Conjunctiva/sclera: Conjunctivae normal.      Pupils: Pupils are equal, round, and reactive to light. Cardiovascular:      Rate and Rhythm: Normal rate and regular rhythm. Pulses: Normal pulses. Heart sounds: S1 normal and S2 normal.   Pulmonary:      Effort: Pulmonary effort is normal. No respiratory distress. Breath sounds: Normal breath sounds and air entry. Abdominal:      General: Abdomen is flat. Bowel sounds are normal.      Palpations: Abdomen is soft. There is no mass. Tenderness: There is no abdominal tenderness. There is no guarding. Musculoskeletal:         General: No swelling or tenderness. Normal range of motion. Cervical back: Normal range of motion and neck supple. Right lower leg: No edema. Left lower leg: No edema. Skin:     General: Skin is warm and dry. Capillary Refill: Capillary refill takes less than 2 seconds. Findings: No bruising, lesion or rash.    Neurological:      General: No focal deficit present. Mental Status: He is alert and oriented to person, place, and time. Mental status is at baseline. Cranial Nerves: Cranial nerves are intact. No cranial nerve deficit or facial asymmetry. Sensory: Sensation is intact. No sensory deficit. Motor: Motor function is intact. No weakness or abnormal muscle tone. Comments: Strength 3/5 bilaterally lower extremity. Left stronger than right. Psychiatric:         Attention and Perception: Attention normal.         Mood and Affect: Mood and affect normal.         Behavior: Behavior is cooperative. EKG Interpretation    Interpreted by emergency department physician    Rhythm: normal sinus   Rate: normal  Axis: left  Ectopy: none  Conduction: left anterior fasciclar block  ST Segments: no acute change  T Waves: no acute change  Q Waves: none    Clinical Impression: no acute changes    Janan Libman, MD    Procedures       MDM  Number of Diagnoses or Management Options  Elevated troponin  End stage renal failure on dialysis [facility])  Essential hypertension  Failure to thrive in adult  Hypertension, unspecified type  Non compliance w medication regimen  Diagnosis management comments: The patient is a 21-year-old male with past medical history of diabetes and end-stage renal disease on hemodialysis Tuesday, Wednesday, Thursday, and Friday presenting with dizziness, nausea, and high blood pressure. At the time of examination the patient is hypertensive but otherwise vitally stable. CBC reveals leukopenia. Patient's creatinine 6 which is consistent from a level taking 2 weeks ago. Potassium is hemolyzed at 5.2. Patient reassessed. Patient reports being noncompliant with his medication. The patient was asked whether he would like to live in a nursing home and the patient is open to the idea but would like to have a conversation with the  and have his son involved. Second troponin is 742. Delta troponin is 27.   Call out to cardiology was made spoke with Dr. Louis Betts who okayed admission here downtown. Case was discussed with Dr. Raine Diana who agreed to admit the patient and wanted something for his blood pressure. Patient was given hydralazine. The patient was educated about his condition and demonstrated good understanding. At the time of admission the patient had no questions and was vitally stable. ED Course as of 08/10/22 2121   Wed Aug 10, 2022   1456 ATTENDING PROVIDER ATTESTATION:     I have personally performed and/or participated in the history, exam, medical decision making, and procedures and agree with all pertinent clinical information unless otherwise noted. I have also reviewed and agree with the past medical, family and social history unless otherwise noted. I have discussed this patient in detail with the resident and provided the instruction and education regarding the evidence-based evaluation and treatment of fatigue. History: Patient reports history of end-stage renal disease and receives dialysis 4 days/week, Tuesday, Wednesday, Thursday and Friday. He reports this morning, he was weak and unable to even get out of bed. He states he has these episodes from time to time. He denies any fever or chills. No nausea or vomiting. No diarrhea. No chest pain, shortness of breath or cough. No other complaints. My findings: Ayanna Mcfarlane is a 67 y.o. male whom is in no distress. Physical exam reveals he is alert and oriented. Testing on the right upper chest.  Heart is regular, lungs are coarse. Abdomen soft nontender. Extremities intact with good distal pulse and capillary refill. My plan: Symptomatic and supportive care. Labs, x-ray. Electronically signed by Moncho Alexis DO on 8/10/22 at 2:56 PM EDT       [TG]   7533 Patient reassessed. He is resting comfortably. He still hypertensive. Patient reports being noncompliant with his medication.   He says that he has a hard time remembering to take his medication and he is describing periods of decreased p.o. intake. Patient lives alone. He is also saying that he had a similar episode of leg weakness 5 months prior when he was \"over-dialyzed\". The patient was asked whether he would like to live in a nursing home and the patient is open to the idea but would like to have a conversation with the  and have his son involved. [VG]   1370 Second troponin is 742. Delta troponin is 27. Repeat potassium is 4.2. COVID-negative. [VG]   [id number] Discussed with Dr. Maciel Renee, cardiology. Nydia for admission here. [TG]   [id number] Discussed with Dr. Claudia Phan. Nydia for admission. She has asked we give him something to bring his pressure down at least a little bit. Orders placed. [TG]      ED Course User Index  [TG] Kathleen Clarke DO  [VG] Candy Quijano MD       --------------------------------------------- PAST HISTORY ---------------------------------------------  Past Medical History:  has a past medical history of Chronic kidney disease, Diabetes mellitus ([facility] [address] Hepatitis C, Hypertension, Liver disease, Thyroid disease, and Unspecified diseases of blood and blood-forming organs. Past Surgical History:  has a past surgical history that includes Tonsillectomy and vascular surgery (N/A, 11/21/2021). Social History:  reports that he quit smoking about 48 years ago. His smoking use included cigarettes. He has a 20.00 pack-year smoking history. He has never used smokeless tobacco. He reports current drug use. He reports that he does not drink alcohol. Family History: family history includes Cancer in his father; Diabetes in his mother. The patients home medications have been reviewed.     Allergies: Penicillins    -------------------------------------------------- RESULTS -------------------------------------------------    LABS:  Results for orders placed or performed during the hospital encounter of 08/10/22   COVID-19, Rapid    Specimen: Nasopharyngeal Swab   Result Value Ref Range    SARS-CoV-2, NAAT Not Detected Not Detected   CBC with Auto Differential   Result Value Ref Range    WBC 3.6 (L) 4.5 - 11.5 E9/L    RBC 4.69 3.80 - 5.80 E12/L    Hemoglobin 14.4 12.5 - 16.5 g/dL    Hematocrit 42.7 37.0 - 54.0 %    MCV 91.0 80.0 - 99.9 fL    MCH 30.7 26.0 - 35.0 pg    MCHC 33.7 32.0 - 34.5 %    RDW 13.4 11.5 - 15.0 fL    Platelets 883 317 - 545 E9/L    MPV 11.9 7.0 - 12.0 fL    Neutrophils % 63.2 43.0 - 80.0 %    Immature Granulocytes % 0.3 0.0 - 5.0 %    Lymphocytes % 21.9 20.0 - 42.0 %    Monocytes % 11.5 2.0 - 12.0 %    Eosinophils % 1.4 0.0 - 6.0 %    Basophils % 1.7 0.0 - 2.0 %    Neutrophils Absolute 2.25 1.80 - 7.30 E9/L    Immature Granulocytes # 0.01 E9/L    Lymphocytes Absolute 0.78 (L) 1.50 - 4.00 E9/L    Monocytes Absolute 0.41 0.10 - 0.95 E9/L    Eosinophils Absolute 0.05 0.05 - 0.50 E9/L    Basophils Absolute 0.06 0.00 - 0.20 E9/L   Comprehensive Metabolic Panel w/ Reflex to MG   Result Value Ref Range    Sodium 137 132 - 146 mmol/L    Potassium reflex Magnesium 5.2 (H) 3.5 - 5.0 mmol/L    Chloride 97 (L) 98 - 107 mmol/L    CO2 24 22 - 29 mmol/L    Anion Gap 16 7 - 16 mmol/L    Glucose 94 74 - 99 mg/dL    BUN 38 (H) 6 - 23 mg/dL    Creatinine 6.0 (H) 0.7 - 1.2 mg/dL    GFR Non-African American 9 >=60 mL/min/1.73    GFR African American 11     Calcium 8.9 8.6 - 10.2 mg/dL    Total Protein 8.1 6.4 - 8.3 g/dL    Albumin 4.0 3.5 - 5.2 g/dL    Total Bilirubin 1.0 0.0 - 1.2 mg/dL    Alkaline Phosphatase 99 40 - 129 U/L    ALT 16 0 - 40 U/L    AST 31 0 - 39 U/L   Magnesium   Result Value Ref Range    Magnesium 1.9 1.6 - 2.6 mg/dL   Phosphorus   Result Value Ref Range    Phosphorus 4.0 2.5 - 4.5 mg/dL   Troponin   Result Value Ref Range    Troponin, High Sensitivity 715 (H) 0 - 11 ng/L   SPECIMEN REJECTION   Result Value Ref Range    Rejected Test TROP     Reason for Rejection see below    Troponin   Result Value Ref Range    Troponin, High Sensitivity 742 (H) 0 - 11 ng/L   Potassium   Result Value Ref Range    Potassium 4.2 3.5 - 5.0 mmol/L   POCT Glucose   Result Value Ref Range    Glucose 74 mg/dL    QC OK? ok    POCT Glucose   Result Value Ref Range    Meter Glucose 74 74 - 99 mg/dL   EKG 12 Lead   Result Value Ref Range    Ventricular Rate 89 BPM    Atrial Rate 89 BPM    P-R Interval 158 ms    QRS Duration 90 ms    Q-T Interval 402 ms    QTc Calculation (Bazett) 489 ms    P Axis 53 degrees    R Axis -60 degrees    T Axis -172 degrees       RADIOLOGY:  XR CHEST PORTABLE   Final Result   No acute cardiopulmonary process. ------------------------- NURSING NOTES AND VITALS REVIEWED ---------------------------  Date / Time Roomed:  8/10/2022  1:49 PM  ED Bed Assignment:  [id number]    The nursing notes within the ED encounter and vital signs as below have been reviewed. Patient Vitals for the past 24 hrs:   BP Temp Temp src Pulse Resp SpO2 Height Weight   08/10/22 2015 (!) 190/101 -- -- -- -- -- -- --   08/10/22 1929 (!) 146/81 -- -- -- -- -- -- --   08/10/22 1926 (!) 170/98 98.3 °F (36.8 °C) -- 80 18 98 % -- --   08/10/22 1859 (!) 198/118 -- -- -- -- -- -- --   08/10/22 1410 (!) 216/120 -- -- 89 20 98 % 6' 2\" (1.88 m) 148 lb (67.1 kg)   08/10/22 1356 (!) 216/128 98.1 °F (36.7 °C) Oral 90 16 98 % 6' 2\" (1.88 m) 148 lb (67.1 kg)       Oxygen Saturation Interpretation: Normal    ------------------------------------------ PROGRESS NOTES ------------------------------------------  Re-evaluation(s):  As outlined in the ED course. Counseling:  I have spoken with the patient and discussed todays results, in addition to providing specific details for the plan of care and counseling regarding the diagnosis and prognosis. Their questions are answered at this time and they are agreeable with the plan of admission.    --------------------------------- ADDITIONAL PROVIDER NOTES ---------------------------------  Consultations:  Time: 18:49.

## 2022-08-11 PROBLEM — I16.0 HYPERTENSIVE URGENCY: Status: ACTIVE | Noted: 2022-08-11

## 2022-08-11 LAB
ALBUMIN SERPL-MCNC: 3.6 G/DL (ref 3.5–5.2)
ALP BLD-CCNC: 98 U/L (ref 40–129)
ALT SERPL-CCNC: 14 U/L (ref 0–40)
ANION GAP SERPL CALCULATED.3IONS-SCNC: 14 MMOL/L (ref 7–16)
AST SERPL-CCNC: 19 U/L (ref 0–39)
BASOPHILS ABSOLUTE: 0.04 E9/L (ref 0–0.2)
BASOPHILS RELATIVE PERCENT: 1.2 % (ref 0–2)
BILIRUB SERPL-MCNC: 0.6 MG/DL (ref 0–1.2)
BUN BLDV-MCNC: 49 MG/DL (ref 6–23)
CALCIUM SERPL-MCNC: 8 MG/DL (ref 8.6–10.2)
CHLORIDE BLD-SCNC: 99 MMOL/L (ref 98–107)
CO2: 23 MMOL/L (ref 22–29)
CREAT SERPL-MCNC: 7.1 MG/DL (ref 0.7–1.2)
EKG ATRIAL RATE: 77 BPM
EKG ATRIAL RATE: 89 BPM
EKG P AXIS: 53 DEGREES
EKG P AXIS: 64 DEGREES
EKG P-R INTERVAL: 158 MS
EKG P-R INTERVAL: 176 MS
EKG Q-T INTERVAL: 402 MS
EKG Q-T INTERVAL: 450 MS
EKG QRS DURATION: 90 MS
EKG QRS DURATION: 92 MS
EKG QTC CALCULATION (BAZETT): 489 MS
EKG QTC CALCULATION (BAZETT): 509 MS
EKG R AXIS: -46 DEGREES
EKG R AXIS: -60 DEGREES
EKG T AXIS: -172 DEGREES
EKG T AXIS: 164 DEGREES
EKG VENTRICULAR RATE: 77 BPM
EKG VENTRICULAR RATE: 89 BPM
EOSINOPHILS ABSOLUTE: 0.05 E9/L (ref 0.05–0.5)
EOSINOPHILS RELATIVE PERCENT: 1.5 % (ref 0–6)
GFR AFRICAN AMERICAN: 9
GFR NON-AFRICAN AMERICAN: 8 ML/MIN/1.73
GLUCOSE BLD-MCNC: 204 MG/DL (ref 74–99)
HBA1C MFR BLD: 5.4 % (ref 4–5.6)
HCT VFR BLD CALC: 38.7 % (ref 37–54)
HEMOGLOBIN: 13 G/DL (ref 12.5–16.5)
IMMATURE GRANULOCYTES #: 0.01 E9/L
IMMATURE GRANULOCYTES %: 0.3 % (ref 0–5)
LYMPHOCYTES ABSOLUTE: 0.72 E9/L (ref 1.5–4)
LYMPHOCYTES RELATIVE PERCENT: 21.4 % (ref 20–42)
MCH RBC QN AUTO: 30.7 PG (ref 26–35)
MCHC RBC AUTO-ENTMCNC: 33.6 % (ref 32–34.5)
MCV RBC AUTO: 91.5 FL (ref 80–99.9)
MONOCYTES ABSOLUTE: 0.45 E9/L (ref 0.1–0.95)
MONOCYTES RELATIVE PERCENT: 13.4 % (ref 2–12)
NEUTROPHILS ABSOLUTE: 2.09 E9/L (ref 1.8–7.3)
NEUTROPHILS RELATIVE PERCENT: 62.2 % (ref 43–80)
PDW BLD-RTO: 13.5 FL (ref 11.5–15)
PLATELET # BLD: 128 E9/L (ref 130–450)
PMV BLD AUTO: 11.9 FL (ref 7–12)
POTASSIUM REFLEX MAGNESIUM: 4.2 MMOL/L (ref 3.5–5)
RBC # BLD: 4.23 E12/L (ref 3.8–5.8)
SODIUM BLD-SCNC: 136 MMOL/L (ref 132–146)
TOTAL PROTEIN: 6.9 G/DL (ref 6.4–8.3)
TSH SERPL DL<=0.05 MIU/L-ACNC: 129.4 UIU/ML (ref 0.27–4.2)
WBC # BLD: 3.4 E9/L (ref 4.5–11.5)

## 2022-08-11 PROCEDURE — 6370000000 HC RX 637 (ALT 250 FOR IP): Performed by: INTERNAL MEDICINE

## 2022-08-11 PROCEDURE — 97161 PT EVAL LOW COMPLEX 20 MIN: CPT

## 2022-08-11 PROCEDURE — 85025 COMPLETE CBC W/AUTO DIFF WBC: CPT

## 2022-08-11 PROCEDURE — G0378 HOSPITAL OBSERVATION PER HR: HCPCS

## 2022-08-11 PROCEDURE — 80053 COMPREHEN METABOLIC PANEL: CPT

## 2022-08-11 PROCEDURE — 99213 OFFICE O/P EST LOW 20 MIN: CPT | Performed by: NURSE PRACTITIONER

## 2022-08-11 PROCEDURE — 99215 OFFICE O/P EST HI 40 MIN: CPT | Performed by: INTERNAL MEDICINE

## 2022-08-11 PROCEDURE — 84443 ASSAY THYROID STIM HORMONE: CPT

## 2022-08-11 PROCEDURE — 96376 TX/PRO/DX INJ SAME DRUG ADON: CPT

## 2022-08-11 PROCEDURE — 6370000000 HC RX 637 (ALT 250 FOR IP)

## 2022-08-11 PROCEDURE — 36415 COLL VENOUS BLD VENIPUNCTURE: CPT

## 2022-08-11 PROCEDURE — 99233 SBSQ HOSP IP/OBS HIGH 50: CPT | Performed by: INTERNAL MEDICINE

## 2022-08-11 PROCEDURE — 90935 HEMODIALYSIS ONE EVALUATION: CPT

## 2022-08-11 PROCEDURE — 6360000002 HC RX W HCPCS

## 2022-08-11 PROCEDURE — 6360000002 HC RX W HCPCS: Performed by: FAMILY MEDICINE

## 2022-08-11 PROCEDURE — 93005 ELECTROCARDIOGRAM TRACING: CPT | Performed by: FAMILY MEDICINE

## 2022-08-11 PROCEDURE — 2580000003 HC RX 258: Performed by: FAMILY MEDICINE

## 2022-08-11 PROCEDURE — 83036 HEMOGLOBIN GLYCOSYLATED A1C: CPT

## 2022-08-11 PROCEDURE — 5A1D70Z PERFORMANCE OF URINARY FILTRATION, INTERMITTENT, LESS THAN 6 HOURS PER DAY: ICD-10-PCS | Performed by: FAMILY MEDICINE

## 2022-08-11 PROCEDURE — 96372 THER/PROPH/DIAG INJ SC/IM: CPT

## 2022-08-11 RX ORDER — CARVEDILOL 6.25 MG/1
12.5 TABLET ORAL 2 TIMES DAILY WITH MEALS
Status: DISCONTINUED | OUTPATIENT
Start: 2022-08-11 | End: 2022-08-12

## 2022-08-11 RX ORDER — HYDRALAZINE HYDROCHLORIDE 20 MG/ML
10 INJECTION INTRAMUSCULAR; INTRAVENOUS EVERY 6 HOURS PRN
Status: DISCONTINUED | OUTPATIENT
Start: 2022-08-11 | End: 2022-08-15

## 2022-08-11 RX ADMIN — CARVEDILOL 12.5 MG: 6.25 TABLET, FILM COATED ORAL at 17:43

## 2022-08-11 RX ADMIN — ATORVASTATIN CALCIUM 40 MG: 40 TABLET, FILM COATED ORAL at 20:56

## 2022-08-11 RX ADMIN — SACUBITRIL AND VALSARTAN 1 TABLET: 97; 103 TABLET, FILM COATED ORAL at 20:56

## 2022-08-11 RX ADMIN — CARVEDILOL 12.5 MG: 6.25 TABLET, FILM COATED ORAL at 07:56

## 2022-08-11 RX ADMIN — HEPARIN SODIUM 5000 UNITS: 10000 INJECTION, SOLUTION INTRAVENOUS; SUBCUTANEOUS at 20:56

## 2022-08-11 RX ADMIN — Medication 10 ML: at 07:55

## 2022-08-11 RX ADMIN — SERTRALINE HYDROCHLORIDE 50 MG: 50 TABLET ORAL at 07:56

## 2022-08-11 RX ADMIN — Medication 10 ML: at 20:58

## 2022-08-11 RX ADMIN — SACUBITRIL AND VALSARTAN 1 TABLET: 97; 103 TABLET, FILM COATED ORAL at 07:56

## 2022-08-11 RX ADMIN — LEVOTHYROXINE SODIUM 100 MCG: 100 TABLET ORAL at 06:27

## 2022-08-11 RX ADMIN — HYDRALAZINE HYDROCHLORIDE 10 MG: 20 INJECTION INTRAMUSCULAR; INTRAVENOUS at 10:58

## 2022-08-11 ASSESSMENT — PAIN SCALES - GENERAL
PAINLEVEL_OUTOF10: 0
PAINLEVEL_OUTOF10: 0

## 2022-08-11 NOTE — FLOWSHEET NOTE
08/11/22 1603   Vital Signs   BP (!) 170/72   Temp 98.3 °F (36.8 °C)   Heart Rate 68   Resp 16   SpO2 98 %   Weight 142 lb 6.7 oz (64.6 kg)   Weight Method Estimated; Bed scale   Percent Weight Change -0.46   Post-Hemodialysis Assessment   Post-Treatment Procedures Blood returned;Catheter capped, clamped and heparinized x 2 ports   Machine Disinfection Process Acid/Vinegar Clean;Heat Disinfect; Exterior Machine Disinfection   Dialyzer Clearance Moderately streaked   Duration of Treatment (minutes) 210 minutes   Heparin Amount Administered During Treatment (mL) 0 mL   Hemodialysis Intake (ml) 400 ml   Hemodialysis Output (ml) 700 ml   NET Removed (ml) 300   Tolerated Treatment Fair   Patient Response to Treatment Pt tolerated tx fair. Maintained profile B throughout tx.  Pt stable   Bilateral Breath Sounds Clear   Edema None   Time Off 4563   Patient Disposition Return to room

## 2022-08-11 NOTE — PROGRESS NOTES
P Quality Flow/Interdisciplinary Rounds Progress Note        Quality Flow Rounds held on August 11, 2022    Disciplines Attending:  Bedside Nurse, , , and Nursing Unit Garth Gramajo was admitted on 8/10/2022  1:49 PM    Anticipated Discharge Date:       Disposition:    Kory Score:  Kory Scale Score: 19    Readmission Risk              Risk of Unplanned Readmission:  0           Discussed patient goal for the day, patient clinical progression, and barriers to discharge. The following Goal(s) of the Day/Commitment(s) have been identified:   PT/OT, check cardiology and nephro consult's plans.       Carrie Ribeiro RN  August 11, 2022

## 2022-08-11 NOTE — PLAN OF CARE
Problem: ABCDS Injury Assessment  Goal: Absence of physical injury  8/11/2022 0917 by Sydney Dalton RN  Outcome: Progressing  8/10/2022 2254 by Julia Patel RN  Outcome: Progressing     Problem: Pain  Goal: Verbalizes/displays adequate comfort level or baseline comfort level  Outcome: Progressing

## 2022-08-11 NOTE — H&P
[facility]   History and Physical      CHIEF COMPLAINT:  weakness    History of Present Illness:  67 y.o. male with a history  of H/O noncompliance with medical treatment, ESRD on HD, DM, chronic HFrEF with an EF 35 to 40%, HTN, hypothyroid, depression presents to ED with weakness. Patient reports bilateral lower extremity weakness which started today after getting out of bed. States he attempted to get out of bed and had lower extremity weakness and dizziness. Patient slid down the side of his bed to the floor. Denies hitting his head, LOC, numbness, headache, N/V, chest pain, palpitations, SOB, constipation, diarrhea, abdominal pain, urinary symptoms. Patient has dialysis on Tuesday, Wednesday, Thursday and Friday's. Has last hemodialysis session was yesterday. Patient reports noncompliance with all medications. States he is aversive to medication because he does not like the side effects of medication. Patient understands the complications of not taking medications. States that his blood pressure at home is in the 200s. Patient does admit to depression, however does not take medication. Patient lives alone at home and is ambulatory without a walker at baseline. Vitals were remarkable for BP of 216/128. Labs were remarkable for potassium of 5.2 which was hemolyzed, creatinine of 6.0 which is his baseline, WBC of 3.6. His troponin was elevated at 715>742. CXR showed no acute cardiopulmonary process. EKG: When compared to EKG on 4/2022- no changes other than left anterior fascicular block is now present. Informant(s) for H&P:Self    REVIEW OF SYSTEMS:  no fevers, chills, cp, sob, n/v, ha, vision/hearing changes, wt changes, hot flashes, other open skin lesions, diarrhea, constipation, dysuria/hematuria unless noted in HPI. Complete ROS performed with the patient and is otherwise negative.       PMH:  Past Medical History:   Diagnosis Date    Chronic kidney disease     Diabetes mellitus ([facility] [address]     Hepatitis C     Hypertension     Liver disease     Thyroid disease     Unspecified diseases of blood and blood-forming organs        Surgical History:  Past Surgical History:   Procedure Laterality Date    TONSILLECTOMY      VASCULAR SURGERY N/A 11/21/2021    CATHETER INSERTION HEMODIALYSIS, REMOVAL OF FEMORAL CATHETER performed by Azucena Saldaña MD at [facility] OR       Medications Prior to Admission:    Prior to Admission medications    Medication Sig Start Date End Date Taking? Authorizing Provider   sertraline (ZOLOFT) 50 MG tablet Take 1 tablet by mouth daily 4/27/22   Sherri Mistry MD   bumetanide (BUMEX) 2 MG tablet Take 1 tablet by mouth 2 times daily (with meals) 4/26/22   Sherri Mistry MD   levothyroxine (SYNTHROID) 100 MCG tablet Take 1 tablet by mouth Daily 4/27/22   Sherri Mistry MD   carvedilol (COREG) 25 MG tablet Take 1 tablet by mouth 2 times daily (with meals)  Patient not taking: Reported on 4/21/2022 12/22/21 1/21/22  KIANNA Woody CNP   aspirin 81 MG EC tablet Take 1 tablet by mouth daily 12/22/21 1/21/22  KIANNA Woody CNP   sacubitril-valsartan (ENTRESTO)  MG per tablet Take 1 tablet by mouth 2 times daily 12/22/21   KIANNA Woody CNP   atorvastatin (LIPITOR) 40 MG tablet Take 1 tablet by mouth nightly  Patient not taking: Reported on 12/22/2021 12/13/21 1/12/22  KATHARINE Alonso   sodium bicarbonate 650 MG tablet TK 2 TS PO Q 8 H 7/11/20   Historical Provider, MD   vitamin D (ERGOCALCIFEROL) 1.25 MG (56920 UT) CAPS capsule TK 1 C PO 1 TIME Q WK 7/7/20   Historical Provider, MD       Allergies:    Penicillins    Social History:    reports that he quit smoking about 48 years ago. His smoking use included cigarettes. He has a 20.00 pack-year smoking history. He has never used smokeless tobacco. He reports current drug use. He reports that he does not drink alcohol.     Family History:       Problem Relation Age of Onset    Diabetes Mother     Cancer Father        PHYSICAL EXAM:  Vitals:  BP (!) 146/81   Pulse 80   Temp 98.3 °F (36.8 °C)   Resp 18   Ht 6' 2\" (1.88 m)   Wt 148 lb (67.1 kg)   SpO2 98%   BMI 19.00 kg/m²     Physical Exam  Vitals and nursing note reviewed. Constitutional:       General: He is not in acute distress. Appearance: Normal appearance. He is normal weight. He is not ill-appearing. Comments: Dialysis catheter noted to right chest    HENT:      Head: Normocephalic and atraumatic. Right Ear: External ear normal.      Left Ear: External ear normal.      Nose: Nose normal. No congestion. Mouth/Throat:      Mouth: Mucous membranes are dry. Pharynx: Oropharynx is clear. Eyes:      Extraocular Movements: Extraocular movements intact. Conjunctiva/sclera: Conjunctivae normal.   Cardiovascular:      Rate and Rhythm: Normal rate and regular rhythm. Pulses: Normal pulses. Heart sounds: Normal heart sounds. No murmur heard. Pulmonary:      Effort: Pulmonary effort is normal. No respiratory distress. Breath sounds: Normal breath sounds. No wheezing. Abdominal:      General: Abdomen is flat. Bowel sounds are normal.      Palpations: Abdomen is soft. Tenderness: There is abdominal tenderness (suprapubic). There is no right CVA tenderness, left CVA tenderness, guarding or rebound. Musculoskeletal:      Cervical back: Normal range of motion and neck supple. Right lower leg: No edema. Left lower leg: No edema. Skin:     General: Skin is warm and dry. Capillary Refill: Capillary refill takes less than 2 seconds. Neurological:      General: No focal deficit present. Mental Status: He is alert and oriented to person, place, and time. Cranial Nerves: No dysarthria or facial asymmetry. Sensory: Sensation is intact. Motor: Weakness (lower extremity strength 4/5 bilaterally) present. No tremor or pronator drift. Meghan Garrison MD on 8/10/2022 at 8:02 PM      NOTE: This report was transcribed using voice recognition software. Every effort was made to ensure accuracy; however, inadvertent computerized transcription errors may be present.

## 2022-08-11 NOTE — PROGRESS NOTES
[facility]   Progress Note    Admitting Date and Time: 8/10/2022  1:49 PM  Admit Dx: Essential hypertension [I10]  Elevated troponin [R77.8]  End stage renal failure on dialysis ([facility] [N18.6, Z99.2]  Failure to thrive in adult [R62.7]  Non compliance w medication regimen [Z91.14]  Hypertension, unspecified type [I10]    Subjective:    Patient was admitted with Essential hypertension [I10]  Elevated troponin [R77.8]  End stage renal failure on dialysis ([facility] [N18.6, Z99.2]  Failure to thrive in adult [R62.7]  Non compliance w medication regimen [Z91.14]  Hypertension, unspecified type [I10]. No acute events overnight. BP this morning 226/127. Patient currently asymptomatic. ROS: denies fever, chills, cp, sob, n/v, HA unless stated above. sacubitril-valsartan  1 tablet Oral BID    carvedilol  12.5 mg Oral BID WC    sodium chloride flush  5-40 mL IntraVENous 2 times per day    heparin (porcine)  5,000 Units SubCUTAneous 3 times per day    atorvastatin  40 mg Oral Nightly    levothyroxine  100 mcg Oral Daily    sertraline  50 mg Oral Daily     polyethylene glycol, 17 g, Daily PRN  prochlorperazine, 10 mg, Q6H PRN  sodium chloride flush, 5-40 mL, PRN  sodium chloride, , PRN  acetaminophen, 650 mg, Q6H PRN   Or  acetaminophen, 650 mg, Q6H PRN  bisacodyl, 5 mg, Daily PRN         Objective:    BP (!) 226/127   Pulse 85   Temp 98.1 °F (36.7 °C) (Oral)   Resp 18   Ht 6' 2\" (1.88 m)   Wt 143 lb 1.6 oz (64.9 kg)   SpO2 99%   BMI 18.37 kg/m²     Physical Exam  Vitals and nursing note reviewed. Constitutional:       General: He is not in acute distress. Appearance: Normal appearance. He is normal weight. He is not ill-appearing. Comments: Dialysis catheter noted to right chest    HENT:     Head: Normocephalic and atraumatic. Right Ear: External ear normal.     Left Ear: External ear normal.     Nose: Nose normal. No congestion.      Mouth/Throat:     Mouth: Mucous membranes are dry. Pharynx: Oropharynx is clear. Eyes:     Extraocular Movements: Extraocular movements intact. Conjunctiva/sclera: Conjunctivae normal.  Cardiovascular:     Rate and Rhythm: Normal rate and regular rhythm. Pulses: Normal pulses. Heart sounds: Normal heart sounds. No murmur heard. Pulmonary:     Effort: Pulmonary effort is normal. No respiratory distress. Breath sounds: Normal breath sounds. No wheezing. Abdominal:     General: Abdomen is flat. Bowel sounds are normal.     Palpations: Abdomen is soft. Tenderness: There is no tenderness. There is no right CVA tenderness, left CVA tenderness, guarding or rebound. Musculoskeletal:     Cervical back: Normal range of motion and neck supple. Right lower leg: No edema. Left lower leg: No edema. Skin:     General: Skin is warm and dry. Capillary Refill: Capillary refill takes less than 2 seconds. Neurological:     General: No focal deficit present. Mental Status: He is alert and oriented to person, place, and time. Cranial Nerves: No dysarthria or facial asymmetry. Sensory: Sensation is intact. Motor: Weakness (lower extremity strength 4/5 bilaterally) present. No tremor or pronator drift. Coordination: Finger-Nose-Finger Test and Heel to [facility] Test normal.  Psychiatric:         Mood and Affect: Mood normal.         Behavior: Behavior normal.      Recent Labs     08/10/22  1430 08/10/22  1708 08/10/22  1740     --   --    K 5.2*  --  4.2   CL 97*  --   --    CO2 24  --   --    BUN 38*  --   --    CREATININE 6.0*  --   --    GLUCOSE 94 74  --    CALCIUM 8.9  --   --        Recent Labs     08/10/22  1430   WBC 3.6*   RBC 4.69   HGB 14.4   HCT 42.7   MCV 91.0   MCH 30.7   MCHC 33.7   RDW 13.4      MPV 11.9           Radiology:   XR CHEST PORTABLE   Final Result   No acute cardiopulmonary process.              Assessment:    Principal Problem:    Elevated troponin  Active Problems: Muscle weakness    Non compliance w medication regimen    End stage renal failure on dialysis [facility])  Resolved Problems:    * No resolved hospital problems. *      Plan:  Hypertensive emergency - given hydralazine and labetalol x1. Restarted home Entresto  twice daily. Started Coreg 12.5 twice daily and will titrate up. Hydralazine 10 mg q6hrs PRN with parameters. Elevated troponin - likely secondary to end-stage renal disease and HTN emergency. Currently asx. No ST changes on EKG. ESRD on HD T/W/Th/F. Avoid nephrotoxic agents. Renal diet. Nephrology consulted. Palliative consulted. Chronic HFrEF with an EF 35 to 40% (4/2022). Coreg and Entresto restarted. Hypothyroid - Continue home synthroid. TSH pending. DM II - Controlled. Last A1C 5.4% on 5/2022. Bilateral lower extremity weakness - PT/OT. Social work consulted for placement.         Electronically signed by Albertina Rowland MD on 8/11/2022 at 9:05 AM

## 2022-08-11 NOTE — PROGRESS NOTES
Physical Therapy  Facility/Department: [address] INTERMEDIATE  Physical Therapy Initial Assessment    Name: Rehan Reyes  : 1950  MRN: [id number]  Date of Service: 2022        Patient Diagnosis(es): The primary encounter diagnosis was End stage renal failure on dialysis [facility]). Diagnoses of Hypertension, unspecified type, Non compliance w medication regimen, Failure to thrive in adult, Elevated troponin, and Essential hypertension were also pertinent to this visit. Past Medical History:  has a past medical history of Chronic kidney disease, Diabetes mellitus ([address] Hepatitis C, Hypertension, Liver disease, Thyroid disease, and Unspecified diseases of blood and blood-forming organs. Past Surgical History:  has a past surgical history that includes Tonsillectomy and vascular surgery (N/A, 2021). Requires PT Follow-Up: Yes     Evaluating Therapist: Bijan Cagle PT     Referring Provider:  Feliberto Wilkes MD    PT order : PT eval and treat     Room #: 453   DIAGNOSIS:  essential HTN, Slid out of bed   Additional Pertinent History:ESRD on HD   PRECAUTIONS:  falls     Social:  Pt lives alone  in a  1  floor plan  3  steps and  no  rails to enter. Prior to admission pt walked with no AD, independent . Drives      Initial Evaluation  Date:  2022  Treatment      Short Term/ Long Term   Goals   Was pt agreeable to Eval/treatment?   Yes      Does pt have pain?  denies     Bed Mobility  Rolling:  NT   Supine to sit:  SBA  Sit to supine:  SBA   Scooting:  independent    Independent    Transfers Sit to stand: CGA   Stand to sit:  CGA   Stand pivot:  NT    Independent    Ambulation     15 and 80  feet with  no AD  with  CGA/min assist. 25 feet x 1 with ww CGA/min assist    100 feet with  no AD/AAD  with  independent        Stair negotiation: ascended and descended NT    4  steps with  no  rail with  S/I    LE ROM  WFL     LE strength  /    4+/ 5    AM- PAC RAW score   mobility   [x] Balance Training to improve static/dynamic balance and to reduce fall risk  [x] Endurance Training to improve activity tolerance during functional mobility   [x] Transfer Training to improve safety and independence with all functional transfers   [x] Gait Training to improve gait mechanics, endurance and assess need for appropriate assistive device  [x] Stair Training in preparation for safe discharge home and/or into the community   [x] Positioning to prevent skin breakdown and contractures  [x] Safety and Education Training   [x] Patient/Caregiver Education   [] HEP  [] Other     Frequency of treatments will be 2-5x/week x  days. Time in: 0905   Time out: 0925       Evaluation Time includes thorough review of current medical information, gathering information on past medical history/social history and prior level of function, completion of standardized testing/informal observation of tasks, assessment of data and education on plan of care and goals.     CPT codes:  [x] Low Complexity PT evaluation 29338  [] Moderate Complexity PT evaluation 47243  [] High Complexity PT evaluation 20985  [] PT Re-evaluation 28145  [] Gait training 30082  minutes  [] Therapeutic activities 07199  minutes  [] Therapeutic exercises 46967  minutes  [] Neuromuscular reeducation 92844  minutes       [address] number:  PT 0065

## 2022-08-11 NOTE — PROGRESS NOTES
Attempted to reach out to Dr. Makenna Flores office per nursing communication to cancel  appointment scheduled for today. Unable to speak with anyone will attempt again later in shift.

## 2022-08-11 NOTE — CONSULTS
Department of Internal Medicine  Nephrology Nurse Practitioner Consult Note      Reason for Consult:  ESRD on HD  Requesting Physician:  Dr. Prince Franks:  Weakness    History Obtained From:  patient, electronic medical record    HISTORY OF PRESENT ILLNESS:  Briefly, Mr. Nettles is a 67year old male with a PMH of ESRD currently being trained for home hemo T-W-Th-F, via RIJ, HTN, type II DM, HFrEF 35-40% with stage I DD, hypothyroidism, hepatitis C, who was admitted on August 10, 2022 after presenting to the ER with weakness. Upon arrival to ER, he was found to be hypertensive with a BP of 216/128. He received hydralazine 10 mg IV x1 and labetalol 10 mg IV x1 in ER. Patient admits to noncompliance with medications. He states his last dialysis treatment was on Tuesday. We are consulted for dialysis management.      Past Medical History:        Diagnosis Date    Chronic kidney disease     Diabetes mellitus ([address]     Hepatitis C     Hypertension     Liver disease     Thyroid disease     Unspecified diseases of blood and blood-forming organs      Past Surgical History:        Procedure Laterality Date    TONSILLECTOMY      VASCULAR SURGERY N/A 11/21/2021    CATHETER INSERTION HEMODIALYSIS, REMOVAL OF FEMORAL CATHETER performed by Walker Perez MD at [facility] OR     Current Medications:    Current Facility-Administered Medications: sacubitril-valsartan (ENTRESTO)  MG per tablet 1 tablet, 1 tablet, Oral, BID  carvedilol (COREG) tablet 12.5 mg, 12.5 mg, Oral, BID WC  polyethylene glycol (GLYCOLAX) packet 17 g, 17 g, Oral, Daily PRN  prochlorperazine (COMPAZINE) injection 10 mg, 10 mg, IntraVENous, Q6H PRN  sodium chloride flush 0.9 % injection 5-40 mL, 5-40 mL, IntraVENous, 2 times per day  sodium chloride flush 0.9 % injection 5-40 mL, 5-40 mL, IntraVENous, PRN  0.9 % sodium chloride infusion, , IntraVENous, PRN  acetaminophen (TYLENOL) tablet 650 mg, 650 mg, Oral, Q6H PRN **OR** acetaminophen (TYLENOL) suppository 650 mg, 650 mg, Rectal, Q6H PRN  bisacodyl (DULCOLAX) EC tablet 5 mg, 5 mg, Oral, Daily PRN  heparin (porcine) injection 5,000 Units, 5,000 Units, SubCUTAneous, 3 times per day  atorvastatin (LIPITOR) tablet 40 mg, 40 mg, Oral, Nightly  levothyroxine (SYNTHROID) tablet 100 mcg, 100 mcg, Oral, Daily  sertraline (ZOLOFT) tablet 50 mg, 50 mg, Oral, Daily  Allergies:  Penicillins    Social History:    TOBACCO:   reports that he quit smoking about 48 years ago. His smoking use included cigarettes. He has a 20.00 pack-year smoking history. He has never used smokeless tobacco.  ETOH:   reports no history of alcohol use.     Family History:       Problem Relation Age of Onset    Diabetes Mother     Cancer Father      REVIEW OF SYSTEMS:    CONSTITUTIONAL:  negative for  fevers and chills  EYES:  negative for  double vision and blurred vision  HEENT:  negative for  epistaxis  RESPIRATORY:  negative for  dyspnea  CARDIOVASCULAR:  negative for  dyspnea, edema  GASTROINTESTINAL:  negative for nausea, vomiting, diarrhea, and abdominal pain  GENITOURINARY:  negative  INTEGUMENT/BREAST:  negative  HEMATOLOGIC/LYMPHATIC:  negative  ALLERGIC/IMMUNOLOGIC:  negative  ENDOCRINE:  negative  MUSCULOSKELETAL:  positive for  muscle weakness  NEUROLOGICAL:  positive for weakness  BEHAVIOR/PSYCH:  negative for poor appetite    PHYSICAL EXAM:      Vitals:    VITALS:  BP (!) 226/127   Pulse 85   Temp 98.1 °F (36.7 °C) (Oral)   Resp 18   Ht 6' 2\" (1.88 m)   Wt 143 lb 1.6 oz (64.9 kg)   SpO2 96%   BMI 18.37 kg/m²   24HR INTAKE/OUTPUT:  No intake or output data in the 24 hours ending 08/11/22 1000    Access: RIJ tunneled dialysis catheter  Constitutional:  Alert and oriented, NAD  HEENT:  Normocephalic, PERRL  Respiratory:  CTA bilaterally  Cardiovascular/Edema:  RRR, S1,S2  Gastrointestinal:  Soft, rounded, nontender, nondistended  Neurologic:  Nonfocal, YARBROUGH  Skin:  Warm, dry, intact  Other:  No edema     DATA:    CBC: Lab Results   Component Value Date/Time    WBC 3.6 08/10/2022 02:30 PM    RBC 4.69 08/10/2022 02:30 PM    HGB 14.4 08/10/2022 02:30 PM    HCT 42.7 08/10/2022 02:30 PM    MCV 91.0 08/10/2022 02:30 PM    MCH 30.7 08/10/2022 02:30 PM    MCHC 33.7 08/10/2022 02:30 PM    RDW 13.4 08/10/2022 02:30 PM     08/10/2022 02:30 PM    MPV 11.9 08/10/2022 02:30 PM     CMP:    Lab Results   Component Value Date/Time     08/10/2022 02:30 PM    K 4.2 08/10/2022 05:40 PM    K 5.2 08/10/2022 02:30 PM    CL 97 08/10/2022 02:30 PM    CO2 24 08/10/2022 02:30 PM    BUN 38 08/10/2022 02:30 PM    CREATININE 6.0 08/10/2022 02:30 PM    GFRAA 11 08/10/2022 02:30 PM    LABGLOM 9 08/10/2022 02:30 PM    GLUCOSE 74 08/10/2022 05:08 PM    PROT 8.1 08/10/2022 02:30 PM    LABALBU 4.0 08/10/2022 02:30 PM    CALCIUM 8.9 08/10/2022 02:30 PM    BILITOT 1.0 08/10/2022 02:30 PM    ALKPHOS 99 08/10/2022 02:30 PM    AST 31 08/10/2022 02:30 PM    ALT 16 08/10/2022 02:30 PM     Magnesium:    Lab Results   Component Value Date/Time    MG 1.9 08/10/2022 02:30 PM     Phosphorus:    Lab Results   Component Value Date/Time    PHOS 4.0 08/10/2022 02:30 PM     Radiology Review:      CXR 8/10/22   No acute cardiopulmonary process. IMPRESSION/RECOMMENDATIONS:      Briefly, Mr. Liliane Sheikh is a 67year old male with a PMH of ESRD currently being trained for home hemo T-W-Th-F, via RIJ, HTN, type II DM, HFrEF 35-40% with stage I DD, hypothyroidism, hepatitis C, who was admitted on August 10, 2022 after presenting to the ER with weakness. Upon arrival to ER, he was found to be hypertensive with a BP of 216/128. He received hydralazine 10 mg IV x1 and labetalol 10 mg IV x1 in ER. Patient admits to noncompliance with medications. He states his last dialysis treatment was on Tuesday. We are consulted for dialysis management. ESRD currently being trained for home hemo T-W-Th-F, via RIJ tunneled dialysis catheter. His last treatment was on Tuesday. We will continue with dialysis T-Th-S while in the hospital.     Uncontrolled HTN, secondary to noncompliance with medications, on carvedilol. HFrEF 35-40% with stage I DD, on carvedilol and Entresto  MBD of CKD, phosphorus 4, not currently on binders  Anemia of CKD, hold NIXON for hgb >10  --------------------------  Weakness      Plan:    Obtain BMP  HD three times weekly T-Th-S while in the hospital  Monitor BP  Monitor labs    Thank you so much Dr. Jose Patel for allowing us to participate in the care of Mr. Yasir Williamson.      Electronically signed by KIANNA Oates CNP on 8/11/2022 at 11:13 AM

## 2022-08-11 NOTE — PROGRESS NOTES
Message sent to [facility] palliative NP via [facility] re: new consult.

## 2022-08-11 NOTE — CARE COORDINATION
Social Work / Discharge Planning : SW attempted to meet with patient but patient off unit for HD. Patient admitted from [address] [place] [address] Str.. Patient has a SNF hx with [facility] and currently being trained for HOME HD. [facility] [place]. Therapy ordered and await therapy input. SW will follow up with patient when he returns to unit. SW to follow.  Electronically signed by JAY Pichardo on 8/11/22 at 12:16 PM EDT

## 2022-08-11 NOTE — CONSULTS
Palliative Care Department  [phone]  Palliative Care Initial Consult  Provider KIANNA Reyes - CNP      PATIENT: Jessi Anguiano  : 1950  MRN: [id number]  ADMISSION DATE: 8/10/2022  1:49 PM  Referring Provider: Sarah Fernandez MD    Palliative Medicine was consulted on hospital day 0 for assistance with Goals of care, Code Status Discussion, Symptom management     HPI:     Patty Mcfarland is a 67 y.o. y/o male with a history of end-stage renal disease on hemodialysis, hypertension, noncompliant with treatment, chronic heart failure with EF of 35 to 40%, hypothyroidism,  who presented to [facility]) on 8/10/2022 with weakness. Patient reported having progressive weakness at home, he endorses of being noncompliant with all his medication due to side effect. At the emergency room patient was found to be hypertensive with blood pressure 190/101. Laboratory finding reveals troponin 742, potassium 5.2, chloride 97, creatinine 6.0, BUN 38, WBC 3.6. .4 EKG shows a left anterior fascicular block. Patient was admitted with hypertensive emergency. Nephrology and cardiology were consulted. Palliative medicine consulted to discuss goal of care, CODE STATUS discussion, symptom management. ASSESSMENT/PLAN:     Pertinent Hospital Diagnoses     Elevated troponin    Palliative Care Encounter / Counseling Regarding Goals of Care  Please see detailed goals of care discussion as below  At this time, Jessi Anguiano, Does have capacity for medical decision-making.   Capacity is time limited and situation/question specific  During encounter no surrogate medical decision-maker was needed  Outcome of goals of care meeting: Continue current medical management and for CODE STATUS to remain a full code  Code status Full Code  Advanced Directives: no POA or living will in epic  Surrogate/Legal NOK:    Spiritual assessment: no spiritual distress identified  Bereavement and grief: to be determined  Referrals to: none today    Thank you for the opportunity to participate in the care of [place]. Brandon Man, KIANNA - CNP   Palliative Medicine     SUBJECTIVE:     Details of Conversation: Chart reviewed and met with Olamide Carter at the bedside. He had just returned from dialysis and was eating his dinner. He expresses that he has been noncompliant with all of his medications. He states that he does not like the side effects from the medications. We discussed the importance of these medications and the complications that can arise. He states his understanding. He states that he lives at home and is learning how to do his dialysis himself. He states that he wants to continue with dialysis and all medical management. He states that he would want resuscitation and for CODE STATUS to remain a full code. He states that he would not want prolonged ventilator support. He states that his son would be decision-maker if he were unable. He denies any pain, shortness of breath, or loss of appetite. He states that he will sometimes get nauseous, we discussed that he does have as needed medications for nausea. He denies any questions or needs at this time. There are no further PM needs at this time. PM will now sign off. If new PM needs arise, please re-consult. Thank you.      Prognosis: Guarded    OBJECTIVE:     BP (!) 170/72   Pulse 68   Temp 98.3 °F (36.8 °C)   Resp 16   Ht 6' 2\" (1.88 m)   Wt 142 lb 6.7 oz (64.6 kg)   SpO2 98%   BMI 18.29 kg/m²     Physical Examination:  Gen: elderly, thin, NAD, awake, alert   HEENT: normocephalic, atraumatic, PERRL, EOMI,   Neck: trachea midline, no JVD  Lungs: respirations easy and not labored,   Heart: regular rate and rhythm, distant heart tones,   Abdomen: normoactive bowel sounds, soft, non-tender  Extremities: no clubbing, cyanosis or edema, moving all extremities    Skin: warm, dry without rashes, lesions, bruising  Neuro: awake, alert, oriented x 3, follows commands, no gross neurologic deficit    Objective data reviewed: labs, images, records, medication use, vitals, and chart    Time/Communication  Greater than 50% of time spent, total 30 minutes in counseling and coordination of care at the bedside regarding goals of care. Thank you for allowing Palliative Medicine to participate in the care of Roberto Ortega. Note: This report was completed using computerize voiced recognition software. Every effort has been made to ensure accuracy; however, inadvertent computerized transcription errors may be present.

## 2022-08-12 PROBLEM — I50.42 CHRONIC COMBINED SYSTOLIC AND DIASTOLIC CONGESTIVE HEART FAILURE (HCC): Status: ACTIVE | Noted: 2022-08-12

## 2022-08-12 PROBLEM — R62.7 FAILURE TO THRIVE IN ADULT: Status: ACTIVE | Noted: 2022-08-12

## 2022-08-12 PROBLEM — I42.9 CARDIOMYOPATHY (HCC): Status: ACTIVE | Noted: 2022-08-12

## 2022-08-12 LAB
ALBUMIN SERPL-MCNC: 3.5 G/DL (ref 3.5–5.2)
ALP BLD-CCNC: 98 U/L (ref 40–129)
ALT SERPL-CCNC: 13 U/L (ref 0–40)
ANION GAP SERPL CALCULATED.3IONS-SCNC: 11 MMOL/L (ref 7–16)
AST SERPL-CCNC: 20 U/L (ref 0–39)
BASOPHILS ABSOLUTE: 0.04 E9/L (ref 0–0.2)
BASOPHILS RELATIVE PERCENT: 1.2 % (ref 0–2)
BILIRUB SERPL-MCNC: 0.7 MG/DL (ref 0–1.2)
BUN BLDV-MCNC: 26 MG/DL (ref 6–23)
CALCIUM SERPL-MCNC: 8.2 MG/DL (ref 8.6–10.2)
CHLORIDE BLD-SCNC: 101 MMOL/L (ref 98–107)
CO2: 24 MMOL/L (ref 22–29)
CREAT SERPL-MCNC: 5.1 MG/DL (ref 0.7–1.2)
EOSINOPHILS ABSOLUTE: 0.05 E9/L (ref 0.05–0.5)
EOSINOPHILS RELATIVE PERCENT: 1.5 % (ref 0–6)
GFR AFRICAN AMERICAN: 14
GFR NON-AFRICAN AMERICAN: 11 ML/MIN/1.73
GLUCOSE BLD-MCNC: 109 MG/DL (ref 74–99)
HCT VFR BLD CALC: 39.7 % (ref 37–54)
HEMOGLOBIN: 13.3 G/DL (ref 12.5–16.5)
IMMATURE GRANULOCYTES #: 0.01 E9/L
IMMATURE GRANULOCYTES %: 0.3 % (ref 0–5)
LYMPHOCYTES ABSOLUTE: 1.03 E9/L (ref 1.5–4)
LYMPHOCYTES RELATIVE PERCENT: 31.2 % (ref 20–42)
MCH RBC QN AUTO: 30 PG (ref 26–35)
MCHC RBC AUTO-ENTMCNC: 33.5 % (ref 32–34.5)
MCV RBC AUTO: 89.6 FL (ref 80–99.9)
MONOCYTES ABSOLUTE: 0.43 E9/L (ref 0.1–0.95)
MONOCYTES RELATIVE PERCENT: 13 % (ref 2–12)
NEUTROPHILS ABSOLUTE: 1.74 E9/L (ref 1.8–7.3)
NEUTROPHILS RELATIVE PERCENT: 52.8 % (ref 43–80)
PDW BLD-RTO: 13.4 FL (ref 11.5–15)
PLATELET # BLD: 142 E9/L (ref 130–450)
PMV BLD AUTO: 11.5 FL (ref 7–12)
POTASSIUM REFLEX MAGNESIUM: 4.5 MMOL/L (ref 3.5–5)
RBC # BLD: 4.43 E12/L (ref 3.8–5.8)
SODIUM BLD-SCNC: 136 MMOL/L (ref 132–146)
TOTAL PROTEIN: 7 G/DL (ref 6.4–8.3)
WBC # BLD: 3.3 E9/L (ref 4.5–11.5)

## 2022-08-12 PROCEDURE — 80053 COMPREHEN METABOLIC PANEL: CPT

## 2022-08-12 PROCEDURE — 6370000000 HC RX 637 (ALT 250 FOR IP)

## 2022-08-12 PROCEDURE — 6360000002 HC RX W HCPCS

## 2022-08-12 PROCEDURE — 99232 SBSQ HOSP IP/OBS MODERATE 35: CPT | Performed by: INTERNAL MEDICINE

## 2022-08-12 PROCEDURE — G0378 HOSPITAL OBSERVATION PER HR: HCPCS

## 2022-08-12 PROCEDURE — 1200000000 HC SEMI PRIVATE

## 2022-08-12 PROCEDURE — 97165 OT EVAL LOW COMPLEX 30 MIN: CPT

## 2022-08-12 PROCEDURE — 6360000002 HC RX W HCPCS: Performed by: FAMILY MEDICINE

## 2022-08-12 PROCEDURE — 6370000000 HC RX 637 (ALT 250 FOR IP): Performed by: INTERNAL MEDICINE

## 2022-08-12 PROCEDURE — 2580000003 HC RX 258: Performed by: FAMILY MEDICINE

## 2022-08-12 PROCEDURE — 36415 COLL VENOUS BLD VENIPUNCTURE: CPT

## 2022-08-12 PROCEDURE — 85025 COMPLETE CBC W/AUTO DIFF WBC: CPT

## 2022-08-12 RX ORDER — CARVEDILOL 25 MG/1
25 TABLET ORAL 2 TIMES DAILY WITH MEALS
Status: DISCONTINUED | OUTPATIENT
Start: 2022-08-12 | End: 2022-08-16 | Stop reason: HOSPADM

## 2022-08-12 RX ADMIN — Medication 10 ML: at 20:25

## 2022-08-12 RX ADMIN — ATORVASTATIN CALCIUM 40 MG: 40 TABLET, FILM COATED ORAL at 20:24

## 2022-08-12 RX ADMIN — SACUBITRIL AND VALSARTAN 1 TABLET: 97; 103 TABLET, FILM COATED ORAL at 20:24

## 2022-08-12 RX ADMIN — SACUBITRIL AND VALSARTAN 1 TABLET: 97; 103 TABLET, FILM COATED ORAL at 07:37

## 2022-08-12 RX ADMIN — SERTRALINE HYDROCHLORIDE 50 MG: 50 TABLET ORAL at 07:37

## 2022-08-12 RX ADMIN — HYDRALAZINE HYDROCHLORIDE 10 MG: 20 INJECTION INTRAMUSCULAR; INTRAVENOUS at 20:24

## 2022-08-12 RX ADMIN — LEVOTHYROXINE SODIUM 100 MCG: 100 TABLET ORAL at 05:25

## 2022-08-12 RX ADMIN — HEPARIN SODIUM 5000 UNITS: 10000 INJECTION, SOLUTION INTRAVENOUS; SUBCUTANEOUS at 14:17

## 2022-08-12 RX ADMIN — CARVEDILOL 25 MG: 25 TABLET, FILM COATED ORAL at 07:37

## 2022-08-12 RX ADMIN — Medication 10 ML: at 07:37

## 2022-08-12 ASSESSMENT — PAIN SCALES - GENERAL
PAINLEVEL_OUTOF10: 0
PAINLEVEL_OUTOF10: 0

## 2022-08-12 NOTE — PROGRESS NOTES
Department of Internal Medicine  Nephrology Progress Note    Events reviewed. SUBJECTIVE:  We are following Mr. Nettles for ESRD on HD. He reports no complaints.     PHYSICAL EXAM:      Vitals:    VITALS:  BP (!) 174/103   Pulse 79   Temp 98 °F (36.7 °C) (Oral)   Resp 18   Ht 6' 2\" (1.88 m)   Wt 143 lb 3.2 oz (65 kg)   SpO2 93%   BMI 18.39 kg/m²   24HR INTAKE/OUTPUT:    Intake/Output Summary (Last 24 hours) at 8/12/2022 1014  Last data filed at 8/11/2022 1603  Gross per 24 hour   Intake 400 ml   Output 700 ml   Net -300 ml       Access: RIJ tunneled dialysis catheter  Constitutional:  Alert and oriented, NAD  HEENT:  Normocephalic, PERRL  Respiratory:  CTA bilaterally  Cardiovascular/Edema:  RRR, S1,S2  Gastrointestinal:  Soft, rounded, nontender, nondistended  Neurologic:  Nonfocal, YARBROUGH  Skin:  Warm, dry, intact  Other:  No edema     Scheduled Meds:   carvedilol  25 mg Oral BID WC    sacubitril-valsartan  1 tablet Oral BID    sodium chloride flush  5-40 mL IntraVENous 2 times per day    heparin (porcine)  5,000 Units SubCUTAneous 3 times per day    atorvastatin  40 mg Oral Nightly    levothyroxine  100 mcg Oral Daily    sertraline  50 mg Oral Daily     Continuous Infusions:   sodium chloride       PRN Meds:.hydrALAZINE, polyethylene glycol, prochlorperazine, sodium chloride flush, sodium chloride, acetaminophen **OR** acetaminophen, bisacodyl    DATA:    CBC:   Lab Results   Component Value Date/Time    WBC 3.3 08/12/2022 05:22 AM    RBC 4.43 08/12/2022 05:22 AM    HGB 13.3 08/12/2022 05:22 AM    HCT 39.7 08/12/2022 05:22 AM    MCV 89.6 08/12/2022 05:22 AM    MCH 30.0 08/12/2022 05:22 AM    MCHC 33.5 08/12/2022 05:22 AM    RDW 13.4 08/12/2022 05:22 AM     08/12/2022 05:22 AM    MPV 11.5 08/12/2022 05:22 AM     CMP:    Lab Results   Component Value Date/Time     08/12/2022 05:22 AM    K 4.5 08/12/2022 05:22 AM     08/12/2022 05:22 AM    CO2 24 08/12/2022 05:22 AM    BUN 26 08/12/2022 05:22 AM    CREATININE 5.1 08/12/2022 05:22 AM    GFRAA 14 08/12/2022 05:22 AM    LABGLOM 11 08/12/2022 05:22 AM    GLUCOSE 109 08/12/2022 05:22 AM    PROT 7.0 08/12/2022 05:22 AM    LABALBU 3.5 08/12/2022 05:22 AM    CALCIUM 8.2 08/12/2022 05:22 AM    BILITOT 0.7 08/12/2022 05:22 AM    ALKPHOS 98 08/12/2022 05:22 AM    AST 20 08/12/2022 05:22 AM    ALT 13 08/12/2022 05:22 AM     Magnesium:    Lab Results   Component Value Date/Time    MG 1.9 08/10/2022 02:30 PM     Phosphorus:    Lab Results   Component Value Date/Time    PHOS 4.0 08/10/2022 02:30 PM     Radiology Review:      CXR 8/10/22   No acute cardiopulmonary process. BRIEF SUMMARY OF INITIAL CONSULT:    Briefly, Mr. Gerry Beltran is a 67year old male with a PMH of ESRD currently being trained for home hemo T-W-Th-F, via RIJ, HTN, type II DM, HFrEF 35-40% with stage I DD, hypothyroidism, hepatitis C, who was admitted on August 10, 2022 after presenting to the ER with weakness. Upon arrival to ER, he was found to be hypertensive with a BP of 216/128. He received hydralazine 10 mg IV x1 and labetalol 10 mg IV x1 in ER. Patient admits to noncompliance with medications. He states his last dialysis treatment was on Tuesday. We are consulted for dialysis management. IMPRESSION/RECOMMENDATIONS:      ESRD currently being trained for home hemo T-W-Th-F, via RIJ tunneled dialysis catheter. His last treatment was on Tuesday. We will continue with dialysis T-Th-S while in the hospital.     Uncontrolled HTN, secondary to noncompliance with medications, on carvedilol. BP improving.   HFrEF 35-40% with stage I DD, on carvedilol and Entresto  MBD of CKD, phosphorus 4, not currently on binders  Anemia of CKD, hold NIXON for hgb >10  --------------------------  Weakness  Hypothyroidism, .400, noncompliant with medication, on levothyroxine      Plan:    HD three times weekly T-Th-S while in the hospital  Continue to monitor BP  Continue to monitor labs      Electronically signed by KIANNA Duarte CNP on 8/12/2022 at 10:14 AM

## 2022-08-12 NOTE — PROGRESS NOTES
[facility]   [facility] Note    Admitting Date and Time: 8/10/2022  1:49 PM  Admit Dx: Essential hypertension [I10]  Elevated troponin [R77.8]  End stage renal failure on dialysis ([address] [N18.6, Z99.2]  Failure to thrive in adult [R62.7]  Non compliance w medication regimen [Z91.14]  Hypertension, unspecified type [I10]    Seen for follow on multiple problems as listed below. Subjective:  Had headache this am , improving , he feels sec to correction of BP , he feels he functions better when BP is high. Denies CP ,sob  ,n/v/d/abd pain. Continues to feel weak. ROS: denies fever, chills, cp, sob, n/v, HA unless stated above.      carvedilol  25 mg Oral BID WC    sacubitril-valsartan  1 tablet Oral BID    sodium chloride flush  5-40 mL IntraVENous 2 times per day    heparin (porcine)  5,000 Units SubCUTAneous 3 times per day    atorvastatin  40 mg Oral Nightly    levothyroxine  100 mcg Oral Daily    sertraline  50 mg Oral Daily     hydrALAZINE, 10 mg, Q6H PRN  polyethylene glycol, 17 g, Daily PRN  prochlorperazine, 10 mg, Q6H PRN  sodium chloride flush, 5-40 mL, PRN  sodium chloride, , PRN  acetaminophen, 650 mg, Q6H PRN   Or  acetaminophen, 650 mg, Q6H PRN  bisacodyl, 5 mg, Daily PRN         Objective:    BP (!) 174/103   Pulse 79   Temp 98 °F (36.7 °C) (Oral)   Resp 18   Ht 6' 2\" (1.88 m)   Wt 143 lb 3.2 oz (65 kg)   SpO2 93%   BMI 18.39 kg/m²   General Appearance: alert and oriented to person, place and time,in no acute distress  Skin: warm and dry  Head: normocephalic and atraumatic  Eyes: extraocular eye movements intact, conjunctivae normal  Neck: supple and non-tender without mass  Pulmonary/Chest: clear to auscultation bilaterally  Cardiovascular: normal rate,  normal S1 and S2  Abdomen: soft, non-tender, non-distended, normal bowel sounds, no masses or organomegaly  Extremities: no cyanosis, clubbing   Neurologic:  no cranial nerve deficit, speech normal      Recent Labs 08/10/22  1430 08/10/22  1708 08/10/22  1740 08/11/22  1010 08/12/22  0522     --   --  136 136   K 5.2*  --  4.2 4.2 4.5   CL 97*  --   --  99 101   CO2 24  --   --  23 24   BUN 38*  --   --  49* 26*   CREATININE 6.0*  --   --  7.1* 5.1*   GLUCOSE 94 74  --  204* 109*   CALCIUM 8.9  --   --  8.0* 8.2*       Recent Labs     08/10/22  1430 08/11/22  1010 08/12/22  0522   WBC 3.6* 3.4* 3.3*   RBC 4.69 4.23 4.43   HGB 14.4 13.0 13.3   HCT 42.7 38.7 39.7   MCV 91.0 91.5 89.6   MCH 30.7 30.7 30.0   MCHC 33.7 33.6 33.5   RDW 13.4 13.5 13.4    128* 142   MPV 11.9 11.9 11.5       Labs and images reviewed     Radiology:   XR CHEST PORTABLE   Final Result   No acute cardiopulmonary process. Assessment:    Principal Problem:    Elevated troponin  Active Problems:    Muscle weakness    Non compliance w medication regimen    Hypertensive urgency    End stage renal failure on dialysis [facility])  Resolved Problems:    * No resolved hospital problems. *      Plan:  Hypertensive urgency - non compliant with meds , treated with IV Hydralazine & IV labetalol. Card is consulted & following. Resume coreg , entresto as per home. BP improving. Elevated troponin , chronic , in April >1000. No acute ST T changes , as per card no plans for ischemic evals this admission     ESRD on HD - renal consulted to manage dialysis needs. Chronic HFrEF  EF 35 to 40 % on 4/22 , Cardiomyopathy ? Etio,on BB , Entresto as per home. Hypothyroidism - on supplements as per home     DMII- continue dietary restrictions. Weakness , physical debility - likely sec to above PT /OT consulted, AM _ PAC score 17/24 , d/w CM will go home with [facility]. Palliative care consulted for  advance directives & goals of care. Remains full code.      On sc heparin for dvt prophy  Full code        Electronically signed by Chirag Fofana MD on 8/12/2022 at 9:44 AM

## 2022-08-12 NOTE — PROGRESS NOTES
P Quality Flow/Interdisciplinary Rounds Progress Note        Quality Flow Rounds held on August 12, 2022    Disciplines Attending:  Bedside Nurse, , , and Nursing Unit Garth Gramajo was admitted on 8/10/2022  1:49 PM    Anticipated Discharge Date:       Disposition:    Kory Score:  Kory Scale Score: 18    Readmission Risk              Risk of Unplanned Readmission:  0           Discussed patient goal for the day, patient clinical progression, and barriers to discharge.   The following Goal(s) of the Day/Commitment(s) have been identified:   Monitor Bp       Heidy Mccurdy RN  August 12, 2022

## 2022-08-12 NOTE — DISCHARGE INSTR - COC
Continuity of Care Form    Patient Name: Saroj Rothman   :  1950  MRN:  [id number]    Admit date:  8/10/2022  Discharge date:  2022    Code Status Order: Full Code   Advance Directives:     Admitting Physician:  Azucena Barney MD  PCP: Margo Schilder, MD    Discharging Nurse: [address] [place], [place] [address]D Forms Unit/Room#: 1024/6658-C  Discharging Unit Phone Number: [phone]    Emergency Contact:   Extended Emergency Contact Information  Primary Emergency Contact: [facility]  Mobile Phone: [phone]  Relation: Child  Secondary Emergency Contact: Belem Villalobos  Home Phone: [phone]  Mobile Phone: [phone]  Relation: Other  Preferred language: English   needed? No    Past Surgical History:  Past Surgical History:   Procedure Laterality Date    TONSILLECTOMY      VASCULAR SURGERY N/A 2021    CATHETER INSERTION HEMODIALYSIS, REMOVAL OF FEMORAL CATHETER performed by Iliana Mitchell MD at [facility]       Immunization History: There is no immunization history on file for this patient.     Active Problems:  Patient Active Problem List   Diagnosis Code    Diabetes mellitus ([facility] B37.9    Metabolic acidosis X98.9    Hypertension I10    Uncontrolled diabetes mellitus ([address] E11.65    Hyperbilirubinemia E80.6    Thrombocytopenia ([facility]) D69.6    Hypothyroidism E03.9    End stage renal failure on dialysis (HCC) N18.6, Z99.2    Acute respiratory failure with hypoxia ([facility]) J96.01    Volume overload E87.70    Hypertensive emergency I16.1    Hyperkalemia E87.5    Severe protein-calorie malnutrition ([facility] [address] E43    Acute renal failure superimposed on chronic kidney disease, on chronic dialysis (HCC) N17.9, N18.9, Z99.2    Elevated serum creatinine R79.89    Lung infiltrate R91.8    Generalized weakness R53.1    DNR (do not resuscitate) discussion Z71.89    Elevated troponin R77.8    Muscle weakness M62.81    Non compliance w medication regimen Z91.14    Hypertensive urgency I16.0    Chronic combined systolic and diastolic congestive heart failure (HCC) I50.42    Cardiomyopathy (HCC) I42.9    Failure to thrive in adult R62.7       Isolation/Infection:   Isolation            No Isolation          Patient Infection Status       Infection Onset Added Last Indicated Last Indicated By Review Planned Expiration Resolved Resolved By    None active    Resolved    COVID-19 (Rule Out) 08/10/22 08/10/22 08/10/22 COVID-19, Rapid (Ordered)   08/10/22 Rule-Out Test Resulted    COVID-19 (Rule Out) 04/21/22 04/21/22 04/21/22 Respiratory Panel, Molecular, with COVID-19 (Restricted: peds pts or suitable admitted adults) (Ordered)   04/21/22 Rule-Out Test Resulted    COVID-19 (Rule Out) 04/20/22 04/20/22 04/20/22 COVID-19, Rapid (Ordered)   04/20/22 Rule-Out Test Resulted    COVID-19 12/07/21 12/07/21 12/07/21 COVID-19, Rapid   12/10/21 Ciaran Redd RN    2 tests resulted not detected 24 hours apart, no fever, room air, asymptomatic    COVID-19 (Rule Out) 12/07/21 12/07/21 12/07/21 COVID-19, Rapid (Ordered)   12/07/21 Rule-Out Test Resulted    COVID-19 (Rule Out) 11/19/21 11/19/21 11/19/21 COVID-19, Rapid (Ordered)   11/19/21 Rule-Out Test Resulted            Nurse Assessment:  Last Vital Signs: /68   Pulse 70   Temp 98.4 °F (36.9 °C) (Oral)   Resp 18   Ht 6' 2\" (1.88 m)   Wt 143 lb 3.2 oz (65 kg)   SpO2 93%   BMI 18.39 kg/m²     Last documented pain score (0-10 scale): Pain Level: 0  Last Weight:   Wt Readings from Last 1 Encounters:   08/12/22 143 lb 3.2 oz (65 kg)     Mental Status:  oriented and alert    IV Access:  - Dialysis Catheter  - site  right, insertion date: 11/21/2021    Nursing Mobility/ADLs:  Walking   Independent  Transfer  Independent  Bathing  Independent  Dressing  Independent  Toileting  Independent  Feeding  [address] Delivery   whole    Wound Care Documentation and Therapy:  Incision 11/21/21 Internal jugular Right (Active) Number of days: 264        Elimination:  Continence: Bowel: Yes  Bladder: Yes  Urinary Catheter: None   Colostomy/Ileostomy/Ileal Conduit: No       Date of Last BM: 8/13/2022    Intake/Output Summary (Last 24 hours) at 8/12/2022 1238  Last data filed at 8/11/2022 1603  Gross per 24 hour   Intake 400 ml   Output 700 ml   Net -300 ml     I/O last 3 completed shifts: In: 400   Out: 700     Safety Concerns: At Risk for Falls    Impairments/Disabilities:      None    Nutrition Therapy:  Current Nutrition Therapy:   - Oral Diet:  Renal    Routes of Feeding: Oral  Liquids: Thin Liquids  Daily Fluid Restriction: no  Last Modified Barium Swallow with Video (Video Swallowing Test): not done    Treatments at the Time of Hospital Discharge:   Respiratory Treatments: none  Oxygen Therapy:  is not on home oxygen therapy. Ventilator:    - No ventilator support    Rehab Therapies: n/a  Weight Bearing Status/Restrictions: No weight bearing restrictions  Other Medical Equipment (for information only, NOT a DME order):  n/a  Other Treatments: n/a    Patient's personal belongings (please select all that are sent with patient):  Phone    RN SIGNATURE:  Electronically signed by Carina Bella RN on 8/16/22 at 2:33 PM EDT    CASE MANAGEMENT/SOCIAL WORK SECTION    Inpatient Status Date: ***    Readmission Risk Assessment Score:  Readmission Risk              Risk of Unplanned Readmission:  0           Discharging to Facility/ Agency   Name: [address] [place], [place] [address]VO:[phone]  Fax:[phone]    Dialysis Facility (if applicable)   Name:  Address:  Dialysis Schedule:  Phone:  Fax:    / signature: {Esignature:[id number]}Electronically signed by JAY Parrish on 8/12/22 at 12:40 PM EDT     PHYSICIAN SECTION    Prognosis: Good    Condition at Discharge: Stable    Rehab Potential (if transferring to Rehab):  Fair    Recommended Labs or Other Treatments After Discharge: none -- except per dialysis unit    Physician Certification: I certify the above information and transfer of Clark Shaffer  is necessary for the continuing treatment of the diagnosis listed and that he requires Dain Reid for less 30 days.      Update Admission H&P: No change in H&P    PHYSICIAN SIGNATURE:  Electronically signed by Shila Golden DO on 8/16/22 at 2:21 PM EDT

## 2022-08-12 NOTE — PROGRESS NOTES
Occupational Therapy  OCCUPATIONAL THERAPY INITIAL EVALUATION  [facility]  [address], [facility], [place]    Date: 2022     Patient Name: Ayanna Mcfarlane  MRN: [id number]  : 1950  Room: [address] [place] [address]    Evaluating OT: Carmencita Carmona, OTR/L - [id number]    Referring Provider: Taylor Espitia MD  Specific Provider Orders/Date: \"OT eval and treat\" - 8/10/2022    Diagnosis: Essential hypertension [I10], Elevated troponin [R77.8], End stage renal failure on dialysis ([facility] [N18.6, Z99.2], Failure to thrive in adult [R62.7], Non compliance w medication regimen [Z91.14], Hypertension, unspecified type [I10]      Pertinent Medical History: HTN, hepatitis C, ESRD on HD     Precautions: fall risk, bed alarm    Assessment of Current Deficits:    [x] Functional mobility   [x]ADLs  [x] Strength               [x]Cognition   [x] Functional transfers   [x] IADLs         [x] Safety Awareness   [x]Endurance   [] Fine Coordination              [x] Balance      [] Vision/perception   [x]Sensation    []Gross Motor Coordination  [] ROM  [] Delirium                   [] Motor Control     OT PLAN OF CARE   OT POC is based on physician orders, patient diagnosis, and results of clinical assessment.   Frequency/Duration 2-5 days/week for 2 weeks PRN   Specific OT Treatment Interventions to Include:   * Instruction/training on adapted ADL techniques and AE recommendations to increase functional independence within precautions       * Training on energy conservation strategies, correct breathing pattern and techniques to improve independence/tolerance for self-care routine  * Functional transfer/mobility training/DME recommendations for increased independence, safety, and fall prevention  * Patient/Family education to increase follow through with safety techniques and functional independence  * Recommendation of environmental modifications for increased safety with functional transfers/mobility and ADLs  * Therapeutic exercise to improve motor endurance, ROM, and functional strength for ADLs/functional transfers  * Therapeutic activities to facilitate/challenge dynamic balance, stand tolerance for increased safety and independence with ADLs  * Neuro-muscular re-education: facilitation of righting/equilibrium reactions, midline orientation, scapular stability/mobility, normalization of muscle tone, and facilitation of volitional active controled movement  * Positioning to improve skin integrity, interaction with environment and functional independence    Recommended Adaptive Equipment: TBD     Home Living: Patient lives alone in a one-floor setup (few steps to enter); laundry is on the main living level. Bathroom Setup: tub shower (no seat, no grab bars)  Equipment Owned: N/A    Prior Level of Function (PLOF): Per patient, he was independent with ADLs, IADLs, and functional mobility (without device) prior to this hospitalization. Driving: Yes    Pain Level: Patient denied experiencing pain; patient reported feeling \"out of it\" during this session. Cognition: Patient demonstrated fatigue throughout this session; slowed processing noted. Patient agreeable to participation in EOB/OOB activities with provision of encouragement. WFL command follow demonstrated. Memory: WFL grossly  Sequencing: WFL grossly  Problem Solving: WFL grossly  Judgement/Safety: WFL grossly    Functional Assessment:  AM-PAC Daily Activity Raw Score: 14/24   Initial Eval Status  Date: 8/12/2022 Treatment Status  Date:  Short Term Goals = Long Term Goals   Feeding Setup  Independent   Grooming Mod A  Supervision (seated/standing at sink)   UB Dressing Min A  Setup    LB Dressing Max A  Min A to don/doff socks while supine in bed.   SBA - with use of AE, as needed/appropriate   Bathing Max A  SBA - with use of AE/DME, as needed/appropriate   Toileting Max A  SBA   Bed Mobility  Supine-to-Sit: Min A and increased time needed. Sit-to-Supine: Min A   Independent / Mod I in order to maximize patient's independence with ADLs, re-positioning, and other functional tasks. Functional Transfers Sit-to-Stand: Mod A   from EOB (with walker)  Patient unable to achieve full, upright standing posture during first attempt; with second attempt, patient was able to stand, but quickly sat back down on the EOB and requested to return to bed. SBA   Functional Mobility Not assessed. Poor tolerance of static standing demonstrated during this session. SBA with functional mobility (with device, as needed/appropriate) in order to maximize independence with ADLs/IADLs and other functional tasks. Balance Sitting: Fair (at EOB)  Posterior lean noted occasionally. Standing: Poor (with walker)  Fair dynamic standing balance during completion of ADLs/IADLs and other functional tasks. Activity Tolerance Limited secondary to fatigue and weakness. Patient will demonstrate Good understanding and consistent implementation of energy conservation techniques and work simplification techniques into ADL/IADL routines. Visual/  Perceptual WFL     N/A   B UE Strength 3+/5  Patient will demonstrate 4/5 B UE strength in order to maximize independence with ADLs/IADLs and functional transfers. Additional Long-Term Goal: Patient will increase functional independence to PLOF in order to allow patient to live in least restrictive environment. ROM: Additional Information:    R UE  WFL    L UE WFL      Hearing: WFL  Sensation: No complaints of numbness/tingling in B UEs. Tone: WFL  Edema: No    Comments: RN approved patient's participation in [address] [place] [address] activities. Upon arrival, patient supine in bed. At end of session, patient supine in bed with call light and phone within reach, bed alarm activated, and all lines and tubes intact.  Patient would benefit from continued skilled OT to increase safety and independence with completion of ADL/IADL tasks for functional independence and quality of life. Patient education provided regardin) importance of OOB activities during hospitalization, 2) importance of having staff assistance with ADLs and other OOB activities during hospitalization to prevent falls/injury. Patient indicated Fair+ understanding. Further skilled OT treatment indicated to increase patient's safety and independence with completion of ADL/IADL tasks in order to maximize patient's functional independence and quality of life. Rehab Potential: Good for established goals. Patient / Family Goal: Patient did not state a goal.  Patient and/or family were instructed on functional diagnosis, prognosis/goals, and OT plan of care. Demonstrated Fair understanding. Eval Complexity: Low    Time In: 1535  Time Out: 1555  Total Treatment Time: 0 minutes      Minutes Units   OT Eval Low 80331 20 1   OT Eval Medium 76601     OT Eval High 82746     OT Re-Eval [id number]     Therapeutic Ex 56046     Therapeutic Activities 72410     ADL/Self Care 01005     Orthotic Management 40606     Neuro Re-Ed 34915     Non-Billable Time N/A ---     Evaluation time includes thorough review of current medical information, gathering information on past medical history/social history and prior level of function, completion of standardized testing/informal observation of tasks, assessment of data, and education on plan of care and goals. Ila Ruiz OTR/L  License Number: [id number]

## 2022-08-12 NOTE — PROGRESS NOTES
INPATIENT CARDIOLOGY FOLLOW-UP    Name: Mindy Rankin    Age: 67 y.o. Date of Admission: 8/10/2022  1:49 PM    Date of Service: 8/12/2022    Primary Cardiologist: Known to me    Chief Complaint: Follow-up for cardiomyopathy    Interim History:  Feels okay, just weak. Denies chest pain or shortness of breath. Blood pressure better.     Review of Systems:   Negative except as described above    Problem List:  Patient Active Problem List   Diagnosis    Diabetes mellitus ([facility]    Metabolic acidosis    Hypertension    Uncontrolled diabetes mellitus ([facility]    Hyperbilirubinemia    Thrombocytopenia (HCC)    Hypothyroidism    [facility]    Acute respiratory failure with hypoxia (HCC)    Volume overload    Hypertensive emergency    Hyperkalemia    Severe protein-calorie malnutrition (HCC)    Acute renal failure superimposed on chronic kidney disease, on chronic dialysis (HCC)    Elevated serum creatinine    Lung infiltrate    Generalized weakness    DNR (do not resuscitate) discussion    Elevated troponin    Muscle weakness    Non compliance w medication regimen    Hypertensive urgency       Current Medications:    Current Facility-Administered Medications:     carvedilol (COREG) tablet 25 mg, 25 mg, Oral, BID WC, Musa Ratliff MD, 25 mg at 08/12/22 0737    sacubitril-valsartan (ENTRESTO)  MG per tablet 1 tablet, 1 tablet, Oral, BID, Musa Ratliff MD, 1 tablet at 08/12/22 0737    hydrALAZINE (APRESOLINE) injection 10 mg, 10 mg, IntraVENous, Q6H PRN, Pennie Peña MD, 10 mg at 08/11/22 1058    polyethylene glycol (GLYCOLAX) packet 17 g, 17 g, Oral, Daily PRN, Pablo Rubio MD    prochlorperazine (COMPAZINE) injection 10 mg, 10 mg, IntraVENous, Q6H PRN, Pablo Rubio MD    sodium chloride flush 0.9 % injection 5-40 mL, 5-40 mL, IntraVENous, 2 times per day, Pablo Rubio MD, 10 mL at 08/12/22 0737    sodium chloride flush 0.9 % injection 5-40 mL, 5-40 mL, IntraVENous, PRN, Joyce Kenny MD    0.9 % sodium chloride infusion, , IntraVENous, PRN, Joyce Kenny MD    acetaminophen (TYLENOL) tablet 650 mg, 650 mg, Oral, Q6H PRN **OR** acetaminophen (TYLENOL) suppository 650 mg, 650 mg, Rectal, Q6H PRN, Joyce Kenny MD    bisacodyl (DULCOLAX) EC tablet 5 mg, 5 mg, Oral, Daily PRN, Joyce Kenny MD    heparin (porcine) injection 5,000 Units, 5,000 Units, SubCUTAneous, 3 times per day, Joyce Kenny MD, 5,000 Units at 08/11/22 2056    atorvastatin (LIPITOR) tablet 40 mg, 40 mg, Oral, Nightly, Ashli Carl MD, 40 mg at 08/11/22 2056    levothyroxine (SYNTHROID) tablet 100 mcg, 100 mcg, Oral, Daily, Rachid Hollis MD, 100 mcg at 08/12/22 0525    sertraline (ZOLOFT) tablet 50 mg, 50 mg, Oral, Daily, Rachid Hlolis MD, 50 mg at 08/12/22 0737    Physical Exam:  BP (!) 174/103   Pulse 79   Temp 98 °F (36.7 °C) (Oral)   Resp 18   Ht 6' 2\" (1.88 m)   Wt 143 lb 3.2 oz (65 kg)   SpO2 93%   BMI 18.39 kg/m²   Wt Readings from Last 3 Encounters:   08/12/22 143 lb 3.2 oz (65 kg)   05/31/22 141 lb (64 kg)   04/26/22 138 lb 0.1 oz (62.6 kg)     Appearance: Under nourished appearing gentleman, awake, alert, no acute respiratory distress  Skin: Intact, no rash  Head: Normocephalic, atraumatic  Eyes: EOMI, no conjunctival erythema  ENMT: No pharyngeal erythema, MMM, no rhinorrhea  Neck: Supple, no elevated JVP, no carotid bruits  Lungs: Clear to auscultation bilaterally. No wheezes, rales, or rhonchi.   Cardiac: PMI nondisplaced, Regular rhythm with a normal rate, S1 & S2 normal, no murmurs  Right chest dialysis catheter  Abdomen: Soft, nontender, +bowel sounds  Extremities: Moves all extremities x 4, no lower extremity edema  Neurologic: No focal motor deficits apparent, normal mood and affect  Peripheral Pulses: Intact posterior tibial pulses bilaterally    Intake/Output:    Intake/Output Summary (Last 24 hours) at 8/12/2022 0953  Last data filed at 8/11/2022 1603  Gross per 24 hour   Intake 400 ml   Output 700 ml   Net -300 ml     No intake/output data recorded.     Laboratory Tests:  Recent Labs     08/10/22  1430 08/10/22  1708 08/10/22  1740 08/11/22  1010 08/12/22  0522     --   --  136 136   K 5.2*  --  4.2 4.2 4.5   CL 97*  --   --  99 101   CO2 24  --   --  23 24   BUN 38*  --   --  49* 26*   CREATININE 6.0*  --   --  7.1* 5.1*   GLUCOSE 94 74  --  204* 109*   CALCIUM 8.9  --   --  8.0* 8.2*     Lab Results   Component Value Date/Time    MG 1.9 08/10/2022 02:30 PM     Recent Labs     08/10/22  1430 08/11/22  1010 08/12/22  0522   ALKPHOS 99 98 98   ALT 16 14 13   AST 31 19 20   PROT 8.1 6.9 7.0   BILITOT 1.0 0.6 0.7   LABALBU 4.0 3.6 3.5     Recent Labs     08/10/22  1430 08/11/22  1010 08/12/22  0522   WBC 3.6* 3.4* 3.3*   RBC 4.69 4.23 4.43   HGB 14.4 13.0 13.3   HCT 42.7 38.7 39.7   MCV 91.0 91.5 89.6   MCH 30.7 30.7 30.0   MCHC 33.7 33.6 33.5   RDW 13.4 13.5 13.4    128* 142   MPV 11.9 11.9 11.5     Lab Results   Component Value Date    CKTOTAL 1,503 (H) 06/26/2020     Lab Results   Component Value Date    INR 1.1 08/24/2021    INR 1.0 06/02/2020    INR 1.1 12/04/2018    PROTIME 12.1 08/24/2021    PROTIME 11.2 06/02/2020    PROTIME 12.2 12/04/2018     Lab Results   Component Value Date    .400 (H) 08/11/2022     Lab Results   Component Value Date    LABA1C 5.4 08/11/2022     No results found for: EAG  Lab Results   Component Value Date    CHOL 200 (H) 08/24/2021     Lab Results   Component Value Date    TRIG 109 08/24/2021     Lab Results   Component Value Date    HDL 53 11/12/2021    HDL 51 08/24/2021    HDL 38 06/02/2020     Lab Results   Component Value Date    LDLCALC 147 (H) 11/12/2021    LDLCALC 127 (H) 08/24/2021    LDLCALC 178 (H) 06/02/2020     Lab Results   Component Value Date    LABVLDL 30 11/12/2021    LABVLDL 22 08/24/2021    LABVLDL 34 06/02/2020     No results found for: CHOLHDLRATIO  No results for input(s): PROBNP in the last 72 hours. Cardiac Tests:    EKG:   NSR 89 bpm LAFB,, unchanged extensive ST/T wave changes anterolateral leads     Telemetry:   SR 70s brief tachy 6 beats probably pat     Chest X-ray:   8/10/22      Impression   No acute cardiopulmonary process. Echocardiogram:   TTE 4/2022   Summary   Left ventricle size is normal.   Ejection fraction is visually estimated at 35-40%. Overall ejection fraction moderately decreased . No regional wall motion abnormalities seen. Concentric remodelling. Stage I diastolic dysfunction. Normal right ventricular size and function. TAPSE 18 mm. Physiologic and/or trace mitral regurgitation is present. No hemodynamically significant aortic stenosis is present. Unable to estimate PA systolic pressure. No evidence for hemodynamically significant pericardial effusion. Stress test:       Cardiac catheterization:     ----------------------------------------------------------------------------------------------------------------------------------------------------------------  IMPRESSION:  Chronic heart failure with reduced ejection fraction. Appears euvolemic/dry  Cardiomyopathy, etiology unclear. EF 35-40%  Elevated high-sensitivity troponin, chronic: 715-742 ng/L likely acute and chronic myocardial injury in the setting of ESRD, cardiomyopathy and elevated blood pressure. No evidence of ACS. >1000 ng/L in April  Severe hypertension, transiently improved running high this morning  ESRD on HD  Noncompliance  Failure to thrive/malnutrition  Hypothyroidism,   History of COVID-19 infection  Depression    RECOMMENDATIONS:  His severe noncompliance will hamper any efforts at successful long-term management of his chronic issues. Stable from cardiac standpoint at this time.     Increase carvedilol to 25 mg twice daily  Continues to go to losartan  mg twice daily  If blood pressure remains poorly controlled recommend initiation of hydralazine/isosorbide dinitrate combination though it is exceedingly unlikely he would be compliant with a TID medication  Recommend outpatient ischemic evaluation if patient agreeable  Further care per primary service and consultants  Aggressive risk factor modification  He was encouraged to follow-up with me in the office, and to remain compliant with medications  Okay for discharge from cardiology standpoint  Will be available as needed, please call with questions    Reyna Duron MD, [facility] Cardiology    NOTE: This report was transcribed using voice recognition software. Every effort was made to ensure accuracy; however, inadvertent computerized transcription errors may be present.

## 2022-08-13 LAB
ANION GAP SERPL CALCULATED.3IONS-SCNC: 8 MMOL/L (ref 7–16)
BUN BLDV-MCNC: 30 MG/DL (ref 6–23)
CALCIUM SERPL-MCNC: 7.8 MG/DL (ref 8.6–10.2)
CHLORIDE BLD-SCNC: 103 MMOL/L (ref 98–107)
CO2: 25 MMOL/L (ref 22–29)
CREAT SERPL-MCNC: 5.3 MG/DL (ref 0.7–1.2)
GFR AFRICAN AMERICAN: 13
GFR NON-AFRICAN AMERICAN: 11 ML/MIN/1.73
GLUCOSE BLD-MCNC: 175 MG/DL (ref 74–99)
HCT VFR BLD CALC: 38 % (ref 37–54)
HEMOGLOBIN: 12.3 G/DL (ref 12.5–16.5)
MCH RBC QN AUTO: 30.1 PG (ref 26–35)
MCHC RBC AUTO-ENTMCNC: 32.4 % (ref 32–34.5)
MCV RBC AUTO: 92.9 FL (ref 80–99.9)
PDW BLD-RTO: 13.5 FL (ref 11.5–15)
PLATELET # BLD: 126 E9/L (ref 130–450)
PMV BLD AUTO: 12.1 FL (ref 7–12)
POTASSIUM SERPL-SCNC: 3.9 MMOL/L (ref 3.5–5)
RBC # BLD: 4.09 E12/L (ref 3.8–5.8)
SODIUM BLD-SCNC: 136 MMOL/L (ref 132–146)
WBC # BLD: 2.9 E9/L (ref 4.5–11.5)

## 2022-08-13 PROCEDURE — 85027 COMPLETE CBC AUTOMATED: CPT

## 2022-08-13 PROCEDURE — 36415 COLL VENOUS BLD VENIPUNCTURE: CPT

## 2022-08-13 PROCEDURE — 1200000000 HC SEMI PRIVATE

## 2022-08-13 PROCEDURE — 6370000000 HC RX 637 (ALT 250 FOR IP): Performed by: INTERNAL MEDICINE

## 2022-08-13 PROCEDURE — 99232 SBSQ HOSP IP/OBS MODERATE 35: CPT | Performed by: INTERNAL MEDICINE

## 2022-08-13 PROCEDURE — 6370000000 HC RX 637 (ALT 250 FOR IP): Performed by: FAMILY MEDICINE

## 2022-08-13 PROCEDURE — 90935 HEMODIALYSIS ONE EVALUATION: CPT

## 2022-08-13 PROCEDURE — 2580000003 HC RX 258: Performed by: FAMILY MEDICINE

## 2022-08-13 PROCEDURE — 6360000002 HC RX W HCPCS: Performed by: FAMILY MEDICINE

## 2022-08-13 PROCEDURE — 80048 BASIC METABOLIC PNL TOTAL CA: CPT

## 2022-08-13 PROCEDURE — 6370000000 HC RX 637 (ALT 250 FOR IP)

## 2022-08-13 RX ORDER — ISOSORBIDE DINITRATE 10 MG/1
20 TABLET ORAL 3 TIMES DAILY
Status: DISCONTINUED | OUTPATIENT
Start: 2022-08-13 | End: 2022-08-16 | Stop reason: HOSPADM

## 2022-08-13 RX ORDER — HYDRALAZINE HYDROCHLORIDE 25 MG/1
37.5 TABLET, FILM COATED ORAL EVERY 8 HOURS SCHEDULED
Status: DISCONTINUED | OUTPATIENT
Start: 2022-08-13 | End: 2022-08-14

## 2022-08-13 RX ADMIN — Medication 10 ML: at 14:39

## 2022-08-13 RX ADMIN — HYDRALAZINE HYDROCHLORIDE 37.5 MG: 25 TABLET, FILM COATED ORAL at 20:00

## 2022-08-13 RX ADMIN — HYDRALAZINE HYDROCHLORIDE 37.5 MG: 25 TABLET, FILM COATED ORAL at 14:38

## 2022-08-13 RX ADMIN — HEPARIN SODIUM 5000 UNITS: 10000 INJECTION, SOLUTION INTRAVENOUS; SUBCUTANEOUS at 20:01

## 2022-08-13 RX ADMIN — HEPARIN SODIUM 5000 UNITS: 10000 INJECTION, SOLUTION INTRAVENOUS; SUBCUTANEOUS at 05:33

## 2022-08-13 RX ADMIN — ATORVASTATIN CALCIUM 40 MG: 40 TABLET, FILM COATED ORAL at 20:00

## 2022-08-13 RX ADMIN — LEVOTHYROXINE SODIUM 100 MCG: 100 TABLET ORAL at 05:33

## 2022-08-13 RX ADMIN — SERTRALINE HYDROCHLORIDE 50 MG: 50 TABLET ORAL at 14:38

## 2022-08-13 RX ADMIN — SACUBITRIL AND VALSARTAN 1 TABLET: 97; 103 TABLET, FILM COATED ORAL at 20:00

## 2022-08-13 RX ADMIN — ISOSORBIDE DINITRATE 20 MG: 10 TABLET ORAL at 14:38

## 2022-08-13 RX ADMIN — Medication 10 ML: at 20:50

## 2022-08-13 RX ADMIN — SACUBITRIL AND VALSARTAN 1 TABLET: 97; 103 TABLET, FILM COATED ORAL at 14:38

## 2022-08-13 RX ADMIN — ACETAMINOPHEN 650 MG: 325 TABLET ORAL at 23:25

## 2022-08-13 RX ADMIN — HEPARIN SODIUM 5000 UNITS: 10000 INJECTION, SOLUTION INTRAVENOUS; SUBCUTANEOUS at 14:46

## 2022-08-13 ASSESSMENT — PAIN SCALES - GENERAL
PAINLEVEL_OUTOF10: 0
PAINLEVEL_OUTOF10: 6
PAINLEVEL_OUTOF10: 0

## 2022-08-13 ASSESSMENT — PAIN DESCRIPTION - DESCRIPTORS: DESCRIPTORS: DULL;THROBBING

## 2022-08-13 ASSESSMENT — PAIN DESCRIPTION - LOCATION: LOCATION: HEAD;NECK

## 2022-08-13 ASSESSMENT — PAIN DESCRIPTION - ORIENTATION: ORIENTATION: MID

## 2022-08-13 NOTE — PROGRESS NOTES
[facility] Hospitalist   Progress Note    Admitting Date and Time: 8/10/2022  1:49 PM  Admit Dx: Essential hypertension [I10]  Elevated troponin [R77.8]  End stage renal failure on dialysis ([email or web address] [N18.6, Z99.2]  Failure to thrive in adult [R62.7]  Non compliance w medication regimen [Z91.14]  Hypertension, unspecified type [I10]    Subjective:    Patient was admitted with Essential hypertension [I10]  Elevated troponin [R77.8]  [facility] [N18.6, Z99.2]  Failure to thrive in adult [R62.7]  Non compliance w medication regimen [Z91.14]  Hypertension, unspecified type [I10]. No acute events overnight. Patient examined during dialysis. BP this morning 134/87. Patient currently asymptomatic. ROS: denies fever, chills, cp, sob, n/v, HA unless stated above. carvedilol  25 mg Oral BID WC    sacubitril-valsartan  1 tablet Oral BID    sodium chloride flush  5-40 mL IntraVENous 2 times per day    heparin (porcine)  5,000 Units SubCUTAneous 3 times per day    atorvastatin  40 mg Oral Nightly    levothyroxine  100 mcg Oral Daily    sertraline  50 mg Oral Daily     hydrALAZINE, 10 mg, Q6H PRN  polyethylene glycol, 17 g, Daily PRN  prochlorperazine, 10 mg, Q6H PRN  sodium chloride flush, 5-40 mL, PRN  sodium chloride, , PRN  acetaminophen, 650 mg, Q6H PRN   Or  acetaminophen, 650 mg, Q6H PRN  bisacodyl, 5 mg, Daily PRN       Objective:    BP (!) 176/92   Pulse 70   Temp 98 °F (36.7 °C) (Tympanic)   Resp 18   Ht 6' 2\" (1.88 m)   Wt 141 lb (64 kg)   SpO2 96%   BMI 18.10 kg/m²     Physical Exam  Vitals and nursing note reviewed. Constitutional:       General: He is not in acute distress. Appearance: Normal appearance. He is normal weight. He is not ill-appearing. Comments: Dialysis catheter noted to right chest    HENT:     Head: Normocephalic and atraumatic.      Right Ear: External ear normal.     Left Ear: External ear normal.     Nose: Nose normal. No congestion. Mouth/Throat:     Mouth: Mucous membranes are moist.     Pharynx: Oropharynx is clear. Eyes:     Extraocular Movements: Extraocular movements intact. Conjunctiva/sclera: Conjunctivae normal.  Cardiovascular:     Rate and Rhythm: Normal rate and regular rhythm. Pulses: Normal pulses. Heart sounds: Normal heart sounds. No murmur heard. Pulmonary:     Effort: Pulmonary effort is normal. No respiratory distress. Breath sounds: Normal breath sounds. No wheezing. Abdominal:     General: Abdomen is flat. Bowel sounds are normal.     Palpations: Abdomen is soft. Tenderness: There is no tenderness. There is no right CVA tenderness, left CVA tenderness, guarding or rebound. Musculoskeletal:     Cervical back: Normal range of motion and neck supple. Right lower leg: No edema. Left lower leg: No edema. Skin:     General: Skin is warm and dry. Capillary Refill: Capillary refill takes less than 2 seconds. Neurological:     General: No focal deficit present. Mental Status: He is alert and oriented to person, place, and time. Cranial Nerves: No dysarthria or facial asymmetry. Sensory: Sensation is intact. Motor: Weakness (lower extremity strength 4/5 bilaterally) present. No tremor or pronator drift.      Coordination: Finger-Nose-Finger Test and Heel to [place] marina Test normal.  Psychiatric:         Mood and Affect: Mood normal.         Behavior: Behavior normal.      Recent Labs     08/10/22  1430 08/10/22  1708 08/10/22  1740 08/11/22  1010 08/12/22  0522     --   --  136 136   K 5.2*  --  4.2 4.2 4.5   CL 97*  --   --  99 101   CO2 24  --   --  23 24   BUN 38*  --   --  49* 26*   CREATININE 6.0*  --   --  7.1* 5.1*   GLUCOSE 94 74  --  204* 109*   CALCIUM 8.9  --   --  8.0* 8.2*         Recent Labs     08/10/22  1430 08/11/22  1010 08/12/22  0522   WBC 3.6* 3.4* 3.3*   RBC 4.69 4.23 4.43   HGB 14.4 13.0 13.3   HCT 42.7 38.7 39.7   MCV 91.0 91.5 89.6   MCH 30.7 30.7 30.0   MCHC 33.7 33.6 33.5   RDW 13.4 13.5 13.4    128* 142   MPV 11.9 11.9 11.5             Radiology:   XR CHEST PORTABLE   Final Result   No acute cardiopulmonary process. Assessment:    Principal Problem:    Elevated troponin  Active Problems:    Muscle weakness    Non compliance w medication regimen    Hypertensive urgency    Chronic combined systolic and diastolic congestive heart failure (HCC)    Cardiomyopathy (HCC)    Failure to thrive in adult    End stage renal failure on dialysis [facility])  Resolved Problems:    * No resolved hospital problems. *      Plan:  Hypertensive emergency - Continue Entresto  mg twice daily & Coreg 25 twice daily. Hydralazine 10 mg q6hrs PRN with parameters. Elevated troponin - likely secondary to end-stage renal disease and HTN emergency. Currently asx. No ST changes on EKG. ESRD on HD T/Th/S. Avoid nephrotoxic agents. Renal diet. Nephrology consulted. Palliative consulted. Chronic HFrEF with an EF 35 to 40% (4/2022). Continue Entresto & Coreg. Hypothyroid - Continue home synthroid. DM II - Controlled. Last A1C 5.4% on 5/2022. Bilateral lower extremity weakness - PT-17/OT-14. Social work consulted for placement. Dispo: Waiting for outpatient HD set up - [facility] vs home.     DVT: Heparin        Electronically signed by Nathaniel Fong MD on 8/13/2022 at 7:02 AM

## 2022-08-13 NOTE — PROGRESS NOTES
08/12/2022 05:22 AM    BUN 26 08/12/2022 05:22 AM    CREATININE 5.1 08/12/2022 05:22 AM    GFRAA 14 08/12/2022 05:22 AM    LABGLOM 11 08/12/2022 05:22 AM    GLUCOSE 109 08/12/2022 05:22 AM    PROT 7.0 08/12/2022 05:22 AM    LABALBU 3.5 08/12/2022 05:22 AM    CALCIUM 8.2 08/12/2022 05:22 AM    BILITOT 0.7 08/12/2022 05:22 AM    ALKPHOS 98 08/12/2022 05:22 AM    AST 20 08/12/2022 05:22 AM    ALT 13 08/12/2022 05:22 AM     Magnesium:    Lab Results   Component Value Date/Time    MG 1.9 08/10/2022 02:30 PM     Phosphorus:    Lab Results   Component Value Date/Time    PHOS 4.0 08/10/2022 02:30 PM     Radiology Review:      CXR 8/10/22   No acute cardiopulmonary process. BRIEF SUMMARY OF INITIAL CONSULT:    Briefly, Mr. Mayur Fischer is a 67year old male with a PMH of ESRD currently being trained for home hemo T-W-Th-F, via RIJ, HTN, type II DM, HFrEF 35-40% with stage I DD, hypothyroidism, hepatitis C, who was admitted on August 10, 2022 after presenting to the ER with weakness. Upon arrival to ER, he was found to be hypertensive with a BP of 216/128. He received hydralazine 10 mg IV x1 and labetalol 10 mg IV x1 in ER. Patient admits to noncompliance with medications. He states his last dialysis treatment was on Tuesday. We are consulted for dialysis management. IMPRESSION/RECOMMENDATIONS:      ESRD currently being trained for home hemo T-W-Th-F, via RIJ tunneled dialysis catheter. His last treatment was on Tuesday. We will continue with dialysis T-Th-S while in the hospital. Seen on dialysis, tolerating well. Uncontrolled HTN, secondary to noncompliance with medications, on carvedilol. BP improving.   HFrEF 35-40% with stage I DD, on carvedilol and Entresto  MBD of CKD, phosphorus 4, not currently on binders  Anemia of CKD, hold NIXON for hgb >10  --------------------------  Weakness  Hypothyroidism, .400, noncompliant with medication, on levothyroxine      Plan:    Obtain BMP and CBC  HD three times weekly T-Th-S while in the hospital  Continue to monitor BP  Continue to monitor labs      Electronically signed by KIANNA Hines CNP on 8/13/2022 at 8:46 AM

## 2022-08-13 NOTE — PROGRESS NOTES
Patient is c/o headache, refuses Tylenol, requesting aspirin or Excedrin. Patient c/o nausea, refuses Compazine, is requesting Rolaids. Patient is also refusing hydralazine for sbp 170s- he states the hydralazine is the reason he has a headache and is nauseous. [employer] message sent to physician. Awaiting response.

## 2022-08-13 NOTE — FLOWSHEET NOTE
08/13/22 1207   Vital Signs   BP (!) 160/104   Temp 97.4 °F (36.3 °C)   Heart Rate 74   Resp 16   Weight 140 lb 3.4 oz (63.6 kg)   Weight Method Bed scale   Percent Weight Change 0   Pain Assessment   Pain Assessment None - Denies Pain   Pain Level 0   Patient's Stated Pain Goal 0 - No pain   Post-Hemodialysis Assessment   Post-Treatment Procedures Blood returned;Catheter capped, clamped with Citrate x 2 ports   Machine Disinfection Process Exterior Machine Disinfection   Rinseback Volume (ml) 300 ml   Blood Volume Processed (Liters) 67.1 l/min   Dialyzer Clearance Lightly streaked   Duration of Treatment (minutes) 210 minutes   Heparin Amount Administered During Treatment (mL) 0 mL   Hemodialysis Intake (ml) 500 ml   Hemodialysis Output (ml) 1118 ml   NET Removed (ml) 618   Tolerated Treatment Good   Interventions Taken Fluid bolus;Ultrafiltration stopped   Patient Response to Treatment pt tolerated tx well, 600ml fluid removed   Physician Notified No   Time Off 1202   Patient Disposition Return to room   Tolerated  HD per orders,  600 ml removed, pt became tachycardic uf shut off, resolved tachycardia. HD CVC flushed, citrate to dwell, clamped and capped  post tx per policy. Report to floor RN, remains in care of staff.

## 2022-08-13 NOTE — PROGRESS NOTES
P Quality Flow/Interdisciplinary Rounds Progress Note        Quality Flow Rounds held on August 13, 2022    Disciplines Attending:  Bedside Nurse and      Mona Caraballo was admitted on 8/10/2022  1:49 PM    Anticipated Discharge Date:       Disposition:    Kory Score:  Kory Scale Score: 18    Readmission Risk              Risk of Unplanned Readmission:  26           Discussed patient goal for the day, patient clinical progression, and barriers to discharge. The following Goal(s) of the Day/Commitment(s) have been identified:   Await a chair time for dialysis.        Rach Contreras RN  August 13, 2022

## 2022-08-14 LAB
ANION GAP SERPL CALCULATED.3IONS-SCNC: 12 MMOL/L (ref 7–16)
BUN BLDV-MCNC: 24 MG/DL (ref 6–23)
CALCIUM SERPL-MCNC: 7.9 MG/DL (ref 8.6–10.2)
CHLORIDE BLD-SCNC: 103 MMOL/L (ref 98–107)
CO2: 22 MMOL/L (ref 22–29)
CREAT SERPL-MCNC: 4.3 MG/DL (ref 0.7–1.2)
GFR AFRICAN AMERICAN: 16
GFR NON-AFRICAN AMERICAN: 14 ML/MIN/1.73
GLUCOSE BLD-MCNC: 99 MG/DL (ref 74–99)
HCT VFR BLD CALC: 36.7 % (ref 37–54)
HEMOGLOBIN: 12.1 G/DL (ref 12.5–16.5)
MCH RBC QN AUTO: 29.6 PG (ref 26–35)
MCHC RBC AUTO-ENTMCNC: 33 % (ref 32–34.5)
MCV RBC AUTO: 89.7 FL (ref 80–99.9)
PDW BLD-RTO: 13.4 FL (ref 11.5–15)
PLATELET # BLD: 148 E9/L (ref 130–450)
PMV BLD AUTO: 11.9 FL (ref 7–12)
POTASSIUM SERPL-SCNC: 4.2 MMOL/L (ref 3.5–5)
RBC # BLD: 4.09 E12/L (ref 3.8–5.8)
SODIUM BLD-SCNC: 137 MMOL/L (ref 132–146)
WBC # BLD: 3.6 E9/L (ref 4.5–11.5)

## 2022-08-14 PROCEDURE — 85027 COMPLETE CBC AUTOMATED: CPT

## 2022-08-14 PROCEDURE — 80048 BASIC METABOLIC PNL TOTAL CA: CPT

## 2022-08-14 PROCEDURE — 99232 SBSQ HOSP IP/OBS MODERATE 35: CPT | Performed by: INTERNAL MEDICINE

## 2022-08-14 PROCEDURE — 6370000000 HC RX 637 (ALT 250 FOR IP): Performed by: INTERNAL MEDICINE

## 2022-08-14 PROCEDURE — 36415 COLL VENOUS BLD VENIPUNCTURE: CPT

## 2022-08-14 PROCEDURE — 6370000000 HC RX 637 (ALT 250 FOR IP)

## 2022-08-14 PROCEDURE — 6360000002 HC RX W HCPCS: Performed by: FAMILY MEDICINE

## 2022-08-14 PROCEDURE — 6370000000 HC RX 637 (ALT 250 FOR IP): Performed by: FAMILY MEDICINE

## 2022-08-14 PROCEDURE — 2580000003 HC RX 258: Performed by: FAMILY MEDICINE

## 2022-08-14 PROCEDURE — 1200000000 HC SEMI PRIVATE

## 2022-08-14 RX ADMIN — LEVOTHYROXINE SODIUM 100 MCG: 100 TABLET ORAL at 05:38

## 2022-08-14 RX ADMIN — CARVEDILOL 25 MG: 25 TABLET, FILM COATED ORAL at 16:11

## 2022-08-14 RX ADMIN — ACETAMINOPHEN 650 MG: 325 TABLET ORAL at 07:49

## 2022-08-14 RX ADMIN — HEPARIN SODIUM 5000 UNITS: 10000 INJECTION, SOLUTION INTRAVENOUS; SUBCUTANEOUS at 21:38

## 2022-08-14 RX ADMIN — Medication 10 ML: at 21:43

## 2022-08-14 RX ADMIN — SACUBITRIL AND VALSARTAN 1 TABLET: 97; 103 TABLET, FILM COATED ORAL at 07:49

## 2022-08-14 RX ADMIN — ISOSORBIDE DINITRATE 20 MG: 10 TABLET ORAL at 16:12

## 2022-08-14 RX ADMIN — HEPARIN SODIUM 5000 UNITS: 10000 INJECTION, SOLUTION INTRAVENOUS; SUBCUTANEOUS at 05:38

## 2022-08-14 RX ADMIN — Medication 10 ML: at 07:50

## 2022-08-14 RX ADMIN — ATORVASTATIN CALCIUM 40 MG: 40 TABLET, FILM COATED ORAL at 21:38

## 2022-08-14 RX ADMIN — CARVEDILOL 25 MG: 25 TABLET, FILM COATED ORAL at 07:49

## 2022-08-14 RX ADMIN — PROCHLORPERAZINE EDISYLATE 10 MG: 5 INJECTION INTRAMUSCULAR; INTRAVENOUS at 17:03

## 2022-08-14 RX ADMIN — HYDRALAZINE HYDROCHLORIDE 37.5 MG: 25 TABLET, FILM COATED ORAL at 05:38

## 2022-08-14 RX ADMIN — HEPARIN SODIUM 5000 UNITS: 10000 INJECTION, SOLUTION INTRAVENOUS; SUBCUTANEOUS at 12:50

## 2022-08-14 RX ADMIN — ISOSORBIDE DINITRATE 20 MG: 10 TABLET ORAL at 07:48

## 2022-08-14 RX ADMIN — SERTRALINE HYDROCHLORIDE 50 MG: 50 TABLET ORAL at 07:49

## 2022-08-14 RX ADMIN — SACUBITRIL AND VALSARTAN 1 TABLET: 97; 103 TABLET, FILM COATED ORAL at 21:38

## 2022-08-14 ASSESSMENT — PAIN SCALES - GENERAL
PAINLEVEL_OUTOF10: 0
PAINLEVEL_OUTOF10: 6
PAINLEVEL_OUTOF10: 0

## 2022-08-14 ASSESSMENT — PAIN DESCRIPTION - PAIN TYPE: TYPE: ACUTE PAIN

## 2022-08-14 ASSESSMENT — PAIN DESCRIPTION - DESCRIPTORS: DESCRIPTORS: THROBBING

## 2022-08-14 ASSESSMENT — PAIN DESCRIPTION - LOCATION: LOCATION: HEAD

## 2022-08-14 ASSESSMENT — PAIN - FUNCTIONAL ASSESSMENT: PAIN_FUNCTIONAL_ASSESSMENT: PREVENTS OR INTERFERES SOME ACTIVE ACTIVITIES AND ADLS

## 2022-08-14 NOTE — PROGRESS NOTES
[facility] Progress Note    Admitting Date and Time: 8/10/2022  1:49 PM  Admit Dx: Essential hypertension [I10]  Elevated troponin [R77.8]  End stage renal failure on dialysis ([address] [N18.6, Z99.2]  Failure to thrive in adult [R62.7]  Non compliance w medication regimen [Z91.14]  Hypertension, unspecified type [I10]    Subjective/interval history:    Patient transferred to Dr. Daniel Apple 8/14/2022. Patient initially presented 8/10/2022 secondary to generalized weakness and malaise. Past medical history significant for end-stage renal disease on hemodialysis, diabetes mellitus, chronic congestive heart failure systolic and diastolic, benign essential hypertension, hypothyroidism, depression. Patient has been seen and evaluated by cardiology. Did have elevated troponin thought to be nonischemic pattern. Previous echocardiogram shows left ventricular ejection fraction 35 to 40%. Patient has undergone medication adjustments by cardiology. They are recommending a outpatient ischemic evaluation. Patient has significant hypertension. Patient was also seen and evaluated by nephrology. Has right IJ tunneled catheter. Did receive dialysis at [facility] ([facility] Previously received home dialysis T/W/Th/F. While in the hospital dialysis schedule is T/Th/S. No complaints this morning other than headache which she feels is caused by hydralazine. Denies shortness of breath, chest pain or pressure.      hydrALAZINE  37.5 mg Oral 3 times per day    isosorbide dinitrate  20 mg Oral TID    carvedilol  25 mg Oral BID WC    sacubitril-valsartan  1 tablet Oral BID    sodium chloride flush  5-40 mL IntraVENous 2 times per day    heparin (porcine)  5,000 Units SubCUTAneous 3 times per day    atorvastatin  40 mg Oral Nightly    levothyroxine  100 mcg Oral Daily    sertraline  50 mg Oral Daily     hydrALAZINE, 10 mg, Q6H PRN  polyethylene glycol, 17 g, Daily PRN  prochlorperazine, 10 mg, Q6H PRN  sodium chloride flush, 5-40 mL, PRN  sodium chloride, , PRN  acetaminophen, 650 mg, Q6H PRN   Or  acetaminophen, 650 mg, Q6H PRN  bisacodyl, 5 mg, Daily PRN       Objective:    BP (!) 162/84   Pulse 85   Temp 98.4 °F (36.9 °C) (Oral)   Resp 16   Ht 6' 2\" (1.88 m)   Wt 144 lb 10 oz (65.6 kg)   SpO2 98%   BMI 18.57 kg/m²     General Appearance: alert and oriented and in no acute distress  Skin: Right internal jugular catheter  Pulmonary/Chest: clear to auscultation bilaterally  Cardiovascular: normal rate, normal S1 and S2   Abdomen: soft, non-tender, non-distended, normal bowel sounds,  Extremities: no cyanosis, no clubbing and no edema  Neurologic: no cranial nerve deficit and speech normal    Recent Labs     08/12/22 0522 08/13/22  0845 08/14/22  0245    136 137   K 4.5 3.9 4.2    103 103   CO2 24 25 22   BUN 26* 30* 24*   CREATININE 5.1* 5.3* 4.3*   GLUCOSE 109* 175* 99   CALCIUM 8.2* 7.8* 7.9*       Recent Labs     08/12/22 0522 08/13/22  0845 08/14/22  0245   WBC 3.3* 2.9* 3.6*   RBC 4.43 4.09 4.09   HGB 13.3 12.3* 12.1*   HCT 39.7 38.0 36.7*   MCV 89.6 92.9 89.7   MCH 30.0 30.1 29.6   MCHC 33.5 32.4 33.0   RDW 13.4 13.5 13.4    126* 148   MPV 11.5 12.1* 11.9       Assessment:    Principal Problem:  Elevated troponin  Active Problems:  Muscle weakness  Non compliance w medication regimen  Hypertensive urgency  Chronic combined systolic and diastolic congestive heart failure (HCC)  Cardiomyopathy (HCC)  Failure to thrive in adult  End stage renal failure on dialysis (HCC)  Anemia of chronic disease  Hypothyroidism  NSTEMI type II  Elevated troponin    Plan:  1. Blood pressure on admission 216/128. Significant improvement. Medication regimen has been adjusted by cardiology. Carvedilol increased to be 25 mg twice daily, isosorbide dinitrate, entresto, hydralazine. Blood pressure this morning 162/84. Plan for ischemic work-up as outpatient.    2.  Dialysis schedule while in hospital T-TH-S. Continue to monitor electrolytes. Follow-up ionized calcium level. 3.  Patient was seen and evaluated by palliative care met, remains full ACLS protocol. 4.  Physical therapy evaluation noted high fall risk due to poor balance and weakness. Case management involvement for possible skilled nursing facility placement. 5.  Per medical record has history of noncompliance with hypothyroidism therapy. Continuing Synthroid.     Disposition; skilled nursing facility assessment by case management 8/15/2022. NOTE: This report was transcribed using voice recognition software. Every effort was made to ensure accuracy; however, inadvertent computerized transcription errors may be present.   Electronically signed by Hilaria Carvalho MD on 8/14/2022 at 9:45 AM

## 2022-08-14 NOTE — PROGRESS NOTES
Pt refusing PRN hydralazine despite high BP, complaining of a dull head and neck ache, education provided on continued high blood pressure and possible pain associated it. Patient aware and verbalizes understanding.

## 2022-08-14 NOTE — PLAN OF CARE
Problem: Safety - Adult  Goal: Free from fall injury  Outcome: Progressing     Problem: ABCDS Injury Assessment  Goal: Absence of physical injury  Outcome: Progressing     Problem: Pain  Goal: Verbalizes/displays adequate comfort level or baseline comfort level  Outcome: Progressing     Problem: Chronic Conditions and Co-morbidities  Goal: Patient's chronic conditions and co-morbidity symptoms are monitored and maintained or improved  Outcome: Progressing     Problem: Skin/Tissue Integrity  Goal: Absence of new skin breakdown  Description: 1. Monitor for areas of redness and/or skin breakdown  2. Assess vascular access sites hourly  3. Every 4-6 hours minimum:  Change oxygen saturation probe site  4. Every 4-6 hours:  If on nasal continuous positive airway pressure, respiratory therapy assess nares and determine need for appliance change or resting period.   Outcome: Progressing

## 2022-08-14 NOTE — PROGRESS NOTES
Department of Internal Medicine  Nephrology Progress Note    Events reviewed. SUBJECTIVE:  We are following Mr. Sima Joyce for ESRD on HD. He reports no complaints.     PHYSICAL EXAM:      Vitals:    VITALS:  BP (!) 162/84   Pulse 85   Temp 98.4 °F (36.9 °C) (Oral)   Resp 16   Ht 6' 2\" (1.88 m)   Wt 144 lb 10 oz (65.6 kg)   SpO2 98%   BMI 18.57 kg/m²   24HR INTAKE/OUTPUT:    Intake/Output Summary (Last 24 hours) at 8/14/2022 0950  Last data filed at 8/14/2022 0749  Gross per 24 hour   Intake 510 ml   Output 1268 ml   Net -758 ml         Access: RIJ tunneled dialysis catheter  Constitutional:  Alert and oriented, NAD  HEENT:  Normocephalic, PERRL  Respiratory:  CTA bilaterally  Cardiovascular/Edema:  RRR, S1,S2  Gastrointestinal:  Soft, rounded, nontender, nondistended  Neurologic:  Nonfocal, YARBROUGH  Skin:  Warm, dry, intact  Other:  No edema     Scheduled Meds:   hydrALAZINE  37.5 mg Oral 3 times per day    isosorbide dinitrate  20 mg Oral TID    carvedilol  25 mg Oral BID WC    sacubitril-valsartan  1 tablet Oral BID    sodium chloride flush  5-40 mL IntraVENous 2 times per day    heparin (porcine)  5,000 Units SubCUTAneous 3 times per day    atorvastatin  40 mg Oral Nightly    levothyroxine  100 mcg Oral Daily    sertraline  50 mg Oral Daily     Continuous Infusions:   sodium chloride       PRN Meds:.hydrALAZINE, polyethylene glycol, prochlorperazine, sodium chloride flush, sodium chloride, acetaminophen **OR** acetaminophen, bisacodyl    DATA:    CBC:   Lab Results   Component Value Date/Time    WBC 3.6 08/14/2022 02:45 AM    RBC 4.09 08/14/2022 02:45 AM    HGB 12.1 08/14/2022 02:45 AM    HCT 36.7 08/14/2022 02:45 AM    MCV 89.7 08/14/2022 02:45 AM    MCH 29.6 08/14/2022 02:45 AM    MCHC 33.0 08/14/2022 02:45 AM    RDW 13.4 08/14/2022 02:45 AM     08/14/2022 02:45 AM    MPV 11.9 08/14/2022 02:45 AM     CMP:    Lab Results   Component Value Date/Time     08/14/2022 02:45 AM    K 4.2 08/14/2022 02:45 AM    K 4.5 08/12/2022 05:22 AM     08/14/2022 02:45 AM    CO2 22 08/14/2022 02:45 AM    BUN 24 08/14/2022 02:45 AM    CREATININE 4.3 08/14/2022 02:45 AM    GFRAA 16 08/14/2022 02:45 AM    LABGLOM 14 08/14/2022 02:45 AM    GLUCOSE 99 08/14/2022 02:45 AM    PROT 7.0 08/12/2022 05:22 AM    LABALBU 3.5 08/12/2022 05:22 AM    CALCIUM 7.9 08/14/2022 02:45 AM    BILITOT 0.7 08/12/2022 05:22 AM    ALKPHOS 98 08/12/2022 05:22 AM    AST 20 08/12/2022 05:22 AM    ALT 13 08/12/2022 05:22 AM     Magnesium:    Lab Results   Component Value Date/Time    MG 1.9 08/10/2022 02:30 PM     Phosphorus:    Lab Results   Component Value Date/Time    PHOS 4.0 08/10/2022 02:30 PM     Radiology Review:      CXR 8/10/22   No acute cardiopulmonary process. BRIEF SUMMARY OF INITIAL CONSULT:    Briefly, Mr. Kristel Arauz is a 67year old male with a PMH of ESRD currently being trained for home hemo T-W-Th-F, via RIJ, HTN, type II DM, HFrEF 35-40% with stage I DD, hypothyroidism, hepatitis C, who was admitted on August 10, 2022 after presenting to the ER with weakness. Upon arrival to ER, he was found to be hypertensive with a BP of 216/128. He received hydralazine 10 mg IV x1 and labetalol 10 mg IV x1 in ER. Patient admits to noncompliance with medications. He states his last dialysis treatment was on Tuesday. We are consulted for dialysis management. IMPRESSION/RECOMMENDATIONS:      ESRD currently being trained for home hemo T-W-Th-F, via RIJ tunneled dialysis catheter. His last treatment was on Tuesday. We will continue with dialysis T-Th-S while in the hospital.  Patient will need to be transferred to a in center dialysis facility. Uncontrolled HTN, secondary to noncompliance with medications, on carvedilol. BP improving.   HFrEF 35-40% with stage I DD, on carvedilol and Entresto  MBD of CKD, phosphorus 4, not currently on binders  Anemia of CKD, hold NIXON for hgb >10  --------------------------  Weakness  Hypothyroidism, .400, noncompliant with medication, on levothyroxine      Plan:    HD three times weekly T-Th-S while in the hospital  Continue to monitor BP  Continue to monitor labs  Obtain ionized calcium level in AM      Electronically signed by KIANNA Sanford CNP on 8/14/2022 at 9:50 AM

## 2022-08-14 NOTE — PROGRESS NOTES
Patient stated he will no longer take Hydralazine because it causes him throbbing headaches and he has discontinued taking this medication in the past due to the throbbing headaches.  Dr. Up Luis Manuel notified and new orders to discontinue Hydralazine oral due to patient's concerns/refusal.

## 2022-08-14 NOTE — PLAN OF CARE
Problem: Safety - Adult  Goal: Free from fall injury  8/14/2022 1008 by Jeanette Cast RN  Outcome: Progressing  8/13/2022 2233 by Devora Sarmiento RN  Outcome: Progressing     Problem: ABCDS Injury Assessment  Goal: Absence of physical injury  8/14/2022 1008 by Jeanette Cast RN  Outcome: Progressing  8/13/2022 2233 by Devora Sarmiento RN  Outcome: Progressing     Problem: Chronic Conditions and Co-morbidities  Goal: Patient's chronic conditions and co-morbidity symptoms are monitored and maintained or improved  8/14/2022 1008 by Jeanette Cast RN  Outcome: Progressing  8/13/2022 2233 by Devora Sarmiento RN  Outcome: Progressing

## 2022-08-15 PROBLEM — I50.22 CHRONIC HFREF (HEART FAILURE WITH REDUCED EJECTION FRACTION) (HCC): Status: ACTIVE | Noted: 2022-08-12

## 2022-08-15 LAB
ADENOVIRUS BY PCR: NOT DETECTED
ALBUMIN SERPL-MCNC: 3.4 G/DL (ref 3.5–5.2)
ALP BLD-CCNC: 93 U/L (ref 40–129)
ALT SERPL-CCNC: 13 U/L (ref 0–40)
ANION GAP SERPL CALCULATED.3IONS-SCNC: 13 MMOL/L (ref 7–16)
AST SERPL-CCNC: 19 U/L (ref 0–39)
BILIRUB SERPL-MCNC: 0.7 MG/DL (ref 0–1.2)
BORDETELLA PARAPERTUSSIS BY PCR: NOT DETECTED
BORDETELLA PERTUSSIS BY PCR: NOT DETECTED
BUN BLDV-MCNC: 35 MG/DL (ref 6–23)
CALCIUM IONIZED: 1.15 MMOL/L (ref 1.15–1.33)
CALCIUM SERPL-MCNC: 8.3 MG/DL (ref 8.6–10.2)
CHLAMYDOPHILIA PNEUMONIAE BY PCR: NOT DETECTED
CHLORIDE BLD-SCNC: 103 MMOL/L (ref 98–107)
CO2: 22 MMOL/L (ref 22–29)
CORONAVIRUS 229E BY PCR: NOT DETECTED
CORONAVIRUS HKU1 BY PCR: NOT DETECTED
CORONAVIRUS NL63 BY PCR: NOT DETECTED
CORONAVIRUS OC43 BY PCR: NOT DETECTED
CREAT SERPL-MCNC: 6.1 MG/DL (ref 0.7–1.2)
GFR AFRICAN AMERICAN: 11
GFR NON-AFRICAN AMERICAN: 9 ML/MIN/1.73
GLUCOSE BLD-MCNC: 81 MG/DL (ref 74–99)
HCT VFR BLD CALC: 35.4 % (ref 37–54)
HEMOGLOBIN: 11.7 G/DL (ref 12.5–16.5)
HUMAN METAPNEUMOVIRUS BY PCR: NOT DETECTED
HUMAN RHINOVIRUS/ENTEROVIRUS BY PCR: NOT DETECTED
INFLUENZA A BY PCR: NOT DETECTED
INFLUENZA B BY PCR: NOT DETECTED
MCH RBC QN AUTO: 30.6 PG (ref 26–35)
MCHC RBC AUTO-ENTMCNC: 33.1 % (ref 32–34.5)
MCV RBC AUTO: 92.7 FL (ref 80–99.9)
MYCOPLASMA PNEUMONIAE BY PCR: NOT DETECTED
PARAINFLUENZA VIRUS 1 BY PCR: NOT DETECTED
PARAINFLUENZA VIRUS 2 BY PCR: NOT DETECTED
PARAINFLUENZA VIRUS 3 BY PCR: NOT DETECTED
PARAINFLUENZA VIRUS 4 BY PCR: NOT DETECTED
PDW BLD-RTO: 13.8 FL (ref 11.5–15)
PLATELET # BLD: 139 E9/L (ref 130–450)
PMV BLD AUTO: 12.2 FL (ref 7–12)
POTASSIUM SERPL-SCNC: 4.6 MMOL/L (ref 3.5–5)
RBC # BLD: 3.82 E12/L (ref 3.8–5.8)
RESPIRATORY SYNCYTIAL VIRUS BY PCR: NOT DETECTED
SARS-COV-2, PCR: NOT DETECTED
SODIUM BLD-SCNC: 138 MMOL/L (ref 132–146)
TOTAL PROTEIN: 6.5 G/DL (ref 6.4–8.3)
WBC # BLD: 3.5 E9/L (ref 4.5–11.5)

## 2022-08-15 PROCEDURE — 80053 COMPREHEN METABOLIC PANEL: CPT

## 2022-08-15 PROCEDURE — 36415 COLL VENOUS BLD VENIPUNCTURE: CPT

## 2022-08-15 PROCEDURE — 82330 ASSAY OF CALCIUM: CPT

## 2022-08-15 PROCEDURE — 85027 COMPLETE CBC AUTOMATED: CPT

## 2022-08-15 PROCEDURE — 0202U NFCT DS 22 TRGT SARS-COV-2: CPT

## 2022-08-15 PROCEDURE — 6370000000 HC RX 637 (ALT 250 FOR IP)

## 2022-08-15 PROCEDURE — 97530 THERAPEUTIC ACTIVITIES: CPT

## 2022-08-15 PROCEDURE — 6370000000 HC RX 637 (ALT 250 FOR IP): Performed by: FAMILY MEDICINE

## 2022-08-15 PROCEDURE — 6370000000 HC RX 637 (ALT 250 FOR IP): Performed by: INTERNAL MEDICINE

## 2022-08-15 PROCEDURE — 1200000000 HC SEMI PRIVATE

## 2022-08-15 PROCEDURE — 99232 SBSQ HOSP IP/OBS MODERATE 35: CPT | Performed by: FAMILY MEDICINE

## 2022-08-15 PROCEDURE — 2580000003 HC RX 258: Performed by: FAMILY MEDICINE

## 2022-08-15 PROCEDURE — 6360000002 HC RX W HCPCS: Performed by: FAMILY MEDICINE

## 2022-08-15 RX ORDER — AMLODIPINE BESYLATE 5 MG/1
5 TABLET ORAL ONCE
Status: COMPLETED | OUTPATIENT
Start: 2022-08-15 | End: 2022-08-15

## 2022-08-15 RX ORDER — AMLODIPINE BESYLATE 5 MG/1
5 TABLET ORAL DAILY
Status: DISCONTINUED | OUTPATIENT
Start: 2022-08-16 | End: 2022-08-15

## 2022-08-15 RX ADMIN — AMLODIPINE BESYLATE 5 MG: 5 TABLET ORAL at 17:40

## 2022-08-15 RX ADMIN — Medication 10 ML: at 20:19

## 2022-08-15 RX ADMIN — HEPARIN SODIUM 5000 UNITS: 10000 INJECTION, SOLUTION INTRAVENOUS; SUBCUTANEOUS at 20:19

## 2022-08-15 RX ADMIN — ISOSORBIDE DINITRATE 20 MG: 10 TABLET ORAL at 12:18

## 2022-08-15 RX ADMIN — HEPARIN SODIUM 5000 UNITS: 10000 INJECTION, SOLUTION INTRAVENOUS; SUBCUTANEOUS at 05:50

## 2022-08-15 RX ADMIN — ATORVASTATIN CALCIUM 40 MG: 40 TABLET, FILM COATED ORAL at 20:19

## 2022-08-15 RX ADMIN — ISOSORBIDE DINITRATE 20 MG: 10 TABLET ORAL at 15:52

## 2022-08-15 RX ADMIN — SERTRALINE HYDROCHLORIDE 50 MG: 50 TABLET ORAL at 09:16

## 2022-08-15 RX ADMIN — SACUBITRIL AND VALSARTAN 1 TABLET: 97; 103 TABLET, FILM COATED ORAL at 09:16

## 2022-08-15 RX ADMIN — SACUBITRIL AND VALSARTAN 1 TABLET: 97; 103 TABLET, FILM COATED ORAL at 20:19

## 2022-08-15 RX ADMIN — CARVEDILOL 25 MG: 25 TABLET, FILM COATED ORAL at 15:52

## 2022-08-15 RX ADMIN — CARVEDILOL 25 MG: 25 TABLET, FILM COATED ORAL at 09:16

## 2022-08-15 RX ADMIN — LEVOTHYROXINE SODIUM 100 MCG: 100 TABLET ORAL at 05:51

## 2022-08-15 RX ADMIN — ISOSORBIDE DINITRATE 20 MG: 10 TABLET ORAL at 09:16

## 2022-08-15 RX ADMIN — Medication 10 ML: at 09:16

## 2022-08-15 NOTE — PROGRESS NOTES
[facility] Quality Flow/Interdisciplinary Rounds Progress Note        Quality Flow Rounds held on August 15, 2022    Disciplines Attending:  Bedside Nurse, , , and Nursing Unit Garth Gramajo was admitted on 8/10/2022  1:49 PM    Anticipated Discharge Date:       Disposition:    Kory Score:  Kory Scale Score: 19    Readmission Risk              Risk of Unplanned Readmission:  26           Discussed patient goal for the day, patient clinical progression, and barriers to discharge. The following Goal(s) of the Day/Commitment(s) have been identified:   establish chair time, discharge planning.        Carolyn Miller RN  August 15, 2022

## 2022-08-15 NOTE — PROGRESS NOTES
Patients blood pressure 178/89 informed patient of the hydralazine PRN IV order when his blood pressure is high. Patient refused PRN hydralazine due to IV also causing headaches. Patient was educated on importance of taking medication, patient declined and did not want hydralazine.

## 2022-08-15 NOTE — PLAN OF CARE
Problem: ABCDS Injury Assessment  Goal: Absence of physical injury  Outcome: Progressing     Problem: Safety - Adult  Goal: Free from fall injury  Outcome: Progressing     Problem: Pain  Goal: Verbalizes/displays adequate comfort level or baseline comfort level  Outcome: Progressing     Problem: Chronic Conditions and Co-morbidities  Goal: Patient's chronic conditions and co-morbidity symptoms are monitored and maintained or improved  Outcome: Progressing     Problem: Skin/Tissue Integrity  Goal: Absence of new skin breakdown  Description: 1. Monitor for areas of redness and/or skin breakdown  2. Assess vascular access sites hourly  3. Every 4-6 hours minimum:  Change oxygen saturation probe site  4. Every 4-6 hours:  If on nasal continuous positive airway pressure, respiratory therapy assess nares and determine need for appliance change or resting period.   Outcome: Progressing

## 2022-08-15 NOTE — CARE COORDINATION
Social Work / Discharge Planning : GIOVANI called Consuelo Taylor at [facility] at [employer] [phone] to follow up on HD schedule for patient. Unfortunately, their VM is full. GIOVANI then called [facility] and spoke to CHYNA Ramachandran. They do NOT have a schedule yet but she is messaging Consuelo Taylor to see if they have a day and chair time for patient. Await HD schedule. GIOVANI updated Todd Wayne at [facility]. GIOVANI to follow. Electronically signed by JAY Brown on 8/15/22 at 11:29 AM EDT     Addendum: Consuelo Taylor from [facility] scheduling returned SW call and patient has HD schedule for [facility] for Tuesday, THursday and Saturday at 6:00 am. They can open on Thursday. Will need new COVID for [facility]. Todd Wayne from [facility] can accept patient tomorrow after dialysis here at hospital. GIOVANI to follow.  Electronically signed by JAY Brown on 8/15/22 at 3:06 PM EDT

## 2022-08-15 NOTE — PROGRESS NOTES
Physical Therapy  Facility/Department: [address] INTERMEDIATE  Physical Therapy Treatment Note  Name: Jolynn Corbin  : 1950  MRN: [id number]  Date of Service: 8/15/2022        Patient Diagnosis(es): The primary encounter diagnosis was End stage renal failure on dialysis [facility]). Diagnoses of Hypertension, unspecified type, Non compliance w medication regimen, Failure to thrive in adult, Elevated troponin, and Essential hypertension were also pertinent to this visit. Past Medical History:  has a past medical history of Chronic kidney disease, Diabetes mellitus ([address] Hepatitis C, Hypertension, Liver disease, Thyroid disease, and Unspecified diseases of blood and blood-forming organs. Past Surgical History:  has a past surgical history that includes Tonsillectomy and vascular surgery (N/A, 2021). Evaluating Therapist: Erick Alvarado PT     Referring Provider:  Homero Boyd MD    PT order : PT eval and treat     Room #: 497   DIAGNOSIS:  essential HTN, Slid out of bed   Additional Pertinent History:ESRD on HD   PRECAUTIONS:  falls     Social:  Pt lives alone  in a  1  floor plan  3  steps and  no  rails to enter. Prior to admission pt walked with no AD, independent . Drives      Initial Evaluation  Date:  2022  Treatment  8/15/2022    Short Term/ Long Term   Goals   Was pt agreeable to Eval/treatment?   Yes  yes    Does pt have pain?  denies L knee pain, states has gout    Bed Mobility  Rolling:  NT   Supine to sit:  SBA  Sit to supine:  SBA   Scooting:  independent  Rolling: SBA  Supine <> sit; SBA  Scooting: SBA seated to EOB  Independent    Transfers Sit to stand: CGA   Stand to sit:  CGA   Stand pivot:  NT  Sit <> stand: Min A  Independent    Ambulation     15 and 80  feet with  no AD  with  CGA/min assist. 25 feet x 1 with ww CGA/min assist  120 feet x 1 using [address] for support Min A for balance  100 feet with  no AD/AAD  with  independent        Stair negotiation: ascended and descended NT  NT  4  steps with  no  rail with  S/I    LE ROM  WFL     LE strength  4/ 5   4+/ 5    AM- PAC RAW score  17/ 24 16/24        Pt is alert and able to follow instruction, states wants to walk  Balance: poor dynamic using [employer] for support    Pt performed therapeutic exercise of the following: NT    Patient education/treatment  Pt was educated on UE usage to assist with transfers safety gait mechanics promoting posture and staying within [employer] base of support    Patient response to education:   Pt verbalized understanding Pt demonstrated skill Pt requires further education in this area   yes With repeated instruction for gait yes     ASSESSMENT:   Comments: Nurse ok with Rx. Pt sat EOB SBA. Gait to the hallway, mamie slow, inconsistent and laboring, step to pattern used due to L knee pain. Pt fatigued after activity, states wants to go back to bed. Pt unsafe to gait or transfer alone presently, is a fall risk without support. Pt was left in bed as found per request with call light in reach    Chair/bed alarm: bed alarm active    Time in 1332   Time out 1346   Total Treatment Time 14 minutes   CPT codes:     Therapeutic activities 45082 14 minutes   Therapeutic exercises 90363 0 minutes       Pt is making fair progress toward established Physical Therapy goals. Continue with physical therapy current plan of care.     Karen Shah PTA   License Number: [id number]

## 2022-08-15 NOTE — PROGRESS NOTES
Department of Internal Medicine  Nephrology Progress Note    Events reviewed. SUBJECTIVE:  We are following Mr. Dewey Lee for ESRD on HD. He reports no complaints.     PHYSICAL EXAM:      Vitals:    VITALS:  BP (!) 172/88   Pulse 69   Temp 98.5 °F (36.9 °C) (Axillary)   Resp 18   Ht 6' 2\" (1.88 m)   Wt 144 lb 10 oz (65.6 kg)   SpO2 97%   BMI 18.57 kg/m²   24HR INTAKE/OUTPUT:  No intake or output data in the 24 hours ending 08/15/22 0922        Access: RIJ tunneled dialysis catheter  Constitutional:  Alert and oriented, NAD  HEENT:  Normocephalic, PERRL  Respiratory:  CTA bilaterally  Cardiovascular/Edema:  RRR, S1,S2  Gastrointestinal:  Soft, rounded, nontender, nondistended  Neurologic:  Nonfocal, YARBROUGH  Skin:  Warm, dry, intact  Other:  No edema     Scheduled Meds:   isosorbide dinitrate  20 mg Oral TID    carvedilol  25 mg Oral BID WC    sacubitril-valsartan  1 tablet Oral BID    sodium chloride flush  5-40 mL IntraVENous 2 times per day    heparin (porcine)  5,000 Units SubCUTAneous 3 times per day    atorvastatin  40 mg Oral Nightly    levothyroxine  100 mcg Oral Daily    sertraline  50 mg Oral Daily     Continuous Infusions:   sodium chloride       PRN Meds:.hydrALAZINE, polyethylene glycol, prochlorperazine, sodium chloride flush, sodium chloride, acetaminophen **OR** acetaminophen, bisacodyl    DATA:    CBC:   Lab Results   Component Value Date/Time    WBC 3.5 08/15/2022 05:44 AM    RBC 3.82 08/15/2022 05:44 AM    HGB 11.7 08/15/2022 05:44 AM    HCT 35.4 08/15/2022 05:44 AM    MCV 92.7 08/15/2022 05:44 AM    MCH 30.6 08/15/2022 05:44 AM    MCHC 33.1 08/15/2022 05:44 AM    RDW 13.8 08/15/2022 05:44 AM     08/15/2022 05:44 AM    MPV 12.2 08/15/2022 05:44 AM     CMP:    Lab Results   Component Value Date/Time     08/15/2022 05:44 AM    K 4.6 08/15/2022 05:44 AM    K 4.5 08/12/2022 05:22 AM     08/15/2022 05:44 AM    CO2 22 08/15/2022 05:44 AM    BUN 35 08/15/2022 05:44 AM CREATININE 6.1 08/15/2022 05:44 AM    GFRAA 11 08/15/2022 05:44 AM    LABGLOM 9 08/15/2022 05:44 AM    GLUCOSE 81 08/15/2022 05:44 AM    PROT 6.5 08/15/2022 05:44 AM    LABALBU 3.4 08/15/2022 05:44 AM    CALCIUM 8.3 08/15/2022 05:44 AM    BILITOT 0.7 08/15/2022 05:44 AM    ALKPHOS 93 08/15/2022 05:44 AM    AST 19 08/15/2022 05:44 AM    ALT 13 08/15/2022 05:44 AM     Magnesium:    Lab Results   Component Value Date/Time    MG 1.9 08/10/2022 02:30 PM     Phosphorus:    Lab Results   Component Value Date/Time    PHOS 4.0 08/10/2022 02:30 PM     Radiology Review:      CXR 8/10/22   No acute cardiopulmonary process. BRIEF SUMMARY OF INITIAL CONSULT:    Briefly, Mr. Wilbert Sams is a 67year old male with a PMH of ESRD currently being trained for home hemo T-W-Th-F, via RIJ, HTN, type II DM, HFrEF 35-40% with stage I DD, hypothyroidism, hepatitis C, who was admitted on August 10, 2022 after presenting to the ER with weakness. Upon arrival to ER, he was found to be hypertensive with a BP of 216/128. He received hydralazine 10 mg IV x1 and labetalol 10 mg IV x1 in ER. Patient admits to noncompliance with medications. He states his last dialysis treatment was on Tuesday. We are consulted for dialysis management. IMPRESSION/RECOMMENDATIONS:      ESRD currently being trained for home hemo T-W-Th-F, via RIJ tunneled dialysis catheter. His last treatment was on Tuesday. We will continue with dialysis T-Th-S while in the hospital.  Patient will need to be transferred to a in center dialysis facility. Uncontrolled HTN, secondary to noncompliance with medications, on carvedilol. BP improving.   HFrEF 35-40% with stage I DD, on carvedilol and Entresto  MBD of CKD, phosphorus 4, not currently on binders  Anemia of CKD, hold NIXON for hgb >10  --------------------------  Weakness  Hypothyroidism, .400, noncompliant with medication, on levothyroxine      Plan:    HD three times weekly T-Th-S while in the hospital  Continue to monitor BP  Continue to monitor labs    Electronically signed by KIANNA Choe NP on 8/15/2022 at 9:22 AM

## 2022-08-15 NOTE — PROGRESS NOTES
Occupational Therapy  OT BEDSIDE TREATMENT NOTE      Date:8/15/2022  Patient Name: Jessi Anguiano  MRN: [id number]  : 1950  Room: [address] [place] [address]     Evaluating OT: JAYE Ignacio/L - [id number]     Referring Provider: Sarah Fernandez MD  Specific Provider Orders/Date: \"OT eval and treat\" - 8/10/2022     Diagnosis: Essential hypertension [I10], Elevated troponin [R77.8], End stage renal failure on dialysis ([facility] [place] [address] [N18.6, Z99.2], Failure to thrive in adult [R62.7], Non compliance w medication regimen [Z91.14], Hypertension, unspecified type [I10]       Pertinent Medical History: HTN, hepatitis C, ESRD on HD      Precautions: fall risk, bed alarm     Assessment of Current Deficits:    [x] Functional mobility             [x]ADLs           [x] Strength                  [x]Cognition  [x] Functional transfers           [x] IADLs         [x] Safety Awareness   [x]Endurance  [] Fine Coordination              [x] Balance      [] Vision/perception   [x]Sensation     []Gross Motor Coordination  [] ROM           [] Delirium                   [] Motor Control     OT PLAN OF CARE   OT POC is based on physician orders, patient diagnosis, and results of clinical assessment.   Frequency/Duration 2-5 days/week for 2 weeks PRN  Specific OT Treatment Interventions to Include:   * Instruction/training on adapted ADL techniques and AE recommendations to increase functional independence within precautions       * Training on energy conservation strategies, correct breathing pattern and techniques to improve independence/tolerance for self-care routine  * Functional transfer/mobility training/DME recommendations for increased independence, safety, and fall prevention  * Patient/Family education to increase follow through with safety techniques and functional independence  * Recommendation of environmental modifications for increased safety with functional transfers/mobility and ADLs  * Therapeutic exercise to improve motor endurance, ROM, and functional strength for ADLs/functional transfers  * Therapeutic activities to facilitate/challenge dynamic balance, stand tolerance for increased safety and independence with ADLs  * Neuro-muscular re-education: facilitation of righting/equilibrium reactions, midline orientation, scapular stability/mobility, normalization of muscle tone, and facilitation of volitional active controled movement  * Positioning to improve skin integrity, interaction with environment and functional independence     Recommended Adaptive Equipment: TBD     Home Living: Patient lives alone in a one-floor setup (few steps to enter); laundry is on the main living level. Bathroom Setup: tub shower (no seat, no grab bars)  Equipment Owned: N/A     Prior Level of Function (PLOF): Per patient, he was independent with ADLs, IADLs, and functional mobility (without device) prior to this hospitalization. Driving: Yes     Pain Level: Patient complained of L knee pain due to gout with activity. Cognition: Patient demonstrated fatigue throughout this session; slowed processing noted. Patient agreeable to participation in EOB/OOB activities with provision of encouragement. WFL command follow demonstrated.   Memory: WFL grossly  Sequencing: WFL grossly  Problem Solving: WFL grossly  Judgement/Safety: WFL grossly     Functional Assessment:                  AM-PAC Daily Activity Raw Score: 15/24    Initial Eval Status  Date: 8/12/2022 Treatment Status  Date: 8/15/22 Short Term Goals = Long Term Goals   Feeding Setup   Independent   Grooming Mod A   Supervision (seated/standing at sink)   UB Dressing Min A Min A to arrange gown while standing  Setup    LB Dressing Max A  Min A to don/doff socks while supine in bed.  mod A to don socks sitting EOB due to LOB  SBA - with use of AE, as needed/appropriate   Bathing Max A   SBA - with use of AE/DME, as needed/appropriate   Toileting Max A   SBA   Bed Mobility Supine-to-Sit: Min A and increased time needed. Sit-to-Supine: Min A Supine <> sit SBA  Independent / Mod I in order to maximize patient's independence with ADLs, re-positioning, and other functional tasks. Functional Transfers Sit-to-Stand: Mod A   from EOB (with walker)  Patient unable to achieve full, upright standing posture during first attempt; with second attempt, patient was able to stand, but quickly sat back down on the EOB and requested to return to bed. Sit <> stand min A  SBA   Functional Mobility Not assessed. Poor tolerance of static standing demonstrated during this session. Min A with [employer] in room and for short distance in mary. SBA with functional mobility (with device, as needed/appropriate) in order to maximize independence with ADLs/IADLs and other functional tasks. Balance Sitting: Fair (at EOB)  Posterior lean noted occasionally. Standing: Poor (with walker) Sitting balance min A with ADL  Standing balance min A with [employer]  Fair dynamic standing balance during completion of ADLs/IADLs and other functional tasks. Activity Tolerance Limited secondary to fatigue and weakness. Fair, limited by fatigue and L knee pain  Patient will demonstrate Good understanding and consistent implementation of energy conservation techniques and work simplification techniques into ADL/IADL routines. Visual/  Perceptual WFL      N/A   B UE Strength 3+/5   Patient will demonstrate 4/5 B UE strength in order to maximize independence with ADLs/IADLs and functional transfers. Comments:  patient cleared with nursing and agreeable to therapy. Patient quick to fatigue and L knee limits functional mobility, but good effort and improvement demonstrated. Patient in bed with call light in reach and alarm on    Education/treatment: ADL and functional transfer/activity performed to increase safety and independence during self care tasks. Education provided on safety awareness, adl reeducation, functional transfer training.     Pt has made progress towards set goals.      Time In: 1:34  Time Out: 1:46     Min Units   Therapeutic Ex 70677     Therapeutic Activities 67139 12 1   ADL/Self Care 26235     Orthotic Management 76852     Neuro Re-Ed 49923     Non-Billable Time     TOTAL TIMED TREATMENT 12 0330 [address] [place] POWERS/L [id number]

## 2022-08-15 NOTE — PROGRESS NOTES
[facility] Hospitalist   Progress Note    Admitting Date and Time: 8/10/2022  1:49 PM  Admit Dx: Essential hypertension [I10]  Elevated troponin [R77.8]  End stage renal failure on dialysis ([facility] [address] [N18.6, Z99.2]  Failure to thrive in adult [R62.7]  Non compliance w medication regimen [Z91.14]  Hypertension, unspecified type [I10]  Hypertensive urgency [I16.0]    Subjective:    Pt feels well today. Receiving am care per nurse. Denies any new concerns at this time. Per RN: Can Pt DC today? ROS: denies fever, chills, cp, sob, n/v, HA unless stated above.     isosorbide dinitrate  20 mg Oral TID    carvedilol  25 mg Oral BID WC    sacubitril-valsartan  1 tablet Oral BID    sodium chloride flush  5-40 mL IntraVENous 2 times per day    heparin (porcine)  5,000 Units SubCUTAneous 3 times per day    atorvastatin  40 mg Oral Nightly    levothyroxine  100 mcg Oral Daily    sertraline  50 mg Oral Daily     hydrALAZINE, 10 mg, Q6H PRN  polyethylene glycol, 17 g, Daily PRN  prochlorperazine, 10 mg, Q6H PRN  sodium chloride flush, 5-40 mL, PRN  sodium chloride, , PRN  acetaminophen, 650 mg, Q6H PRN   Or  acetaminophen, 650 mg, Q6H PRN  bisacodyl, 5 mg, Daily PRN         Objective:    BP (!) 172/88   Pulse 69   Temp 98.5 °F (36.9 °C) (Axillary)   Resp 18   Ht 6' 2\" (1.88 m)   Wt 144 lb 10 oz (65.6 kg)   SpO2 97%   BMI 18.57 kg/m²   General Appearance: alert and oriented to person, place and time and in no acute distress  Skin: warm and dry  Head: normocephalic and atraumatic  Eyes: pupils equal, round, and reactive to light, extraocular eye movements intact, conjunctivae normal  Neck: neck supple and non tender without mass   Pulmonary/Chest: clear to auscultation bilaterally- no wheezes, rales or rhonchi, normal air movement, no respiratory distress.  Right HD cath  Cardiovascular: normal rate, normal S1 and S2 and no RGM  Abdomen: soft, non-tender, non-distended, normal bowel sounds,  Extremities: no cyanosis, no clubbing and no edema  Neurologic: no cranial nerve deficit and speech normal      Recent Labs     08/13/22  0845 08/14/22  0245 08/15/22  0544    137 138   K 3.9 4.2 4.6    103 103   CO2 25 22 22   BUN 30* 24* 35*   CREATININE 5.3* 4.3* 6.1*   GLUCOSE 175* 99 81   CALCIUM 7.8* 7.9* 8.3*       Recent Labs     08/15/22  0544   ALKPHOS 93   PROT 6.5   LABALBU 3.4*   BILITOT 0.7   AST 19   ALT 13       Recent Labs     08/13/22  0845 08/14/22  0245 08/15/22  0544   WBC 2.9* 3.6* 3.5*   RBC 4.09 4.09 3.82   HGB 12.3* 12.1* 11.7*   HCT 38.0 36.7* 35.4*   MCV 92.9 89.7 92.7   MCH 30.1 29.6 30.6   MCHC 32.4 33.0 33.1   RDW 13.5 13.4 13.8   * 148 139   MPV 12.1* 11.9 12.2*            Radiology:   XR CHEST PORTABLE   Final Result   No acute cardiopulmonary process. Assessment:  Principal Problem:    Elevated troponin  Active Problems:    Muscle weakness    Non compliance w medication regimen    Hypertensive urgency    Chronic combined systolic and diastolic congestive heart failure (HCC)    Cardiomyopathy (HCC)    Failure to thrive in adult    End stage renal failure on dialysis [facility])  Resolved Problems:    * No resolved hospital problems. *      Plan:  Hypertensive Emergency- Pt stopped taking Carvedilol/Entresto. /128 upon presentation. Received. Hydralazine/labetolol. Carvedilol/Isosorbide Dinitrate/Entresto/Hydralazine. Ischemic workup as outpatient. Elevated this am. Received Hydralazine. Add Amlodipine. Cardiology input appreciated. Elevated troponin- HS troponin 715>742 loren acute on chronic myocardial injury in the setting of ESRD. Plan for ischemic evaluation if pt agreeable. ESRD requiring HD- HD TThSat. While inpatient. Awaiting HD schedule and chair time. Nephrology input appreciated. Chronic HFrEF- 4/2022 ECHO LVEF 35-45%. Stage I Diastolic Dysfunction. Euvolemic. I/O. Weights daily. Entresto/BB/Ace, Repeat ECHO. Cardiology input appreciated. Hypothyroid- Levothyroxine  DM II- A1C 5. 4. Currently diet controlled. Carb Control Diet. Weakness- PT/OT    NOTE: This report was transcribed using voice recognition software. Every effort was made to ensure accuracy; however, inadvertent computerized transcription errors may be present. Electronically signed by Alma Motnanez CNP on 8/15/2022 at 10:13 AM

## 2022-08-16 ENCOUNTER — APPOINTMENT (OUTPATIENT)
Dept: CT IMAGING | Age: 72
End: 2022-08-16
Payer: MEDICARE

## 2022-08-16 ENCOUNTER — APPOINTMENT (OUTPATIENT)
Dept: GENERAL RADIOLOGY | Age: 72
End: 2022-08-16
Payer: MEDICARE

## 2022-08-16 ENCOUNTER — HOSPITAL ENCOUNTER (EMERGENCY)
Age: 72
Discharge: NURSING, REHAB OR LONG-TERM CARE | End: 2022-08-17
Attending: EMERGENCY MEDICINE
Payer: MEDICARE

## 2022-08-16 VITALS
HEIGHT: 74 IN | WEIGHT: 136.24 LBS | HEART RATE: 63 BPM | TEMPERATURE: 98 F | OXYGEN SATURATION: 94 % | RESPIRATION RATE: 18 BRPM | SYSTOLIC BLOOD PRESSURE: 107 MMHG | DIASTOLIC BLOOD PRESSURE: 61 MMHG | BODY MASS INDEX: 17.49 KG/M2

## 2022-08-16 DIAGNOSIS — S09.90XA CLOSED HEAD INJURY, INITIAL ENCOUNTER: Primary | ICD-10-CM

## 2022-08-16 DIAGNOSIS — M54.2 NECK PAIN: ICD-10-CM

## 2022-08-16 LAB
ALBUMIN SERPL-MCNC: 3.7 G/DL (ref 3.5–5.2)
ALP BLD-CCNC: 146 U/L (ref 40–129)
ALT SERPL-CCNC: 21 U/L (ref 0–40)
ANION GAP SERPL CALCULATED.3IONS-SCNC: 15 MMOL/L (ref 7–16)
APTT: 32.9 SEC (ref 24.5–35.1)
AST SERPL-CCNC: 30 U/L (ref 0–39)
BASOPHILS ABSOLUTE: 0.06 E9/L (ref 0–0.2)
BASOPHILS RELATIVE PERCENT: 1.4 % (ref 0–2)
BILIRUB SERPL-MCNC: 0.6 MG/DL (ref 0–1.2)
BUN BLDV-MCNC: 22 MG/DL (ref 6–23)
CALCIUM SERPL-MCNC: 8.7 MG/DL (ref 8.6–10.2)
CHLORIDE BLD-SCNC: 98 MMOL/L (ref 98–107)
CO2: 22 MMOL/L (ref 22–29)
CREAT SERPL-MCNC: 4.1 MG/DL (ref 0.7–1.2)
EOSINOPHILS ABSOLUTE: 0.05 E9/L (ref 0.05–0.5)
EOSINOPHILS RELATIVE PERCENT: 1.2 % (ref 0–6)
GFR AFRICAN AMERICAN: 17
GFR NON-AFRICAN AMERICAN: 14 ML/MIN/1.73
GLUCOSE BLD-MCNC: 120 MG/DL (ref 74–99)
HCT VFR BLD CALC: 38.7 % (ref 37–54)
HEMOGLOBIN: 12.8 G/DL (ref 12.5–16.5)
IMMATURE GRANULOCYTES #: 0.01 E9/L
IMMATURE GRANULOCYTES %: 0.2 % (ref 0–5)
INR BLD: 1
LYMPHOCYTES ABSOLUTE: 0.69 E9/L (ref 1.5–4)
LYMPHOCYTES RELATIVE PERCENT: 16 % (ref 20–42)
MCH RBC QN AUTO: 30.2 PG (ref 26–35)
MCHC RBC AUTO-ENTMCNC: 33.1 % (ref 32–34.5)
MCV RBC AUTO: 91.3 FL (ref 80–99.9)
MONOCYTES ABSOLUTE: 0.6 E9/L (ref 0.1–0.95)
MONOCYTES RELATIVE PERCENT: 13.9 % (ref 2–12)
NEUTROPHILS ABSOLUTE: 2.9 E9/L (ref 1.8–7.3)
NEUTROPHILS RELATIVE PERCENT: 67.3 % (ref 43–80)
PDW BLD-RTO: 13.5 FL (ref 11.5–15)
PLATELET # BLD: 160 E9/L (ref 130–450)
PMV BLD AUTO: 11.5 FL (ref 7–12)
POTASSIUM REFLEX MAGNESIUM: 4.3 MMOL/L (ref 3.5–5)
PRO-BNP: ABNORMAL PG/ML (ref 0–125)
PROTHROMBIN TIME: 11.1 SEC (ref 9.3–12.4)
RBC # BLD: 4.24 E12/L (ref 3.8–5.8)
SODIUM BLD-SCNC: 135 MMOL/L (ref 132–146)
TOTAL CK: 683 U/L (ref 20–200)
TOTAL PROTEIN: 7.7 G/DL (ref 6.4–8.3)
TROPONIN, HIGH SENSITIVITY: 1130 NG/L (ref 0–11)
WBC # BLD: 4.3 E9/L (ref 4.5–11.5)

## 2022-08-16 PROCEDURE — 70450 CT HEAD/BRAIN W/O DYE: CPT

## 2022-08-16 PROCEDURE — 93005 ELECTROCARDIOGRAM TRACING: CPT

## 2022-08-16 PROCEDURE — 90935 HEMODIALYSIS ONE EVALUATION: CPT

## 2022-08-16 PROCEDURE — 85025 COMPLETE CBC W/AUTO DIFF WBC: CPT

## 2022-08-16 PROCEDURE — 86900 BLOOD TYPING SEROLOGIC ABO: CPT

## 2022-08-16 PROCEDURE — 85610 PROTHROMBIN TIME: CPT

## 2022-08-16 PROCEDURE — 73560 X-RAY EXAM OF KNEE 1 OR 2: CPT

## 2022-08-16 PROCEDURE — 6370000000 HC RX 637 (ALT 250 FOR IP)

## 2022-08-16 PROCEDURE — 99285 EMERGENCY DEPT VISIT HI MDM: CPT

## 2022-08-16 PROCEDURE — 73030 X-RAY EXAM OF SHOULDER: CPT

## 2022-08-16 PROCEDURE — 84484 ASSAY OF TROPONIN QUANT: CPT

## 2022-08-16 PROCEDURE — 72125 CT NECK SPINE W/O DYE: CPT

## 2022-08-16 PROCEDURE — 72170 X-RAY EXAM OF PELVIS: CPT

## 2022-08-16 PROCEDURE — 6360000002 HC RX W HCPCS: Performed by: FAMILY MEDICINE

## 2022-08-16 PROCEDURE — 71045 X-RAY EXAM CHEST 1 VIEW: CPT

## 2022-08-16 PROCEDURE — 85730 THROMBOPLASTIN TIME PARTIAL: CPT

## 2022-08-16 PROCEDURE — 6370000000 HC RX 637 (ALT 250 FOR IP): Performed by: INTERNAL MEDICINE

## 2022-08-16 PROCEDURE — 86901 BLOOD TYPING SEROLOGIC RH(D): CPT

## 2022-08-16 PROCEDURE — 2580000003 HC RX 258: Performed by: FAMILY MEDICINE

## 2022-08-16 PROCEDURE — 86850 RBC ANTIBODY SCREEN: CPT

## 2022-08-16 PROCEDURE — 82550 ASSAY OF CK (CPK): CPT

## 2022-08-16 PROCEDURE — 80053 COMPREHEN METABOLIC PANEL: CPT

## 2022-08-16 PROCEDURE — 99239 HOSP IP/OBS DSCHRG MGMT >30: CPT | Performed by: NURSE PRACTITIONER

## 2022-08-16 PROCEDURE — 83880 ASSAY OF NATRIURETIC PEPTIDE: CPT

## 2022-08-16 RX ORDER — ISOSORBIDE DINITRATE 20 MG/1
20 TABLET ORAL 3 TIMES DAILY
Qty: 90 TABLET | Refills: 3 | DISCHARGE
Start: 2022-08-16

## 2022-08-16 RX ORDER — AMLODIPINE BESYLATE 5 MG/1
5 TABLET ORAL DAILY
Qty: 90 TABLET | Refills: 0 | Status: ON HOLD | DISCHARGE
Start: 2022-08-16 | End: 2022-09-15 | Stop reason: HOSPADM

## 2022-08-16 RX ORDER — HEPARIN SODIUM 10000 [USP'U]/ML
5000 INJECTION, SOLUTION INTRAVENOUS; SUBCUTANEOUS EVERY 8 HOURS SCHEDULED
DISCHARGE
Start: 2022-08-16

## 2022-08-16 RX ADMIN — SERTRALINE HYDROCHLORIDE 50 MG: 50 TABLET ORAL at 11:06

## 2022-08-16 RX ADMIN — SACUBITRIL AND VALSARTAN 1 TABLET: 97; 103 TABLET, FILM COATED ORAL at 11:06

## 2022-08-16 RX ADMIN — HEPARIN SODIUM 5000 UNITS: 10000 INJECTION, SOLUTION INTRAVENOUS; SUBCUTANEOUS at 13:53

## 2022-08-16 RX ADMIN — Medication 10 ML: at 11:07

## 2022-08-16 RX ADMIN — LEVOTHYROXINE SODIUM 100 MCG: 100 TABLET ORAL at 05:32

## 2022-08-16 RX ADMIN — CARVEDILOL 25 MG: 25 TABLET, FILM COATED ORAL at 11:06

## 2022-08-16 RX ADMIN — ISOSORBIDE DINITRATE 20 MG: 10 TABLET ORAL at 11:06

## 2022-08-16 RX ADMIN — HEPARIN SODIUM 5000 UNITS: 10000 INJECTION, SOLUTION INTRAVENOUS; SUBCUTANEOUS at 05:32

## 2022-08-16 NOTE — PROGRESS NOTES
Department of Internal Medicine  Nephrology Progress Note    Events reviewed. SUBJECTIVE:  We are following Mr. Silvina Faye for ESRD on HD. He reports no complaints.     PHYSICAL EXAM:      Vitals:    VITALS:  BP (!) 171/86   Pulse 68   Temp 98.4 °F (36.9 °C)   Resp 16   Ht 6' 2\" (1.88 m)   Wt 141 lb 8.6 oz (64.2 kg)   SpO2 94%   BMI 18.17 kg/m²   24HR INTAKE/OUTPUT:    Intake/Output Summary (Last 24 hours) at 8/16/2022 1003  Last data filed at 8/15/2022 1018  Gross per 24 hour   Intake --   Output 300 ml   Net -300 ml           Access: RIJ tunneled dialysis catheter  Constitutional:  Alert and oriented, NAD  HEENT:  Normocephalic, PERRL  Respiratory:  CTA bilaterally  Cardiovascular/Edema:  RRR, S1,S2  Gastrointestinal:  Soft, rounded, nontender, nondistended  Neurologic:  Nonfocal, YARBROUGH  Skin:  Warm, dry, intact  Other:  No edema     Scheduled Meds:   isosorbide dinitrate  20 mg Oral TID    carvedilol  25 mg Oral BID WC    sacubitril-valsartan  1 tablet Oral BID    sodium chloride flush  5-40 mL IntraVENous 2 times per day    heparin (porcine)  5,000 Units SubCUTAneous 3 times per day    atorvastatin  40 mg Oral Nightly    levothyroxine  100 mcg Oral Daily    sertraline  50 mg Oral Daily     Continuous Infusions:   sodium chloride       PRN Meds:.polyethylene glycol, prochlorperazine, sodium chloride flush, sodium chloride, acetaminophen **OR** acetaminophen, bisacodyl    DATA:    CBC:   Lab Results   Component Value Date/Time    WBC 3.5 08/15/2022 05:44 AM    RBC 3.82 08/15/2022 05:44 AM    HGB 11.7 08/15/2022 05:44 AM    HCT 35.4 08/15/2022 05:44 AM    MCV 92.7 08/15/2022 05:44 AM    MCH 30.6 08/15/2022 05:44 AM    MCHC 33.1 08/15/2022 05:44 AM    RDW 13.8 08/15/2022 05:44 AM     08/15/2022 05:44 AM    MPV 12.2 08/15/2022 05:44 AM     CMP:    Lab Results   Component Value Date/Time     08/15/2022 05:44 AM    K 4.6 08/15/2022 05:44 AM    K 4.5 08/12/2022 05:22 AM     08/15/2022 05:44 AM    CO2 22 08/15/2022 05:44 AM    BUN 35 08/15/2022 05:44 AM    CREATININE 6.1 08/15/2022 05:44 AM    GFRAA 11 08/15/2022 05:44 AM    LABGLOM 9 08/15/2022 05:44 AM    GLUCOSE 81 08/15/2022 05:44 AM    PROT 6.5 08/15/2022 05:44 AM    LABALBU 3.4 08/15/2022 05:44 AM    CALCIUM 8.3 08/15/2022 05:44 AM    BILITOT 0.7 08/15/2022 05:44 AM    ALKPHOS 93 08/15/2022 05:44 AM    AST 19 08/15/2022 05:44 AM    ALT 13 08/15/2022 05:44 AM     Magnesium:    Lab Results   Component Value Date/Time    MG 1.9 08/10/2022 02:30 PM     Phosphorus:    Lab Results   Component Value Date/Time    PHOS 4.0 08/10/2022 02:30 PM     Radiology Review:      CXR 8/10/22   No acute cardiopulmonary process. BRIEF SUMMARY OF INITIAL CONSULT:    Briefly, Mr. Rosalina Zayas is a 67year old male with a PMH of ESRD currently being trained for home hemo T-W-Th-F, via RIJ, HTN, type II DM, HFrEF 35-40% with stage I DD, hypothyroidism, hepatitis C, who was admitted on August 10, 2022 after presenting to the ER with weakness. Upon arrival to ER, he was found to be hypertensive with a BP of 216/128. He received hydralazine 10 mg IV x1 and labetalol 10 mg IV x1 in ER. Patient admits to noncompliance with medications. He states his last dialysis treatment was on Tuesday. We are consulted for dialysis management. IMPRESSION/RECOMMENDATIONS:      ESRD currently being trained for home hemo T-W-Th-F, via RIJ tunneled dialysis catheter. His last treatment was on Tuesday. We will continue with dialysis T-Th-S while in the hospital.  Patient will need to be transferred to a in center dialysis facility. Uncontrolled HTN, secondary to noncompliance with medications, on carvedilol. BP improving.   HFrEF 35-40% with stage I DD, on isordil, carvedilol and Entresto  MBD of CKD, phosphorus 4, not currently on binders  Anemia of CKD, hold NIXON for hgb >10  --------------------------  Weakness  Hypothyroidism, .400, noncompliant with medication, on levothyroxine      Plan:    HD three times weekly T-Th-S while in the hospital  Continue to monitor BP  Continue to monitor labs  Will continue to follow outpatient    Electronically signed by KIANNA De Jesus NP on 8/16/2022 at 10:03 AM

## 2022-08-16 NOTE — DISCHARGE SUMMARY
[facility] Hospitalist       Hospitalist Physician Discharge Summary       [facility] Harmon [address] [place] [address]  [phone]    Schedule an appointment as soon as possible for a visit in 1 week(s)  Please call for follow up post hospital stay appt. Activity level: As Tolerated    Diet: ADULT DIET; Regular; Low Potassium (Less than 3000 mg/day); Low Phosphorus (Less than 1000 mg); Less than 60 gm        Condition at discharge: Stable    Dispo:SNF      Patient ID:  Raymonde Lesches  [id number]  67 y.o.  1950    Admit date: 8/10/2022    Discharge date and time:  8/16/2022  2:19 PM    Admission Diagnoses: Principal Problem:    Elevated troponin  Active Problems:    Weakness    Muscle weakness    Non compliance w medication regimen    Hypertensive urgency    Chronic HFrEF (heart failure with reduced ejection fraction) (HCC)    Cardiomyopathy (HCC)    Failure to thrive in adult    End stage renal failure on dialysis [facility])  Resolved Problems:    * No resolved hospital problems. *      Discharge Diagnoses: Principal Problem:    Elevated troponin  Active Problems:    Weakness    Muscle weakness    Non compliance w medication regimen    Hypertensive urgency    Chronic HFrEF (heart failure with reduced ejection fraction) (HCC)    Cardiomyopathy (HCC)    Failure to thrive in adult    End stage renal failure on dialysis [facility])  Resolved Problems:    * No resolved hospital problems. *      Consults:  IP CONSULT TO CARDIOLOGY  IP CONSULT TO NEPHROLOGY  IP CONSULT TO NEPHROLOGY  IP CONSULT TO PALLIATIVE CARE  IP CONSULT TO SOCIAL WORK    Procedures: none    Hospital Course:   610/22 67year old male presented to [facility] ED with complaints of   bilateral lower extremity weakness which started tday of preentation after getting out of bed.  Also complaints of lower extremity weakness and dizziness. ESRD with HD T Wed Thurs Friday. Patient reports being noncompliant with his medication. Vitals were remarkable for BP of 216/128. Labs were remarkable for potassium of 5.2 which was hemolyzed, creatinine of 6.0 which is his baseline, WBC of 3.6. His troponin was elevated at 715>742. CXR showed no acute cardiopulmonary process. EKG: When compared to EKG on 4/2022- no changes other than left anterior fascicular block is now present. Decision to admit pt for further evaluation and treatment. Hypertensive Emergency- Pt stopped taking Carvedilol/Entresto. /128 upon presentation. Received. Hydralazine/labetolol. Carvedilol/Isosorbide Dinitrate/Entresto/Hydralazine. Ischemic workup as outpatient. Elevated this am. Received Hydralazine. Add Amlodipine. Cardiology input appreciated. Elevated troponin- HS troponin 715>742 likley acute on chronic myocardial injury in the setting of ESRD. Plan for ischemic evaluation if pt agreeable. ESRD requiring HD- HD TThSat. While inpatient. Awaiting HD schedule and chair time. Nephrology input appreciated. Chronic HFrEF- 4/2022 ECHO LVEF 35-45%. Stage I Diastolic Dysfunction. Euvolemic. I/O. Weights daily. Entresto/BB/Ace, Repeat ECHO. Cardiology input appreciated. Hypothyroid- Levothyroxine  DM II- A1C 5. 4. Currently diet controlled. Carb Control Diet. Weakness- PT/OT    Amlodipine 5mg daily added. BP improved. Pt received HD today. He remains hemodynamically stable and can be discharged to [facility] at this time. Pt will be instructed to follow up with PCP, Nephrology, and Cardiology up NE. Transportation has been arranged for 3pm this afternoon.      Discharge Exam:  Vitals:    08/16/22 1030 08/16/22 1055 08/16/22 1100 08/16/22 1345   BP: (!) 143/84 131/78 (!) 182/90 107/61   Pulse: 73 69 64 63   Resp:  16 18    Temp:  98.3 °F (36.8 °C) 98 °F (36.7 °C)    TempSrc:   Oral    SpO2:   94%    Weight:  136 lb 3.9 oz (61.8 kg)     Height:           General Appearance: alert and oriented to person, place and time and in no acute distress  Skin: warm and dry  Head: normocephalic and atraumatic  Eyes: pupils equal, round, and reactive to light, extraocular eye movements intact, conjunctivae normal  Neck: neck supple and non tender without mass  Pulmonary/Chest: clear to auscultation bilaterally- no wheezes, rales or rhonchi, normal air movement, no respiratory distress. Right HD cath  Cardiovascular: normal rate, normal S1 and S2 and no RGM  Abdomen: soft, non-tender, non-distended, normal bowel sounds,  Extremities: no cyanosis, no clubbing and no edema  Neurologic: no cranial nerve deficit and speech normal  I/O last 3 completed shifts:  In: -   Out: 300 [Urine:300]  I/O this shift:  In: -   Out: 2900 [Urine:200]      LABS:  Recent Labs     08/14/22  0245 08/15/22  0544    138   K 4.2 4.6    103   CO2 22 22   BUN 24* 35*   CREATININE 4.3* 6.1*   GLUCOSE 99 81   CALCIUM 7.9* 8.3*       Recent Labs     08/14/22  0245 08/15/22  0544   WBC 3.6* 3.5*   RBC 4.09 3.82   HGB 12.1* 11.7*   HCT 36.7* 35.4*   MCV 89.7 92.7   MCH 29.6 30.6   MCHC 33.0 33.1   RDW 13.4 13.8    139   MPV 11.9 12.2*       No results for input(s): POCGLU in the last 72 hours. Imaging:  XR CHEST PORTABLE    Result Date: 8/10/2022  EXAMINATION: ONE XRAY VIEW OF THE CHEST 8/10/2022 3:12 pm COMPARISON: 20 April 2022 HISTORY: ORDERING SYSTEM PROVIDED HISTORY: dizziness TECHNOLOGIST PROVIDED HISTORY: Reason for exam:->dizziness FINDINGS: There is a large bore right-sided catheter remaining in position without evidence of a pneumothorax. The lungs are clear. There is tortuosity of the aorta without cardiomegaly. No acute cardiopulmonary process.          Patient Instructions:   Current Discharge Medication List        START taking these medications    Details   isosorbide dinitrate (ISORDIL) 20 MG tablet Take 1 tablet by mouth 3 times daily  Qty: 90 tablet, Refills: 3 Heparin Sodium, Porcine, (HEPARIN, PORCINE,) 47229 UNIT/ML injection Inject 0.5 mLs into the skin in the morning and 0.5 mLs at noon and 0.5 mLs before bedtime. amLODIPine (NORVASC) 5 MG tablet Take 1 tablet by mouth in the morning. Qty: 90 tablet, Refills: 0           CONTINUE these medications which have NOT CHANGED    Details   sertraline (ZOLOFT) 50 MG tablet Take 1 tablet by mouth daily  Qty: 30 tablet, Refills: 1      levothyroxine (SYNTHROID) 100 MCG tablet Take 1 tablet by mouth Daily  Qty: 30 tablet, Refills: 3      carvedilol (COREG) 25 MG tablet Take 1 tablet by mouth 2 times daily (with meals)  Qty: 60 tablet, Refills: 0      sacubitril-valsartan (ENTRESTO)  MG per tablet Take 1 tablet by mouth 2 times daily  Qty: 60 tablet, Refills: 3      atorvastatin (LIPITOR) 40 MG tablet Take 1 tablet by mouth nightly  Qty: 30 tablet, Refills: 0      vitamin D (ERGOCALCIFEROL) 1.25 MG (88966 UT) CAPS capsule TK 1 C PO 1 TIME Q WK           STOP taking these medications       bumetanide (BUMEX) 2 MG tablet Comments:   Reason for Stopping:         aspirin 81 MG EC tablet Comments:   Reason for Stopping:         sodium bicarbonate 650 MG tablet Comments:   Reason for Stopping:               In Review of EMR,  evaluation, management and diagnosis. Care plan has been discussed with attending. Time spent 42  minutes. Note that more than 30 minutes was spent in preparing discharge papers, discussing discharge with patient, medication review, etc.    NOTE: This report was transcribed using voice recognition software. Every effort was made to ensure accuracy; however, inadvertent computerized transcription errors may be present. Signed:  Electronically signed by Jayden Montanez CNP on 8/16/2022 at 2:19 PM

## 2022-08-16 NOTE — CARE COORDINATION
Social Work / Discharge Planning : PCR faxed to [employer] per [facility] request this am. Patient expected to be discharged to [facility] today after dialysis. AWait final discharge order and then transport to be set up. Patient to start [facility] on Thursday. Terrence Ramos confirmed that they were able to set up Transport. SW to follow. ,Electronically signed by JAY Mccormick on 8/16/22 at 8:21 AM EDT     Addendum: GIOVANI set up three o clock for W/C transport for patient for [facility] with Physicians. PCR negative. Charge RN updated and patient will be updated when he returns from HD. GIOVANI to follow.  Electronically signed by JAY Mccormick on 8/16/22 at 9:37 AM EDT

## 2022-08-16 NOTE — PROGRESS NOTES
[id number] Hospitalist   Progress Note    Admitting Date and Time: 8/10/2022  1:49 PM  Admit Dx: Essential hypertension [I10]  Elevated troponin [R77.8]  End stage renal failure on dialysis ([facility] [address] [N18.6, Z99.2]  Failure to thrive in adult [R62.7]  Non compliance w medication regimen [Z91.14]  Hypertension, unspecified type [I10]  Hypertensive urgency [I16.0]    Subjective:    Pt feels well today, no complaints. Denies any new concerns at this time. Patient to be discharged in afternoon      ROS: denies fever, chills, cp, sob, n/v, HA unless stated above.     isosorbide dinitrate  20 mg Oral TID    carvedilol  25 mg Oral BID WC    sacubitril-valsartan  1 tablet Oral BID    sodium chloride flush  5-40 mL IntraVENous 2 times per day    heparin (porcine)  5,000 Units SubCUTAneous 3 times per day    atorvastatin  40 mg Oral Nightly    levothyroxine  100 mcg Oral Daily    sertraline  50 mg Oral Daily     polyethylene glycol, 17 g, Daily PRN  prochlorperazine, 10 mg, Q6H PRN  sodium chloride flush, 5-40 mL, PRN  sodium chloride, , PRN  acetaminophen, 650 mg, Q6H PRN   Or  acetaminophen, 650 mg, Q6H PRN  bisacodyl, 5 mg, Daily PRN       Objective:    BP (!) 182/90   Pulse 64   Temp 98 °F (36.7 °C) (Oral)   Resp 18   Ht 6' 2\" (1.88 m)   Wt 136 lb 3.9 oz (61.8 kg)   SpO2 94%   BMI 17.49 kg/m²   General Appearance: alert and oriented to person, place and time and in no acute distress  Skin: warm and dry  Head: normocephalic and atraumatic  Eyes: pupils equal, round, and reactive to light, extraocular eye movements intact, conjunctivae normal  Neck: neck supple and non tender without mass   Pulmonary/Chest: clear to auscultation bilaterally- no wheezes, rales or rhonchi, normal air movement, no respiratory distress. Right HD cath  Cardiovascular: normal rate, normal S1 and S2 and no RGM  Abdomen: soft, non-tender, non-distended, normal bowel sounds,  Extremities: no cyanosis, no clubbing and no edema.  4/5 strength in LE. Neurologic: no cranial nerve deficit and speech normal      Recent Labs     08/14/22  0245 08/15/22  0544    138   K 4.2 4.6    103   CO2 22 22   BUN 24* 35*   CREATININE 4.3* 6.1*   GLUCOSE 99 81   CALCIUM 7.9* 8.3*         Recent Labs     08/15/22  0544   ALKPHOS 93   PROT 6.5   LABALBU 3.4*   BILITOT 0.7   AST 19   ALT 13         Recent Labs     08/14/22 0245 08/15/22  0544   WBC 3.6* 3.5*   RBC 4.09 3.82   HGB 12.1* 11.7*   HCT 36.7* 35.4*   MCV 89.7 92.7   MCH 29.6 30.6   MCHC 33.0 33.1   RDW 13.4 13.8    139   MPV 11.9 12.2*              Radiology:   XR CHEST PORTABLE   Final Result   No acute cardiopulmonary process. Assessment:  Principal Problem:    Elevated troponin  Active Problems:    Weakness    Muscle weakness    Non compliance w medication regimen    Hypertensive urgency    Chronic HFrEF (heart failure with reduced ejection fraction) (HCC)    Cardiomyopathy (HCC)    Failure to thrive in adult    End stage renal failure on dialysis [facility])  Resolved Problems:    * No resolved hospital problems. *      Plan:  Hypertensive Emergency-  /128 upon presentation. Ischemic workup as outpatient. Patient refusing hydralazine due to SE. Continue Coreg, Entresto and Isordil. Cardiology input appreciated. Elevated troponin- HS troponin 715>742 likley acute on chronic myocardial injury in the setting of ESRD. Plan for ischemic evaluation OP if pt agreeable. ESRD requiring HD- HD TThSat. While inpatient. Awaiting HD schedule and chair time. Nephrology input appreciated. Chronic HFrEF- 4/2022 ECHO LVEF 35-45%. Stage I Diastolic Dysfunction. Euvolemic. I/O. Weights daily. Entresto/Coreg/Isordil, Cardiology input appreciated. Hypothyroid- Levothyroxine  DM II- A1C 5. 4. Currently diet controlled. Carb Control Diet. Weakness- PT/OT. Accepted to [facility]. NOTE: This report was transcribed using voice recognition software.  Every effort was made to ensure accuracy; however, inadvertent computerized transcription errors may be present.      Electronically signed by Meghan Garrison MD on 8/16/2022 at 12:31 PM

## 2022-08-16 NOTE — PROGRESS NOTES
Nurse to nurse called into to [facility], spoke to nurse Sandra Dior with report.

## 2022-08-16 NOTE — FLOWSHEET NOTE
Pt completed 3.5 hrs of HD on a 3K bath with 2.4 L of UF removed safely. 08/16/22 1055   Vital Signs   /78   Temp 98.3 °F (36.8 °C)   Heart Rate 69   Resp 16   Weight 136 lb 3.9 oz (61.8 kg)   Weight Method Bed scale   Percent Weight Change -3.74   Pain Assessment   Pain Assessment None - Denies Pain   Post-Hemodialysis Assessment   Post-Treatment Procedures Blood returned;Catheter capped, clamped with Citrate x 2 ports   Machine Disinfection Process Acid/Vinegar Clean;Heat Disinfect; Exterior Machine Disinfection   Rinseback Volume (ml) 300 ml   Blood Volume Processed (Liters) 70.4 l/min   Dialyzer Clearance Moderately streaked   Duration of Treatment (minutes) 210 minutes   Hemodialysis Output (ml) 2700 ml   Tolerated Treatment Good   Patient Response to Treatment tolerated well; cath closed with citrate;clamped and capped; post report to Floor MARCIA Donahue; stable at discharge   Bilateral Breath Sounds Clear   Time Off 1051   Patient Disposition Return to room

## 2022-08-16 NOTE — PLAN OF CARE
Problem: ABCDS Injury Assessment  Goal: Absence of physical injury  8/16/2022 1442 by Carina Bella RN  Outcome: Completed     Problem: Pain  Goal: Verbalizes/displays adequate comfort level or baseline comfort level  8/16/2022 1442 by Carina Bella RN  Outcome: Completed     Problem: Safety - Adult  Goal: Free from fall injury  8/16/2022 1442 by Carina Bella RN  Outcome: Completed  8/16/2022 0856 by Remigio Brewster RN  Outcome: Progressing  Flowsheets (Taken 8/15/2022 2058 by Tiffanie Kogn RN)  Free From Fall Injury: Instruct family/caregiver on patient safety     Problem: Chronic Conditions and Co-morbidities  Goal: Patient's chronic conditions and co-morbidity symptoms are monitored and maintained or improved  8/16/2022 1442 by Carina Bella RN  Outcome: Completed     Problem: Skin/Tissue Integrity  Goal: Absence of new skin breakdown  Description: 1. Monitor for areas of redness and/or skin breakdown  2. Assess vascular access sites hourly  3. Every 4-6 hours minimum:  Change oxygen saturation probe site  4. Every 4-6 hours:  If on nasal continuous positive airway pressure, respiratory therapy assess nares and determine need for appliance change or resting period.   8/16/2022 1442 by Carina Bella RN  Outcome: Completed

## 2022-08-16 NOTE — DISCHARGE INSTR - DIET
Good nutrition is important when healing from an illness, injury, or surgery. Follow any nutrition recommendations given to you during your hospital stay. If you were given an oral nutrition supplement while in the hospital, continue to take this supplement at home. You can take it with meals, in-between meals, and/or before bedtime. These supplements can be purchased at most local grocery stores, pharmacies, and chain "[employer]"-stores. If you have any questions about your diet or nutrition, call the hospital and ask for the dietitian.       Renal hemodialysis diet

## 2022-08-17 VITALS
DIASTOLIC BLOOD PRESSURE: 93 MMHG | TEMPERATURE: 97.5 F | RESPIRATION RATE: 18 BRPM | SYSTOLIC BLOOD PRESSURE: 189 MMHG | HEART RATE: 65 BPM | OXYGEN SATURATION: 99 %

## 2022-08-17 LAB
ABO/RH: NORMAL
ANTIBODY SCREEN: NORMAL
EKG ATRIAL RATE: 66 BPM
EKG P AXIS: 69 DEGREES
EKG P-R INTERVAL: 176 MS
EKG Q-T INTERVAL: 448 MS
EKG QRS DURATION: 94 MS
EKG QTC CALCULATION (BAZETT): 469 MS
EKG R AXIS: -47 DEGREES
EKG T AXIS: 133 DEGREES
EKG VENTRICULAR RATE: 66 BPM
LACTIC ACID: 0.8 MMOL/L (ref 0.5–2.2)
TROPONIN, HIGH SENSITIVITY: 1093 NG/L (ref 0–11)

## 2022-08-17 PROCEDURE — 84484 ASSAY OF TROPONIN QUANT: CPT

## 2022-08-17 PROCEDURE — 83605 ASSAY OF LACTIC ACID: CPT

## 2022-08-17 PROCEDURE — 6370000000 HC RX 637 (ALT 250 FOR IP): Performed by: EMERGENCY MEDICINE

## 2022-08-17 RX ORDER — ACETAMINOPHEN 325 MG/1
650 TABLET ORAL ONCE
Status: COMPLETED | OUTPATIENT
Start: 2022-08-17 | End: 2022-08-17

## 2022-08-17 RX ORDER — COLCHICINE 0.6 MG/1
0.6 TABLET ORAL ONCE
Status: COMPLETED | OUTPATIENT
Start: 2022-08-17 | End: 2022-08-17

## 2022-08-17 RX ADMIN — ACETAMINOPHEN 650 MG: 325 TABLET ORAL at 02:51

## 2022-08-17 RX ADMIN — COLCHICINE 0.6 MG: 0.6 TABLET ORAL at 02:52

## 2022-08-17 ASSESSMENT — ENCOUNTER SYMPTOMS
SORE THROAT: 0
BACK PAIN: 0
VOMITING: 0
DIARRHEA: 0
PHOTOPHOBIA: 0
EYE PAIN: 0
COUGH: 0
NAUSEA: 0
SHORTNESS OF BREATH: 0
FACIAL SWELLING: 0
CHEST TIGHTNESS: 0
CONSTIPATION: 0
ABDOMINAL PAIN: 0
CHOKING: 0

## 2022-08-17 ASSESSMENT — PAIN SCALES - GENERAL: PAINLEVEL_OUTOF10: 10

## 2022-08-17 NOTE — ED PROVIDER NOTES
[facility]  Department of Emergency Medicine     Written by: Jason Anton MD  Patient Name: Suad Farrar  Attending Provider: Fly Carney MD  Admit Date: 2022 10:02 PM  MRN: [id number]                   : 1950        Chief Complaint   Patient presents with    Fall     Pt presents from [facility] after mechanical fall approx 1.5 hours ago. Pt was just d/c from this hospital today. Pt also had HD today. Neck Pain     From fall approx 1.5 hours ago. Refusing C-collar. Headache     States he hit back of head when he fell. On subq heparin.     - Chief complaint    21-year-old male with known history of hypertensive urgency, heart failure with reduced ejection fraction, diabetes essential hypertension, CKD presents to the ED via EMS after a fall at his assisted facility, [facility]. The patient was discharged from the hospital earlier today after admission with lower extremity weakness and dizziness on 10 August.  The patient states that he was trying to adjust the heater in his assisted facility, and while bending over got dizzy and fell backwards with a sensation of spinning. He states that he might hit the back of his head, and may have blacked out temporarily. He is not definitely sure if he lost consciousness or not. The patient states that his neck, left shoulder, left knee is painful. The patient is on subcutaneous heparin for dialysis. The patient denies fevers, shortness of breath, chest pain, abdominal pain, urinary changes, constipation, diarrhea. The history is provided by the patient and the EMS personnel. Review of Systems   Constitutional:  Negative for chills, diaphoresis, fatigue and fever. HENT:  Negative for ear pain, facial swelling, sneezing and sore throat. Eyes:  Negative for photophobia and pain. Respiratory:  Negative for cough, choking, chest tightness and shortness of breath.     Cardiovascular:  Negative for chest pain and palpitations. Gastrointestinal:  Negative for abdominal pain, constipation, diarrhea, nausea and vomiting. Genitourinary:  Negative for enuresis, flank pain, frequency and hematuria. Musculoskeletal:  Positive for arthralgias (Left shoulder and left knee) and neck pain. Negative for back pain, gait problem and joint swelling. Skin:  Negative for pallor and rash. Neurological:  Positive for dizziness, weakness and headaches (Posterior hip pain). Physical Exam  Constitutional:       General: He is not in acute distress. Appearance: Normal appearance. He is normal weight. He is not ill-appearing. HENT:      Head: Normocephalic and atraumatic. Nose: Nose normal. No congestion. Mouth/Throat:      Mouth: Mucous membranes are moist.   Eyes:      General: No scleral icterus. Pupils: Pupils are equal, round, and reactive to light. Cardiovascular:      Rate and Rhythm: Normal rate and regular rhythm. Pulses: Normal pulses. Heart sounds: Normal heart sounds. Pulmonary:      Effort: Pulmonary effort is normal.      Breath sounds: Normal breath sounds. Abdominal:      General: Abdomen is flat. Bowel sounds are normal. There is no distension. Palpations: Abdomen is soft. Tenderness: There is no abdominal tenderness. Musculoskeletal:         General: No swelling, tenderness or signs of injury. Normal range of motion. Cervical back: Normal range of motion and neck supple. No rigidity or tenderness. Skin:     General: Skin is warm. Capillary Refill: Capillary refill takes less than 2 seconds. Coloration: Skin is not jaundiced or pale. Findings: No erythema. Neurological:      General: No focal deficit present. Mental Status: He is alert and oriented to person, place, and time. Mental status is at baseline.         Procedures       MDM  Number of Diagnoses or Management Options  Closed head injury, initial encounter  Neck pain  Diagnosis management comments: Patient presents to the ED via EMS after a fall at [facility]. The patient remains vitally stable, however he is hypertensive. The patient states that 190/90 is his normal range. The patient discusses the fact that he got dizzy while bending over and hit his head and may have lost consciousness. Labs and imaging are ordered, including left shoulder x-ray, left knee x-ray, pelvis x-ray, chest x-ray, CT head and cervical spine. Troponin returned 1130, however he is noted to have end-stage renal disease and his previous troponin levels have all been elevated. Additionally his BNP is elevated at 24,872, consistent with his prior medical history. CK is noted to be 683, likely due to the fall and CKD. No acute processes were noted on any of the imaging that was ordered. Patient is noted to have prolonged QT on his EKG. Repeat troponin was ordered, which returned as 1093, therefore delta troponin is negative. Imaging all returned negative for acute processes or acute changes. Patient was complaining of pain in his left knee, where he has known gout. Colchicine administered based on patient's usual medications. Patient has remained vitally stable throughout his ED visit, besides the elevated blood pressure which the patient said was normal for him. Patient is ready for discharge back to the assisted facility.     Labs Reviewed   CBC WITH AUTO DIFFERENTIAL - Abnormal; Notable for the following components:       Result Value    WBC 4.3 (*)     Lymphocytes % 16.0 (*)     Monocytes % 13.9 (*)     Lymphocytes Absolute 0.69 (*)     All other components within normal limits   COMPREHENSIVE METABOLIC PANEL W/ REFLEX TO MG FOR LOW K - Abnormal; Notable for the following components:    Glucose 120 (*)     Creatinine 4.1 (*)     Alkaline Phosphatase 146 (*)     All other components within normal limits   TROPONIN - Abnormal; Notable for the following components:    Troponin, High Sensitivity 1,130 (*)     All other components within normal limits   BRAIN NATRIURETIC PEPTIDE - Abnormal; Notable for the following components:    Pro-BNP 24,872 (*)     All other components within normal limits   CK - Abnormal; Notable for the following components: Total  (*)     All other components within normal limits   TROPONIN - Abnormal; Notable for the following components:    Troponin, High Sensitivity 1,093 (*)     All other components within normal limits   PROTIME-INR   APTT   LACTIC ACID   TYPE AND SCREEN     XR SHOULDER LEFT (MIN 2 VIEWS)   Final Result   No acute fracture is identified. XR KNEE LEFT (1-2 VIEWS)   Final Result   No acute fracture is identified. XR PELVIS (1-2 VIEWS)   Final Result   No acute pelvic fracture is identified. Suboptimal positioning noted. RECOMMENDATION:   Consider CT for further clinical concern or persistence of symptoms. XR CHEST PORTABLE   Final Result   No acute chest abnormality. Mild cardiomegaly. CT Head WO Contrast   Final Result   CT HEAD WITHOUT CONTRAST:      1. No skull fracture or acute intracranial abnormality. 2. Thinning of the bilateral parietal bones, which is a nonspecific finding   usually seen in the setting of generalized osteoporosis. CT CERVICAL SPINE WITHOUT CONTRAST:      1. No fracture or joint dislocation is seen. 2. Degenerative changes, as described. CT CERVICAL SPINE WO CONTRAST   Final Result   CT HEAD WITHOUT CONTRAST:      1. No skull fracture or acute intracranial abnormality. 2. Thinning of the bilateral parietal bones, which is a nonspecific finding   usually seen in the setting of generalized osteoporosis. CT CERVICAL SPINE WITHOUT CONTRAST:      1. No fracture or joint dislocation is seen. 2. Degenerative changes, as described.            Medications   colchicine (COLCRYS) tablet 0.6 mg (0.6 mg Oral Given 8/17/22 0252)   acetaminophen (TYLENOL) tablet 650 mg (650 mg Oral Given 8/17/22 0251)     EKG: This EKG is signed and interpreted by me. Rate: 66  Rhythm: Sinus  Interpretation: no acute changes  Comparison: stable as compared to patient's most recent EKG    4:19 AM EDT  I have spoken with the patient and discussed todays results, in addition to providing specific details for the plan of care and counseling regarding the diagnosis and prognosis. Their questions are answered at this time and they are agreeable with the plan. I discussed at length with them reasons for immediate return here for re evaluation. They will followup with their and the on call primary care physician by calling their office tomorrow and on Monday.    --------------------------------- ADDITIONAL PROVIDER NOTES ---------------------------------  At this time the patient is without objective evidence of an acute process requiring hospitalization or inpatient management. They have remained hemodynamically stable throughout their entire ED visit and are stable for discharge with outpatient follow-up. The plan has been discussed in detail and they are aware of the specific conditions for emergent return, as well as the importance of follow-up. New Prescriptions    No medications on file       Diagnosis:  1. Closed head injury, initial encounter    2. Neck pain        Disposition:  Patient's disposition: Discharge to home  Patient's condition is stable. Patient was seen and evaluated by myself and my attending Benjamín Hurt MD. Assessment and Plan discussed with attending provider, please see attestation for final plan of care.      MD Liz Regan MD  Resident  08/17/22 9050

## 2022-08-17 NOTE — DISCHARGE INSTRUCTIONS
Please follow up with PCP as discussed. Please return to the ED if symptoms persist, recur, worsen. Please be careful while walking, as to avoid further falls in the future.

## 2022-08-17 NOTE — ED NOTES
Report called to pt rehab center. PAS at bedside. Report given to PAS. Pt left in stable condition.       Marnie Bailey RN  08/17/22 5123

## 2022-08-17 NOTE — ED NOTES
Pt refusing repeat bloodwork at this time. Dr. Regino Kilgore to bedside.       Iliana Avila RN  08/17/22 4501

## 2022-08-17 NOTE — ED NOTES
Pt a hard IV stick. Pt stuck mulitple times. RN able to obtain blood but no IV access. Pt refused anymore sticks. MD aware. Pt also finally agreeable to c-collar. Pt placed in c-collar at this time.       Ann Lord RN  08/16/22 4139

## 2022-09-09 PROBLEM — R77.8 ELEVATED TROPONIN: Status: RESOLVED | Noted: 2022-08-10 | Resolved: 2022-09-09

## 2022-09-09 PROBLEM — R79.89 ELEVATED TROPONIN: Status: RESOLVED | Noted: 2022-08-10 | Resolved: 2022-09-09

## 2022-09-12 ENCOUNTER — HOSPITAL ENCOUNTER (INPATIENT)
Age: 72
LOS: 3 days | Discharge: HOME | DRG: 640 | End: 2022-09-15
Attending: STUDENT IN AN ORGANIZED HEALTH CARE EDUCATION/TRAINING PROGRAM | Admitting: STUDENT IN AN ORGANIZED HEALTH CARE EDUCATION/TRAINING PROGRAM
Payer: MEDICARE

## 2022-09-12 DIAGNOSIS — E87.5 HYPERKALEMIA: ICD-10-CM

## 2022-09-12 DIAGNOSIS — Z99.2 ESRD NEEDING DIALYSIS (HCC): Primary | ICD-10-CM

## 2022-09-12 DIAGNOSIS — I10 HYPERTENSION, UNSPECIFIED TYPE: ICD-10-CM

## 2022-09-12 DIAGNOSIS — N18.6 ESRD NEEDING DIALYSIS (HCC): Primary | ICD-10-CM

## 2022-09-12 LAB
ALBUMIN SERPL-MCNC: 4.1 G/DL (ref 3.5–5.2)
ALP BLD-CCNC: 133 U/L (ref 40–129)
ALT SERPL-CCNC: 35 U/L (ref 0–40)
ANION GAP SERPL CALCULATED.3IONS-SCNC: 11 MMOL/L (ref 7–16)
AST SERPL-CCNC: 26 U/L (ref 0–39)
BASOPHILS ABSOLUTE: 0.03 E9/L (ref 0–0.2)
BASOPHILS RELATIVE PERCENT: 1.1 % (ref 0–2)
BILIRUB SERPL-MCNC: 0.5 MG/DL (ref 0–1.2)
BUN BLDV-MCNC: 71 MG/DL (ref 6–23)
CALCIUM SERPL-MCNC: 9.1 MG/DL (ref 8.6–10.2)
CHLORIDE BLD-SCNC: 108 MMOL/L (ref 98–107)
CO2: 21 MMOL/L (ref 22–29)
CREAT SERPL-MCNC: 8.6 MG/DL (ref 0.7–1.2)
EOSINOPHILS ABSOLUTE: 0.13 E9/L (ref 0.05–0.5)
EOSINOPHILS RELATIVE PERCENT: 4.8 % (ref 0–6)
GFR AFRICAN AMERICAN: 7
GFR NON-AFRICAN AMERICAN: 6 ML/MIN/1.73
GLUCOSE BLD-MCNC: 94 MG/DL (ref 74–99)
HCT VFR BLD CALC: 33.8 % (ref 37–54)
HEMOGLOBIN: 11.4 G/DL (ref 12.5–16.5)
IMMATURE GRANULOCYTES #: 0.01 E9/L
IMMATURE GRANULOCYTES %: 0.4 % (ref 0–5)
LACTIC ACID: 0.9 MMOL/L (ref 0.5–2.2)
LYMPHOCYTES ABSOLUTE: 0.79 E9/L (ref 1.5–4)
LYMPHOCYTES RELATIVE PERCENT: 29.4 % (ref 20–42)
MAGNESIUM: 1.7 MG/DL (ref 1.6–2.6)
MCH RBC QN AUTO: 29.8 PG (ref 26–35)
MCHC RBC AUTO-ENTMCNC: 33.7 % (ref 32–34.5)
MCV RBC AUTO: 88.3 FL (ref 80–99.9)
MONOCYTES ABSOLUTE: 0.42 E9/L (ref 0.1–0.95)
MONOCYTES RELATIVE PERCENT: 15.6 % (ref 2–12)
NEUTROPHILS ABSOLUTE: 1.31 E9/L (ref 1.8–7.3)
NEUTROPHILS RELATIVE PERCENT: 48.7 % (ref 43–80)
PDW BLD-RTO: 14.3 FL (ref 11.5–15)
PLATELET # BLD: 100 E9/L (ref 130–450)
PMV BLD AUTO: 11.8 FL (ref 7–12)
POTASSIUM SERPL-SCNC: 5.6 MMOL/L (ref 3.5–5)
RBC # BLD: 3.83 E12/L (ref 3.8–5.8)
SODIUM BLD-SCNC: 140 MMOL/L (ref 132–146)
TOTAL PROTEIN: 7.7 G/DL (ref 6.4–8.3)
TROPONIN, HIGH SENSITIVITY: 874 NG/L (ref 0–11)
WBC # BLD: 2.7 E9/L (ref 4.5–11.5)

## 2022-09-12 PROCEDURE — 83735 ASSAY OF MAGNESIUM: CPT

## 2022-09-12 PROCEDURE — 6370000000 HC RX 637 (ALT 250 FOR IP): Performed by: STUDENT IN AN ORGANIZED HEALTH CARE EDUCATION/TRAINING PROGRAM

## 2022-09-12 PROCEDURE — 90935 HEMODIALYSIS ONE EVALUATION: CPT

## 2022-09-12 PROCEDURE — 5A1D70Z PERFORMANCE OF URINARY FILTRATION, INTERMITTENT, LESS THAN 6 HOURS PER DAY: ICD-10-PCS | Performed by: INTERNAL MEDICINE

## 2022-09-12 PROCEDURE — 1200000000 HC SEMI PRIVATE

## 2022-09-12 PROCEDURE — 93005 ELECTROCARDIOGRAM TRACING: CPT | Performed by: STUDENT IN AN ORGANIZED HEALTH CARE EDUCATION/TRAINING PROGRAM

## 2022-09-12 PROCEDURE — 85025 COMPLETE CBC W/AUTO DIFF WBC: CPT

## 2022-09-12 PROCEDURE — 84484 ASSAY OF TROPONIN QUANT: CPT

## 2022-09-12 PROCEDURE — 80053 COMPREHEN METABOLIC PANEL: CPT

## 2022-09-12 PROCEDURE — 99222 1ST HOSP IP/OBS MODERATE 55: CPT | Performed by: STUDENT IN AN ORGANIZED HEALTH CARE EDUCATION/TRAINING PROGRAM

## 2022-09-12 PROCEDURE — 2580000003 HC RX 258: Performed by: STUDENT IN AN ORGANIZED HEALTH CARE EDUCATION/TRAINING PROGRAM

## 2022-09-12 PROCEDURE — 83605 ASSAY OF LACTIC ACID: CPT

## 2022-09-12 PROCEDURE — 6360000002 HC RX W HCPCS: Performed by: STUDENT IN AN ORGANIZED HEALTH CARE EDUCATION/TRAINING PROGRAM

## 2022-09-12 PROCEDURE — 99285 EMERGENCY DEPT VISIT HI MDM: CPT

## 2022-09-12 RX ORDER — CARVEDILOL 25 MG/1
25 TABLET ORAL 2 TIMES DAILY WITH MEALS
Status: DISCONTINUED | OUTPATIENT
Start: 2022-09-12 | End: 2022-09-15 | Stop reason: HOSPADM

## 2022-09-12 RX ORDER — ERGOCALCIFEROL 1.25 MG/1
50000 CAPSULE ORAL WEEKLY
Status: DISCONTINUED | OUTPATIENT
Start: 2022-09-16 | End: 2022-09-15 | Stop reason: HOSPADM

## 2022-09-12 RX ORDER — POLYETHYLENE GLYCOL 3350 17 G/17G
17 POWDER, FOR SOLUTION ORAL DAILY PRN
Status: DISCONTINUED | OUTPATIENT
Start: 2022-09-12 | End: 2022-09-15 | Stop reason: HOSPADM

## 2022-09-12 RX ORDER — ACETAMINOPHEN 325 MG/1
650 TABLET ORAL EVERY 6 HOURS PRN
Status: DISCONTINUED | OUTPATIENT
Start: 2022-09-12 | End: 2022-09-15 | Stop reason: HOSPADM

## 2022-09-12 RX ORDER — ACETAMINOPHEN 650 MG/1
650 SUPPOSITORY RECTAL EVERY 6 HOURS PRN
Status: DISCONTINUED | OUTPATIENT
Start: 2022-09-12 | End: 2022-09-15 | Stop reason: HOSPADM

## 2022-09-12 RX ORDER — SERTRALINE HYDROCHLORIDE 100 MG/1
50 TABLET, FILM COATED ORAL DAILY
Status: DISCONTINUED | OUTPATIENT
Start: 2022-09-13 | End: 2022-09-15 | Stop reason: HOSPADM

## 2022-09-12 RX ORDER — ISOSORBIDE DINITRATE 10 MG/1
20 TABLET ORAL 3 TIMES DAILY
Status: DISCONTINUED | OUTPATIENT
Start: 2022-09-12 | End: 2022-09-15 | Stop reason: HOSPADM

## 2022-09-12 RX ORDER — MIRTAZAPINE 15 MG/1
7.5 TABLET, FILM COATED ORAL NIGHTLY
COMMUNITY

## 2022-09-12 RX ORDER — SODIUM CHLORIDE 0.9 % (FLUSH) 0.9 %
5-40 SYRINGE (ML) INJECTION EVERY 12 HOURS SCHEDULED
Status: DISCONTINUED | OUTPATIENT
Start: 2022-09-12 | End: 2022-09-15 | Stop reason: HOSPADM

## 2022-09-12 RX ORDER — ONDANSETRON 4 MG/1
4 TABLET, ORALLY DISINTEGRATING ORAL EVERY 8 HOURS PRN
Status: DISCONTINUED | OUTPATIENT
Start: 2022-09-12 | End: 2022-09-15 | Stop reason: HOSPADM

## 2022-09-12 RX ORDER — AMLODIPINE BESYLATE 5 MG/1
5 TABLET ORAL DAILY
Status: DISCONTINUED | OUTPATIENT
Start: 2022-09-12 | End: 2022-09-13

## 2022-09-12 RX ORDER — SODIUM CHLORIDE 0.9 % (FLUSH) 0.9 %
5-40 SYRINGE (ML) INJECTION PRN
Status: DISCONTINUED | OUTPATIENT
Start: 2022-09-12 | End: 2022-09-15 | Stop reason: HOSPADM

## 2022-09-12 RX ORDER — ONDANSETRON 2 MG/ML
4 INJECTION INTRAMUSCULAR; INTRAVENOUS EVERY 6 HOURS PRN
Status: DISCONTINUED | OUTPATIENT
Start: 2022-09-12 | End: 2022-09-15 | Stop reason: HOSPADM

## 2022-09-12 RX ORDER — HYDRALAZINE HYDROCHLORIDE 20 MG/ML
10 INJECTION INTRAMUSCULAR; INTRAVENOUS ONCE
Status: DISCONTINUED | OUTPATIENT
Start: 2022-09-12 | End: 2022-09-13

## 2022-09-12 RX ORDER — SODIUM CHLORIDE 0.9 % (FLUSH) 0.9 %
SYRINGE (ML) INJECTION
Status: DISPENSED
Start: 2022-09-12 | End: 2022-09-13

## 2022-09-12 RX ORDER — AMLODIPINE BESYLATE 5 MG/1
5 TABLET ORAL DAILY
Status: DISCONTINUED | OUTPATIENT
Start: 2022-09-13 | End: 2022-09-12

## 2022-09-12 RX ORDER — LEVOTHYROXINE SODIUM 0.1 MG/1
100 TABLET ORAL DAILY
Status: DISCONTINUED | OUTPATIENT
Start: 2022-09-13 | End: 2022-09-15 | Stop reason: HOSPADM

## 2022-09-12 RX ORDER — ATORVASTATIN CALCIUM 40 MG/1
40 TABLET, FILM COATED ORAL DAILY
COMMUNITY

## 2022-09-12 RX ORDER — CARVEDILOL 25 MG/1
25 TABLET ORAL 2 TIMES DAILY WITH MEALS
Status: DISCONTINUED | OUTPATIENT
Start: 2022-09-13 | End: 2022-09-12

## 2022-09-12 RX ORDER — CARVEDILOL 25 MG/1
25 TABLET ORAL 2 TIMES DAILY WITH MEALS
COMMUNITY

## 2022-09-12 RX ORDER — MIRTAZAPINE 15 MG/1
7.5 TABLET, FILM COATED ORAL NIGHTLY
Status: DISCONTINUED | OUTPATIENT
Start: 2022-09-12 | End: 2022-09-15 | Stop reason: HOSPADM

## 2022-09-12 RX ORDER — HEPARIN SODIUM 10000 [USP'U]/ML
5000 INJECTION, SOLUTION INTRAVENOUS; SUBCUTANEOUS EVERY 8 HOURS SCHEDULED
Status: DISCONTINUED | OUTPATIENT
Start: 2022-09-12 | End: 2022-09-15 | Stop reason: HOSPADM

## 2022-09-12 RX ORDER — SODIUM CHLORIDE 9 MG/ML
INJECTION, SOLUTION INTRAVENOUS PRN
Status: DISCONTINUED | OUTPATIENT
Start: 2022-09-12 | End: 2022-09-15 | Stop reason: HOSPADM

## 2022-09-12 RX ORDER — ATORVASTATIN CALCIUM 40 MG/1
40 TABLET, FILM COATED ORAL DAILY
Status: DISCONTINUED | OUTPATIENT
Start: 2022-09-13 | End: 2022-09-15 | Stop reason: HOSPADM

## 2022-09-12 RX ADMIN — MIRTAZAPINE 7.5 MG: 15 TABLET, FILM COATED ORAL at 21:38

## 2022-09-12 RX ADMIN — SODIUM CHLORIDE, PRESERVATIVE FREE 10 ML: 5 INJECTION INTRAVENOUS at 21:45

## 2022-09-12 RX ADMIN — AMLODIPINE BESYLATE 5 MG: 5 TABLET ORAL at 21:37

## 2022-09-12 RX ADMIN — SACUBITRIL AND VALSARTAN 1 TABLET: 97; 103 TABLET, FILM COATED ORAL at 23:02

## 2022-09-12 RX ADMIN — CALCIUM GLUCONATE 1000 MG: 98 INJECTION, SOLUTION INTRAVENOUS at 23:08

## 2022-09-12 RX ADMIN — CARVEDILOL 25 MG: 25 TABLET, FILM COATED ORAL at 21:38

## 2022-09-12 RX ADMIN — ISOSORBIDE DINITRATE 20 MG: 10 TABLET ORAL at 23:02

## 2022-09-12 ASSESSMENT — PAIN SCALES - GENERAL: PAINLEVEL_OUTOF10: 0

## 2022-09-12 ASSESSMENT — ENCOUNTER SYMPTOMS
SHORTNESS OF BREATH: 0
TROUBLE SWALLOWING: 0
ABDOMINAL PAIN: 0
VOMITING: 0
DIARRHEA: 0
COUGH: 0
VOICE CHANGE: 0
NAUSEA: 0
PHOTOPHOBIA: 0

## 2022-09-12 ASSESSMENT — PAIN - FUNCTIONAL ASSESSMENT: PAIN_FUNCTIONAL_ASSESSMENT: NONE - DENIES PAIN

## 2022-09-12 NOTE — ED PROVIDER NOTES
HPI   Patient is a 69-year-old male with past medical history of diabetes, thyroid disease, hypertension, liver disease, hepatitis C and end-stage renal disease on chronic dialysis presenting the emergency department for evaluation due to missed dialysis. Patient states that he generally has his dialysis completed at [facility]. He is in the process of being trained for home dialysis but has missed his dialysis ever since last Tuesday. Patient has missed 2 sessions. Dialysis is usually on Tuesdays, Thursdays and Saturdays for the patient. He was endorsing symptoms such as nausea, vomiting and diarrhea previously but states that this has resolved since coming to the emergency department. Patient actually states that he feels well today. Symptoms were ongoing for 2 to 3 weeks before they resolved. Patient follows with Dr. Jason Trujillo and Dr. Margarita Mahoney generally. He is denying any fever, chills, nausea, vomiting, chest pain, shortness of breath, headache, lightheadedness, syncope, cough, congestion, sore throat, allergies, urinary symptoms, abdominal pain, constipation or diarrhea. His symptoms are gradual onset and moderate in nature. Nothing in particular made them better or worse. Patient has had no recent sick contacts or abnormal food ingestions. Review of Systems   Constitutional:  Negative for chills and fever. HENT:  Negative for trouble swallowing and voice change. Eyes:  Negative for photophobia and visual disturbance. Respiratory:  Negative for cough and shortness of breath. Cardiovascular:  Negative for chest pain and palpitations. Gastrointestinal:  Negative for abdominal pain, diarrhea, nausea and vomiting. Genitourinary:  Negative for dysuria. Musculoskeletal:  Negative for arthralgias. Skin:  Negative for rash and wound. Neurological:  Negative for dizziness and headaches. Psychiatric/Behavioral:  Negative for behavioral problems and confusion.        Physical Exam  Constitutional:       General: He is not in acute distress. Appearance: Normal appearance. He is not ill-appearing. HENT:      Head: Normocephalic and atraumatic. Right Ear: External ear normal.      Left Ear: External ear normal.      Nose: Nose normal.      Mouth/Throat:      Mouth: Mucous membranes are moist.      Pharynx: Oropharynx is clear. Eyes:      Pupils: Pupils are equal, round, and reactive to light. Cardiovascular:      Rate and Rhythm: Normal rate and regular rhythm. Pulses: Normal pulses. Heart sounds: Normal heart sounds. Pulmonary:      Effort: Pulmonary effort is normal. No respiratory distress. Breath sounds: Normal breath sounds. No wheezing or rales. Chest:      Comments: Right chest dialysis catheter. Site clean, dry and intact. No obvious signs of infection. Abdominal:      Palpations: Abdomen is soft. Tenderness: There is no abdominal tenderness. There is no guarding or rebound. Musculoskeletal:         General: No swelling. Cervical back: Normal range of motion and neck supple. Right lower leg: No edema. Left lower leg: No edema. Skin:     General: Skin is warm and dry. Capillary Refill: Capillary refill takes less than 2 seconds. Neurological:      General: No focal deficit present. Mental Status: He is alert and oriented to person, place, and time. Cranial Nerves: No cranial nerve deficit. Coordination: Coordination normal.   Psychiatric:         Mood and Affect: Mood normal.         Behavior: Behavior normal.        Procedures  EKG: This EKG is signed and interpreted by me. Normal sinus rhythm with ventricular rate of 61 bpm.  TN interval normal.  QTc not prolonged. Left axis deviation. Nonspecific ST and T wave abnormalities. T wave inversion in lead I, V2, V5 and V6.   No significant changes compared to previous EKG on 8/16/2022    MDM  Patient is a 39-year-old male with past medical history of diabetes, thyroid disease, hypertension, liver disease, hepatitis C and end-stage renal disease on chronic dialysis presenting the emergency department for evaluation due to missed dialysis. On arrival to the ED, patient is awake, alert, and oriented x3. He is complaining of no symptoms. Vitals significant for hypertension at 111/95 but otherwise within normal limits. Physical exam essentially benign. Work-up in the emergency department generally unremarkable. Patient does have kalemia with a potassium of 5.6 but no clear EKG changes. Creatinine elevated at 8.6/BUN 71. This is up from patient's baseline around 4. Spoke with nephrology who recommended admission for dialysis. Discussed case with hospitalist who accepted patient for admission. Patient was agreeable with this plan as well. ED Course as of 09/12/22 1728   Mon Sep 12, 2022   1551 ATTENDING PROVIDER ATTESTATION:     I have personally performed and/or participated in the history, exam, medical decision making, and procedures and agree with all pertinent clinical information unless otherwise noted. I have also reviewed and agree with the past medical, family and social history unless otherwise noted. I have discussed this patient in detail with the resident, and provided the instruction and education regarding the patient. My findings/plan: this is a 76-year-old male with history of ESRD on HD Tuesday, Thursday, Saturday follows with Dr. Ana Garland, [facility] who presented for evaluation of missing his dialysis. States last time he had full dialysis was about 6 days ago. He has been back and forth to different facilities however states that no one that they have not scheduled a history of  He was transferred to another facility but did not care for them. Currently lying in bed no acute distress. Denies any dizziness, lightness, chest pain or shortness of breath, nausea, vomiting, diarrhea, fevers or chills.   He does have a port to the right chest  Labs care. We will touch base with nephrology pending labs are. [BB]   0886 EKG: This EKG is signed and interpreted by me. Normal sinus rhythm with ventricular rate of 61 bpm.  AK interval normal.  QTc not prolonged. Left axis deviation. Nonspecific ST and T wave abnormalities. T wave inversion in lead I, V2, V5 and V6. No significant changes compared to previous EKG on 8/16/2022 [PP]   4210 Spoke with Dr. Jenni Hernandez. He recommends admission for dialysis. [PP]   [phone] Accepted for admission by hospitalist [PP]      ED Course User Index  [BB] Leonardo Rivas DO  [PP] Alvin Barron DO        --------------------------------------------- PAST HISTORY ---------------------------------------------  Past Medical History:  has a past medical history of Chronic kidney disease, Diabetes mellitus ([facility] [address] Hepatitis C, Hypertension, Liver disease, Thyroid disease, and Unspecified diseases of blood and blood-forming organs. Past Surgical History:  has a past surgical history that includes Tonsillectomy and vascular surgery (N/A, 11/21/2021). Social History:  reports that he quit smoking about 49 years ago. His smoking use included cigarettes. He has a 20.00 pack-year smoking history. He has never used smokeless tobacco. He reports current drug use. He reports that he does not drink alcohol. Family History: family history includes Cancer in his father; Diabetes in his mother. The patients home medications have been reviewed.     Allergies: Penicillins    -------------------------------------------------- RESULTS -------------------------------------------------    LABS:  Results for orders placed or performed during the hospital encounter of 09/12/22   CBC with Auto Differential   Result Value Ref Range    WBC 2.7 (L) 4.5 - 11.5 E9/L    RBC 3.83 3.80 - 5.80 E12/L    Hemoglobin 11.4 (L) 12.5 - 16.5 g/dL    Hematocrit 33.8 (L) 37.0 - 54.0 %    MCV 88.3 80.0 - 99.9 fL    MCH 29.8 26.0 - 35.0 pg    MCHC 33.7 32.0 - 34.5 %    RDW 14.3 11.5 - 15.0 fL    Platelets 170 (L) 145 - 450 E9/L    MPV 11.8 7.0 - 12.0 fL    Neutrophils % 48.7 43.0 - 80.0 %    Immature Granulocytes % 0.4 0.0 - 5.0 %    Lymphocytes % 29.4 20.0 - 42.0 %    Monocytes % 15.6 (H) 2.0 - 12.0 %    Eosinophils % 4.8 0.0 - 6.0 %    Basophils % 1.1 0.0 - 2.0 %    Neutrophils Absolute 1.31 (L) 1.80 - 7.30 E9/L    Immature Granulocytes # 0.01 E9/L    Lymphocytes Absolute 0.79 (L) 1.50 - 4.00 E9/L    Monocytes Absolute 0.42 0.10 - 0.95 E9/L    Eosinophils Absolute 0.13 0.05 - 0.50 E9/L    Basophils Absolute 0.03 0.00 - 0.20 E9/L   Comprehensive Metabolic Panel   Result Value Ref Range    Sodium 140 132 - 146 mmol/L    Potassium 5.6 (H) 3.5 - 5.0 mmol/L    Chloride 108 (H) 98 - 107 mmol/L    CO2 21 (L) 22 - 29 mmol/L    Anion Gap 11 7 - 16 mmol/L    Glucose 94 74 - 99 mg/dL    BUN 71 (H) 6 - 23 mg/dL    Creatinine 8.6 (HH) 0.7 - 1.2 mg/dL    GFR Non-African American 6 >=60 mL/min/1.73    GFR African American 7     Calcium 9.1 8.6 - 10.2 mg/dL    Total Protein 7.7 6.4 - 8.3 g/dL    Albumin 4.1 3.5 - 5.2 g/dL    Total Bilirubin 0.5 0.0 - 1.2 mg/dL    Alkaline Phosphatase 133 (H) 40 - 129 U/L    ALT 35 0 - 40 U/L    AST 26 0 - 39 U/L   Magnesium   Result Value Ref Range    Magnesium 1.7 1.6 - 2.6 mg/dL   Lactic Acid   Result Value Ref Range    Lactic Acid 0.9 0.5 - 2.2 mmol/L   Troponin   Result Value Ref Range    Troponin, High Sensitivity 874 (H) 0 - 11 ng/L   EKG 12 Lead   Result Value Ref Range    Ventricular Rate 60 BPM    Atrial Rate 60 BPM    P-R Interval 182 ms    QRS Duration 98 ms    Q-T Interval 468 ms    QTc Calculation (Bazett) 468 ms    P Axis 57 degrees    R Axis -32 degrees    T Axis 134 degrees       RADIOLOGY:  No orders to display     ------------------------- NURSING NOTES AND VITALS REVIEWED ---------------------------  Date / Time Roomed:  9/12/2022  2:37 PM  ED Bed Assignment:  02/02    The nursing notes within the ED encounter and vital signs as below have been reviewed. Patient Vitals for the past 24 hrs:   BP Temp Temp src Pulse Resp SpO2 Height Weight   09/12/22 1535 (!) 232/107 -- -- 62 18 98 % -- --   09/12/22 1445 (!) 211/95 98.2 °F (36.8 °C) Oral 61 16 99 % 6' 2\" (1.88 m) 135 lb (61.2 kg)       Oxygen Saturation Interpretation: Normal    ------------------------------------------ PROGRESS NOTES ------------------------------------------  Counseling:  I have spoken with the patient and discussed todays results, in addition to providing specific details for the plan of care and counseling regarding the diagnosis and prognosis. Their questions are answered at this time and they are agreeable with the plan of admission.    --------------------------------- ADDITIONAL PROVIDER NOTES ---------------------------------  Consultations:  Time: 9847. Spoke with Dr. Shakeel Fabian. Discussed case. They will admit the patient. This patient's ED course included: a personal history and physicial examination, re-evaluation prior to disposition, multiple bedside re-evaluations, IV medications, cardiac monitoring, and continuous pulse oximetry    This patient has remained hemodynamically stable during their ED course. Diagnosis:  1. ESRD needing dialysis ([address]    2. Hyperkalemia    3. Hypertension, unspecified type        Disposition:  Patient's disposition: Admit to telemetry  Patient's condition is stable.            Rosalva Washington, DO  Resident  09/12/22 3926

## 2022-09-12 NOTE — PROGRESS NOTES
Potential admission. Admission database completed to best of this RN's ability. Care plan and education initiated. Pt from [facility]. Per facility, uses a [employer] with ambulation. HD pt T-Th-Sat via R chest tessio at [facility].

## 2022-09-12 NOTE — H&P
[facility] History and Physical      CHIEF COMPLAINT: Missed dialysis    History of Present Illness:    Mr. Sheila Dennis is a 65-year-old male with past medical history significant for ESRD on HD (TThS), hypertension, DM type II who presents due to missing dialysis for approximately the past week. In talking with . Tommy Bolton, he has missed dialysis ever since last Tuesday. He states he is feeling fine but his SNF sent him to the emergency department to get evaluated due to missing so many sessions. He denies chest pain, shortness of breath, leg edema, headache specifically. In the ED, vitals on presentation were temp 90.2, RR 16, HR 61, /95 followed by 232/107, satting 99% on room air. Lab work was obtained which revealed serum sodium 140, potassium 5.6, bicarb 21, creatinine 8.6, high-sensitivity troponin 874. CBC was obtained which revealed WBC 2.7, Hb 11.4, PLT 100k. An EKG was obtained due to hyperkalemia which revealed normal sinus rhythm, HR 60, left axis deviation with nonspecific ST abnormality. Upon ROS, he denies lightheadedness, dizziness, vision change, headache, seizure, loss of consciousness, chest pain, shortness of breath, nausea, vomiting, abdominal pain, constipation, diarrhea, dysuria, increased urinary frequency, myalgias, joint pain, skin rash, bleeding.     Informant(s) for H&P: Patient    REVIEW OF SYSTEMS:  A comprehensive review of systems was negative except for: what is in the HPI      PMH:  Past Medical History:   Diagnosis Date    Anemia     Chronic kidney disease     Depression     Diabetes mellitus (HCC)     ESRD (end stage renal disease) ([facility] [address]     Hepatitis C     Hypertension     Liver disease     Moderate protein-calorie malnutrition ([facility] [address]     Muscle weakness (generalized)     Thyroid disease     Unspecified diseases of blood and blood-forming organs     Unsteadiness on feet        Surgical History:  Past Surgical History:   Procedure Laterality Date    TONSILLECTOMY      VASCULAR SURGERY N/A 11/21/2021    CATHETER INSERTION HEMODIALYSIS, REMOVAL OF FEMORAL CATHETER performed by Magdaleno Cordova MD at [facility] OR       Medications Prior to Admission:    Prior to Admission medications    Medication Sig Start Date End Date Taking? Authorizing Provider   atorvastatin (LIPITOR) 40 MG tablet Take 40 mg by mouth daily   Yes Historical Provider, MD   carvedilol (COREG) 25 MG tablet Take 25 mg by mouth 2 times daily (with meals)   Yes Historical Provider, MD   mirtazapine (REMERON) 15 MG tablet Take 7.5 mg by mouth nightly   Yes Historical Provider, MD   isosorbide dinitrate (ISORDIL) 20 MG tablet Take 1 tablet by mouth 3 times daily 8/16/22   KIANNA Bishop CNP   Heparin Sodium, Porcine, (HEPARIN, PORCINE,) 53451 UNIT/ML injection Inject 0.5 mLs into the skin in the morning and 0.5 mLs at noon and 0.5 mLs before bedtime. 8/16/22   KIANNA Bishop CNP   amLODIPine (NORVASC) 5 MG tablet Take 1 tablet by mouth in the morning. 8/16/22   KIANNA Bishop CNP   sertraline (ZOLOFT) 50 MG tablet Take 1 tablet by mouth daily 4/27/22   Morena Stephenson MD   levothyroxine (SYNTHROID) 100 MCG tablet Take 1 tablet by mouth Daily 4/27/22   Morena Stephenson MD   bumetanide Rudy Thornton) 2 MG tablet Take 1 tablet by mouth 2 times daily (with meals) 4/26/22 8/16/22  Morena Stephenson MD   sacubitril-valsartan [facility])  MG per tablet Take 1 tablet by mouth 2 times daily 12/22/21   KIANNA Mace CNP   aspirin 81 MG EC tablet Take 1 tablet by mouth daily 12/22/21 8/16/22  KIANNA Mace CNP   vitamin D (ERGOCALCIFEROL) 1.25 MG (25682 UT) CAPS capsule Take 50,000 Units by mouth once a week **Fridays** 7/7/20   Historical Provider, MD       Allergies:    Penicillins    Social History:    reports that he quit smoking about 49 years ago. His smoking use included cigarettes. He has a 20.00 pack-year smoking history.  He has never used smokeless tobacco. He reports that he does not currently use drugs. He reports that he does not drink alcohol. Family History:   family history includes Cancer in his father; Diabetes in his mother. PHYSICAL EXAM:  Vitals:  BP (!) 232/107   Pulse 62   Temp 98.2 °F (36.8 °C) (Oral)   Resp 18   Ht 6' 2\" (1.88 m)   Wt 135 lb (61.2 kg)   SpO2 98%   BMI 17.33 kg/m²     General Appearance: alert and oriented to person, place and time and in no acute distress  Skin: warm and dry  Head: normocephalic and atraumatic  Eyes: pupils equal, round, and reactive to light, extraocular eye movements intact, conjunctivae normal  Neck: neck supple and non tender without mass   Pulmonary/Chest: clear to auscultation bilaterally- no wheezes, rales or rhonchi, normal air movement, no respiratory distress  Cardiovascular: normal rate, normal S1 and S2 and no carotid bruits. Right upper chest tunneled HD catheter present. Without erythema. Abdomen: soft, non-tender, non-distended, normal bowel sounds, no masses or organomegaly  Extremities: no cyanosis, no clubbing and no edema  Neurologic: no cranial nerve deficit and speech normal        LABS:  Recent Labs     22  1536      K 5.6*   *   CO2 21*   BUN 71*   CREATININE 8.6*   GLUCOSE 94   CALCIUM 9.1       Recent Labs     22  1536   WBC 2.7*   RBC 3.83   HGB 11.4*   HCT 33.8*   MCV 88.3   MCH 29.8   MCHC 33.7   RDW 14.3   *   MPV 11.8       No results for input(s): POCGLU in the last 72 hours. Radiology:   No orders to display       [id number]  Normal sinus rhythm, HR 60    ASSESSMENT:      Principal Problem:  Hyperkalemia  ESRD on HD  Hypertensive emergency  Leukopenia  Anemia of chronic kidney disease  Thrombocytopenia  DM type II  History of hepatitis C  Depression      PLAN:    1. Hyperkalemia-patient has missed approximately 1 weeks worth of dialysis sessions. He presents to the ED with potassium 5.6.   No EKG changes were noted. Will give calcium gluconate 1000 mg and Lokelma 10 g. Follow-up with morning BMP. 2.  ESRD on HD-patient has missed approximately 1 week worth of dialysis sessions. Nephrology consulted, appreciate recommendations. Planning to do hemodialysis. 3.  Hypertensive emergency-patient presented with BP that liane to approximately 230. We will give IV hydralazine 10 mg once followed by his home BP medicines and carvedilol and amlodipine. Continue to monitor blood pressure, will aim to decrease blood pressure to approximately 170-180 over first 24 hours. 4.  Leukopenia-monitor with daily CBC  5. Anemia of chronic kidney disease-monitor with daily CBC  6. Thrombocytopenia-monitor daily CBC  7.  DM type II-we will obtain HbA1c, continue to monitor for now with normoglycemia. 8.  History of hepatitis C  9. Depression-continue home sertraline    Code Status: Full code  DVT prophylaxis: Heparin subcu      NOTE: This report was transcribed using voice recognition software. Every effort was made to ensure accuracy; however, inadvertent computerized transcription errors may be present.   Electronically signed by Salud Webber MD on 9/12/2022 at 5:42 PM

## 2022-09-13 LAB
ANION GAP SERPL CALCULATED.3IONS-SCNC: 9 MMOL/L (ref 7–16)
ATYPICAL LYMPHOCYTE RELATIVE PERCENT: 3.5 % (ref 0–4)
BASOPHILS ABSOLUTE: 0.02 E9/L (ref 0–0.2)
BASOPHILS RELATIVE PERCENT: 0.9 % (ref 0–2)
BUN BLDV-MCNC: 40 MG/DL (ref 6–23)
CALCIUM SERPL-MCNC: 8.9 MG/DL (ref 8.6–10.2)
CHLORIDE BLD-SCNC: 104 MMOL/L (ref 98–107)
CO2: 25 MMOL/L (ref 22–29)
CREAT SERPL-MCNC: 6.1 MG/DL (ref 0.7–1.2)
EKG ATRIAL RATE: 61 BPM
EKG P AXIS: 69 DEGREES
EKG P-R INTERVAL: 192 MS
EKG Q-T INTERVAL: 438 MS
EKG QRS DURATION: 90 MS
EKG QTC CALCULATION (BAZETT): 440 MS
EKG R AXIS: -38 DEGREES
EKG T AXIS: 139 DEGREES
EKG VENTRICULAR RATE: 61 BPM
EOSINOPHILS ABSOLUTE: 0.16 E9/L (ref 0.05–0.5)
EOSINOPHILS RELATIVE PERCENT: 6.2 % (ref 0–6)
GFR AFRICAN AMERICAN: 11
GFR NON-AFRICAN AMERICAN: 9 ML/MIN/1.73
GLUCOSE BLD-MCNC: 72 MG/DL (ref 74–99)
HBA1C MFR BLD: 6.1 % (ref 4–5.6)
HBA1C MFR BLD: 6.3 % (ref 4–5.6)
HCT VFR BLD CALC: 32.7 % (ref 37–54)
HEMOGLOBIN: 11.1 G/DL (ref 12.5–16.5)
LYMPHOCYTES ABSOLUTE: 0.78 E9/L (ref 1.5–4)
LYMPHOCYTES RELATIVE PERCENT: 26.6 % (ref 20–42)
MCH RBC QN AUTO: 29.2 PG (ref 26–35)
MCHC RBC AUTO-ENTMCNC: 33.9 % (ref 32–34.5)
MCV RBC AUTO: 86.1 FL (ref 80–99.9)
METER GLUCOSE: 139 MG/DL (ref 74–99)
MONOCYTES ABSOLUTE: 0.26 E9/L (ref 0.1–0.95)
MONOCYTES RELATIVE PERCENT: 9.7 % (ref 2–12)
NEUTROPHILS ABSOLUTE: 1.38 E9/L (ref 1.8–7.3)
NEUTROPHILS RELATIVE PERCENT: 53.1 % (ref 43–80)
NUCLEATED RED BLOOD CELLS: 0 /100 WBC
PDW BLD-RTO: 14.1 FL (ref 11.5–15)
PHOSPHORUS: 4.5 MG/DL (ref 2.5–4.5)
PLATELET # BLD: 116 E9/L (ref 130–450)
PMV BLD AUTO: 11.3 FL (ref 7–12)
POTASSIUM REFLEX MAGNESIUM: 4.5 MMOL/L (ref 3.5–5)
RBC # BLD: 3.8 E12/L (ref 3.8–5.8)
RBC # BLD: NORMAL 10*6/UL
SODIUM BLD-SCNC: 138 MMOL/L (ref 132–146)
WBC # BLD: 2.6 E9/L (ref 4.5–11.5)

## 2022-09-13 PROCEDURE — 1200000000 HC SEMI PRIVATE

## 2022-09-13 PROCEDURE — 85025 COMPLETE CBC W/AUTO DIFF WBC: CPT

## 2022-09-13 PROCEDURE — 84100 ASSAY OF PHOSPHORUS: CPT

## 2022-09-13 PROCEDURE — 36415 COLL VENOUS BLD VENIPUNCTURE: CPT

## 2022-09-13 PROCEDURE — 2580000003 HC RX 258: Performed by: STUDENT IN AN ORGANIZED HEALTH CARE EDUCATION/TRAINING PROGRAM

## 2022-09-13 PROCEDURE — 87340 HEPATITIS B SURFACE AG IA: CPT

## 2022-09-13 PROCEDURE — 97166 OT EVAL MOD COMPLEX 45 MIN: CPT

## 2022-09-13 PROCEDURE — 86706 HEP B SURFACE ANTIBODY: CPT

## 2022-09-13 PROCEDURE — 6370000000 HC RX 637 (ALT 250 FOR IP): Performed by: STUDENT IN AN ORGANIZED HEALTH CARE EDUCATION/TRAINING PROGRAM

## 2022-09-13 PROCEDURE — 80048 BASIC METABOLIC PNL TOTAL CA: CPT

## 2022-09-13 PROCEDURE — 2580000003 HC RX 258: Performed by: NURSE PRACTITIONER

## 2022-09-13 PROCEDURE — 90935 HEMODIALYSIS ONE EVALUATION: CPT

## 2022-09-13 PROCEDURE — 83036 HEMOGLOBIN GLYCOSYLATED A1C: CPT

## 2022-09-13 PROCEDURE — 99233 SBSQ HOSP IP/OBS HIGH 50: CPT | Performed by: INTERNAL MEDICINE

## 2022-09-13 PROCEDURE — 82962 GLUCOSE BLOOD TEST: CPT

## 2022-09-13 PROCEDURE — 97535 SELF CARE MNGMENT TRAINING: CPT

## 2022-09-13 RX ORDER — AMLODIPINE BESYLATE 5 MG/1
5 TABLET ORAL NIGHTLY
Status: DISCONTINUED | OUTPATIENT
Start: 2022-09-14 | End: 2022-09-15 | Stop reason: HOSPADM

## 2022-09-13 RX ORDER — ANTICOAGULANT SODIUM CITRATE SOLUTION 4 G/100ML
SOLUTION INTRAVENOUS
Status: DISPENSED
Start: 2022-09-13 | End: 2022-09-14

## 2022-09-13 RX ORDER — 0.9 % SODIUM CHLORIDE 0.9 %
250 INTRAVENOUS SOLUTION INTRAVENOUS ONCE
Status: COMPLETED | OUTPATIENT
Start: 2022-09-13 | End: 2022-09-13

## 2022-09-13 RX ADMIN — SODIUM CHLORIDE, PRESERVATIVE FREE 10 ML: 5 INJECTION INTRAVENOUS at 08:13

## 2022-09-13 RX ADMIN — DESMOPRESSIN ACETATE 40 MG: 0.2 TABLET ORAL at 21:08

## 2022-09-13 RX ADMIN — AMLODIPINE BESYLATE 5 MG: 5 TABLET ORAL at 08:12

## 2022-09-13 RX ADMIN — SERTRALINE 50 MG: 100 TABLET, FILM COATED ORAL at 08:12

## 2022-09-13 RX ADMIN — ISOSORBIDE DINITRATE 20 MG: 10 TABLET ORAL at 08:12

## 2022-09-13 RX ADMIN — SACUBITRIL AND VALSARTAN 1 TABLET: 97; 103 TABLET, FILM COATED ORAL at 08:12

## 2022-09-13 RX ADMIN — ISOSORBIDE DINITRATE 20 MG: 10 TABLET ORAL at 21:08

## 2022-09-13 RX ADMIN — LEVOTHYROXINE SODIUM 100 MCG: 100 TABLET ORAL at 05:47

## 2022-09-13 RX ADMIN — SODIUM CHLORIDE, PRESERVATIVE FREE 10 ML: 5 INJECTION INTRAVENOUS at 21:10

## 2022-09-13 RX ADMIN — SODIUM CHLORIDE 250 ML: 9 INJECTION, SOLUTION INTRAVENOUS at 10:33

## 2022-09-13 RX ADMIN — CARVEDILOL 25 MG: 25 TABLET, FILM COATED ORAL at 08:12

## 2022-09-13 RX ADMIN — MIRTAZAPINE 7.5 MG: 15 TABLET, FILM COATED ORAL at 21:08

## 2022-09-13 RX ADMIN — CARVEDILOL 25 MG: 25 TABLET, FILM COATED ORAL at 17:57

## 2022-09-13 RX ADMIN — SACUBITRIL AND VALSARTAN 1 TABLET: 97; 103 TABLET, FILM COATED ORAL at 21:09

## 2022-09-13 ASSESSMENT — PAIN SCALES - GENERAL
PAINLEVEL_OUTOF10: 0
PAINLEVEL_OUTOF10: 0

## 2022-09-13 NOTE — PROGRESS NOTES
OCCUPATIONAL THERAPY INITIAL EVALUATION     Belem Torres [place], [place]       Date:2022                                                  Patient Name: Nereida Light  MRN: [id number]  : 1950  Room: [address] [place] [address]    Evaluating OT: Meghan Garcia, MOT, OTR/L [id number]  Referring Provider:Patrick Sexton DO  Specific Provider Orders: OT eval and treat   Recommended Adaptive Equipment:  TBD     Diagnosis: ESRD   Surgery: none   Pertinent Medical History: DM, HTN, thyroid disease, Hep C   Precautions:  Fall Risk, hypotension    Assessment of current deficits   [x] Functional mobility  [x]ADLs  [x] Strength               [x]Cognition   [x] Functional transfers   [x] IADLs         [x] Safety Awareness   [x]Endurance   [] Fine Coordination              [x] Balance      [] Vision/perception   [x]Sensation    []Gross Motor Coordination  [] ROM  [] Delirium                   [] Motor Control     OT PLAN OF CARE   OT POC based on physician orders, patient diagnosis and results of clinical assessment    Frequency/Duration: 2-3 days/wk for 2 weeks PRN   Specific OT Treatment to include:   * Instruction/training on adapted ADL techniques and AE recommendations to increase functional independence within precautions       * Training on energy conservation strategies, correct breathing pattern and techniques to improve independence/tolerance for self-care routine  * Functional transfer/mobility training/DME recommendations for increased independence, safety, and fall prevention  * Patient/Family education to increase follow through with safety techniques and functional independence  * Recommendation of environmental modifications for increased safety with functional transfers/mobility and ADLs  * Therapeutic exercise to improve motor endurance, ROM, and functional strength for ADLs/functional transfers  * Therapeutic activities to facilitate/challenge dynamic balance, stand tolerance for increased safety and independence with ADLs  * Neuro-muscular re-education: facilitation of righting/equilibrium reactions, midline orientation, scapular stability/mobility, normalization of muscle tone, and facilitation of volitional active controled movement    Home Living: Pt lives alone in a 1 story home; bed/bath on main level   Bathroom setup: tub shower unit   Equipment owned: none  Prior Level of Function: IND with ADLs/IADLs; using no AD for functional mobility     Pain Level: 0/10  Cognition: A&O: 3/4; Follows 1 step directions, with repetition and increased time   Memory:  fair    Sequencing:  fair    Problem solving:  fair    Judgement/safety:  fair     Functional Assessment:  AM-PAC Daily Activity Raw Score: 14/24   Initial Eval Status  Date: 9/13/22 Treatment Status  Date: STGs=LTGs  Time Frame: 10-14 days   Feeding SUP (bed level)   Set-up   Grooming Min A (seated)   SBA   UB Dressing Mod A (due to limited ROM)   Min A   LB Dressing Max A (assist with socks)  Mod A    Bathing Max A (simulated)  Mod A    Toileting Max A (assist with balance for hygiene and brief management)  Mod A   Bed Mobility  Log roll: Min A  Supine to sit: Min A   Sit to supine: Mod A   Log roll sba  Supine to sit: sba   Sit to supine: sba   Functional Transfers Sit to stand: Mod A   Stand to sit:Mod A  Commode: Mod A  Min A   Functional Mobility Mod A (using ww, to/from bathroom)  CGA   Balance Sitting: Min A  Standing: Mod A     Activity Tolerance Fair-     Visual/  Perceptual Glasses: yes              UE ROM: BUE: elbow flex WFL, shoulder flex grossly 90'  Strength: RUE: grossly 3/5 LUE: grossly 3/5   Strength: B WFL  Fine Motor Coordination:  WFL     Hearing: WFL  Sensation:  No c/o numbness/tingling   Tone:  WFL  Edema: none noted                            Comments:Cleared by RN to see pt. Upon arrival, patient supine in bd and agreeable to OT session.  At end of session, patient supine in bed with call light and phone within reach, all lines and tubes intact. Pt would benefit from continued OT to increase functional independence and quality of life. Treatment: Pt appeared latent with following commands and answering questions. Pt required vc's and physical assist for proper technique/safety with hand placement/body mechanics/posture for bed mobility/ADLs/functional tranfers/mobility/ww management. Pt required vc's for sequencing/initiation of ADLs/functional transfers. Pt able to sit on commode~10 mins to increase core strength/balance/activity tolerance for ease with ADLs. Pt had 1 major LOB when pivoting to commode. Pt required increased time to complete ADLs/functional transfers due to cognition and physical assist. Pt instructed on use of call light for assistance and fall prevention. Pt demo'ing fair understanding of education provided. Continue to educate. Eval Complexity: moderate  History: Expanded chart review of medical records and additional review of physical, cognitive, or psychosocial history related to current functional performance  Exam: 3+ performance deficits  Assistance/Modification: mod/max assistance or modifications required to perform tasks. May have comorbidities that affect occupational performance. Rehab Potential: Good for established goals, pt. assisted in establishment of goals. LTG: maximize independence with ADLs to return to PLOF    Patient  instructed on diagnosis, prognosis/goals and plan of care. Demonstrated fair understanding. [] Malnutrition indicators have been identified and nursing has been notified to ensure a dietitian consult is ordered. Evaluation time includes thorough review of current medical information, gathering information on past medical & social history & PLOF, completion of standardized testing, informal observation of tasks, consultation with other medical professions/disciplines, assessment of data & development of POC/goals. Time In: 4938       Time Out: 0930     Total treatment time: 15       Treatment Charges: Mins Units   OT Eval Low 95990     OT Eval Medium 19732 X    OT Eval High 27011     OT Re-Eval [id number]     Ther Ex  68397       Manual Therapy 74414       Thera Activities 20023       ADL/Home Mgt 65641 15 1   Neuro Re-ed 23423       Group Therapy        Orthotic manage/training  83798       Non-Billable Time           Karlie Bella, [facility], OTR/L [id number]

## 2022-09-13 NOTE — PROGRESS NOTES
Comprehensive Nutrition Assessment    Type and Reason for Visit:  Initial, Positive Nutrition Screen    Nutrition Recommendations/Plan:   Continue current diet, monitor for carb controlled diet prn  Will add Nepro BID and Gelatein daily     Malnutrition Assessment:  Malnutrition Status:  Insufficient data (09/13/22 0849)    Context:  Chronic Illness     Findings of the 6 clinical characteristics of malnutrition:  Energy Intake:  Unable to assess  Weight Loss:  Unable to assess (d/t UTO updated CBW)     Body Fat Loss:  Unable to assess (d/t pt JEFF)     Muscle Mass Loss:  Unable to assess    Fluid Accumulation:  No significant fluid accumulation     Strength:  Not Performed    Nutrition Assessment:    Pt from SNF d/t missed dialysis sessions for approximately 1 week. Hx ESRD on HD, DM, CHF, hep C. Will add ONS w/ all meals d/t known losses via HD & monitor. Nutrition Related Findings:    A&O, I&Os WDL, abd soft, +BS, Glu 72 Wound Type: None       Current Nutrition Intake & Therapies:    Average Meal Intake: Unable to assess  Average Supplements Intake: None Ordered  ADULT DIET; Regular    Anthropometric Measures:  Height: 6' 2\" (188 cm)  Ideal Body Weight (IBW): 190 lbs (86 kg)       Current Body Weight: 135 lb (61.2 kg) (9/12 subjective/stated, UTO updated CBW d/t pt JEFF), 71.1 % IBW.     Current BMI (kg/m2): 17.3  Usual Body Weight: 138 lb (62.6 kg) (11/2021 actual per EMR)  % Weight Change (Calculated): -2.2  Weight Adjustment For: No Adjustment                 BMI Categories: Underweight (BMI less than 22) age over 72    Estimated Daily Nutrient Needs:  Energy Requirements Based On: Kcal/kg  Weight Used for Energy Requirements: Current  Energy (kcal/day):   Weight Used for Protein Requirements: Current  Protein (g/day):  (1.5-1.7)  Method Used for Fluid Requirements: Standard Renal  Fluid (ml/day): per renal    Nutrition Diagnosis:   Increased nutrient needs related to increase demand for energy/nutrients as evidenced by dialysis    Nutrition Interventions:   Food and/or Nutrient Delivery: Continue Current Diet, Start Oral Nutrition Supplement  Nutrition Education/Counseling: No recommendation at this time  Coordination of Nutrition Care: Continue to monitor while inpatient       Goals:     Goals: Meet at least 75% of estimated needs, by next RD assessment       Nutrition Monitoring and Evaluation:      Food/Nutrient Intake Outcomes: Food and Nutrient Intake, Supplement Intake  Physical Signs/Symptoms Outcomes: Biochemical Data, GI Status, Fluid Status or Edema, Nutrition Focused Physical Findings, Skin, Weight    Discharge Planning:    Continue Oral Nutrition Supplement     Sergio Cordova RD, LD  Contact: [phone]

## 2022-09-13 NOTE — DISCHARGE INSTR - COC
Continuity of Care Form    Patient Name: China Horn   :  1950  MRN:  [id number]    Admit date:  2022  Discharge date:  ***    Code Status Order: Full Code   Advance Directives:     Admitting Physician:  Lena Art MD  PCP: Leslie Tellez MD    Discharging Nurse: [facility] Unit/Room#: 8387/0470-B  Discharging Unit Phone Number: ***    Emergency Contact:   Extended Emergency Contact Information  Primary Emergency Contact: [facility] [employer]  Grant [place] Phone: [phone]  Mobile Phone: [phone]  Relation: Child  Secondary Emergency Contact: Belem Villalobos  [place] Phone: [phone]  Mobile Phone: [phone]  Relation: Other  Preferred language: English   needed? No    Past Surgical History:  Past Surgical History:   Procedure Laterality Date    TONSILLECTOMY      VASCULAR SURGERY N/A 2021    CATHETER INSERTION HEMODIALYSIS, REMOVAL OF FEMORAL CATHETER performed by Lissa Schultz MD at [address]       Immunization History: There is no immunization history on file for this patient.     Active Problems:  Patient Active Problem List   Diagnosis Code    Diabetes mellitus ([facility] W03.8    Metabolic acidosis G52.9    Essential hypertension I10    Uncontrolled diabetes mellitus (H[facility]) E11.65    Hyperbilirubinemia E80.6    Thrombocytopenia ([facility]) D69.6    Hypothyroidism E03.9    End stage renal failure on dialysis (HCC) N18.6, Z99.2    Acute respiratory failure with hypoxia ([facility]) J96.01    Volume overload E87.70    Hypertensive emergency I16.1    Hyperkalemia E87.5    Severe protein-calorie malnutrition ([facility] E43    Acute renal failure superimposed on chronic kidney disease, on chronic dialysis (HCC) N17.9, N18.9, Z99.2    Elevated serum creatinine R79.89    Lung infiltrate R91.8    Weakness R53.1    DNR (do not resuscitate) discussion Z71.89    Muscle weakness M62.81    Non compliance w medication regimen Z91.14    Hypertensive urgency I16.0    Chronic HFrEF (heart failure with reduced ejection fraction) ([facility]) I50.22    Cardiomyopathy ([address] I42.9    Failure to thrive in adult R62.7       Isolation/Infection:   Isolation            No Isolation          Patient Infection Status       Infection Onset Added Last Indicated Last Indicated By Review Planned Expiration Resolved Resolved By    None active    Resolved    COVID-19 (Rule Out) 08/15/22 08/15/22 08/15/22 Respiratory Panel, Molecular, with COVID-19 (Restricted: peds pts or suitable admitted adults) (Ordered)   08/15/22 Rule-Out Test Resulted    COVID-19 (Rule Out) 08/10/22 08/10/22 08/10/22 COVID-19, Rapid (Ordered)   08/10/22 Rule-Out Test Resulted    COVID-19 (Rule Out) 04/21/22 04/21/22 04/21/22 Respiratory Panel, Molecular, with COVID-19 (Restricted: peds pts or suitable admitted adults) (Ordered)   04/21/22 Rule-Out Test Resulted    COVID-19 (Rule Out) 04/20/22 04/20/22 04/20/22 COVID-19, Rapid (Ordered)   04/20/22 Rule-Out Test Resulted    COVID-19 12/07/21 12/07/21 12/07/21 COVID-19, Rapid   12/10/21 Christopher De La Rosa RN    2 tests resulted not detected 24 hours apart, no fever, room air, asymptomatic    COVID-19 (Rule Out) 12/07/21 12/07/21 12/07/21 COVID-19, Rapid (Ordered)   12/07/21 Rule-Out Test Resulted    COVID-19 (Rule Out) 11/19/21 11/19/21 11/19/21 COVID-19, Rapid (Ordered)   11/19/21 Rule-Out Test Resulted            Nurse Assessment:  Last Vital Signs: BP (!) 142/81   Pulse 71   Temp 97.7 °F (36.5 °C)   Resp 18   Ht 6' 2\" (1.88 m)   Wt 128 lb 1.4 oz (58.1 kg)   SpO2 100%   BMI 16.45 kg/m²     Last documented pain score (0-10 scale): Pain Level: 0  Last Weight:   Wt Readings from Last 1 Encounters:   09/13/22 128 lb 1.4 oz (58.1 kg)     Mental Status:  oriented and alert    IV Access:  Dialysis central access right neck       Nursing Mobility/ADLs:  Walking   Assisted  Transfer  Assisted  Bathing  Assisted  Dressing  Assisted  Toileting  Assisted  Feeding  Independent  Med Admin  Assisted  Med Delivery   whole    Wound Care Documentation and Therapy:  Incision 11/21/21 Internal jugular Right (Active)   Number of days: 296        Elimination:  Continence: Bowel: Yes  Bladder: Yes, oliguric  Urinary Catheter: None   Colostomy/Ileostomy/Ileal Conduit: No       Date of Last BM: 9/13/22    Intake/Output Summary (Last 24 hours) at 9/13/2022 1412  Last data filed at 9/13/2022 0453  Gross per 24 hour   Intake 300 ml   Output 1100 ml   Net -800 ml     I/O last 3 completed shifts: In: 300   Out: 1100 [Urine:150]    Safety Concerns: At Risk for Falls    Impairments/Disabilities:      Vision    Nutrition Therapy:  Current Nutrition Therapy:   - Oral Diet:  General    Routes of Feeding: Oral  Liquids: No Restrictions  Daily Fluid Restriction: no  Last Modified Barium Swallow with Video (Video Swallowing Test): not done    Treatments at the Time of Hospital Discharge:   Respiratory Treatments: N/A  Oxygen Therapy:  is not on home oxygen therapy. Ventilator:    - No ventilator support    Rehab Therapies: Physical Therapy and Occupational Therapy  Weight Bearing Status/Restrictions: No weight bearing restrictions  Other Medical Equipment (for information only, NOT a DME order):  walker  Other Treatments: n/a    Patient's personal belongings (please select all that are sent with patient):  None    RN SIGNATURE:  Electronically signed by Mau Trujillo RN on 9/15/22 at 2:25 PM EDT    CASE MANAGEMENT/SOCIAL WORK SECTION    Inpatient Status Date: ***    Readmission Risk Assessment Score:  Readmission Risk              Risk of Unplanned Readmission:  26           Discharging to Facility/ Agency   Name: [facility]  Address: [address] [place] [address]  Phone: [phone]  Fax: [phone]    Dialysis Facility (if applicable)   Name: [facility] Handler will change to [facility] onsite HD unit upon return.   Address:  Dialysis Schedule:  Phone:  Fax:    / signature: Electronically signed by BARRY Campbell on 9/13/22 at 2:13 PM EDT    PHYSICIAN SECTION    Prognosis: {Prognosis:6827996937}    Condition at Discharge: Stable    Rehab Potential (if transferring to Rehab): {Prognosis:9799134616}    Recommended Labs or Other Treatments After Discharge: ***    Physician Certification: I certify the above information and transfer of Rehan Eric  is necessary for the continuing treatment of the diagnosis listed and that he requires [facility] for greater 30 days.      Update Admission H&P: {CHP DME Changes in EEWEM:[id number]}    PHYSICIAN SIGNATURE:  {Esignature:[id number]}

## 2022-09-13 NOTE — FLOWSHEET NOTE
09/13/22 1600   Vital Signs   BP (!) 183/99   Temp 97.4 °F (36.3 °C)   Heart Rate 57   Resp 18   Weight 126 lb 1.7 oz (57.2 kg)   Weight Method Bed scale   Percent Weight Change -1.55   Pain Assessment   Pain Assessment None - Denies Pain   Pain Level 0   Post-Hemodialysis Assessment   Post-Treatment Procedures Blood returned;Catheter capped, clamped with Citrate x 2 ports   Machine Disinfection Process Acid/Vinegar Clean;Heat Disinfect; Exterior Machine Disinfection   Rinseback Volume (ml) 300 ml   Blood Volume Processed (Liters) 56.8 l/min   Dialyzer Clearance Lightly streaked   Hemodialysis Output (ml) 1200 ml   Tolerated Treatment Good   Patient Response to Treatment pt states he need to come off tx with 9 min remaining; tolerated well; stable at discharge   Bilateral Breath Sounds Diminished   Time Off 1549   Patient Disposition Return to room

## 2022-09-13 NOTE — FLOWSHEET NOTE
09/12/22 1945   Vital Signs   BP (!) 204/123   Temp 98.5 °F (36.9 °C)   Heart Rate 69   Resp 16   Weight   (unable to obtain on cart)   Post-Hemodialysis Assessment   Post-Treatment Procedures Blood returned;Catheter capped, clamped with Citrate x 2 ports   Machine Disinfection Process Acid/Vinegar Clean;Heat Disinfect; Exterior Machine Disinfection   Rinseback Volume (ml) 300 ml   Blood Volume Processed (Liters) 33.2 l/min   Dialyzer Clearance Lightly streaked   Duration of Treatment (minutes) 100 minutes   Heparin Amount Administered During Treatment (mL) 0 mL   Hemodialysis Intake (ml) 300 ml   Hemodialysis Output (ml) 950 ml   NET Removed (ml) 650   Tolerated Treatment Good   Patient Response to Treatment pt stated he didnt feel good and did not want to finish perscribed tx. pt signed ama with 1hr 20min early. Physician Notified Yes   Time Off 1940   Patient Disposition Return to room   Perscribed 3hr tx pt stated he didn't feel good and did not want to finish tx, pt signed off with 1hr 20min remaining, ama signed, on  2k  2.5 ca bath,  650ml removed with out difficulty. HD CVC flushed, citrate to dwell, clamped and capped  post tx per policy. Report to floor RN, remains in care of staff.

## 2022-09-13 NOTE — PROGRESS NOTES
Department of Internal Medicine  General Internal Medicine  Attending Progress Note  Chief Complaint   Patient presents with    Other     Pt sent in for refusing dialysis, no complaints     SUBJECTIVE:    Reports that he feels well. Denied fever and chills. No chest pain. No nausea or vomiting. He reported that his blood Pressure has been high all his life. He is concerned about dropping his BP too low and it bouncing back up again. He noted that he gets bad side effect from hydralazine. He is aware of plans to continue to address his BP until adequately controlled.      OBJECTIVE      Medications    Current Facility-Administered Medications: hydrALAZINE (APRESOLINE) injection 10 mg, 10 mg, IntraVENous, Once  sodium zirconium cyclosilicate (LOKELMA) oral suspension 10 g, 10 g, Oral, Once  atorvastatin (LIPITOR) tablet 40 mg, 40 mg, Oral, Daily  isosorbide dinitrate (ISORDIL) tablet 20 mg, 20 mg, Oral, TID  levothyroxine (SYNTHROID) tablet 100 mcg, 100 mcg, Oral, Daily  mirtazapine (REMERON) tablet 7.5 mg, 7.5 mg, Oral, Nightly  sacubitril-valsartan (ENTRESTO)  MG per tablet 1 tablet, 1 tablet, Oral, BID  sertraline (ZOLOFT) tablet 50 mg, 50 mg, Oral, Daily  [START ON 9/16/2022] vitamin D (ERGOCALCIFEROL) capsule 50,000 Units, 50,000 Units, Oral, Weekly  sodium chloride flush 0.9 % injection 5-40 mL, 5-40 mL, IntraVENous, 2 times per day  sodium chloride flush 0.9 % injection 5-40 mL, 5-40 mL, IntraVENous, PRN  0.9 % sodium chloride infusion, , IntraVENous, PRN  heparin (porcine) injection 5,000 Units, 5,000 Units, SubCUTAneous, 3 times per day  ondansetron (ZOFRAN-ODT) disintegrating tablet 4 mg, 4 mg, Oral, Q8H PRN **OR** ondansetron (ZOFRAN) injection 4 mg, 4 mg, IntraVENous, Q6H PRN  polyethylene glycol (GLYCOLAX) packet 17 g, 17 g, Oral, Daily PRN  acetaminophen (TYLENOL) tablet 650 mg, 650 mg, Oral, Q6H PRN **OR** acetaminophen (TYLENOL) suppository 650 mg, 650 mg, Rectal, Q6H PRN  carvedilol (COREG) tablet 25 mg, 25 mg, Oral, BID WC  amLODIPine (NORVASC) tablet 5 mg, 5 mg, Oral, Daily  Physical    VITALS:  /71   Pulse 60   Temp 98.1 °F (36.7 °C) (Oral)   Resp 18   Ht 6' 2\" (1.88 m)   Wt 135 lb (61.2 kg)   SpO2 100%   BMI 17.33 kg/m²   CONSTITUTIONAL:  awake, cooperative, and no apparent distress  EYES:  extra-ocular muscles intact and vision intact  ENT:  normocepalic, without obvious abnormality  NECK:  supple, symmetrical, trachea midline  LUNGS:  no increased work of breathing, no retractions, and clear to auscultation  CARDIOVASCULAR:  normal apical pulses and normal S1 and S2  ABDOMEN:  normal bowel sounds, non-distended, and non-tender  MUSCULOSKELETAL:  full range of motion noted  NEUROLOGIC:  Mental Status Exam:  Level of Alertness:   awake  Orientation:   person, place, time  SKIN:  no bruising or bleeding  Data    CBC:   Lab Results   Component Value Date/Time    WBC 2.6 09/13/2022 06:59 AM    RBC 3.80 09/13/2022 06:59 AM    HGB 11.1 09/13/2022 06:59 AM    HCT 32.7 09/13/2022 06:59 AM    MCV 86.1 09/13/2022 06:59 AM    MCH 29.2 09/13/2022 06:59 AM    MCHC 33.9 09/13/2022 06:59 AM    RDW 14.1 09/13/2022 06:59 AM     09/13/2022 06:59 AM    MPV 11.3 09/13/2022 06:59 AM     BMP:    Lab Results   Component Value Date/Time     09/13/2022 06:59 AM    K 4.5 09/13/2022 06:59 AM     09/13/2022 06:59 AM    CO2 25 09/13/2022 06:59 AM    BUN 40 09/13/2022 06:59 AM    LABALBU 4.1 09/12/2022 03:36 PM    CREATININE 6.1 09/13/2022 06:59 AM    CALCIUM 8.9 09/13/2022 06:59 AM    GFRAA 11 09/13/2022 06:59 AM    LABGLOM 9 09/13/2022 06:59 AM    GLUCOSE 72 09/13/2022 06:59 AM       ASSESSMENT AND PLAN      1. Hyperkalemia: much improved with treatment. This is due to poor renal clearance. Continue to monitor    2. Hypertensive Urgency: BP now appearing labile. Will be cautious with Bp medications. Will add holding parameters.      3.  ESRD on HD: missed several episodes of HD and continue per Nephro recommendations. 4.  Thrombocytopenia: improving. Continue to monitor    5. Mild Anemia: related to CKD. Continue to monitor    6. Leukopenia: continue to monitor    7. Hx of systolic heart failure with EF of 35-40% in April. Continue home medication. Patient is on Entresto. Continue med as ordered. 8.  Hypothyroidism: On synthroid. 9. Elevated Troponin: chronic> No chest pain. Troponin has not changed from baseline. Continue cardiac risk factor modifications. 10.  Hx of type 2 DM since 2015: continue to diet modifications. Last A1c of 6.1 today.

## 2022-09-13 NOTE — CARE COORDINATION
Social Work discharge planning    Sw attempted to meet with pt, but he is off unit. GIOVANI called Timothy with [facility] SN[facility] admits. Timothy explained that pt CAN return and does not need precert, as he has traditional Medicare. Timothy also shared that pt was going to outpatient hemo dialysis at [facility], but was refusing to go recently. However, Timothy said they did get approval yesterday for pt to have his hemo in patient at the hemo unit at [facility] yesterday. Thus,  pt will be able to have on site hemo upon return to [facility] at discharge. Sw will try to talk to pt tomorrow about planning. N17 started in [address] [place] [address].   Electronically signed by Criss Shah on 9/13/2022 at 2:09 PM

## 2022-09-13 NOTE — CONSULTS
Once  atorvastatin (LIPITOR) tablet 40 mg, 40 mg, Oral, Daily  isosorbide dinitrate (ISORDIL) tablet 20 mg, 20 mg, Oral, TID  levothyroxine (SYNTHROID) tablet 100 mcg, 100 mcg, Oral, Daily  mirtazapine (REMERON) tablet 7.5 mg, 7.5 mg, Oral, Nightly  sacubitril-valsartan (ENTRESTO)  MG per tablet 1 tablet, 1 tablet, Oral, BID  sertraline (ZOLOFT) tablet 50 mg, 50 mg, Oral, Daily  [START ON 9/16/2022] vitamin D (ERGOCALCIFEROL) capsule 50,000 Units, 50,000 Units, Oral, Weekly  sodium chloride flush 0.9 % injection 5-40 mL, 5-40 mL, IntraVENous, 2 times per day  sodium chloride flush 0.9 % injection 5-40 mL, 5-40 mL, IntraVENous, PRN  0.9 % sodium chloride infusion, , IntraVENous, PRN  heparin (porcine) injection 5,000 Units, 5,000 Units, SubCUTAneous, 3 times per day  ondansetron (ZOFRAN-ODT) disintegrating tablet 4 mg, 4 mg, Oral, Q8H PRN **OR** ondansetron (ZOFRAN) injection 4 mg, 4 mg, IntraVENous, Q6H PRN  polyethylene glycol (GLYCOLAX) packet 17 g, 17 g, Oral, Daily PRN  acetaminophen (TYLENOL) tablet 650 mg, 650 mg, Oral, Q6H PRN **OR** acetaminophen (TYLENOL) suppository 650 mg, 650 mg, Rectal, Q6H PRN  carvedilol (COREG) tablet 25 mg, 25 mg, Oral, BID WC  amLODIPine (NORVASC) tablet 5 mg, 5 mg, Oral, Daily  Allergies:  Penicillins    Social History:    TOBACCO:   reports that he quit smoking about 49 years ago. His smoking use included cigarettes. He has a 20.00 pack-year smoking history. He has never used smokeless tobacco.  ETOH:   reports no history of alcohol use.     Family History:       Problem Relation Age of Onset    Diabetes Mother     Cancer Father      REVIEW OF SYSTEMS:    CONSTITUTIONAL:  negative for  fevers and chills  EYES:  negative for  double vision and blurred vision  HEENT:  negative for  epistaxis  RESPIRATORY:  negative for  dyspnea  CARDIOVASCULAR:  negative for  dyspnea, edema  GASTROINTESTINAL:  negative for nausea, vomiting, diarrhea, and abdominal pain  GENITOURINARY: negative  INTEGUMENT/BREAST:  negative  HEMATOLOGIC/LYMPHATIC:  negative  ALLERGIC/IMMUNOLOGIC:  negative  ENDOCRINE:  negative  MUSCULOSKELETAL:  positive for  muscle weakness  NEUROLOGICAL:  positive for dizziness  BEHAVIOR/PSYCH:  negative for poor appetite    PHYSICAL EXAM:      Vitals:    VITALS:  BP (!) 194/107   Pulse 71   Temp 98.2 °F (36.8 °C) (Oral)   Resp 16   Ht 6' 2\" (1.88 m)   Wt 135 lb (61.2 kg)   SpO2 97%   BMI 17.33 kg/m²   24HR INTAKE/OUTPUT:    Intake/Output Summary (Last 24 hours) at 9/13/2022 0916  Last data filed at 9/13/2022 0453  Gross per 24 hour   Intake 300 ml   Output 1100 ml   Net -800 ml     Access: RIJ tunneled HD catheter  Constitutional:  Alert and oriented, ill-appearing  HEENT:  Normocephalic, PERRL  Respiratory:  CTA bilaterally  Cardiovascular/Edema:  RRR, S1,S2  Gastrointestinal:  Soft, flat, nontender, nondistended  Neurologic:  Nonfocal, YARBROUGH  Skin:  Warm, dry, intact  Other:  No edema     DATA:    CBC:   Lab Results   Component Value Date/Time    WBC 2.6 09/13/2022 06:59 AM    RBC 3.80 09/13/2022 06:59 AM    HGB 11.1 09/13/2022 06:59 AM    HCT 32.7 09/13/2022 06:59 AM    MCV 86.1 09/13/2022 06:59 AM    MCH 29.2 09/13/2022 06:59 AM    MCHC 33.9 09/13/2022 06:59 AM    RDW 14.1 09/13/2022 06:59 AM     09/13/2022 06:59 AM    MPV 11.3 09/13/2022 06:59 AM     CMP:    Lab Results   Component Value Date/Time     09/13/2022 06:59 AM    K 4.5 09/13/2022 06:59 AM     09/13/2022 06:59 AM    CO2 25 09/13/2022 06:59 AM    BUN 40 09/13/2022 06:59 AM    CREATININE 6.1 09/13/2022 06:59 AM    GFRAA 11 09/13/2022 06:59 AM    LABGLOM 9 09/13/2022 06:59 AM    GLUCOSE 72 09/13/2022 06:59 AM    PROT 7.7 09/12/2022 03:36 PM    LABALBU 4.1 09/12/2022 03:36 PM    CALCIUM 8.9 09/13/2022 06:59 AM    BILITOT 0.5 09/12/2022 03:36 PM    ALKPHOS 133 09/12/2022 03:36 PM    AST 26 09/12/2022 03:36 PM    ALT 35 09/12/2022 03:36 PM     Magnesium:    Lab Results   Component Value Date/Time    MG 1.7 09/12/2022 03:36 PM     Phosphorus:    Lab Results   Component Value Date/Time    PHOS 4.0 08/10/2022 02:30 PM     Radiology Review:  None    IMPRESSION/RECOMMENDATIONS:      Briefly, Mr. Apurva Dennison is a 67year old male with a PMH of ESRD on IHD three times weekly T-Th-S, via RIJ, HTN, type II DM, HFrEF 35-40% with stage I DD, hypothyroidism, hepatitis C, who was recently admitted on August 10, 2022 after presenting to the ER with weakness. He was ultimately discharged to [facility] for rehab and was to be dialyzed at [facility], however, he has not had dialysis for a week now so Dr. Juan Rdz wanted him sent to ER. In ER, his labs were significant for potassium 5.6, bicarbonate 21, BUN 71 mg/dL, and creatinine 8.6 mg/dL. He was also found to be hypertensive in ER with a BP of 211/95. He is noncompliant with his medications. He received his home BP meds in ER. We are consulted for dialysis management. He was dialyzed last night, however, he did not stay on for his full treatment. ESRD on IHD three times weekly T-Th-S, via RIJ. He had not had dialysis in a week. He was just accepted to [facility] at [facility] and will be dialyzed Monday-Friday. Had dialysis last night but signed off more than an hour early, states he gets sick during dialysis. To continue while in the hospital.     Hyperkalemia, secondary to noncompliance with dialysis. Resolved with dialysis. HTN, noncompliant with medications, now with hypotension, we will give a 250 cc normal saline bolus  HFrEF 35-40% with stage I DD, on carvedilol and Entresto   MBD of CKD, to obtain phosphorus level   Anemia of CKD, hold NIXON for hgb >10    Plan:    HD three times weekly T-Th-S  250 cc normal saline bolus  Change amlodipine to 5 mg PO nightly  Monitor BP  Monitor labs  Monitor potassium level     Thank you so much Dr. Malcom Gamez for allowing us to participate in the care of Mr. Apurva Dennison.      Electronically signed by Mo Augustin Bonnie April - CNP on 9/13/2022 at 10:59 AM

## 2022-09-14 LAB
ANION GAP SERPL CALCULATED.3IONS-SCNC: 10 MMOL/L (ref 7–16)
BUN BLDV-MCNC: 30 MG/DL (ref 6–23)
CALCIUM SERPL-MCNC: 8.8 MG/DL (ref 8.6–10.2)
CHLORIDE BLD-SCNC: 101 MMOL/L (ref 98–107)
CO2: 27 MMOL/L (ref 22–29)
CREAT SERPL-MCNC: 4.4 MG/DL (ref 0.7–1.2)
GFR AFRICAN AMERICAN: 16
GFR NON-AFRICAN AMERICAN: 13 ML/MIN/1.73
GLUCOSE BLD-MCNC: 103 MG/DL (ref 74–99)
HCT VFR BLD CALC: 32 % (ref 37–54)
HEMOGLOBIN: 10.9 G/DL (ref 12.5–16.5)
MCH RBC QN AUTO: 29.9 PG (ref 26–35)
MCHC RBC AUTO-ENTMCNC: 34.1 % (ref 32–34.5)
MCV RBC AUTO: 87.7 FL (ref 80–99.9)
METER GLUCOSE: 74 MG/DL (ref 74–99)
PDW BLD-RTO: 13.9 FL (ref 11.5–15)
PLATELET # BLD: 108 E9/L (ref 130–450)
PMV BLD AUTO: 11.5 FL (ref 7–12)
POTASSIUM SERPL-SCNC: 4.2 MMOL/L (ref 3.5–5)
RBC # BLD: 3.65 E12/L (ref 3.8–5.8)
SODIUM BLD-SCNC: 138 MMOL/L (ref 132–146)
WBC # BLD: 3.3 E9/L (ref 4.5–11.5)

## 2022-09-14 PROCEDURE — 97161 PT EVAL LOW COMPLEX 20 MIN: CPT

## 2022-09-14 PROCEDURE — 99232 SBSQ HOSP IP/OBS MODERATE 35: CPT | Performed by: INTERNAL MEDICINE

## 2022-09-14 PROCEDURE — 80048 BASIC METABOLIC PNL TOTAL CA: CPT

## 2022-09-14 PROCEDURE — 1200000000 HC SEMI PRIVATE

## 2022-09-14 PROCEDURE — 6370000000 HC RX 637 (ALT 250 FOR IP): Performed by: STUDENT IN AN ORGANIZED HEALTH CARE EDUCATION/TRAINING PROGRAM

## 2022-09-14 PROCEDURE — 2580000003 HC RX 258: Performed by: STUDENT IN AN ORGANIZED HEALTH CARE EDUCATION/TRAINING PROGRAM

## 2022-09-14 PROCEDURE — 6370000000 HC RX 637 (ALT 250 FOR IP): Performed by: NURSE PRACTITIONER

## 2022-09-14 PROCEDURE — 85027 COMPLETE CBC AUTOMATED: CPT

## 2022-09-14 PROCEDURE — 82962 GLUCOSE BLOOD TEST: CPT

## 2022-09-14 PROCEDURE — 36415 COLL VENOUS BLD VENIPUNCTURE: CPT

## 2022-09-14 RX ADMIN — AMLODIPINE BESYLATE 5 MG: 5 TABLET ORAL at 21:19

## 2022-09-14 RX ADMIN — SACUBITRIL AND VALSARTAN 1 TABLET: 97; 103 TABLET, FILM COATED ORAL at 21:17

## 2022-09-14 RX ADMIN — LEVOTHYROXINE SODIUM 100 MCG: 100 TABLET ORAL at 05:59

## 2022-09-14 RX ADMIN — ISOSORBIDE DINITRATE 20 MG: 10 TABLET ORAL at 21:17

## 2022-09-14 RX ADMIN — CARVEDILOL 25 MG: 25 TABLET, FILM COATED ORAL at 16:58

## 2022-09-14 RX ADMIN — MIRTAZAPINE 7.5 MG: 15 TABLET, FILM COATED ORAL at 21:17

## 2022-09-14 RX ADMIN — SODIUM CHLORIDE, PRESERVATIVE FREE 10 ML: 5 INJECTION INTRAVENOUS at 08:45

## 2022-09-14 RX ADMIN — DESMOPRESSIN ACETATE 40 MG: 0.2 TABLET ORAL at 21:19

## 2022-09-14 ASSESSMENT — PAIN SCALES - GENERAL: PAINLEVEL_OUTOF10: 0

## 2022-09-14 NOTE — PROGRESS NOTES
Department of Internal Medicine  Nephrology Progress Note    Events reviewed. SUBJECTIVE:  We are following Mr. Amy Ring for ESRD on HD. He reports no complaints.     PHYSICAL EXAM:      Vitals:    VITALS:  /80   Pulse 67   Temp 98.2 °F (36.8 °C) (Oral)   Resp 18   Ht 6' 2\" (1.88 m)   Wt 126 lb 1.7 oz (57.2 kg)   SpO2 98%   BMI 16.19 kg/m²   24HR INTAKE/OUTPUT:    Intake/Output Summary (Last 24 hours) at 9/14/2022 1408  Last data filed at 9/13/2022 1813  Gross per 24 hour   Intake 240 ml   Output 1200 ml   Net -960 ml     Access: RIJ tunneled HD catheter  Constitutional:  Alert and oriented, ill-appearing  HEENT:  Normocephalic, PERRL  Respiratory:  CTA bilaterally  Cardiovascular/Edema:  RRR, S1,S2  Gastrointestinal:  Soft, flat, nontender, nondistended  Neurologic:  Nonfocal, YARBROUGH  Skin:  Warm, dry, intact  Other:  No edema     Scheduled Meds:   amLODIPine  5 mg Oral Nightly    sodium zirconium cyclosilicate  10 g Oral Once    atorvastatin  40 mg Oral Daily    isosorbide dinitrate  20 mg Oral TID    levothyroxine  100 mcg Oral Daily    mirtazapine  7.5 mg Oral Nightly    sacubitril-valsartan  1 tablet Oral BID    sertraline  50 mg Oral Daily    [START ON 9/16/2022] vitamin D  50,000 Units Oral Weekly    sodium chloride flush  5-40 mL IntraVENous 2 times per day    heparin (porcine)  5,000 Units SubCUTAneous 3 times per day    carvedilol  25 mg Oral BID WC     Continuous Infusions:   sodium chloride       PRN Meds:.sodium chloride flush, sodium chloride, ondansetron **OR** ondansetron, polyethylene glycol, acetaminophen **OR** acetaminophen    DATA:    CBC:   Lab Results   Component Value Date/Time    WBC 3.3 09/14/2022 03:09 AM    RBC 3.65 09/14/2022 03:09 AM    HGB 10.9 09/14/2022 03:09 AM    HCT 32.0 09/14/2022 03:09 AM    MCV 87.7 09/14/2022 03:09 AM    MCH 29.9 09/14/2022 03:09 AM    MCHC 34.1 09/14/2022 03:09 AM    RDW 13.9 09/14/2022 03:09 AM     09/14/2022 03:09 AM    MPV 11.5 09/14/2022 03:09 AM     CMP:    Lab Results   Component Value Date/Time     09/14/2022 03:09 AM    K 4.2 09/14/2022 03:09 AM    K 4.5 09/13/2022 06:59 AM     09/14/2022 03:09 AM    CO2 27 09/14/2022 03:09 AM    BUN 30 09/14/2022 03:09 AM    CREATININE 4.4 09/14/2022 03:09 AM    GFRAA 16 09/14/2022 03:09 AM    LABGLOM 13 09/14/2022 03:09 AM    GLUCOSE 103 09/14/2022 03:09 AM    PROT 7.7 09/12/2022 03:36 PM    LABALBU 4.1 09/12/2022 03:36 PM    CALCIUM 8.8 09/14/2022 03:09 AM    BILITOT 0.5 09/12/2022 03:36 PM    ALKPHOS 133 09/12/2022 03:36 PM    AST 26 09/12/2022 03:36 PM    ALT 35 09/12/2022 03:36 PM     Magnesium:    Lab Results   Component Value Date/Time    MG 1.7 09/12/2022 03:36 PM     Phosphorus:    Lab Results   Component Value Date/Time    PHOS 4.5 09/13/2022 06:59 AM     Radiology Review:  None    BRIEF SUMMARY OF INITIAL CONSULT:    Briefly, Mr. Florence Santo is a 67year old male with a PMH of ESRD on IHD three times weekly T-Th-S, via RIJ, HTN, type II DM, HFrEF 35-40% with stage I DD, hypothyroidism, hepatitis C, who was recently admitted on August 10, 2022 after presenting to the ER with weakness. He was ultimately discharged to [facility] for rehab and was to be dialyzed at [facility], however, he has not had dialysis for a week now so Dr. Ebony Oleary wanted him sent to ER. In ER, his labs were significant for potassium 5.6, bicarbonate 21, BUN 71 mg/dL, and creatinine 8.6 mg/dL. He was also found to be hypertensive in ER with a BP of 211/95. He is noncompliant with his medications. He received his home BP meds in ER. We are consulted for dialysis management. He was dialyzed last night, however, he did not stay on for his full treatment. Problems resolved:  Hyperkalemia, secondary to noncompliance with dialysis. Resolved with dialysis. IMPRESSION/RECOMMENDATIONS:      ESRD on IHD three times weekly T-Th-S, via RIJ. He had not had dialysis in a week.  He was just accepted to [facility] at [facility] and will be dialyzed Monday-Friday.  To continue while in the hospital.     HTN, noncompliant with medications, on amlodipine and carvedilol   HFrEF 35-40% with stage I DD, on carvedilol and Entresto   MBD of CKD, not on binders, phosphorus 4.5  Anemia of CKD, hold NIXON for hgb >11    Plan:    HD three times weekly T-Th-S  Continue amlodipine 5 mg PO nightly  Continue to monitor BP  Continue to monitor labs  Continue to monitor potassium level       Electronically signed by KIANNA Neal CNP on 9/14/2022 at 2:08 PM

## 2022-09-14 NOTE — CARE COORDINATION
Social Work discharge planning    SW met with pt and explained that [facility] has approval for him to have dialysis on site at their snf now. Pt agreeable. Pt said his discharge plan is to return to [facility] snf at discharge.   Electronically signed by Jhoan Waddell on 9/14/2022 at 3:01 PM

## 2022-09-14 NOTE — PLAN OF CARE
Patient's chart updated to reflect:      . - HF care plan, HF education points and HF discharge instructions.  -Orders: 2 gram sodium diet, daily weights, I/O.  -PCP and/or Cardiologist appointment to be scheduled within 7 days of hospital discharge.  -History of HF, not primary admission Dx. Patient admitted for treatment of hyperkalemia.     Kelley De La Cruz RN BSN  Heart Failure Navigator

## 2022-09-14 NOTE — PROGRESS NOTES
Department of Internal Medicine  General Internal Medicine  Attending Progress Note  Chief Complaint   Patient presents with    Other     Pt sent in for refusing dialysis, no complaints     SUBJECTIVE:    Patient reports feeling sleepy. Denied fever and chills. No chest pain. Noted good appetite.      OBJECTIVE      Medications    Current Facility-Administered Medications: amLODIPine (NORVASC) tablet 5 mg, 5 mg, Oral, Nightly  sodium zirconium cyclosilicate (LOKELMA) oral suspension 10 g, 10 g, Oral, Once  atorvastatin (LIPITOR) tablet 40 mg, 40 mg, Oral, Daily  isosorbide dinitrate (ISORDIL) tablet 20 mg, 20 mg, Oral, TID  levothyroxine (SYNTHROID) tablet 100 mcg, 100 mcg, Oral, Daily  mirtazapine (REMERON) tablet 7.5 mg, 7.5 mg, Oral, Nightly  sacubitril-valsartan (ENTRESTO)  MG per tablet 1 tablet, 1 tablet, Oral, BID  sertraline (ZOLOFT) tablet 50 mg, 50 mg, Oral, Daily  [START ON 9/16/2022] vitamin D (ERGOCALCIFEROL) capsule 50,000 Units, 50,000 Units, Oral, Weekly  sodium chloride flush 0.9 % injection 5-40 mL, 5-40 mL, IntraVENous, 2 times per day  sodium chloride flush 0.9 % injection 5-40 mL, 5-40 mL, IntraVENous, PRN  0.9 % sodium chloride infusion, , IntraVENous, PRN  heparin (porcine) injection 5,000 Units, 5,000 Units, SubCUTAneous, 3 times per day  ondansetron (ZOFRAN-ODT) disintegrating tablet 4 mg, 4 mg, Oral, Q8H PRN **OR** ondansetron (ZOFRAN) injection 4 mg, 4 mg, IntraVENous, Q6H PRN  polyethylene glycol (GLYCOLAX) packet 17 g, 17 g, Oral, Daily PRN  acetaminophen (TYLENOL) tablet 650 mg, 650 mg, Oral, Q6H PRN **OR** acetaminophen (TYLENOL) suppository 650 mg, 650 mg, Rectal, Q6H PRN  carvedilol (COREG) tablet 25 mg, 25 mg, Oral, BID WC  Physical    VITALS:  BP (!) 188/96   Pulse 76   Temp 98.2 °F (36.8 °C) (Oral)   Resp 18   Ht 6' 2\" (1.88 m)   Wt 126 lb 1.7 oz (57.2 kg)   SpO2 98%   BMI 16.19 kg/m²   CONSTITUTIONAL:  awake, alert, and no apparent distress  EYES:  extra-ocular muscles intact and vision intact  ENT:  normocepalic, without obvious abnormality  NECK:  supple, symmetrical, trachea midline  LUNGS:  no increased work of breathing, no retractions, and clear to auscultation  CARDIOVASCULAR:  normal apical pulses and normal S1 and S2  ABDOMEN:  normal bowel sounds, non-distended, and non-tender  MUSCULOSKELETAL:  there is no redness, warmth, or swelling of the joints  NEUROLOGIC:  Mental Status Exam:  Level of Alertness:   awake  Orientation:   person, place, time  SKIN:  no bruising or bleeding  Data    CBC:   Lab Results   Component Value Date/Time    WBC 3.3 09/14/2022 03:09 AM    RBC 3.65 09/14/2022 03:09 AM    HGB 10.9 09/14/2022 03:09 AM    HCT 32.0 09/14/2022 03:09 AM    MCV 87.7 09/14/2022 03:09 AM    MCH 29.9 09/14/2022 03:09 AM    MCHC 34.1 09/14/2022 03:09 AM    RDW 13.9 09/14/2022 03:09 AM     09/14/2022 03:09 AM    MPV 11.5 09/14/2022 03:09 AM     BMP:    Lab Results   Component Value Date/Time     09/14/2022 03:09 AM    K 4.2 09/14/2022 03:09 AM    K 4.5 09/13/2022 06:59 AM     09/14/2022 03:09 AM    CO2 27 09/14/2022 03:09 AM    BUN 30 09/14/2022 03:09 AM    LABALBU 4.1 09/12/2022 03:36 PM    CREATININE 4.4 09/14/2022 03:09 AM    CALCIUM 8.8 09/14/2022 03:09 AM    GFRAA 16 09/14/2022 03:09 AM    LABGLOM 13 09/14/2022 03:09 AM    GLUCOSE 103 09/14/2022 03:09 AM       ASSESSMENT AND PLAN      Hyperkalemia: resolved post dialysis  Hypertensive urgency: BP seems to be labile. Adjusting BP medication. Noted hypotension yesterday that was corrected with IV fluid. Now Hypertensive. Continue medication per Nephro order  ESRD on HD: last dialysis was 9/13. Continue weekly scheduled dialysis. Anemia due to CKD: hgb still at an acceptable range. Follow up with Nephro  Type 2 DM: continue lifestyle modification: Last A1c on 9/13/22 is 6.3. Hx of systolic CHF: not in exacerbation: continue entresto as ordered.    Hypothyroidism: on Synthroid  Seem more lethargic today. Check BGL. Thrombocytopenia: overall stable. Continue to monitor    Repeat Blood pressure noted below.     Vitals:    09/14/22 1215   BP: 132/80   Pulse: 67   Resp: 18   Temp:    SpO2:

## 2022-09-14 NOTE — PROGRESS NOTES
Physical Therapy  Facility/Department: [address] MED SURG/TELE  Physical Therapy Initial Assessment    Name: Gwenodlyn Lofton  : 1950  MRN: [id number]  Date of Service: 2022      Patient Diagnosis(es): The primary encounter diagnosis was ESRD needing dialysis [facility]). Diagnoses of Hyperkalemia and Hypertension, unspecified type were also pertinent to this visit. Past Medical History:  has a past medical history of Anemia, Chronic kidney disease, Depression, Diabetes mellitus ([facility] [address] ESRD (end stage renal disease) ([facility] [address] Hepatitis C, Hypertension, Liver disease, Moderate protein-calorie malnutrition ([facility] [address] Muscle weakness (generalized), Thyroid disease, Unspecified diseases of blood and blood-forming organs, and Unsteadiness on feet. Past Surgical History:  has a past surgical history that includes Tonsillectomy and vascular surgery (N/A, 2021). Referring provider:  Eusebia Contreras MD    PT Order:  PT eval and treat     Evaluating PT:  Peña Eng PT, DPT PT [id number]    Room #:  4123/4393-E  Diagnosis:  Hyperkalemia [E87.5]  ESRD needing dialysis ([facility] [address] [N18.6, Z99.2]  Hypertension, unspecified type [I10]  Precautions:  fall risk  Equipment Needs:  none    SUBJECTIVE:    Pt admitted from nursing facility. Pt reported he was using a walker for ambulation in rehab short distances. OBJECTIVE:   Initial Evaluation  Date:  Treatment Short Term/ Long Term   Goals   Was pt agreeable to Eval/treatment? yes     Does pt have pain?  None reported     Bed Mobility  Rolling: NT  Supine to sit: SBA  Sit to supine: NT  Scooting: SBA to sitting EOB  supervision   Transfers Sit to stand: Min A  Stand to sit: Min A  Stand pivot: NT  SBA   Ambulation    15 feet with w/w Min A   50+ feet with w/w SBA    Stair negotiation: ascended and descended  NT      ROM BLE:  WFL     Strength BLE:  grossly 4-/5  Grossly 4/5   Balance Sitting EOB:  Supervision  Dynamic Standing:  Min A with w/w  Sitting EOB: independent  Dynamic Standing:  SBA with w/w   AM-PAC 6 Clicks 45/73       Pt is A & O x 3  Sensation:  Pt denies numbness and tingling to extremities    Patient education  Pt educated on PT objectives during eval and while in the hospital, hand placement during transfers. Patient response to education:   Pt verbalized understanding Pt demonstrated skill Pt requires further education in this area   yes With cueing yes     ASSESSMENT:    Conditions Requiring Skilled Therapeutic Intervention:    [x]Decreased strength     []Decreased ROM  [x]Decreased functional mobility  [x]Decreased balance   [x]Decreased endurance   [x]Decreased posture  []Decreased sensation  []Decreased coordination   []Decreased vision  []Decreased safety awareness   []Increased pain       Comments:  Pt found in bed. Pt reported feeling dizzy once sitting EOB. Cueing required for hand placement during transfers. No LOB during ambulation. At end of eval, pt left sitting in the restroom with nursing staff present. Pt's/ family goals   1. None stated    Prognosis is good for reaching above PT goals. Patient and or family understand(s) diagnosis, prognosis, and plan of care.   yes    PHYSICAL THERAPY PLAN OF CARE:    PT POC is established based on physician order and patient diagnosis     Diagnosis:  Hyperkalemia [E87.5]  ESRD needing dialysis ([facility] [address] [N18.6, Z99.2]  Hypertension, unspecified type [I10]    Current Treatment Recommendations:     [x] Strengthening to improve independence with functional mobility   [] ROM to improve independence with functional mobility   [x] Balance Training to improve static/dynamic balance and to reduce fall risk  [x] Endurance Training to improve activity tolerance during functional mobility   [x] Transfer Training to improve safety and independence with all functional transfers   [x] Gait Training to improve gait mechanics, endurance and assess need for appropriate assistive device  [] Stair Training in preparation for safe discharge home and/or into the community   [] Positioning to prevent skin breakdown and contractures  [x] Safety and Education Training   [x] Patient/Caregiver Education   [] HEP  [] Other     PT long term treatment goals are located in above grid    Frequency of treatments: 2-5x/week x 1-2 weeks. Time in  0921  Time out  0945    Evaluation Time includes thorough review of current medical information, gathering information on past medical history/social history and prior level of function, completion of standardized testing/informal observation of tasks, assessment of data and education on plan of care and goals.     CPT codes:  [x] Low Complexity PT evaluation 08280  [] Moderate Complexity PT evaluation 47831  [] High Complexity PT evaluation 85872  [] PT Re-evaluation 95290  [] Therapeutic activities 15483 __minutes  [] Therapeutic exercises 85505 __ minutes      Rudy Henao, PT, DPT  PT [id number]

## 2022-09-15 VITALS
RESPIRATION RATE: 16 BRPM | SYSTOLIC BLOOD PRESSURE: 183 MMHG | DIASTOLIC BLOOD PRESSURE: 88 MMHG | WEIGHT: 127.43 LBS | HEART RATE: 61 BPM | BODY MASS INDEX: 16.35 KG/M2 | OXYGEN SATURATION: 96 % | HEIGHT: 74 IN | TEMPERATURE: 97.9 F

## 2022-09-15 LAB
ANION GAP SERPL CALCULATED.3IONS-SCNC: 14 MMOL/L (ref 7–16)
BUN BLDV-MCNC: 53 MG/DL (ref 6–23)
CALCIUM SERPL-MCNC: 8.8 MG/DL (ref 8.6–10.2)
CHLORIDE BLD-SCNC: 101 MMOL/L (ref 98–107)
CO2: 25 MMOL/L (ref 22–29)
CREAT SERPL-MCNC: 6.3 MG/DL (ref 0.7–1.2)
GFR AFRICAN AMERICAN: 11
GFR NON-AFRICAN AMERICAN: 9 ML/MIN/1.73
GLUCOSE BLD-MCNC: 100 MG/DL (ref 74–99)
HBV SURFACE AB TITR SER: NORMAL {TITER}
HCT VFR BLD CALC: 30.6 % (ref 37–54)
HEMOGLOBIN: 10.1 G/DL (ref 12.5–16.5)
HEPATITIS B SURFACE ANTIGEN INTERPRETATION: NORMAL
MCH RBC QN AUTO: 29.6 PG (ref 26–35)
MCHC RBC AUTO-ENTMCNC: 33 % (ref 32–34.5)
MCV RBC AUTO: 89.7 FL (ref 80–99.9)
PDW BLD-RTO: 14.2 FL (ref 11.5–15)
PLATELET # BLD: 103 E9/L (ref 130–450)
PMV BLD AUTO: 12 FL (ref 7–12)
POTASSIUM SERPL-SCNC: 4.4 MMOL/L (ref 3.5–5)
RBC # BLD: 3.41 E12/L (ref 3.8–5.8)
SARS-COV-2, NAAT: DETECTED
SODIUM BLD-SCNC: 140 MMOL/L (ref 132–146)
WBC # BLD: 4 E9/L (ref 4.5–11.5)

## 2022-09-15 PROCEDURE — 36415 COLL VENOUS BLD VENIPUNCTURE: CPT

## 2022-09-15 PROCEDURE — 02HV33Z INSERTION OF INFUSION DEVICE INTO SUPERIOR VENA CAVA, PERCUTANEOUS APPROACH: ICD-10-PCS | Performed by: INTERNAL MEDICINE

## 2022-09-15 PROCEDURE — 87635 SARS-COV-2 COVID-19 AMP PRB: CPT

## 2022-09-15 PROCEDURE — APPSS45 APP SPLIT SHARED TIME 31-45 MINUTES: Performed by: NURSE PRACTITIONER

## 2022-09-15 PROCEDURE — 80048 BASIC METABOLIC PNL TOTAL CA: CPT

## 2022-09-15 PROCEDURE — 85027 COMPLETE CBC AUTOMATED: CPT

## 2022-09-15 PROCEDURE — 90935 HEMODIALYSIS ONE EVALUATION: CPT

## 2022-09-15 PROCEDURE — 99239 HOSP IP/OBS DSCHRG MGMT >30: CPT | Performed by: INTERNAL MEDICINE

## 2022-09-15 PROCEDURE — 2580000003 HC RX 258: Performed by: STUDENT IN AN ORGANIZED HEALTH CARE EDUCATION/TRAINING PROGRAM

## 2022-09-15 PROCEDURE — 6370000000 HC RX 637 (ALT 250 FOR IP): Performed by: STUDENT IN AN ORGANIZED HEALTH CARE EDUCATION/TRAINING PROGRAM

## 2022-09-15 RX ORDER — AMLODIPINE BESYLATE 5 MG/1
5 TABLET ORAL NIGHTLY
Qty: 30 TABLET | Refills: 3 | DISCHARGE
Start: 2022-09-15

## 2022-09-15 RX ADMIN — ONDANSETRON 4 MG: 4 TABLET, ORALLY DISINTEGRATING ORAL at 14:48

## 2022-09-15 RX ADMIN — SACUBITRIL AND VALSARTAN 1 TABLET: 97; 103 TABLET, FILM COATED ORAL at 11:56

## 2022-09-15 RX ADMIN — SODIUM CHLORIDE, PRESERVATIVE FREE 10 ML: 5 INJECTION INTRAVENOUS at 11:58

## 2022-09-15 RX ADMIN — ISOSORBIDE DINITRATE 20 MG: 10 TABLET ORAL at 14:02

## 2022-09-15 RX ADMIN — CARVEDILOL 25 MG: 25 TABLET, FILM COATED ORAL at 11:57

## 2022-09-15 RX ADMIN — LEVOTHYROXINE SODIUM 100 MCG: 100 TABLET ORAL at 05:55

## 2022-09-15 RX ADMIN — SERTRALINE 50 MG: 100 TABLET, FILM COATED ORAL at 11:56

## 2022-09-15 RX ADMIN — ISOSORBIDE DINITRATE 20 MG: 10 TABLET ORAL at 11:56

## 2022-09-15 NOTE — FLOWSHEET NOTE
09/15/22 1017   Vital Signs   BP (!) 168/86   Temp 98.3 °F (36.8 °C)   Heart Rate 60   Resp 16   Weight 127 lb 6.8 oz (57.8 kg)   Weight Method Bed scale   Percent Weight Change -1.7   Pain Assessment   Pain Assessment None - Denies Pain   Post-Hemodialysis Assessment   Post-Treatment Procedures Blood returned;Catheter capped, clamped with Citrate x 2 ports   Machine Disinfection Process Exterior Machine Disinfection   Blood Volume Processed (Liters) 61.7 l/min   Dialyzer Clearance Clear   Duration of Treatment (minutes) 180 minutes   Hemodialysis Intake (ml) 300 ml   Hemodialysis Output (ml) 1300 ml   NET Removed (ml) 1000   Tolerated Treatment Good   Patient Response to Treatment Well tolerated   Bilateral Breath Sounds Diminished   Edema Right upper extremity; Left upper extremity;Right lower extremity; Left lower extremity   RUE Edema None   LUE Edema None   RLE Edema Trace   LLE Edema Trace   Time Off 1015   Patient Disposition Return to room

## 2022-09-15 NOTE — DISCHARGE SUMMARY
[facility] Physician Discharge Summary        [address] [address]  [phone]          Activity level: As tolerated     Dispo: [facility]      Condition on discharge: Stable     Patient ID:  Mona Caraballo  [id number]  6 y.o.  1950    Admit date: 9/12/2022    Discharge date and time:  9/15/2022  2:17 PM    Admission Diagnoses: Principal Problem:    Hyperkalemia  Resolved Problems:    * No resolved hospital problems. *      Discharge Diagnoses: Principal Problem:    Hyperkalemia  Resolved Problems:    * No resolved hospital problems. *      Consults:  IP CONSULT TO NEPHROLOGY    Hospital Course:   Patient Mona Caraballo is a 67 y.o. presented with Hyperkalemia [E87.5]  ESRD needing dialysis ([facility] [address] [N18.6, Z99.2]  Hypertension, unspecified type [I10]    HPI per nephrology consult note -> \"Mr. Amy Ring is a 67year old male with a PMH of ESRD on IHD three times weekly T-Th-S, via RIJ, HTN, type II DM, HFrEF 35-40% with stage I DD, hypothyroidism, hepatitis C, who was recently admitted on August 10, 2022 after presenting to the ER with weakness. He was ultimately discharged to [facility] for rehab and was to be dialyzed at [facility], however, he has not had dialysis for a week now so Dr. Milka Clarke wanted him sent to ER. In ER, his labs were significant for potassium 5.6, bicarbonate 21, BUN 71 mg/dL, and creatinine 8.6 mg/dL. He was also found to be hypertensive in ER with a BP of 211/95. He is noncompliant with his medications. He received his home BP meds in ER\"    Proceeded with nephrology consultation and HD treatment. Amlodipine was changed to 5 mg QHS   He had 2 HD sessions. He felt well and offered no further complaints. BP was much improved at time of discharge without other changes in management.   Hyperkalemia resolved with HD    Arrangements for daily HD at his nursing facility    Prior to DC a screening COVID test was positive. He exhibited no respiratory symptoms, was afebrile and was stable on room air oxygen. Discharge Exam:    General Appearance: alert and oriented to person, place and time and in no acute distress  Skin: warm and dry  Head: normocephalic and atraumatic  Eyes: pupils equal, round, and reactive to light, extraocular eye movements intact, conjunctivae normal  Neck: neck supple and non tender without mass   Pulmonary/Chest: clear to auscultation bilaterally- no wheezes, rales or rhonchi, normal air movement, no respiratory distress  Cardiovascular: normal rate, normal S1 and S2 and no carotid bruits  Abdomen: soft, non-tender, non-distended, normal bowel sounds, no masses or organomegaly  Extremities: no cyanosis, no clubbing and no edema  Neurologic: no cranial nerve deficit and speech normal    I/O last 3 completed shifts: In: 56 [P.O.:490]  Out: -   I/O this shift: In: 5 [P.O.:120]  Out: 1300       LABS:  Recent Labs     09/13/22 0659 09/14/22  0309 09/15/22  0227    138 140   K 4.5 4.2 4.4    101 101   CO2 25 27 25   BUN 40* 30* 53*   CREATININE 6.1* 4.4* 6.3*   GLUCOSE 72* 103* 100*   CALCIUM 8.9 8.8 8.8       Recent Labs     09/13/22  0659 09/14/22  0309 09/15/22  0227   WBC 2.6* 3.3* 4.0*   RBC 3.80 3.65* 3.41*   HGB 11.1* 10.9* 10.1*   HCT 32.7* 32.0* 30.6*   MCV 86.1 87.7 89.7   MCH 29.2 29.9 29.6   MCHC 33.9 34.1 33.0   RDW 14.1 13.9 14.2   * 108* 103*   MPV 11.3 11.5 12.0       No results for input(s): POCGLU in the last 72 hours. Imaging:  No results found.     Patient Instructions:      Medication List        CHANGE how you take these medications      amLODIPine 5 MG tablet  Commonly known as: NORVASC  Take 1 tablet by mouth nightly  What changed: when to take this            CONTINUE taking these medications      atorvastatin 40 MG tablet  Commonly known as: LIPITOR     carvedilol 25 MG tablet  Commonly known as: COREG     heparin (porcine) 75224 UNIT/ML injection  Inject 0.5 mLs into the skin in the morning and 0.5 mLs at noon and 0.5 mLs before bedtime.      isosorbide dinitrate 20 MG tablet  Commonly known as: ISORDIL  Take 1 tablet by mouth 3 times daily     levothyroxine 100 MCG tablet  Commonly known as: SYNTHROID  Take 1 tablet by mouth Daily     mirtazapine 15 MG tablet  Commonly known as: REMERON     sacubitril-valsartan  MG per tablet  Commonly known as: ENTRESTO  Take 1 tablet by mouth 2 times daily     sertraline 50 MG tablet  Commonly known as: ZOLOFT  Take 1 tablet by mouth daily     vitamin D 1.25 MG (70610 UT) Caps capsule  Commonly known as: ERGOCALCIFEROL            STOP taking these medications      aspirin 81 MG EC tablet     bumetanide 2 MG tablet  Commonly known as: BUMEX               Where to Get Your Medications        Information about where to get these medications is not yet available    Ask your nurse or doctor about these medications  amLODIPine 5 MG tablet           Note that more than 30 minutes was spent in preparing discharge papers, discussing discharge with patient, medication review, etc.    Signed:  Electronically signed by KIANNA Stevenson CNP on 9/15/2022 at 2:17 PM

## 2022-09-15 NOTE — CARE COORDINATION
Hgb 10; poss. D/c back to [facility] today; HD now can be done on site at [facility]. Transport forms/ibeth on chart. Will need covid day of d/c. John Marc. D/c order noted; to transport via [address] today. Facility/ pt aware. Carolina concepcion RN,CM. Pt  tested + covid; kimberlyn at [facility] aware; still can accept pt.; ivet at [facility] transport made aware. Transport still on for 2pm. Carolina Concepcion RN,CM.

## 2022-09-15 NOTE — CARE COORDINATION
Hgb 10; poss. D/c back to [facility] today; HD now can be done on site at [facility]. Transport forms/ibeth on chart. Will need covid day of d/c. Marie Sandoval. D/c order noted; to transport via [address] today. Facility/ pt aware. Carolina concepcion RN,CM. Pt  tested + covid; kimberlyn at [facility] aware; still can accept pt.; ivet at [facility] transport made aware. Transport still on for 2pm. Carolina Concepcion RN,CM. Per ivet at [facility]; transport will be at 4pm instead of 2pm. Staff/facility/ pt aware. Marie Sandoval.

## 2022-09-15 NOTE — PROGRESS NOTES
Department of Internal Medicine  Nephrology Progress Note    Events reviewed. Seen on HD. SUBJECTIVE:  We are following Mr. Apurva Dennison for ESRD on HD. He reports no complaints.     PHYSICAL EXAM:      Vitals:    VITALS:  /79   Pulse 58   Temp 98.5 °F (36.9 °C)   Resp 16   Ht 6' 2\" (1.88 m)   Wt 129 lb 10.1 oz (58.8 kg)   SpO2 98%   BMI 16.64 kg/m²   24HR INTAKE/OUTPUT:    Intake/Output Summary (Last 24 hours) at 9/15/2022 2906  Last data filed at 9/14/2022 2015  Gross per 24 hour   Intake 490 ml   Output --   Net 490 ml     Access: RIJ tunneled HD catheter  Constitutional:  Alert and oriented, ill-appearing  HEENT:  Normocephalic, PERRL  Respiratory:  CTA bilaterally  Cardiovascular/Edema:  RRR, S1,S2  Gastrointestinal:  Soft, flat, nontender, nondistended  Neurologic:  Nonfocal, YARBROUGH  Skin:  Warm, dry, intact  Other:  No edema     Scheduled Meds:   amLODIPine  5 mg Oral Nightly    sodium zirconium cyclosilicate  10 g Oral Once    atorvastatin  40 mg Oral Daily    isosorbide dinitrate  20 mg Oral TID    levothyroxine  100 mcg Oral Daily    mirtazapine  7.5 mg Oral Nightly    sacubitril-valsartan  1 tablet Oral BID    sertraline  50 mg Oral Daily    [START ON 9/16/2022] vitamin D  50,000 Units Oral Weekly    sodium chloride flush  5-40 mL IntraVENous 2 times per day    heparin (porcine)  5,000 Units SubCUTAneous 3 times per day    carvedilol  25 mg Oral BID WC     Continuous Infusions:   sodium chloride       PRN Meds:.sodium chloride flush, sodium chloride, ondansetron **OR** ondansetron, polyethylene glycol, acetaminophen **OR** acetaminophen    DATA:    CBC:   Lab Results   Component Value Date/Time    WBC 4.0 09/15/2022 02:27 AM    RBC 3.41 09/15/2022 02:27 AM    HGB 10.1 09/15/2022 02:27 AM    HCT 30.6 09/15/2022 02:27 AM    MCV 89.7 09/15/2022 02:27 AM    MCH 29.6 09/15/2022 02:27 AM    MCHC 33.0 09/15/2022 02:27 AM    RDW 14.2 09/15/2022 02:27 AM     09/15/2022 02:27 AM    MPV 12.0 09/15/2022 02:27 AM     CMP:    Lab Results   Component Value Date/Time     09/15/2022 02:27 AM    K 4.4 09/15/2022 02:27 AM    K 4.5 09/13/2022 06:59 AM     09/15/2022 02:27 AM    CO2 25 09/15/2022 02:27 AM    BUN 53 09/15/2022 02:27 AM    CREATININE 6.3 09/15/2022 02:27 AM    GFRAA 11 09/15/2022 02:27 AM    LABGLOM 9 09/15/2022 02:27 AM    GLUCOSE 100 09/15/2022 02:27 AM    PROT 7.7 09/12/2022 03:36 PM    LABALBU 4.1 09/12/2022 03:36 PM    CALCIUM 8.8 09/15/2022 02:27 AM    BILITOT 0.5 09/12/2022 03:36 PM    ALKPHOS 133 09/12/2022 03:36 PM    AST 26 09/12/2022 03:36 PM    ALT 35 09/12/2022 03:36 PM     Magnesium:    Lab Results   Component Value Date/Time    MG 1.7 09/12/2022 03:36 PM     Phosphorus:    Lab Results   Component Value Date/Time    PHOS 4.5 09/13/2022 06:59 AM     Radiology Review:  None    BRIEF SUMMARY OF INITIAL CONSULT:    Briefly, Mr. Stephany Maloney is a 67year old male with a PMH of ESRD on IHD three times weekly T-Th-S, via RIJ, HTN, type II DM, HFrEF 35-40% with stage I DD, hypothyroidism, hepatitis C, who was recently admitted on August 10, 2022 after presenting to the ER with weakness. He was ultimately discharged to [facility] for rehab and was to be dialyzed at [facility], however, he has not had dialysis for a week now so Dr. Mike Strickland wanted him sent to ER. In ER, his labs were significant for potassium 5.6, bicarbonate 21, BUN 71 mg/dL, and creatinine 8.6 mg/dL. He was also found to be hypertensive in ER with a BP of 211/95. He is noncompliant with his medications. He received his home BP meds in ER. We are consulted for dialysis management. He was dialyzed last night, however, he did not stay on for his full treatment. Problems resolved:  Hyperkalemia, secondary to noncompliance with dialysis. Resolved with dialysis. IMPRESSION/RECOMMENDATIONS:      ESRD on IHD three times weekly T-Th-S, via RIJ. He had not had dialysis in a week.  He was just accepted to [facility] and will be dialyzed Monday-Friday. To continue while in the hospital. Seen on dialysis, tolerating well.      HTN, noncompliant with medications, on amlodipine and carvedilol   HFrEF 35-40% with stage I DD, on carvedilol and Entresto   MBD of CKD, not on binders, phosphorus 4.5  Anemia of CKD, hold NIXON for hgb >11    Plan:    HD three times weekly T-Th-S  Continue amlodipine 5 mg PO nightly  Continue to monitor BP  Continue to monitor labs  Continue to monitor potassium level   Discharge planning      Electronically signed by KIANNA Rodriguez CNP on 9/15/2022 at 9:52 AM

## 2023-01-03 RX ORDER — SACUBITRIL AND VALSARTAN 97; 103 MG/1; MG/1
TABLET, FILM COATED ORAL
Qty: 60 TABLET | Refills: 3 | OUTPATIENT
Start: 2023-01-03

## 2023-02-13 ENCOUNTER — HOSPITAL ENCOUNTER (EMERGENCY)
Age: 73
Discharge: HOME | End: 2023-02-13
Payer: MEDICARE

## 2023-02-13 VITALS
BODY MASS INDEX: 17.97 KG/M2 | HEART RATE: 71 BPM | TEMPERATURE: 98 F | DIASTOLIC BLOOD PRESSURE: 92 MMHG | SYSTOLIC BLOOD PRESSURE: 179 MMHG | RESPIRATION RATE: 15 BRPM | HEIGHT: 74 IN | OXYGEN SATURATION: 100 % | WEIGHT: 140 LBS

## 2023-02-13 DIAGNOSIS — Z00.00 GENERAL MEDICAL EXAMINATION: Primary | ICD-10-CM

## 2023-02-13 LAB
ANION GAP SERPL CALCULATED.3IONS-SCNC: 14 MMOL/L (ref 7–16)
ANISOCYTOSIS: ABNORMAL
BASOPHILS ABSOLUTE: 0.03 E9/L (ref 0–0.2)
BASOPHILS RELATIVE PERCENT: 0.5 % (ref 0–2)
BUN BLDV-MCNC: 27 MG/DL (ref 6–23)
CALCIUM SERPL-MCNC: 8.2 MG/DL (ref 8.6–10.2)
CHLORIDE BLD-SCNC: 95 MMOL/L (ref 98–107)
CO2: 26 MMOL/L (ref 22–29)
CREAT SERPL-MCNC: 6 MG/DL (ref 0.7–1.2)
EOSINOPHILS ABSOLUTE: 0.01 E9/L (ref 0.05–0.5)
EOSINOPHILS RELATIVE PERCENT: 0.2 % (ref 0–6)
GFR SERPL CREATININE-BSD FRML MDRD: 9 ML/MIN/1.73
GLUCOSE BLD-MCNC: 139 MG/DL (ref 74–99)
HCT VFR BLD CALC: 41.6 % (ref 37–54)
HEMOGLOBIN: 13.5 G/DL (ref 12.5–16.5)
IMMATURE GRANULOCYTES #: 0.02 E9/L
IMMATURE GRANULOCYTES %: 0.3 % (ref 0–5)
LYMPHOCYTES ABSOLUTE: 0.44 E9/L (ref 1.5–4)
LYMPHOCYTES RELATIVE PERCENT: 6.8 % (ref 20–42)
MCH RBC QN AUTO: 31.6 PG (ref 26–35)
MCHC RBC AUTO-ENTMCNC: 32.5 % (ref 32–34.5)
MCV RBC AUTO: 97.4 FL (ref 80–99.9)
MONOCYTES ABSOLUTE: 0.43 E9/L (ref 0.1–0.95)
MONOCYTES RELATIVE PERCENT: 6.6 % (ref 2–12)
NEUTROPHILS ABSOLUTE: 5.54 E9/L (ref 1.8–7.3)
NEUTROPHILS RELATIVE PERCENT: 85.6 % (ref 43–80)
PDW BLD-RTO: 15.7 FL (ref 11.5–15)
PLATELET # BLD: 140 E9/L (ref 130–450)
PMV BLD AUTO: 10.4 FL (ref 7–12)
POLYCHROMASIA: ABNORMAL
POTASSIUM SERPL-SCNC: 3.8 MMOL/L (ref 3.5–5)
RBC # BLD: 4.27 E12/L (ref 3.8–5.8)
SODIUM BLD-SCNC: 135 MMOL/L (ref 132–146)
WBC # BLD: 6.5 E9/L (ref 4.5–11.5)

## 2023-02-13 PROCEDURE — 85025 COMPLETE CBC W/AUTO DIFF WBC: CPT

## 2023-02-13 PROCEDURE — 80048 BASIC METABOLIC PNL TOTAL CA: CPT

## 2023-02-13 PROCEDURE — 99283 EMERGENCY DEPT VISIT LOW MDM: CPT

## 2023-02-13 ASSESSMENT — PAIN - FUNCTIONAL ASSESSMENT: PAIN_FUNCTIONAL_ASSESSMENT: NONE - DENIES PAIN

## 2023-02-14 NOTE — ED PROVIDER NOTES
Independent JOHN Visit. [facility]  ED  Encounter Note  Admit Date/RoomTime: 2023  6:21 PM  ED Room:   NAME: Jessi Anguiano  : 1950  MRN: [id number]  PCP: Jayde Salinas MD    CHIEF COMPLAINT     Other (Was pulled over for eratic driving by [employer], pt stated that he made a wrong turn after dialysis and got lost on his way home. Denies any complaints)    HISTORY OF PRESENT ILLNESS        Jessi Anguiano is a 67 y.o. male who presents to the ED by Ambulance after he got pulled over by a [facility] police for erratic driving. Officer told him he was driving too slow. Patient reports he was on his way home from dialysis and he made a wrong turn and was driving slow because he was looking for a road that would take him back to his correct route. He does not feel that he was driving erratically. No alcohol. Pt reports he always feels drained after dialysis and today in nothing abnormal.  Pt denies dizziness, headache, confusion, chest pain, abdominal pain, shortness of breath, fevers. REVIEW OF SYSTEMS     Pertinent positives and negatives are stated within HPI, all other systems reviewed and are negative. Past Medical History:  has a past medical history of Anemia, Chronic kidney disease, Depression, Diabetes mellitus ([address] ESRD (end stage renal disease) ([address] Hepatitis C, Hypertension, Liver disease, Moderate protein-calorie malnutrition ([address] Muscle weakness (generalized), Thyroid disease, Unspecified diseases of blood and blood-forming organs, and Unsteadiness on feet. Surgical History:  has a past surgical history that includes Tonsillectomy and vascular surgery (N/A, 2021). Social History:  reports that he quit smoking about 49 years ago. His smoking use included cigarettes. He has a 20.00 pack-year smoking history.  He has never used smokeless tobacco. He reports that he does not currently use drugs. He reports that he does not drink alcohol. Family History: family history includes Cancer in his father; Diabetes in his mother. Allergies: Penicillins  CURRENT MEDICATIONS       Discharge Medication List as of 2/13/2023  9:09 PM        CONTINUE these medications which have NOT CHANGED    Details   amLODIPine (NORVASC) 5 MG tablet Take 1 tablet by mouth nightly, Disp-30 tablet, R-3DC to SNF      atorvastatin (LIPITOR) 40 MG tablet Take 40 mg by mouth dailyHistorical Med      carvedilol (COREG) 25 MG tablet Take 25 mg by mouth 2 times daily (with meals)Historical Med      mirtazapine (REMERON) 15 MG tablet Take 7.5 mg by mouth nightlyHistorical Med      isosorbide dinitrate (ISORDIL) 20 MG tablet Take 1 tablet by mouth 3 times daily, Disp-90 tablet, R-3DC to SNF      Heparin Sodium, Porcine, (HEPARIN, PORCINE,) 89907 UNIT/ML injection Inject 0.5 mLs into the skin in the morning and 0.5 mLs at noon and 0.5 mLs before bedtime. DC to SNF      sertraline (ZOLOFT) 50 MG tablet Take 1 tablet by mouth daily, Disp-30 tablet, R-1Normal      levothyroxine (SYNTHROID) 100 MCG tablet Take 1 tablet by mouth Daily, Disp-30 tablet, R-3Normal      sacubitril-valsartan (ENTRESTO)  MG per tablet Take 1 tablet by mouth 2 times daily, Disp-60 tablet, R-3Normal      vitamin D (ERGOCALCIFEROL) 1.25 MG (24321 UT) CAPS capsule Take 50,000 Units by mouth once a week **Fridays**Historical Med             SCREENINGS     Nikkie Coma Scale  Eye Opening: Spontaneous  Best Verbal Response: Oriented  Best Motor Response: Obeys commands  Lueders Coma Scale Score: 15         CIWA Assessment  BP: (!) 179/92  Heart Rate: 71       PHYSICAL EXAM   Oxygen Saturation Interpretation: Normal on room air analysis.         ED Triage Vitals [02/13/23 1844]   BP Temp Temp Source Heart Rate Resp SpO2 Height Weight   (!) 188/99 98 °F (36.7 °C) Oral 78 16 97 % 6' 2\" (1.88 m) 140 lb (63.5 kg)         Physical Exam  Constitutional/General: Alert and oriented x3, well appearing, non toxic  HEENT:  NC/NT. PERRLA,  Airway patent. Oral mucosa moist.  Neck: Supple, full ROM, non tender to palpation in the midline, no stridor, no crepitus, no meningeal signs  Respiratory: Lungs clear to auscultation bilaterally, no wheezes, rales, or rhonchi. Not in respiratory distress  CV:  Regular rate. Regular rhythm. No murmurs, gallops, or rubs. 2+ distal pulses  GI:  Abdomen Soft, Non tender, Non distended. +BS. No rebound, guarding, or rigidity. No pulsatile masses. Musculoskeletal: Moves all extremities x 4. Warm and well perfused, no clubbing, cyanosis, or edema. Capillary refill <3 seconds  Integument: skin warm and dry. No rashes. Lymphatic: no lymphadenopathy noted  Neurologic: GCS 15, no focal deficits, symmetric strength 5/5 in the upper and lower extremities bilaterally. Cranial nerves II through XII grossly intact, patient answers all questions appropriately. Alert and oriented x3.   Psychiatric: Normal Affect    DIAGNOSTIC RESULTS   (All laboratory and radiology results have been personally reviewed by myself)  Labs:  Results for orders placed or performed during the hospital encounter of 02/13/23   CBC with Auto Differential   Result Value Ref Range    WBC 6.5 4.5 - 11.5 E9/L    RBC 4.27 3.80 - 5.80 E12/L    Hemoglobin 13.5 12.5 - 16.5 g/dL    Hematocrit 41.6 37.0 - 54.0 %    MCV 97.4 80.0 - 99.9 fL    MCH 31.6 26.0 - 35.0 pg    MCHC 32.5 32.0 - 34.5 %    RDW 15.7 (H) 11.5 - 15.0 fL    Platelets 338 431 - 314 E9/L    MPV 10.4 7.0 - 12.0 fL    Neutrophils % 85.6 (H) 43.0 - 80.0 %    Immature Granulocytes % 0.3 0.0 - 5.0 %    Lymphocytes % 6.8 (L) 20.0 - 42.0 %    Monocytes % 6.6 2.0 - 12.0 %    Eosinophils % 0.2 0.0 - 6.0 %    Basophils % 0.5 0.0 - 2.0 %    Neutrophils Absolute 5.54 1.80 - 7.30 E9/L    Immature Granulocytes # 0.02 E9/L    Lymphocytes Absolute 0.44 (L) 1.50 - 4.00 E9/L    Monocytes Absolute 0.43 0.10 - 0.95 E9/L    Eosinophils Absolute 0.01 (L) 0.05 - 0.50 E9/L    Basophils Absolute 0.03 0.00 - 0.20 E9/L    Anisocytosis 1+     Polychromasia 1+    Basic Metabolic Panel   Result Value Ref Range    Sodium 135 132 - 146 mmol/L    Potassium 3.8 3.5 - 5.0 mmol/L    Chloride 95 (L) 98 - 107 mmol/L    CO2 26 22 - 29 mmol/L    Anion Gap 14 7 - 16 mmol/L    Glucose 139 (H) 74 - 99 mg/dL    BUN 27 (H) 6 - 23 mg/dL    Creatinine 6.0 (H) 0.7 - 1.2 mg/dL    Est, Glom Filt Rate 9 >=60 mL/min/1.73    Calcium 8.2 (L) 8.6 - 10.2 mg/dL     Imaging: All Radiology results interpreted by Radiologist unless otherwise noted. No orders to display       ED COURSE   Vitals:    Vitals:    02/13/23 1844 02/13/23 1918   BP: (!) 188/99 (!) 179/92   Pulse: 78 71   Resp: 16 15   Temp: 98 °F (36.7 °C)    TempSrc: Oral    SpO2: 97% 100%   Weight: 140 lb (63.5 kg)    Height: 6' 2\" (1.88 m)        Patient was given the following medications:  Medications - No data to display       PROCEDURES       REASSESSMENT   2/14/23       Patients condition remains stable. Patient ambulated without assistance in department, oxygen saturation stayed at 96 and above. No signs of confusion, disorientation, weakness. CONSULTS:  None  DIFFERENTIAL DX: Chronic illness, anemia, electrolyte abnormality   MDM:   Social Determinants : Pt lives alone, no family in area, no friends in area. Pt car impounded by police. Has money available for taxi or uber tomorrow. Records Reviewed : Outpatient Notes - chronic conditions, had rehab stay at [facility] last fall. CC/HPI Summary, DDx, ED Course, and Reassessment: Patient presents with Other (Was pulled over for eratic driving by [employer], pt stated that he made a wrong turn after dialysis and got lost on his way home. Denies any complaints)  Patient is well-appearing in department with normal vital signs, ambulating without assistance.   Patient reports he does not feel unwell other than his usual fatigue after dialysis. Patient reports he has money available for taxi to dialysis tomorrow. Taxi voucher will be provided for him today for home. Patient received a lunch box in department. Blood work showing no anemia, electrolytes are normal.  Patient stable for discharge home. Patient advised to consult with his primary care doctor if he has needs for  support. Plan of Care/Counseling:  Claire Camarena PA-C reviewed today's visit with the patient in addition to providing specific details for the plan of care and counseling regarding the diagnosis and prognosis. Questions are answered at this time and are agreeable with the plan. ASSESSMENT     1. General medical examination        DISPOSITION   Discharged home. Patient condition is stable    NEW MEDICATIONS     Discharge Medication List as of 2/13/2023  9:09 PM        Electronically signed by Claire Camarena PA-C   DD: 2/14/23  **This report was transcribed using voice recognition software. Every effort was made to ensure accuracy; however, inadvertent computerized transcription errors may be present.   END OF ED PROVIDER NOTE       Claire Camarena PA-C  02/14/23 0155

## 2023-02-14 NOTE — ED NOTES
Patient ambulated in the hallway with standby, SaO2 remained 96% RA, HR: [address] [facility], RN  02/13/23 5100 [facility], RN  02/13/23 4973

## 2023-02-22 ENCOUNTER — APPOINTMENT (OUTPATIENT)
Dept: CT IMAGING | Age: 73
End: 2023-02-22
Payer: MEDICARE

## 2023-02-22 ENCOUNTER — HOSPITAL ENCOUNTER (INPATIENT)
Age: 73
LOS: 8 days | Discharge: NURSING, REHAB OR LONG-TERM CARE | End: 2023-03-02
Attending: EMERGENCY MEDICINE | Admitting: INTERNAL MEDICINE
Payer: MEDICARE

## 2023-02-22 ENCOUNTER — APPOINTMENT (OUTPATIENT)
Dept: GENERAL RADIOLOGY | Age: 73
End: 2023-02-22
Payer: MEDICARE

## 2023-02-22 DIAGNOSIS — N18.6 END-STAGE RENAL DISEASE NEEDING DIALYSIS (HCC): Primary | ICD-10-CM

## 2023-02-22 DIAGNOSIS — R78.81 BACTEREMIA: ICD-10-CM

## 2023-02-22 DIAGNOSIS — R19.7 DIARRHEA, UNSPECIFIED TYPE: ICD-10-CM

## 2023-02-22 DIAGNOSIS — R11.0 NAUSEA: ICD-10-CM

## 2023-02-22 DIAGNOSIS — Z99.2 END-STAGE RENAL DISEASE NEEDING DIALYSIS (HCC): Primary | ICD-10-CM

## 2023-02-22 LAB
ALBUMIN SERPL-MCNC: 4 G/DL (ref 3.5–5.2)
ALP BLD-CCNC: 73 U/L (ref 40–129)
ALT SERPL-CCNC: 20 U/L (ref 0–40)
AMPHETAMINE SCREEN, URINE: NOT DETECTED
ANION GAP SERPL CALCULATED.3IONS-SCNC: 20 MMOL/L (ref 7–16)
AST SERPL-CCNC: 51 U/L (ref 0–39)
BACTERIA: NORMAL /HPF
BARBITURATE SCREEN URINE: NOT DETECTED
BASOPHILS ABSOLUTE: 0.02 E9/L (ref 0–0.2)
BASOPHILS RELATIVE PERCENT: 0.7 % (ref 0–2)
BENZODIAZEPINE SCREEN, URINE: NOT DETECTED
BILIRUB SERPL-MCNC: 1.3 MG/DL (ref 0–1.2)
BILIRUBIN DIRECT: 0.2 MG/DL (ref 0–0.3)
BILIRUBIN URINE: NEGATIVE
BILIRUBIN, INDIRECT: 1.1 MG/DL (ref 0–1)
BLOOD, URINE: ABNORMAL
BUN BLDV-MCNC: 68 MG/DL (ref 6–23)
CALCIUM SERPL-MCNC: 7.9 MG/DL (ref 8.6–10.2)
CANNABINOID SCREEN URINE: NOT DETECTED
CHLORIDE BLD-SCNC: 99 MMOL/L (ref 98–107)
CLARITY: CLEAR
CO2: 18 MMOL/L (ref 22–29)
COCAINE METABOLITE SCREEN URINE: NOT DETECTED
COLOR: YELLOW
CREAT SERPL-MCNC: 11.7 MG/DL (ref 0.7–1.2)
EOSINOPHILS ABSOLUTE: 0.02 E9/L (ref 0.05–0.5)
EOSINOPHILS RELATIVE PERCENT: 0.7 % (ref 0–6)
FENTANYL SCREEN, URINE: NOT DETECTED
GFR SERPL CREATININE-BSD FRML MDRD: 4 ML/MIN/1.73
GLUCOSE BLD-MCNC: 133 MG/DL (ref 74–99)
GLUCOSE URINE: 100 MG/DL
HCT VFR BLD CALC: 44.2 % (ref 37–54)
HEMOGLOBIN: 14.3 G/DL (ref 12.5–16.5)
IMMATURE GRANULOCYTES #: 0 E9/L
IMMATURE GRANULOCYTES %: 0 % (ref 0–5)
INFLUENZA A BY PCR: NOT DETECTED
INFLUENZA B BY PCR: NOT DETECTED
KETONES, URINE: NEGATIVE MG/DL
LACTIC ACID: 3 MMOL/L (ref 0.5–2.2)
LEUKOCYTE ESTERASE, URINE: NEGATIVE
LIPASE: 54 U/L (ref 13–60)
LYMPHOCYTES ABSOLUTE: 0.48 E9/L (ref 1.5–4)
LYMPHOCYTES RELATIVE PERCENT: 17.8 % (ref 20–42)
Lab: NORMAL
MAGNESIUM: 2.4 MG/DL (ref 1.6–2.6)
MCH RBC QN AUTO: 30.3 PG (ref 26–35)
MCHC RBC AUTO-ENTMCNC: 32.4 % (ref 32–34.5)
MCV RBC AUTO: 93.6 FL (ref 80–99.9)
METER GLUCOSE: 87 MG/DL (ref 74–99)
METHADONE SCREEN, URINE: NOT DETECTED
MONOCYTES ABSOLUTE: 0.32 E9/L (ref 0.1–0.95)
MONOCYTES RELATIVE PERCENT: 11.9 % (ref 2–12)
NEUTROPHILS ABSOLUTE: 1.86 E9/L (ref 1.8–7.3)
NEUTROPHILS RELATIVE PERCENT: 68.9 % (ref 43–80)
NITRITE, URINE: NEGATIVE
OPIATE SCREEN URINE: NOT DETECTED
OXYCODONE URINE: NOT DETECTED
PDW BLD-RTO: 14.4 FL (ref 11.5–15)
PH UA: 7.5 (ref 5–9)
PHENCYCLIDINE SCREEN URINE: NOT DETECTED
PLATELET # BLD: 92 E9/L (ref 130–450)
PLATELET CONFIRMATION: NORMAL
PMV BLD AUTO: 11.1 FL (ref 7–12)
POTASSIUM SERPL-SCNC: 4.6 MMOL/L (ref 3.5–5)
POTASSIUM SERPL-SCNC: 5.9 MMOL/L (ref 3.5–5)
PRO-BNP: ABNORMAL PG/ML (ref 0–125)
PROTEIN UA: >=300 MG/DL
RBC # BLD: 4.72 E12/L (ref 3.8–5.8)
RBC # BLD: NORMAL 10*6/UL
RBC UA: NORMAL /HPF (ref 0–2)
REASON FOR REJECTION: NORMAL
REJECTED TEST: NORMAL
SARS-COV-2, NAAT: NOT DETECTED
SODIUM BLD-SCNC: 137 MMOL/L (ref 132–146)
SPECIFIC GRAVITY UA: 1.01 (ref 1–1.03)
TOTAL PROTEIN: 8.1 G/DL (ref 6.4–8.3)
TROPONIN, HIGH SENSITIVITY: 1693 NG/L (ref 0–11)
UROBILINOGEN, URINE: 0.2 E.U./DL
WBC # BLD: 2.7 E9/L (ref 4.5–11.5)
WBC UA: NORMAL /HPF (ref 0–5)

## 2023-02-22 PROCEDURE — 96365 THER/PROPH/DIAG IV INF INIT: CPT

## 2023-02-22 PROCEDURE — 2580000003 HC RX 258: Performed by: INTERNAL MEDICINE

## 2023-02-22 PROCEDURE — 6360000002 HC RX W HCPCS: Performed by: NURSE PRACTITIONER

## 2023-02-22 PROCEDURE — 6360000002 HC RX W HCPCS: Performed by: INTERNAL MEDICINE

## 2023-02-22 PROCEDURE — 74176 CT ABD & PELVIS W/O CONTRAST: CPT

## 2023-02-22 PROCEDURE — 80307 DRUG TEST PRSMV CHEM ANLYZR: CPT

## 2023-02-22 PROCEDURE — 83735 ASSAY OF MAGNESIUM: CPT

## 2023-02-22 PROCEDURE — 80076 HEPATIC FUNCTION PANEL: CPT

## 2023-02-22 PROCEDURE — 81001 URINALYSIS AUTO W/SCOPE: CPT

## 2023-02-22 PROCEDURE — 99285 EMERGENCY DEPT VISIT HI MDM: CPT

## 2023-02-22 PROCEDURE — 83690 ASSAY OF LIPASE: CPT

## 2023-02-22 PROCEDURE — 0202U NFCT DS 22 TRGT SARS-COV-2: CPT

## 2023-02-22 PROCEDURE — 71046 X-RAY EXAM CHEST 2 VIEWS: CPT

## 2023-02-22 PROCEDURE — 87635 SARS-COV-2 COVID-19 AMP PRB: CPT

## 2023-02-22 PROCEDURE — 96361 HYDRATE IV INFUSION ADD-ON: CPT

## 2023-02-22 PROCEDURE — 83880 ASSAY OF NATRIURETIC PEPTIDE: CPT

## 2023-02-22 PROCEDURE — 82962 GLUCOSE BLOOD TEST: CPT

## 2023-02-22 PROCEDURE — 84484 ASSAY OF TROPONIN QUANT: CPT

## 2023-02-22 PROCEDURE — 2580000003 HC RX 258: Performed by: NURSE PRACTITIONER

## 2023-02-22 PROCEDURE — 80048 BASIC METABOLIC PNL TOTAL CA: CPT

## 2023-02-22 PROCEDURE — 85025 COMPLETE CBC W/AUTO DIFF WBC: CPT

## 2023-02-22 PROCEDURE — 84132 ASSAY OF SERUM POTASSIUM: CPT

## 2023-02-22 PROCEDURE — 87502 INFLUENZA DNA AMP PROBE: CPT

## 2023-02-22 PROCEDURE — 83605 ASSAY OF LACTIC ACID: CPT

## 2023-02-22 PROCEDURE — 96366 THER/PROPH/DIAG IV INF ADDON: CPT

## 2023-02-22 PROCEDURE — 93005 ELECTROCARDIOGRAM TRACING: CPT | Performed by: NURSE PRACTITIONER

## 2023-02-22 PROCEDURE — 1200000000 HC SEMI PRIVATE

## 2023-02-22 PROCEDURE — 6370000000 HC RX 637 (ALT 250 FOR IP): Performed by: INTERNAL MEDICINE

## 2023-02-22 RX ORDER — DEXTROSE MONOHYDRATE 100 MG/ML
INJECTION, SOLUTION INTRAVENOUS CONTINUOUS PRN
Status: DISCONTINUED | OUTPATIENT
Start: 2023-02-22 | End: 2023-03-02 | Stop reason: HOSPADM

## 2023-02-22 RX ORDER — ISOSORBIDE DINITRATE 10 MG/1
20 TABLET ORAL 3 TIMES DAILY
Status: DISCONTINUED | OUTPATIENT
Start: 2023-02-22 | End: 2023-03-02 | Stop reason: HOSPADM

## 2023-02-22 RX ORDER — CARVEDILOL 25 MG/1
25 TABLET ORAL 2 TIMES DAILY WITH MEALS
Status: DISCONTINUED | OUTPATIENT
Start: 2023-02-23 | End: 2023-03-02 | Stop reason: HOSPADM

## 2023-02-22 RX ORDER — HEPARIN SODIUM 10000 [USP'U]/ML
5000 INJECTION, SOLUTION INTRAVENOUS; SUBCUTANEOUS EVERY 8 HOURS SCHEDULED
Status: DISCONTINUED | OUTPATIENT
Start: 2023-02-22 | End: 2023-03-02 | Stop reason: HOSPADM

## 2023-02-22 RX ORDER — ONDANSETRON 2 MG/ML
4 INJECTION INTRAMUSCULAR; INTRAVENOUS EVERY 6 HOURS PRN
Status: DISCONTINUED | OUTPATIENT
Start: 2023-02-22 | End: 2023-03-02 | Stop reason: HOSPADM

## 2023-02-22 RX ORDER — ACETAMINOPHEN 650 MG/1
650 SUPPOSITORY RECTAL EVERY 6 HOURS PRN
Status: DISCONTINUED | OUTPATIENT
Start: 2023-02-22 | End: 2023-03-02 | Stop reason: HOSPADM

## 2023-02-22 RX ORDER — ASPIRIN 81 MG/1
81 TABLET ORAL DAILY
Status: DISCONTINUED | OUTPATIENT
Start: 2023-02-23 | End: 2023-03-02 | Stop reason: HOSPADM

## 2023-02-22 RX ORDER — MIRTAZAPINE 15 MG/1
7.5 TABLET, FILM COATED ORAL NIGHTLY
Status: DISCONTINUED | OUTPATIENT
Start: 2023-02-22 | End: 2023-03-02 | Stop reason: HOSPADM

## 2023-02-22 RX ORDER — ATORVASTATIN CALCIUM 40 MG/1
40 TABLET, FILM COATED ORAL DAILY
Status: DISCONTINUED | OUTPATIENT
Start: 2023-02-23 | End: 2023-03-02 | Stop reason: HOSPADM

## 2023-02-22 RX ORDER — SODIUM CHLORIDE 0.9 % (FLUSH) 0.9 %
5-40 SYRINGE (ML) INJECTION EVERY 12 HOURS SCHEDULED
Status: DISCONTINUED | OUTPATIENT
Start: 2023-02-22 | End: 2023-03-02 | Stop reason: HOSPADM

## 2023-02-22 RX ORDER — ACETAMINOPHEN 325 MG/1
650 TABLET ORAL EVERY 6 HOURS PRN
Status: DISCONTINUED | OUTPATIENT
Start: 2023-02-22 | End: 2023-03-02 | Stop reason: HOSPADM

## 2023-02-22 RX ORDER — POLYETHYLENE GLYCOL 3350 17 G/17G
17 POWDER, FOR SOLUTION ORAL DAILY PRN
Status: DISCONTINUED | OUTPATIENT
Start: 2023-02-22 | End: 2023-03-02 | Stop reason: HOSPADM

## 2023-02-22 RX ORDER — ONDANSETRON 4 MG/1
4 TABLET, ORALLY DISINTEGRATING ORAL EVERY 8 HOURS PRN
Status: DISCONTINUED | OUTPATIENT
Start: 2023-02-22 | End: 2023-03-02 | Stop reason: HOSPADM

## 2023-02-22 RX ORDER — LABETALOL HYDROCHLORIDE 5 MG/ML
10 INJECTION, SOLUTION INTRAVENOUS EVERY 6 HOURS PRN
Status: DISCONTINUED | OUTPATIENT
Start: 2023-02-22 | End: 2023-03-02 | Stop reason: HOSPADM

## 2023-02-22 RX ORDER — SODIUM CHLORIDE 0.9 % (FLUSH) 0.9 %
5-40 SYRINGE (ML) INJECTION PRN
Status: DISCONTINUED | OUTPATIENT
Start: 2023-02-22 | End: 2023-03-02 | Stop reason: HOSPADM

## 2023-02-22 RX ORDER — SODIUM CHLORIDE 9 MG/ML
INJECTION, SOLUTION INTRAVENOUS PRN
Status: DISCONTINUED | OUTPATIENT
Start: 2023-02-22 | End: 2023-03-02 | Stop reason: HOSPADM

## 2023-02-22 RX ORDER — AMLODIPINE BESYLATE 5 MG/1
5 TABLET ORAL NIGHTLY
Status: DISCONTINUED | OUTPATIENT
Start: 2023-02-22 | End: 2023-02-23

## 2023-02-22 RX ORDER — LEVOTHYROXINE SODIUM 0.1 MG/1
100 TABLET ORAL DAILY
Status: DISCONTINUED | OUTPATIENT
Start: 2023-02-23 | End: 2023-03-02 | Stop reason: HOSPADM

## 2023-02-22 RX ORDER — 0.9 % SODIUM CHLORIDE 0.9 %
500 INTRAVENOUS SOLUTION INTRAVENOUS ONCE
Status: COMPLETED | OUTPATIENT
Start: 2023-02-22 | End: 2023-02-22

## 2023-02-22 RX ADMIN — Medication 10 ML: at 22:56

## 2023-02-22 RX ADMIN — SODIUM CHLORIDE 500 ML: 9 INJECTION, SOLUTION INTRAVENOUS at 19:15

## 2023-02-22 RX ADMIN — MIRTAZAPINE 7.5 MG: 15 TABLET, FILM COATED ORAL at 22:55

## 2023-02-22 RX ADMIN — AMLODIPINE BESYLATE 5 MG: 5 TABLET ORAL at 22:55

## 2023-02-22 RX ADMIN — CALCIUM GLUCONATE 2000 MG: 98 INJECTION, SOLUTION INTRAVENOUS at 16:13

## 2023-02-22 RX ADMIN — HEPARIN SODIUM 5000 UNITS: 10000 INJECTION INTRAVENOUS; SUBCUTANEOUS at 22:57

## 2023-02-22 RX ADMIN — ISOSORBIDE DINITRATE 20 MG: 10 TABLET ORAL at 22:55

## 2023-02-22 ASSESSMENT — LIFESTYLE VARIABLES
HOW MANY STANDARD DRINKS CONTAINING ALCOHOL DO YOU HAVE ON A TYPICAL DAY: PATIENT DOES NOT DRINK
HOW OFTEN DO YOU HAVE A DRINK CONTAINING ALCOHOL: NEVER

## 2023-02-22 ASSESSMENT — PAIN - FUNCTIONAL ASSESSMENT: PAIN_FUNCTIONAL_ASSESSMENT: NONE - DENIES PAIN

## 2023-02-22 NOTE — Clinical Note
Discharge Plan[de-identified] Other/[facility])   Telemetry/Cardiac Monitoring Required?: Yes   Bed request comments: dialysis patient

## 2023-02-22 NOTE — ED PROVIDER NOTES
Shared JOHN-ED Attending Visit. CC: No           [facility]  ED  Encounter Note  Admit Date/RoomTime: 2023  2:06 PM  ED Room:   NAME: Dangelo Ho  : 1950  MRN: [id number]  PCP: Eduar Souza MD    CHIEF COMPLAINT     Fatigue (Hasn't had dialysis since Friday, nausea, dehydration) and Diarrhea    HISTORY OF PRESENT ILLNESS        Dangelo Ho is a 67 y.o. male who presents to the ED by private vehicle alone for complaints of feeling fatigued and states he has not had an appetite since Friday with nausea with no emesis and started with watery diarrhea yesterday x1 episode and an episode this morning. He reports he does have a right subclavian Tessio catheter and sees Dr. Kate Rachel and he is in the process of having home dialysis and he reports he has not had dialysis since last Friday they usually do not take a lot of fluid off because he does not retain fluids. He sometimes will go for 5 days without dialysis. He reports that he usually does it  and Friday. He denies any fever, chills, chest pain, palpitations, leg pain, leg swelling, shortness of breath, URI symptoms. The complaint has been persistent and gradually worsening and are moderate in severity. Patient reports he still does make urine. He denies any headache, blurred vision, double vision, bloody or tarry stools or any hematemesis. He denies any recent antibiotic use. REVIEW OF SYSTEMS     Pertinent positives and negatives are stated within HPI, all other systems reviewed and are negative.     Past Medical History:  has a past medical history of Anemia, Chronic kidney disease, Depression, Diabetes mellitus ([address] ESRD (end stage renal disease) ([address] Hepatitis C, Hypertension, Liver disease, Moderate protein-calorie malnutrition ([address] Muscle weakness (generalized), Thyroid disease, Unspecified diseases of blood and blood-forming organs, and Unsteadiness on feet. Surgical History:  has a past surgical history that includes Tonsillectomy and vascular surgery (N/A, 11/21/2021). Social History:  reports that he quit smoking about 49 years ago. His smoking use included cigarettes. He has a 20.00 pack-year smoking history. He has never used smokeless tobacco. He reports that he does not currently use drugs. He reports that he does not drink alcohol. Family History: family history includes Cancer in his father; Diabetes in his mother. Allergies: Penicillins  CURRENT MEDICATIONS       Previous Medications    AMLODIPINE (NORVASC) 5 MG TABLET    Take 1 tablet by mouth nightly    ATORVASTATIN (LIPITOR) 40 MG TABLET    Take 40 mg by mouth daily    CARVEDILOL (COREG) 25 MG TABLET    Take 25 mg by mouth 2 times daily (with meals)    HEPARIN SODIUM, PORCINE, (HEPARIN, PORCINE,) 35557 UNIT/ML INJECTION    Inject 0.5 mLs into the skin in the morning and 0.5 mLs at noon and 0.5 mLs before bedtime. ISOSORBIDE DINITRATE (ISORDIL) 20 MG TABLET    Take 1 tablet by mouth 3 times daily    LEVOTHYROXINE (SYNTHROID) 100 MCG TABLET    Take 1 tablet by mouth Daily    MIRTAZAPINE (REMERON) 15 MG TABLET    Take 7.5 mg by mouth nightly    SACUBITRIL-VALSARTAN (ENTRESTO)  MG PER TABLET    Take 1 tablet by mouth 2 times daily    SERTRALINE (ZOLOFT) 50 MG TABLET    Take 1 tablet by mouth daily    VITAMIN D (ERGOCALCIFEROL) 1.25 MG (33141 UT) CAPS CAPSULE    Take 50,000 Units by mouth once a week **Fridays**       SCREENINGS     [place] Coma Scale  Eye Opening: Spontaneous  Best Verbal Response: Oriented  Best Motor Response: Obeys commands  [place] Coma Scale Score: 15         CIWA Assessment  BP: (!) 179/87  Heart Rate: 62       PHYSICAL EXAM   Oxygen Saturation Interpretation: Normal on room air analysis.         ED Triage Vitals   BP Temp Temp src Heart Rate Resp SpO2 Height Weight   02/22/23 1337 02/22/23 1337 -- 02/22/23 1316 02/22/23 1316 02/22/23 1316 02/22/23 1316 02/22/23 1316   (!) 195/96 97.5 °F (36.4 °C)  72 20 98 % 6' 2\" (1.88 m) 140 lb (63.5 kg)         Physical Exam  Constitutional/General: Alert and oriented x3, well appearing, non toxic. HEENT:  NC/NT. PERRLA,  Airway patent. Neck: Supple, full ROM, non tender to palpation in the midline, no stridor, no crepitus, no meningeal signs  Respiratory: Lungs clear to auscultation bilaterally, no wheezes, rales, or rhonchi. Not in respiratory distress  CV:  Regular rate. Regular rhythm. No murmurs, gallops, or rubs. 2+ distal pulses  Chest: No chest wall tenderness. Tesio catheter intact to right subclavian region with no signs of infection. GI:  Abdomen Soft, Non tender, Non distended. +BS. No rebound, guarding, or rigidity. No pulsatile masses. Musculoskeletal: Moves all extremities x 4. Warm and well perfused, no clubbing, cyanosis, or edema. Capillary refill <3 seconds. 2+ pedal and posterior tibial pulses intact. Integument: skin warm and dry. No rashes. Lymphatic: no lymphadenopathy noted  Neurologic: GCS 15, no focal deficits, symmetric strength 5/5 in the upper and lower extremities bilaterally. Cranial nerves II through XII grossly intact.   Psychiatric: Normal Affect    DIAGNOSTIC RESULTS   (All laboratory and radiology results have been personally reviewed by myself)  Labs:  Results for orders placed or performed during the hospital encounter of 02/22/23   COVID-19, Rapid    Specimen: Nasopharyngeal Swab   Result Value Ref Range    SARS-CoV-2, NAAT Not Detected Not Detected   RAPID INFLUENZA A/B ANTIGENS    Specimen: Nasopharyngeal   Result Value Ref Range    Influenza A by PCR Not Detected Not Detected    Influenza B by PCR Not Detected Not Detected   CBC with Auto Differential   Result Value Ref Range    WBC 2.7 (L) 4.5 - 11.5 E9/L    RBC 4.72 3.80 - 5.80 E12/L    Hemoglobin 14.3 12.5 - 16.5 g/dL    Hematocrit 44.2 37.0 - 54.0 %    MCV 93.6 80.0 - 99.9 fL    MCH 30.3 26.0 - 35.0 pg MCHC 32.4 32.0 - 34.5 %    RDW 14.4 11.5 - 15.0 fL    Platelets 92 (L) 573 - 450 E9/L    MPV 11.1 7.0 - 12.0 fL    Neutrophils % 68.9 43.0 - 80.0 %    Immature Granulocytes % 0.0 0.0 - 5.0 %    Lymphocytes % 17.8 (L) 20.0 - 42.0 %    Monocytes % 11.9 2.0 - 12.0 %    Eosinophils % 0.7 0.0 - 6.0 %    Basophils % 0.7 0.0 - 2.0 %    Neutrophils Absolute 1.86 1.80 - 7.30 E9/L    Immature Granulocytes # 0.00 E9/L    Lymphocytes Absolute 0.48 (L) 1.50 - 4.00 E9/L    Monocytes Absolute 0.32 0.10 - 0.95 E9/L    Eosinophils Absolute 0.02 (L) 0.05 - 0.50 E9/L    Basophils Absolute 0.02 0.00 - 0.20 E9/L    RBC Morphology Normal    Basic Metabolic Panel   Result Value Ref Range    Sodium 137 132 - 146 mmol/L    Potassium 5.9 (H) 3.5 - 5.0 mmol/L    Chloride 99 98 - 107 mmol/L    CO2 18 (L) 22 - 29 mmol/L    Anion Gap 20 (H) 7 - 16 mmol/L    Glucose 133 (H) 74 - 99 mg/dL    BUN 68 (H) 6 - 23 mg/dL    Creatinine 11.7 (HH) 0.7 - 1.2 mg/dL    Est, Glom Filt Rate 4 >=60 mL/min/1.73    Calcium 7.9 (L) 8.6 - 10.2 mg/dL   Magnesium   Result Value Ref Range    Magnesium 2.4 1.6 - 2.6 mg/dL   Hepatic Function Panel   Result Value Ref Range    Total Protein 8.1 6.4 - 8.3 g/dL    Albumin 4.0 3.5 - 5.2 g/dL    Alkaline Phosphatase 73 40 - 129 U/L    ALT 20 0 - 40 U/L    AST 51 (H) 0 - 39 U/L    Total Bilirubin 1.3 (H) 0.0 - 1.2 mg/dL    Bilirubin, Direct 0.2 0.0 - 0.3 mg/dL    Bilirubin, Indirect 1.1 (H) 0.0 - 1.0 mg/dL   Lactic Acid   Result Value Ref Range    Lactic Acid 3.0 (H) 0.5 - 2.2 mmol/L   Brain Natriuretic Peptide   Result Value Ref Range    Pro-BNP 29,091 (H) 0 - 125 pg/mL   Lipase   Result Value Ref Range    Lipase 54 13 - 60 U/L   Platelet Confirmation   Result Value Ref Range    Platelet Confirmation CONFIRMED    SPECIMEN REJECTION   Result Value Ref Range    Rejected Test TRP5     Reason for Rejection see below    Potassium   Result Value Ref Range    Potassium 4.6 3.5 - 5.0 mmol/L   Troponin   Result Value Ref Range Troponin, High Sensitivity 1,693 (H) 0 - 11 ng/L   EKG 12 Lead   Result Value Ref Range    Ventricular Rate 67 BPM    Atrial Rate 67 BPM    P-R Interval 194 ms    QRS Duration 82 ms    Q-T Interval 456 ms    QTc Calculation (Bazett) 481 ms    P Axis 76 degrees    R Axis -56 degrees    T Axis 119 degrees     Imaging: All Radiology results interpreted by Radiologist unless otherwise noted. XR CHEST (2 VW)   Final Result   No acute process. CT ABDOMEN PELVIS WO CONTRAST Additional Contrast? None   Final Result   No acute inflammatory process in the abdomen and pelvis. Atrophic kidneys with nephrocalcinosis. ED COURSE   Vitals:    Vitals:    02/22/23 1316 02/22/23 1337 02/22/23 1547 02/22/23 1952   BP:  (!) 195/96 (!) 154/109 (!) 179/87   Pulse: 72  63 62   Resp: 20  18 17   Temp:  97.5 °F (36.4 °C) 97.5 °F (36.4 °C) 97.7 °F (36.5 °C)   TempSrc:   Oral Oral   SpO2: 98%  100% 99%   Weight: 140 lb (63.5 kg)      Height: 6' 2\" (1.88 m)          Patient was given the following medications:  Medications   calcium gluconate 2,000 mg in sodium chloride 0.9 % 100 mL IVPB (0 mg IntraVENous Stopped 2/22/23 1846)   0.9 % sodium chloride bolus (0 mLs IntraVENous Stopped 2/22/23 1950)       ED Course as of 02/22/23 1953 Wed Feb 22, 2023   1423 EKG: This EKG is signed and interpreted by the EP. Time: 14:19  Rate: 67  Rhythm: Sinus and with Left Anterior Fascicular Block  Interpretation: prolonged QT interval  Comparison: stable as compared to patient's most recent EKG and None   [CF]   1615 Potassium came back at 5.9 but is hemolyzed repeat potassium has been ordered. Patient given calcium for perceived hyperkalemia [CF]      ED Course User Index  [CF] Fly Big, DO     PROCEDURES   None    REASSESSMENT   2/22/23       Time: 1953 discussed results with patient and need for admission and wanting to eat. Patients condition remains stable.     CONSULTS:   [address] discussed with Dr. Winnie Oppenheim and will add on a urine and serum drug screen and have patient admitted and he will need dialysis due to noncompliance. 1952 Discussed patient with Dr. Smith Ramirez and will admit to telemetry unit      History from : Patient    Limitations to history : None    Discussion with Other Professionals : None    Social Determinants Significantly Affecting Health : None  Records Reviewed : Other previous labs for comparison     MDM: Sarah Adams is a 67 y.o. male who presents to the ED by private vehicle alone for complaints of feeling fatigued and states he has not had an appetite since Friday with nausea with no emesis and started with watery diarrhea yesterday x1 episode and an episode this morning. He reports he does have a right subclavian Tessio catheter and sees Dr. Winnie Oppenheim and he is in the process of having home dialysis and he reports he has not had dialysis since last Friday they usually do not take a lot of fluid off because he does not retain fluids. He sometimes will go for 5 days without dialysis. He reports that he usually does it Monday Tuesday Thursday and Friday. He denies any fever, chills, chest pain, palpitations, leg pain, leg swelling, shortness of breath, URI symptoms. The complaint has been persistent and gradually worsening and are moderate in severity. Patient reports he still does make urine. He denies any headache, blurred vision, double vision, bloody or tarry stools or any hematemesis. Differential diagnosis to include but not limited to electrolyte imbalance due to end-stage renal disease needing dialysis, volume overload, viral illness, diverticulitis, urinary tract infection, COVID, to name a few. Patient will have labs with EKG and COVID testing with chest x-ray. Patient was given a 2 g dose of calcium gluconate for suspected hyperkalemia. K level was initially moderately hemolyzed above 5 and was redrawn.   WBCs 2.7; Hgb 14.3; platelets 92; Mg 2.4; ALT 20; AST 51; lipase 54; lactic acid 3.0 and was given 500 cc bolus since his BNP was 29,092. CT of the abdomen reveals no acute inflammatory process atrophic kidneys. Consulted with patient's nephrologist Dr. Lorenzo Rojo and will add on a serum drug screen and urine drug screen. Will have patient admitted for dialysis and will consult with [address] [place], [place] [address]ist for admission. Spoke with  and will admit to telemetry bed for further evaluation. Plan of Care/Counseling:  KIANNA Burton CNP and EM Attending Physician reviewed today's visit with the patient in addition to providing specific details for the plan of care and counseling regarding the diagnosis and prognosis. Questions are answered at this time and are agreeable with the plan. ASSESSMENT     1. End-stage renal disease needing dialysis ([address]    2. Nausea    3. Diarrhea, unspecified type      This patient's ED course included: a personal history and physicial examination, re-evaluation prior to disposition, IV medications, and complex medical decision making and emergency management  This patient has remained hemodynamically stable, remained unchanged, and been closely monitored during their ED course. DISPOSITION   Inpatient Admission to Telemetry Unit. Patient condition is stable    NEW MEDICATIONS     New Prescriptions    No medications on file     Electronically signed by KIANNA Burton CNP   DD: 2/22/23  **This report was transcribed using voice recognition software. Every effort was made to ensure accuracy; however, inadvertent computerized transcription errors may be present.   END OF ED PROVIDER NOTE        KIANNA Burton CNP  02/22/23 1708 KIANNA Peralta CNP  02/22/23 9180

## 2023-02-22 NOTE — LETTER
[place] Department Medicaid  CERTIFICATION OF NECESSITY  FOR NON-EMERGENCY TRANSPORTATION   BY [facility]      Individual Information   1. Name: Raheem Roman 2. [place] Medicaid Billing Number:    3. Address: [address]      Transportation Provider Information   4. Provider Name: [facility]   5. [place] Medicaid Provider Number:  National Provider Identifier (NPI):      Certification  7. Criteria:  During transport, this individual requires:  [x] Medical treatment or continuous     supervision by an EMT. [] The administration or regulation of oxygen by another person. [] Supervised protective restraint. 8. Period Beginning Date: 3/2/2023     9. Length  [x] Not more than  day(s)  [] One Year     Additional Information Relevant to Certification   10. Comments or Explanations, If Necessary or Appropriate   ESRD requiring dialysis, sepsis, failure to thrive. Certifying Practitioner Information   11. Name of Practitioner: Dr. Augustina Bautista MD   12. [place] Medicaid Provider Number, If Applicable:  [address] Provider Identifier (NPI):      Signature Information   14. Date of Signature: 3/2/2023   15. Name of Person Signing: Electronically signed by Bridgett Bradford RN on 3/2/2023 at 11:52 AM     16.  Signature and Professional Designation: Electronically signed by Bridgett Bradford RN on 3/2/2023 at 11:52 AM  Discharge Planning     [facility] [address]  Rev. 7/2015

## 2023-02-23 PROBLEM — I10 PRIMARY HYPERTENSION: Status: ACTIVE | Noted: 2023-02-23

## 2023-02-23 PROBLEM — N18.6 ESRD (END STAGE RENAL DISEASE) (HCC): Status: ACTIVE | Noted: 2023-02-23

## 2023-02-23 PROBLEM — R19.7 DIARRHEA: Status: ACTIVE | Noted: 2023-02-23

## 2023-02-23 LAB
ADENOVIRUS BY PCR: NOT DETECTED
ALBUMIN SERPL-MCNC: 3.3 G/DL (ref 3.5–5.2)
ALP BLD-CCNC: 77 U/L (ref 40–129)
ALT SERPL-CCNC: 17 U/L (ref 0–40)
ANION GAP SERPL CALCULATED.3IONS-SCNC: 13 MMOL/L (ref 7–16)
AST SERPL-CCNC: 19 U/L (ref 0–39)
ATYPICAL LYMPHOCYTE RELATIVE PERCENT: 3 % (ref 0–4)
BASOPHILS ABSOLUTE: 0 E9/L (ref 0–0.2)
BASOPHILS RELATIVE PERCENT: 0 % (ref 0–2)
BILIRUB SERPL-MCNC: 0.8 MG/DL (ref 0–1.2)
BORDETELLA PARAPERTUSSIS BY PCR: NOT DETECTED
BORDETELLA PERTUSSIS BY PCR: NOT DETECTED
BUN BLDV-MCNC: 47 MG/DL (ref 6–23)
CALCIUM SERPL-MCNC: 8.1 MG/DL (ref 8.6–10.2)
CHLAMYDOPHILIA PNEUMONIAE BY PCR: NOT DETECTED
CHLORIDE BLD-SCNC: 100 MMOL/L (ref 98–107)
CO2: 23 MMOL/L (ref 22–29)
CORONAVIRUS 229E BY PCR: NOT DETECTED
CORONAVIRUS HKU1 BY PCR: NOT DETECTED
CORONAVIRUS NL63 BY PCR: NOT DETECTED
CORONAVIRUS OC43 BY PCR: NOT DETECTED
CREAT SERPL-MCNC: 8 MG/DL (ref 0.7–1.2)
EKG ATRIAL RATE: 67 BPM
EKG P AXIS: 76 DEGREES
EKG P-R INTERVAL: 194 MS
EKG Q-T INTERVAL: 456 MS
EKG QRS DURATION: 82 MS
EKG QTC CALCULATION (BAZETT): 481 MS
EKG R AXIS: -56 DEGREES
EKG T AXIS: 119 DEGREES
EKG VENTRICULAR RATE: 67 BPM
EOSINOPHILS ABSOLUTE: 0.06 E9/L (ref 0.05–0.5)
EOSINOPHILS RELATIVE PERCENT: 3 % (ref 0–6)
GFR SERPL CREATININE-BSD FRML MDRD: 7 ML/MIN/1.73
GLUCOSE BLD-MCNC: 97 MG/DL (ref 74–99)
HCT VFR BLD CALC: 35.9 % (ref 37–54)
HEMOGLOBIN: 12.2 G/DL (ref 12.5–16.5)
HUMAN METAPNEUMOVIRUS BY PCR: NOT DETECTED
HUMAN RHINOVIRUS/ENTEROVIRUS BY PCR: NOT DETECTED
INFLUENZA A BY PCR: NOT DETECTED
INFLUENZA B BY PCR: NOT DETECTED
LYMPHOCYTES ABSOLUTE: 0.82 E9/L (ref 1.5–4)
LYMPHOCYTES RELATIVE PERCENT: 36 % (ref 20–42)
MCH RBC QN AUTO: 31.7 PG (ref 26–35)
MCHC RBC AUTO-ENTMCNC: 34 % (ref 32–34.5)
MCV RBC AUTO: 93.2 FL (ref 80–99.9)
METER GLUCOSE: 92 MG/DL (ref 74–99)
MONOCYTES ABSOLUTE: 0.4 E9/L (ref 0.1–0.95)
MONOCYTES RELATIVE PERCENT: 19 % (ref 2–12)
MYCOPLASMA PNEUMONIAE BY PCR: NOT DETECTED
MYELOCYTE PERCENT: 1 % (ref 0–0)
NEUTROPHILS ABSOLUTE: 0.82 E9/L (ref 1.8–7.3)
NEUTROPHILS RELATIVE PERCENT: 38 % (ref 43–80)
PARAINFLUENZA VIRUS 1 BY PCR: NOT DETECTED
PARAINFLUENZA VIRUS 2 BY PCR: NOT DETECTED
PARAINFLUENZA VIRUS 3 BY PCR: NOT DETECTED
PARAINFLUENZA VIRUS 4 BY PCR: NOT DETECTED
PDW BLD-RTO: 14 FL (ref 11.5–15)
PLATELET # BLD: 117 E9/L (ref 130–450)
PMV BLD AUTO: 11.3 FL (ref 7–12)
POTASSIUM REFLEX MAGNESIUM: 4.1 MMOL/L (ref 3.5–5)
RBC # BLD: 3.85 E12/L (ref 3.8–5.8)
RBC # BLD: NORMAL 10*6/UL
RESPIRATORY SYNCYTIAL VIRUS BY PCR: NOT DETECTED
SARS-COV-2, PCR: NOT DETECTED
SODIUM BLD-SCNC: 136 MMOL/L (ref 132–146)
TOTAL PROTEIN: 6.7 G/DL (ref 6.4–8.3)
WBC # BLD: 2.1 E9/L (ref 4.5–11.5)

## 2023-02-23 PROCEDURE — 6360000002 HC RX W HCPCS: Performed by: INTERNAL MEDICINE

## 2023-02-23 PROCEDURE — 85025 COMPLETE CBC W/AUTO DIFF WBC: CPT

## 2023-02-23 PROCEDURE — 87329 GIARDIA AG IA: CPT

## 2023-02-23 PROCEDURE — 99232 SBSQ HOSP IP/OBS MODERATE 35: CPT | Performed by: INTERNAL MEDICINE

## 2023-02-23 PROCEDURE — 1200000000 HC SEMI PRIVATE

## 2023-02-23 PROCEDURE — 82962 GLUCOSE BLOOD TEST: CPT

## 2023-02-23 PROCEDURE — 82270 OCCULT BLOOD FECES: CPT

## 2023-02-23 PROCEDURE — 87045 FECES CULTURE AEROBIC BACT: CPT

## 2023-02-23 PROCEDURE — 93010 ELECTROCARDIOGRAM REPORT: CPT | Performed by: INTERNAL MEDICINE

## 2023-02-23 PROCEDURE — 90935 HEMODIALYSIS ONE EVALUATION: CPT

## 2023-02-23 PROCEDURE — 82705 FATS/LIPIDS FECES QUAL: CPT

## 2023-02-23 PROCEDURE — 80053 COMPREHEN METABOLIC PANEL: CPT

## 2023-02-23 PROCEDURE — 6370000000 HC RX 637 (ALT 250 FOR IP): Performed by: LICENSED PRACTICAL NURSE

## 2023-02-23 PROCEDURE — 5A1D70Z PERFORMANCE OF URINARY FILTRATION, INTERMITTENT, LESS THAN 6 HOURS PER DAY: ICD-10-PCS | Performed by: INTERNAL MEDICINE

## 2023-02-23 PROCEDURE — 36415 COLL VENOUS BLD VENIPUNCTURE: CPT

## 2023-02-23 PROCEDURE — 2580000003 HC RX 258: Performed by: INTERNAL MEDICINE

## 2023-02-23 PROCEDURE — 6370000000 HC RX 637 (ALT 250 FOR IP): Performed by: INTERNAL MEDICINE

## 2023-02-23 PROCEDURE — 87425 ROTAVIRUS AG IA: CPT

## 2023-02-23 RX ORDER — HYDRALAZINE HYDROCHLORIDE 25 MG/1
25 TABLET, FILM COATED ORAL EVERY 8 HOURS SCHEDULED
Status: DISCONTINUED | OUTPATIENT
Start: 2023-02-23 | End: 2023-02-24

## 2023-02-23 RX ORDER — AMLODIPINE BESYLATE 10 MG/1
10 TABLET ORAL NIGHTLY
Status: DISCONTINUED | OUTPATIENT
Start: 2023-02-23 | End: 2023-02-23

## 2023-02-23 RX ORDER — AMLODIPINE BESYLATE 10 MG/1
10 TABLET ORAL DAILY
Status: DISCONTINUED | OUTPATIENT
Start: 2023-02-23 | End: 2023-02-26

## 2023-02-23 RX ADMIN — MIRTAZAPINE 7.5 MG: 15 TABLET, FILM COATED ORAL at 20:56

## 2023-02-23 RX ADMIN — LEVOTHYROXINE SODIUM 100 MCG: 100 TABLET ORAL at 06:07

## 2023-02-23 RX ADMIN — ATORVASTATIN CALCIUM 40 MG: 40 TABLET, FILM COATED ORAL at 20:56

## 2023-02-23 RX ADMIN — HEPARIN SODIUM 5000 UNITS: 10000 INJECTION INTRAVENOUS; SUBCUTANEOUS at 06:05

## 2023-02-23 RX ADMIN — Medication 10 ML: at 20:57

## 2023-02-23 RX ADMIN — SACUBITRIL AND VALSARTAN 1 TABLET: 97; 103 TABLET, FILM COATED ORAL at 20:56

## 2023-02-23 RX ADMIN — HEPARIN SODIUM 5000 UNITS: 10000 INJECTION INTRAVENOUS; SUBCUTANEOUS at 20:57

## 2023-02-23 RX ADMIN — ISOSORBIDE DINITRATE 20 MG: 10 TABLET ORAL at 20:56

## 2023-02-23 RX ADMIN — ACETAMINOPHEN 650 MG: 325 TABLET ORAL at 18:10

## 2023-02-23 ASSESSMENT — PAIN SCALES - GENERAL: PAINLEVEL_OUTOF10: 0

## 2023-02-23 NOTE — DIALYSIS
Pt refusing to take any BP meds at this time BP is 212/101, stated \"this is normal for me\". Advised that he needs the medication and stated he will not take any. He is complaining of a headache at this time. Dr. Dino Arriola notified.

## 2023-02-23 NOTE — PROGRESS NOTES
Occupational Therapy      Occupational Therapy referral received  Attempt made  - patient out of room - dialysis

## 2023-02-23 NOTE — PROGRESS NOTES
[facility]   Progress Note    Admitting Date and Time: 2/22/2023  2:06 PM  Admit Dx: Nausea [R11.0]  ESRD on hemodialysis ([facility] [N18.6, Z99.2]  End-stage renal disease needing dialysis ([facility] [N18.6, Z99.2]  ESRD needing dialysis ([facility] [N18.6, Z99.2]  Diarrhea, unspecified type [R19.7]    Subjective:    Patient was admitted with Nausea [R11.0]  ESRD on hemodialysis ([facility] [N18.6, Z99.2]  End-stage renal disease needing dialysis ([facility] [N18.6, Z99.2]  ESRD needing dialysis ([facility] [N18.6, Z99.2]  Diarrhea, unspecified type [R19.7].  Patient denies fever, chills, cp, sob, n/v.     hydrALAZINE  25 mg Oral 3 times per day    amLODIPine  10 mg Oral Daily    aspirin  81 mg Oral Daily    atorvastatin  40 mg Oral Daily    carvedilol  25 mg Oral BID WC    isosorbide dinitrate  20 mg Oral TID    levothyroxine  100 mcg Oral Daily    mirtazapine  7.5 mg Oral Nightly    sacubitril-valsartan  1 tablet Oral BID    sertraline  50 mg Oral Daily    sodium chloride flush  5-40 mL IntraVENous 2 times per day    heparin (porcine)  5,000 Units SubCUTAneous 3 times per day     sodium chloride flush, 5-40 mL, PRN  sodium chloride, , PRN  ondansetron, 4 mg, Q8H PRN   Or  ondansetron, 4 mg, Q6H PRN  polyethylene glycol, 17 g, Daily PRN  acetaminophen, 650 mg, Q6H PRN   Or  acetaminophen, 650 mg, Q6H PRN  glucose, 4 tablet, PRN  dextrose bolus, 125 mL, PRN   Or  dextrose bolus, 250 mL, PRN  glucagon (rDNA), 1 mg, PRN  dextrose, , Continuous PRN  labetalol, 10 mg, Q6H PRN         Objective:    BP (!) 190/27   Pulse (!) 108   Temp (!) 102.7 °F (39.3 °C) (Oral)   Resp 18   Ht 6' 2\" (1.88 m)   Wt 143 lb 4.8 oz (65 kg)   SpO2 98%   BMI 18.40 kg/m²   Skin: warm and dry, no rash or erythema  Pulmonary/Chest: clear to auscultation bilaterally- no wheezes, rales or rhonchi, normal air movement, no respiratory distress  Cardiovascular: rhythm reg at rate of 104  Abdomen: soft, non-tender, non-distended, normal bowel sounds, no masses or organomegaly  Extremities: no cyanosis, no clubbing, and no edema      Recent Labs     02/22/23  1435 02/22/23  1830 02/23/23  1335     --  136   K 5.9* 4.6 4.1   CL 99  --  100   CO2 18*  --  23   BUN 68*  --  47*   CREATININE 11.7*  --  8.0*   GLUCOSE 133*  --  97   CALCIUM 7.9*  --  8.1*       Recent Labs     02/22/23  1435 02/23/23  1335   WBC 2.7* 2.1*   RBC 4.72 3.85   HGB 14.3 12.2*   HCT 44.2 35.9*   MCV 93.6 93.2   MCH 30.3 31.7   MCHC 32.4 34.0   RDW 14.4 14.0   PLT 92* 117*   MPV 11.1 11.3       CBC with Differential:    Lab Results   Component Value Date/Time    WBC 2.1 02/23/2023 01:35 PM    RBC 3.85 02/23/2023 01:35 PM    HGB 12.2 02/23/2023 01:35 PM    HCT 35.9 02/23/2023 01:35 PM     02/23/2023 01:35 PM    MCV 93.2 02/23/2023 01:35 PM    MCH 31.7 02/23/2023 01:35 PM    MCHC 34.0 02/23/2023 01:35 PM    RDW 14.0 02/23/2023 01:35 PM    NRBC 0.0 09/13/2022 06:59 AM    SEGSPCT 55 06/10/2013 04:00 AM    LYMPHOPCT 36.0 02/23/2023 01:35 PM    MONOPCT 19.0 02/23/2023 01:35 PM    MYELOPCT 1.0 02/23/2023 01:35 PM    BASOPCT 0.0 02/23/2023 01:35 PM    MONOSABS 0.40 02/23/2023 01:35 PM    LYMPHSABS 0.82 02/23/2023 01:35 PM    EOSABS 0.06 02/23/2023 01:35 PM    BASOSABS 0.00 02/23/2023 01:35 PM     CMP:    Lab Results   Component Value Date/Time     02/23/2023 01:35 PM    K 4.1 02/23/2023 01:35 PM     02/23/2023 01:35 PM    CO2 23 02/23/2023 01:35 PM    BUN 47 02/23/2023 01:35 PM    CREATININE 8.0 02/23/2023 01:35 PM    GFRAA 11 09/15/2022 02:27 AM    LABGLOM 7 02/23/2023 01:35 PM    GLUCOSE 97 02/23/2023 01:35 PM    PROT 6.7 02/23/2023 01:35 PM    LABALBU 3.3 02/23/2023 01:35 PM    CALCIUM 8.1 02/23/2023 01:35 PM    BILITOT 0.8 02/23/2023 01:35 PM    ALKPHOS 77 02/23/2023 01:35 PM    AST 19 02/23/2023 01:35 PM    ALT 17 02/23/2023 01:35 PM        Radiology:   XR CHEST (2 VW)   Final Result   No acute process.          CT ABDOMEN PELVIS WO CONTRAST Additional Contrast? None   Final Result   No acute inflammatory process in the abdomen and pelvis. Atrophic kidneys with nephrocalcinosis. Assessment:    Principal Problem:    ESRD needing dialysis [facility])  Active Problems:    ESRD on hemodialysis ([facility] [facility]  Resolved Problems:    * No resolved hospital problems. *      Plan:  Diarrhea presumed to be viral gastroenteritis check workup and supportive care  ESRD  HD  nephrology following  Htn monitor and tx with bp medicine  Hypothyroidism continue med  Depression continue med  Hyperkalemia monitor and tx  Acidosis/elevated lactic acid level monitor  Thrombocytopenia monitor      Informed by nursing that pt was hypertensive and had been refusing medication. Also, subsequently informed of fever. Pt did not have fever noted prior to now. In review of his blood work from the day, pt becoming more leukopenic and neutropenic as it is trending. Will ask input from ID.       Electronically signed by Cayden Light DO on 2/23/2023 at 6:57 PM

## 2023-02-23 NOTE — PROGRESS NOTES
Physical Therapy  Facility/Department: [facility] MED SURG    Name: Tay Ba  : 1950  MRN: [id number]    Chart reviewed and PT noam attempted this pm.  Pt out of the room at dialysis. Will check back at later time/date.      [employer], [address]

## 2023-02-23 NOTE — CONSULTS
Department of Internal Medicine  Nephrology Consult Note      Reason for Consult: ESRD on HD  Requesting Physician: KIANNA Burton CNP    CHIEF COMPLAINT: Fatigue    History Obtained From:  patient, electronic medical record    HISTORY OF PRESENT ILLNESS:  Mary Jo Mario is a 67 y.o. male, past medical history significant for ESRD on HD 3 times weekly Tuesday, Thursday, Saturday via right IJ, HTN, type II DM, HFrEF 35 to 40% with stage I diastolic dysfunction, hypothyroidism, HCV, recently evaluated in the ED under police order for reportedly swerving while driving, who presented to the ED on 2/22/2023 with a chief complaint of fatigue, (last HD treatment Friday) and diarrhea. Lab work on admission significant for potassium level 5.9, bicarbonate 18, creatinine 11.7, anion gap 20, lactic acid 3.0, proBNP 29,091, patient admitted for further evaluation and treatment, we are asked to see this patient in consultation for hemodialysis management.     Past Medical History:        Diagnosis Date    Anemia     Chronic kidney disease     Depression     Diabetes mellitus (HCC)     ESRD (end stage renal disease) ([facility] [address]     Hepatitis C     Hypertension     Liver disease     Moderate protein-calorie malnutrition (HCC)     Muscle weakness (generalized)     Thyroid disease     Unspecified diseases of blood and blood-forming organs     Unsteadiness on feet      Past Surgical History:        Procedure Laterality Date    TONSILLECTOMY      VASCULAR SURGERY N/A 11/21/2021    CATHETER INSERTION HEMODIALYSIS, REMOVAL OF FEMORAL CATHETER performed by Alberto Ring MD at [facility] OR     Current Medications:    Current Facility-Administered Medications: hydrALAZINE (APRESOLINE) tablet 25 mg, 25 mg, Oral, 3 times per day  amLODIPine (NORVASC) tablet 10 mg, 10 mg, Oral, Daily  aspirin EC tablet 81 mg, 81 mg, Oral, Daily  atorvastatin (LIPITOR) tablet 40 mg, 40 mg, Oral, Daily  carvedilol (COREG) tablet 25 mg, 25 mg, Oral, BID WC  isosorbide dinitrate (ISORDIL) tablet 20 mg, 20 mg, Oral, TID  levothyroxine (SYNTHROID) tablet 100 mcg, 100 mcg, Oral, Daily  mirtazapine (REMERON) tablet 7.5 mg, 7.5 mg, Oral, Nightly  sacubitril-valsartan (ENTRESTO)  MG per tablet 1 tablet, 1 tablet, Oral, BID  sertraline (ZOLOFT) tablet 50 mg, 50 mg, Oral, Daily  sodium chloride flush 0.9 % injection 5-40 mL, 5-40 mL, IntraVENous, 2 times per day  sodium chloride flush 0.9 % injection 5-40 mL, 5-40 mL, IntraVENous, PRN  0.9 % sodium chloride infusion, , IntraVENous, PRN  heparin (porcine) injection 5,000 Units, 5,000 Units, SubCUTAneous, 3 times per day  ondansetron (ZOFRAN-ODT) disintegrating tablet 4 mg, 4 mg, Oral, Q8H PRN **OR** ondansetron (ZOFRAN) injection 4 mg, 4 mg, IntraVENous, Q6H PRN  polyethylene glycol (GLYCOLAX) packet 17 g, 17 g, Oral, Daily PRN  acetaminophen (TYLENOL) tablet 650 mg, 650 mg, Oral, Q6H PRN **OR** acetaminophen (TYLENOL) suppository 650 mg, 650 mg, Rectal, Q6H PRN  glucose chewable tablet 16 g, 4 tablet, Oral, PRN  dextrose bolus 10% 125 mL, 125 mL, IntraVENous, PRN **OR** dextrose bolus 10% 250 mL, 250 mL, IntraVENous, PRN  glucagon (rDNA) injection 1 mg, 1 mg, SubCUTAneous, PRN  dextrose 10 % infusion, , IntraVENous, Continuous PRN  labetalol (NORMODYNE;TRANDATE) injection 10 mg, 10 mg, IntraVENous, Q6H PRN  Allergies:  Penicillins    Social History:    TOBACCO:   reports that he quit smoking about 49 years ago. His smoking use included cigarettes. He has a 20.00 pack-year smoking history. He has never used smokeless tobacco.  ETOH:   reports no history of alcohol use. DRUGS:   reports that he does not currently use drugs.     Family History:       Problem Relation Age of Onset    Diabetes Mother     Cancer Father      REVIEW OF SYSTEMS:    CONSTITUTIONAL:  positive for  fatigue  EYES:  negative  HEENT:  negative  RESPIRATORY:  negative  CARDIOVASCULAR:  negative  GASTROINTESTINAL:  positive for diarrhea  GENITOURINARY:  negative  INTEGUMENT/BREAST:  negative  HEMATOLOGIC/LYMPHATIC:  negative  ALLERGIC/IMMUNOLOGIC:  negative  ENDOCRINE:  negative  PHYSICAL EXAM:      Vitals:    VITALS:  BP (!) 179/106   Pulse 67   Temp 98.1 °F (36.7 °C)   Resp 16   Ht 6' 2\" (1.88 m)   Wt 143 lb 15.4 oz (65.3 kg)   SpO2 99%   BMI 18.48 kg/m²   24HR INTAKE/OUTPUT:  No intake or output data in the 24 hours ending 02/23/23 1513    Constitutional: Alert, oriented, NAD  HEENT: Normocephalic, atraumatic, PERRLA  Respiratory: CTA  Cardiovascular/Edema: RRR, normal S1, normal S2, no edema  Gastrointestinal: Soft, flat, nondistended, nontender  Neurologic: Nonfocal  Skin: Warm, dry, no rashes, no lesions  Access: RIJ tunneled dialysis catheter    DATA:    CBC with Differential:    Lab Results   Component Value Date/Time    WBC 2.1 02/23/2023 01:35 PM    RBC 3.85 02/23/2023 01:35 PM    HGB 12.2 02/23/2023 01:35 PM    HCT 35.9 02/23/2023 01:35 PM     02/23/2023 01:35 PM    MCV 93.2 02/23/2023 01:35 PM    MCH 31.7 02/23/2023 01:35 PM    MCHC 34.0 02/23/2023 01:35 PM    RDW 14.0 02/23/2023 01:35 PM    NRBC 0.0 09/13/2022 06:59 AM    SEGSPCT 55 06/10/2013 04:00 AM    LYMPHOPCT 17.8 02/22/2023 02:35 PM    MONOPCT 11.9 02/22/2023 02:35 PM    BASOPCT 0.7 02/22/2023 02:35 PM    MONOSABS 0.32 02/22/2023 02:35 PM    LYMPHSABS 0.48 02/22/2023 02:35 PM    EOSABS 0.02 02/22/2023 02:35 PM    BASOSABS 0.02 02/22/2023 02:35 PM     CMP:    Lab Results   Component Value Date/Time     02/23/2023 01:35 PM    K 4.1 02/23/2023 01:35 PM     02/23/2023 01:35 PM    CO2 23 02/23/2023 01:35 PM    BUN 47 02/23/2023 01:35 PM    CREATININE 11.7 02/22/2023 02:35 PM    GFRAA 11 09/15/2022 02:27 AM    LABGLOM 4 02/22/2023 02:35 PM    GLUCOSE 97 02/23/2023 01:35 PM    PROT 6.7 02/23/2023 01:35 PM    LABALBU 3.3 02/23/2023 01:35 PM    CALCIUM 8.1 02/23/2023 01:35 PM    BILITOT 0.8 02/23/2023 01:35 PM    ALKPHOS 77 02/23/2023 01:35 PM    AST 19 02/23/2023 01:35 PM    ALT 17 02/23/2023 01:35 PM     Magnesium:    Lab Results   Component Value Date/Time    MG 2.4 02/22/2023 02:35 PM     Phosphorus:    Lab Results   Component Value Date/Time    PHOS 4.5 09/13/2022 06:59 AM     Radiology Review:    CTAP 2/22/2023      No acute inflammatory process in the abdomen and pelvis. Atrophic kidneys with nephrocalcinosis. CXR 2/22/2023      No acute process. IMPRESSION/RECOMMENDATIONS:      Spencer Soria is a 67 y.o. male, past medical history significant for ESRD on HD 3 times weekly Tuesday, Thursday, Saturday via right IJ, HTN, type II DM, HFrEF 35 to 40% with stage I diastolic dysfunction, hypothyroidism, HCV, recently evaluated in the ED under police order for reportedly swerving while driving, who presented to the ED on 2/22/2023 with a chief complaint of fatigue, (last HD treatment Friday) and diarrhea. Lab work on admission significant for potassium level 5.9, bicarbonate 18, creatinine 11.7, anion gap 20, lactic acid 3.0, proBNP 29,091, patient admitted for further evaluation and treatment, we are asked to see this patient in consultation for hemodialysis management. ESRD on IHD three times weekly T-Th-S, via RIJ. He had not had dialysis since 2/17/2023, patient wishes to transition to home hemodialysis, however after evaluation by our office we do not feel that he is suitable or capable to safely perform dialysis at home.      Hyperkalemia 2/2 decreased GFR and noncompliance with HD, for HD today  HTN, noncompliant with medications, on amlodipine and carvedilol   HFrEF 35-40% with stage I DD, on carvedilol and Entresto   MBD of CKD, not on binders, check Phos tomorrow  Anemia of CKD, hold NIXON  -----------------------------------------------------------------------  Thrombocytopenia, platelet count 92, recommend holding heparin  Diarrhea, rule out C. difficile  Nutrition: Low potassium diet, 1 L fluid restriction  Social detriments to health: After home evaluation by our office, it was decided the patient is not a candidate for home hemodialysis, serious concerns about home safety including functioning utilities, patient was without the ability to make meals, very poor medication safety--recommend placement, will consult social work. Plan:     HD three times weekly T-Th-S, for HD today  Increase amlodipine to 10 mg daily  Begin hydralazine 25 mg 3 times daily  Consult  for possible placement  Obtain phosphorus level tomorrow  Continue carvedilol 25 mg p.o. twice daily  Continue sacubitril-valsartan  twice daily  Continue to monitor BP  Continue to monitor labs  Continue to monitor potassium level    Thank you Manjit Moralez CNP for allowing us to participate in the care of Emeka Kennedy. Patient seen, case and plan discussed with Dr. Niels Davis.     Electronically signed by KIANNA Graves CNP on 2/23/2023 at 3:11 PM  Patient discussed with ED physician   Agree as annotated  Yevonne Sandifer MD 122

## 2023-02-23 NOTE — H&P
[facility]  Internal Medicine   History and Physical    Patient:  Rand Sams 67 y.o. male MRN: [id number]     Date of Service: 2/22/2023    Hospital Day: 1      Chief complaint: had concerns including Fatigue (Hasn't had dialysis since Friday, nausea, dehydration) and Diarrhea. History of Present Illness   The patient is a 67 y.o. male with past medical history of ESRD on hemodialysis, hypertension, depression, hepatitis C, hypothyroidism presented to the hospital with chief complaint of nausea, watery diarrhea. Patient reported since Friday last week, patient has been feeling nauseous, weakness, decreased appetite. After that, he started to develop diarrhea yesterday, with 2 watery diarrheal episodes yesterday, no diarrhea today reported. Patient did not take any medication since Friday seen he has no appetite and not feeling well. Patient denies chest pain, shortness of breath, dizziness, blurred vision, no sore throat, fever or chills. Patient also denies diarrhea, dysuria, increased frequency. Patient came to the ER today for evaluation. Patient is on dialysis for about a year, follow with Dr. Aki Gonzalez. He usually have dialysis on Monday, Tuesday, Thursday, Friday. ED Course: Patient is awake, alert, not in acute distress. Patient complains of mild headache. Laboratory work-up significant for potassium 5.9, repeat potassium 4.6, creatinine 11.7, lactic acid is 3.0, proBNP 29,091, troponin 1693, with is chronic elevated. WBC 2.7, hemoglobin 14.3, platelet 92.   Patient was given 500 cc normal saline bolus, calcium gluconate 2 g and admitted to the hospital    Past Medical History:      Diagnosis Date    Anemia     Chronic kidney disease     Depression     Diabetes mellitus (HCC)     ESRD (end stage renal disease) ([facility] [address]     Hepatitis C     Hypertension     Liver disease     Moderate protein-calorie malnutrition (HCC)     Muscle weakness (generalized)     Thyroid disease Unspecified diseases of blood and blood-forming organs     Unsteadiness on feet        Past Surgical History:        Procedure Laterality Date    TONSILLECTOMY      VASCULAR SURGERY N/A 11/21/2021    CATHETER INSERTION HEMODIALYSIS, REMOVAL OF FEMORAL CATHETER performed by Cliff Garcia MD at [facility] OR       Medications Prior to Admission:    Prior to Admission medications    Medication Sig Start Date End Date Taking? Authorizing Provider   amLODIPine (NORVASC) 5 MG tablet Take 1 tablet by mouth nightly 9/15/22   KIANNA aPrrish CNP   atorvastatin (LIPITOR) 40 MG tablet Take 40 mg by mouth daily    Historical Provider, MD   carvedilol (COREG) 25 MG tablet Take 25 mg by mouth 2 times daily (with meals)    Historical Provider, MD   mirtazapine (REMERON) 15 MG tablet Take 7.5 mg by mouth nightly    Historical Provider, MD   isosorbide dinitrate (ISORDIL) 20 MG tablet Take 1 tablet by mouth 3 times daily 8/16/22   Karin MarketKIANNA CNP   Heparin Sodium, Porcine, (HEPARIN, PORCINE,) 06252 UNIT/ML injection Inject 0.5 mLs into the skin in the morning and 0.5 mLs at noon and 0.5 mLs before bedtime.  8/16/22   Karin MarketKIANNA CNP   sertraline (ZOLOFT) 50 MG tablet Take 1 tablet by mouth daily 4/27/22   Sruthi Jimenez MD   levothyroxine (SYNTHROID) 100 MCG tablet Take 1 tablet by mouth Daily 4/27/22   Sruthi Jimenez MD   bumetanide Rudolpho Mantle) 2 MG tablet Take 1 tablet by mouth 2 times daily (with meals) 4/26/22 8/16/22  Sruthi Jimenez MD   sacubitril-valsartan [facility])  MG per tablet Take 1 tablet by mouth 2 times daily 12/22/21   KIANNA Maharaj CNP   aspirin 81 MG EC tablet Take 1 tablet by mouth daily 12/22/21 8/16/22  Valerie Alvesim, APRN - CNP   vitamin D (ERGOCALCIFEROL) 1.25 MG (60600 UT) CAPS capsule Take 50,000 Units by mouth once a week **Fridays** 7/7/20   Historical Provider, MD       Allergies:  Penicillins    Social History:   TOBACCO:   reports that he quit smoking about 49 years ago. His smoking use included cigarettes. He has a 20.00 pack-year smoking history. He has never used smokeless tobacco.  ETOH:   reports no history of alcohol use.  OCCUPATION: retired     Family History:       Problem Relation Age of Onset    Diabetes Mother     Cancer Father        REVIEW OF SYSTEMS:    Constitutional: No fever, no chills, no change in weight; good appetite  HEENT: No blurred vision, no ear problems, no sore throat, no rhinorrhea.  Respiratory: No cough, no sputum production, no pleuritic chest pain, no shortness of breath  Cardiology: No angina, no dyspnea on exertion, no paroxysmal nocturnal dyspnea, no orthopnea, no palpitation, no leg swelling.   Gastroenterology: No dysphagia, no reflux; no abdominal pain, no nausea or vomiting; no constipation or diarrhea. No hematochezia   Genitourinary: No dysuria, no frequency, hesitancy; no hematuria  Musculoskeletal: no joint pain, no myalgia, no change in range of movement  Neurology: no focal weakness in extremities, no slurred speech, no double vision, no tingling or numbness sensation  Endocrinology: no temperature intolerance, no polyphagia, polydipsia or polyuria  Hematology: no increased bleeding, no bruising, no lymphadenopathy  Skin: no skin changes noticed by patient  Psychology: no depressed mood, no suicidal ideation    Physical Exam   Vitals: BP (!) 179/87   Pulse 62   Temp 97.7 °F (36.5 °C) (Oral)   Resp 17   Ht 6' 2\" (1.88 m)   Wt 140 lb (63.5 kg)   SpO2 99%   BMI 17.97 kg/m²     General Appearance: alert and oriented to person, place and time, well developed and well- nourished, in no acute distress  Skin: warm and dry, no rash or erythema  Head: normocephalic and atraumatic  Eyes: pupils equal, round, and reactive to light, extraocular eye movements intact, conjunctivae normal  ENT: tympanic membrane, external ear and ear canal normal bilaterally, nose without deformity, nasal mucosa and turbinates normal without  polyps  Neck: supple and non-tender without mass, no thyromegaly or thyroid nodules, no cervical lymphadenopathy  Pulmonary/Chest: clear to auscultation bilaterally- no wheezes, rales or rhonchi, normal air movement, no respiratory distress.   HD catheter in place, no signs of drainage  Cardiovascular: normal rate, regular rhythm, normal S1 and S2, no murmurs, rubs, clicks, or gallops, distal pulses intact, no carotid bruits  Abdomen: soft, non-tender, non-distended, normal bowel sounds, no masses or organomegaly  Extremities: no cyanosis, clubbing or edema  Musculoskeletal: normal range of motion, no joint swelling, deformity or tenderness  Neurologic: reflexes normal and symmetric, no cranial nerve deficit, gait, coordination and speech normal   Labs and Imaging Studies   Basic Labs  Recent Labs     02/22/23  1435 02/22/23  1830     --    K 5.9* 4.6   CL 99  --    CO2 18*  --    BUN 68*  --    CREATININE 11.7*  --    GLUCOSE 133*  --    CALCIUM 7.9*  --        Recent Labs     02/22/23  1435   WBC 2.7*   RBC 4.72   HGB 14.3   HCT 44.2   MCV 93.6   MCH 30.3   MCHC 32.4   RDW 14.4   PLT 92*   MPV 11.1       CBC:   Lab Results   Component Value Date/Time    WBC 2.7 02/22/2023 02:35 PM    RBC 4.72 02/22/2023 02:35 PM    HGB 14.3 02/22/2023 02:35 PM    HCT 44.2 02/22/2023 02:35 PM    MCV 93.6 02/22/2023 02:35 PM    RDW 14.4 02/22/2023 02:35 PM    PLT 92 02/22/2023 02:35 PM     BMP:    Lab Results   Component Value Date/Time     02/22/2023 02:35 PM    K 4.6 02/22/2023 06:30 PM    K 4.5 09/13/2022 06:59 AM    CL 99 02/22/2023 02:35 PM    CO2 18 02/22/2023 02:35 PM    BUN 68 02/22/2023 02:35 PM     CMP:  Lab Results   Component Value Date/Time     02/22/2023 02:35 PM    K 4.6 02/22/2023 06:30 PM    K 4.5 09/13/2022 06:59 AM    CL 99 02/22/2023 02:35 PM    CO2 18 02/22/2023 02:35 PM    BUN 68 02/22/2023 02:35 PM    PROT 8.1 02/22/2023 02:35 PM     TSH:    Lab Results   Component Value Date/Time    TSH 129.400 08/11/2022 10:10 AM     PT/INR:  No results found for: PTINR  PTT:    Lab Results   Component Value Date/Time    APTT 32.9 08/16/2022 10:53 PM     Iron Studies:    Lab Results   Component Value Date/Time    TIBC 307 11/20/2021 03:56 AM    FERRITIN 499 11/20/2021 03:56 AM     VITAMIN B12: No components found for: B12    Imaging Studies:     CT ABDOMEN PELVIS WO CONTRAST Additional Contrast? None    Result Date: 2/22/2023  EXAMINATION: CT OF THE ABDOMEN AND PELVIS WITHOUT CONTRAST 2/22/2023 3:07 pm TECHNIQUE: CT of the abdomen and pelvis was performed without the administration of intravenous contrast. Multiplanar reformatted images are provided for review. Automated exposure control, iterative reconstruction, and/or weight based adjustment of the mA/kV was utilized to reduce the radiation dose to as low as reasonably achievable. COMPARISON: None. HISTORY: ORDERING SYSTEM PROVIDED HISTORY: abdominal pain diarrhea TECHNOLOGIST PROVIDED HISTORY: Reason for exam:->abdominal pain diarrhea Additional Contrast?->None Decision Support Exception - unselect if not a suspected or confirmed emergency medical condition->Emergency Medical Condition (MA) FINDINGS: Lower Chest: No infiltrates or pleural effusion. Organs: No focal liver lesions. Gallbladder is present with no calcified stones. Spleen is not enlarged. Pancreas and adrenal glands appear unremarkable. The kidneys are atrophic with bilateral nephrocalcinosis. GI/Bowel: There are no obstructing or constricting lesions. Pelvis: Bladder is suboptimally distended. The prostate gland is enlarged. There is no significant free fluid. Peritoneum/Retroperitoneum: No free air or significant adenopathy. Bones/Soft Tissues: Degenerative changes of the lumbar spine most advanced at L3-L4 and L4-L5. No acute inflammatory process in the abdomen and pelvis. Atrophic kidneys with nephrocalcinosis.      XR CHEST (2 VW)    Result Date: 2/22/2023  EXAMINATION: TWO XRAY VIEWS OF THE CHEST 2/22/2023 3:26 pm COMPARISON: /16/22. HISTORY: ORDERING SYSTEM PROVIDED HISTORY: chest pain TECHNOLOGIST PROVIDED HISTORY: Reason for exam:->chest pain FINDINGS: The lungs are without acute focal process. There is no effusion or pneumothorax. The cardiomediastinal silhouette is without acute process. The osseous structures are without acute process. Right-sided central line tip in the distal SVC. No acute process. EKG: normal sinus rhythm. Resident's Assessment and Plan     Bethanie Torres is a 67 y.o. male with past medical history of ESRD on hemodialysis, hypertension, depression, hepatitis C, hypothyroidism presented to the hospital with chief complaint of nausea, watery diarrhea. Assessment:   Watery diarrhea, likely 2/2 viral enteritis  Nausea, 2/2#1  ESRD on hemodialysis  Hypertension  Depression  Hypothyroidism    Plan:  FOBT, C. difficile EIA, stool culture, fecal fat, Giardia antigen, rotavirus antigen sent, waiting for result. Admitted to the hospital.  Consulted with nephrology. Plan to start hemodialysis tomorrow. Resume amlodipine, Coreg, Entresto twice daily, labetalol as needed for hypertension. Continue Lipitor 40 mg daily, isosorbide dinitrate 20 mg 3 times daily. Continue Zoloft 50 mg daily. Continue levothyroxine 100 mcg daily. Monitor vital, daily BMP, CBC. Check respiratory panel.       Lukasz Jacome MD, PGY-3

## 2023-02-23 NOTE — CARE COORDINATION
Transition of Care-Met with patient at the bedside, introduced myself and CM role in discharge planning. Patient stated he lives alone in a three story home, however his bed/bath set up are on the first floor. He does not use any equipment to ambulate with, still drives and his car is here . He goes to dialysis [facility] at [facility] stated he drives there. Patient is requesting to go to a SNF at discharge, past stay at [facility], however does not wish to return. Left [facility] list in room. I asked him what the plan was with his car-he stated \"drive it to the nursing home\". I advised that this is not usually possible. PT/OT to see-will follow up after evaluations. PCP is Dr. Guera Bagley, preferred pharmacy is [facility]. Cm following for placement vs. HHC. Case Management Assessment  Initial Evaluation    Date/Time of Evaluation: 2/23/2023 2:28 PM  Assessment Completed by: Kirill Rowe RN    If patient is discharged prior to next notation, then this note serves as note for discharge by case management. Patient Name: Mary Jo Mario                   YOB: 1950  Diagnosis: Nausea [R11.0]  ESRD on hemodialysis ([facility] [address] [N18.6, Z99.2]  End-stage renal disease needing dialysis ([facility] [address] [N18.6, Z99.2]  ESRD needing dialysis ([facility] [address] [N18.6, Z99.2]  Diarrhea, unspecified type [R19.7]                   Date / Time: 2/22/2023  2:06 PM    Patient Admission Status: Inpatient   Readmission Risk (Low < 19, Mod (19-27), High > 27): Readmission Risk Score: 19.1    Current PCP: Brianne Garsia MD  PCP verified by CM? Yes    Chart Reviewed: Yes      History Provided by: Patient  Patient Orientation: Alert and Oriented    Patient Cognition: Alert    Hospitalization in the last 30 days (Readmission):  Yes    If yes, Readmission Assessment in CM Navigator will be completed.     Advance Directives:      Code Status: Full Code   Patient's Primary Decision Maker is: Legal Next of Kin Primary Decision Twin Ndiaye - [phone]    Discharge Planning:    Patient lives with: Alone Type of Home: House  Primary Care Giver: Self  Patient Support Systems include: Children   Current Financial resources: Medicare  Current community resources:    Current services prior to admission: None            Current DME:              Type of Home Care services:  Nursing Services (HD)    ADLS  Prior functional level: Independent in ADLs/IADLs  Current functional level: Independent in ADLs/IADLs    PT AM-PAC:   /24  OT AM-PAC:   /24    Family can provide assistance at DC: No  Would you like Case Management to discuss the discharge plan with any other family members/significant others, and if so, who? No  Plans to Return to Present Housing: Unknown at present  Other Identified Issues/Barriers to RETURNING to current housing:   Potential Assistance needed at discharge: N/A            Potential DME:    Patient expects to discharge to: Other (comment) (wants placed/NOT [facility])  Plan for transportation at discharge:      Financial    Payor: [address] [place] [address] / Plan: MEDICARE PART A AND B / Product Type: *No Product type* /     Does insurance require precert for SNF: No    Potential assistance Purchasing Medications: No  Meds-to-Beds request: Yes      [facility] [id number] [place] [id number]  Phone: [phone] Fax: [phone]      Notes:    Factors facilitating achievement of predicted outcomes: Knowledge about rehab    Barriers to discharge: Limited family support    Additional Case Management Notes:      The Plan for Transition of Care is related to the following treatment goals of Nausea [R11.0]  ESRD on hemodialysis ([facility] [address] [N18.6, Z99.2]  End-stage renal disease needing dialysis ([facility] [address] [N18.6, Z99.2]  ESRD needing dialysis ([facility] [address] [N18.6, Z99.2]  Diarrhea, unspecified type [A60.5]    IF APPLICABLE: The Patient and/or patient representative Amado Sharmaer and his family were provided with a choice of provider and agrees with the discharge plan. Freedom of choice list with basic dialogue that supports the patient's individualized plan of care/goals and shares the quality data associated with the providers was provided to:     Patient Representative Name:       The Patient and/or Patient Representative Agree with the Discharge Plan?       Idris Katz RN  Case Management Department  Ph: [phone]  Fax: [phone]        Kailey MCLAIN, RN  [facility]

## 2023-02-23 NOTE — DISCHARGE INSTRUCTIONS
HEART FAILURE  / CONGESTIVE HEART FAILURE  DISCHARGE INSTRUCTIONS:  GUIDELINES TO FOLLOW AT HOME    Self- Managed Care:     MEDICATIONS:  Take your medication as directed. If you are experiencing any side effects, inform your doctor, Do not stop taking any of your medications without letting your doctor know. Check with your doctor before taking any over-the-counter medications / herbal / or dietary supplements. They may interfere with your other medications. Do not take ibuprofen (Advil or Motrin) and naproxen (Aleve) without talking to your doctor first. They could make your heart failure worse. WEIGHT MONITORING:   Weigh yourself everyday (with the same scale) around the same time of the day and write it down. (you can chart them on a calendar or keep track of them on paper. Notify your doctor of a weight gain of 3 pounds or more in 1 day   OR a total of 5 pounds or more in 1 week    Take your weight record to your doctor visits  Also, the same goes if you loose more than 3# in one day, let your heart doctor know. DIET:   Cardiac heart healthy diet- Low saturated / low trans fat, no added salt, caffeine restricted, Low sodium diet-   No more than 2,000mg (2 grams) of salt / sodium per day (which equals to a little less than  a teaspoon of salt)  If your doctor wants you on a fluid restriction. ..it is usually recommended a fluid limit of 2,000cc -  Fluid restriction- 2,000 ml (milliliters) = 64 ounces = you can have 8 glasses of fluid per day (each glass 8 ounces)    Follow a low salt diet - avoid using salt at the table, avoid / limit use of canned soups, processed / packaged foods, salted snacks, olives and pickles. Do not use a salt substitute without checking with your doctor, they may contain a high amount of potassioum. (Mrs. Parvin Sewell is safe to use).     Limit the use of alcohol       CALL YOUR DOCTOR THE FIRST DAY YOU NOTICE ANY OF THESE   SYMPTOMS:  You have a weight gain of 3 pounds or more in 1 day         OR 5 pounds or more in one week  More shortness of breath  More swelling of your stomach, legs, ankles or feet  Feeling more tired, No energy  Dry hacky cough  Dizziness  More chest pain / discomfort       (CALL 911 IF ANY OF THE FOLLOWING OCCURS  Chest pain (not relieved with nitroglycerine, if you have been prescribed this medication)  Severe shortness of breath  Faint / Pass out  Confusion / cannot think clearly  If symptoms get worse           SMOKING - TOBACCO USE:  * IF YOU SMOKE - STOP! Kick the habit. [address] Program is offered at [facility] [address] and [address] [facility]. Call [phone] extension [phone] for more information. ACTIVITY:   (Ask your doctor when you will be able to return to work and before starting any exercise program.  Do not drive unless unless your doctor has given you permission to do so). Start light exercise. Even if you can only do a small amount, exercise will help you get stronger, have more energy, help manage your weight and decrease  stress. Walking is an easy way to get exercise. Start out slowly and  increase the amount you walk as tolerated  If you become short of breath, dizzy or have chest pain; stop, sit down, and rest.  If you feel \"wiped out\" the day after you exercise, walk at a slower pace or for a shorter distance. You can gradually increase the pace or amount of time. (Do not exercise right after a meal or in extreme temperatures, such as above 85 degrees, if the air is really humid, or wind chill is less than 20 degrees)                                             ADDITIONAL INFORMATION:  Avoid getting sick from colds and the flu. Stay away from friends or family that you know may have a contagious illness  Get plenty of rest   Get a flu shot each year. Get a pneumococcal vaccine shot.  If you have had one before, ask your doctor whether you need another dose. My Goal for Self-management of Heart Failure Includes 5 steps :    1. Notice a change in symptoms ( weight gain, short of breath, leg swelling, decreased activity level, bloating. ...)    2. Evaluate the change: (use the Heart Failure Zones )     3. Decide to take action: decide what your options are, such as: (call your doctor for an extra visit, take a prescribed medication, such as your water pill if your doctor has given you directions to do so, [address])    4. Come up with a strategy:  (now you call the doctor for advice / appointment. This is where you take action!!! Do not wait, catch the symptom early and treat it before it worsens. 5. Evaluate the response: The next day, check your Heart Failure Zones: are you in the GREEN ZONE (safe zone)? Worsening symptoms of YELLOW ZONE? Or have you moved to the RED ZONE and need to call 911 or go to the Emergency Room for evaluation? Call your doctor's office to update them on your symptoms of heart failure. Right femoral temporary dialysis catheter, dressing changes per dialysis nursing with treatments. Placed on 3/1/2023. Dialysis TX days T-TH-Sat, last dressing change 3/1/2023. Antibiotic changed per Dr Jerre Burkitt on 3/2/2023.

## 2023-02-23 NOTE — PLAN OF CARE
Patient's chart updated to reflect:      . - HF care plan, HF education points and HF discharge instructions.  -Orders: 2 gram sodium diet, daily weights, I/O.  -PCP and/or Cardiologist appointment to be scheduled within 7 days of hospital discharge.  -History of HF, not primary admission Dx. Patient admitted for treatment of ESRD needing dialysis.     Keyanna Briscoe RN  BSN  Heart Failure Navigator

## 2023-02-23 NOTE — FLOWSHEET NOTE
02/23/23 1550   Vital Signs   BP (!) 218/119   Temp 98 °F (36.7 °C)   Heart Rate 71   Resp 18   Weight 143 lb 4.8 oz (65 kg)   Weight Method Bed scale   Percent Weight Change -0.46   Post-Hemodialysis Assessment   Post-Treatment Procedures Blood returned;Catheter capped, clamped with Citrate x 2 ports   Machine Disinfection Process Acid/Vinegar Clean;Heat Disinfect; Exterior Machine Disinfection   Rinseback Volume (ml) 300 ml   Blood Volume Processed (Liters) 56.4 l/min   Dialyzer Clearance Clotted   Duration of Treatment (minutes) 150 minutes   Heparin Amount Administered During Treatment (mL) 0 mL   Hemodialysis Intake (ml) 300 ml   Hemodialysis Output (ml) 520 ml   NET Removed (ml) 220   Tolerated Treatment Poor   Interventions Taken Ultrafiltration goal decreased   Patient Response to Treatment pt clotted system and refused new system, blood returned, recieved2.5hrs of his prescribed 4hr tx. Floor nurse was called to bedside to give BP medication, pt refused all medication and stated this is normal for him, attempted to education but still refused any medication. States he has a headache, Dr. Lorenzo Rojo notified. Physician Notified Yes   Time Off [phone]   Patient Disposition Return to room   Pt clotted system and refused new system, blood returned, received 2.5hrs of his prescribed 4hr tx on a 2K 3Ca bath, 200ml fluid removed. Floor nurse was called to bedside for elevated BP of 212/101 to give his prescribed medication, pt refused to take medication states \"this is normal for me\", attempted to educate but still refusing. States he has a headache but denies any chest pain at this time. Dr. Lorenzo Rojo notified. RCVC ports flushed w/ NSS, locked w/ citrate, clamped and capped x2. Returned to floor, remains in care of staff.

## 2023-02-23 NOTE — PROGRESS NOTES
Called to Dialysis for /118. Patient refuses to take any medications and states this is normal for me. Despite any teaching, patient remains adamant. Dr. Bradford Prime informed.

## 2023-02-24 LAB
ALBUMIN SERPL-MCNC: 3.1 G/DL (ref 3.5–5.2)
ALP BLD-CCNC: 71 U/L (ref 40–129)
ALT SERPL-CCNC: 17 U/L (ref 0–40)
ANION GAP SERPL CALCULATED.3IONS-SCNC: 12 MMOL/L (ref 7–16)
AST SERPL-CCNC: 18 U/L (ref 0–39)
BASOPHILS ABSOLUTE: 0.04 E9/L (ref 0–0.2)
BASOPHILS RELATIVE PERCENT: 0.6 % (ref 0–2)
BILIRUB SERPL-MCNC: 0.9 MG/DL (ref 0–1.2)
BUN BLDV-MCNC: 34 MG/DL (ref 6–23)
CALCIUM SERPL-MCNC: 7.9 MG/DL (ref 8.6–10.2)
CHLORIDE BLD-SCNC: 99 MMOL/L (ref 98–107)
CO2: 22 MMOL/L (ref 22–29)
CREAT SERPL-MCNC: 7.6 MG/DL (ref 0.7–1.2)
EOSINOPHILS ABSOLUTE: 0.02 E9/L (ref 0.05–0.5)
EOSINOPHILS RELATIVE PERCENT: 0.3 % (ref 0–6)
GFR SERPL CREATININE-BSD FRML MDRD: 7 ML/MIN/1.73
GIARDIA ANTIGEN STOOL: NORMAL
GLUCOSE BLD-MCNC: 91 MG/DL (ref 74–99)
HCT VFR BLD CALC: 38.7 % (ref 37–54)
HEMOGLOBIN: 12.6 G/DL (ref 12.5–16.5)
IMMATURE GRANULOCYTES #: 0.03 E9/L
IMMATURE GRANULOCYTES %: 0.5 % (ref 0–5)
LYMPHOCYTES ABSOLUTE: 0.75 E9/L (ref 1.5–4)
LYMPHOCYTES RELATIVE PERCENT: 11.8 % (ref 20–42)
MCH RBC QN AUTO: 31.2 PG (ref 26–35)
MCHC RBC AUTO-ENTMCNC: 32.6 % (ref 32–34.5)
MCV RBC AUTO: 95.8 FL (ref 80–99.9)
MONOCYTES ABSOLUTE: 0.69 E9/L (ref 0.1–0.95)
MONOCYTES RELATIVE PERCENT: 10.9 % (ref 2–12)
NEUTROPHILS ABSOLUTE: 4.8 E9/L (ref 1.8–7.3)
NEUTROPHILS RELATIVE PERCENT: 75.9 % (ref 43–80)
OCCULT BLOOD SCREENING: NORMAL
PDW BLD-RTO: 14.1 FL (ref 11.5–15)
PHOSPHORUS: 4.8 MG/DL (ref 2.5–4.5)
PLATELET # BLD: 112 E9/L (ref 130–450)
PMV BLD AUTO: 10.7 FL (ref 7–12)
POTASSIUM REFLEX MAGNESIUM: 4 MMOL/L (ref 3.5–5)
POTASSIUM SERPL-SCNC: 4 MMOL/L (ref 3.5–5)
RBC # BLD: 4.04 E12/L (ref 3.8–5.8)
REASON FOR REJECTION: NORMAL
REJECTED TEST: NORMAL
ROTAVIRUS ANTIGEN: NORMAL
SODIUM BLD-SCNC: 133 MMOL/L (ref 132–146)
TOTAL PROTEIN: 6.4 G/DL (ref 6.4–8.3)
WBC # BLD: 6.3 E9/L (ref 4.5–11.5)

## 2023-02-24 PROCEDURE — 6360000002 HC RX W HCPCS: Performed by: INTERNAL MEDICINE

## 2023-02-24 PROCEDURE — 1200000000 HC SEMI PRIVATE

## 2023-02-24 PROCEDURE — 80048 BASIC METABOLIC PNL TOTAL CA: CPT

## 2023-02-24 PROCEDURE — 6370000000 HC RX 637 (ALT 250 FOR IP): Performed by: LICENSED PRACTICAL NURSE

## 2023-02-24 PROCEDURE — 2580000003 HC RX 258: Performed by: INTERNAL MEDICINE

## 2023-02-24 PROCEDURE — 90935 HEMODIALYSIS ONE EVALUATION: CPT

## 2023-02-24 PROCEDURE — 99232 SBSQ HOSP IP/OBS MODERATE 35: CPT | Performed by: INTERNAL MEDICINE

## 2023-02-24 PROCEDURE — 36415 COLL VENOUS BLD VENIPUNCTURE: CPT

## 2023-02-24 PROCEDURE — 80053 COMPREHEN METABOLIC PANEL: CPT

## 2023-02-24 PROCEDURE — 87040 BLOOD CULTURE FOR BACTERIA: CPT

## 2023-02-24 PROCEDURE — 84100 ASSAY OF PHOSPHORUS: CPT

## 2023-02-24 PROCEDURE — 6370000000 HC RX 637 (ALT 250 FOR IP): Performed by: INTERNAL MEDICINE

## 2023-02-24 PROCEDURE — 85025 COMPLETE CBC W/AUTO DIFF WBC: CPT

## 2023-02-24 RX ORDER — BUMETANIDE 1 MG/1
2 TABLET ORAL 2 TIMES DAILY
Status: DISCONTINUED | OUTPATIENT
Start: 2023-02-24 | End: 2023-03-02 | Stop reason: HOSPADM

## 2023-02-24 RX ORDER — HYDRALAZINE HYDROCHLORIDE 10 MG/1
10 TABLET, FILM COATED ORAL EVERY 8 HOURS SCHEDULED
Status: DISCONTINUED | OUTPATIENT
Start: 2023-02-24 | End: 2023-02-25

## 2023-02-24 RX ADMIN — CARVEDILOL 25 MG: 25 TABLET, FILM COATED ORAL at 18:37

## 2023-02-24 RX ADMIN — CARVEDILOL 25 MG: 25 TABLET, FILM COATED ORAL at 09:11

## 2023-02-24 RX ADMIN — ISOSORBIDE DINITRATE 20 MG: 10 TABLET ORAL at 09:13

## 2023-02-24 RX ADMIN — ISOSORBIDE DINITRATE 20 MG: 10 TABLET ORAL at 20:24

## 2023-02-24 RX ADMIN — MIRTAZAPINE 7.5 MG: 15 TABLET, FILM COATED ORAL at 20:24

## 2023-02-24 RX ADMIN — HEPARIN SODIUM 5000 UNITS: 10000 INJECTION INTRAVENOUS; SUBCUTANEOUS at 20:25

## 2023-02-24 RX ADMIN — Medication 10 ML: at 20:26

## 2023-02-24 RX ADMIN — ONDANSETRON 4 MG: 2 INJECTION INTRAMUSCULAR; INTRAVENOUS at 12:25

## 2023-02-24 RX ADMIN — ASPIRIN 81 MG: 81 TABLET, COATED ORAL at 09:12

## 2023-02-24 RX ADMIN — Medication 10 ML: at 09:17

## 2023-02-24 RX ADMIN — SACUBITRIL AND VALSARTAN 1 TABLET: 97; 103 TABLET, FILM COATED ORAL at 20:26

## 2023-02-24 RX ADMIN — ATORVASTATIN CALCIUM 40 MG: 40 TABLET, FILM COATED ORAL at 20:26

## 2023-02-24 RX ADMIN — AMLODIPINE BESYLATE 10 MG: 10 TABLET ORAL at 09:13

## 2023-02-24 RX ADMIN — LEVOTHYROXINE SODIUM 100 MCG: 100 TABLET ORAL at 06:18

## 2023-02-24 RX ADMIN — HEPARIN SODIUM 5000 UNITS: 10000 INJECTION INTRAVENOUS; SUBCUTANEOUS at 06:18

## 2023-02-24 RX ADMIN — SERTRALINE HYDROCHLORIDE 50 MG: 50 TABLET ORAL at 09:13

## 2023-02-24 ASSESSMENT — PAIN SCALES - GENERAL
PAINLEVEL_OUTOF10: 0
PAINLEVEL_OUTOF10: 0

## 2023-02-24 NOTE — FLOWSHEET NOTE
Pt completed 2.5 hrs of HD and signed off tx AMA. No fluid removed. 02/24/23 1630   Vital Signs   BP (!) 156/83   Temp 98 °F (36.7 °C)   Heart Rate 62   Resp 18   Weight 143 lb 11.8 oz (65.2 kg)   Weight Method Bed scale   Percent Weight Change 0   Post-Hemodialysis Assessment   Post-Treatment Procedures Blood returned; Access bleeding time < 10 minutes   Machine Disinfection Process Acid/Vinegar Clean;Heat Disinfect; Exterior Machine Disinfection   Rinseback Volume (ml) 300 ml   Blood Volume Processed (Liters) 44.4 l/min   Dialyzer Clearance Lightly streaked   Duration of Treatment (minutes) 150 minutes   Hemodialysis Intake (ml) 300 ml   Hemodialysis Output (ml) 300 ml   NET Removed (ml) 0   Tolerated Treatment Good   Patient Response to Treatment pt signed off tx AMA after 2.5hrs; Dr. Samir Veloz made aware; post report to [facility] 245; stable at discharge   Bilateral Breath Sounds Diminished   Time Off 1612   Patient Disposition Return to room

## 2023-02-24 NOTE — PROGRESS NOTES
Occupational Therapy      Occupational Therapy referral received  Attempt made - upon entry - patient being assisted from nursing from bathroom - back to bed.     Patient feeling nauseated and vomiting

## 2023-02-24 NOTE — CONSULTS
[address] [place] [address] [facility]    Consultation Note     Admit Date: 2/22/2023  2:06 PM    Reason for Consult:   fever, diarrhea, worsening neutropenia    Attending Physician:  Jnoy Berry DO     Chief Complaint: diarrhea    HISTORY OF PRESENT ILLNESS:   The patient is a 67 y.o.  male not known to the Infectious Diseases service. The patient has hx of ESRD On HD, HTN presented with diarrhea since Wednesday. He had only 2 episodes of BM s yesterday which were watery. He did not have any today. No nausea. He had abdominal soreness on Wednesday but its resolved now. He had a temp of 102 overnight but he did not know he had fever. No cough or Sob or issue with line. No skin wounds. No sick contacts. Since admission, pt did spike a fever of 102.7 last evening. BP is high. On RA. Labs showed white count was 2.7 improved to 6.3 today. Normal ANC. Hgb is 12.6 , platelet count is 182 today improved from 92. NOEMY 9 negative. BNP is 29K, troponin is 1693. CT A/p showed no acute inflammatory process, nephrocalcinosis. Stool work up in process. C.diff test was declined as the stool was formed. RVP negative. UA is clean. Patient is not on any antibiotics and I got consulted for further recommendations.     Past Medical History:        Diagnosis Date    Anemia     Chronic kidney disease     Depression     Diabetes mellitus (HCC)     ESRD (end stage renal disease) ([facility] [address]     Hepatitis C     Hypertension     Liver disease     Moderate protein-calorie malnutrition (HCC)     Muscle weakness (generalized)     Thyroid disease     Unspecified diseases of blood and blood-forming organs     Unsteadiness on feet      Past Surgical History:        Procedure Laterality Date    TONSILLECTOMY      VASCULAR SURGERY N/A 11/21/2021    CATHETER INSERTION HEMODIALYSIS, REMOVAL OF FEMORAL CATHETER performed by Rigo Le MD at [facility] OR     Current Medications:   Scheduled Meds:   hydrALAZINE  25 mg Oral 3 times per day amLODIPine  10 mg Oral Daily    aspirin  81 mg Oral Daily    atorvastatin  40 mg Oral Daily    carvedilol  25 mg Oral BID WC    isosorbide dinitrate  20 mg Oral TID    levothyroxine  100 mcg Oral Daily    mirtazapine  7.5 mg Oral Nightly    sacubitril-valsartan  1 tablet Oral BID    sertraline  50 mg Oral Daily    sodium chloride flush  5-40 mL IntraVENous 2 times per day    heparin (porcine)  5,000 Units SubCUTAneous 3 times per day     Continuous Infusions:   sodium chloride      dextrose       PRN Meds:sodium chloride flush, sodium chloride, ondansetron **OR** ondansetron, polyethylene glycol, acetaminophen **OR** acetaminophen, glucose, dextrose bolus **OR** dextrose bolus, glucagon (rDNA), dextrose, labetalol    Allergies:  Penicillins    Social History:   Social History     Socioeconomic History    Marital status: Single     Spouse name: None    Number of children: None    Years of education: None    Highest education level: None   Tobacco Use    Smoking status: Former     Packs/day: 2.00     Years: 10.00     Pack years: 20.00     Types: Cigarettes     Quit date: 1973     Years since quittin.5    Smokeless tobacco: Never   Vaping Use    Vaping Use: Never used   Substance and Sexual Activity    Alcohol use: No    Drug use: Not Currently     Comment: in early 19's    Sexual activity: Yes     Partners: Female     Family History:       Problem Relation Age of Onset    Diabetes Mother     Cancer Father    . Otherwise non-pertinent to the chief complaint. REVIEW OF SYSTEMS:    As mentioned in HPI, all other systems negative. PHYSICAL EXAM:    Vitals:    /72   Pulse 73   Temp 98.3 °F (36.8 °C) (Oral)   Resp 18   Ht 6' 2\" (1.88 m)   Wt 146 lb 3.2 oz (66.3 kg)   SpO2 98%   BMI 18.77 kg/m²   Constitutional: The patient is awake, alert, and oriented. Skin: Warm and dry. No rashes were noted. No jaundice. HEENT: Eyes show round, and reactive pupils.  Moist mucous membranes, no ulcerations, no thrush. Neck: Supple to movements. No lymphadenopathy. Chest: bibasilar crackles. Cardiovascular: S1 and S2 are rhythmic and regular. No murmurs appreciated. Abdomen: soft, non tender  Extremities: No clubbing, no cyanosis, no edema.   Musculoskeletal: no gross focal abnormalities  Neurological: alert , oriented x 3  Lines: peripheral, right upper chest tunneled HD line: site looks clean      CBC+dif:  Recent Labs     02/22/23  1435 02/23/23  1335 02/24/23  0456   WBC 2.7* 2.1* 6.3   HGB 14.3 12.2* 12.6   HCT 44.2 35.9* 38.7   MCV 93.6 93.2 95.8   PLT 92* 117* 112*   NEUTROABS 1.86 0.82* 4.80     Lab Results   Component Value Date    CRP 2.2 (H) 07/15/2022    CRP 0.3 04/21/2022    CRP 0.6 (H) 12/09/2021     No results found for: [facility]  Lab Results   Component Value Date    SEDRATE 63 (H) 11/28/2016     Lab Results   Component Value Date    ALT 17 02/24/2023    AST 18 02/24/2023    ALKPHOS 71 02/24/2023    BILITOT 0.9 02/24/2023     Lab Results   Component Value Date/Time     02/24/2023 04:56 AM    K 4.0 02/24/2023 04:56 AM    K 4.0 02/24/2023 04:56 AM    CL 99 02/24/2023 04:56 AM    CO2 22 02/24/2023 04:56 AM    BUN 34 02/24/2023 04:56 AM    CREATININE 7.6 02/24/2023 04:56 AM    GFRAA 11 09/15/2022 02:27 AM    LABGLOM 7 02/24/2023 04:56 AM    GLUCOSE 91 02/24/2023 04:56 AM    PROT 6.4 02/24/2023 04:56 AM    LABALBU 3.1 02/24/2023 04:56 AM    CALCIUM 7.9 02/24/2023 04:56 AM    BILITOT 0.9 02/24/2023 04:56 AM    ALKPHOS 71 02/24/2023 04:56 AM    AST 18 02/24/2023 04:56 AM    ALT 17 02/24/2023 04:56 AM       Lab Results   Component Value Date/Time    PROTIME 11.1 08/16/2022 10:53 PM    INR 1.0 08/16/2022 10:53 PM       Lab Results   Component Value Date/Time    .400 08/11/2022 10:10 AM       Lab Results   Component Value Date/Time    COLORU Yellow 02/22/2023 03:27 PM    PHUR 7.5 02/22/2023 03:27 PM    [address] NONE 02/22/2023 03:27 PM    RBCUA 1-3 02/22/2023 03:27 PM    RBCUA NONE 06/08/2013 10:30 PM    BACTERIA NONE SEEN 02/22/2023 03:27 PM    CLARITYU Clear 02/22/2023 03:27 PM    SPECGRAV 1.015 02/22/2023 03:27 PM    LEUKOCYTESUR Negative 02/22/2023 03:27 PM    UROBILINOGEN 0.2 02/22/2023 03:27 PM    BILIRUBINUR Negative 02/22/2023 03:27 PM    BLOODU MODERATE 02/22/2023 03:27 PM    GLUCOSEU 100 02/22/2023 03:27 PM    AMORPHOUS PRESENT 08/24/2021 03:00 PM       No results found for: UNH4CIA, BEART, R7PNQWIS, PHART, THGBART, YFT6WGM, PO2ART, RQN3GXM  Radiology:  XR CHEST (2 VW)   Final Result   No acute process. CT ABDOMEN PELVIS WO CONTRAST Additional Contrast? None   Final Result   No acute inflammatory process in the abdomen and pelvis. Atrophic kidneys with nephrocalcinosis. Microbiology:  Pending  No results for input(s): BC in the last 72 hours. No results for input(s): ORG in the last 72 hours. No results for input(s): Kirill Judit in the last 72 hours. No results for input(s): STREPNEUMAGU in the last 72 hours. No results for input(s): LP1UAG in the last 72 hours. No results for input(s): ASO in the last 72 hours. No results for input(s): CULTRESP in the last 72 hours. Assessment:  Fever : r/o CLABSI  No sign of pneumonia or skin wounds or UTI (he does urinate every day)   CT A/p did not show any sign of infection  Diarrhea: he had 2 BM s yesterday. None today. Had solid BM per lab report and c.diff order was d/zenaida. Other work up pending  ESRD on HD: right tunneled line in place    Plan:    Blood cx x 2  Monitor fever curve and clinical course  Will follow with you    Thank you for having us see this patient in consultation. I will be discussing this case with the treating physicians.     Electronically signed by Nader Gonzalez MD on 2/24/2023 at 10:51 AM

## 2023-02-24 NOTE — PROGRESS NOTES
Department of Internal Medicine  Nephrology Progress Note    Events reviewed. SUBJECTIVE: We are following Palmer Benton for ESRD on HD. Patient reports no new complaints.       PHYSICAL EXAM:      Vitals:    VITALS:  /62   Pulse 56   Temp 98 °F (36.7 °C)   Resp 16   Ht 6' 2\" (1.88 m)   Wt 143 lb 11.8 oz (65.2 kg)   SpO2 98%   BMI 18.46 kg/m²   24HR INTAKE/OUTPUT:    Intake/Output Summary (Last 24 hours) at 2/24/2023 1526  Last data filed at 2/24/2023 1225  Gross per 24 hour   Intake 540 ml   Output 520 ml   Net 20 ml       Constitutional: Alert, oriented, NAD  HEENT: Normocephalic, atraumatic, PERRLA  Respiratory: CTA  Cardiovascular/Edema: RRR, normal S1, normal S2, no edema  Gastrointestinal: Soft, flat, nondistended, nontender  Neurologic: Nonfocal  Skin: Warm, dry, no rashes, no lesions  Access: RIJ tunneled dialysis catheter    Scheduled Meds:   hydrALAZINE  25 mg Oral 3 times per day    amLODIPine  10 mg Oral Daily    aspirin  81 mg Oral Daily    atorvastatin  40 mg Oral Daily    carvedilol  25 mg Oral BID WC    isosorbide dinitrate  20 mg Oral TID    levothyroxine  100 mcg Oral Daily    mirtazapine  7.5 mg Oral Nightly    sacubitril-valsartan  1 tablet Oral BID    sertraline  50 mg Oral Daily    sodium chloride flush  5-40 mL IntraVENous 2 times per day    heparin (porcine)  5,000 Units SubCUTAneous 3 times per day     Continuous Infusions:   sodium chloride      dextrose       PRN Meds:.sodium chloride flush, sodium chloride, ondansetron **OR** ondansetron, polyethylene glycol, acetaminophen **OR** acetaminophen, glucose, dextrose bolus **OR** dextrose bolus, glucagon (rDNA), dextrose, labetalol    DATA:    CBC with Differential:    Lab Results   Component Value Date/Time    WBC 6.3 02/24/2023 04:56 AM    RBC 4.04 02/24/2023 04:56 AM    HGB 12.6 02/24/2023 04:56 AM    HCT 38.7 02/24/2023 04:56 AM     02/24/2023 04:56 AM    MCV 95.8 02/24/2023 04:56 AM    MCH 31.2 02/24/2023 04:56 AM MCHC 32.6 02/24/2023 04:56 AM    RDW 14.1 02/24/2023 04:56 AM    NRBC 0.0 09/13/2022 06:59 AM    SEGSPCT 55 06/10/2013 04:00 AM    LYMPHOPCT 11.8 02/24/2023 04:56 AM    MONOPCT 10.9 02/24/2023 04:56 AM    MYELOPCT 1.0 02/23/2023 01:35 PM    BASOPCT 0.6 02/24/2023 04:56 AM    MONOSABS 0.69 02/24/2023 04:56 AM    LYMPHSABS 0.75 02/24/2023 04:56 AM    EOSABS 0.02 02/24/2023 04:56 AM    BASOSABS 0.04 02/24/2023 04:56 AM     CMP:    Lab Results   Component Value Date/Time     02/24/2023 04:56 AM    K 4.0 02/24/2023 04:56 AM    K 4.0 02/24/2023 04:56 AM    CL 99 02/24/2023 04:56 AM    CO2 22 02/24/2023 04:56 AM    BUN 34 02/24/2023 04:56 AM    CREATININE 7.6 02/24/2023 04:56 AM    GFRAA 11 09/15/2022 02:27 AM    LABGLOM 7 02/24/2023 04:56 AM    GLUCOSE 91 02/24/2023 04:56 AM    PROT 6.4 02/24/2023 04:56 AM    LABALBU 3.1 02/24/2023 04:56 AM    CALCIUM 7.9 02/24/2023 04:56 AM    BILITOT 0.9 02/24/2023 04:56 AM    ALKPHOS 71 02/24/2023 04:56 AM    AST 18 02/24/2023 04:56 AM    ALT 17 02/24/2023 04:56 AM     Magnesium:    Lab Results   Component Value Date/Time    MG 2.4 02/22/2023 02:35 PM     Phosphorus:    Lab Results   Component Value Date/Time    PHOS 4.8 02/24/2023 04:56 AM     Radiology Review:    CTAP 2/22/2023      No acute inflammatory process in the abdomen and pelvis. Atrophic kidneys with nephrocalcinosis. CXR 2/22/2023      No acute process. BRIEF SUMMARY OF INITIAL CONSULT:  Haritha Vargas is a 67 y.o. male, past medical history significant for ESRD on HD 3 times weekly Tuesday, Thursday, Saturday via right IJ, HTN, type II DM, HFrEF 35 to 40% with stage I diastolic dysfunction, hypothyroidism, HCV, recently evaluated in the ED under police order for reportedly swerving while driving, who presented to the ED on 2/22/2023 with a chief complaint of fatigue, (last HD treatment Friday) and diarrhea.   Lab work on admission significant for potassium level 5.9, bicarbonate 18, creatinine 11.7, anion gap 20, lactic acid 3.0, proBNP 29,091, patient admitted for further evaluation and treatment, we are asked to see this patient in consultation for hemodialysis management. Problems Resolved:  Hyperkalemia 2/2 decreased GFR and noncompliance with HD, for HD today    IMPRESSION/RECOMMENDATIONS:      ESRD on IHD three times weekly T-Th-S, via RIJ.      HTN, noncompliant with medications, on amlodipine and carvedilol, added hydralazine 2/23/23  HFrEF 35-40% with stage I DD, on carvedilol and Entresto   MBD of CKD, not on binders  Anemia of CKD, hold NIXON  -----------------------------------------------------------------------  Thrombocytopenia, platelet count 92, recommend holding heparin  Diarrhea, rule out C. difficile  Nutrition: Low potassium diet, 1 L fluid restriction  Social detriments to health: After home evaluation by our office, it was decided the patient is not a candidate for home hemodialysis, serious concerns about home safety including functioning utilities, patient was without the ability to make meals, very poor medication safety--recommend placement. Plan:     HD three times weekly T-Th-S, patient seen on HD today, decrease flow to 300/400, run even   Patient has a history of noncompliance with treatment due to subjective intolerance to HD, would likely benefit from slower flow and less fluid removal, initially patient refused to attend any center for HD and would only consider home hemodialysis, unfortunately he is not a candidate, discussed with patient at length today--he is now amenable to attending a center for dialysis with a lower blood flow and less/no fluid removal. Restarted on Bumex today, maximize residual renal function.     Decrease hydralazine to 10 mg 3 times daily  Resume Bumex 2 mg BID  Continue amlodipine 10 mg daily  Continue carvedilol 25 mg p.o. twice daily  Continue sacubitril-valsartan  twice daily  Continue to monitor BP  Continue to monitor labs    Electronically signed by KIANNA Valverde CNP on 2/24/2023 at 3:36 PM

## 2023-02-24 NOTE — CARE COORDINATION
Transition of Care-Patient requesting SNF placement, PT/OT unable to see yesterday-was off unit for dialysis. Patient's car is here and he stated he has no one to return it to his home. ID consulted d/t fever, to see today. Follow up with patient once therapy evaluates.      Jose Angel SUMNERN, RN  [facility]

## 2023-02-25 LAB
ALBUMIN SERPL-MCNC: 3.1 G/DL (ref 3.5–5.2)
ALP BLD-CCNC: 72 U/L (ref 40–129)
ALT SERPL-CCNC: 14 U/L (ref 0–40)
ANION GAP SERPL CALCULATED.3IONS-SCNC: 10 MMOL/L (ref 7–16)
AST SERPL-CCNC: 17 U/L (ref 0–39)
ATYPICAL LYMPHOCYTE RELATIVE PERCENT: 1.7 % (ref 0–4)
BASOPHILS ABSOLUTE: 0.11 E9/L (ref 0–0.2)
BASOPHILS RELATIVE PERCENT: 1.7 % (ref 0–2)
BILIRUB SERPL-MCNC: 1.1 MG/DL (ref 0–1.2)
BLASTS RELATIVE PERCENT: 0.9 % (ref 0–0)
BUN BLDV-MCNC: 24 MG/DL (ref 6–23)
CALCIUM SERPL-MCNC: 7.7 MG/DL (ref 8.6–10.2)
CHLORIDE BLD-SCNC: 98 MMOL/L (ref 98–107)
CO2: 27 MMOL/L (ref 22–29)
CREAT SERPL-MCNC: 5.6 MG/DL (ref 0.7–1.2)
CULTURE, STOOL: NORMAL
EOSINOPHILS ABSOLUTE: 0 E9/L (ref 0.05–0.5)
EOSINOPHILS RELATIVE PERCENT: 0 % (ref 0–6)
GFR SERPL CREATININE-BSD FRML MDRD: 10 ML/MIN/1.73
GLUCOSE BLD-MCNC: 108 MG/DL (ref 74–99)
HCT VFR BLD CALC: 35 % (ref 37–54)
HEMOGLOBIN: 11.2 G/DL (ref 12.5–16.5)
HYPOCHROMIA: ABNORMAL
LYMPHOCYTES ABSOLUTE: 0.74 E9/L (ref 1.5–4)
LYMPHOCYTES RELATIVE PERCENT: 9.6 % (ref 20–42)
MAGNESIUM: 1.9 MG/DL (ref 1.6–2.6)
MCH RBC QN AUTO: 30.8 PG (ref 26–35)
MCHC RBC AUTO-ENTMCNC: 32 % (ref 32–34.5)
MCV RBC AUTO: 96.2 FL (ref 80–99.9)
MONOCYTES ABSOLUTE: 0.47 E9/L (ref 0.1–0.95)
MONOCYTES RELATIVE PERCENT: 7 % (ref 2–12)
NEUTROPHILS ABSOLUTE: 5.29 E9/L (ref 1.8–7.3)
NEUTROPHILS RELATIVE PERCENT: 79.1 % (ref 43–80)
NUCLEATED RED BLOOD CELLS: 0 /100 WBC
OVALOCYTES: ABNORMAL
PDW BLD-RTO: 14 FL (ref 11.5–15)
PHOSPHORUS: 3.2 MG/DL (ref 2.5–4.5)
PLATELET # BLD: 102 E9/L (ref 130–450)
PMV BLD AUTO: 11.7 FL (ref 7–12)
POLYCHROMASIA: ABNORMAL
POTASSIUM REFLEX MAGNESIUM: 3.5 MMOL/L (ref 3.5–5)
POTASSIUM SERPL-SCNC: 3.5 MMOL/L (ref 3.5–5)
RBC # BLD: 3.64 E12/L (ref 3.8–5.8)
SODIUM BLD-SCNC: 135 MMOL/L (ref 132–146)
TOTAL PROTEIN: 6.2 G/DL (ref 6.4–8.3)
WBC # BLD: 6.7 E9/L (ref 4.5–11.5)

## 2023-02-25 PROCEDURE — 6370000000 HC RX 637 (ALT 250 FOR IP): Performed by: INTERNAL MEDICINE

## 2023-02-25 PROCEDURE — 85025 COMPLETE CBC W/AUTO DIFF WBC: CPT

## 2023-02-25 PROCEDURE — 6360000002 HC RX W HCPCS: Performed by: INTERNAL MEDICINE

## 2023-02-25 PROCEDURE — 2580000003 HC RX 258: Performed by: INTERNAL MEDICINE

## 2023-02-25 PROCEDURE — 36415 COLL VENOUS BLD VENIPUNCTURE: CPT

## 2023-02-25 PROCEDURE — 99232 SBSQ HOSP IP/OBS MODERATE 35: CPT | Performed by: INTERNAL MEDICINE

## 2023-02-25 PROCEDURE — 1200000000 HC SEMI PRIVATE

## 2023-02-25 PROCEDURE — 80048 BASIC METABOLIC PNL TOTAL CA: CPT

## 2023-02-25 PROCEDURE — 80053 COMPREHEN METABOLIC PANEL: CPT

## 2023-02-25 PROCEDURE — 90935 HEMODIALYSIS ONE EVALUATION: CPT

## 2023-02-25 PROCEDURE — 83735 ASSAY OF MAGNESIUM: CPT

## 2023-02-25 PROCEDURE — 84100 ASSAY OF PHOSPHORUS: CPT

## 2023-02-25 PROCEDURE — 6370000000 HC RX 637 (ALT 250 FOR IP): Performed by: LICENSED PRACTICAL NURSE

## 2023-02-25 RX ADMIN — AMLODIPINE BESYLATE 10 MG: 10 TABLET ORAL at 18:03

## 2023-02-25 RX ADMIN — SERTRALINE HYDROCHLORIDE 50 MG: 50 TABLET ORAL at 18:04

## 2023-02-25 RX ADMIN — LEVOTHYROXINE SODIUM 100 MCG: 100 TABLET ORAL at 05:25

## 2023-02-25 RX ADMIN — BUMETANIDE 2 MG: 1 TABLET ORAL at 20:00

## 2023-02-25 RX ADMIN — MIRTAZAPINE 7.5 MG: 15 TABLET, FILM COATED ORAL at 20:01

## 2023-02-25 RX ADMIN — ATORVASTATIN CALCIUM 40 MG: 40 TABLET, FILM COATED ORAL at 20:01

## 2023-02-25 RX ADMIN — CARVEDILOL 25 MG: 25 TABLET, FILM COATED ORAL at 18:04

## 2023-02-25 RX ADMIN — HEPARIN SODIUM 5000 UNITS: 10000 INJECTION INTRAVENOUS; SUBCUTANEOUS at 20:01

## 2023-02-25 RX ADMIN — ISOSORBIDE DINITRATE 20 MG: 10 TABLET ORAL at 20:00

## 2023-02-25 RX ADMIN — ASPIRIN 81 MG: 81 TABLET, COATED ORAL at 18:02

## 2023-02-25 RX ADMIN — HEPARIN SODIUM 5000 UNITS: 10000 INJECTION INTRAVENOUS; SUBCUTANEOUS at 05:25

## 2023-02-25 RX ADMIN — Medication 10 ML: at 20:01

## 2023-02-25 RX ADMIN — Medication 10 ML: at 18:05

## 2023-02-25 NOTE — PROGRESS NOTES
[facility] Hospital[facility]   Progress Note    Admitting Date and Time: 2/22/2023  2:06 PM  Admit Dx: Nausea [R11.0]  ESRD on hemodialysis ([facility] [address] [N18.6, Z99.2]  End-stage renal disease needing dialysis ([facility] [address] [N18.6, Z99.2]  ESRD needing dialysis ([facility] [address] [N18.6, Z99.2]  Diarrhea, unspecified type [R19.7]    Subjective:    Patient was admitted with Nausea [R11.0]  ESRD on hemodialysis ([facility] [address] [N18.6, Z99.2]  End-stage renal disease needing dialysis ([facility] [address] [N18.6, Z99.2]  ESRD needing dialysis ([facility] [address] [N18.6, Z99.2]  Diarrhea, unspecified type [R19.7].  Patient denies fever, chills, cp, sob, n/v.     hydrALAZINE  10 mg Oral 3 times per day    bumetanide  2 mg Oral BID    amLODIPine  10 mg Oral Daily    aspirin  81 mg Oral Daily    atorvastatin  40 mg Oral Daily    carvedilol  25 mg Oral BID WC    isosorbide dinitrate  20 mg Oral TID    levothyroxine  100 mcg Oral Daily    mirtazapine  7.5 mg Oral Nightly    sacubitril-valsartan  1 tablet Oral BID    sertraline  50 mg Oral Daily    sodium chloride flush  5-40 mL IntraVENous 2 times per day    heparin (porcine)  5,000 Units SubCUTAneous 3 times per day     sodium chloride flush, 5-40 mL, PRN  sodium chloride, , PRN  ondansetron, 4 mg, Q8H PRN   Or  ondansetron, 4 mg, Q6H PRN  polyethylene glycol, 17 g, Daily PRN  acetaminophen, 650 mg, Q6H PRN   Or  acetaminophen, 650 mg, Q6H PRN  glucose, 4 tablet, PRN  dextrose bolus, 125 mL, PRN   Or  dextrose bolus, 250 mL, PRN  glucagon (rDNA), 1 mg, PRN  dextrose, , Continuous PRN  labetalol, 10 mg, Q6H PRN         Objective:    BP (!) 153/67   Pulse 91   Temp 99.9 °F (37.7 °C) (Oral)   Resp 18   Ht 6' 2\" (1.88 m)   Wt 143 lb 11.8 oz (65.2 kg)   SpO2 100%   BMI 18.46 kg/m²   Skin: warm and dry, no rash or erythema  Pulmonary/Chest: clear to auscultation bilaterally- no wheezes, rales or rhonchi, normal air movement, no respiratory distress  Cardiovascular: rhythm reg at rate of 90  Abdomen: soft, non-tender, non-distended, normal bowel sounds, no masses or organomegaly  Extremities: no cyanosis, no clubbing, and no edema      Recent Labs     02/22/23  1435 02/22/23  1830 02/23/23  1335 02/24/23  0456     --  136 133   K 5.9*   < > 4.1 4.0  4.0   CL 99  --  100 99   CO2 18*  --  23 22   BUN 68*  --  47* 34*   CREATININE 11.7*  --  8.0* 7.6*   GLUCOSE 133*  --  97 91   CALCIUM 7.9*  --  8.1* 7.9*    < > = values in this interval not displayed.        Recent Labs     02/22/23  1435 02/23/23  1335 02/24/23  0456   WBC 2.7* 2.1* 6.3   RBC 4.72 3.85 4.04   HGB 14.3 12.2* 12.6   HCT 44.2 35.9* 38.7   MCV 93.6 93.2 95.8   MCH 30.3 31.7 31.2   MCHC 32.4 34.0 32.6   RDW 14.4 14.0 14.1   PLT 92* 117* 112*   MPV 11.1 11.3 10.7       CBC with Differential:    Lab Results   Component Value Date/Time    WBC 6.3 02/24/2023 04:56 AM    RBC 4.04 02/24/2023 04:56 AM    HGB 12.6 02/24/2023 04:56 AM    HCT 38.7 02/24/2023 04:56 AM     02/24/2023 04:56 AM    MCV 95.8 02/24/2023 04:56 AM    MCH 31.2 02/24/2023 04:56 AM    MCHC 32.6 02/24/2023 04:56 AM    RDW 14.1 02/24/2023 04:56 AM    NRBC 0.0 09/13/2022 06:59 AM    SEGSPCT 55 06/10/2013 04:00 AM    LYMPHOPCT 11.8 02/24/2023 04:56 AM    MONOPCT 10.9 02/24/2023 04:56 AM    MYELOPCT 1.0 02/23/2023 01:35 PM    BASOPCT 0.6 02/24/2023 04:56 AM    MONOSABS 0.69 02/24/2023 04:56 AM    LYMPHSABS 0.75 02/24/2023 04:56 AM    EOSABS 0.02 02/24/2023 04:56 AM    BASOSABS 0.04 02/24/2023 04:56 AM     CMP:    Lab Results   Component Value Date/Time     02/24/2023 04:56 AM    K 4.0 02/24/2023 04:56 AM    K 4.0 02/24/2023 04:56 AM    CL 99 02/24/2023 04:56 AM    CO2 22 02/24/2023 04:56 AM    BUN 34 02/24/2023 04:56 AM    CREATININE 7.6 02/24/2023 04:56 AM    GFRAA 11 09/15/2022 02:27 AM    LABGLOM 7 02/24/2023 04:56 AM    GLUCOSE 91 02/24/2023 04:56 AM    PROT 6.4 02/24/2023 04:56 AM    LABALBU 3.1 02/24/2023 04:56 AM    CALCIUM 7.9 02/24/2023 04:56 AM    BILITOT 0.9 02/24/2023 04:56 AM ALKPHOS 71 02/24/2023 04:56 AM    AST 18 02/24/2023 04:56 AM    ALT 17 02/24/2023 04:56 AM     BMP:    Lab Results   Component Value Date/Time     02/24/2023 04:56 AM    K 4.0 02/24/2023 04:56 AM    K 4.0 02/24/2023 04:56 AM    CL 99 02/24/2023 04:56 AM    CO2 22 02/24/2023 04:56 AM    BUN 34 02/24/2023 04:56 AM    LABALBU 3.1 02/24/2023 04:56 AM    CREATININE 7.6 02/24/2023 04:56 AM    CALCIUM 7.9 02/24/2023 04:56 AM    GFRAA 11 09/15/2022 02:27 AM    LABGLOM 7 02/24/2023 04:56 AM    GLUCOSE 91 02/24/2023 04:56 AM     Phosphorus:    Lab Results   Component Value Date/Time    PHOS 4.8 02/24/2023 04:56 AM        Radiology:   XR CHEST (2 VW)   Final Result   No acute process. CT ABDOMEN PELVIS WO CONTRAST Additional Contrast? None   Final Result   No acute inflammatory process in the abdomen and pelvis. Atrophic kidneys with nephrocalcinosis. Assessment:    Principal Problem:    ESRD needing dialysis ([facility] [address]  Active Problems:    ESRD on hemodialysis ([facility] [address]    Diarrhea    Primary hypertension    ESRD (end stage renal disease) ([facility] [address]  Resolved Problems:    * No resolved hospital problems. *      Plan:  Fever  appreciate ID input.  Check BC's  Diarrhea presumed to be viral gastroenteritis check workup and supportive care  ESRD  HD  nephrology following  Htn monitor and tx with bp medicine  Hypothyroidism continue med  Depression continue med  Hyperkalemia monitor and tx  Acidosis/elevated lactic acid level monitor  Thrombocytopenia monitor        Electronically signed by Carole Litten, DO on 2/24/2023 at 7:05 PM

## 2023-02-25 NOTE — PROGRESS NOTES
Occupational Therapy  OT consult received to eval/treat and chart review complete. Patient was off the floor for dialysis, unavailable for OT evaluation. OT to re-attempt at a later date as able and appropriate.      Corinne Zavala OTR/L  License Number: [id number]

## 2023-02-25 NOTE — FLOWSHEET NOTE
02/25/23 1136   Vital Signs   BP (!) 148/92   Temp 98 °F (36.7 °C)   Heart Rate 69   Resp 16   Weight 139 lb 8.8 oz (63.3 kg)   Weight Method Bed scale   Percent Weight Change -0.63   Post-Hemodialysis Assessment   Post-Treatment Procedures Blood returned;Catheter capped, clamped and heparinized x 2 ports   Machine Disinfection Process Exterior Machine Disinfection   Rinseback Volume (ml) 300 ml   Blood Volume Processed (Liters) 63.8 l/min   Dialyzer Clearance Lightly streaked   Duration of Treatment (minutes) 210 minutes   Heparin Amount Administered During Treatment (mL) 0 mL   Hemodialysis Intake (ml) 300 ml   Hemodialysis Output (ml) 700 ml   NET Removed (ml) 400   Tolerated Treatment Good   Patient Response to Treatment pt signed off tx w/ 25min remaining, stated he was cramping attempted to turn off UF but refused and wanted off tx. Time Off 1132   Patient Disposition Return to room   Completed 3.5 of prescribed 4 hr tx on 3 k 2.5 ca bath, 300 ml removed with out difficulty. HD CVC flushed, heparin to dwell, clamped and capped  post tx per policy. Stated he wanted off because he was cramping, attempted to turn UF off but refused and wanted off now. Report to floor RN, remains in care of staff.

## 2023-02-25 NOTE — PROGRESS NOTES
[facility] Hospital[facility]   Progress Note    Admitting Date and Time: 2/22/2023  2:06 PM  Admit Dx: Nausea [R11.0]  ESRD on hemodialysis ([facility] [address] [N18.6, Z99.2]  End-stage renal disease needing dialysis ([facility] [address] [N18.6, Z99.2]  ESRD needing dialysis ([facility] [address] [N18.6, Z99.2]  Diarrhea, unspecified type [R19.7]    Subjective:    Patient was admitted with Nausea [R11.0]  ESRD on hemodialysis ([facility] [address] [N18.6, Z99.2]  End-stage renal disease needing dialysis ([facility] [address] [N18.6, Z99.2]  ESRD needing dialysis ([facility] [address] [N18.6, Z99.2]  Diarrhea, unspecified type [R19.7].  Patient denies fever, chills, cp, sob, n/v.     bumetanide  2 mg Oral BID    amLODIPine  10 mg Oral Daily    aspirin  81 mg Oral Daily    atorvastatin  40 mg Oral Daily    carvedilol  25 mg Oral BID WC    isosorbide dinitrate  20 mg Oral TID    levothyroxine  100 mcg Oral Daily    mirtazapine  7.5 mg Oral Nightly    sacubitril-valsartan  1 tablet Oral BID    sertraline  50 mg Oral Daily    sodium chloride flush  5-40 mL IntraVENous 2 times per day    heparin (porcine)  5,000 Units SubCUTAneous 3 times per day     sodium chloride flush, 5-40 mL, PRN  sodium chloride, , PRN  ondansetron, 4 mg, Q8H PRN   Or  ondansetron, 4 mg, Q6H PRN  polyethylene glycol, 17 g, Daily PRN  acetaminophen, 650 mg, Q6H PRN   Or  acetaminophen, 650 mg, Q6H PRN  glucose, 4 tablet, PRN  dextrose bolus, 125 mL, PRN   Or  dextrose bolus, 250 mL, PRN  glucagon (rDNA), 1 mg, PRN  dextrose, , Continuous PRN  labetalol, 10 mg, Q6H PRN         Objective:    BP (!) 148/74   Pulse 93   Temp 100.4 °F (38 °C) (Oral)   Resp 17   Ht 6' 2\" (1.88 m)   Wt 139 lb 8.8 oz (63.3 kg)   SpO2 97%   BMI 17.92 kg/m²   Skin: warm and dry, no rash or erythema  Pulmonary/Chest: clear to auscultation bilaterally- no wheezes, rales or rhonchi, normal air movement, no respiratory distress  Cardiovascular: rhythm reg at rate of 96  Abdomen: soft, non-tender, non-distended, normal bowel sounds, no masses or organomegaly  Extremities: no cyanosis, no clubbing, and no edema      Recent Labs     02/23/23  1335 02/24/23  0456 02/25/23  0508    133 135   K 4.1 4.0  4.0 3.5  3.5    99 98   CO2 23 22 27   BUN 47* 34* 24*   CREATININE 8.0* 7.6* 5.6*   GLUCOSE 97 91 108*   CALCIUM 8.1* 7.9* 7.7*       Recent Labs     02/23/23  1335 02/24/23  0456 02/25/23  0508   WBC 2.1* 6.3 6.7   RBC 3.85 4.04 3.64*   HGB 12.2* 12.6 11.2*   HCT 35.9* 38.7 35.0*   MCV 93.2 95.8 96.2   MCH 31.7 31.2 30.8   MCHC 34.0 32.6 32.0   RDW 14.0 14.1 14.0   * 112* 102*   MPV 11.3 10.7 11.7       CBC with Differential:    Lab Results   Component Value Date/Time    WBC 6.7 02/25/2023 05:08 AM    RBC 3.64 02/25/2023 05:08 AM    HGB 11.2 02/25/2023 05:08 AM    HCT 35.0 02/25/2023 05:08 AM     02/25/2023 05:08 AM    MCV 96.2 02/25/2023 05:08 AM    MCH 30.8 02/25/2023 05:08 AM    MCHC 32.0 02/25/2023 05:08 AM    RDW 14.0 02/25/2023 05:08 AM    NRBC 0.0 02/25/2023 05:08 AM    SEGSPCT 55 06/10/2013 04:00 AM    BLASTSPCT 0.9 02/25/2023 05:08 AM    LYMPHOPCT 9.6 02/25/2023 05:08 AM    MONOPCT 7.0 02/25/2023 05:08 AM    MYELOPCT 1.0 02/23/2023 01:35 PM    BASOPCT 1.7 02/25/2023 05:08 AM    MONOSABS 0.47 02/25/2023 05:08 AM    LYMPHSABS 0.74 02/25/2023 05:08 AM    EOSABS 0.00 02/25/2023 05:08 AM    BASOSABS 0.11 02/25/2023 05:08 AM     CMP:    Lab Results   Component Value Date/Time     02/25/2023 05:08 AM    K 3.5 02/25/2023 05:08 AM    K 3.5 02/25/2023 05:08 AM    CL 98 02/25/2023 05:08 AM    CO2 27 02/25/2023 05:08 AM    BUN 24 02/25/2023 05:08 AM    CREATININE 5.6 02/25/2023 05:08 AM    GFRAA 11 09/15/2022 02:27 AM    LABGLOM 10 02/25/2023 05:08 AM    GLUCOSE 108 02/25/2023 05:08 AM    PROT 6.2 02/25/2023 05:08 AM    LABALBU 3.1 02/25/2023 05:08 AM    CALCIUM 7.7 02/25/2023 05:08 AM    BILITOT 1.1 02/25/2023 05:08 AM    ALKPHOS 72 02/25/2023 05:08 AM    AST 17 02/25/2023 05:08 AM    ALT 14 02/25/2023 05:08 AM     BMP:    Lab Results   Component Value Date/Time     02/25/2023 05:08 AM    K 3.5 02/25/2023 05:08 AM    K 3.5 02/25/2023 05:08 AM    CL 98 02/25/2023 05:08 AM    CO2 27 02/25/2023 05:08 AM    BUN 24 02/25/2023 05:08 AM    LABALBU 3.1 02/25/2023 05:08 AM    CREATININE 5.6 02/25/2023 05:08 AM    CALCIUM 7.7 02/25/2023 05:08 AM    GFRAA 11 09/15/2022 02:27 AM    LABGLOM 10 02/25/2023 05:08 AM    GLUCOSE 108 02/25/2023 05:08 AM     Magnesium:    Lab Results   Component Value Date/Time    MG 1.9 02/25/2023 05:08 AM     Phosphorus:    Lab Results   Component Value Date/Time    PHOS 3.2 02/25/2023 05:08 AM        Radiology:   XR CHEST (2 VW)   Final Result   No acute process. CT ABDOMEN PELVIS WO CONTRAST Additional Contrast? None   Final Result   No acute inflammatory process in the abdomen and pelvis. Atrophic kidneys with nephrocalcinosis. Assessment:    Principal Problem:    ESRD needing dialysis ([facility] [address]  Active Problems:    ESRD on hemodialysis ([facility] [address]    Diarrhea    Primary hypertension    ESRD (end stage renal disease) ([facility] [address]  Resolved Problems:    * No resolved hospital problems. *      Plan:  Fever  appreciate ID input.  Continue to monitor BC's  Diarrhea presumed to be viral gastroenteritis check workup and supportive care  ESRD  HD  nephrology following  Htn monitor and tx with bp medicine  Hypothyroidism continue med  Depression continue med  Hyperkalemia monitor and tx  Acidosis/elevated lactic acid level monitor  Thrombocytopenia monitor          Electronically signed by Caryle Navy, DO on 2/25/2023 at 5:40 PM

## 2023-02-25 NOTE — PROGRESS NOTES
Department of Internal Medicine  Nephrology Progress Note    Events reviewed. Patient seen on dialysis, tolerating well. SUBJECTIVE: We are following Haritha Vargas for ESRD on HD. Patient reports no new complaints.       PHYSICAL EXAM:      Vitals:    VITALS:  /66   Pulse 62   Temp 98.3 °F (36.8 °C)   Resp 16   Ht 6' 2\" (1.88 m)   Wt 140 lb 6.9 oz (63.7 kg)   SpO2 98%   BMI 18.03 kg/m²   24HR INTAKE/OUTPUT:    Intake/Output Summary (Last 24 hours) at 2/25/2023 5771  Last data filed at 2/25/2023 4006  Gross per 24 hour   Intake 780 ml   Output 900 ml   Net -120 ml         Constitutional: Alert, oriented, NAD  HEENT: Normocephalic, atraumatic, PERRLA  Respiratory: CTA  Cardiovascular/Edema: RRR, normal S1, normal S2, no edema  Gastrointestinal: Soft, flat, nondistended, nontender  Neurologic: Nonfocal  Skin: Warm, dry, no rashes, no lesions  Access: RIJ tunneled dialysis catheter    Scheduled Meds:   hydrALAZINE  10 mg Oral 3 times per day    bumetanide  2 mg Oral BID    amLODIPine  10 mg Oral Daily    aspirin  81 mg Oral Daily    atorvastatin  40 mg Oral Daily    carvedilol  25 mg Oral BID WC    isosorbide dinitrate  20 mg Oral TID    levothyroxine  100 mcg Oral Daily    mirtazapine  7.5 mg Oral Nightly    sacubitril-valsartan  1 tablet Oral BID    sertraline  50 mg Oral Daily    sodium chloride flush  5-40 mL IntraVENous 2 times per day    heparin (porcine)  5,000 Units SubCUTAneous 3 times per day     Continuous Infusions:   sodium chloride      dextrose       PRN Meds:.sodium chloride flush, sodium chloride, ondansetron **OR** ondansetron, polyethylene glycol, acetaminophen **OR** acetaminophen, glucose, dextrose bolus **OR** dextrose bolus, glucagon (rDNA), dextrose, labetalol    DATA:    CBC with Differential:    Lab Results   Component Value Date/Time    WBC 6.7 02/25/2023 05:08 AM    RBC 3.64 02/25/2023 05:08 AM    HGB 11.2 02/25/2023 05:08 AM    HCT 35.0 02/25/2023 05:08 AM     02/25/2023 05:08 AM    MCV 96.2 02/25/2023 05:08 AM    MCH 30.8 02/25/2023 05:08 AM    MCHC 32.0 02/25/2023 05:08 AM    RDW 14.0 02/25/2023 05:08 AM    NRBC 0.0 02/25/2023 05:08 AM    SEGSPCT 55 06/10/2013 04:00 AM    BLASTSPCT 0.9 02/25/2023 05:08 AM    LYMPHOPCT 9.6 02/25/2023 05:08 AM    MONOPCT 7.0 02/25/2023 05:08 AM    MYELOPCT 1.0 02/23/2023 01:35 PM    BASOPCT 1.7 02/25/2023 05:08 AM    MONOSABS 0.47 02/25/2023 05:08 AM    LYMPHSABS 0.74 02/25/2023 05:08 AM    EOSABS 0.00 02/25/2023 05:08 AM    BASOSABS 0.11 02/25/2023 05:08 AM     CMP:    Lab Results   Component Value Date/Time     02/25/2023 05:08 AM    K 3.5 02/25/2023 05:08 AM    K 3.5 02/25/2023 05:08 AM    CL 98 02/25/2023 05:08 AM    CO2 27 02/25/2023 05:08 AM    BUN 24 02/25/2023 05:08 AM    CREATININE 5.6 02/25/2023 05:08 AM    GFRAA 11 09/15/2022 02:27 AM    LABGLOM 10 02/25/2023 05:08 AM    GLUCOSE 108 02/25/2023 05:08 AM    PROT 6.2 02/25/2023 05:08 AM    LABALBU 3.1 02/25/2023 05:08 AM    CALCIUM 7.7 02/25/2023 05:08 AM    BILITOT 1.1 02/25/2023 05:08 AM    ALKPHOS 72 02/25/2023 05:08 AM    AST 17 02/25/2023 05:08 AM    ALT 14 02/25/2023 05:08 AM     Magnesium:    Lab Results   Component Value Date/Time    MG 1.9 02/25/2023 05:08 AM     Phosphorus:    Lab Results   Component Value Date/Time    PHOS 3.2 02/25/2023 05:08 AM     Radiology Review:    CTAP 2/22/2023      No acute inflammatory process in the abdomen and pelvis. Atrophic kidneys with nephrocalcinosis. CXR 2/22/2023      No acute process.       BRIEF SUMMARY OF INITIAL CONSULT:  Romana Palm is a 67 y.o. male, past medical history significant for ESRD on HD 3 times weekly Tuesday, Thursday, Saturday via right IJ, HTN, type II DM, HFrEF 35 to 40% with stage I diastolic dysfunction, hypothyroidism, HCV, recently evaluated in the ED under police order for reportedly swerving while driving, who presented to the ED on 2/22/2023 with a chief complaint of fatigue, (last HD treatment Friday) and diarrhea. Lab work on admission significant for potassium level 5.9, bicarbonate 18, creatinine 11.7, anion gap 20, lactic acid 3.0, proBNP 29,091, patient admitted for further evaluation and treatment, we are asked to see this patient in consultation for hemodialysis management. Problems Resolved:  Hyperkalemia 2/2 decreased GFR and noncompliance with HD, for HD today    IMPRESSION/RECOMMENDATIONS:      ESRD on IHD three times weekly T-Th-S, via RIJ. Patient has a history of noncompliance with treatment due to subjective intolerance to HD. Patient shortened his treatment yesterday. BFR and DFR for decrease, hopefully he does complete length of HD treatment today. HTN, noncompliant with medications, on amlodipine and carvedilol, added hydralazine 2/23/23  HFrEF 35-40% with stage I DD, on carvedilol and Entresto   MBD of CKD, not on binders  Anemia of CKD, hold NIXON  -----------------------------------------------------------------------  Thrombocytopenia, platelet count 92, recommend holding heparin  Diarrhea, rule out C. difficile  Nutrition: Low potassium diet, 1 L fluid restriction  Social detriments to health: After home evaluation by our office, it was decided the patient is not a candidate for home hemodialysis, serious concerns about home safety including functioning utilities, patient was without the ability to make meals, very poor medication safety--recommend placement. Plan:    HD three times weekly T-Th-S, decrease blood flow rate and dialysate flow rate with minimal fluid removal today.   Discontinue hydralazine  Resume Bumex 2 mg BID  Continue amlodipine 10 mg daily  Continue carvedilol 25 mg p.o. twice daily  Continue sacubitril-valsartan  twice daily  Continue to monitor BP  Continue to monitor labs    Electronically signed by KIANNA Gutierrez CNP on 2/25/2023 at 9:34 AM

## 2023-02-25 NOTE — PROGRESS NOTES
[address] [place] [address] [facility]  [facility]  Progress Note    SUBJECTIVE:  Chief Complaint   Patient presents with    Fatigue     Hasn't had dialysis since Friday, nausea, dehydration    Diarrhea     Patient is tolerating medications. No reported adverse drug reactions. No nausea, vomiting, diarrhea. Seen in HD stated he is very cold     Review of systems:  As stated above in the chief complaint, otherwise negative. Medications:  Scheduled Meds:   bumetanide  2 mg Oral BID    amLODIPine  10 mg Oral Daily    aspirin  81 mg Oral Daily    atorvastatin  40 mg Oral Daily    carvedilol  25 mg Oral BID WC    isosorbide dinitrate  20 mg Oral TID    levothyroxine  100 mcg Oral Daily    mirtazapine  7.5 mg Oral Nightly    sacubitril-valsartan  1 tablet Oral BID    sertraline  50 mg Oral Daily    sodium chloride flush  5-40 mL IntraVENous 2 times per day    heparin (porcine)  5,000 Units SubCUTAneous 3 times per day     Continuous Infusions:   sodium chloride      dextrose       PRN Meds:sodium chloride flush, sodium chloride, ondansetron **OR** ondansetron, polyethylene glycol, acetaminophen **OR** acetaminophen, glucose, dextrose bolus **OR** dextrose bolus, glucagon (rDNA), dextrose, labetalol    OBJECTIVE:  /66   Pulse 62   Temp 98.3 °F (36.8 °C)   Resp 16   Ht 6' 2\" (1.88 m)   Wt 140 lb 6.9 oz (63.7 kg)   SpO2 98%   BMI 18.03 kg/m²   Temp  Av.7 °F (37.1 °C)  Min: 98 °F (36.7 °C)  Max: 100.1 °F (37.8 °C)  Constitutional: The patient is awake, alert, and oriented. Chills +  Skin: Warm and dry. No rashes were noted. HEENT: Round and reactive pupils. Moist mucous membranes. No ulcerations or thrush. Neck: Supple to movements. Chest: No use of accessory muscles to breathe. Symmetrical expansion. No wheezing, crackles or rhonchi. Cardiovascular: S1 and S2 are rhythmic and regular. No murmurs appreciated. Abdomen: Positive bowel sounds to auscultation. Benign to palpation.  No masses felt. No hepatosplenomegaly. Extremities: No clubbing, no cyanosis, no edema. Lines: Peripheral.  Right chest HD access    Laboratory and Tests Review:  Lab Results   Component Value Date    WBC 6.7 02/25/2023    WBC 6.3 02/24/2023    WBC 2.1 (L) 02/23/2023    HGB 11.2 (L) 02/25/2023    HCT 35.0 (L) 02/25/2023    MCV 96.2 02/25/2023     (L) 02/25/2023     Lab Results   Component Value Date    NEUTROABS 5.29 02/25/2023    NEUTROABS 4.80 02/24/2023    NEUTROABS 0.82 (L) 02/23/2023     No results found for: [facility]  Lab Results   Component Value Date    ALT 14 02/25/2023    AST 17 02/25/2023    ALKPHOS 72 02/25/2023    BILITOT 1.1 02/25/2023     Lab Results   Component Value Date/Time     02/25/2023 05:08 AM    K 3.5 02/25/2023 05:08 AM    K 3.5 02/25/2023 05:08 AM    CL 98 02/25/2023 05:08 AM    CO2 27 02/25/2023 05:08 AM    BUN 24 02/25/2023 05:08 AM    CREATININE 5.6 02/25/2023 05:08 AM    CREATININE 7.6 02/24/2023 04:56 AM    CREATININE 8.0 02/23/2023 01:35 PM    GFRAA 11 09/15/2022 02:27 AM    LABGLOM 10 02/25/2023 05:08 AM    GLUCOSE 108 02/25/2023 05:08 AM    PROT 6.2 02/25/2023 05:08 AM    LABALBU 3.1 02/25/2023 05:08 AM    CALCIUM 7.7 02/25/2023 05:08 AM    BILITOT 1.1 02/25/2023 05:08 AM    ALKPHOS 72 02/25/2023 05:08 AM    AST 17 02/25/2023 05:08 AM    ALT 14 02/25/2023 05:08 AM     Lab Results   Component Value Date    CRP 2.2 (H) 07/15/2022    CRP 0.3 04/21/2022    CRP 0.6 (H) 12/09/2021     Lab Results   Component Value Date    SEDRATE 63 (H) 11/28/2016     Radiology:  No acute inflammatory process in the abdomen and pelvis. Atrophic kidneys with nephrocalcinosis. Microbiology:   2/24/2023 blood culture sent  Stool culture enteric ina  Respiratory panel negative    Assessment:  Fever : r/o CLABSI  No sign of pneumonia or skin wounds or UTI (he does urinate every day)   CT A/p did not show any sign of infection  Diarrhea: he had 2 BM  None today.  Had solid BM per lab report and c.diff order was d/zenaida. Other work up pending  -Probably some component of viral gastroenteritis  ESRD on HD: right tunneled line in place         Plan:    Blood cx x 2 sent 2/24  Monitor fever curve and clinical course  Will follow with you        KIANNA Canada - CNS  9:43 AM  2/25/2023     Patient seen and examined. I had a face to face encounter with the patient. Agree with exam.  Assessment and plan as outlined above and directed by me. Addition and corrections were done as deemed appropriate. My exam and plan include: The patient is lying in bed. He is asleep. Not eating. Cultures have been taken. Awaiting for cultures. Observe off antibiotic.     Lei Carrion MD  2/25/2023  1:36 PM

## 2023-02-26 PROBLEM — F32.A DEPRESSION: Status: ACTIVE | Noted: 2023-02-26

## 2023-02-26 LAB
ANION GAP SERPL CALCULATED.3IONS-SCNC: 11 MMOL/L (ref 7–16)
BUN BLDV-MCNC: 21 MG/DL (ref 6–23)
CALCIUM SERPL-MCNC: 7.6 MG/DL (ref 8.6–10.2)
CHLORIDE BLD-SCNC: 96 MMOL/L (ref 98–107)
CO2: 26 MMOL/L (ref 22–29)
CREAT SERPL-MCNC: 4.6 MG/DL (ref 0.7–1.2)
GFR SERPL CREATININE-BSD FRML MDRD: 13 ML/MIN/1.73
GLUCOSE BLD-MCNC: 114 MG/DL (ref 74–99)
LACTIC ACID: 1.5 MMOL/L (ref 0.5–2.2)
PHOSPHORUS: 2.3 MG/DL (ref 2.5–4.5)
POTASSIUM SERPL-SCNC: 3.5 MMOL/L (ref 3.5–5)
SODIUM BLD-SCNC: 133 MMOL/L (ref 132–146)

## 2023-02-26 PROCEDURE — 36415 COLL VENOUS BLD VENIPUNCTURE: CPT

## 2023-02-26 PROCEDURE — 6360000002 HC RX W HCPCS: Performed by: INTERNAL MEDICINE

## 2023-02-26 PROCEDURE — 97161 PT EVAL LOW COMPLEX 20 MIN: CPT

## 2023-02-26 PROCEDURE — 84100 ASSAY OF PHOSPHORUS: CPT

## 2023-02-26 PROCEDURE — 83605 ASSAY OF LACTIC ACID: CPT

## 2023-02-26 PROCEDURE — 1200000000 HC SEMI PRIVATE

## 2023-02-26 PROCEDURE — 6370000000 HC RX 637 (ALT 250 FOR IP): Performed by: INTERNAL MEDICINE

## 2023-02-26 PROCEDURE — 80048 BASIC METABOLIC PNL TOTAL CA: CPT

## 2023-02-26 PROCEDURE — 2580000003 HC RX 258: Performed by: INTERNAL MEDICINE

## 2023-02-26 PROCEDURE — 97530 THERAPEUTIC ACTIVITIES: CPT

## 2023-02-26 PROCEDURE — 99232 SBSQ HOSP IP/OBS MODERATE 35: CPT | Performed by: INTERNAL MEDICINE

## 2023-02-26 RX ORDER — AMLODIPINE BESYLATE 5 MG/1
5 TABLET ORAL DAILY
Status: DISCONTINUED | OUTPATIENT
Start: 2023-02-27 | End: 2023-03-02 | Stop reason: HOSPADM

## 2023-02-26 RX ADMIN — HEPARIN SODIUM 5000 UNITS: 10000 INJECTION INTRAVENOUS; SUBCUTANEOUS at 20:22

## 2023-02-26 RX ADMIN — ASPIRIN 81 MG: 81 TABLET, COATED ORAL at 10:37

## 2023-02-26 RX ADMIN — ISOSORBIDE DINITRATE 20 MG: 10 TABLET ORAL at 20:21

## 2023-02-26 RX ADMIN — Medication 10 ML: at 20:42

## 2023-02-26 RX ADMIN — Medication 10 ML: at 10:38

## 2023-02-26 RX ADMIN — ATORVASTATIN CALCIUM 40 MG: 40 TABLET, FILM COATED ORAL at 20:21

## 2023-02-26 RX ADMIN — MIRTAZAPINE 7.5 MG: 15 TABLET, FILM COATED ORAL at 20:21

## 2023-02-26 RX ADMIN — SERTRALINE HYDROCHLORIDE 50 MG: 50 TABLET ORAL at 10:37

## 2023-02-26 RX ADMIN — LEVOTHYROXINE SODIUM 100 MCG: 100 TABLET ORAL at 06:13

## 2023-02-26 RX ADMIN — HEPARIN SODIUM 5000 UNITS: 10000 INJECTION INTRAVENOUS; SUBCUTANEOUS at 14:26

## 2023-02-26 ASSESSMENT — PAIN SCALES - GENERAL: PAINLEVEL_OUTOF10: 0

## 2023-02-26 NOTE — PROGRESS NOTES
[facility]   Progress Note    Admitting Date and Time: 2/22/2023  2:06 PM  Admit Dx: Nausea [R11.0]  ESRD on hemodialysis ([facility] [address] [N18.6, Z99.2]  End-stage renal disease needing dialysis ([facility] [address] [N18.6, Z99.2]  ESRD needing dialysis ([address] [N18.6, Z99.2]  Diarrhea, unspecified type [R19.7]    Subjective:    Patient was admitted with Nausea [R11.0]  ESRD on hemodialysis ([facility] [address] [N18.6, Z99.2]  End-stage renal disease needing dialysis ([facility] [address] [N18.6, Z99.2]  ESRD needing dialysis ([facility] [address] [N18.6, Z99.2]  Diarrhea, unspecified type [R19.7].  Patient denies fever, chills, cp, sob, n/v.     [START ON 2/27/2023] amLODIPine  5 mg Oral Daily    [START ON 2/27/2023] sertraline  75 mg Oral Daily    bumetanide  2 mg Oral BID    aspirin  81 mg Oral Daily    atorvastatin  40 mg Oral Daily    carvedilol  25 mg Oral BID WC    isosorbide dinitrate  20 mg Oral TID    levothyroxine  100 mcg Oral Daily    mirtazapine  7.5 mg Oral Nightly    sacubitril-valsartan  1 tablet Oral BID    sodium chloride flush  5-40 mL IntraVENous 2 times per day    heparin (porcine)  5,000 Units SubCUTAneous 3 times per day     sodium chloride flush, 5-40 mL, PRN  sodium chloride, , PRN  ondansetron, 4 mg, Q8H PRN   Or  ondansetron, 4 mg, Q6H PRN  polyethylene glycol, 17 g, Daily PRN  acetaminophen, 650 mg, Q6H PRN   Or  acetaminophen, 650 mg, Q6H PRN  glucose, 4 tablet, PRN  dextrose bolus, 125 mL, PRN   Or  dextrose bolus, 250 mL, PRN  glucagon (rDNA), 1 mg, PRN  dextrose, , Continuous PRN  labetalol, 10 mg, Q6H PRN         Objective:    /70   Pulse 65   Temp 98.4 °F (36.9 °C) (Oral)   Resp 18   Ht 6' 2\" (1.88 m)   Wt 145 lb 11.2 oz (66.1 kg)   SpO2 99%   BMI 18.71 kg/m²   Skin: warm and dry, no rash or erythema  Pulmonary/Chest: clear to auscultation bilaterally- no wheezes, rales or rhonchi, normal air movement, no respiratory distress  Cardiovascular: rhythm reg at rate of 64  Abdomen: soft, non-tender, non-distended, normal bowel sounds, no masses or organomegaly  Extremities: no cyanosis, no clubbing, and no edema      Recent Labs     02/24/23  0456 02/25/23  0508 02/26/23  0343    135 133   K 4.0  4.0 3.5  3.5 3.5   CL 99 98 96*   CO2 22 27 26   BUN 34* 24* 21   CREATININE 7.6* 5.6* 4.6*   GLUCOSE 91 108* 114*   CALCIUM 7.9* 7.7* 7.6*       Recent Labs     02/24/23  0456 02/25/23  0508   WBC 6.3 6.7   RBC 4.04 3.64*   HGB 12.6 11.2*   HCT 38.7 35.0*   MCV 95.8 96.2   MCH 31.2 30.8   MCHC 32.6 32.0   RDW 14.1 14.0   * 102*   MPV 10.7 11.7       BMP:    Lab Results   Component Value Date/Time     02/26/2023 03:43 AM    K 3.5 02/26/2023 03:43 AM    K 3.5 02/25/2023 05:08 AM    CL 96 02/26/2023 03:43 AM    CO2 26 02/26/2023 03:43 AM    BUN 21 02/26/2023 03:43 AM    LABALBU 3.1 02/25/2023 05:08 AM    CREATININE 4.6 02/26/2023 03:43 AM    CALCIUM 7.6 02/26/2023 03:43 AM    GFRAA 11 09/15/2022 02:27 AM    LABGLOM 13 02/26/2023 03:43 AM    GLUCOSE 114 02/26/2023 03:43 AM     Phosphorus:    Lab Results   Component Value Date/Time    PHOS 2.3 02/26/2023 03:43 AM        Radiology:   XR CHEST (2 VW)   Final Result   No acute process. CT ABDOMEN PELVIS WO CONTRAST Additional Contrast? None   Final Result   No acute inflammatory process in the abdomen and pelvis. Atrophic kidneys with nephrocalcinosis. Assessment:    Principal Problem:    ESRD needing dialysis ([address]  Active Problems:    ESRD on hemodialysis ([address]    Diarrhea    Primary hypertension    ESRD (end stage renal disease) ([address]  Resolved Problems:    * No resolved hospital problems. *      Plan:  Fever  appreciate ID input.  Continue to monitor BC's  Diarrhea presumed to be viral gastroenteritis check workup and supportive care  ESRD  HD  nephrology following  Htn monitor and tx with bp medicine  Hypothyroidism continue med  Depression continue med  Hyperkalemia monitor and tx  Acidosis/elevated lactic acid level monitor  Thrombocytopenia monitor    Pt told me that he has problem tolerating dialysis previously but has noted the last few dialysis treatments have been different in presentation. Discussed with ID.  Continue to monitor cultures      Electronically signed by Garima Kasper DO on 2/26/2023 at 5:38 PM

## 2023-02-26 NOTE — PROGRESS NOTES
Department of Internal Medicine  Nephrology Progress Note    Events reviewed. SUBJECTIVE: We are following Haritha Vargas for ESRD on HD. Patient reports no new complaints.       PHYSICAL EXAM:      Vitals:    VITALS:  /60   Pulse 70   Temp 98.1 °F (36.7 °C) (Oral)   Resp 18   Ht 6' 2\" (1.88 m)   Wt 145 lb 11.2 oz (66.1 kg)   SpO2 99%   BMI 18.71 kg/m²   24HR INTAKE/OUTPUT:  No intake or output data in the 24 hours ending 02/26/23 1325      Constitutional: Alert, oriented, NAD  HEENT: Normocephalic, atraumatic, PERRLA  Respiratory: CTA  Cardiovascular/Edema: RRR, normal S1, normal S2, no edema  Gastrointestinal: Soft, flat, nondistended, nontender  Neurologic: Nonfocal  Skin: Warm, dry, no rashes, no lesions  Access: RIJ tunneled dialysis catheter    Scheduled Meds:   [START ON 2/27/2023] amLODIPine  5 mg Oral Daily    [START ON 2/27/2023] sertraline  75 mg Oral Daily    bumetanide  2 mg Oral BID    aspirin  81 mg Oral Daily    atorvastatin  40 mg Oral Daily    carvedilol  25 mg Oral BID WC    isosorbide dinitrate  20 mg Oral TID    levothyroxine  100 mcg Oral Daily    mirtazapine  7.5 mg Oral Nightly    sacubitril-valsartan  1 tablet Oral BID    sodium chloride flush  5-40 mL IntraVENous 2 times per day    heparin (porcine)  5,000 Units SubCUTAneous 3 times per day     Continuous Infusions:   sodium chloride      dextrose       PRN Meds:.sodium chloride flush, sodium chloride, ondansetron **OR** ondansetron, polyethylene glycol, acetaminophen **OR** acetaminophen, glucose, dextrose bolus **OR** dextrose bolus, glucagon (rDNA), dextrose, labetalol    DATA:    CBC with Differential:    Lab Results   Component Value Date/Time    WBC 6.7 02/25/2023 05:08 AM    RBC 3.64 02/25/2023 05:08 AM    HGB 11.2 02/25/2023 05:08 AM    HCT 35.0 02/25/2023 05:08 AM     02/25/2023 05:08 AM    MCV 96.2 02/25/2023 05:08 AM    MCH 30.8 02/25/2023 05:08 AM    MCHC 32.0 02/25/2023 05:08 AM    RDW 14.0 02/25/2023 05:08 AM    NRBC 0.0 02/25/2023 05:08 AM    SEGSPCT 55 06/10/2013 04:00 AM    BLASTSPCT 0.9 02/25/2023 05:08 AM    LYMPHOPCT 9.6 02/25/2023 05:08 AM    MONOPCT 7.0 02/25/2023 05:08 AM    MYELOPCT 1.0 02/23/2023 01:35 PM    BASOPCT 1.7 02/25/2023 05:08 AM    MONOSABS 0.47 02/25/2023 05:08 AM    LYMPHSABS 0.74 02/25/2023 05:08 AM    EOSABS 0.00 02/25/2023 05:08 AM    BASOSABS 0.11 02/25/2023 05:08 AM     CMP:    Lab Results   Component Value Date/Time     02/26/2023 03:43 AM    K 3.5 02/26/2023 03:43 AM    K 3.5 02/25/2023 05:08 AM    CL 96 02/26/2023 03:43 AM    CO2 26 02/26/2023 03:43 AM    BUN 21 02/26/2023 03:43 AM    CREATININE 4.6 02/26/2023 03:43 AM    GFRAA 11 09/15/2022 02:27 AM    LABGLOM 13 02/26/2023 03:43 AM    GLUCOSE 114 02/26/2023 03:43 AM    PROT 6.2 02/25/2023 05:08 AM    LABALBU 3.1 02/25/2023 05:08 AM    CALCIUM 7.6 02/26/2023 03:43 AM    BILITOT 1.1 02/25/2023 05:08 AM    ALKPHOS 72 02/25/2023 05:08 AM    AST 17 02/25/2023 05:08 AM    ALT 14 02/25/2023 05:08 AM     Magnesium:    Lab Results   Component Value Date/Time    MG 1.9 02/25/2023 05:08 AM     Phosphorus:    Lab Results   Component Value Date/Time    PHOS 2.3 02/26/2023 03:43 AM     Radiology Review:    CTAP 2/22/2023      No acute inflammatory process in the abdomen and pelvis. Atrophic kidneys with nephrocalcinosis. CXR 2/22/2023      No acute process. BRIEF SUMMARY OF INITIAL CONSULT:  Adrián Olmedo is a 67 y.o. male, past medical history significant for ESRD on HD 3 times weekly Tuesday, Thursday, Saturday via right IJ, HTN, type II DM, HFrEF 35 to 40% with stage I diastolic dysfunction, hypothyroidism, HCV, recently evaluated in the ED under police order for reportedly swerving while driving, who presented to the ED on 2/22/2023 with a chief complaint of fatigue, (last HD treatment Friday) and diarrhea.   Lab work on admission significant for potassium level 5.9, bicarbonate 18, creatinine 11.7, anion gap 20, lactic acid 3.0, proBNP 29,091, patient admitted for further evaluation and treatment, we are asked to see this patient in consultation for hemodialysis management. Problems Resolved:  Hyperkalemia 2/2 decreased GFR and noncompliance with HD, for HD today    IMPRESSION/RECOMMENDATIONS:      ESRD on IHD three times weekly T-Th-S, via RIJ. Patient has a history of noncompliance with treatment due to subjective intolerance to HD. Tolerated 3 hr 35 minutes yesterday with decreased BFR and DFR. HTN, noncompliant with medications, on amlodipine and carvedilol, decrease amlodipine today  HFrEF 35-40% with stage I DD, on carvedilol and Entresto   MBD of CKD, not on binders  Anemia of CKD, hold NIXON  -----------------------------------------------------------------------  Thrombocytopenia, platelet count 92, recommend holding heparin  Diarrhea, rule out C. difficile  Nutrition: Low potassium diet, 1 L fluid restriction  Depression: on sertraline, increase to 75 mg daily  Social detriments to health: After home evaluation by our office, it was decided the patient is not a candidate for home hemodialysis, serious concerns about home safety including functioning utilities, patient was without the ability to make meals, very poor medication safety--recommend placement.     Plan:    HD three times weekly T-Th-S, 300/400, 0-0.5 L fluid removal  Decrease amlodipine to 5 mg daily  Increase sertraline to 75 mg daily  Continue Bumex 2 mg BID  Continue carvedilol 25 mg p.o. twice daily  Continue sacubitril-valsartan  twice daily  Continue to monitor BP  Continue to monitor labs    Electronically signed by KIANNA Mcclellan CNP on 2/26/2023 at 1:25 PM

## 2023-02-26 NOTE — PLAN OF CARE
Problem: Discharge Planning  Goal: Discharge to home or other facility with appropriate resources  Outcome: Progressing     Problem: Safety - Adult  Goal: Free from fall injury  Outcome: Progressing  Flowsheets  Taken 2/25/2023 2115 by Marnie Renner RN  Free From Fall Injury: Instruct family/caregiver on patient safety  Taken 2/25/2023 0807 by Fortino Ziegler RN  Free From Fall Injury: Instruct family/caregiver on patient safety     Problem: Chronic Conditions and Co-morbidities  Goal: Patient's chronic conditions and co-morbidity symptoms are monitored and maintained or improved  Outcome: Progressing     Problem: Pain  Goal: Verbalizes/displays adequate comfort level or baseline comfort level  Outcome: Progressing

## 2023-02-26 NOTE — PROGRESS NOTES
[address] [place] [address] [facility]  Progress Note    SUBJECTIVE:  Chief Complaint   Patient presents with    Fatigue     Hasn't had dialysis since Friday, nausea, dehydration    Diarrhea     Patient is tolerating medications. No reported adverse drug reactions. No nausea, vomiting, diarrhea. Denies any further diarrhea over the last day or so he stated. He is resting in bed not cold at this time. Review of systems:  As stated above in the chief complaint, otherwise negative. Medications:  Scheduled Meds:   bumetanide  2 mg Oral BID    amLODIPine  10 mg Oral Daily    aspirin  81 mg Oral Daily    atorvastatin  40 mg Oral Daily    carvedilol  25 mg Oral BID WC    isosorbide dinitrate  20 mg Oral TID    levothyroxine  100 mcg Oral Daily    mirtazapine  7.5 mg Oral Nightly    sacubitril-valsartan  1 tablet Oral BID    sertraline  50 mg Oral Daily    sodium chloride flush  5-40 mL IntraVENous 2 times per day    heparin (porcine)  5,000 Units SubCUTAneous 3 times per day     Continuous Infusions:   sodium chloride      dextrose       PRN Meds:sodium chloride flush, sodium chloride, ondansetron **OR** ondansetron, polyethylene glycol, acetaminophen **OR** acetaminophen, glucose, dextrose bolus **OR** dextrose bolus, glucagon (rDNA), dextrose, labetalol    OBJECTIVE:  BP 96/60   Pulse 67   Temp 98.1 °F (36.7 °C) (Oral)   Resp 18   Ht 6' 2\" (1.88 m)   Wt 145 lb 11.2 oz (66.1 kg)   SpO2 99%   BMI 18.71 kg/m²   Temp  Av.5 °F (36.9 °C)  Min: 98 °F (36.7 °C)  Max: 100.4 °F (38 °C)  Constitutional: The patient is awake, alert, and oriented. Sitting in bed comfortable right now. Skin: Warm and dry. No rashes were noted. HEENT: Round and reactive pupils. Moist mucous membranes. No ulcerations or thrush. Neck: Supple to movements. Chest: No use of accessory muscles to breathe. Symmetrical expansion. No wheezing, crackles or rhonchi. Cardiovascular: S1 and S2 are rhythmic and regular. No murmurs appreciated. Abdomen: Positive bowel sounds to auscultation. Benign to palpation. No masses felt. No hepatosplenomegaly. Extremities: No clubbing, no cyanosis, no edema. Lines: Peripheral.  Right chest HD dry dressing    Laboratory and Tests Review:  Lab Results   Component Value Date    WBC 6.7 02/25/2023    WBC 6.3 02/24/2023    WBC 2.1 (L) 02/23/2023    HGB 11.2 (L) 02/25/2023    HCT 35.0 (L) 02/25/2023    MCV 96.2 02/25/2023     (L) 02/25/2023     Lab Results   Component Value Date    NEUTROABS 5.29 02/25/2023    NEUTROABS 4.80 02/24/2023    NEUTROABS 0.82 (L) 02/23/2023     No results found for: [facility] [[email or web address]]  Lab Results   Component Value Date    ALT 14 02/25/2023    AST 17 02/25/2023    ALKPHOS 72 02/25/2023    BILITOT 1.1 02/25/2023     Lab Results   Component Value Date/Time     02/26/2023 03:43 AM    K 3.5 02/26/2023 03:43 AM    K 3.5 02/25/2023 05:08 AM    CL 96 02/26/2023 03:43 AM    CO2 26 02/26/2023 03:43 AM    BUN 21 02/26/2023 03:43 AM    CREATININE 4.6 02/26/2023 03:43 AM    CREATININE 5.6 02/25/2023 05:08 AM    CREATININE 7.6 02/24/2023 04:56 AM    GFRAA 11 09/15/2022 02:27 AM    LABGLOM 13 02/26/2023 03:43 AM    GLUCOSE 114 02/26/2023 03:43 AM    PROT 6.2 02/25/2023 05:08 AM    LABALBU 3.1 02/25/2023 05:08 AM    CALCIUM 7.6 02/26/2023 03:43 AM    BILITOT 1.1 02/25/2023 05:08 AM    ALKPHOS 72 02/25/2023 05:08 AM    AST 17 02/25/2023 05:08 AM    ALT 14 02/25/2023 05:08 AM     Lab Results   Component Value Date    CRP 2.2 (H) 07/15/2022    CRP 0.3 04/21/2022    CRP 0.6 (H) 12/09/2021     Lab Results   Component Value Date    SEDRATE 63 (H) 11/28/2016     Radiology:  No acute inflammatory process in the abdomen and pelvis. Atrophic kidneys with nephrocalcinosis.        Microbiology:   2/24/2023 blood culture sent  Stool culture enteric ina  Respiratory panel negative  Giardia  COVID-negative  C. difficile was canceled    Assessment:  Fever : r/o CLABSI  No sign of pneumonia or skin wounds or UTI (he does urinate every day)   CT A/p did not show any sign of infection  Diarrhea: he had 2 BM  None today. Had solid BM per lab report and c.diff order was d/zenaida. Other work up pending  -Probably some component of viral gastroenteritis  ESRD on HD: right tunneled line in place         Plan:    Blood cx x 2 sent 2/24  Following off ATB, follow clinical appearance on dialysis he does seem to chill during that time. Unguinal order another set of blood cultures from dialysis next time to have 2 sets pending  Monitor fever curve and clinical course  Will follow with you        KIANNA Loomis - CNS  9:16 AM  2/26/2023     Patient seen and examined. I had a face to face encounter with the patient. Agree with exam.  Assessment and plan as outlined above and directed by me. Addition and corrections were done as deemed appropriate. My exam and plan include: Other than having chills, the patient has no other complaints. He does not have any fevers. Blood cultures have been repeated. If symptoms persist, he may have to have the tunneled HD catheter removed and replaced.     Aj Mcgraw MD  2/26/2023  2:13 PM

## 2023-02-26 NOTE — PROGRESS NOTES
Physical Therapy  Facility/Department: [facility]G  Physical Therapy Initial Assessment    Name: Adrián Olmedo  : 1950  MRN: [id number]  Date of Service: 2023    Attending Provider:  Christiano Rojo DO    Evaluating PT:  Gian Quintero. Bladimir Peña, P.T. Room #:  03/0603-A  Diagnosis:  Nausea [R11.0]  ESRD on hemodialysis ([address] [N18.6, Z99.2]  End-stage renal disease needing dialysis ([address] [N18.6, Z99.2]  ESRD needing dialysis ([facility] [address] [N18.6, Z99.2]  Diarrhea, unspecified type [R19.7]  Pertinent PMHx/PSHx:  HEP C, dialysis  Precautions:  falls, bed/chair alarm    SUBJECTIVE:    Pt lives alone in a 1 story home with 2 or 3 stairs and no rail to enter. Pt ambulated with no AD PTA. OBJECTIVE:   Initial Evaluation  Date: 23 Treatment Short Term/ Long Term   Goals   Was pt agreeable to Eval/treatment? yes     Does pt have pain? No c/o pain     Bed Mobility  Rolling: SBA  Supine to sit: MIN A  Sit to supine: MIN A  Scooting: MIN A  Independent    Transfers Sit to stand: MOD A  Stand to sit: MIN A  Stand pivot: MIN A/MOD A with ww  Independent    Ambulation   40 feet with ww MIN A/MOD A  200 feet with ww if needed Independent    Stair negotiation: ascended and descended NA  3 steps with no rail Independent    AM-PAC 6 Clicks /20       BLE ROM is WFL. BLE strength is grossly 4-/5 to 4/5. Sensation:  Pt denies numbness and tingling to extremities  Edema:  none noted  Balance: sitting is SBA/MIN A due to multiple LOB and required MIN A at times to right himself.  Standing with ww is MIN A  Endurance: fair-    Patient education  Pt educated on transfers and gait with ww    Patient response to education:   Pt verbalized understanding Pt demonstrated skill Pt requires further education in this area   yes Yes with VCs and assist yes     ASSESSMENT:    Conditions Requiring Skilled Therapeutic Intervention:    [x]Decreased strength     []Decreased ROM  [x]Decreased functional mobility  [x]Decreased balance   [x]Decreased endurance   []Decreased posture  []Decreased sensation  [x]Decreased coordination   []Decreased vision  []Decreased safety awareness   []Increased pain       Comments:  Pt was found supine in bed asleep and was agreeable to PT. He required some increased time to wake up, but was able to follow directions and participate with PT. He was unsteady sitting on EOB and with standing with ww and required assist at times to maintain his balance. He walked with slow gait speed with short step lengths. He then had frequent LOB episodes amb with ww. Pt is a high risk for falls at this time. He required increased time to complete functional tasks due to being sleepy and weak. He c/o fatigue that limited amb distance. Treatment:  Patient practiced and was instructed in the following treatment:    Bed mobility, transfers, sitting on EOB, standing with ww, and gait with ww to improve functional strength, balance, safety, and endurance. Pt was left in bed in chair position with call light left by patient. Chair/bed alarm: bed alarm was activated. Pt's/ family goals   1. To get stronger and go home. Patient and or family understand(s) diagnosis, prognosis, and plan of care. PHYSICAL THERAPY PLAN OF CARE:    PT POC is established based on physician order and patient diagnosis     Referring provider/PT Order:  PT eval and treat  Diagnosis:  Nausea [R11.0]  ESRD on hemodialysis ([address] [N18.6, Z99.2]  End-stage renal disease needing dialysis ([address] [N18.6, Z99.2]  ESRD needing dialysis ([address] [N18.6, Z99.2]  Diarrhea, unspecified type [R19.7]  Specific instructions for next treatment:  increase amb distance as pt is able.     Current Treatment Recommendations:     [x] Strengthening to improve independence with functional mobility   [] ROM to improve ROM and decrease spasm and pain which will help promote independence with functional mobility   [x] Balance Training to improve static/dynamic balance and to reduce fall risk  [x] Endurance Training to improve activity tolerance during functional mobility   [x] Transfer Training to improve safety and independence with all functional transfers   [x] Gait Training to improve gait mechanics, endurance and assess need for appropriate assistive device  [x] Stair Training in preparation for safe discharge home and/or into the community   [] Positioning to prevent skin breakdown and contractures  [] Safety and Education Training   [x] Patient/Caregiver Education   [] HEP  [] Other     PT long term treatment goals are located in above grid    Frequency of treatments: 2-5x/week x 1-2 weeks. Time in  08:00  Time out  08:20    Total Treatment Time 10 minutes     Evaluation Time includes thorough review of current medical information, gathering information on past medical history/social history and prior level of function, completion of standardized testing/informal observation of tasks, assessment of data and education on plan of care and goals. CPT codes:  [x] Low Complexity PT evaluation 17453  [] Moderate Complexity PT evaluation 54267  [] High Complexity PT evaluation 21825  [] PT Re-evaluation 34243  [] Gait training 49772 ** minutes  [] Manual therapy 76367 ** minutes  [x] Therapeutic activities 14207 10 minutes  [] Therapeutic exercises 34344 ** minutes  [] Neuromuscular reeducation 46135 ** minutes     Patrick Kennedy., P.T.   License Number: [id number]

## 2023-02-27 PROBLEM — E87.1 HYPONATREMIA: Status: ACTIVE | Noted: 2023-02-27

## 2023-02-27 LAB
ANION GAP SERPL CALCULATED.3IONS-SCNC: 11 MMOL/L (ref 7–16)
ANISOCYTOSIS: ABNORMAL
BASOPHILS ABSOLUTE: 0.04 E9/L (ref 0–0.2)
BASOPHILS RELATIVE PERCENT: 1.4 % (ref 0–2)
BUN BLDV-MCNC: 33 MG/DL (ref 6–23)
CALCIUM SERPL-MCNC: 7.4 MG/DL (ref 8.6–10.2)
CHLORIDE BLD-SCNC: 96 MMOL/L (ref 98–107)
CO2: 22 MMOL/L (ref 22–29)
CREAT SERPL-MCNC: 6.1 MG/DL (ref 0.7–1.2)
EOSINOPHILS ABSOLUTE: 0.04 E9/L (ref 0.05–0.5)
EOSINOPHILS RELATIVE PERCENT: 1.4 % (ref 0–6)
FECAL NEUTRAL FAT: NORMAL
FECAL SPLIT FATS: NORMAL
GFR SERPL CREATININE-BSD FRML MDRD: 9 ML/MIN/1.73
GLUCOSE BLD-MCNC: 85 MG/DL (ref 74–99)
HCT VFR BLD CALC: 34.3 % (ref 37–54)
HEMOGLOBIN: 11.4 G/DL (ref 12.5–16.5)
HYPOCHROMIA: ABNORMAL
IMMATURE GRANULOCYTES #: 0.01 E9/L
IMMATURE GRANULOCYTES %: 0.4 % (ref 0–5)
LYMPHOCYTES ABSOLUTE: 0.65 E9/L (ref 1.5–4)
LYMPHOCYTES RELATIVE PERCENT: 23.2 % (ref 20–42)
MCH RBC QN AUTO: 30.6 PG (ref 26–35)
MCHC RBC AUTO-ENTMCNC: 33.2 % (ref 32–34.5)
MCV RBC AUTO: 92 FL (ref 80–99.9)
MONOCYTES ABSOLUTE: 0.33 E9/L (ref 0.1–0.95)
MONOCYTES RELATIVE PERCENT: 11.8 % (ref 2–12)
NEUTROPHILS ABSOLUTE: 1.73 E9/L (ref 1.8–7.3)
NEUTROPHILS RELATIVE PERCENT: 61.8 % (ref 43–80)
OVALOCYTES: ABNORMAL
PDW BLD-RTO: 13.7 FL (ref 11.5–15)
PHOSPHORUS: 2.7 MG/DL (ref 2.5–4.5)
PLATELET # BLD: 121 E9/L (ref 130–450)
PMV BLD AUTO: 11.1 FL (ref 7–12)
POIKILOCYTES: ABNORMAL
POLYCHROMASIA: ABNORMAL
POTASSIUM SERPL-SCNC: 4.5 MMOL/L (ref 3.5–5)
RBC # BLD: 3.73 E12/L (ref 3.8–5.8)
SMUDGE CELLS: ABNORMAL
SODIUM BLD-SCNC: 129 MMOL/L (ref 132–146)
WBC # BLD: 2.8 E9/L (ref 4.5–11.5)

## 2023-02-27 PROCEDURE — 99232 SBSQ HOSP IP/OBS MODERATE 35: CPT | Performed by: INTERNAL MEDICINE

## 2023-02-27 PROCEDURE — 6370000000 HC RX 637 (ALT 250 FOR IP): Performed by: LICENSED PRACTICAL NURSE

## 2023-02-27 PROCEDURE — 85025 COMPLETE CBC W/AUTO DIFF WBC: CPT

## 2023-02-27 PROCEDURE — 87150 DNA/RNA AMPLIFIED PROBE: CPT

## 2023-02-27 PROCEDURE — 36415 COLL VENOUS BLD VENIPUNCTURE: CPT

## 2023-02-27 PROCEDURE — 97165 OT EVAL LOW COMPLEX 30 MIN: CPT

## 2023-02-27 PROCEDURE — 6360000002 HC RX W HCPCS: Performed by: INTERNAL MEDICINE

## 2023-02-27 PROCEDURE — 87186 SC STD MICRODIL/AGAR DIL: CPT

## 2023-02-27 PROCEDURE — 80048 BASIC METABOLIC PNL TOTAL CA: CPT

## 2023-02-27 PROCEDURE — 84100 ASSAY OF PHOSPHORUS: CPT

## 2023-02-27 PROCEDURE — 6370000000 HC RX 637 (ALT 250 FOR IP): Performed by: INTERNAL MEDICINE

## 2023-02-27 PROCEDURE — 1200000000 HC SEMI PRIVATE

## 2023-02-27 PROCEDURE — 87077 CULTURE AEROBIC IDENTIFY: CPT

## 2023-02-27 PROCEDURE — 87040 BLOOD CULTURE FOR BACTERIA: CPT

## 2023-02-27 PROCEDURE — 2580000003 HC RX 258: Performed by: INTERNAL MEDICINE

## 2023-02-27 RX ADMIN — SACUBITRIL AND VALSARTAN 1 TABLET: 97; 103 TABLET, FILM COATED ORAL at 11:56

## 2023-02-27 RX ADMIN — Medication 10 ML: at 20:19

## 2023-02-27 RX ADMIN — LEVOTHYROXINE SODIUM 100 MCG: 100 TABLET ORAL at 06:29

## 2023-02-27 RX ADMIN — SACUBITRIL AND VALSARTAN 1 TABLET: 97; 103 TABLET, FILM COATED ORAL at 20:20

## 2023-02-27 RX ADMIN — CARVEDILOL 25 MG: 25 TABLET, FILM COATED ORAL at 11:55

## 2023-02-27 RX ADMIN — Medication 10 ML: at 11:58

## 2023-02-27 RX ADMIN — ASPIRIN 81 MG: 81 TABLET, COATED ORAL at 11:55

## 2023-02-27 RX ADMIN — ATORVASTATIN CALCIUM 40 MG: 40 TABLET, FILM COATED ORAL at 20:19

## 2023-02-27 RX ADMIN — ISOSORBIDE DINITRATE 20 MG: 10 TABLET ORAL at 20:19

## 2023-02-27 RX ADMIN — BUMETANIDE 2 MG: 1 TABLET ORAL at 20:20

## 2023-02-27 RX ADMIN — BUMETANIDE 2 MG: 1 TABLET ORAL at 11:55

## 2023-02-27 RX ADMIN — MIRTAZAPINE 7.5 MG: 15 TABLET, FILM COATED ORAL at 20:20

## 2023-02-27 RX ADMIN — SERTRALINE HYDROCHLORIDE 75 MG: 50 TABLET ORAL at 11:56

## 2023-02-27 RX ADMIN — AMLODIPINE BESYLATE 5 MG: 5 TABLET ORAL at 11:57

## 2023-02-27 RX ADMIN — HEPARIN SODIUM 5000 UNITS: 10000 INJECTION INTRAVENOUS; SUBCUTANEOUS at 20:20

## 2023-02-27 ASSESSMENT — PAIN SCALES - GENERAL: PAINLEVEL_OUTOF10: 0

## 2023-02-27 NOTE — CARE COORDINATION
Transition of Care-Met with patient, he provided choices for SNF. First choice was [facility], second choice is [facility]-referral sent, third choice is [facility], and last choice is [facility]. Awaiting acceptance. Patient will not require a pre-cert. ID watching off antibiotics. Anticipate discharge in the next 24 hrs. Addendum-Place cannot accept-referral made to [facility] -can accept when medically ready. The Plan for Transition of Care is related to the following treatment goals: Dain Mathews    The Patient was provided with a choice of provider and agrees   with the discharge plan. [x] Yes [] No    Freedom of choice list was provided with basic dialogue that supports the patient's individualized plan of care/goals, treatment preferences and shares the quality data associated with the providers.  [x] Yes [] No    Lenin MCLAIN, RN  [facility]

## 2023-02-27 NOTE — PROGRESS NOTES
[facility]   Progress Note    Admitting Date and Time: 2/22/2023  2:06 PM  Admit Dx: Nausea [R11.0]  ESRD on hemodialysis ([facility] [address] [N18.6, Z99.2]  End-stage renal disease needing dialysis ([facility] [address] [N18.6, Z99.2]  ESRD needing dialysis ([address] [N18.6, Z99.2]  Diarrhea, unspecified type [R19.7]    Subjective:    Patient was admitted with Nausea [R11.0]  ESRD on hemodialysis ([facility] [address] [N18.6, Z99.2]  End-stage renal disease needing dialysis ([facility] [address] [N18.6, Z99.2]  ESRD needing dialysis ([facility] [address] [N18.6, Z99.2]  Diarrhea, unspecified type [R19.7].  Patient denies fever, chills, cp, sob, n/v.     amLODIPine  5 mg Oral Daily    sertraline  75 mg Oral Daily    bumetanide  2 mg Oral BID    aspirin  81 mg Oral Daily    atorvastatin  40 mg Oral Daily    carvedilol  25 mg Oral BID WC    isosorbide dinitrate  20 mg Oral TID    levothyroxine  100 mcg Oral Daily    mirtazapine  7.5 mg Oral Nightly    sacubitril-valsartan  1 tablet Oral BID    sodium chloride flush  5-40 mL IntraVENous 2 times per day    heparin (porcine)  5,000 Units SubCUTAneous 3 times per day     sodium chloride flush, 5-40 mL, PRN  sodium chloride, , PRN  ondansetron, 4 mg, Q8H PRN   Or  ondansetron, 4 mg, Q6H PRN  polyethylene glycol, 17 g, Daily PRN  acetaminophen, 650 mg, Q6H PRN   Or  acetaminophen, 650 mg, Q6H PRN  glucose, 4 tablet, PRN  dextrose bolus, 125 mL, PRN   Or  dextrose bolus, 250 mL, PRN  glucagon (rDNA), 1 mg, PRN  dextrose, , Continuous PRN  labetalol, 10 mg, Q6H PRN         Objective:    /85   Pulse 63   Temp 98.3 °F (36.8 °C) (Oral)   Resp 18   Ht 6' 2\" (1.88 m)   Wt 149 lb 6.4 oz (67.8 kg)   SpO2 100%   BMI 19.18 kg/m²   Skin: warm and dry, no rash or erythema  Pulmonary/Chest: clear to auscultation bilaterally- no wheezes, rales or rhonchi, normal air movement, no respiratory distress  Cardiovascular: rhythm reg at rate of 64  Abdomen: soft, non-tender, non-distended, normal bowel sounds, no masses or organomegaly  Extremities: no cyanosis, no clubbing, and no edema      Recent Labs     02/25/23  0508 02/26/23  0343 02/27/23  0415    133 129*   K 3.5  3.5 3.5 4.5   CL 98 96* 96*   CO2 27 26 22   BUN 24* 21 33*   CREATININE 5.6* 4.6* 6.1*   GLUCOSE 108* 114* 85   CALCIUM 7.7* 7.6* 7.4*       Recent Labs     02/25/23  0508   WBC 6.7   RBC 3.64*   HGB 11.2*   HCT 35.0*   MCV 96.2   MCH 30.8   MCHC 32.0   RDW 14.0   *   MPV 11.7       BMP:    Lab Results   Component Value Date/Time     02/27/2023 04:15 AM    K 4.5 02/27/2023 04:15 AM    K 3.5 02/25/2023 05:08 AM    CL 96 02/27/2023 04:15 AM    CO2 22 02/27/2023 04:15 AM    BUN 33 02/27/2023 04:15 AM    LABALBU 3.1 02/25/2023 05:08 AM    CREATININE 6.1 02/27/2023 04:15 AM    CALCIUM 7.4 02/27/2023 04:15 AM    GFRAA 11 09/15/2022 02:27 AM    LABGLOM 9 02/27/2023 04:15 AM    GLUCOSE 85 02/27/2023 04:15 AM     Phosphorus:    Lab Results   Component Value Date/Time    PHOS 2.7 02/27/2023 04:15 AM        Radiology:   XR CHEST (2 VW)   Final Result   No acute process. CT ABDOMEN PELVIS WO CONTRAST Additional Contrast? None   Final Result   No acute inflammatory process in the abdomen and pelvis. Atrophic kidneys with nephrocalcinosis. Assessment:    Principal Problem:    ESRD needing dialysis ([facility] [address]  Active Problems:    ESRD on hemodialysis ([address]    Diarrhea    Primary hypertension    ESRD (end stage renal disease) ([facility] [address]    Depression  Resolved Problems:    * No resolved hospital problems. *      Plan:  Fever  appreciate ID input.  Continue to monitor BC's  Diarrhea presumed to be viral gastroenteritis check workup and supportive care  ESRD  HD  nephrology following  Htn monitor and tx with bp medicine  Hypothyroidism continue med  Depression continue med  Hyperkalemia monitor and tx  Acidosis/elevated lactic acid level monitor  Thrombocytopenia monitor  Hypophosphatemia monitor and replace prn  Hyponatremia monitor    Check labs.  Pt had bc drawn in dialysis. Appreciate ID's input.      Electronically signed by Christina Garcia DO on 2/27/2023 at 4:21 PM

## 2023-02-27 NOTE — PROGRESS NOTES
[address] [place] [address] [facility]  Progress Note    SUBJECTIVE:  Chief Complaint   Patient presents with    Fatigue     Hasn't had dialysis since Friday, nausea, dehydration    Diarrhea     Patient is tolerating medications. No reported adverse drug reactions. No nausea, vomiting, diarrhea. Denies any chills. No fevers. Scheduled for dialysis today     Review of systems:  As stated above in the chief complaint, otherwise negative. Medications:  Scheduled Meds:   amLODIPine  5 mg Oral Daily    sertraline  75 mg Oral Daily    bumetanide  2 mg Oral BID    aspirin  81 mg Oral Daily    atorvastatin  40 mg Oral Daily    carvedilol  25 mg Oral BID WC    isosorbide dinitrate  20 mg Oral TID    levothyroxine  100 mcg Oral Daily    mirtazapine  7.5 mg Oral Nightly    sacubitril-valsartan  1 tablet Oral BID    sodium chloride flush  5-40 mL IntraVENous 2 times per day    heparin (porcine)  5,000 Units SubCUTAneous 3 times per day     Continuous Infusions:   sodium chloride      dextrose       PRN Meds:sodium chloride flush, sodium chloride, ondansetron **OR** ondansetron, polyethylene glycol, acetaminophen **OR** acetaminophen, glucose, dextrose bolus **OR** dextrose bolus, glucagon (rDNA), dextrose, labetalol    OBJECTIVE:  /82   Pulse 76   Temp 98.4 °F (36.9 °C) (Temporal)   Resp 17   Ht 6' 2\" (1.88 m)   Wt 149 lb 6.4 oz (67.8 kg)   SpO2 100%   BMI 19.18 kg/m²   Temp  Av.3 °F (36.8 °C)  Min: 98 °F (36.7 °C)  Max: 98.4 °F (36.9 °C)  Constitutional: The patient is awake, alert, and oriented. Lying in bed, talking on the phone   Skin: Warm and dry. No rashes were noted. HEENT: Round and reactive pupils. Moist mucous membranes. No ulcerations or thrush. Neck: Supple to movements. Chest: No use of accessory muscles to breathe. Symmetrical expansion. No wheezing, crackles or rhonchi. Cardiovascular: S1 and S2 are rhythmic and regular. No murmurs appreciated.    Abdomen: Positive bowel sounds to auscultation. Benign to palpation. No masses felt. Extremities: No edema.   Lines: Peripheral.  Right chest HD dry dressing    Laboratory and Tests Review:  Lab Results   Component Value Date    WBC 6.7 02/25/2023    WBC 6.3 02/24/2023    WBC 2.1 (L) 02/23/2023    HGB 11.2 (L) 02/25/2023    HCT 35.0 (L) 02/25/2023    MCV 96.2 02/25/2023     (L) 02/25/2023     Lab Results   Component Value Date    NEUTROABS 5.29 02/25/2023    NEUTROABS 4.80 02/24/2023    NEUTROABS 0.82 (L) 02/23/2023     No results found for: [facility]  Lab Results   Component Value Date    ALT 14 02/25/2023    AST 17 02/25/2023    ALKPHOS 72 02/25/2023    BILITOT 1.1 02/25/2023     Lab Results   Component Value Date/Time     02/27/2023 04:15 AM    K 4.5 02/27/2023 04:15 AM    K 3.5 02/25/2023 05:08 AM    CL 96 02/27/2023 04:15 AM    CO2 22 02/27/2023 04:15 AM    BUN 33 02/27/2023 04:15 AM    CREATININE 6.1 02/27/2023 04:15 AM    CREATININE 4.6 02/26/2023 03:43 AM    CREATININE 5.6 02/25/2023 05:08 AM    GFRAA 11 09/15/2022 02:27 AM    LABGLOM 9 02/27/2023 04:15 AM    GLUCOSE 85 02/27/2023 04:15 AM    PROT 6.2 02/25/2023 05:08 AM    LABALBU 3.1 02/25/2023 05:08 AM    CALCIUM 7.4 02/27/2023 04:15 AM    BILITOT 1.1 02/25/2023 05:08 AM    ALKPHOS 72 02/25/2023 05:08 AM    AST 17 02/25/2023 05:08 AM    ALT 14 02/25/2023 05:08 AM     Lab Results   Component Value Date    CRP 2.2 (H) 07/15/2022    CRP 0.3 04/21/2022    CRP 0.6 (H) 12/09/2021     Lab Results   Component Value Date    SEDRATE 61 (H) 11/28/2016     Radiology:  Reviewed     Microbiology:   Blood cultures 2/24: negative so far   Stool culture enteric ina  Respiratory panel negative  Giardia  COVID-negative  C. difficile was canceled    Assessment:  Fever : r/o CLABSI  No sign of pneumonia or skin wounds or UTI (he does urinate every day)   CT A/p did not show any sign of infection  Diarrhea: likely viral gastroenteritis  ESRD on HD: right tunneled line in place    Plan:    Observe off antibiotics   Blood cultures from 2/24 are negative so far, a second set was order but was cancelled? RVP: negative   For dialysis today - will see how he does    Juan Jose Ramsey, KIANNA - CNP  10:34 AM  2/27/2023   Pt seen and examined. Above discussed agree with advanced practice nurse. Labs, cultures, and radiographs reviewed. Face to Face encounter occurred. Changes made as necessary. He is still having fevers after the HD. Peripheral blood cx are negative. Patient is not going to get HD today but I requested the RN to speak to HD nurses to get blood cx from HD line. It will be done today.      Vi Pete MD

## 2023-02-27 NOTE — PROGRESS NOTES
Department of Internal Medicine  Nephrology Progress Note    Events reviewed. SUBJECTIVE: We are following Michelle Ordoñez for ESRD on HD. Patient reports no new complaints.       PHYSICAL EXAM:      Vitals:    VITALS:  /82   Pulse 77   Temp 98.4 °F (36.9 °C) (Temporal)   Resp 17   Ht 6' 2\" (1.88 m)   Wt 149 lb 6.4 oz (67.8 kg)   SpO2 100%   BMI 19.18 kg/m²   24HR INTAKE/OUTPUT:  No intake or output data in the 24 hours ending 02/27/23 1243      Constitutional: Alert, oriented, NAD  HEENT: Normocephalic, atraumatic, PERRLA  Respiratory: CTA  Cardiovascular/Edema: RRR, normal S1, normal S2, no edema  Gastrointestinal: Soft, flat, nondistended, nontender  Neurologic: Nonfocal  Skin: Warm, dry, no rashes, no lesions  Access: RIJ tunneled dialysis catheter    Scheduled Meds:   amLODIPine  5 mg Oral Daily    sertraline  75 mg Oral Daily    bumetanide  2 mg Oral BID    aspirin  81 mg Oral Daily    atorvastatin  40 mg Oral Daily    carvedilol  25 mg Oral BID WC    isosorbide dinitrate  20 mg Oral TID    levothyroxine  100 mcg Oral Daily    mirtazapine  7.5 mg Oral Nightly    sacubitril-valsartan  1 tablet Oral BID    sodium chloride flush  5-40 mL IntraVENous 2 times per day    heparin (porcine)  5,000 Units SubCUTAneous 3 times per day     Continuous Infusions:   sodium chloride      dextrose       PRN Meds:.sodium chloride flush, sodium chloride, ondansetron **OR** ondansetron, polyethylene glycol, acetaminophen **OR** acetaminophen, glucose, dextrose bolus **OR** dextrose bolus, glucagon (rDNA), dextrose, labetalol    DATA:    CBC with Differential:    Lab Results   Component Value Date/Time    WBC 6.7 02/25/2023 05:08 AM    RBC 3.64 02/25/2023 05:08 AM    HGB 11.2 02/25/2023 05:08 AM    HCT 35.0 02/25/2023 05:08 AM     02/25/2023 05:08 AM    MCV 96.2 02/25/2023 05:08 AM    MCH 30.8 02/25/2023 05:08 AM    MCHC 32.0 02/25/2023 05:08 AM    RDW 14.0 02/25/2023 05:08 AM    NRBC 0.0 02/25/2023 05:08 AM    SEGSPCT 55 06/10/2013 04:00 AM    BLASTSPCT 0.9 02/25/2023 05:08 AM    LYMPHOPCT 9.6 02/25/2023 05:08 AM    MONOPCT 7.0 02/25/2023 05:08 AM    MYELOPCT 1.0 02/23/2023 01:35 PM    BASOPCT 1.7 02/25/2023 05:08 AM    MONOSABS 0.47 02/25/2023 05:08 AM    LYMPHSABS 0.74 02/25/2023 05:08 AM    EOSABS 0.00 02/25/2023 05:08 AM    BASOSABS 0.11 02/25/2023 05:08 AM     CMP:    Lab Results   Component Value Date/Time     02/27/2023 04:15 AM    K 4.5 02/27/2023 04:15 AM    K 3.5 02/25/2023 05:08 AM    CL 96 02/27/2023 04:15 AM    CO2 22 02/27/2023 04:15 AM    BUN 33 02/27/2023 04:15 AM    CREATININE 6.1 02/27/2023 04:15 AM    GFRAA 11 09/15/2022 02:27 AM    LABGLOM 9 02/27/2023 04:15 AM    GLUCOSE 85 02/27/2023 04:15 AM    PROT 6.2 02/25/2023 05:08 AM    LABALBU 3.1 02/25/2023 05:08 AM    CALCIUM 7.4 02/27/2023 04:15 AM    BILITOT 1.1 02/25/2023 05:08 AM    ALKPHOS 72 02/25/2023 05:08 AM    AST 17 02/25/2023 05:08 AM    ALT 14 02/25/2023 05:08 AM     Magnesium:    Lab Results   Component Value Date/Time    MG 1.9 02/25/2023 05:08 AM     Phosphorus:    Lab Results   Component Value Date/Time    PHOS 2.7 02/27/2023 04:15 AM     Radiology Review:    CTAP 2/22/2023      No acute inflammatory process in the abdomen and pelvis. Atrophic kidneys with nephrocalcinosis. CXR 2/22/2023      No acute process. BRIEF SUMMARY OF INITIAL CONSULT:  Bethanie Torres is a 67 y.o. male, past medical history significant for ESRD on HD 3 times weekly Tuesday, Thursday, Saturday via right IJ, HTN, type II DM, HFrEF 35 to 40% with stage I diastolic dysfunction, hypothyroidism, HCV, recently evaluated in the ED under police order for reportedly swerving while driving, who presented to the ED on 2/22/2023 with a chief complaint of fatigue, (last HD treatment Friday) and diarrhea.   Lab work on admission significant for potassium level 5.9, bicarbonate 18, creatinine 11.7, anion gap 20, lactic acid 3.0, proBNP 29,091, patient admitted for further evaluation and treatment, we are asked to see this patient in consultation for hemodialysis management. Problems Resolved:  Hyperkalemia 2/2 decreased GFR and noncompliance with HD, for HD today    IMPRESSION/RECOMMENDATIONS:      ESRD on IHD three times weekly T-Th-S, via RIJ. Patient has a history of noncompliance with treatment due to subjective intolerance to HD. Tolerated 3 hr 35 minutes yesterday with decreased BFR and DFR. HTN, noncompliant with medications, on amlodipine and carvedilol, decrease amlodipine today  HFrEF 35-40% with stage I DD, on carvedilol and Entresto   MBD of CKD, not on binders  Anemia of CKD, hold NIXON  -----------------------------------------------------------------------  Thrombocytopenia, platelet count 92, recommend holding heparin  Diarrhea, rule out C. difficile  Nutrition: Low potassium diet, 1 L fluid restriction  Depression: on sertraline, increase to 75 mg daily  Social detriments to health: After home evaluation by our office, it was decided the patient is not a candidate for home hemodialysis, serious concerns about home safety including functioning utilities, patient was without the ability to make meals, very poor medication safety--recommend placement. Plan:    HD three times weekly T-Th-S, 300/400, 0-0.5 L fluid removal  Decrease amlodipine to 5 mg daily  Increase sertraline to 75 mg daily  Continue Bumex 2 mg BID  Continue carvedilol 25 mg p.o. twice daily  Continue sacubitril-valsartan  twice daily  Continue to monitor BP  Continue to monitor labs  Awaiting placement.     Electronically signed by Jhon Mcgraw MD on 2/27/2023 at 12:43 PM

## 2023-02-27 NOTE — PROGRESS NOTES
Occupational Therapy    OCCUPATIONAL THERAPY INITIAL EVALUATION     [facility] [place], [place]         TXAX:                                                  Patient Name: Fabi Silva    MRN: [id number]    : 1950    Room: [address] [place] [address]      Evaluating OT: Yoselin Rubin OTR/L   [id number]      Referring Provider:Patrick Sexton DO    Specific Provider Orders/Date:OT eval and treat 2023      Diagnosis:  Nausea [R11.0]  ESRD on hemodialysis ([facility] [address] [N18.6, Z99.2]  End-stage renal disease needing dialysis ([facility] [address] [N18.6, Z99.2]  ESRD needing dialysis ([facility] [N18.6, Z99.2]  Diarrhea, unspecified type [R19.7]     Pertinent Medical History: Hep C, unsteadiness on feet     Precautions:  Fall Risk,      Assessment of current deficits    [x] Functional mobility  [x]ADLs  [x] Strength               []Cognition    [x] Functional transfers   [x] IADLs         [x] Safety Awareness   [x]Endurance    [] Fine Coordination              [x] Balance      [] Vision/perception   []Sensation     []Gross Motor Coordination  [] ROM  [] Delirium                   [] Motor Control     OT PLAN OF CARE   OT POC based on physician orders, patient diagnosis and results of clinical assessment    Frequency/Duration  2-4 days/wk for 2 weeks PRN   Specific OT Treatment Interventions to include:   ADL retraining/adapted techniques and AE recommendations to increase functional independence within precautions                    Energy conservation techniques to improve tolerance for selfcare routine   Functional transfer/mobility training/DME recommendations for increased independence, safety and fall prevention         Patient/family education to increase safety and functional independence             Environmental modifications for safe mobility and completion of ADLs                             Therapeutic activity to improve functional performance during ADLs. Therapeutic exercise to improve tolerance and functional strength for ADLs    Balance retraining/tolerance tasks for facilitation of postural control with dynamic challenges during ADLs . Positioning to improve functional independence  []    Recommended Adaptive Equipment: TBD     Home Living: Pt lives alone, 1 story with 2- 3 steps     Prior Level of Function: Independent  with ADLs , independent  with IADLs; ambulated with no device    Pain Level: no pain this session ;   Cognition: A&O:  pleasant , following simple commands, conversing    Memory:  fair +   Sequencing:  fair+   Problem solving:  fair    Judgement/safety:  fair      Functional Assessment:  AM-PAC Daily Activity Raw Score: 16/24   Initial Eval Status  Date: 2/27/2023 Treatment Status  Date: STGs = LTGs  Time frame: 10-14 days   Feeding Independent     Grooming CGA, standing at sink   Independent    UB Dressing Min A   Independent    LB Dressing Min A  Assist with threading brief over feet   Mod I    Bathing Min A   Mod I    Toileting Min A   Independent    Bed Mobility  SBA  Supine <> sit   Independent    Functional Transfers Min A  Sit- stand from bed, commode   Mod I    Functional Mobility Min A, no device   To/from bathroom   Patient refusing walker - stated they make him fall   Mod I  with good tolerance    Balance Sitting:     Static:  Independent     Dynamic:SBA   Standing: Min/CGA   Independent    Activity Tolerance No SOB   Fatigued with light activity   Good  with ADL activity    Visual/  Perceptual Glasses: none by bedside                 Hand Dominance right   AROM (PROM) Strength Additional Info:    RUE  WFL WFL good  and wfl FMC/dexterity noted during ADL tasks       LUE WFL WFl good  and wfl FMC/dexterity noted during ADL tasks       Hearing: WFL   Sensation:  No c/o numbness or tingling   Tone: WFL  Edema: none observed     Comments: Upon arrival patient in bed .   At end of session, patient returned to bed  with call light and phone within reach, all lines and tubes intact. Nursing present in room      Overall patient demonstrated  decreased independence and safety during completion of ADL/functional transfer/mobility tasks. Pt would benefit from continued skilled OT to increase safety and independence with completion of ADL/IADL tasks for functional independence and quality of life. Rehab Potential: good for established goals     Patient / Family Goal: rehab       Patient and/or family were instructed on functional diagnosis, prognosis/goals and OT plan of care. Demonstrated good  understanding. Eval Complexity: Low    Time In: 1130  Time Out: 1145      Min Units   OT Eval Low 97165 x  1   OT Eval Medium 51148      OT Eval High 59013      OT Re-Eval [id number]       Therapeutic Ex 87889      Therapeutic Activities 83713       ADL/Self Care 61719       Orthotic Management 59163       Manual [id number]     Neuro Re-Ed 97807       Non-Billable Time         Evaluation Time additionally includes thorough review of current medical information, gathering information on past medical history/social history and prior level of function, interpretation of standardized testing/informal observation of tasks, assessment of data and development of plan of care and goals.             Cassy Borjas  OTR/L  OT [id number]

## 2023-02-27 NOTE — PROGRESS NOTES
Checking in on patient for assessment. Resting in bed with eyes closed. Refuses medications at this time and states he has dialysis today. Will continue to follow and attempt to give medications at a later time.

## 2023-02-27 NOTE — PROGRESS NOTES
Blood cultures x 2 sets obtained from HD CVC lines using aseptic technique, tolerated well, cultures labeled and sent w primary RN, catheter clamped and capped, stable at dc

## 2023-02-28 ENCOUNTER — ANESTHESIA EVENT (OUTPATIENT)
Dept: OPERATING ROOM | Age: 73
End: 2023-02-28
Payer: MEDICARE

## 2023-02-28 PROBLEM — R78.81 BACTEREMIA: Status: ACTIVE | Noted: 2023-02-28

## 2023-02-28 LAB
ABO/RH: NORMAL
ABO/RH: NORMAL
ACINETOBACTER CALCOAC BAUMANNII COMPLEX BY PCR: NOT DETECTED
ANION GAP SERPL CALCULATED.3IONS-SCNC: 12 MMOL/L (ref 7–16)
ANISOCYTOSIS: ABNORMAL
ANTIBODY SCREEN: NORMAL
ATYPICAL LYMPHOCYTE RELATIVE PERCENT: 0.9 % (ref 0–4)
BACTEROIDES FRAGILIS BY PCR: NOT DETECTED
BASOPHILS ABSOLUTE: 0.03 E9/L (ref 0–0.2)
BASOPHILS RELATIVE PERCENT: 0.9 % (ref 0–2)
BOTTLE TYPE: ABNORMAL
BUN BLDV-MCNC: 45 MG/DL (ref 6–23)
CALCIUM SERPL-MCNC: 7.7 MG/DL (ref 8.6–10.2)
CANDIDA ALBICANS BY PCR: NOT DETECTED
CANDIDA AURIS BY PCR: NOT DETECTED
CANDIDA GLABRATA BY PCR: NOT DETECTED
CANDIDA KRUSEI BY PCR: NOT DETECTED
CANDIDA PARAPSILOSIS BY PCR: NOT DETECTED
CANDIDA TROPICALIS BY PCR: NOT DETECTED
CARBAPENEM RESISTANCE IMP GENE BY PCR: NOT DETECTED
CARBAPENEM RESISTANCE KPC BY PCR: NOT DETECTED
CARBAPENEM RESISTANCE NDM GENE BY PCR: NOT DETECTED
CARBAPENEM RESISTANCE OXA-48 GENE BY PCR: NOT DETECTED
CARBAPENEM RESISTANCE VIM GENE BY PCR: NOT DETECTED
CEPHALOSPORIN RESISTANCE CTX-M GENE BY PCR: NOT DETECTED
CHLORIDE BLD-SCNC: 100 MMOL/L (ref 98–107)
CO2: 23 MMOL/L (ref 22–29)
CREAT SERPL-MCNC: 7.4 MG/DL (ref 0.7–1.2)
CRYPTOCOCCUS NEOFORMANS/GATTII BY PCR: NOT DETECTED
ENTEROBACTER CLOACAE COMPLEX BY PCR: NOT DETECTED
ENTEROBACTERALES BY PCR: DETECTED
ENTEROCOCCUS FAECALIS BY PCR: NOT DETECTED
ENTEROCOCCUS FAECIUM BY PCR: NOT DETECTED
EOSINOPHILS ABSOLUTE: 0.03 E9/L (ref 0.05–0.5)
EOSINOPHILS RELATIVE PERCENT: 0.8 % (ref 0–6)
ESCHERICHIA COLI BY PCR: NOT DETECTED
GFR SERPL CREATININE-BSD FRML MDRD: 7 ML/MIN/1.73
GLUCOSE BLD-MCNC: 93 MG/DL (ref 74–99)
HAEMOPHILUS INFLUENZAE BY PCR: NOT DETECTED
HCT VFR BLD CALC: 36.8 % (ref 37–54)
HEMOGLOBIN: 11.9 G/DL (ref 12.5–16.5)
KLEBSIELLA AEROGENES BY PCR: NOT DETECTED
KLEBSIELLA OXYTOCA BY PCR: NOT DETECTED
KLEBSIELLA PNEUMONIAE GROUP BY PCR: NOT DETECTED
LISTERIA MONOCYTOGENES BY PCR: NOT DETECTED
LYMPHOCYTES ABSOLUTE: 0.56 E9/L (ref 1.5–4)
LYMPHOCYTES RELATIVE PERCENT: 15.6 % (ref 20–42)
MCH RBC QN AUTO: 30.1 PG (ref 26–35)
MCHC RBC AUTO-ENTMCNC: 32.3 % (ref 32–34.5)
MCV RBC AUTO: 93.2 FL (ref 80–99.9)
METER GLUCOSE: 125 MG/DL (ref 74–99)
METER GLUCOSE: 133 MG/DL (ref 74–99)
MONOCYTES ABSOLUTE: 0.3 E9/L (ref 0.1–0.95)
MONOCYTES RELATIVE PERCENT: 8.7 % (ref 2–12)
MYELOCYTE PERCENT: 0.9 % (ref 0–0)
NEISSERIA MENINGITIDIS BY PCR: NOT DETECTED
NEUTROPHILS ABSOLUTE: 2.41 E9/L (ref 1.8–7.3)
NEUTROPHILS RELATIVE PERCENT: 72.2 % (ref 43–80)
NUCLEATED RED BLOOD CELLS: 0 /100 WBC
ORDER NUMBER: ABNORMAL
PDW BLD-RTO: 13.7 FL (ref 11.5–15)
PHOSPHORUS: 2.4 MG/DL (ref 2.5–4.5)
PLATELET # BLD: 138 E9/L (ref 130–450)
PMV BLD AUTO: 11.1 FL (ref 7–12)
POTASSIUM SERPL-SCNC: 4.4 MMOL/L (ref 3.5–5)
PROTEUS SPECIES BY PCR: NOT DETECTED
PSEUDOMONAS AERUGINOSA BY PCR: NOT DETECTED
RBC # BLD: 3.95 E12/L (ref 3.8–5.8)
SALMONELLA SPECIES BY PCR: NOT DETECTED
SERRATIA MARCESCENS BY PCR: NOT DETECTED
SODIUM BLD-SCNC: 135 MMOL/L (ref 132–146)
SOURCE OF BLOOD CULTURE: ABNORMAL
STAPHYLOCOCCUS AUREUS BY PCR: NOT DETECTED
STAPHYLOCOCCUS EPIDERMIDIS BY PCR: NOT DETECTED
STAPHYLOCOCCUS LUGDUNENSIS BY PCR: NOT DETECTED
STAPHYLOCOCCUS SPECIES BY PCR: NOT DETECTED
STENOTROPHOMONAS MALTOPHILIA BY PCR: NOT DETECTED
STREPTOCOCCUS AGALACTIAE BY PCR: NOT DETECTED
STREPTOCOCCUS PNEUMONIAE BY PCR: NOT DETECTED
STREPTOCOCCUS PYOGENES  BY PCR: NOT DETECTED
STREPTOCOCCUS SPECIES BY PCR: NOT DETECTED
WBC # BLD: 3.3 E9/L (ref 4.5–11.5)

## 2023-02-28 PROCEDURE — 86850 RBC ANTIBODY SCREEN: CPT

## 2023-02-28 PROCEDURE — 6370000000 HC RX 637 (ALT 250 FOR IP): Performed by: LICENSED PRACTICAL NURSE

## 2023-02-28 PROCEDURE — 84100 ASSAY OF PHOSPHORUS: CPT

## 2023-02-28 PROCEDURE — 36415 COLL VENOUS BLD VENIPUNCTURE: CPT

## 2023-02-28 PROCEDURE — 90935 HEMODIALYSIS ONE EVALUATION: CPT

## 2023-02-28 PROCEDURE — 86900 BLOOD TYPING SEROLOGIC ABO: CPT

## 2023-02-28 PROCEDURE — 6370000000 HC RX 637 (ALT 250 FOR IP): Performed by: INTERNAL MEDICINE

## 2023-02-28 PROCEDURE — 86901 BLOOD TYPING SEROLOGIC RH(D): CPT

## 2023-02-28 PROCEDURE — 99232 SBSQ HOSP IP/OBS MODERATE 35: CPT | Performed by: INTERNAL MEDICINE

## 2023-02-28 PROCEDURE — 2580000003 HC RX 258: Performed by: REGISTERED NURSE

## 2023-02-28 PROCEDURE — 1200000000 HC SEMI PRIVATE

## 2023-02-28 PROCEDURE — 82962 GLUCOSE BLOOD TEST: CPT

## 2023-02-28 PROCEDURE — 2580000003 HC RX 258: Performed by: INTERNAL MEDICINE

## 2023-02-28 PROCEDURE — 6360000002 HC RX W HCPCS: Performed by: INTERNAL MEDICINE

## 2023-02-28 PROCEDURE — 6360000002 HC RX W HCPCS: Performed by: REGISTERED NURSE

## 2023-02-28 PROCEDURE — 80048 BASIC METABOLIC PNL TOTAL CA: CPT

## 2023-02-28 PROCEDURE — 85025 COMPLETE CBC W/AUTO DIFF WBC: CPT

## 2023-02-28 RX ADMIN — SACUBITRIL AND VALSARTAN 1 TABLET: 97; 103 TABLET, FILM COATED ORAL at 20:43

## 2023-02-28 RX ADMIN — MIRTAZAPINE 7.5 MG: 15 TABLET, FILM COATED ORAL at 20:43

## 2023-02-28 RX ADMIN — CEFEPIME 1000 MG: 1 INJECTION, POWDER, FOR SOLUTION INTRAMUSCULAR; INTRAVENOUS at 11:55

## 2023-02-28 RX ADMIN — LEVOTHYROXINE SODIUM 100 MCG: 100 TABLET ORAL at 05:27

## 2023-02-28 RX ADMIN — SERTRALINE HYDROCHLORIDE 75 MG: 50 TABLET ORAL at 13:28

## 2023-02-28 RX ADMIN — ACETAMINOPHEN 650 MG: 325 TABLET ORAL at 13:28

## 2023-02-28 RX ADMIN — SODIUM CHLORIDE, PRESERVATIVE FREE 10 ML: 5 INJECTION INTRAVENOUS at 13:43

## 2023-02-28 RX ADMIN — ASPIRIN 81 MG: 81 TABLET, COATED ORAL at 13:28

## 2023-02-28 RX ADMIN — Medication 10 ML: at 11:13

## 2023-02-28 RX ADMIN — Medication 10 ML: at 20:43

## 2023-02-28 RX ADMIN — ATORVASTATIN CALCIUM 40 MG: 40 TABLET, FILM COATED ORAL at 20:43

## 2023-02-28 RX ADMIN — ISOSORBIDE DINITRATE 20 MG: 10 TABLET ORAL at 13:27

## 2023-02-28 RX ADMIN — HEPARIN SODIUM 5000 UNITS: 10000 INJECTION INTRAVENOUS; SUBCUTANEOUS at 05:27

## 2023-02-28 RX ADMIN — ONDANSETRON 4 MG: 2 INJECTION INTRAMUSCULAR; INTRAVENOUS at 10:42

## 2023-02-28 RX ADMIN — SACUBITRIL AND VALSARTAN 1 TABLET: 97; 103 TABLET, FILM COATED ORAL at 13:27

## 2023-02-28 RX ADMIN — AMLODIPINE BESYLATE 5 MG: 5 TABLET ORAL at 13:28

## 2023-02-28 ASSESSMENT — PAIN SCALES - GENERAL
PAINLEVEL_OUTOF10: 0
PAINLEVEL_OUTOF10: 6
PAINLEVEL_OUTOF10: 0

## 2023-02-28 ASSESSMENT — PAIN DESCRIPTION - ORIENTATION: ORIENTATION: MID

## 2023-02-28 ASSESSMENT — PAIN - FUNCTIONAL ASSESSMENT: PAIN_FUNCTIONAL_ASSESSMENT: PREVENTS OR INTERFERES SOME ACTIVE ACTIVITIES AND ADLS

## 2023-02-28 ASSESSMENT — PAIN DESCRIPTION - DESCRIPTORS: DESCRIPTORS: ACHING

## 2023-02-28 ASSESSMENT — PAIN DESCRIPTION - LOCATION: LOCATION: HEAD

## 2023-02-28 NOTE — PROGRESS NOTES
Physical Therapy    PT Rx attempted this PM. Pt unable to stay awake, not responding to questions about participation. Will follow on another date/time.   Randie Cranker PTA [id number]

## 2023-02-28 NOTE — CONSULTS
GENERAL SURGERY  CONSULT NOTE  2/28/2023    Physician Consulted: Dr. Ondina Romero   Reason for Consult: GNR bacteremia CLABSI  Referring Physician: Dr. Gail ELLINGTON  Sarah Adams is a 67 y.o. male with history of ESRD on HD, HTN, HLD, DM, hypothyroidism who initially presented to the ED on 2/22 secondary to new onset diarrhea associated with weakness and fatigue. Patient was found to be febrile during work-up and underwent evaluation by infectious disease with blood cultures. Initial blood cultures were noted to be negative but patient continued to be febrile. Patient underwent repeat blood cultures that were drawn from his right internal jugular tunneled HD line. These blood cultures were noted to be positive for gram-negative rods x2. Without another obvious source on work-up, general surgery was consulted for tunneled line removal due to suspected central line associated bloodstream infection. Patient denies any other vascular surgical procedures patient states this is his only dialysis access. Patient states he never followed up for further planning of ongoing dialysis access. Patient is currently on dialysis 3 times weekly with most recent dialysis session today 2/28. Patient is currently on cefepime per infectious disease recommendation.       Past Medical History:   Diagnosis Date    Anemia     Chronic kidney disease     Depression     Diabetes mellitus (HCC)     ESRD (end stage renal disease) ([facility] [address]     Hepatitis C     Hypertension     Liver disease     Moderate protein-calorie malnutrition (HCC)     Muscle weakness (generalized)     Thyroid disease     Unspecified diseases of blood and blood-forming organs     Unsteadiness on feet        Past Surgical History:   Procedure Laterality Date    TONSILLECTOMY      VASCULAR SURGERY N/A 11/21/2021    CATHETER INSERTION HEMODIALYSIS, REMOVAL OF FEMORAL CATHETER performed by Delta Ceja MD at [facility] OR       Medications Prior to Admission:    Prior to Admission medications    Medication Sig Start Date End Date Taking? Authorizing Provider   amLODIPine (NORVASC) 5 MG tablet Take 1 tablet by mouth nightly 9/15/22   KIANNA Worthington CNP   atorvastatin (LIPITOR) 40 MG tablet Take 40 mg by mouth daily    Historical Provider, MD   carvedilol (COREG) 25 MG tablet Take 25 mg by mouth 2 times daily (with meals)    Historical Provider, MD   mirtazapine (REMERON) 15 MG tablet Take 7.5 mg by mouth nightly    Historical Provider, MD   isosorbide dinitrate (ISORDIL) 20 MG tablet Take 1 tablet by mouth 3 times daily  Patient not taking: Reported on 2023   KIANNA Alonso CNP   Heparin Sodium, Porcine, (HEPARIN, PORCINE,) 61297 UNIT/ML injection Inject 0.5 mLs into the skin in the morning and 0.5 mLs at noon and 0.5 mLs before bedtime.  22   KIANNA Alonso CNP   sertraline (ZOLOFT) 50 MG tablet Take 1 tablet by mouth daily  Patient not taking: Reported on 2023   Mark Desai MD   levothyroxine (SYNTHROID) 100 MCG tablet Take 1 tablet by mouth Daily 22   Mark Desai MD   [facility]) 2 MG tablet Take 1 tablet by mouth 2 times daily (with meals) 22  Mark Desai MD   sacubitril-valsartan [facility])  MG per tablet Take 1 tablet by mouth 2 times daily 21   KIANAN Jaimes CNP   aspirin 81 MG EC tablet Take 1 tablet by mouth daily 21  KIANNA Jaimes CNP   vitamin D (ERGOCALCIFEROL) 1.25 MG (77948 UT) CAPS capsule Take 50,000 Units by mouth once a week **** 20   Historical Provider, MD       Allergies   Allergen Reactions    Penicillins      Does not remember       Family History   Problem Relation Age of Onset    Diabetes Mother     Cancer Father        Social History     Tobacco Use    Smoking status: Former     Packs/day: 2.00     Years: 10.00     Pack years: 20.00     Types: Cigarettes     Quit date: 1973     Years since quittin.5 Smokeless tobacco: Never   Vaping Use    Vaping Use: Never used   Substance Use Topics    Alcohol use: No    Drug use: Not Currently     Comment: in early 20's         Review of Systems reviewed and otherwise negative unless noted in HPI    PHYSICAL EXAM:    Vitals:    02/28/23 1045   BP: (!) 169/88   Pulse: 66   Resp: 20   Temp: 98.1 °F (36.7 °C)   SpO2: 97%       General Appearance:  awake, alert, oriented, in no acute distress but somewhat lethargic  Skin:  Skin color, texture, turgor normal. No rashes or lesions. Head/face:  NCAT  Eyes:  No gross abnormalities. Lungs:  Normal expansion. Clear to auscultation. No rales, rhonchi, or wheezing. Heart:  Heart regular rate and rhythm  Abdomen: Soft, nondistended, nontender without rebound/guarding/rigidity. Extremities: pulses present in all extremities and trace pedal edema    LABS:    CBC  Recent Labs     02/28/23  0534   WBC 3.3*   HGB 11.9*   HCT 36.8*        BMP  Recent Labs     02/28/23  0534      K 4.4      CO2 23   BUN 45*   CREATININE 7.4*   CALCIUM 7.7*     Liver Function  No results for input(s): AMYLASE, LIPASE, BILITOT, BILIDIR, AST, ALT, ALKPHOS, PROT, LABALBU in the last 72 hours. No results for input(s): LACTATE in the last 72 hours. No results for input(s): INR, PTT in the last 72 hours. Invalid input(s): PT    RADIOLOGY    CT ABDOMEN PELVIS WO CONTRAST Additional Contrast? None    Result Date: 2/22/2023  EXAMINATION: CT OF THE ABDOMEN AND PELVIS WITHOUT CONTRAST 2/22/2023 3:07 pm TECHNIQUE: CT of the abdomen and pelvis was performed without the administration of intravenous contrast. Multiplanar reformatted images are provided for review. Automated exposure control, iterative reconstruction, and/or weight based adjustment of the mA/kV was utilized to reduce the radiation dose to as low as reasonably achievable. COMPARISON: None.  HISTORY: ORDERING SYSTEM PROVIDED HISTORY: abdominal pain diarrhea TECHNOLOGIST PROVIDED HISTORY: Reason for exam:->abdominal pain diarrhea Additional Contrast?->None Decision Support Exception - unselect if not a suspected or confirmed emergency medical condition->Emergency Medical Condition (MA) FINDINGS: Lower Chest: No infiltrates or pleural effusion. Organs: No focal liver lesions. Gallbladder is present with no calcified stones. Spleen is not enlarged. Pancreas and adrenal glands appear unremarkable. The kidneys are atrophic with bilateral nephrocalcinosis. GI/Bowel: There are no obstructing or constricting lesions. Pelvis: Bladder is suboptimally distended. The prostate gland is enlarged. There is no significant free fluid. Peritoneum/Retroperitoneum: No free air or significant adenopathy. Bones/Soft Tissues: Degenerative changes of the lumbar spine most advanced at L3-L4 and L4-L5. No acute inflammatory process in the abdomen and pelvis. Atrophic kidneys with nephrocalcinosis. XR CHEST (2 VW)    Result Date: 2/22/2023  EXAMINATION: TWO XRAY VIEWS OF THE CHEST 2/22/2023 3:26 pm COMPARISON: /16/22. HISTORY: ORDERING SYSTEM PROVIDED HISTORY: chest pain TECHNOLOGIST PROVIDED HISTORY: Reason for exam:->chest pain FINDINGS: The lungs are without acute focal process. There is no effusion or pneumothorax. The cardiomediastinal silhouette is without acute process. The osseous structures are without acute process. Right-sided central line tip in the distal SVC. No acute process.          ASSESSMENT:  67 y.o. male with gram-negative jolene bacteremia with suspected central line associated bloodstream infection with history of ESRD on HD    PLAN:  -Plan for tunneled dialysis catheter removal on 3/1  -To discuss with nephrology regarding patient's ongoing dialysis access  -N.p.o. at midnight for procedure  -Discussed with Dr. Trudi Martinez    Electronically signed by Martha Shay DO on 2/28/23 at 12:30 PM EST    The patient was seen and examined  The chart was reviewed  Agree with the assessment and plan

## 2023-02-28 NOTE — CARE COORDINATION
Social work / Discharge Planning:          [facility] to [facility] to check criteria. If no LTAC criteria, patient has been accepted at [facility] and will need OP HD arrangements made prior to discharge. Social work spoke to "[employer] yesterday and they are looking into chair time since patient was active with them prior to starting home HD training.      Electronically signed by JAY Taylor on 2/28/2023 at 11:30 AM

## 2023-02-28 NOTE — FLOWSHEET NOTE
02/28/23 1036   Vital Signs   BP (!) 179/97   Temp 98 °F (36.7 °C)   Heart Rate 68   Post-Hemodialysis Assessment   Post-Treatment Procedures Blood returned;Catheter capped, clamped and heparinized x 2 ports   Machine Disinfection Process Exterior Machine Disinfection   Rinseback Volume (ml) 300 ml   Dialyzer Clearance Moderately streaked   Duration of Treatment (minutes) 180 minutes   Heparin Amount Administered During Treatment (mL) 0 mL   Hemodialysis Intake (ml) 500 ml   Hemodialysis Output (ml) 500 ml   NET Removed (ml) 0   Tolerated Treatment Fair   Interventions Taken Fluid bolus;Ultrafiltration stopped   Patient Response to Treatment Signed off HD AMA at 3hrs, 3k bath   Time Off 1030   Patient Disposition Return to room

## 2023-02-28 NOTE — PROGRESS NOTES
Balaji Jensen Hospitalist   Progress Note    Admitting Date and Time: 2/22/2023  2:06 PM  Admit Dx: Nausea [R11.0]  ESRD on hemodialysis ([facility] [address] [N18.6, Z99.2]  End-stage renal disease needing dialysis ([facility] [address] [N18.6, Z99.2]  ESRD needing dialysis ([facility] [address] [N18.6, Z99.2]  Diarrhea, unspecified type [R19.7]    Subjective:    Patient was admitted with Nausea [R11.0]  ESRD on hemodialysis ([facility] [address] [N18.6, Z99.2]  End-stage renal disease needing dialysis ([facility] [address] [N18.6, Z99.2]  ESRD needing dialysis ([facility] [address] [N18.6, Z99.2]  Diarrhea, unspecified type [R19.7]. Patient denies fever, chills, cp, sob.  Pt c/o n/v.     [START ON 3/1/2023] cefepime  1,000 mg IntraVENous Q24H    amLODIPine  5 mg Oral Daily    sertraline  75 mg Oral Daily    bumetanide  2 mg Oral BID    aspirin  81 mg Oral Daily    atorvastatin  40 mg Oral Daily    carvedilol  25 mg Oral BID WC    isosorbide dinitrate  20 mg Oral TID    levothyroxine  100 mcg Oral Daily    mirtazapine  7.5 mg Oral Nightly    sacubitril-valsartan  1 tablet Oral BID    sodium chloride flush  5-40 mL IntraVENous 2 times per day    heparin (porcine)  5,000 Units SubCUTAneous 3 times per day     sodium chloride flush, 5-40 mL, PRN  sodium chloride, , PRN  ondansetron, 4 mg, Q8H PRN   Or  ondansetron, 4 mg, Q6H PRN  polyethylene glycol, 17 g, Daily PRN  acetaminophen, 650 mg, Q6H PRN   Or  acetaminophen, 650 mg, Q6H PRN  glucose, 4 tablet, PRN  dextrose bolus, 125 mL, PRN   Or  dextrose bolus, 250 mL, PRN  glucagon (rDNA), 1 mg, PRN  dextrose, , Continuous PRN  labetalol, 10 mg, Q6H PRN         Objective:    BP (!) 106/56   Pulse 83   Temp 98.9 °F (37.2 °C) (Oral)   Resp 20   Ht 6' 2\" (1.88 m)   Wt 147 lb 9.6 oz (67 kg)   SpO2 98%   BMI 18.95 kg/m²   Skin: warm and dry, no rash or erythema  Pulmonary/Chest: clear to auscultation bilaterally- no wheezes, rales or rhonchi, normal air movement, no respiratory distress  Cardiovascular: rhythm reg at rate of 84  Abdomen: soft, non-tender, non-distended, normal bowel sounds, no masses or organomegaly  Extremities: no cyanosis, no clubbing, and no edema      Recent Labs     02/26/23  0343 02/27/23  0415 02/28/23  0534    129* 135   K 3.5 4.5 4.4   CL 96* 96* 100   CO2 26 22 23   BUN 21 33* 45*   CREATININE 4.6* 6.1* 7.4*   GLUCOSE 114* 85 93   CALCIUM 7.6* 7.4* 7.7*       Recent Labs     02/27/23  1715 02/28/23  0534   WBC 2.8* 3.3*   RBC 3.73* 3.95   HGB 11.4* 11.9*   HCT 34.3* 36.8*   MCV 92.0 93.2   MCH 30.6 30.1   MCHC 33.2 32.3   RDW 13.7 13.7   * 138   MPV 11.1 11.1       CBC with Differential:    Lab Results   Component Value Date/Time    WBC 3.3 02/28/2023 05:34 AM    RBC 3.95 02/28/2023 05:34 AM    HGB 11.9 02/28/2023 05:34 AM    HCT 36.8 02/28/2023 05:34 AM     02/28/2023 05:34 AM    MCV 93.2 02/28/2023 05:34 AM    MCH 30.1 02/28/2023 05:34 AM    MCHC 32.3 02/28/2023 05:34 AM    RDW 13.7 02/28/2023 05:34 AM    NRBC 0.0 02/28/2023 05:34 AM    SEGSPCT 55 06/10/2013 04:00 AM    BLASTSPCT 0.9 02/25/2023 05:08 AM    LYMPHOPCT 15.6 02/28/2023 05:34 AM    MONOPCT 8.7 02/28/2023 05:34 AM    MYELOPCT 0.9 02/28/2023 05:34 AM    BASOPCT 0.9 02/28/2023 05:34 AM    MONOSABS 0.30 02/28/2023 05:34 AM    LYMPHSABS 0.56 02/28/2023 05:34 AM    EOSABS 0.03 02/28/2023 05:34 AM    BASOSABS 0.03 02/28/2023 05:34 AM     BMP:    Lab Results   Component Value Date/Time     02/28/2023 05:34 AM    K 4.4 02/28/2023 05:34 AM    K 3.5 02/25/2023 05:08 AM     02/28/2023 05:34 AM    CO2 23 02/28/2023 05:34 AM    BUN 45 02/28/2023 05:34 AM    LABALBU 3.1 02/25/2023 05:08 AM    CREATININE 7.4 02/28/2023 05:34 AM    CALCIUM 7.7 02/28/2023 05:34 AM    GFRAA 11 09/15/2022 02:27 AM    LABGLOM 7 02/28/2023 05:34 AM    GLUCOSE 93 02/28/2023 05:34 AM     Phosphorus:    Lab Results   Component Value Date/Time    PHOS 2.4 02/28/2023 05:34 AM        Radiology:   XR CHEST (2 VW)   Final Result   No acute process.          CT ABDOMEN PELVIS WO CONTRAST Additional Contrast? None   Final Result   No acute inflammatory process in the abdomen and pelvis. Atrophic kidneys with nephrocalcinosis. Assessment:    Principal Problem:    ESRD needing dialysis ([facility] [address]  Active Problems:    ESRD on hemodialysis ([facility] [address]    Diarrhea    Primary hypertension    ESRD (end stage renal disease) ([facility] [address]    Depression    Hyponatremia  Resolved Problems:    * No resolved hospital problems. *      Plan:  Bacteremia monitor closely. Abx. ID following  CLABSI (likely present on admission)  abx. ID following.  Surgery consulted for line removal  Diarrhea presumed to be viral gastroenteritis check workup and supportive care  ESRD  HD  nephrology following  Htn monitor and tx with bp medicine  Hypothyroidism continue med  Depression continue med  Hyperkalemia monitor and tx  Acidosis/elevated lactic acid level monitor  Thrombocytopenia monitor  Hypophosphatemia monitor and replace prn  Hyponatremia monitor        Electronically signed by Lisset Huizar DO on 2/28/2023 at 6:32 PM

## 2023-02-28 NOTE — PROGRESS NOTES
[address] [place] [address] [facility]  ELIG[facility]  Progress Note    SUBJECTIVE:  Chief Complaint   Patient presents with    Fatigue     Hasn't had dialysis since Friday, nausea, dehydration    Diarrhea     Patient is sitting up in bed, says he is miserable   He is nauseated. He had dialysis and did have chills while on. No fevers documented  Discussed plan of care with him     Review of systems:  As stated above in the chief complaint, otherwise negative. Medications:  Scheduled Meds:   cefepime  1,000 mg IntraVENous Q24H    amLODIPine  5 mg Oral Daily    sertraline  75 mg Oral Daily    bumetanide  2 mg Oral BID    aspirin  81 mg Oral Daily    atorvastatin  40 mg Oral Daily    carvedilol  25 mg Oral BID WC    isosorbide dinitrate  20 mg Oral TID    levothyroxine  100 mcg Oral Daily    mirtazapine  7.5 mg Oral Nightly    sacubitril-valsartan  1 tablet Oral BID    sodium chloride flush  5-40 mL IntraVENous 2 times per day    heparin (porcine)  5,000 Units SubCUTAneous 3 times per day     Continuous Infusions:   sodium chloride      dextrose       PRN Meds:sodium chloride flush, sodium chloride, ondansetron **OR** ondansetron, polyethylene glycol, acetaminophen **OR** acetaminophen, glucose, dextrose bolus **OR** dextrose bolus, glucagon (rDNA), dextrose, labetalol    OBJECTIVE:  BP (!) 179/97   Pulse 68   Temp 98 °F (36.7 °C)   Resp 16   Ht 6' 2\" (1.88 m)   Wt 147 lb 9.6 oz (67 kg)   SpO2 100%   BMI 18.95 kg/m²   Temp  Av.2 °F (36.8 °C)  Min: 98 °F (36.7 °C)  Max: 98.3 °F (36.8 °C)  Constitutional: The patient is awake, alert, and oriented. He is nauseated & feeling miserable. Skin: Warm and dry. No rashes were noted. HEENT: Round and reactive pupils. Moist mucous membranes. No ulcerations or thrush. Neck: Supple to movements. Chest: No use of accessory muscles to breathe. Symmetrical expansion. No wheezing, crackles or rhonchi. Cardiovascular: S1 and S2 are rhythmic and regular.  No murmurs appreciated. Abdomen: Positive bowel sounds to auscultation. Benign to palpation. No masses felt. Extremities: No edema.   Lines: Peripheral.  Right chest HD dry dressing    Laboratory and Tests Review:  Lab Results   Component Value Date    WBC 3.3 (L) 02/28/2023    WBC 2.8 (L) 02/27/2023    WBC 6.7 02/25/2023    HGB 11.9 (L) 02/28/2023    HCT 36.8 (L) 02/28/2023    MCV 93.2 02/28/2023     02/28/2023     Lab Results   Component Value Date    NEUTROABS 2.41 02/28/2023    NEUTROABS 1.73 (L) 02/27/2023    NEUTROABS 5.29 02/25/2023     No results found for: CRPHS  Lab Results   Component Value Date    ALT 14 02/25/2023    AST 17 02/25/2023    ALKPHOS 72 02/25/2023    BILITOT 1.1 02/25/2023     Lab Results   Component Value Date/Time     02/28/2023 05:34 AM    K 4.4 02/28/2023 05:34 AM    K 3.5 02/25/2023 05:08 AM     02/28/2023 05:34 AM    CO2 23 02/28/2023 05:34 AM    BUN 45 02/28/2023 05:34 AM    CREATININE 7.4 02/28/2023 05:34 AM    CREATININE 6.1 02/27/2023 04:15 AM    CREATININE 4.6 02/26/2023 03:43 AM    GFRAA 11 09/15/2022 02:27 AM    LABGLOM 7 02/28/2023 05:34 AM    GLUCOSE 93 02/28/2023 05:34 AM    PROT 6.2 02/25/2023 05:08 AM    LABALBU 3.1 02/25/2023 05:08 AM    CALCIUM 7.7 02/28/2023 05:34 AM    BILITOT 1.1 02/25/2023 05:08 AM    ALKPHOS 72 02/25/2023 05:08 AM    AST 17 02/25/2023 05:08 AM    ALT 14 02/25/2023 05:08 AM     Lab Results   Component Value Date    CRP 2.2 (H) 07/15/2022    CRP 0.3 04/21/2022    CRP 0.6 (H) 12/09/2021     Lab Results   Component Value Date    SEDRATE 61 (H) 11/28/2016     Radiology:  Reviewed     Microbiology:   Blood cultures 2/24: negative so far   Stool culture enteric ina  Respiratory panel negative  Giardia  COVID-negative  C. difficile was canceled  Blood cultures 2/27 from HD line; 4/4 GNR    Assessment:  CLABSI: blood cultures drawn from right tunneled HD line positive for GNR  Fever : associated to CLABSI  No sign of pneumonia or skin wounds or UTI (he does urinate every day)   CT A/p did not show any sign of infection  Diarrhea: likely viral gastroenteritis  ESRD on HD: right tunneled line in place (placed November 2021)    Plan:    Start Cefepime 1g IV every 24 hours   Consult Vascular surgery (placed line in November 2021) to remove tunneled HD line today   Await ID & sensitivity of blood cultures  Repeat blood cultures tomorrow AM     Jeremy Alba, APRN - CNP  10:44 AM  2/28/2023   Pt seen and examined. Above discussed agree with advanced practice nurse. Labs, cultures, and radiographs reviewed. Face to Face encounter occurred. Changes made as necessary. He looks sick. He just came back from HD. He has fever, feels nauseated. His HD line cx showed GNR. Line need to be removed. Gen surgery consulted. Informed Dr. Britni Eason, nephrology. Started on IV cefepime.     Kamla Steven MD

## 2023-02-28 NOTE — PROGRESS NOTES
Department of Internal Medicine  Nephrology Progress Note    Events reviewed. SUBJECTIVE: We are following Carter Velez for ESRD on HD. Patient reports no new complaints.     PHYSICAL EXAM:      Vitals:    VITALS:  BP (!) 169/88   Pulse 66   Temp 98.1 °F (36.7 °C) (Oral)   Resp 20   Ht 6' 2\" (1.88 m)   Wt 147 lb 9.6 oz (67 kg)   SpO2 97%   BMI 18.95 kg/m²   24HR INTAKE/OUTPUT:    Intake/Output Summary (Last 24 hours) at 2/28/2023 1432  Last data filed at 2/28/2023 1036  Gross per 24 hour   Intake 620 ml   Output 500 ml   Net 120 ml     Constitutional: Alert, oriented, NAD  HEENT: Normocephalic, atraumatic, PERRLA  Respiratory: CTA  Cardiovascular/Edema: RRR, normal S1, normal S2, no edema  Gastrointestinal: Soft, flat, nondistended, nontender  Neurologic: Nonfocal  Skin: Warm, dry, no rashes, no lesions  Access: RIJ tunneled dialysis catheter    Scheduled Meds:   [START ON 3/1/2023] cefepime  1,000 mg IntraVENous Q24H    amLODIPine  5 mg Oral Daily    sertraline  75 mg Oral Daily    bumetanide  2 mg Oral BID    aspirin  81 mg Oral Daily    atorvastatin  40 mg Oral Daily    carvedilol  25 mg Oral BID WC    isosorbide dinitrate  20 mg Oral TID    levothyroxine  100 mcg Oral Daily    mirtazapine  7.5 mg Oral Nightly    sacubitril-valsartan  1 tablet Oral BID    sodium chloride flush  5-40 mL IntraVENous 2 times per day    heparin (porcine)  5,000 Units SubCUTAneous 3 times per day     Continuous Infusions:   sodium chloride      dextrose       PRN Meds:.sodium chloride flush, sodium chloride, ondansetron **OR** ondansetron, polyethylene glycol, acetaminophen **OR** acetaminophen, glucose, dextrose bolus **OR** dextrose bolus, glucagon (rDNA), dextrose, labetalol    DATA:    CBC with Differential:    Lab Results   Component Value Date/Time    WBC 3.3 02/28/2023 05:34 AM    RBC 3.95 02/28/2023 05:34 AM    HGB 11.9 02/28/2023 05:34 AM    HCT 36.8 02/28/2023 05:34 AM     02/28/2023 05:34 AM    MCV 93.2 02/28/2023 05:34 AM    MCH 30.1 02/28/2023 05:34 AM    MCHC 32.3 02/28/2023 05:34 AM    RDW 13.7 02/28/2023 05:34 AM    NRBC 0.0 02/28/2023 05:34 AM    SEGSPCT 55 06/10/2013 04:00 AM    BLASTSPCT 0.9 02/25/2023 05:08 AM    LYMPHOPCT 15.6 02/28/2023 05:34 AM    MONOPCT 8.7 02/28/2023 05:34 AM    MYELOPCT 0.9 02/28/2023 05:34 AM    BASOPCT 0.9 02/28/2023 05:34 AM    MONOSABS 0.30 02/28/2023 05:34 AM    LYMPHSABS 0.56 02/28/2023 05:34 AM    EOSABS 0.03 02/28/2023 05:34 AM    BASOSABS 0.03 02/28/2023 05:34 AM     CMP:    Lab Results   Component Value Date/Time     02/28/2023 05:34 AM    K 4.4 02/28/2023 05:34 AM    K 3.5 02/25/2023 05:08 AM     02/28/2023 05:34 AM    CO2 23 02/28/2023 05:34 AM    BUN 45 02/28/2023 05:34 AM    CREATININE 7.4 02/28/2023 05:34 AM    GFRAA 11 09/15/2022 02:27 AM    LABGLOM 7 02/28/2023 05:34 AM    GLUCOSE 93 02/28/2023 05:34 AM    PROT 6.2 02/25/2023 05:08 AM    LABALBU 3.1 02/25/2023 05:08 AM    CALCIUM 7.7 02/28/2023 05:34 AM    BILITOT 1.1 02/25/2023 05:08 AM    ALKPHOS 72 02/25/2023 05:08 AM    AST 17 02/25/2023 05:08 AM    ALT 14 02/25/2023 05:08 AM     Magnesium:    Lab Results   Component Value Date/Time    MG 1.9 02/25/2023 05:08 AM     Phosphorus:    Lab Results   Component Value Date/Time    PHOS 2.4 02/28/2023 05:34 AM     Radiology Review:    CTAP 2/22/2023      No acute inflammatory process in the abdomen and pelvis. Atrophic kidneys with nephrocalcinosis. CXR 2/22/2023      No acute process. BRIEF SUMMARY OF INITIAL CONSULT:  Nicki Torres is a 67 y.o. male, past medical history significant for ESRD on HD 3 times weekly Tuesday, Thursday, Saturday via right IJ, HTN, type II DM, HFrEF 35 to 40% with stage I diastolic dysfunction, hypothyroidism, HCV, recently evaluated in the ED under police order for reportedly swerving while driving, who presented to the ED on 2/22/2023 with a chief complaint of fatigue, (last HD treatment Friday) and diarrhea. Lab work on admission significant for potassium level 5.9, bicarbonate 18, creatinine 11.7, anion gap 20, lactic acid 3.0, proBNP 29,091, patient admitted for further evaluation and treatment, we are asked to see this patient in consultation for hemodialysis management. Problems Resolved:  Hyperkalemia 2/2 decreased GFR and noncompliance with HD, for HD today    IMPRESSION/RECOMMENDATIONS:      ESRD on IHD three times weekly T-Th-S, via RIJ. Patient has a history of noncompliance with treatment due to subjective intolerance to HD. Had dialysis this morning but signed off AMA at 3 hours. HTN, noncompliant with medications, on amlodipine and carvedilol  HFrEF 35-40% with stage I DD, on carvedilol and Entresto   MBD of CKD, not on binders  Anemia of CKD, monitor H&H  Bacteremia, secondary to CLABSI. For tunneled HD catheter removal tomorrow. -----------------------------------------------------------------------  Thrombocytopenia, improved  Diarrhea, stool studies negative   Nutrition: Low potassium diet, 1 L fluid restriction  Depression: on sertraline, increase to 75 mg daily  Social detriments to health: After home evaluation by our office, it was decided the patient is not a candidate for home hemodialysis, serious concerns about home safety including functioning utilities, patient was without the ability to make meals, very poor medication safety--recommend placement.     Plan:    HD three times weekly T-Th-S, 300/400, 0-0.5 L fluid removal  Continue amlodipine 5 mg daily  Continue sertraline 75 mg daily  Continue Bumex 2 mg PO BID  Continue carvedilol 25 mg p.o. twice daily  Continue sacubitril-valsartan  twice daily  Continue to monitor BP  Continue to monitor labs  Awaiting placement  For tunneled HD catheter removal tomorrow due to CLABSI    Electronically signed by Almeta Goltz, APRN - CNP on 2/28/2023 at 2:32 PM

## 2023-02-28 NOTE — PROGRESS NOTES
Call to Dr Boland Ing service to report a new surgical consult. All information provided to service. Will await call back.

## 2023-02-28 NOTE — PLAN OF CARE
Problem: Discharge Planning  Goal: Discharge to home or other facility with appropriate resources  Outcome: Progressing     Problem: Safety - Adult  Goal: Free from fall injury  Outcome: Progressing     Problem: Chronic Conditions and Co-morbidities  Goal: Patient's chronic conditions and co-morbidity symptoms are monitored and maintained or improved  Outcome: Progressing     Problem: Pain  Goal: Verbalizes/displays adequate comfort level or baseline comfort level  Outcome: Progressing     Problem: Skin/Tissue Integrity  Goal: Absence of new skin breakdown  Description: 1. Monitor for areas of redness and/or skin breakdown  2. Assess vascular access sites hourly  3. Every 4-6 hours minimum:  Change oxygen saturation probe site  4. Every 4-6 hours:  If on nasal continuous positive airway pressure, respiratory therapy assess nares and determine need for appliance change or resting period.   Outcome: Progressing

## 2023-02-28 NOTE — PROGRESS NOTES
IV site and telemetry removed. Complete discharge instructions given to patient and family. Verbalized understanding of all instructions. Call to pharmacy to verify prescriptions are ready for . Pharmacy to call patient when available.

## 2023-02-28 NOTE — PROGRESS NOTES
New consult sent via [employer] message to General surgery resident for removal of tunneled HD catheter - CLABSI

## 2023-03-01 ENCOUNTER — APPOINTMENT (OUTPATIENT)
Dept: GENERAL RADIOLOGY | Age: 73
End: 2023-03-01
Payer: MEDICARE

## 2023-03-01 ENCOUNTER — ANESTHESIA (OUTPATIENT)
Dept: OPERATING ROOM | Age: 73
End: 2023-03-01
Payer: MEDICARE

## 2023-03-01 LAB
ANION GAP SERPL CALCULATED.3IONS-SCNC: 9 MMOL/L (ref 7–16)
ANISOCYTOSIS: ABNORMAL
BASOPHILS ABSOLUTE: 0.04 E9/L (ref 0–0.2)
BASOPHILS RELATIVE PERCENT: 0.4 % (ref 0–2)
BLOOD CULTURE, ROUTINE: NORMAL
BUN BLDV-MCNC: 29 MG/DL (ref 6–23)
CALCIUM SERPL-MCNC: 7.5 MG/DL (ref 8.6–10.2)
CHLORIDE BLD-SCNC: 97 MMOL/L (ref 98–107)
CO2: 27 MMOL/L (ref 22–29)
CREAT SERPL-MCNC: 5.6 MG/DL (ref 0.7–1.2)
CULTURE, BLOOD 2: NORMAL
EOSINOPHILS ABSOLUTE: 0.01 E9/L (ref 0.05–0.5)
EOSINOPHILS RELATIVE PERCENT: 0.1 % (ref 0–6)
GFR SERPL CREATININE-BSD FRML MDRD: 10 ML/MIN/1.73
GLUCOSE BLD-MCNC: 90 MG/DL (ref 74–99)
HCT VFR BLD CALC: 32.6 % (ref 37–54)
HEMOGLOBIN: 10.4 G/DL (ref 12.5–16.5)
HYPOCHROMIA: ABNORMAL
IMMATURE GRANULOCYTES #: 0.11 E9/L
IMMATURE GRANULOCYTES %: 1 % (ref 0–5)
LYMPHOCYTES ABSOLUTE: 0.83 E9/L (ref 1.5–4)
LYMPHOCYTES RELATIVE PERCENT: 7.8 % (ref 20–42)
MCH RBC QN AUTO: 30.1 PG (ref 26–35)
MCHC RBC AUTO-ENTMCNC: 31.9 % (ref 32–34.5)
MCV RBC AUTO: 94.5 FL (ref 80–99.9)
METER GLUCOSE: 134 MG/DL (ref 74–99)
METER GLUCOSE: 69 MG/DL (ref 74–99)
METER GLUCOSE: 77 MG/DL (ref 74–99)
METER GLUCOSE: 80 MG/DL (ref 74–99)
MONOCYTES ABSOLUTE: 0.88 E9/L (ref 0.1–0.95)
MONOCYTES RELATIVE PERCENT: 8.3 % (ref 2–12)
NEUTROPHILS ABSOLUTE: 8.74 E9/L (ref 1.8–7.3)
NEUTROPHILS RELATIVE PERCENT: 82.4 % (ref 43–80)
PDW BLD-RTO: 14.1 FL (ref 11.5–15)
PHOSPHORUS: 2.2 MG/DL (ref 2.5–4.5)
PLATELET # BLD: 130 E9/L (ref 130–450)
PMV BLD AUTO: 11.2 FL (ref 7–12)
POTASSIUM SERPL-SCNC: 4.1 MMOL/L (ref 3.5–5)
RBC # BLD: 3.45 E12/L (ref 3.8–5.8)
SODIUM BLD-SCNC: 133 MMOL/L (ref 132–146)
WBC # BLD: 10.6 E9/L (ref 4.5–11.5)

## 2023-03-01 PROCEDURE — 36415 COLL VENOUS BLD VENIPUNCTURE: CPT

## 2023-03-01 PROCEDURE — 6370000000 HC RX 637 (ALT 250 FOR IP): Performed by: STUDENT IN AN ORGANIZED HEALTH CARE EDUCATION/TRAINING PROGRAM

## 2023-03-01 PROCEDURE — 3700000001 HC ADD 15 MINUTES (ANESTHESIA): Performed by: SURGERY

## 2023-03-01 PROCEDURE — 2500000003 HC RX 250 WO HCPCS: Performed by: SURGERY

## 2023-03-01 PROCEDURE — 2580000003 HC RX 258: Performed by: INTERNAL MEDICINE

## 2023-03-01 PROCEDURE — 6360000002 HC RX W HCPCS: Performed by: NURSE ANESTHETIST, CERTIFIED REGISTERED

## 2023-03-01 PROCEDURE — 97530 THERAPEUTIC ACTIVITIES: CPT

## 2023-03-01 PROCEDURE — 85025 COMPLETE CBC W/AUTO DIFF WBC: CPT

## 2023-03-01 PROCEDURE — 6360000002 HC RX W HCPCS: Performed by: REGISTERED NURSE

## 2023-03-01 PROCEDURE — 2580000003 HC RX 258: Performed by: NURSE ANESTHETIST, CERTIFIED REGISTERED

## 2023-03-01 PROCEDURE — 87070 CULTURE OTHR SPECIMN AEROBIC: CPT

## 2023-03-01 PROCEDURE — 1200000000 HC SEMI PRIVATE

## 2023-03-01 PROCEDURE — 84100 ASSAY OF PHOSPHORUS: CPT

## 2023-03-01 PROCEDURE — 2580000003 HC RX 258: Performed by: REGISTERED NURSE

## 2023-03-01 PROCEDURE — 3600000013 HC SURGERY LEVEL 3 ADDTL 15MIN: Performed by: SURGERY

## 2023-03-01 PROCEDURE — 87077 CULTURE AEROBIC IDENTIFY: CPT

## 2023-03-01 PROCEDURE — 2580000003 HC RX 258: Performed by: STUDENT IN AN ORGANIZED HEALTH CARE EDUCATION/TRAINING PROGRAM

## 2023-03-01 PROCEDURE — 7100000010 HC PHASE II RECOVERY - FIRST 15 MIN: Performed by: SURGERY

## 2023-03-01 PROCEDURE — 82962 GLUCOSE BLOOD TEST: CPT

## 2023-03-01 PROCEDURE — 6370000000 HC RX 637 (ALT 250 FOR IP): Performed by: LICENSED PRACTICAL NURSE

## 2023-03-01 PROCEDURE — 80048 BASIC METABOLIC PNL TOTAL CA: CPT

## 2023-03-01 PROCEDURE — C1752 CATH,HEMODIALYSIS,SHORT-TERM: HCPCS | Performed by: SURGERY

## 2023-03-01 PROCEDURE — 3700000000 HC ANESTHESIA ATTENDED CARE: Performed by: SURGERY

## 2023-03-01 PROCEDURE — 97535 SELF CARE MNGMENT TRAINING: CPT

## 2023-03-01 PROCEDURE — 6360000002 HC RX W HCPCS: Performed by: SURGERY

## 2023-03-01 PROCEDURE — 7100000011 HC PHASE II RECOVERY - ADDTL 15 MIN: Performed by: SURGERY

## 2023-03-01 PROCEDURE — 6370000000 HC RX 637 (ALT 250 FOR IP): Performed by: INTERNAL MEDICINE

## 2023-03-01 PROCEDURE — 99232 SBSQ HOSP IP/OBS MODERATE 35: CPT | Performed by: INTERNAL MEDICINE

## 2023-03-01 PROCEDURE — 3600000003 HC SURGERY LEVEL 3 BASE: Performed by: SURGERY

## 2023-03-01 PROCEDURE — 6360000002 HC RX W HCPCS: Performed by: STUDENT IN AN ORGANIZED HEALTH CARE EDUCATION/TRAINING PROGRAM

## 2023-03-01 PROCEDURE — 0JPT3XZ REMOVAL OF TUNNELED VASCULAR ACCESS DEVICE FROM TRUNK SUBCUTANEOUS TISSUE AND FASCIA, PERCUTANEOUS APPROACH: ICD-10-PCS | Performed by: SURGERY

## 2023-03-01 PROCEDURE — 2709999900 HC NON-CHARGEABLE SUPPLY: Performed by: SURGERY

## 2023-03-01 PROCEDURE — 02HV33Z INSERTION OF INFUSION DEVICE INTO SUPERIOR VENA CAVA, PERCUTANEOUS APPROACH: ICD-10-PCS | Performed by: SURGERY

## 2023-03-01 PROCEDURE — 87186 SC STD MICRODIL/AGAR DIL: CPT

## 2023-03-01 PROCEDURE — 2580000003 HC RX 258: Performed by: ANESTHESIOLOGY

## 2023-03-01 PROCEDURE — 87040 BLOOD CULTURE FOR BACTERIA: CPT

## 2023-03-01 RX ORDER — HEPARIN SODIUM (PORCINE) LOCK FLUSH IV SOLN 100 UNIT/ML 100 UNIT/ML
SOLUTION INTRAVENOUS PRN
Status: DISCONTINUED | OUTPATIENT
Start: 2023-03-01 | End: 2023-03-01 | Stop reason: ALTCHOICE

## 2023-03-01 RX ORDER — FENTANYL CITRATE 50 UG/ML
INJECTION, SOLUTION INTRAMUSCULAR; INTRAVENOUS PRN
Status: DISCONTINUED | OUTPATIENT
Start: 2023-03-01 | End: 2023-03-01 | Stop reason: SDUPTHER

## 2023-03-01 RX ORDER — SODIUM CHLORIDE 9 MG/ML
INJECTION, SOLUTION INTRAVENOUS PRN
Status: DISCONTINUED | OUTPATIENT
Start: 2023-03-01 | End: 2023-03-02 | Stop reason: HOSPADM

## 2023-03-01 RX ORDER — SODIUM CHLORIDE 0.9 % (FLUSH) 0.9 %
5-40 SYRINGE (ML) INJECTION EVERY 12 HOURS SCHEDULED
Status: DISCONTINUED | OUTPATIENT
Start: 2023-03-01 | End: 2023-03-02 | Stop reason: HOSPADM

## 2023-03-01 RX ORDER — PROPOFOL 10 MG/ML
INJECTION, EMULSION INTRAVENOUS CONTINUOUS PRN
Status: DISCONTINUED | OUTPATIENT
Start: 2023-03-01 | End: 2023-03-01 | Stop reason: SDUPTHER

## 2023-03-01 RX ORDER — SODIUM CHLORIDE 9 MG/ML
INJECTION, SOLUTION INTRAVENOUS CONTINUOUS PRN
Status: DISCONTINUED | OUTPATIENT
Start: 2023-03-01 | End: 2023-03-01 | Stop reason: SDUPTHER

## 2023-03-01 RX ORDER — LIDOCAINE HYDROCHLORIDE 10 MG/ML
INJECTION, SOLUTION INFILTRATION; PERINEURAL PRN
Status: DISCONTINUED | OUTPATIENT
Start: 2023-03-01 | End: 2023-03-01 | Stop reason: ALTCHOICE

## 2023-03-01 RX ORDER — SODIUM CHLORIDE 0.9 % (FLUSH) 0.9 %
5-40 SYRINGE (ML) INJECTION PRN
Status: DISCONTINUED | OUTPATIENT
Start: 2023-03-01 | End: 2023-03-02 | Stop reason: HOSPADM

## 2023-03-01 RX ORDER — OXYCODONE HYDROCHLORIDE AND ACETAMINOPHEN 5; 325 MG/1; MG/1
1 TABLET ORAL EVERY 4 HOURS PRN
Status: DISCONTINUED | OUTPATIENT
Start: 2023-03-01 | End: 2023-03-02 | Stop reason: HOSPADM

## 2023-03-01 RX ORDER — FENTANYL CITRATE 50 UG/ML
25 INJECTION, SOLUTION INTRAMUSCULAR; INTRAVENOUS EVERY 5 MIN PRN
Status: DISCONTINUED | OUTPATIENT
Start: 2023-03-01 | End: 2023-03-02 | Stop reason: HOSPADM

## 2023-03-01 RX ADMIN — SACUBITRIL AND VALSARTAN 1 TABLET: 97; 103 TABLET, FILM COATED ORAL at 21:31

## 2023-03-01 RX ADMIN — PROPOFOL 80 MCG/KG/MIN: 10 INJECTION, EMULSION INTRAVENOUS at 14:52

## 2023-03-01 RX ADMIN — SERTRALINE HYDROCHLORIDE 75 MG: 50 TABLET ORAL at 17:00

## 2023-03-01 RX ADMIN — Medication 10 ML: at 21:32

## 2023-03-01 RX ADMIN — BUMETANIDE 2 MG: 1 TABLET ORAL at 21:31

## 2023-03-01 RX ADMIN — FENTANYL CITRATE 25 MCG: 50 INJECTION, SOLUTION INTRAMUSCULAR; INTRAVENOUS at 15:02

## 2023-03-01 RX ADMIN — Medication 10 ML: at 09:44

## 2023-03-01 RX ADMIN — HEPARIN SODIUM 5000 UNITS: 10000 INJECTION INTRAVENOUS; SUBCUTANEOUS at 21:32

## 2023-03-01 RX ADMIN — CARVEDILOL 25 MG: 25 TABLET, FILM COATED ORAL at 17:00

## 2023-03-01 RX ADMIN — FENTANYL CITRATE 25 MCG: 50 INJECTION, SOLUTION INTRAMUSCULAR; INTRAVENOUS at 15:16

## 2023-03-01 RX ADMIN — FENTANYL CITRATE 25 MCG: 50 INJECTION, SOLUTION INTRAMUSCULAR; INTRAVENOUS at 14:54

## 2023-03-01 RX ADMIN — CEFEPIME 1000 MG: 1 INJECTION, POWDER, FOR SOLUTION INTRAMUSCULAR; INTRAVENOUS at 11:48

## 2023-03-01 RX ADMIN — MIRTAZAPINE 7.5 MG: 15 TABLET, FILM COATED ORAL at 21:31

## 2023-03-01 RX ADMIN — ASPIRIN 81 MG: 81 TABLET, COATED ORAL at 17:00

## 2023-03-01 RX ADMIN — PROPOFOL 20 MG: 10 INJECTION, EMULSION INTRAVENOUS at 14:57

## 2023-03-01 RX ADMIN — FENTANYL CITRATE 25 MCG: 50 INJECTION, SOLUTION INTRAMUSCULAR; INTRAVENOUS at 14:45

## 2023-03-01 RX ADMIN — ATORVASTATIN CALCIUM 40 MG: 40 TABLET, FILM COATED ORAL at 21:31

## 2023-03-01 RX ADMIN — PROPOFOL 40 MG: 10 INJECTION, EMULSION INTRAVENOUS at 14:54

## 2023-03-01 RX ADMIN — AMLODIPINE BESYLATE 5 MG: 5 TABLET ORAL at 16:59

## 2023-03-01 RX ADMIN — ISOSORBIDE DINITRATE 20 MG: 10 TABLET ORAL at 21:31

## 2023-03-01 RX ADMIN — SODIUM CHLORIDE: 9 INJECTION, SOLUTION INTRAVENOUS at 14:37

## 2023-03-01 ASSESSMENT — PAIN SCALES - GENERAL
PAINLEVEL_OUTOF10: 0

## 2023-03-01 ASSESSMENT — LIFESTYLE VARIABLES: SMOKING_STATUS: 0

## 2023-03-01 NOTE — PLAN OF CARE
Problem: Discharge Planning  Goal: Discharge to home or other facility with appropriate resources  Outcome: Progressing     Problem: Safety - Adult  Goal: Free from fall injury  Outcome: Progressing     Problem: Chronic Conditions and Co-morbidities  Goal: Patient's chronic conditions and co-morbidity symptoms are monitored and maintained or improved  Outcome: Progressing     Problem: Pain  Goal: Verbalizes/displays adequate comfort level or baseline comfort level  Outcome: Progressing     Problem: Skin/Tissue Integrity  Goal: Absence of new skin breakdown  Description: 1. Monitor for areas of redness and/or skin breakdown  2. Assess vascular access sites hourly  3. Every 4-6 hours minimum:  Change oxygen saturation probe site  4. Every 4-6 hours:  If on nasal continuous positive airway pressure, respiratory therapy assess nares and determine need for appliance change or resting period.   Outcome: Progressing     Problem: Nutrition Deficit:  Goal: Optimize nutritional status  Outcome: Progressing

## 2023-03-01 NOTE — ANESTHESIA PRE PROCEDURE
Department of Anesthesiology  Preprocedure Note       Name:  Richard Stewart   Age:  72 y.o.  :  1950                                          MRN:  [id number]         Date:  3/1/2023      Surgeon: Surgeon(s):  Caleb Tafoya MD    Procedure: Procedure(s):  TUNNELED HEMODIALYSIS CATHETER REMOVAL POSSIBLE TEMPORARY CATHETER INSERTION    Medications prior to admission:   Prior to Admission medications    Medication Sig Start Date End Date Taking? Authorizing Provider   amLODIPine (NORVASC) 5 MG tablet Take 1 tablet by mouth nightly 9/15/22   KIANNA Gtz CNP   atorvastatin (LIPITOR) 40 MG tablet Take 40 mg by mouth daily    Historical Provider, MD   carvedilol (COREG) 25 MG tablet Take 25 mg by mouth 2 times daily (with meals)    Historical Provider, MD   mirtazapine (REMERON) 15 MG tablet Take 7.5 mg by mouth nightly    Historical Provider, MD   isosorbide dinitrate (ISORDIL) 20 MG tablet Take 1 tablet by mouth 3 times daily  Patient not taking: Reported on 2023   KIANNA Ness CNP   Heparin Sodium, Porcine, (HEPARIN, PORCINE,) 87867 UNIT/ML injection Inject 0.5 mLs into the skin in the morning and 0.5 mLs at noon and 0.5 mLs before bedtime. 22   KIANNA Ness CNP   sertraline (ZOLOFT) 50 MG tablet Take 1 tablet by mouth daily  Patient not taking: Reported on 2023   Alen Rucker MD   levothyroxine (SYNTHROID) 100 MCG tablet Take 1 tablet by mouth Daily 22   Alen Rucker MD   bumetanide (BUMEX) 2 MG tablet Take 1 tablet by mouth 2 times daily (with meals) 22  Alen Rucker MD   sacubitril-valsartan (ENTRESTO)  MG per tablet Take 1 tablet by mouth 2 times daily 21   KIANNA Bo CNP   aspirin 81 MG EC tablet Take 1 tablet by mouth daily 21  KIANNA Bo CNP   vitamin D (ERGOCALCIFEROL) 1.25 MG (85664 UT) CAPS capsule Take 50,000 Units by mouth once a week **** 20    Historical Provider, MD       Current medications:    Current Facility-Administered Medications   Medication Dose Route Frequency Provider Last Rate Last Admin    cefepime (MAXIPIME) 1,000 mg in sodium chloride 0.9 % 50 mL IVPB  1,000 mg IntraVENous Q24H KIANNA Kidd CNP 12.5 mL/hr at 03/01/23 1148 1,000 mg at 03/01/23 1148    amLODIPine (NORVASC) tablet 5 mg  5 mg Oral Daily KIANNA Garzon CNP   5 mg at 02/28/23 1328    sertraline (ZOLOFT) tablet 75 mg  75 mg Oral Daily KIANNA Garzon CNP   75 mg at 02/28/23 1328    bumetanide (BUMEX) tablet 2 mg  2 mg Oral BID Drucie Dancer, APRN - CNP   2 mg at 02/27/23 2020    aspirin EC tablet 81 mg  81 mg Oral Daily Marcel Queen MD   81 mg at 02/28/23 1328    atorvastatin (LIPITOR) tablet 40 mg  40 mg Oral Daily Marcel Queen MD   40 mg at 02/28/23 2043    carvedilol (COREG) tablet 25 mg  25 mg Oral BID WC Marcel Queen MD   25 mg at 02/27/23 1155    isosorbide dinitrate (ISORDIL) tablet 20 mg  20 mg Oral TID Marcel Queen MD   20 mg at 02/28/23 1327    levothyroxine (SYNTHROID) tablet 100 mcg  100 mcg Oral Daily Marcel Queen MD   100 mcg at 02/28/23 3569    mirtazapine (REMERON) tablet 7.5 mg  7.5 mg Oral Nightly Marcel Queen MD   7.5 mg at 02/28/23 2043    sacubitril-valsartan (ENTRESTO)  MG per tablet 1 tablet  1 tablet Oral BID Marcel Queen MD   1 tablet at 02/28/23 2043    sodium chloride flush 0.9 % injection 5-40 mL  5-40 mL IntraVENous 2 times per day Marcel Queen MD   10 mL at 03/01/23 0944    sodium chloride flush 0.9 % injection 5-40 mL  5-40 mL IntraVENous PRN Marcel Queen MD   10 mL at 02/28/23 1343    0.9 % sodium chloride infusion   IntraVENous PRN Marcel Queen MD        heparin (porcine) injection 5,000 Units  5,000 Units SubCUTAneous 3 times per day Marcel Queen MD   5,000 Units at 02/28/23 0527    ondansetron (ZOFRAN-ODT) disintegrating tablet 4 mg 4 mg Oral Q8H PRN Joselin Smith MD        Or    ondansetron [facility]) injection 4 mg  4 mg IntraVENous Q6H PRN Joselin Smith MD   4 mg at 02/28/23 1042    polyethylene glycol (GLYCOLAX) packet 17 g  17 g Oral Daily PRN Joselin Smith MD        acetaminophen (TYLENOL) tablet 650 mg  650 mg Oral Q6H PRN Joselin Smith MD   650 mg at 02/28/23 1328    Or    acetaminophen (TYLENOL) suppository 650 mg  650 mg Rectal Q6H PRN Joselin Smith MD        glucose chewable tablet 16 g  4 tablet Oral PRN Joselin Smith MD        dextrose bolus 10% 125 mL  125 mL IntraVENous PRN Joselin Smith MD        Or    dextrose bolus 10% 250 mL  250 mL IntraVENous PRN Joselin Smith MD        glucagon (rDNA) injection 1 mg  1 mg SubCUTAneous PRN Joselin Smith MD        dextrose 10 % infusion   IntraVENous Continuous PRN Joselin Smith MD        labetalol (NORMODYNE;TRANDATE) injection 10 mg  10 mg IntraVENous Q6H PRN Joselin Smith MD           Allergies:     Allergies   Allergen Reactions    Penicillins      Does not remember       Problem List:    Patient Active Problem List   Diagnosis Code    Diabetes mellitus ([facility] D65.0    Metabolic acidosis Y39.79    Essential hypertension I10    Uncontrolled diabetes mellitus ([facility] [id number]    Hyperbilirubinemia E80.6    Thrombocytopenia (HCC) D69.6    Hypothyroidism E03.9    ESRD needing dialysis ([facility] N18.6, Z99.2    Acute respiratory failure with hypoxia (HCC) J96.01    Volume overload E87.70    Hypertensive emergency I16.1    Hyperkalemia E87.5    Severe protein-calorie malnutrition (HCC) E43    Acute renal failure superimposed on chronic kidney disease, on chronic dialysis (HCC) N17.9, N18.9, Z99.2    Elevated serum creatinine R79.89    Lung infiltrate R91.8    Weakness R53.1    DNR (do not resuscitate) discussion Z71.89    Muscle weakness M62.81    Non compliance w medication regimen Z91.14    Hypertensive urgency I16.0    Chronic HFrEF (heart failure with reduced ejection fraction) ([facility]) I50.22    Cardiomyopathy (HCC) I42.9    Failure to thrive in adult R62.7    ESRD on hemodialysis ([facility] N18.6, Z99.2    Diarrhea R19.7    Primary hypertension I10    ESRD (end stage renal disease) ([facility] N18.6    Depression F32. A    Hyponatremia E87.1    Bacteremia R78.81       Past Medical History:        Diagnosis Date    Anemia     Chronic kidney disease     Depression     Diabetes mellitus ([facility]     ESRD (end stage renal disease) ([facility]     Hepatitis C     Hypertension     Liver disease     Moderate protein-calorie malnutrition (HCC)     Muscle weakness (generalized)     Thyroid disease     Unspecified diseases of blood and blood-forming organs     Unsteadiness on feet        Past Surgical History:        Procedure Laterality Date    TONSILLECTOMY      VASCULAR SURGERY N/A 2021    CATHETER INSERTION HEMODIALYSIS, REMOVAL OF FEMORAL CATHETER performed by Nikkie Pizano MD at [facility] OR       Social History:    Social History     Tobacco Use    Smoking status: Former     Packs/day: 2.00     Years: 10.00     Pack years: 20.00     Types: Cigarettes     Quit date: 1973     Years since quittin.5    Smokeless tobacco: Never   Substance Use Topics    Alcohol use:  No                                Counseling given: Not Answered      Vital Signs (Current):   Vitals:    23 0101 23 0315 23 0740 23 1314   BP:  108/69 117/69    Pulse:  73 74    Resp:  16 16    Temp:  97.9 °F (36.6 °C) 98.6 °F (37 °C)    TempSrc:  Oral Oral    SpO2:  99% 97%    Weight: 145 lb 4.8 oz (65.9 kg)      Height:    6' 2\" (1.88 m)                                              BP Readings from Last 3 Encounters:   23 117/69   23 (!) 179/92   09/15/22 (!) 183/88       NPO Status: Time of last liquid consumption: 0000                        Time of last solid consumption: 1700                        Date of last liquid consumption: 03/01/23                        Date of last solid food consumption: 02/28/23    BMI:   Wt Readings from Last 3 Encounters:   03/01/23 145 lb 4.8 oz (65.9 kg)   02/13/23 140 lb (63.5 kg)   09/15/22 127 lb 6.8 oz (57.8 kg)     Body mass index is 18.66 kg/m². CBC:   Lab Results   Component Value Date/Time    WBC 10.6 03/01/2023 03:50 AM    RBC 3.45 03/01/2023 03:50 AM    HGB 10.4 03/01/2023 03:50 AM    HCT 32.6 03/01/2023 03:50 AM    MCV 94.5 03/01/2023 03:50 AM    RDW 14.1 03/01/2023 03:50 AM     03/01/2023 03:50 AM       CMP:   Lab Results   Component Value Date/Time     03/01/2023 03:50 AM    K 4.1 03/01/2023 03:50 AM    K 3.5 02/25/2023 05:08 AM    CL 97 03/01/2023 03:50 AM    CO2 27 03/01/2023 03:50 AM    BUN 29 03/01/2023 03:50 AM    CREATININE 5.6 03/01/2023 03:50 AM    GFRAA 11 09/15/2022 02:27 AM    LABGLOM 10 03/01/2023 03:50 AM    GLUCOSE 90 03/01/2023 03:50 AM    PROT 6.2 02/25/2023 05:08 AM    CALCIUM 7.5 03/01/2023 03:50 AM    BILITOT 1.1 02/25/2023 05:08 AM    ALKPHOS 72 02/25/2023 05:08 AM    AST 17 02/25/2023 05:08 AM    ALT 14 02/25/2023 05:08 AM       POC Tests: No results for input(s): POCGLU, POCNA, POCK, POCCL, POCBUN, POCHEMO, POCHCT in the last 72 hours.     Coags:   Lab Results   Component Value Date/Time    PROTIME 11.1 08/16/2022 10:53 PM    INR 1.0 08/16/2022 10:53 PM    APTT 32.9 08/16/2022 10:53 PM       HCG (If Applicable): No results found for: PREGTESTUR, PREGSERUM, HCG, HCGQUANT     ABGs: No results found for: PHART, PO2ART, GXF7ESE, UFZ4ROJ, BEART, R6JPHCYB     Type & Screen (If Applicable):  No results found for: LABABO, LABRH    Drug/Infectious Status (If Applicable):  No results found for: HIV, HEPCAB    COVID-19 Screening (If Applicable):   Lab Results   Component Value Date/Time    COVID19 Not Detected 02/22/2023 02:35 PM    COVID19 Not Detected 02/22/2023 02:35 PM           Anesthesia Evaluation  Patient summary reviewed and Nursing notes reviewed  Airway: Mallampati: II  TM distance: >3 FB   Neck ROM: full  Mouth opening: > = 3 FB   Dental: normal exam         Pulmonary: breath sounds clear to auscultation      (-) not a current smoker (former smoker)                           Cardiovascular:    (+) hypertension:, CHF:, hyperlipidemia      ECG reviewed  Rhythm: regular    Echocardiogram reviewed         Beta Blocker:  Dose within 24 Hrs      ROS comment: Cardiomyopathy    Echo April 2022    Summary   Left ventricle size is normal.   Ejection fraction is visually estimated at 35-40%. Overall ejection fraction moderately decreased . No regional wall motion abnormalities seen. Concentric remodelling. Stage I diastolic dysfunction. Normal right ventricular size and function. TAPSE 18 mm. Physiologic and/or trace mitral regurgitation is present. No hemodynamically significant aortic stenosis is present. Unable to estimate PA systolic pressure. No evidence for hemodynamically significant pericardial effusion     Neuro/Psych:   (+) psychiatric history:depression/anxiety             GI/Hepatic/Renal:   (+) hepatitis: C, liver disease:, renal disease: ESRD and dialysis,           Endo/Other:    (+) DiabetesType II DM, no interval change, , hypothyroidism, blood dyscrasia (10.4/32): anemia:., electrolyte abnormalities (Ca 7.5, phos 2.2), . Abdominal:             Vascular: negative vascular ROS. Other Findings:           Anesthesia Plan      MAC     ASA 4       Induction: intravenous. MIPS: Postoperative opioids intended and Prophylactic antiemetics administered. Anesthetic plan and risks discussed with patient. Plan discussed with CRNA.                 Ulises Major DO   3/1/2023

## 2023-03-01 NOTE — PROGRESS NOTES
Occupational Therapy  OT BEDSIDE TREATMENT NOTE      Date:3/1/2023  Patient Name: Raheem Roman  MRN: [id number]  : 1950  Room: [address] [place] [address]     Evaluating OT: Adelaida Becker OTR/L   [id number]       Referring Provider:Patrick Sexton DO    Specific Provider Orders/Date:OT eval and treat 2023       Diagnosis:  Nausea [R11.0]  ESRD on hemodialysis ([facility] [N18.6, Z99.2]  End-stage renal disease needing dialysis ([facility] [N18.6, Z99.2]  ESRD needing dialysis ([facility] [N18.6, Z99.2]  Diarrhea, unspecified type [R19.7]     Pertinent Medical History: Hep C, unsteadiness on feet     Precautions:  Fall Risk      Assessment of current deficits    [x] Functional mobility            [x]ADLs           [x] Strength                  []Cognition    [x] Functional transfers          [x] IADLs         [x] Safety Awareness   [x]Endurance    [] Fine Coordination                         [x] Balance      [] Vision/perception   []Sensation      []Gross Motor Coordination             [] ROM           [] Delirium                   [] Motor Control      OT PLAN OF CARE   OT POC based on physician orders, patient diagnosis and results of clinical assessment     Frequency/Duration  2-4 days/wk for 2 weeks PRN   Specific OT Treatment Interventions to include:   ADL retraining/adapted techniques and AE recommendations to increase functional independence within precautions                    Energy conservation techniques to improve tolerance for selfcare routine   Functional transfer/mobility training/DME recommendations for increased independence, safety and fall prevention         Patient/family education to increase safety and functional independence             Environmental modifications for safe mobility and completion of ADLs                             Therapeutic activity to improve functional performance during ADLs.                                          Therapeutic exercise to improve tolerance and functional strength for ADLs Balance retraining/tolerance tasks for facilitation of postural control with dynamic challenges during ADLs . Positioning to improve functional independence  []      Recommended Adaptive Equipment: shower chair. Home Living: Pt lives alone, 1 story with 2- 3 steps      Prior Level of Function: Independent  with ADLs , independent  with IADLs; ambulated with no device     Pain Level: Pt did not report level of pain. Cognition: Awake and alert. Functional Assessment:  AM-PAC Daily Activity Raw Score: 16/24    Initial Eval Status  Date: 2/27/2023 Treatment Status  Date:3/1/23  STGs = LTGs  Time frame: 10-14 days   Feeding Independent       Grooming CGA, standing at sink  SBA for balance while standing at the sink 10+ minutes at the sink. Independent    UB Dressing Min A  SBA don gown as a robe. Independent    LB Dressing Min A  Assist with threading brief over feet  Setup   Pt able to don slipper socks while seated on the side of the bed. Mod I    Bathing Min A    Mod I    Toileting Min A  Pt declined need while in the bathroom. Independent    Bed Mobility  SBA  Supine <> sit  Independent supine to sit   Independent    Functional Transfers Min A  Sit- stand from bed, commode  Min A from elevated bed surface. Mod I    Functional Mobility Min A, no device   To/from bathroom   Patient refusing walker - stated they make him fall  Min A HHA walking to and from the bathroom. Unsteady and min A required however pt refusing to use a walker for support. Mod I  with good tolerance    Balance Sitting:     Static:  Independent     Dynamic:SBA   Standing: Min/CGA  Min A stand balance during ADL. Independent    Activity Tolerance No SOB   Fatigued with light activity   fair  Good  with ADL activity      Comments:  Pt pleasant and cooperative. States he is frustrated with waiting to go down for surgical procedure.   Completed ADL activity while seated on the side of the bed then later while standing at the sink. Pt tolerated static standing however does have balance issues during functional mobility and refuses to use a walker due to falling when using one in the past.  He remained seated in the chair at the end of the session. Education/treatment:  ADL retraining with facilitation of movement to increase self care skills. Therapeutic activity to address balance and endurance for ADL and transfers. Pt education of energy conservation and transfer safety. Pt has made  progress towards set goals.        Time In: 10:35   Time Out: 11:00      Min Units   Therapeutic Ex [id number]     Therapeutic Activities [id number] 5    ADL/Self Care [id number] 20 2   Orthotic Management [id number]     Neuro Re-Ed [id number]     Non-Billable Time     TOTAL TIMED TREATMENT 25 [facility] CARRIE/L [id number]

## 2023-03-01 NOTE — PROGRESS NOTES
[address] [place] [address] [facility]  ELIGIO  Progress Note    SUBJECTIVE:  Chief Complaint   Patient presents with    Fatigue     Hasn't had dialysis since Friday, nausea, dehydration    Diarrhea     Patient is sitting up on the edge of the bed  He is feeling a little better today, does endorse getting cold still   No fevers documented - tmax 99   Tolerating antibiotics      Review of systems:  As stated above in the chief complaint, otherwise negative. Medications:  Scheduled Meds:   cefepime  1,000 mg IntraVENous Q24H    amLODIPine  5 mg Oral Daily    sertraline  75 mg Oral Daily    bumetanide  2 mg Oral BID    aspirin  81 mg Oral Daily    atorvastatin  40 mg Oral Daily    carvedilol  25 mg Oral BID WC    isosorbide dinitrate  20 mg Oral TID    levothyroxine  100 mcg Oral Daily    mirtazapine  7.5 mg Oral Nightly    sacubitril-valsartan  1 tablet Oral BID    sodium chloride flush  5-40 mL IntraVENous 2 times per day    heparin (porcine)  5,000 Units SubCUTAneous 3 times per day     Continuous Infusions:   sodium chloride      dextrose       PRN Meds:sodium chloride flush, sodium chloride, ondansetron **OR** ondansetron, polyethylene glycol, acetaminophen **OR** acetaminophen, glucose, dextrose bolus **OR** dextrose bolus, glucagon (rDNA), dextrose, labetalol    OBJECTIVE:  /69   Pulse 74   Temp 98.6 °F (37 °C) (Oral)   Resp 16   Ht 6' 2\" (1.88 m)   Wt 145 lb 4.8 oz (65.9 kg)   SpO2 97%   BMI 18.66 kg/m²   Temp  Av.6 °F (37 °C)  Min: 97.9 °F (36.6 °C)  Max: 99 °F (37.2 °C)  Constitutional: The patient is awake, alert, and oriented. Sitting up on the edge of the bed getting washed up   Skin: Warm and dry. No rashes were noted. HEENT: Round and reactive pupils. Moist mucous membranes. No ulcerations or thrush. Neck: Supple to movements. Chest: No use of accessory muscles to breathe. Symmetrical expansion. No wheezing, crackles or rhonchi.   Cardiovascular: S1 and S2 are rhythmic and regular. No murmurs appreciated. Abdomen: Positive bowel sounds to auscultation. Benign to palpation. No masses felt. Extremities: No edema.   Lines: Peripheral.  Right chest HD dry dressing    Laboratory and Tests Review:  Lab Results   Component Value Date    WBC 10.6 03/01/2023    WBC 3.3 (L) 02/28/2023    WBC 2.8 (L) 02/27/2023    HGB 10.4 (L) 03/01/2023    HCT 32.6 (L) 03/01/2023    MCV 94.5 03/01/2023     03/01/2023     Lab Results   Component Value Date    NEUTROABS 8.74 (H) 03/01/2023    NEUTROABS 2.41 02/28/2023    NEUTROABS 1.73 (L) 02/27/2023     No results found for: [facility]  Lab Results   Component Value Date    ALT 14 02/25/2023    AST 17 02/25/2023    ALKPHOS 72 02/25/2023    BILITOT 1.1 02/25/2023     Lab Results   Component Value Date/Time     03/01/2023 03:50 AM    K 4.1 03/01/2023 03:50 AM    K 3.5 02/25/2023 05:08 AM    CL 97 03/01/2023 03:50 AM    CO2 27 03/01/2023 03:50 AM    BUN 29 03/01/2023 03:50 AM    CREATININE 5.6 03/01/2023 03:50 AM    CREATININE 7.4 02/28/2023 05:34 AM    CREATININE 6.1 02/27/2023 04:15 AM    GFRAA 11 09/15/2022 02:27 AM    LABGLOM 10 03/01/2023 03:50 AM    GLUCOSE 90 03/01/2023 03:50 AM    PROT 6.2 02/25/2023 05:08 AM    LABALBU 3.1 02/25/2023 05:08 AM    CALCIUM 7.5 03/01/2023 03:50 AM    BILITOT 1.1 02/25/2023 05:08 AM    ALKPHOS 72 02/25/2023 05:08 AM    AST 17 02/25/2023 05:08 AM    ALT 14 02/25/2023 05:08 AM     Lab Results   Component Value Date    CRP 2.2 (H) 07/15/2022    CRP 0.3 04/21/2022    CRP 0.6 (H) 12/09/2021     Lab Results   Component Value Date    SEDRATE 61 (H) 11/28/2016     Radiology:  Reviewed     Microbiology:   Blood cultures 2/24: negative so far   Stool culture enteric ina  Respiratory panel negative  Giardia  COVID-negative  C. difficile was canceled  Blood cultures 2/27 from HD line; 4/4 GNR    Assessment:  CLABSI: blood cultures drawn from right tunneled HD line positive for GNR  Fever : associated to CLABSI  No sign of pneumonia or skin wounds or UTI (he does urinate every day)   CT A/p did not show any sign of infection  Diarrhea: likely viral gastroenteritis  ESRD on HD: right tunneled line in place (placed November 2021)    Plan:    Continue Cefepime 1g IV every 24 hours   HD line to be removed today   Await ID & sensitivity of blood cultures  Repeat blood cultures today pending      KIANNA Ramey CNP  10:50 AM  3/1/2023     Pt seen and examined. Above discussed agree with advanced practice nurse. Labs, cultures, and radiographs reviewed. Face to Face encounter occurred. Changes made as necessary. Pending HD line removal today.      Vasu Radford MD

## 2023-03-01 NOTE — PROGRESS NOTES
Department of Internal Medicine  Nephrology Progress Note    Events reviewed. For tunneled HD catheter removal today    SUBJECTIVE: We are following Michelle Ordoñez for ESRD on HD. Patient reports no new complaints.     PHYSICAL EXAM:      Vitals:    VITALS:  /69   Pulse 74   Temp 98.6 °F (37 °C) (Oral)   Resp 16   Ht 6' 2\" (1.88 m)   Wt 145 lb 4.8 oz (65.9 kg)   SpO2 97%   BMI 18.66 kg/m²   24HR INTAKE/OUTPUT:    Intake/Output Summary (Last 24 hours) at 3/1/2023 1312  Last data filed at 2/28/2023 1701  Gross per 24 hour   Intake 147.28 ml   Output --   Net 147.28 ml       Constitutional: Alert, oriented, NAD  HEENT: Normocephalic, atraumatic, PERRLA  Respiratory: CTA  Cardiovascular/Edema: RRR, normal S1, normal S2, no edema  Gastrointestinal: Soft, flat, nondistended, nontender  Neurologic: Nonfocal  Skin: Warm, dry, no rashes, no lesions  Access: RIJ tunneled dialysis catheter    Scheduled Meds:   cefepime  1,000 mg IntraVENous Q24H    amLODIPine  5 mg Oral Daily    sertraline  75 mg Oral Daily    bumetanide  2 mg Oral BID    aspirin  81 mg Oral Daily    atorvastatin  40 mg Oral Daily    carvedilol  25 mg Oral BID WC    isosorbide dinitrate  20 mg Oral TID    levothyroxine  100 mcg Oral Daily    mirtazapine  7.5 mg Oral Nightly    sacubitril-valsartan  1 tablet Oral BID    sodium chloride flush  5-40 mL IntraVENous 2 times per day    heparin (porcine)  5,000 Units SubCUTAneous 3 times per day     Continuous Infusions:   sodium chloride      dextrose       PRN Meds:.sodium chloride flush, sodium chloride, ondansetron **OR** ondansetron, polyethylene glycol, acetaminophen **OR** acetaminophen, glucose, dextrose bolus **OR** dextrose bolus, glucagon (rDNA), dextrose, labetalol    DATA:    CBC with Differential:    Lab Results   Component Value Date/Time    WBC 10.6 03/01/2023 03:50 AM    RBC 3.45 03/01/2023 03:50 AM    HGB 10.4 03/01/2023 03:50 AM    HCT 32.6 03/01/2023 03:50 AM     03/01/2023 03:50 AM    MCV 94.5 03/01/2023 03:50 AM    MCH 30.1 03/01/2023 03:50 AM    MCHC 31.9 03/01/2023 03:50 AM    RDW 14.1 03/01/2023 03:50 AM    NRBC 0.0 02/28/2023 05:34 AM    SEGSPCT 55 06/10/2013 04:00 AM    BLASTSPCT 0.9 02/25/2023 05:08 AM    LYMPHOPCT 7.8 03/01/2023 03:50 AM    MONOPCT 8.3 03/01/2023 03:50 AM    MYELOPCT 0.9 02/28/2023 05:34 AM    BASOPCT 0.4 03/01/2023 03:50 AM    MONOSABS 0.88 03/01/2023 03:50 AM    LYMPHSABS 0.83 03/01/2023 03:50 AM    EOSABS 0.01 03/01/2023 03:50 AM    BASOSABS 0.04 03/01/2023 03:50 AM     CMP:    Lab Results   Component Value Date/Time     03/01/2023 03:50 AM    K 4.1 03/01/2023 03:50 AM    K 3.5 02/25/2023 05:08 AM    CL 97 03/01/2023 03:50 AM    CO2 27 03/01/2023 03:50 AM    BUN 29 03/01/2023 03:50 AM    CREATININE 5.6 03/01/2023 03:50 AM    GFRAA 11 09/15/2022 02:27 AM    LABGLOM 10 03/01/2023 03:50 AM    GLUCOSE 90 03/01/2023 03:50 AM    PROT 6.2 02/25/2023 05:08 AM    LABALBU 3.1 02/25/2023 05:08 AM    CALCIUM 7.5 03/01/2023 03:50 AM    BILITOT 1.1 02/25/2023 05:08 AM    ALKPHOS 72 02/25/2023 05:08 AM    AST 17 02/25/2023 05:08 AM    ALT 14 02/25/2023 05:08 AM     Magnesium:    Lab Results   Component Value Date/Time    MG 1.9 02/25/2023 05:08 AM     Phosphorus:    Lab Results   Component Value Date/Time    PHOS 2.2 03/01/2023 03:50 AM     Radiology Review:    CTAP 2/22/2023      No acute inflammatory process in the abdomen and pelvis. Atrophic kidneys with nephrocalcinosis. CXR 2/22/2023      No acute process.       BRIEF SUMMARY OF INITIAL CONSULT:  Adalberto Rodrigues is a 67 y.o. male, past medical history significant for ESRD on HD 3 times weekly Tuesday, Thursday, Saturday via right IJ, HTN, type II DM, HFrEF 35 to 40% with stage I diastolic dysfunction, hypothyroidism, HCV, recently evaluated in the ED under police order for reportedly swerving while driving, who presented to the ED on 2/22/2023 with a chief complaint of fatigue, (last HD treatment Friday) and diarrhea. Lab work on admission significant for potassium level 5.9, bicarbonate 18, creatinine 11.7, anion gap 20, lactic acid 3.0, proBNP 29,091, patient admitted for further evaluation and treatment, we are asked to see this patient in consultation for hemodialysis management. Problems Resolved:  Hyperkalemia 2/2 decreased GFR and noncompliance with HD, for HD today    IMPRESSION/RECOMMENDATIONS:      ESRD on IHD three times weekly T-Th-S, via RIJ. Patient has a history of noncompliance with treatment due to subjective intolerance to HD. Had yesterday but signed off AMA at 3 hours. HTN, noncompliant with medications, on amlodipine and carvedilol  HFrEF 35-40% with stage I DD, on carvedilol and Entresto   MBD of CKD, not on binders  Anemia of CKD, monitor H&H  Bacteremia, secondary to CLABSI. For tunneled HD catheter removal tomorrow. -----------------------------------------------------------------------  Thrombocytopenia, improved  Diarrhea, stool studies negative   Nutrition: Low potassium diet, 1 L fluid restriction  Depression: on sertraline, increase to 75 mg daily  Social detriments to health: After home evaluation by our office, it was decided the patient is not a candidate for home hemodialysis, serious concerns about home safety including functioning utilities, patient was without the ability to make meals, very poor medication safety--recommend placement.     Plan:    HD three times weekly T-Th-S, 300/400, 0-0.5 L fluid removal  Continue amlodipine 5 mg daily  Continue sertraline 75 mg daily  Continue Bumex 2 mg PO BID  Continue carvedilol 25 mg p.o. twice daily  Continue sacubitril-valsartan  twice daily  Continue to monitor BP  Continue to monitor labs  Awaiting placement  For tunneled HD catheter removal today due to CLABSI    Electronically signed by KIANNA Bell NP on 3/1/2023 at 1:12 PM

## 2023-03-01 NOTE — PROGRESS NOTES
No acute issues overnight. Plan for removal of tunneled line in OR today.     Electronically signed by Ryanne Guerrero MD on 3/1/2023 at 7:56 AM

## 2023-03-01 NOTE — PROGRESS NOTES
Physical Therapy  Facility/Department: [facility] OR  Daily Treatment Note  NAME: Tay Ba  : 1950  MRN: [id number]    Date of Service: 3/1/2023    Patient Diagnosis(es): The primary encounter diagnosis was End-stage renal disease needing dialysis [facility]). Diagnoses of Nausea and Diarrhea, unspecified type were also pertinent to this visit. Attending Provider:  Justus Ledesma DO     Evaluating PT:  Dewayne Vazquez, P.T. Room #:  0603/0603-A  Diagnosis:  Nausea [R11.0]  ESRD on hemodialysis ([facility] [address] [N18.6, Z99.2]  End-stage renal disease needing dialysis ([facility] [address] [N18.6, Z99.2]  ESRD needing dialysis ([facility] [address] [N18.6, Z99.2]  Diarrhea, unspecified type [R19.7]  Pertinent PMHx/PSHx:  HEP C, dialysis  Precautions:  falls, bed/chair alarm     SUBJECTIVE:     Pt lives alone in a 1 story home with 2 or 3 stairs and no rail to enter. Pt ambulated with no AD PTA. OBJECTIVE:    Initial Evaluation  Date: 23 Treatment  3/1 Short Term/ Long Term   Goals   Was pt agreeable to Eval/treatment? yes  yes     Does pt have pain? No c/o pain  none reported     Bed Mobility  Rolling: SBA  Supine to sit: MIN A  Sit to supine: MIN A  Scooting: MIN A  rolling:  SBA  Supine to sit:  SBA  Sit to supine:  NT  Scooting:  SBA to sitting EOB Independent    Transfers Sit to stand: MOD A  Stand to sit: MIN A  Stand pivot: MIN A/MOD A with ww  sit to stand:  Min A  Stand to sit:  SBA  Stand pivot:  NT   Independent    Ambulation   40 feet with ww MIN A/MOD A 400 feet with w/w Min A. One LOB with Min A to correct. 200 feet with ww if needed Independent    Stair negotiation: ascended and descended NA  NT 3 steps with no rail Independent    AM-PAC 6 Clicks 49/75  56/49        Patient education  Pt educated on PT objectives during treatment session, hand placement during transfers, walker usage and safety.      Patient response to education:   Pt verbalized understanding Pt demonstrated skill Pt requires further education in this area   yes With cueing yes     ASSESSMENT:    Comments:  Pt found in bed and left sitting on the EOB with call light in reach. Treatment:  Patient practiced and was instructed in the following treatment:   Functional mobility performed as documented above. No report of dizziness during functional mobility. Cueing required for hand placement during transfers. Pt reported UE fatigue from use of walker during ambulation. Pt with one LOB during ambulation with Min A to correct. PLAN:    Patient is making good progress towards established goals. Will continue with current POC.      Time in  1214  Time out  1233    Total Treatment Time  19 minutes     CPT codes:    [x] Therapeutic activities 94929 19 minutes  [] Therapeutic exercises 27637  minutes      Jannice Lesches, [phone]

## 2023-03-01 NOTE — PROGRESS NOTES
Comprehensive Nutrition Assessment    Type and Reason for Visit:  Initial, RD Nutrition Re-Screen/LOS    Nutrition Recommendations/Plan:   Continue NPO, ADAT & add ONS w/ nutrition progression. Monitor. Malnutrition Assessment:  Malnutrition Status: At risk for malnutrition (Comment) (03/01/23 1327)    Context:  Chronic Illness     Findings of the 6 clinical characteristics of malnutrition:  Energy Intake:  Mild decrease in energy intake (Comment)  Weight Loss:  Unable to assess (d/t fluid shifts)     Body Fat Loss:  Unable to assess (d/t pt JEFF)     Muscle Mass Loss:  Unable to assess (d/t pt JEFF)    Fluid Accumulation:  No significant fluid accumulation     Strength:  Not Performed    Nutrition Assessment:    Pt w/ diarrhea likely viral gastroenteritis & bacteremia w/ plan for [facility] removal today. Hx EXRD on HD, CHF, DM, hep C, liver disease. Currently NPO for procedure, ADAT, add ONS w/ nutrition progression & monitor. Nutrition Related Findings:    A&O X4, I&Os WDL, no edema, abd soft, +BS, loss of appetite, HD, hypophosphatemia, N/V/diarrhea at times noted Wound Type: None       Current Nutrition Intake & Therapies:    Average Meal Intake: NPO (~50% w/ PO diet)  Average Supplements Intake: NPO  Diet NPO Exceptions are: Sips of Water with Meds    Anthropometric Measures:  Height: 6' 2\" (188 cm)  Ideal Body Weight (IBW): 190 lbs (86 kg)    Admission Body Weight: 139 lb 15.9 oz (63.5 kg) (2/23 bed)  Current Body Weight: 145 lb 4.8 oz (65.9 kg), 76.5 % IBW.  Weight Source: Not Specified (3/1)  Current BMI (kg/m2): 18.6  Usual Body Weight: 149 lb (67.6 kg) (4/2022 actual per EMR, 128# X 6 months)  % Weight Change (Calculated): -2.5  Weight Adjustment For: No Adjustment                 BMI Categories: Underweight (BMI less than 22) age over 72    Estimated Daily Nutrient Needs:  Energy Requirements Based On: Kcal/kg  Weight Used for Energy Requirements: Admission  Energy (kcal/day):   Weight Used for Protein Requirements: Admission  Protein (g/day):  (1.5-1.6)  Method Used for Fluid Requirements: Standard Renal  Fluid (ml/day): per renal    Nutrition Diagnosis:   Inadequate oral intake related to altered GI function as evidenced by poor intake prior to admission, GI abnormality, nausea, vomiting, diarrhea, intake 26-50%    Nutrition Interventions:   Food and/or Nutrient Delivery: Continue NPO (ADAT)  Nutrition Education/Counseling: No recommendation at this time  Coordination of Nutrition Care: Continue to monitor while inpatient       Goals:     Goals: Initiate PO diet       Nutrition Monitoring and Evaluation:      Food/Nutrient Intake Outcomes: Diet Advancement/Tolerance  Physical Signs/Symptoms Outcomes: Biochemical Data, GI Status, Fluid Status or Edema, Nutrition Focused Physical Findings, Skin, Weight    Discharge Planning:     Too soon to determine     John Smith RD, LD  Contact: [phone]

## 2023-03-01 NOTE — ANESTHESIA POSTPROCEDURE EVALUATION
Department of Anesthesiology  Postprocedure Note    Patient: Anabell Renee  MRN: [id number]  YOB: 1950  Date of evaluation: 3/1/2023      Procedure Summary     Date: 03/01/23 Room / Location: [facility] 09 / [facility]    Anesthesia Start: 7845 Anesthesia Stop: 0526    Procedure: TUNNELED HEMODIALYSIS CATHETER REMOVAL POSSIBLE TEMPORARY CATHETER INSERTION (Chest) Diagnosis:       Bacteremia      (Bacteremia [R78.81])    Surgeons: Steph Thomson MD Responsible Provider: Xena Sales DO    Anesthesia Type: MAC ASA Status: 4          Anesthesia Type: No value filed.     Angela Phase I:      Angela Phase II:        Anesthesia Post Evaluation    Patient location during evaluation: PACU  Patient participation: complete - patient participated  Level of consciousness: awake  Airway patency: patent  Nausea & Vomiting: no nausea and no vomiting  Complications: no  Cardiovascular status: hemodynamically stable and blood pressure returned to baseline  Respiratory status: acceptable and room air  Hydration status: euvolemic

## 2023-03-01 NOTE — OP NOTE
Operative Note      Patient: Richard Stewart  YOB: 1950  MRN: [id number]    Date of Procedure: 3/1/2023    Pre-Op Diagnosis: Bacteremia [R78.81]    Post-Op Diagnosis: Same       Procedure(s):  RIGHT INTERNAL JUGULAR TUNNELED HEMODIALYSIS CATHETER REMOVAL   US GUIDED ACCESS OF THE RIGHT COMMON FEMORAL VEIN  TEMPORARY CATHETER INSERTION    Surgeon(s):  Caleb Tafoya MD    Assistant:   Resident: Jacobo Kmi MD; Ivanna Garza DO    Anesthesia: Monitor Anesthesia Care    Estimated Blood Loss (mL): 10 cc    Complications: None    Specimens:   ID Type Source Tests Collected by Time Destination   1 : catheter tip Catheter Tip Catheter Tip CULTURE, TIP Caleb Tafoya MD 3/1/2023 3148        Detailed Description of Procedure:     The patient was identified and the procedure was confirmed. The right neck, chest, and catheter were prepped and draped in the usual sterile fashion. Next, 1% lidocaine mixed with 0.25% Marcaine was used for local anesthesia.  Through the catheter exit site the cuff was freed from the surrounding tissues. Traction was applied to the catheter and it was removed intact. Pressure was held for hemostasis and a sterile pressure dressing was applied to the exit site in the operating room.     Using ultrasound guidance, right common femoral vein was accessed.  Wire was inserted into the vein and ultrasound confirmed placement of the wire into the left femoral vein.  Dilators were passed over the wire, and catheter was then placed over the wire.  Catheter was noted to draw and flush easily.  Catheter was secured to the skin using 2, 0 silk sutures and covered in an antibiotic impregnated dressing    Needle, sponge, and instrument counts were reported as correct x2. The patient tolerated the procedure and was transferred to the recovery area in satisfactory condition.      Electronically signed by Jacobo Kim MD on 3/1/2023 at 3:25 PM

## 2023-03-02 VITALS
OXYGEN SATURATION: 98 % | WEIGHT: 145.5 LBS | BODY MASS INDEX: 18.67 KG/M2 | SYSTOLIC BLOOD PRESSURE: 150 MMHG | HEART RATE: 65 BPM | DIASTOLIC BLOOD PRESSURE: 86 MMHG | HEIGHT: 74 IN | TEMPERATURE: 98.2 F | RESPIRATION RATE: 18 BRPM

## 2023-03-02 PROBLEM — Z99.2 END-STAGE RENAL DISEASE NEEDING DIALYSIS (HCC): Status: ACTIVE | Noted: 2023-02-23

## 2023-03-02 LAB
ANION GAP SERPL CALCULATED.3IONS-SCNC: 11 MMOL/L (ref 7–16)
BLOOD CULTURE, ROUTINE: NORMAL
BUN BLDV-MCNC: 39 MG/DL (ref 6–23)
CALCIUM SERPL-MCNC: 7.8 MG/DL (ref 8.6–10.2)
CHLORIDE BLD-SCNC: 99 MMOL/L (ref 98–107)
CO2: 23 MMOL/L (ref 22–29)
CREAT SERPL-MCNC: 6.8 MG/DL (ref 0.7–1.2)
CULTURE, BLOOD 2: ABNORMAL
CULTURE, BLOOD 2: ABNORMAL
CULTURE, BLOOD 2: NORMAL
GFR SERPL CREATININE-BSD FRML MDRD: 8 ML/MIN/1.73
GLUCOSE BLD-MCNC: 90 MG/DL (ref 74–99)
METER GLUCOSE: 137 MG/DL (ref 74–99)
METER GLUCOSE: 81 MG/DL (ref 74–99)
ORGANISM: ABNORMAL
ORGANISM: ABNORMAL
PHOSPHORUS: 3 MG/DL (ref 2.5–4.5)
POTASSIUM SERPL-SCNC: 4.6 MMOL/L (ref 3.5–5)
SODIUM BLD-SCNC: 133 MMOL/L (ref 132–146)

## 2023-03-02 PROCEDURE — 84100 ASSAY OF PHOSPHORUS: CPT

## 2023-03-02 PROCEDURE — 90935 HEMODIALYSIS ONE EVALUATION: CPT

## 2023-03-02 PROCEDURE — 99239 HOSP IP/OBS DSCHRG MGMT >30: CPT | Performed by: STUDENT IN AN ORGANIZED HEALTH CARE EDUCATION/TRAINING PROGRAM

## 2023-03-02 PROCEDURE — 2580000003 HC RX 258: Performed by: STUDENT IN AN ORGANIZED HEALTH CARE EDUCATION/TRAINING PROGRAM

## 2023-03-02 PROCEDURE — 6360000002 HC RX W HCPCS: Performed by: STUDENT IN AN ORGANIZED HEALTH CARE EDUCATION/TRAINING PROGRAM

## 2023-03-02 PROCEDURE — 99239 HOSP IP/OBS DSCHRG MGMT >30: CPT | Performed by: NURSE PRACTITIONER

## 2023-03-02 PROCEDURE — 36415 COLL VENOUS BLD VENIPUNCTURE: CPT

## 2023-03-02 PROCEDURE — 82962 GLUCOSE BLOOD TEST: CPT

## 2023-03-02 PROCEDURE — 80048 BASIC METABOLIC PNL TOTAL CA: CPT

## 2023-03-02 PROCEDURE — 6370000000 HC RX 637 (ALT 250 FOR IP): Performed by: STUDENT IN AN ORGANIZED HEALTH CARE EDUCATION/TRAINING PROGRAM

## 2023-03-02 PROCEDURE — 80074 ACUTE HEPATITIS PANEL: CPT

## 2023-03-02 PROCEDURE — 2580000003 HC RX 258: Performed by: ANESTHESIOLOGY

## 2023-03-02 RX ORDER — ASPIRIN 81 MG/1
81 TABLET ORAL DAILY
Qty: 30 TABLET | Refills: 3 | DISCHARGE
Start: 2023-03-03

## 2023-03-02 RX ORDER — BUMETANIDE 2 MG/1
2 TABLET ORAL 2 TIMES DAILY
Qty: 30 TABLET | Refills: 3 | DISCHARGE
Start: 2023-03-02

## 2023-03-02 RX ADMIN — HEPARIN SODIUM 5000 UNITS: 10000 INJECTION INTRAVENOUS; SUBCUTANEOUS at 06:17

## 2023-03-02 RX ADMIN — ASPIRIN 81 MG: 81 TABLET, COATED ORAL at 11:31

## 2023-03-02 RX ADMIN — CARVEDILOL 25 MG: 25 TABLET, FILM COATED ORAL at 16:45

## 2023-03-02 RX ADMIN — SACUBITRIL AND VALSARTAN 1 TABLET: 97; 103 TABLET, FILM COATED ORAL at 11:31

## 2023-03-02 RX ADMIN — MEROPENEM 1000 MG: 1 INJECTION, POWDER, FOR SOLUTION INTRAVENOUS at 12:11

## 2023-03-02 RX ADMIN — LEVOTHYROXINE SODIUM 100 MCG: 100 TABLET ORAL at 06:17

## 2023-03-02 RX ADMIN — SERTRALINE HYDROCHLORIDE 75 MG: 50 TABLET ORAL at 11:31

## 2023-03-02 RX ADMIN — CARVEDILOL 25 MG: 25 TABLET, FILM COATED ORAL at 11:31

## 2023-03-02 RX ADMIN — ISOSORBIDE DINITRATE 20 MG: 10 TABLET ORAL at 11:30

## 2023-03-02 RX ADMIN — AMLODIPINE BESYLATE 5 MG: 5 TABLET ORAL at 11:31

## 2023-03-02 RX ADMIN — ISOSORBIDE DINITRATE 20 MG: 10 TABLET ORAL at 16:45

## 2023-03-02 RX ADMIN — ACETAMINOPHEN 650 MG: 325 TABLET ORAL at 16:50

## 2023-03-02 RX ADMIN — Medication 10 ML: at 11:55

## 2023-03-02 RX ADMIN — HEPARIN SODIUM 5000 UNITS: 10000 INJECTION INTRAVENOUS; SUBCUTANEOUS at 14:15

## 2023-03-02 ASSESSMENT — PAIN DESCRIPTION - DESCRIPTORS: DESCRIPTORS: ACHING

## 2023-03-02 ASSESSMENT — PAIN SCALES - GENERAL
PAINLEVEL_OUTOF10: 0
PAINLEVEL_OUTOF10: 6

## 2023-03-02 ASSESSMENT — PAIN DESCRIPTION - LOCATION: LOCATION: GENERALIZED

## 2023-03-02 ASSESSMENT — PAIN - FUNCTIONAL ASSESSMENT: PAIN_FUNCTIONAL_ASSESSMENT: PREVENTS OR INTERFERES SOME ACTIVE ACTIVITIES AND ADLS

## 2023-03-02 NOTE — PROGRESS NOTES
[address] [place] [address] [facility]  Progress Note    SUBJECTIVE:  Chief Complaint   Patient presents with    Fatigue     Hasn't had dialysis since Friday, nausea, dehydration    Diarrhea     Patient is doing ok. No fevers overnight. He just had HD and feels drained. No nausea. Review of systems:  As stated above in the chief complaint, otherwise negative. Medications:  Scheduled Meds:   meropenem  1,000 mg IntraVENous Q24H    sodium chloride flush  5-40 mL IntraVENous 2 times per day    amLODIPine  5 mg Oral Daily    sertraline  75 mg Oral Daily    bumetanide  2 mg Oral BID    aspirin  81 mg Oral Daily    atorvastatin  40 mg Oral Daily    carvedilol  25 mg Oral BID WC    isosorbide dinitrate  20 mg Oral TID    levothyroxine  100 mcg Oral Daily    mirtazapine  7.5 mg Oral Nightly    sacubitril-valsartan  1 tablet Oral BID    sodium chloride flush  5-40 mL IntraVENous 2 times per day    heparin (porcine)  5,000 Units SubCUTAneous 3 times per day     Continuous Infusions:   sodium chloride      sodium chloride      dextrose       PRN Meds:sodium chloride flush, sodium chloride, fentanNYL, oxyCODONE-acetaminophen, sodium chloride flush, sodium chloride, ondansetron **OR** ondansetron, polyethylene glycol, acetaminophen **OR** acetaminophen, glucose, dextrose bolus **OR** dextrose bolus, glucagon (rDNA), dextrose, labetalol    OBJECTIVE:  BP (!) 151/87   Pulse 63   Temp 97.2 °F (36.2 °C)   Resp 18   Ht 6' 2\" (1.88 m)   Wt 145 lb 8.1 oz (66 kg)   SpO2 100%   BMI 18.68 kg/m²   Temp  Av.1 °F (36.7 °C)  Min: 97.2 °F (36.2 °C)  Max: 98.6 °F (37 °C)  Constitutional: The patient is awake, alert, and oriented. Skin: Warm and dry. No rashes were noted. HEENT: Round and reactive pupils. Moist mucous membranes. No ulcerations or thrush. Neck: Supple to movements. Chest: No use of accessory muscles to breathe. Symmetrical expansion. No wheezing, crackles or rhonchi.   Cardiovascular: S1 and S2 are rhythmic and regular. No murmurs appreciated. Abdomen: Positive bowel sounds to auscultation. Benign to palpation. No masses felt. Extremities: No edema. Lines: Peripheral.  Right chest HD dry dressing  Right fem HD line.      Laboratory and Tests Review:  Lab Results   Component Value Date    WBC 10.6 03/01/2023    WBC 3.3 (L) 02/28/2023    WBC 2.8 (L) 02/27/2023    HGB 10.4 (L) 03/01/2023    HCT 32.6 (L) 03/01/2023    MCV 94.5 03/01/2023     03/01/2023     Lab Results   Component Value Date    NEUTROABS 8.74 (H) 03/01/2023    NEUTROABS 2.41 02/28/2023    NEUTROABS 1.73 (L) 02/27/2023     No results found for: CRP  Lab Results   Component Value Date    ALT 14 02/25/2023    AST 17 02/25/2023    ALKPHOS 72 02/25/2023    BILITOT 1.1 02/25/2023     Lab Results   Component Value Date/Time     03/02/2023 02:50 AM    K 4.6 03/02/2023 02:50 AM    K 3.5 02/25/2023 05:08 AM    CL 99 03/02/2023 02:50 AM    CO2 23 03/02/2023 02:50 AM    BUN 39 03/02/2023 02:50 AM    CREATININE 6.8 03/02/2023 02:50 AM    CREATININE 5.6 03/01/2023 03:50 AM    CREATININE 7.4 02/28/2023 05:34 AM    GFRAA 11 09/15/2022 02:27 AM    LABGLOM 8 03/02/2023 02:50 AM    GLUCOSE 90 03/02/2023 02:50 AM    PROT 6.2 02/25/2023 05:08 AM    LABALBU 3.1 02/25/2023 05:08 AM    CALCIUM 7.8 03/02/2023 02:50 AM    BILITOT 1.1 02/25/2023 05:08 AM    ALKPHOS 72 02/25/2023 05:08 AM    AST 17 02/25/2023 05:08 AM    ALT 14 02/25/2023 05:08 AM     Lab Results   Component Value Date    CRP 2.2 (H) 07/15/2022    CRP 0.3 04/21/2022    CRP 0.6 (H) 12/09/2021     Lab Results   Component Value Date    SEDRATE 63 (H) 11/28/2016     Radiology:  Reviewed     Microbiology:   Blood cultures 2/24: negative so far   Stool culture enteric ina  Respiratory panel negative  Giardia  COVID-negative  C. difficile was canceled  Blood cultures 2/27 from HD line; 4/4 :Citrobacter Koseri     Assessment:  CLABSI:   Citrobacter Koseri bacteremia:   Diarrhea: likely related to  HD line infection. ESRD on HD: right tunneled line in place (placed November 2021)    Plan:    Switched cefepime to meropenem 1gr daily IV for 7 days. (End date 3/9/23)  [id number] Tnaya Aviles for discharge to select when ok with other consultants. Med rec done.      Murrel Favre, MD  11:12 AM  3/2/2023

## 2023-03-02 NOTE — PLAN OF CARE
Problem: Pain  Goal: Verbalizes/displays adequate comfort level or baseline comfort level  Outcome: Progressing  Flowsheets (Taken 3/2/2023 0245 by Cindy Valenzuela RN)  Verbalizes/displays adequate comfort level or baseline comfort level: Assess pain using appropriate pain scale     Problem: Skin/Tissue Integrity  Goal: Absence of new skin breakdown  Description: 1. Monitor for areas of redness and/or skin breakdown  2. Assess vascular access sites hourly  3. Every 4-6 hours minimum:  Change oxygen saturation probe site  4. Every 4-6 hours:  If on nasal continuous positive airway pressure, respiratory therapy assess nares and determine need for appliance change or resting period.   Outcome: Progressing     Problem: Nutrition Deficit:  Goal: Optimize nutritional status  Outcome: Progressing

## 2023-03-02 NOTE — PROGRESS NOTES
Patient seen and examined. Incision C/D/I. R groin temp line in place without evidence of bleeding. Ok to use temp line. No further plans from general surgery POV. Will be available as needed.     Electronically signed by Otto Toscano MD on 3/2/2023 at 1:27 PM    The patient was seen and examined  The chart was reviewed  Agree with the assessment and plan

## 2023-03-02 NOTE — CARE COORDINATION
Have you seen any other physician or provider since your last visit no    Have you had any other diagnostic tests since your last visit? no    Have you changed or stopped any medications since your last visit? no Transition of Care-[facility] can ACCEPT today with temp dialysis catheter-liaison wanted to verify length of antibiotics with ID physician. Dr. Lb Peña was on the unit-she is waiting on the second set of blood cultures to result. Charge nurse to call and get discharge order.     Tomás SUMNERN, RN  [facility]

## 2023-03-02 NOTE — PROGRESS NOTES
[facility] CARE AT [facility] Hospitalist   Progress Note    Admitting Date and Time: 2/22/2023  2:06 PM  Admit Dx: Nausea [R11.0]  ESRD on hemodialysis ([address] [N18.6, Z99.2]  End-stage renal disease needing dialysis ([address] [N18.6, Z99.2]  ESRD needing dialysis ([address] [N18.6, Z99.2]  Diarrhea, unspecified type [R19.7]    Subjective:    Patient was admitted with Nausea [R11.0]  ESRD on hemodialysis ([address] [N18.6, Z99.2]  End-stage renal disease needing dialysis ([address] [N18.6, Z99.2]  ESRD needing dialysis ([address] [N18.6, Z99.2]  Diarrhea, unspecified type [R19.7].  Patient denies fever, chills, cp, sob, n/v.     sodium chloride flush  5-40 mL IntraVENous 2 times per day    cefepime  1,000 mg IntraVENous Q24H    amLODIPine  5 mg Oral Daily    sertraline  75 mg Oral Daily    bumetanide  2 mg Oral BID    aspirin  81 mg Oral Daily    atorvastatin  40 mg Oral Daily    carvedilol  25 mg Oral BID WC    isosorbide dinitrate  20 mg Oral TID    levothyroxine  100 mcg Oral Daily    mirtazapine  7.5 mg Oral Nightly    sacubitril-valsartan  1 tablet Oral BID    sodium chloride flush  5-40 mL IntraVENous 2 times per day    heparin (porcine)  5,000 Units SubCUTAneous 3 times per day     sodium chloride flush, 5-40 mL, PRN  sodium chloride, , PRN  fentanNYL, 25 mcg, Q5 Min PRN  oxyCODONE-acetaminophen, 1 tablet, Q4H PRN  sodium chloride flush, 5-40 mL, PRN  sodium chloride, , PRN  ondansetron, 4 mg, Q8H PRN   Or  ondansetron, 4 mg, Q6H PRN  polyethylene glycol, 17 g, Daily PRN  acetaminophen, 650 mg, Q6H PRN   Or  acetaminophen, 650 mg, Q6H PRN  glucose, 4 tablet, PRN  dextrose bolus, 125 mL, PRN   Or  dextrose bolus, 250 mL, PRN  glucagon (rDNA), 1 mg, PRN  dextrose, , Continuous PRN  labetalol, 10 mg, Q6H PRN         Objective:    BP (!) 157/82   Pulse 65   Temp 98.2 °F (36.8 °C) (Oral)   Resp 18   Ht 6' 2\" (1.88 m)   Wt 145 lb 4.8 oz (65.9 kg)   SpO2 100%   BMI 18.66 kg/m²   Skin: warm and dry, no rash or erythema  Pulmonary/Chest: clear to auscultation bilaterally- no wheezes, rales or rhonchi, normal air movement, no respiratory distress  Cardiovascular: rhythm reg at rate of 64  Abdomen: soft, non-tender, non-distended, normal bowel sounds, no masses or organomegaly  Extremities: no cyanosis, no clubbing, and no edema      Recent Labs     02/27/23  0415 02/28/23  0534 03/01/23  0350   * 135 133   K 4.5 4.4 4.1   CL 96* 100 97*   CO2 22 23 27   BUN 33* 45* 29*   CREATININE 6.1* 7.4* 5.6*   GLUCOSE 85 93 90   CALCIUM 7.4* 7.7* 7.5*       Recent Labs     02/27/23  1715 02/28/23  0534 03/01/23  0350   WBC 2.8* 3.3* 10.6   RBC 3.73* 3.95 3.45*   HGB 11.4* 11.9* 10.4*   HCT 34.3* 36.8* 32.6*   MCV 92.0 93.2 94.5   MCH 30.6 30.1 30.1   MCHC 33.2 32.3 31.9*   RDW 13.7 13.7 14.1   * 138 130   MPV 11.1 11.1 11.2       CBC with Differential:    Lab Results   Component Value Date/Time    WBC 10.6 03/01/2023 03:50 AM    RBC 3.45 03/01/2023 03:50 AM    HGB 10.4 03/01/2023 03:50 AM    HCT 32.6 03/01/2023 03:50 AM     03/01/2023 03:50 AM    MCV 94.5 03/01/2023 03:50 AM    MCH 30.1 03/01/2023 03:50 AM    MCHC 31.9 03/01/2023 03:50 AM    RDW 14.1 03/01/2023 03:50 AM    NRBC 0.0 02/28/2023 05:34 AM    SEGSPCT 55 06/10/2013 04:00 AM    BLASTSPCT 0.9 02/25/2023 05:08 AM    LYMPHOPCT 7.8 03/01/2023 03:50 AM    MONOPCT 8.3 03/01/2023 03:50 AM    MYELOPCT 0.9 02/28/2023 05:34 AM    BASOPCT 0.4 03/01/2023 03:50 AM    MONOSABS 0.88 03/01/2023 03:50 AM    LYMPHSABS 0.83 03/01/2023 03:50 AM    EOSABS 0.01 03/01/2023 03:50 AM    BASOSABS 0.04 03/01/2023 03:50 AM     BMP:    Lab Results   Component Value Date/Time     03/01/2023 03:50 AM    K 4.1 03/01/2023 03:50 AM    K 3.5 02/25/2023 05:08 AM    CL 97 03/01/2023 03:50 AM    CO2 27 03/01/2023 03:50 AM    BUN 29 03/01/2023 03:50 AM    LABALBU 3.1 02/25/2023 05:08 AM    CREATININE 5.6 03/01/2023 03:50 AM    CALCIUM 7.5 03/01/2023 03:50 AM    GFRAA 11 09/15/2022 02:27 AM    LABGLOM 10 03/01/2023 03:50 AM    GLUCOSE 90 03/01/2023 03:50 AM     Phosphorus:    Lab Results   Component Value Date/Time    PHOS 2.2 03/01/2023 03:50 AM        Radiology:   XR CHEST (2 VW)   Final Result   No acute process. CT ABDOMEN PELVIS WO CONTRAST Additional Contrast? None   Final Result   No acute inflammatory process in the abdomen and pelvis. Atrophic kidneys with nephrocalcinosis. Assessment:    Principal Problem:    ESRD needing dialysis ([facility]  Active Problems:    ESRD on hemodialysis ([facility]    Diarrhea    Primary hypertension    ESRD (end stage renal disease) ([facility]    Depression    Hyponatremia    Bacteremia  Resolved Problems:    * No resolved hospital problems. *      Plan:  Bacteremia monitor closely. Abx. ID following  CLABSI (likely present on admission)  abx. ID following. Surgery  removing HD cath and placing temp access  Diarrhea presumed to be viral gastroenteritis check workup and supportive care  ESRD  HD  nephrology following  Htn monitor and tx with bp medicine  Hypothyroidism continue med  Depression continue med  Hyperkalemia monitor and tx  Acidosis/elevated lactic acid level monitor  Thrombocytopenia monitor  Hypophosphatemia monitor and replace prn  Hyponatremia monitor    Monitor cultures.  Current disposition supposed to be at discharge is to be LTAC    Electronically signed by Spencer Murphy DO on 3/1/2023 at 8:07 PM

## 2023-03-02 NOTE — PROGRESS NOTES
Department of Internal Medicine  Nephrology Progress Note    Events reviewed. SUBJECTIVE: We are following Jessy Blow for ESRD on HD. Patient reports no new complaints.     PHYSICAL EXAM:      Vitals:    VITALS:  BP (!) 150/86   Pulse 65   Temp 98.2 °F (36.8 °C) (Oral)   Resp 18   Ht 6' 2\" (1.88 m)   Wt 145 lb 8.1 oz (66 kg)   SpO2 98%   BMI 18.68 kg/m²   24HR INTAKE/OUTPUT:    Intake/Output Summary (Last 24 hours) at 3/2/2023 1326  Last data filed at 3/2/2023 1216  Gross per 24 hour   Intake 949.84 ml   Output 500 ml   Net 449.84 ml       Constitutional: Alert, oriented, NAD  HEENT: Normocephalic, atraumatic, PERRLA  Respiratory: CTA  Cardiovascular/Edema: RRR, normal S1, normal S2, no edema  Gastrointestinal: Soft, flat, nondistended, nontender  Neurologic: Nonfocal  Skin: Warm, dry, no rashes, no lesions  Access: RIJ tunneled dialysis catheter    Scheduled Meds:   meropenem  1,000 mg IntraVENous Q24H    sodium chloride flush  5-40 mL IntraVENous 2 times per day    amLODIPine  5 mg Oral Daily    sertraline  75 mg Oral Daily    bumetanide  2 mg Oral BID    aspirin  81 mg Oral Daily    atorvastatin  40 mg Oral Daily    carvedilol  25 mg Oral BID WC    isosorbide dinitrate  20 mg Oral TID    levothyroxine  100 mcg Oral Daily    mirtazapine  7.5 mg Oral Nightly    sacubitril-valsartan  1 tablet Oral BID    sodium chloride flush  5-40 mL IntraVENous 2 times per day    heparin (porcine)  5,000 Units SubCUTAneous 3 times per day     Continuous Infusions:   sodium chloride      sodium chloride      dextrose       PRN Meds:.sodium chloride flush, sodium chloride, fentanNYL, oxyCODONE-acetaminophen, sodium chloride flush, sodium chloride, ondansetron **OR** ondansetron, polyethylene glycol, acetaminophen **OR** acetaminophen, glucose, dextrose bolus **OR** dextrose bolus, glucagon (rDNA), dextrose, labetalol    DATA:    CBC with Differential:    Lab Results   Component Value Date/Time    WBC 10.6 03/01/2023 03:50 AM    RBC 3.45 03/01/2023 03:50 AM    HGB 10.4 03/01/2023 03:50 AM    HCT 32.6 03/01/2023 03:50 AM     03/01/2023 03:50 AM    MCV 94.5 03/01/2023 03:50 AM    MCH 30.1 03/01/2023 03:50 AM    MCHC 31.9 03/01/2023 03:50 AM    RDW 14.1 03/01/2023 03:50 AM    NRBC 0.0 02/28/2023 05:34 AM    SEGSPCT 55 06/10/2013 04:00 AM    BLASTSPCT 0.9 02/25/2023 05:08 AM    LYMPHOPCT 7.8 03/01/2023 03:50 AM    MONOPCT 8.3 03/01/2023 03:50 AM    MYELOPCT 0.9 02/28/2023 05:34 AM    BASOPCT 0.4 03/01/2023 03:50 AM    MONOSABS 0.88 03/01/2023 03:50 AM    LYMPHSABS 0.83 03/01/2023 03:50 AM    EOSABS 0.01 03/01/2023 03:50 AM    BASOSABS 0.04 03/01/2023 03:50 AM     CMP:    Lab Results   Component Value Date/Time     03/02/2023 02:50 AM    K 4.6 03/02/2023 02:50 AM    K 3.5 02/25/2023 05:08 AM    CL 99 03/02/2023 02:50 AM    CO2 23 03/02/2023 02:50 AM    BUN 39 03/02/2023 02:50 AM    CREATININE 6.8 03/02/2023 02:50 AM    GFRAA 11 09/15/2022 02:27 AM    LABGLOM 8 03/02/2023 02:50 AM    GLUCOSE 90 03/02/2023 02:50 AM    PROT 6.2 02/25/2023 05:08 AM    LABALBU 3.1 02/25/2023 05:08 AM    CALCIUM 7.8 03/02/2023 02:50 AM    BILITOT 1.1 02/25/2023 05:08 AM    ALKPHOS 72 02/25/2023 05:08 AM    AST 17 02/25/2023 05:08 AM    ALT 14 02/25/2023 05:08 AM     Magnesium:    Lab Results   Component Value Date/Time    MG 1.9 02/25/2023 05:08 AM     Phosphorus:    Lab Results   Component Value Date/Time    PHOS 3.0 03/02/2023 02:50 AM     Radiology Review:    CTAP 2/22/2023      No acute inflammatory process in the abdomen and pelvis. Atrophic kidneys with nephrocalcinosis. CXR 2/22/2023      No acute process.       BRIEF SUMMARY OF INITIAL CONSULT:  Nicki Torres is a 67 y.o. male, past medical history significant for ESRD on HD 3 times weekly Tuesday, Thursday, Saturday via right IJ, HTN, type II DM, HFrEF 35 to 40% with stage I diastolic dysfunction, hypothyroidism, HCV, recently evaluated in the ED under police order for reportedly swerving while driving, who presented to the ED on 2/22/2023 with a chief complaint of fatigue, (last HD treatment Friday) and diarrhea. Lab work on admission significant for potassium level 5.9, bicarbonate 18, creatinine 11.7, anion gap 20, lactic acid 3.0, proBNP 29,091, patient admitted for further evaluation and treatment, we are asked to see this patient in consultation for hemodialysis management. Problems Resolved:  Hyperkalemia 2/2 decreased GFR and noncompliance with HD, for HD today    IMPRESSION/RECOMMENDATIONS:      ESRD on IHD three times weekly T-Th-S, via RIJ. Patient has a history of noncompliance with treatment due to subjective intolerance to HD. Had yesterday but signed off AMA at 3 hours. HTN, noncompliant with medications, on amlodipine and carvedilol  HFrEF 35-40% with stage I DD, on carvedilol and Entresto   MBD of CKD, not on binders  Anemia of CKD, monitor H&H  Bacteremia, secondary to CLABSI. For tunneled HD catheter removal tomorrow. -----------------------------------------------------------------------  Thrombocytopenia, improved  Diarrhea, stool studies negative   Nutrition: Low potassium diet, 1 L fluid restriction  Depression: on sertraline, increase to 75 mg daily  Social detriments to health: After home evaluation by our office, it was decided the patient is not a candidate for home hemodialysis, serious concerns about home safety including functioning utilities, patient was without the ability to make meals, very poor medication safety--recommend placement.     Plan:    HD three times weekly T-Th-S, 300/400, 0-0.5 L fluid removal  Ok for DC to [facility], will continue to follow     Electronically signed by KIANNA Villavicencio NP on 3/2/2023 at 1:26 PM

## 2023-03-02 NOTE — PROGRESS NOTES
Complete report called to [facility] to Mina Ling RN. Full description of hospital events, labs values, medications and assessments updated via telephone. Packet completed for discharge. All patient belongings packed and ready for discharge. Cell phone in patients possession. Son called and updated with discharge details.

## 2023-03-02 NOTE — PROGRESS NOTES
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 She reconciled IV antibiotics from ID standpoint.     Electronically signed by Sudhir Trejo RN on 3/2/2023 at 11:14 AM

## 2023-03-02 NOTE — DISCHARGE INSTR - COC
Continuity of Care Form    Patient Name: Cate Butt   :  1950  MRN:  [id number]    [facility] date:  2023  Discharge date:  2023    Code Status Order: Full Code   Advance Directives:   Advance Care Flowsheet Documentation       Date/Time Healthcare Directive Type of Healthcare Directive Copy in [address] Agent's Name Healthcare Agent's Phone Number    [phone] No, patient does not have an advance directive for healthcare treatment -- -- -- -- --            Admitting Physician:  Tierra Hoyos MD  PCP: Fredis Richards MD    Discharging Nurse: Brandon Betts Unit/Room#: 2754/1807-W  Discharging Unit Phone Number: [phone]    Emergency Contact:   Extended Emergency Contact Information  [facility] Emergency Contact[facility] [place] Phone: [phone]  Mobile Phone: [phone]  Relation: Child  Secondary Emergency Contact: OrquideawaelviraBelem  [place] Phone: [phone]  Mobile Phone: [phone]  Relation: Other  Preferred language: English   needed? No    Past Surgical History:  Past Surgical History:   Procedure Laterality Date    CATHETER INSERTION N/A 3/1/2023    TUNNELED HEMODIALYSIS CATHETER REMOVAL POSSIBLE TEMPORARY CATHETER INSERTION performed by Rossy Gonzales MD at [place] [address] N/A 2021    CATHETER INSERTION HEMODIALYSIS, REMOVAL OF FEMORAL CATHETER performed by Neida Kaur MD at [address] [place] [address]       Immunization History: There is no immunization history on file for this patient.     Active Problems:  Patient Active Problem List   Diagnosis Code    Diabetes mellitus ([address] Z22.3    Metabolic acidosis X36.65    Essential hypertension I10    Uncontrolled diabetes mellitus ([address] [id number]    Hyperbilirubinemia E80.6    Thrombocytopenia (HCC) D69.6    Hypothyroidism E03.9    ESRD needing dialysis ([address] N18.6, Z99.2    Acute respiratory failure with hypoxia ([facility] J96.01 Volume overload E87.70    Hypertensive emergency I16.1    Hyperkalemia E87.5    Severe protein-calorie malnutrition ([facility]) E43    Acute renal failure superimposed on chronic kidney disease, on chronic dialysis ([facility]) N17.9, N18.9, Z99.2    Elevated serum creatinine R79.89    Lung infiltrate R91.8    Weakness R53.1    DNR (do not resuscitate) discussion Z71.89    Muscle weakness M62.81    Non compliance w medication regimen Z91.14    Hypertensive urgency I16.0    Chronic HFrEF (heart failure with reduced ejection fraction) ([facility]) I50.22    Cardiomyopathy ([facility]) I42.9    Failure to thrive in adult R62.7    ESRD on hemodialysis ([facility]) N18.6, Z99.2    Diarrhea R19.7    Primary hypertension I10    ESRD (end stage renal disease) ([facility] N18.6    Depression F32. A    Hyponatremia E87.1    Bacteremia R78.81       Isolation/Infection:   Isolation            No Isolation          Patient Infection Status       Infection Onset Added Last Indicated Last Indicated By Review Planned Expiration Resolved Resolved By    None active    Resolved    COVID-19 (Rule Out) 23 Respiratory Panel, Molecular, with COVID-19 (Restricted: peds pts or suitable admitted adults) (Ordered)   23 Rule-Out Test Resulted    C-diff Rule Out 23 Clostridium difficile, EIA (Ordered)   23 Rule-Out Test Canceled    COVID-19 (Rule Out) 23 COVID-19, Rapid (Ordered)   23 Rule-Out Test Resulted    COVID-19 09/15/22 09/15/22 09/15/22 COVID-19, Rapid   22     COVID-19 (Rule Out) 08/15/22 08/15/22 08/15/22 Respiratory Panel, Molecular, with COVID-19 (Restricted: peds pts or suitable admitted adults) (Ordered)   08/15/22 Rule-Out Test Resulted    COVID-19 (Rule Out) 08/10/22 08/10/22 08/10/22 COVID-19, Rapid (Ordered)   08/10/22 Rule-Out Test Resulted    COVID-19 (Rule Out) 22 Respiratory Panel, Molecular, with COVID-19 (Restricted: peds pts or suitable admitted adults) (Ordered)   04/21/22 Rule-Out Test Resulted    COVID-19 (Rule Out) 04/20/22 04/20/22 04/20/22 COVID-19, Rapid (Ordered)   04/20/22 Rule-Out Test Resulted    COVID-19 12/07/21 12/07/21 12/07/21 COVID-19, Rapid   12/10/21 Karissa Enamorado RN    2 tests resulted not detected 24 hours apart, no fever, room air, asymptomatic    COVID-19 (Rule Out) 12/07/21 12/07/21 12/07/21 COVID-19, Rapid (Ordered)   12/07/21 Rule-Out Test Resulted    COVID-19 (Rule Out) 11/19/21 11/19/21 11/19/21 COVID-19, Rapid (Ordered)   11/19/21 Rule-Out Test Resulted            Nurse Assessment:  Last Vital Signs: BP (!) 150/86   Pulse 65   Temp 98.2 °F (36.8 °C) (Oral)   Resp 18   Ht 6' 2\" (1.88 m)   Wt 145 lb 8.1 oz (66 kg)   SpO2 98%   BMI 18.68 kg/m²     Last documented pain score (0-10 scale): Pain Level: 0  Last Weight:   Wt Readings from Last 1 Encounters:   03/02/23 145 lb 8.1 oz (66 kg)     Mental Status:  oriented, alert, coherent, logical, thought processes intact, and able to concentrate and follow conversation    IV Access:  - Peripheral IV - site  left fore arm, insertion date: ***    Nursing Mobility/ADLs:  Walking   Assisted  Transfer  Assisted  Bathing  Dependent  Dressing  Assisted  Toileting  Assisted  Feeding  Assisted  Med Admin  Dependent  Med Delivery   whole    Wound Care Documentation and Therapy:  Incision 03/01/23 Femoral Anterior;Right (Active)   Dressing Status Clean;Dry; Intact 03/02/23 1216   Dressing/Treatment Tegaderm/transparent film dressing 03/02/23 1216   Drainage Amount None 03/02/23 1216   Number of days: 0        Elimination:  Continence: Bowel: No  Bladder: No  Urinary Catheter: None   Colostomy/Ileostomy/Ileal Conduit: No       Date of Last BM: 2/28/2021    Intake/Output Summary (Last 24 hours) at 3/2/2023 1326  Last data filed at 3/2/2023 1216  Gross per 24 hour   Intake 949.84 ml   Output 500 ml   Net 449.84 ml     I/O last 3 completed shifts:   In: 289.8 [P.O.:110; I.V.:150; IV Piggyback:29.8]  Out: -     Safety Concerns: At Risk for Falls and infection due to right femoral temp dialysis catheter    Impairments/Disabilities:      None    Nutrition Therapy:  Current Nutrition Therapy:   - Oral Diet:  General and low potassium diet  - Oral Nutrition Supplement:  Standard  four times a day    Routes of Feeding: Oral  Liquids: Thin Liquids  Daily Fluid Restriction: yes - amount none  Last Modified Barium Swallow with Video (Video Swallowing Test): {Done Not Done [facility]:927786849}    Treatments at the Time of Hospital Discharge:   Respiratory Treatments: none  Oxygen Therapy:  is not on home oxygen therapy.   Ventilator:    - No ventilator support    Rehab Therapies: Physical Therapy and Occupational Therapy  Weight Bearing Status/Restrictions: No weight bearing restrictions  Other Medical Equipment (for information only, NOT a DME order):  walker, bedside commode, and hospital bed  Other Treatments: ***    Patient's personal belongings (please select all that are sent with patient):  Cell phone    RN SIGNATURE:  Electronically signed by Nicic Roberson on 3/2/23 at 1:32 PM EST    CASE MANAGEMENT/SOCIAL WORK SECTION    Inpatient Status Date: ***    Readmission Risk Assessment Score:  Readmission Risk              Risk of Unplanned Readmission:  34           Discharging to Facility/ Agency   Name:   Address:  Phone:  Fax:    Dialysis Facility (if applicable)   Name:  Address:  Dialysis Schedule:  Phone:  Fax:    / signature: {Esignature:[id number]}    PHYSICIAN SECTION    Prognosis: {Prognosis:6025759110}    Condition at Discharge: Bart Garza Patient Condition:[id number]}    Rehab Potential (if transferring to Rehab): {Prognosis:6835993008}    Recommended Labs or Other Treatments After Discharge: ***    Physician Certification: I certify the above information and transfer of Kristofer Sellers  is necessary for the continuing treatment of the diagnosis listed and that he requires {Admit to Appropriate Level of Care:84836} for {GREATER/LESS:982805997} 30 days.      Update Admission H&P: {CHP DME Changes in EFQIP:844485164}    PHYSICIAN SIGNATURE:  {Esignature:[id number]}

## 2023-03-02 NOTE — FLOWSHEET NOTE
Pt signed off tx AMA after 2:43. Post report to Brittany KENNEDY   03/02/23 1030   Vital Signs   BP (!) 151/87   Temp 97.2 °F (36.2 °C)   Heart Rate 63   Resp 18   Weight 145 lb 8.1 oz (66 kg)   Weight Method Bed scale   Percent Weight Change -0.45   Pain Assessment   Pain Assessment None - Denies Pain   Pain Level 0   Post-Hemodialysis Assessment   Post-Treatment Procedures Blood returned;Catheter capped, clamped and heparinized x 2 ports   Machine Disinfection Process Acid/Vinegar Clean;Heat Disinfect;Exterior Machine Disinfection   Rinseback Volume (ml) 300 ml   Blood Volume Processed (Liters) 54.1 l/min   Dialyzer Clearance Lightly streaked   Duration of Treatment (minutes) 163 minutes   Hemodialysis Intake (ml) 300 ml   Hemodialysis Output (ml) 500 ml   NET Removed (ml) 200   Tolerated Treatment Fair   Patient Response to Treatment Pt signed off tx after 2:43;1:1 ineffective; cath care per [facility] policy; post report to Brittany KENNEDY; stable at discharge   Bilateral Breath Sounds Diminished;Clear   Physician Notified Yes   Time Off 1026   Patient Disposition Return to room

## 2023-03-02 NOTE — DISCHARGE SUMMARY
[facility] Physician Discharge Summary        [address] [address] [phone]        Santhosh Beckett, [address] [address]  [phone]    Follow up  follow up with PCP after discharge form LTACH/SNF in 5-7 days    Shanel Mathew MD  [address] [facility] 195 3061    Schedule an appointment as soon as possible for a visit  follow up appointment after discharge from LTACH/SNF 5-7 days    MD Rad[facility]  [phone]    Schedule an appointment as soon as possible for a visit  Follow up appointment after discharge LTACH/SNF 5-7 days after discharge    [facility] [address] Madhuri briggs [address] [place] [address]  [phone]    Schedule an appointment as soon as possible for a visit  Follow up after discharged from LTACH/SNF in 606/966 Dayn Vasquez MD  [address] [address]  [phone]    Schedule an appointment as soon as possible for a visit  Follow up after discahrge from LTACH/SNF in 5-7 days      Activity level: As tolerated     Dispo: [facility]      Condition on discharge: Stable     Patient ID:  Romana Palm  [id number]  72 y.o.  1950    Admit date: 2/22/2023    Discharge date and time:  3/2/2023  1:43 PM    Admission Diagnoses: Principal Problem:    ESRD needing dialysis [facility])  Active Problems:    ESRD on hemodialysis ([facility]    Diarrhea    Primary hypertension    ESRD (end stage renal disease) ([facility]    Depression    Hyponatremia    Bacteremia  Resolved Problems:    * No resolved hospital problems. *      Discharge Diagnoses: Principal Problem:    ESRD needing dialysis ([facility]  Active Problems:    ESRD on hemodialysis ([facility]    Diarrhea    Primary hypertension    ESRD (end stage renal disease) ([facility]    Depression    Hyponatremia    Bacteremia  Resolved Problems:    * No resolved hospital problems. *      Consults:  IP CONSULT TO NEPHROLOGY  IP CONSULT TO SOCIAL WORK  IP CONSULT TO INFECTIOUS DISEASES  IP CONSULT TO IV TEAM  IP CONSULT TO IV TEAM  IP CONSULT TO IV TEAM  IP CONSULT TO IV TEAM  IP CONSULT TO GENERAL SURGERY  IP CONSULT TO [address] Course:   Patient Rea Rinne is a 67 y.o. presented with Nausea [R11.0]  ESRD on hemodialysis ([facility] [N18.6, Z99.2]  End-stage renal disease needing dialysis ([facility] [N18.6, Z99.2]  ESRD needing dialysis ([facility] [N18.6, Z99.2]  Diarrhea, unspecified type [R19.7]    Mr Margarita Ambrose is a 67year old man with hx of ESRD on HD, HTN, depression, hepatitis C and hypothyroidism    He initially presented to hospital re: general malaise, weakness and nausea with significant watery diarrhea. Was concerned re: volume status and not able to tolerate HD. Nephrology consulted to manage HD  Stool studies obtained to rule out infectious etiology of diarrhea. All stool studies were negative and was felt that he had a viral gastroenteritis. As he proceeded with HD sessions, was noted to have daily afternoon fevers for which ID team was consulted. Concern was that he possibly had a CLABSI r/t HD catheter. Initial blood cultures were negative, however after continuing to have fevers a repeat set was obtained that resulted positive for Citrobacter. He was started on Cefepime IV and surgery consulted to [facility]  He then had a temporary femoral line placed and was able to proceed with HD. Plan is for discharge to LTAC to continue HD and also to receive IV Antibiotic therapy -> changed to merrem daily for next 7 days. Will need follow up to replace dialysis catheter when completed antibiotics and bacteremia cleared      Discharge Exam:    General Appearance: alert and oriented to person, place and time and in no acute distress  Skin: warm and dry.  Right chest dressing C/D/I  Right fem HD line used for HD today and functioned well  Head: normocephalic and atraumatic  Eyes: pupils equal, round, and reactive to light, extraocular eye movements intact, conjunctivae normal  Neck: neck supple and non tender without mass   Pulmonary/Chest: clear to auscultation bilaterally- no wheezes, rales or rhonchi, normal air movement, no respiratory distress  Cardiovascular: normal rate, normal S1 and S2 and no carotid bruits  Abdomen: soft, non-tender, non-distended, normal bowel sounds, no masses or organomegaly  Extremities: no cyanosis, no clubbing and no edema  Neurologic: no cranial nerve deficit and speech normal    I/O last 3 completed shifts: In: 289.8 [P.O.:110; I.V.:150; IV Piggyback:29.8]  Out: -   I/O this shift: In: 707.4 [P.O.:360; IV Piggyback:47.4]  Out: 500       LABS:  Recent Labs     02/28/23  0534 03/01/23  0350 03/02/23  0250    133 133   K 4.4 4.1 4.6    97* 99   CO2 23 27 23   BUN 45* 29* 39*   CREATININE 7.4* 5.6* 6.8*   GLUCOSE 93 90 90   CALCIUM 7.7* 7.5* 7.8*       Recent Labs     02/27/23  1715 02/28/23  0534 03/01/23  0350   WBC 2.8* 3.3* 10.6   RBC 3.73* 3.95 3.45*   HGB 11.4* 11.9* 10.4*   HCT 34.3* 36.8* 32.6*   MCV 92.0 93.2 94.5   MCH 30.6 30.1 30.1   MCHC 33.2 32.3 31.9*   RDW 13.7 13.7 14.1   * 138 130   MPV 11.1 11.1 11.2       No results for input(s): POCGLU in the last 72 hours. Imaging:  CT ABDOMEN PELVIS WO CONTRAST Additional Contrast? None    Result Date: 2/22/2023  EXAMINATION: CT OF THE ABDOMEN AND PELVIS WITHOUT CONTRAST 2/22/2023 3:07 pm TECHNIQUE: CT of the abdomen and pelvis was performed without the administration of intravenous contrast. Multiplanar reformatted images are provided for review. Automated exposure control, iterative reconstruction, and/or weight based adjustment of the mA/kV was utilized to reduce the radiation dose to as low as reasonably achievable. COMPARISON: None.  HISTORY: ORDERING SYSTEM PROVIDED HISTORY: abdominal pain diarrhea TECHNOLOGIST PROVIDED HISTORY: Reason for exam:->abdominal pain diarrhea Additional Contrast?->None Decision Support Exception - unselect if not a suspected or confirmed emergency medical condition->Emergency Medical Condition (MA) FINDINGS: Lower Chest: No infiltrates or pleural effusion. Organs: No focal liver lesions. Gallbladder is present with no calcified stones. Spleen is not enlarged. Pancreas and adrenal glands appear unremarkable. The kidneys are atrophic with bilateral nephrocalcinosis. GI/Bowel: There are no obstructing or constricting lesions. Pelvis: Bladder is suboptimally distended. The prostate gland is enlarged. There is no significant free fluid. Peritoneum/Retroperitoneum: No free air or significant adenopathy. Bones/Soft Tissues: Degenerative changes of the lumbar spine most advanced at L3-L4 and L4-L5. No acute inflammatory process in the abdomen and pelvis. Atrophic kidneys with nephrocalcinosis. XR CHEST (2 VW)    Result Date: 2/22/2023  EXAMINATION: TWO XRAY VIEWS OF THE CHEST 2/22/2023 3:26 pm COMPARISON: /16/22. HISTORY: ORDERING SYSTEM PROVIDED HISTORY: chest pain TECHNOLOGIST PROVIDED HISTORY: Reason for exam:->chest pain FINDINGS: The lungs are without acute focal process. There is no effusion or pneumothorax. The cardiomediastinal silhouette is without acute process. The osseous structures are without acute process. Right-sided central line tip in the distal SVC. No acute process. Patient Instructions:      Medication List        START taking these medications      isosorbide dinitrate 20 MG tablet  Commonly known as: ISORDIL  Take 1 tablet by mouth 3 times daily     meropenem  infusion  Commonly known as: MERREM  Infuse 1,000 mg intravenously every 24 hours for 6 days Compound per protocol.   Start taking on: March 3, 2023     sertraline 50 MG tablet  Commonly known as: ZOLOFT  Take 1 tablet by mouth daily            CHANGE how you take these medications      bumetanide 2 MG tablet  Commonly known as: BUMEX  Take 1 tablet by mouth 2 times daily  What changed: when to take this            CONTINUE taking these medications      amLODIPine 5 MG tablet  Commonly known as: NORVASC  Take 1 tablet by mouth nightly     aspirin 81 MG EC tablet  Take 1 tablet by mouth daily  Start taking on: March 3, 2023     atorvastatin 40 MG tablet  Commonly known as: LIPITOR     carvedilol 25 MG tablet  Commonly known as: COREG     heparin (porcine) 91736 UNIT/ML injection  Inject 0.5 mLs into the skin in the morning and 0.5 mLs at noon and 0.5 mLs before bedtime. levothyroxine 100 MCG tablet  Commonly known as: SYNTHROID  Take 1 tablet by mouth Daily     mirtazapine 15 MG tablet  Commonly known as: REMERON     sacubitril-valsartan  MG per tablet  Commonly known as: ENTRESTO  Take 1 tablet by mouth 2 times daily     vitamin D 1.25 MG (82947 UT) Caps capsule  Commonly known as: ERGOCALCIFEROL               Where to Get Your Medications        Information about where to get these medications is not yet available    Ask your nurse or doctor about these medications  aspirin 81 MG EC tablet  bumetanide 2 MG tablet  meropenem  infusion           Note that more than 30 minutes was spent in preparing discharge papers, discussing discharge with patient, medication review, etc.    Signed:  Electronically signed by KIANNA Sanchez CNP on 3/2/2023 at 1:43 PM      Addendum: I have personally participated in the history, exam, medical decision making with  Poornima Gan NP  on the date of service and I agree with all of the pertinent clinical information unless otherwise noted. I have also reviewed and agree with the past medical, family, and social history unless otherwise noted. Patient was admitted with central line associated bloodstream infection.     PHYSICAL EXAM:  Vitals:  BP (!) 150/86   Pulse 65   Temp 98.2 °F (36.8 °C) (Oral)   Resp 18   Ht 6' 2\" (1.88 m)   Wt 145 lb 8.1 oz (66 kg)   SpO2 98%   BMI 18.68 kg/m²   Gen: awake, alert, NAD  Lungs: clear to auscultation bilaterally no crackles no wheezing. Heart: RRR, no murmur   Abdomen: soft nontender nondistended positive bowel sounds. Extremities: full range of motion no peripheral edema. Impression:  Principal Problem:    ESRD needing dialysis ([address]  Active Problems:    ESRD on hemodialysis ([address]    Diarrhea    Primary hypertension    ESRD (end stage renal disease) ([address]    Depression    Hyponatremia    Bacteremia  Resolved Problems:    * No resolved hospital problems. *      My findings/plan include:  Patient was admitted with central line associated bloodstream infection. Infectious diseases was consulted. Infected line was removed and temporary HD line was placed. Discharge planning has secured a place for him at [facility]. He will continue therapy with IV meropenem daily for the next 7 days per infectious diseases. We will need to follow-up to replace dialysis catheter when antibiotics are completed and bacteremia is cleared. Follow-up with LTAC physicians. NOTE: This report was transcribed using voice recognition software. Every effort was made to ensure accuracy; however, inadvertent computerized transcription errors may be present.   Electronically signed by Luma Garces MD on 3/2/2023 at 2:24 PM

## 2023-03-04 LAB
CULTURE CATHETER TIP: ABNORMAL
CULTURE CATHETER TIP: ABNORMAL
ORGANISM: ABNORMAL
ORGANISM: ABNORMAL

## 2023-03-06 LAB
BLOOD CULTURE, ROUTINE: NORMAL
CULTURE, BLOOD 2: NORMAL

## 2023-05-02 ENCOUNTER — HOSPITAL ENCOUNTER (OUTPATIENT)
Age: 73
Setting detail: OBSERVATION
Discharge: OTHER INSTITUTION | End: 2023-05-07
Attending: EMERGENCY MEDICINE | Admitting: INTERNAL MEDICINE
Payer: MEDICARE

## 2023-05-02 ENCOUNTER — APPOINTMENT (OUTPATIENT)
Dept: GENERAL RADIOLOGY | Age: 73
End: 2023-05-02
Payer: MEDICARE

## 2023-05-02 ENCOUNTER — APPOINTMENT (OUTPATIENT)
Dept: CT IMAGING | Age: 73
End: 2023-05-02
Payer: MEDICARE

## 2023-05-02 DIAGNOSIS — R41.82 ALTERED MENTAL STATUS, UNSPECIFIED ALTERED MENTAL STATUS TYPE: Primary | ICD-10-CM

## 2023-05-02 LAB
ALBUMIN SERPL-MCNC: 4.3 G/DL (ref 3.5–5.2)
ALP SERPL-CCNC: 123 U/L (ref 40–129)
ALT SERPL-CCNC: 15 U/L (ref 0–40)
ANION GAP SERPL CALCULATED.3IONS-SCNC: 13 MMOL/L (ref 7–16)
ANISOCYTOSIS: ABNORMAL
AST SERPL-CCNC: 19 U/L (ref 0–39)
BASOPHILS # BLD: 0 E9/L (ref 0–0.2)
BASOPHILS NFR BLD: 0.5 % (ref 0–2)
BILIRUB SERPL-MCNC: 0.9 MG/DL (ref 0–1.2)
BUN SERPL-MCNC: 31 MG/DL (ref 6–23)
BURR CELLS: ABNORMAL
CALCIUM SERPL-MCNC: 10 MG/DL (ref 8.6–10.2)
CHLORIDE SERPL-SCNC: 105 MMOL/L (ref 98–107)
CO2 SERPL-SCNC: 22 MMOL/L (ref 22–29)
CREAT SERPL-MCNC: 7.4 MG/DL (ref 0.7–1.2)
EKG ATRIAL RATE: 66 BPM
EKG P AXIS: 72 DEGREES
EKG P-R INTERVAL: 176 MS
EKG Q-T INTERVAL: 434 MS
EKG QRS DURATION: 90 MS
EKG QTC CALCULATION (BAZETT): 454 MS
EKG R AXIS: -69 DEGREES
EKG T AXIS: 90 DEGREES
EKG VENTRICULAR RATE: 66 BPM
EOSINOPHIL # BLD: 0 E9/L (ref 0.05–0.5)
EOSINOPHIL NFR BLD: 0.9 % (ref 0–6)
ERYTHROCYTE [DISTWIDTH] IN BLOOD BY AUTOMATED COUNT: 17.8 FL (ref 11.5–15)
GLUCOSE BLD-MCNC: 87 MG/DL
GLUCOSE SERPL-MCNC: 114 MG/DL (ref 74–99)
HCT VFR BLD AUTO: 45.1 % (ref 37–54)
HGB BLD-MCNC: 14.4 G/DL (ref 12.5–16.5)
LACTATE BLDV-SCNC: 0.9 MMOL/L (ref 0.5–2.2)
LYMPHOCYTES # BLD: 0.4 E9/L (ref 1.5–4)
LYMPHOCYTES NFR BLD: 8.7 % (ref 20–42)
MCH RBC QN AUTO: 30.5 PG (ref 26–35)
MCHC RBC AUTO-ENTMCNC: 31.9 % (ref 32–34.5)
MCV RBC AUTO: 95.6 FL (ref 80–99.9)
METER GLUCOSE: 87 MG/DL (ref 74–99)
MONOCYTES # BLD: 0.53 E9/L (ref 0.1–0.95)
MONOCYTES NFR BLD: 12.2 % (ref 2–12)
NEUTROPHILS # BLD: 3.48 E9/L (ref 1.8–7.3)
NEUTS SEG NFR BLD: 79.1 % (ref 43–80)
OVALOCYTES: ABNORMAL
PLATELET # BLD AUTO: 128 E9/L (ref 130–450)
PMV BLD AUTO: 10.1 FL (ref 7–12)
POIKILOCYTES: ABNORMAL
POLYCHROMASIA: ABNORMAL
POTASSIUM SERPL-SCNC: 4.3 MMOL/L (ref 3.5–5)
PROT SERPL-MCNC: 8.2 G/DL (ref 6.4–8.3)
RBC # BLD AUTO: 4.72 E12/L (ref 3.8–5.8)
SODIUM SERPL-SCNC: 140 MMOL/L (ref 132–146)
TEAR DROP CELLS: ABNORMAL
TROPONIN, HIGH SENSITIVITY: 1093 NG/L (ref 0–11)
TROPONIN, HIGH SENSITIVITY: 1155 NG/L (ref 0–11)
WBC # BLD: 4.4 E9/L (ref 4.5–11.5)

## 2023-05-02 PROCEDURE — 6370000000 HC RX 637 (ALT 250 FOR IP): Performed by: FAMILY MEDICINE

## 2023-05-02 PROCEDURE — 82962 GLUCOSE BLOOD TEST: CPT

## 2023-05-02 PROCEDURE — 96376 TX/PRO/DX INJ SAME DRUG ADON: CPT

## 2023-05-02 PROCEDURE — 80053 COMPREHEN METABOLIC PANEL: CPT

## 2023-05-02 PROCEDURE — 99285 EMERGENCY DEPT VISIT HI MDM: CPT

## 2023-05-02 PROCEDURE — G0378 HOSPITAL OBSERVATION PER HR: HCPCS

## 2023-05-02 PROCEDURE — 93005 ELECTROCARDIOGRAM TRACING: CPT | Performed by: EMERGENCY MEDICINE

## 2023-05-02 PROCEDURE — 70450 CT HEAD/BRAIN W/O DYE: CPT

## 2023-05-02 PROCEDURE — 83605 ASSAY OF LACTIC ACID: CPT

## 2023-05-02 PROCEDURE — 96375 TX/PRO/DX INJ NEW DRUG ADDON: CPT

## 2023-05-02 PROCEDURE — 6360000002 HC RX W HCPCS: Performed by: EMERGENCY MEDICINE

## 2023-05-02 PROCEDURE — 84484 ASSAY OF TROPONIN QUANT: CPT

## 2023-05-02 PROCEDURE — 93010 ELECTROCARDIOGRAM REPORT: CPT | Performed by: INTERNAL MEDICINE

## 2023-05-02 PROCEDURE — 6360000002 HC RX W HCPCS

## 2023-05-02 PROCEDURE — 2580000003 HC RX 258: Performed by: FAMILY MEDICINE

## 2023-05-02 PROCEDURE — 93005 ELECTROCARDIOGRAM TRACING: CPT | Performed by: FAMILY MEDICINE

## 2023-05-02 PROCEDURE — 85025 COMPLETE CBC W/AUTO DIFF WBC: CPT

## 2023-05-02 PROCEDURE — 71045 X-RAY EXAM CHEST 1 VIEW: CPT

## 2023-05-02 PROCEDURE — 6360000002 HC RX W HCPCS: Performed by: FAMILY MEDICINE

## 2023-05-02 PROCEDURE — 96374 THER/PROPH/DIAG INJ IV PUSH: CPT

## 2023-05-02 RX ORDER — HYDRALAZINE HYDROCHLORIDE 20 MG/ML
10 INJECTION INTRAMUSCULAR; INTRAVENOUS EVERY 6 HOURS PRN
Status: DISCONTINUED | OUTPATIENT
Start: 2023-05-02 | End: 2023-05-07 | Stop reason: HOSPADM

## 2023-05-02 RX ORDER — AMLODIPINE BESYLATE 5 MG/1
5 TABLET ORAL NIGHTLY
Status: DISCONTINUED | OUTPATIENT
Start: 2023-05-02 | End: 2023-05-03

## 2023-05-02 RX ORDER — MIRTAZAPINE 15 MG/1
7.5 TABLET, FILM COATED ORAL NIGHTLY
Status: DISCONTINUED | OUTPATIENT
Start: 2023-05-02 | End: 2023-05-07 | Stop reason: HOSPADM

## 2023-05-02 RX ORDER — SODIUM CHLORIDE 0.9 % (FLUSH) 0.9 %
5-40 SYRINGE (ML) INJECTION EVERY 12 HOURS SCHEDULED
Status: DISCONTINUED | OUTPATIENT
Start: 2023-05-02 | End: 2023-05-07 | Stop reason: HOSPADM

## 2023-05-02 RX ORDER — ASPIRIN 81 MG/1
81 TABLET ORAL DAILY
Status: DISCONTINUED | OUTPATIENT
Start: 2023-05-02 | End: 2023-05-07 | Stop reason: HOSPADM

## 2023-05-02 RX ORDER — ACETAMINOPHEN 325 MG/1
650 TABLET ORAL EVERY 6 HOURS PRN
Status: DISCONTINUED | OUTPATIENT
Start: 2023-05-02 | End: 2023-05-07 | Stop reason: HOSPADM

## 2023-05-02 RX ORDER — SODIUM CHLORIDE 9 MG/ML
INJECTION, SOLUTION INTRAVENOUS PRN
Status: DISCONTINUED | OUTPATIENT
Start: 2023-05-02 | End: 2023-05-07 | Stop reason: HOSPADM

## 2023-05-02 RX ORDER — HEPARIN SODIUM 10000 [USP'U]/ML
5000 INJECTION, SOLUTION INTRAVENOUS; SUBCUTANEOUS EVERY 8 HOURS
Status: DISCONTINUED | OUTPATIENT
Start: 2023-05-02 | End: 2023-05-07 | Stop reason: HOSPADM

## 2023-05-02 RX ORDER — LEVOTHYROXINE SODIUM 0.05 MG/1
100 TABLET ORAL DAILY
Status: DISCONTINUED | OUTPATIENT
Start: 2023-05-02 | End: 2023-05-03

## 2023-05-02 RX ORDER — CARVEDILOL 25 MG/1
25 TABLET ORAL 2 TIMES DAILY WITH MEALS
Status: DISCONTINUED | OUTPATIENT
Start: 2023-05-02 | End: 2023-05-07 | Stop reason: HOSPADM

## 2023-05-02 RX ORDER — BUMETANIDE 1 MG/1
2 TABLET ORAL 2 TIMES DAILY
Status: DISCONTINUED | OUTPATIENT
Start: 2023-05-02 | End: 2023-05-07 | Stop reason: HOSPADM

## 2023-05-02 RX ORDER — ONDANSETRON 2 MG/ML
4 INJECTION INTRAMUSCULAR; INTRAVENOUS EVERY 6 HOURS PRN
Status: DISCONTINUED | OUTPATIENT
Start: 2023-05-02 | End: 2023-05-07 | Stop reason: HOSPADM

## 2023-05-02 RX ORDER — ENOXAPARIN SODIUM 100 MG/ML
40 INJECTION SUBCUTANEOUS DAILY
Status: DISCONTINUED | OUTPATIENT
Start: 2023-05-02 | End: 2023-05-02 | Stop reason: ALTCHOICE

## 2023-05-02 RX ORDER — HYDRALAZINE HYDROCHLORIDE 20 MG/ML
10 INJECTION INTRAMUSCULAR; INTRAVENOUS ONCE
Status: COMPLETED | OUTPATIENT
Start: 2023-05-02 | End: 2023-05-02

## 2023-05-02 RX ORDER — ATORVASTATIN CALCIUM 40 MG/1
40 TABLET, FILM COATED ORAL DAILY
Status: DISCONTINUED | OUTPATIENT
Start: 2023-05-02 | End: 2023-05-07 | Stop reason: HOSPADM

## 2023-05-02 RX ORDER — ACETAMINOPHEN 650 MG/1
650 SUPPOSITORY RECTAL EVERY 6 HOURS PRN
Status: DISCONTINUED | OUTPATIENT
Start: 2023-05-02 | End: 2023-05-07 | Stop reason: HOSPADM

## 2023-05-02 RX ORDER — ONDANSETRON 4 MG/1
4 TABLET, ORALLY DISINTEGRATING ORAL EVERY 8 HOURS PRN
Status: DISCONTINUED | OUTPATIENT
Start: 2023-05-02 | End: 2023-05-07 | Stop reason: HOSPADM

## 2023-05-02 RX ORDER — SODIUM CHLORIDE 0.9 % (FLUSH) 0.9 %
10 SYRINGE (ML) INJECTION PRN
Status: DISCONTINUED | OUTPATIENT
Start: 2023-05-02 | End: 2023-05-07 | Stop reason: HOSPADM

## 2023-05-02 RX ORDER — LEVETIRACETAM 500 MG/5ML
1000 INJECTION, SOLUTION, CONCENTRATE INTRAVENOUS ONCE
Status: COMPLETED | OUTPATIENT
Start: 2023-05-02 | End: 2023-05-02

## 2023-05-02 RX ADMIN — HYDRALAZINE HYDROCHLORIDE 10 MG: 20 INJECTION INTRAMUSCULAR; INTRAVENOUS at 14:26

## 2023-05-02 RX ADMIN — SACUBITRIL AND VALSARTAN 1 TABLET: 97; 103 TABLET, FILM COATED ORAL at 19:54

## 2023-05-02 RX ADMIN — MIRTAZAPINE 7.5 MG: 15 TABLET, FILM COATED ORAL at 19:54

## 2023-05-02 RX ADMIN — HYDRALAZINE HYDROCHLORIDE 10 MG: 20 INJECTION INTRAMUSCULAR; INTRAVENOUS at 19:54

## 2023-05-02 RX ADMIN — BUMETANIDE 2 MG: 1 TABLET ORAL at 18:40

## 2023-05-02 RX ADMIN — LEVETIRACETAM 1000 MG: 100 INJECTION INTRAVENOUS at 15:07

## 2023-05-02 RX ADMIN — CARVEDILOL 25 MG: 25 TABLET, FILM COATED ORAL at 18:40

## 2023-05-02 RX ADMIN — SODIUM CHLORIDE, PRESERVATIVE FREE 10 ML: 5 INJECTION INTRAVENOUS at 19:55

## 2023-05-02 RX ADMIN — ASPIRIN 81 MG: 81 TABLET, COATED ORAL at 18:40

## 2023-05-02 RX ADMIN — ATORVASTATIN CALCIUM 40 MG: 40 TABLET, FILM COATED ORAL at 18:40

## 2023-05-02 RX ADMIN — HYDRALAZINE HYDROCHLORIDE 10 MG: 20 INJECTION INTRAMUSCULAR; INTRAVENOUS at 13:13

## 2023-05-02 RX ADMIN — AMLODIPINE BESYLATE 5 MG: 5 TABLET ORAL at 19:54

## 2023-05-02 ASSESSMENT — ENCOUNTER SYMPTOMS
COUGH: 0
CHOKING: 0
ABDOMINAL DISTENTION: 0
NAUSEA: 0
CONSTIPATION: 0
SHORTNESS OF BREATH: 0
APNEA: 0
DIARRHEA: 0
BLOOD IN STOOL: 0

## 2023-05-02 ASSESSMENT — PAIN - FUNCTIONAL ASSESSMENT: PAIN_FUNCTIONAL_ASSESSMENT: NONE - DENIES PAIN

## 2023-05-03 ENCOUNTER — APPOINTMENT (OUTPATIENT)
Dept: NEUROLOGY | Age: 73
End: 2023-05-03
Payer: MEDICARE

## 2023-05-03 PROBLEM — N18.6 ESRD (END STAGE RENAL DISEASE) (HCC): Status: ACTIVE | Noted: 2023-05-03

## 2023-05-03 LAB
ANION GAP SERPL CALCULATED.3IONS-SCNC: 19 MMOL/L (ref 7–16)
BUN SERPL-MCNC: 33 MG/DL (ref 6–23)
CALCIUM SERPL-MCNC: 10 MG/DL (ref 8.6–10.2)
CHLORIDE SERPL-SCNC: 107 MMOL/L (ref 98–107)
CHOLESTEROL, TOTAL: 142 MG/DL (ref 0–199)
CK SERPL-CCNC: 424 U/L (ref 20–200)
CO2 SERPL-SCNC: 16 MMOL/L (ref 22–29)
CREAT SERPL-MCNC: 8.3 MG/DL (ref 0.7–1.2)
EKG ATRIAL RATE: 70 BPM
EKG ATRIAL RATE: 72 BPM
EKG P AXIS: 56 DEGREES
EKG P AXIS: 70 DEGREES
EKG P-R INTERVAL: 170 MS
EKG P-R INTERVAL: 178 MS
EKG Q-T INTERVAL: 426 MS
EKG Q-T INTERVAL: 440 MS
EKG QRS DURATION: 78 MS
EKG QRS DURATION: 84 MS
EKG QTC CALCULATION (BAZETT): 466 MS
EKG QTC CALCULATION (BAZETT): 475 MS
EKG R AXIS: -51 DEGREES
EKG R AXIS: -54 DEGREES
EKG T AXIS: 78 DEGREES
EKG T AXIS: 87 DEGREES
EKG VENTRICULAR RATE: 70 BPM
EKG VENTRICULAR RATE: 72 BPM
ERYTHROCYTE [DISTWIDTH] IN BLOOD BY AUTOMATED COUNT: 18.7 FL (ref 11.5–15)
GLUCOSE SERPL-MCNC: 91 MG/DL (ref 74–99)
HBA1C MFR BLD: 4.4 % (ref 4–5.6)
HCT VFR BLD AUTO: 46.6 % (ref 37–54)
HDLC SERPL-MCNC: 62 MG/DL
HGB BLD-MCNC: 14.6 G/DL (ref 12.5–16.5)
LDLC SERPL CALC-MCNC: 65 MG/DL (ref 0–99)
MCH RBC QN AUTO: 30 PG (ref 26–35)
MCHC RBC AUTO-ENTMCNC: 31.3 % (ref 32–34.5)
MCV RBC AUTO: 95.9 FL (ref 80–99.9)
PLATELET # BLD AUTO: 136 E9/L (ref 130–450)
PMV BLD AUTO: 10.6 FL (ref 7–12)
POTASSIUM SERPL-SCNC: 4.7 MMOL/L (ref 3.5–5)
RBC # BLD AUTO: 4.86 E12/L (ref 3.8–5.8)
SODIUM SERPL-SCNC: 142 MMOL/L (ref 132–146)
T4 FREE SERPL-MCNC: 1.05 NG/DL (ref 0.93–1.7)
TRIGL SERPL-MCNC: 76 MG/DL (ref 0–149)
TROPONIN, HIGH SENSITIVITY: 1093 NG/L (ref 0–11)
TSH SERPL-MCNC: 12.71 UIU/ML (ref 0.27–4.2)
VLDLC SERPL CALC-MCNC: 15 MG/DL
WBC # BLD: 3.6 E9/L (ref 4.5–11.5)

## 2023-05-03 PROCEDURE — 80061 LIPID PANEL: CPT

## 2023-05-03 PROCEDURE — 84484 ASSAY OF TROPONIN QUANT: CPT

## 2023-05-03 PROCEDURE — G0378 HOSPITAL OBSERVATION PER HR: HCPCS

## 2023-05-03 PROCEDURE — 99222 1ST HOSP IP/OBS MODERATE 55: CPT | Performed by: INTERNAL MEDICINE

## 2023-05-03 PROCEDURE — 6370000000 HC RX 637 (ALT 250 FOR IP): Performed by: INTERNAL MEDICINE

## 2023-05-03 PROCEDURE — 6370000000 HC RX 637 (ALT 250 FOR IP): Performed by: NURSE PRACTITIONER

## 2023-05-03 PROCEDURE — 80048 BASIC METABOLIC PNL TOTAL CA: CPT

## 2023-05-03 PROCEDURE — 83036 HEMOGLOBIN GLYCOSYLATED A1C: CPT

## 2023-05-03 PROCEDURE — 84439 ASSAY OF FREE THYROXINE: CPT

## 2023-05-03 PROCEDURE — APPSS60 APP SPLIT SHARED TIME 46-60 MINUTES: Performed by: NURSE PRACTITIONER

## 2023-05-03 PROCEDURE — 36415 COLL VENOUS BLD VENIPUNCTURE: CPT

## 2023-05-03 PROCEDURE — 6360000002 HC RX W HCPCS: Performed by: INTERNAL MEDICINE

## 2023-05-03 PROCEDURE — 6370000000 HC RX 637 (ALT 250 FOR IP): Performed by: FAMILY MEDICINE

## 2023-05-03 PROCEDURE — 90935 HEMODIALYSIS ONE EVALUATION: CPT

## 2023-05-03 PROCEDURE — 84443 ASSAY THYROID STIM HORMONE: CPT

## 2023-05-03 PROCEDURE — 82550 ASSAY OF CK (CPK): CPT

## 2023-05-03 PROCEDURE — 95816 EEG AWAKE AND DROWSY: CPT | Performed by: PSYCHIATRY & NEUROLOGY

## 2023-05-03 PROCEDURE — 95816 EEG AWAKE AND DROWSY: CPT

## 2023-05-03 PROCEDURE — 85027 COMPLETE CBC AUTOMATED: CPT

## 2023-05-03 PROCEDURE — 96372 THER/PROPH/DIAG INJ SC/IM: CPT

## 2023-05-03 PROCEDURE — 2580000003 HC RX 258: Performed by: FAMILY MEDICINE

## 2023-05-03 PROCEDURE — 93010 ELECTROCARDIOGRAM REPORT: CPT | Performed by: INTERNAL MEDICINE

## 2023-05-03 RX ORDER — HYDRALAZINE HYDROCHLORIDE 25 MG/1
25 TABLET, FILM COATED ORAL EVERY 8 HOURS SCHEDULED
Status: DISCONTINUED | OUTPATIENT
Start: 2023-05-03 | End: 2023-05-03

## 2023-05-03 RX ORDER — ISOSORBIDE DINITRATE 10 MG/1
10 TABLET ORAL 3 TIMES DAILY
Status: DISCONTINUED | OUTPATIENT
Start: 2023-05-03 | End: 2023-05-07 | Stop reason: HOSPADM

## 2023-05-03 RX ORDER — DEXTROSE MONOHYDRATE 100 MG/ML
INJECTION, SOLUTION INTRAVENOUS CONTINUOUS PRN
Status: DISCONTINUED | OUTPATIENT
Start: 2023-05-03 | End: 2023-05-07 | Stop reason: HOSPADM

## 2023-05-03 RX ORDER — DEXTROSE AND SODIUM CHLORIDE 5; .9 G/100ML; G/100ML
INJECTION, SOLUTION INTRAVENOUS CONTINUOUS
Status: DISCONTINUED | OUTPATIENT
Start: 2023-05-03 | End: 2023-05-07 | Stop reason: HOSPADM

## 2023-05-03 RX ORDER — HYDRALAZINE HYDROCHLORIDE 50 MG/1
50 TABLET, FILM COATED ORAL EVERY 8 HOURS SCHEDULED
Status: DISCONTINUED | OUTPATIENT
Start: 2023-05-03 | End: 2023-05-05

## 2023-05-03 RX ORDER — DOCUSATE SODIUM 100 MG/1
100 CAPSULE, LIQUID FILLED ORAL NIGHTLY
Status: DISCONTINUED | OUTPATIENT
Start: 2023-05-03 | End: 2023-05-07 | Stop reason: HOSPADM

## 2023-05-03 RX ORDER — LEVETIRACETAM 250 MG/1
250 TABLET ORAL 2 TIMES DAILY
Status: DISCONTINUED | OUTPATIENT
Start: 2023-05-03 | End: 2023-05-07 | Stop reason: HOSPADM

## 2023-05-03 RX ORDER — ISOSORBIDE DINITRATE 10 MG/1
5 TABLET ORAL 3 TIMES DAILY
Status: DISCONTINUED | OUTPATIENT
Start: 2023-05-03 | End: 2023-05-03

## 2023-05-03 RX ORDER — INSULIN LISPRO 100 [IU]/ML
0-10 INJECTION, SOLUTION INTRAVENOUS; SUBCUTANEOUS
Status: DISCONTINUED | OUTPATIENT
Start: 2023-05-03 | End: 2023-05-07 | Stop reason: HOSPADM

## 2023-05-03 RX ORDER — LORAZEPAM 2 MG/ML
1 INJECTION INTRAMUSCULAR
Status: ACTIVE | OUTPATIENT
Start: 2023-05-03 | End: 2023-05-04

## 2023-05-03 RX ORDER — HALOPERIDOL 5 MG/ML
1 INJECTION INTRAMUSCULAR EVERY 6 HOURS PRN
Status: DISCONTINUED | OUTPATIENT
Start: 2023-05-03 | End: 2023-05-07 | Stop reason: HOSPADM

## 2023-05-03 RX ADMIN — SACUBITRIL AND VALSARTAN 1 TABLET: 97; 103 TABLET, FILM COATED ORAL at 20:17

## 2023-05-03 RX ADMIN — LEVETIRACETAM 250 MG: 250 TABLET, FILM COATED ORAL at 20:16

## 2023-05-03 RX ADMIN — HALOPERIDOL LACTATE 1 MG: 5 INJECTION, SOLUTION INTRAMUSCULAR at 21:46

## 2023-05-03 RX ADMIN — ASPIRIN 81 MG: 81 TABLET, COATED ORAL at 13:41

## 2023-05-03 RX ADMIN — SODIUM CHLORIDE, PRESERVATIVE FREE 10 ML: 5 INJECTION INTRAVENOUS at 13:47

## 2023-05-03 RX ADMIN — SACUBITRIL AND VALSARTAN 1 TABLET: 97; 103 TABLET, FILM COATED ORAL at 13:46

## 2023-05-03 RX ADMIN — ISOSORBIDE DINITRATE 10 MG: 10 TABLET ORAL at 20:16

## 2023-05-03 RX ADMIN — MIRTAZAPINE 7.5 MG: 15 TABLET, FILM COATED ORAL at 20:17

## 2023-05-03 RX ADMIN — CARVEDILOL 25 MG: 25 TABLET, FILM COATED ORAL at 13:39

## 2023-05-03 RX ADMIN — ISOSORBIDE DINITRATE 5 MG: 10 TABLET ORAL at 13:39

## 2023-05-04 LAB
ANION GAP SERPL CALCULATED.3IONS-SCNC: 15 MMOL/L (ref 7–16)
BUN SERPL-MCNC: 19 MG/DL (ref 6–23)
CALCIUM SERPL-MCNC: 9.2 MG/DL (ref 8.6–10.2)
CHLORIDE SERPL-SCNC: 101 MMOL/L (ref 98–107)
CO2 SERPL-SCNC: 23 MMOL/L (ref 22–29)
CREAT SERPL-MCNC: 5.9 MG/DL (ref 0.7–1.2)
GLUCOSE SERPL-MCNC: 72 MG/DL (ref 74–99)
METER GLUCOSE: 108 MG/DL (ref 74–99)
METER GLUCOSE: 76 MG/DL (ref 74–99)
METER GLUCOSE: 78 MG/DL (ref 74–99)
METER GLUCOSE: 88 MG/DL (ref 74–99)
POTASSIUM SERPL-SCNC: 4.3 MMOL/L (ref 3.5–5)
SODIUM SERPL-SCNC: 139 MMOL/L (ref 132–146)

## 2023-05-04 PROCEDURE — 80048 BASIC METABOLIC PNL TOTAL CA: CPT

## 2023-05-04 PROCEDURE — 36415 COLL VENOUS BLD VENIPUNCTURE: CPT

## 2023-05-04 PROCEDURE — 6370000000 HC RX 637 (ALT 250 FOR IP): Performed by: INTERNAL MEDICINE

## 2023-05-04 PROCEDURE — 6370000000 HC RX 637 (ALT 250 FOR IP): Performed by: FAMILY MEDICINE

## 2023-05-04 PROCEDURE — 99222 1ST HOSP IP/OBS MODERATE 55: CPT | Performed by: PSYCHIATRY & NEUROLOGY

## 2023-05-04 PROCEDURE — 99232 SBSQ HOSP IP/OBS MODERATE 35: CPT | Performed by: INTERNAL MEDICINE

## 2023-05-04 PROCEDURE — G0378 HOSPITAL OBSERVATION PER HR: HCPCS

## 2023-05-04 PROCEDURE — 82962 GLUCOSE BLOOD TEST: CPT

## 2023-05-04 RX ORDER — AMLODIPINE BESYLATE 5 MG/1
5 TABLET ORAL DAILY
Status: DISCONTINUED | OUTPATIENT
Start: 2023-05-04 | End: 2023-05-07 | Stop reason: HOSPADM

## 2023-05-04 RX ADMIN — MIRTAZAPINE 7.5 MG: 15 TABLET, FILM COATED ORAL at 19:40

## 2023-05-04 RX ADMIN — ISOSORBIDE DINITRATE 10 MG: 10 TABLET ORAL at 08:34

## 2023-05-04 RX ADMIN — LEVETIRACETAM 250 MG: 250 TABLET, FILM COATED ORAL at 19:40

## 2023-05-04 RX ADMIN — CARVEDILOL 25 MG: 25 TABLET, FILM COATED ORAL at 08:34

## 2023-05-04 RX ADMIN — SACUBITRIL AND VALSARTAN 1 TABLET: 97; 103 TABLET, FILM COATED ORAL at 08:35

## 2023-05-04 RX ADMIN — ISOSORBIDE DINITRATE 10 MG: 10 TABLET ORAL at 19:40

## 2023-05-04 RX ADMIN — ASPIRIN 81 MG: 81 TABLET, COATED ORAL at 08:34

## 2023-05-04 RX ADMIN — LEVETIRACETAM 250 MG: 250 TABLET, FILM COATED ORAL at 08:37

## 2023-05-04 RX ADMIN — AMLODIPINE BESYLATE 5 MG: 5 TABLET ORAL at 08:33

## 2023-05-04 RX ADMIN — ATORVASTATIN CALCIUM 40 MG: 40 TABLET, FILM COATED ORAL at 08:37

## 2023-05-04 RX ADMIN — SACUBITRIL AND VALSARTAN 1 TABLET: 97; 103 TABLET, FILM COATED ORAL at 19:40

## 2023-05-05 ENCOUNTER — APPOINTMENT (OUTPATIENT)
Dept: MRI IMAGING | Age: 73
End: 2023-05-05
Payer: MEDICARE

## 2023-05-05 LAB
ANION GAP SERPL CALCULATED.3IONS-SCNC: 15 MMOL/L (ref 7–16)
BUN SERPL-MCNC: 30 MG/DL (ref 6–23)
CALCIUM SERPL-MCNC: 8.9 MG/DL (ref 8.6–10.2)
CHLORIDE SERPL-SCNC: 101 MMOL/L (ref 98–107)
CO2 SERPL-SCNC: 22 MMOL/L (ref 22–29)
CREAT SERPL-MCNC: 7.6 MG/DL (ref 0.7–1.2)
GLUCOSE SERPL-MCNC: 67 MG/DL (ref 74–99)
METER GLUCOSE: 130 MG/DL (ref 74–99)
METER GLUCOSE: 162 MG/DL (ref 74–99)
METER GLUCOSE: 62 MG/DL (ref 74–99)
METER GLUCOSE: 70 MG/DL (ref 74–99)
POTASSIUM SERPL-SCNC: 4.4 MMOL/L (ref 3.5–5)
SODIUM SERPL-SCNC: 138 MMOL/L (ref 132–146)

## 2023-05-05 PROCEDURE — 80048 BASIC METABOLIC PNL TOTAL CA: CPT

## 2023-05-05 PROCEDURE — 97165 OT EVAL LOW COMPLEX 30 MIN: CPT

## 2023-05-05 PROCEDURE — 82962 GLUCOSE BLOOD TEST: CPT

## 2023-05-05 PROCEDURE — G0378 HOSPITAL OBSERVATION PER HR: HCPCS

## 2023-05-05 PROCEDURE — 6370000000 HC RX 637 (ALT 250 FOR IP): Performed by: FAMILY MEDICINE

## 2023-05-05 PROCEDURE — 96372 THER/PROPH/DIAG INJ SC/IM: CPT

## 2023-05-05 PROCEDURE — 70551 MRI BRAIN STEM W/O DYE: CPT

## 2023-05-05 PROCEDURE — 97535 SELF CARE MNGMENT TRAINING: CPT

## 2023-05-05 PROCEDURE — 6370000000 HC RX 637 (ALT 250 FOR IP): Performed by: INTERNAL MEDICINE

## 2023-05-05 PROCEDURE — 97161 PT EVAL LOW COMPLEX 20 MIN: CPT

## 2023-05-05 PROCEDURE — 36415 COLL VENOUS BLD VENIPUNCTURE: CPT

## 2023-05-05 PROCEDURE — 99233 SBSQ HOSP IP/OBS HIGH 50: CPT | Performed by: NURSE PRACTITIONER

## 2023-05-05 PROCEDURE — 97530 THERAPEUTIC ACTIVITIES: CPT

## 2023-05-05 PROCEDURE — 6360000002 HC RX W HCPCS: Performed by: INTERNAL MEDICINE

## 2023-05-05 PROCEDURE — 90935 HEMODIALYSIS ONE EVALUATION: CPT

## 2023-05-05 RX ORDER — HYDRALAZINE HYDROCHLORIDE 50 MG/1
50 TABLET, FILM COATED ORAL EVERY 6 HOURS SCHEDULED
Status: DISCONTINUED | OUTPATIENT
Start: 2023-05-05 | End: 2023-05-07 | Stop reason: HOSPADM

## 2023-05-05 RX ADMIN — AMLODIPINE BESYLATE 5 MG: 5 TABLET ORAL at 09:58

## 2023-05-05 RX ADMIN — LEVETIRACETAM 250 MG: 250 TABLET, FILM COATED ORAL at 10:01

## 2023-05-05 RX ADMIN — SACUBITRIL AND VALSARTAN 1 TABLET: 97; 103 TABLET, FILM COATED ORAL at 10:00

## 2023-05-05 RX ADMIN — CARVEDILOL 25 MG: 25 TABLET, FILM COATED ORAL at 10:03

## 2023-05-05 RX ADMIN — ISOSORBIDE DINITRATE 10 MG: 10 TABLET ORAL at 18:07

## 2023-05-05 RX ADMIN — GLUCAGON 1 MG: KIT at 04:55

## 2023-05-05 RX ADMIN — SACUBITRIL AND VALSARTAN 1 TABLET: 97; 103 TABLET, FILM COATED ORAL at 21:31

## 2023-05-05 RX ADMIN — ASPIRIN 81 MG: 81 TABLET, COATED ORAL at 10:04

## 2023-05-05 RX ADMIN — CARVEDILOL 25 MG: 25 TABLET, FILM COATED ORAL at 18:07

## 2023-05-06 PROBLEM — G93.41 ACUTE METABOLIC ENCEPHALOPATHY: Status: ACTIVE | Noted: 2023-05-06

## 2023-05-06 LAB
ANION GAP SERPL CALCULATED.3IONS-SCNC: 13 MMOL/L (ref 7–16)
BUN SERPL-MCNC: 22 MG/DL (ref 6–23)
CALCIUM SERPL-MCNC: 8.7 MG/DL (ref 8.6–10.2)
CHLORIDE SERPL-SCNC: 98 MMOL/L (ref 98–107)
CO2 SERPL-SCNC: 24 MMOL/L (ref 22–29)
CREAT SERPL-MCNC: 5.8 MG/DL (ref 0.7–1.2)
GLUCOSE SERPL-MCNC: 66 MG/DL (ref 74–99)
METER GLUCOSE: 135 MG/DL (ref 74–99)
METER GLUCOSE: 183 MG/DL (ref 74–99)
METER GLUCOSE: 69 MG/DL (ref 74–99)
POTASSIUM SERPL-SCNC: 4.1 MMOL/L (ref 3.5–5)
SODIUM SERPL-SCNC: 135 MMOL/L (ref 132–146)

## 2023-05-06 PROCEDURE — 6370000000 HC RX 637 (ALT 250 FOR IP): Performed by: INTERNAL MEDICINE

## 2023-05-06 PROCEDURE — G0378 HOSPITAL OBSERVATION PER HR: HCPCS

## 2023-05-06 PROCEDURE — 82962 GLUCOSE BLOOD TEST: CPT

## 2023-05-06 PROCEDURE — 90935 HEMODIALYSIS ONE EVALUATION: CPT

## 2023-05-06 PROCEDURE — 6370000000 HC RX 637 (ALT 250 FOR IP): Performed by: FAMILY MEDICINE

## 2023-05-06 PROCEDURE — 36415 COLL VENOUS BLD VENIPUNCTURE: CPT

## 2023-05-06 PROCEDURE — 99233 SBSQ HOSP IP/OBS HIGH 50: CPT | Performed by: NURSE PRACTITIONER

## 2023-05-06 PROCEDURE — 80048 BASIC METABOLIC PNL TOTAL CA: CPT

## 2023-05-06 RX ORDER — LEVOTHYROXINE SODIUM 0.12 MG/1
125 TABLET ORAL DAILY
Qty: 30 TABLET | Refills: 3 | Status: SHIPPED | OUTPATIENT
Start: 2023-05-07

## 2023-05-06 RX ORDER — HYDRALAZINE HYDROCHLORIDE 50 MG/1
50 TABLET, FILM COATED ORAL 3 TIMES DAILY
Qty: 90 TABLET | Refills: 3 | Status: SHIPPED | OUTPATIENT
Start: 2023-05-06

## 2023-05-06 RX ORDER — LEVETIRACETAM 250 MG/1
250 TABLET ORAL 2 TIMES DAILY
Qty: 60 TABLET | Refills: 3 | Status: SHIPPED | OUTPATIENT
Start: 2023-05-06

## 2023-05-06 RX ORDER — ISOSORBIDE DINITRATE 10 MG/1
10 TABLET ORAL 3 TIMES DAILY
Qty: 90 TABLET | Refills: 3 | Status: SHIPPED | OUTPATIENT
Start: 2023-05-06

## 2023-05-06 RX ADMIN — MIRTAZAPINE 7.5 MG: 15 TABLET, FILM COATED ORAL at 23:00

## 2023-05-06 RX ADMIN — AMLODIPINE BESYLATE 5 MG: 5 TABLET ORAL at 08:47

## 2023-05-06 RX ADMIN — BUMETANIDE 2 MG: 1 TABLET ORAL at 08:46

## 2023-05-06 RX ADMIN — ASPIRIN 81 MG: 81 TABLET, COATED ORAL at 08:46

## 2023-05-06 RX ADMIN — ATORVASTATIN CALCIUM 40 MG: 40 TABLET, FILM COATED ORAL at 08:47

## 2023-05-06 RX ADMIN — ISOSORBIDE DINITRATE 10 MG: 10 TABLET ORAL at 08:47

## 2023-05-06 RX ADMIN — LEVOTHYROXINE SODIUM 125 MCG: 0.07 TABLET ORAL at 06:29

## 2023-05-06 RX ADMIN — SACUBITRIL AND VALSARTAN 1 TABLET: 97; 103 TABLET, FILM COATED ORAL at 22:58

## 2023-05-06 RX ADMIN — CARVEDILOL 25 MG: 25 TABLET, FILM COATED ORAL at 08:47

## 2023-05-06 RX ADMIN — SACUBITRIL AND VALSARTAN 1 TABLET: 97; 103 TABLET, FILM COATED ORAL at 09:03

## 2023-05-06 RX ADMIN — LEVETIRACETAM 250 MG: 250 TABLET, FILM COATED ORAL at 09:03

## 2023-05-06 RX ADMIN — ISOSORBIDE DINITRATE 10 MG: 10 TABLET ORAL at 23:02

## 2023-05-07 VITALS
DIASTOLIC BLOOD PRESSURE: 91 MMHG | OXYGEN SATURATION: 97 % | TEMPERATURE: 98.1 F | SYSTOLIC BLOOD PRESSURE: 156 MMHG | WEIGHT: 127.43 LBS | BODY MASS INDEX: 16.35 KG/M2 | HEIGHT: 74 IN | RESPIRATION RATE: 18 BRPM | HEART RATE: 57 BPM

## 2023-05-07 LAB
ANION GAP SERPL CALCULATED.3IONS-SCNC: 16 MMOL/L (ref 7–16)
BUN SERPL-MCNC: 16 MG/DL (ref 6–23)
CALCIUM SERPL-MCNC: 8.7 MG/DL (ref 8.6–10.2)
CHLORIDE SERPL-SCNC: 98 MMOL/L (ref 98–107)
CO2 SERPL-SCNC: 22 MMOL/L (ref 22–29)
CREAT SERPL-MCNC: 4.9 MG/DL (ref 0.7–1.2)
GLUCOSE SERPL-MCNC: 130 MG/DL (ref 74–99)
POTASSIUM SERPL-SCNC: 4 MMOL/L (ref 3.5–5)
SODIUM SERPL-SCNC: 136 MMOL/L (ref 132–146)

## 2023-05-07 PROCEDURE — G0378 HOSPITAL OBSERVATION PER HR: HCPCS

## 2023-05-07 PROCEDURE — 6370000000 HC RX 637 (ALT 250 FOR IP): Performed by: FAMILY MEDICINE

## 2023-05-07 PROCEDURE — 80048 BASIC METABOLIC PNL TOTAL CA: CPT

## 2023-05-07 PROCEDURE — 36415 COLL VENOUS BLD VENIPUNCTURE: CPT

## 2023-05-07 PROCEDURE — 6370000000 HC RX 637 (ALT 250 FOR IP): Performed by: INTERNAL MEDICINE

## 2023-05-07 RX ADMIN — LEVOTHYROXINE SODIUM 125 MCG: 0.07 TABLET ORAL at 06:57

## 2023-05-07 RX ADMIN — CARVEDILOL 25 MG: 25 TABLET, FILM COATED ORAL at 08:54

## 2023-05-07 RX ADMIN — AMLODIPINE BESYLATE 5 MG: 5 TABLET ORAL at 08:54

## 2023-05-07 RX ADMIN — ASPIRIN 81 MG: 81 TABLET, COATED ORAL at 08:53

## 2023-05-07 RX ADMIN — LEVETIRACETAM 250 MG: 250 TABLET, FILM COATED ORAL at 08:54

## 2023-05-07 RX ADMIN — ATORVASTATIN CALCIUM 40 MG: 40 TABLET, FILM COATED ORAL at 08:54

## 2023-05-07 RX ADMIN — ISOSORBIDE DINITRATE 10 MG: 10 TABLET ORAL at 08:54

## 2023-05-07 RX ADMIN — SACUBITRIL AND VALSARTAN 1 TABLET: 97; 103 TABLET, FILM COATED ORAL at 08:53

## 2023-05-07 RX ADMIN — BUMETANIDE 2 MG: 1 TABLET ORAL at 08:57

## 2023-05-08 NOTE — CARE COORDINATION
"Subjective:      Patient ID: Partha Curtis Jr. is a 62 y.o. male.    Vitals:  height is 5' 10" (1.778 m) and weight is 90.7 kg (200 lb). His temperature is 97.8 °F (36.6 °C). His blood pressure is 154/88 (abnormal) and his pulse is 68. His respiration is 19 and oxygen saturation is 95%.     Chief Complaint: Foot Injury (Right Foot )    Pt present with trauma to his right foot when he stepped wrong off his patio yesterday.     Provider note begins below:    Patient hyperextended toes of right foot and now has pain to dorsum of right foot.  No bruising. Some edema with TTP. DP pulse present and strong. Cap ref < 3 sec. No numbness or paresthesias.    Wearing an op    Foot Injury   The incident occurred 12 to 24 hours ago. The incident occurred at home. Injury mechanism: Stepped wrong off his patio. The pain is present in the right foot. The quality of the pain is described as aching (Sharp pain with weigh bearing). The pain is at a severity of 7/10. The pain is moderate. The pain has been Constant since onset. Associated symptoms include an inability to bear weight. Pertinent negatives include no numbness or tingling. He reports no foreign bodies present. The symptoms are aggravated by movement and weight bearing. He has tried ice (Boot from his last visit for his left foot) for the symptoms. The treatment provided no relief.     Skin:  Negative for erythema.   Neurological:  Negative for numbness.    Objective:     Physical Exam   Constitutional: He is oriented to person, place, and time. He appears well-developed.   HENT:   Head: Normocephalic and atraumatic. Head is without abrasion, without contusion and without laceration.   Ears:   Right Ear: External ear normal.   Left Ear: External ear normal.   Nose: Nose normal.   Mouth/Throat: Oropharynx is clear and moist and mucous membranes are normal.   Eyes: Conjunctivae, EOM and lids are normal. Pupils are equal, round, and reactive to light.   Neck: Trachea normal " CASE MANAGEMENT. .. Paco Pepper Confirmed with Stacie from [facility] in [place] that patient can return for his HD at his scheduled chair time 10:30am. I informed Mr Meme Toro of this, but he wants to receive his HD at the [address] location. I notified Thor Alexandra of his request. She states that she will start the transfer process today, but patient is to continue his treatments in [place] as scheduled until plans are finalized. Mr Meme Toro notified and is agreeable. Stacie is aware of probable discharge today and is expecting patient tomorrow for his treatment. Will follow and assist with needs should they arise. and phonation normal. Neck supple.   Cardiovascular: Normal rate, regular rhythm and normal heart sounds.   Pulses:       Dorsalis pedis pulses are 2+ on the right side.   Pulmonary/Chest: Effort normal and breath sounds normal. No stridor. No respiratory distress.   Musculoskeletal: Normal range of motion.         General: Normal range of motion.      Right foot: Normal capillary refill. Tenderness and swelling present.   Neurological: He is alert and oriented to person, place, and time.   Skin: Skin is warm, dry, intact and no rash. Capillary refill takes less than 2 seconds. No abrasion, No burn, No bruising, No erythema and No ecchymosis   Psychiatric: His speech is normal and behavior is normal. Judgment and thought content normal.   Nursing note and vitals reviewed.  X-Ray Foot Complete 3 view Right    Result Date: 5/8/2023  EXAMINATION: XR FOOT COMPLETE 3 VIEW RIGHT CLINICAL HISTORY: Pain in right foot TECHNIQUE: AP, lateral, and oblique views of the right foot were performed. COMPARISON: None FINDINGS: The bone mineralization is within normal limits.  There is cortical step-off involving the base of the 1st distal phalanx.  There is no evidence of periostitis. There is joint space narrowing.  There is an enthesophyte at the insertion of the Achilles tendon.  There are scattered osteophytes at the level of the hindfoot. The soft tissues are unremarkable.  No radiopaque foreign body is identified.     Cortical step-off involving the base of the 1st distal phalanx, suggestive of possible acute nondisplaced fracture within this region.  Suggest correlation with point tenderness. Electronically signed by: Clay Kapoor MD Date:    05/08/2023 Time:    14:25     Assessment:     1. Sprain of right foot, initial encounter    2. Right foot pain        Plan:     Xrays ordered at this visit reviewed.     Pt will continue to use his at home aircast boot.  He declines NSAIDs due to chronic kidney issues, etc and will  continue to use Tylenol for pain relief.    Sprain of right foot, initial encounter    Right foot pain  -     X-Ray Foot Complete 3 view Right; Future; Expected date: 05/08/2023

## 2023-05-09 LAB
ALBUMIN SERPL-MCNC: 3.7 G/DL (ref 3.5–5.2)
ALP SERPL-CCNC: 120 U/L (ref 40–129)
ALT SERPL-CCNC: 14 U/L (ref 0–40)
ANION GAP SERPL CALCULATED.3IONS-SCNC: 17 MMOL/L (ref 7–16)
AST SERPL-CCNC: 19 U/L (ref 0–39)
BASOPHILS # BLD: 0.05 E9/L (ref 0–0.2)
BASOPHILS NFR BLD: 1.7 % (ref 0–2)
BILIRUB SERPL-MCNC: 0.4 MG/DL (ref 0–1.2)
BUN SERPL-MCNC: 52 MG/DL (ref 6–23)
CALCIUM SERPL-MCNC: 8.4 MG/DL (ref 8.6–10.2)
CHLORIDE SERPL-SCNC: 103 MMOL/L (ref 98–107)
CO2 SERPL-SCNC: 20 MMOL/L (ref 22–29)
CREAT SERPL-MCNC: 9.7 MG/DL (ref 0.7–1.2)
EOSINOPHIL # BLD: 0.12 E9/L (ref 0.05–0.5)
EOSINOPHIL NFR BLD: 4 % (ref 0–6)
ERYTHROCYTE [DISTWIDTH] IN BLOOD BY AUTOMATED COUNT: 17 FL (ref 11.5–15)
GLUCOSE SERPL-MCNC: 105 MG/DL (ref 74–99)
HCT VFR BLD AUTO: 40.5 % (ref 37–54)
HGB BLD-MCNC: 13.1 G/DL (ref 12.5–16.5)
IMM GRANULOCYTES # BLD: 0.01 E9/L
IMM GRANULOCYTES NFR BLD: 0.3 % (ref 0–5)
LYMPHOCYTES # BLD: 0.72 E9/L (ref 1.5–4)
LYMPHOCYTES NFR BLD: 23.8 % (ref 20–42)
MCH RBC QN AUTO: 31 PG (ref 26–35)
MCHC RBC AUTO-ENTMCNC: 32.3 % (ref 32–34.5)
MCV RBC AUTO: 95.7 FL (ref 80–99.9)
MONOCYTES # BLD: 0.46 E9/L (ref 0.1–0.95)
MONOCYTES NFR BLD: 15.2 % (ref 2–12)
NEUTROPHILS # BLD: 1.66 E9/L (ref 1.8–7.3)
NEUTS SEG NFR BLD: 55 % (ref 43–80)
PLATELET # BLD AUTO: 116 E9/L (ref 130–450)
PMV BLD AUTO: 10.5 FL (ref 7–12)
POTASSIUM SERPL-SCNC: 4.5 MMOL/L (ref 3.5–5)
PROT SERPL-MCNC: 7.3 G/DL (ref 6.4–8.3)
RBC # BLD AUTO: 4.23 E12/L (ref 3.8–5.8)
SODIUM SERPL-SCNC: 140 MMOL/L (ref 132–146)
WBC # BLD: 3 E9/L (ref 4.5–11.5)

## 2023-05-10 NOTE — DISCHARGE SUMMARY
[facility]   Discharge summary   Patient ID:  Satish Perla  [id number]  75 y.o.  1950    Admit date: 5/2/2023    Discharge date and time: 5/7/23    Admission Diagnoses:   Patient Active Problem List   Diagnosis    Diabetes mellitus ([facility]    Metabolic acidosis    Essential hypertension    Uncontrolled diabetes mellitus (HCC)    Hyperbilirubinemia    Thrombocytopenia (HCC)    Hypothyroidism    ESRD needing dialysis ([facility]    Acute respiratory failure with hypoxia (HCC)    Volume overload    Elevated troponin    Hypertensive emergency    Hyperkalemia    Severe protein-calorie malnutrition (HCC)    Acute renal failure superimposed on chronic kidney disease, on chronic dialysis (HCC)    Elevated serum creatinine    Lung infiltrate    Weakness    DNR (do not resuscitate) discussion    Muscle weakness    Non compliance w medication regimen    Hypertensive urgency    Chronic HFrEF (heart failure with reduced ejection fraction) ([address]    Cardiomyopathy ([address]    Failure to thrive in adult    ESRD on hemodialysis ([facility]    Diarrhea    Primary hypertension    End-stage renal disease needing dialysis ([address]    Depression    Hyponatremia    Bacteremia    Altered mental state    ESRD (end stage renal disease) ([address]    Acute metabolic encephalopathy       Discharge Diagnoses: AMS    Consults: cardiology, nephrology, and neurology    Procedures: EEG    Hospital Course:   75 y.o. male presented with  [address]. HE IS STILL MILDLY CONFUSED, KEPT TELLING ME HE IS ON DIALYSIS, THAT IS WHY HE IS HERE**Patient had a fall on Sunday denies hitting head per nursing note. Patient was brought in by EMS, en route patient was noted to have elevated blood pressure 222/100.   He is currently AO x3 to self/   CT SHOWS CHRONIC LACUNAR INFARCTS   EEG UNREMARKABLE, **   WILL NEED PLACEMENT    5/6/2023  Labs and vitals unremarkable  No further plans by neurology  Okay for discharge later tonight after dialysis is

## 2023-05-22 ENCOUNTER — APPOINTMENT (OUTPATIENT)
Dept: GENERAL RADIOLOGY | Age: 73
DRG: 640 | End: 2023-05-22
Payer: MEDICARE

## 2023-05-22 ENCOUNTER — APPOINTMENT (OUTPATIENT)
Dept: CT IMAGING | Age: 73
DRG: 640 | End: 2023-05-22
Payer: MEDICARE

## 2023-05-22 ENCOUNTER — HOSPITAL ENCOUNTER (INPATIENT)
Age: 73
LOS: 8 days | Discharge: NURSING, REHAB OR LONG-TERM CARE | DRG: 640 | End: 2023-05-30
Attending: EMERGENCY MEDICINE | Admitting: INTERNAL MEDICINE
Payer: MEDICARE

## 2023-05-22 DIAGNOSIS — E16.2 HYPOGLYCEMIA: ICD-10-CM

## 2023-05-22 DIAGNOSIS — E87.5 HYPERKALEMIA: ICD-10-CM

## 2023-05-22 DIAGNOSIS — R41.82 ALTERED MENTAL STATUS, UNSPECIFIED ALTERED MENTAL STATUS TYPE: Primary | ICD-10-CM

## 2023-05-22 LAB
ALBUMIN SERPL-MCNC: 4.6 G/DL (ref 3.5–5.2)
ALP SERPL-CCNC: 115 U/L (ref 40–129)
ALT SERPL-CCNC: 19 U/L (ref 0–40)
ANION GAP SERPL CALCULATED.3IONS-SCNC: 33 MMOL/L (ref 7–16)
AST SERPL-CCNC: 23 U/L (ref 0–39)
B.E.: -20 MMOL/L (ref -3–3)
B.E.: -20.3 MMOL/L (ref -3–3)
BILIRUB SERPL-MCNC: 0.7 MG/DL (ref 0–1.2)
BUN SERPL-MCNC: 90 MG/DL (ref 6–23)
CALCIUM SERPL-MCNC: 8.7 MG/DL (ref 8.6–10.2)
CHLORIDE SERPL-SCNC: 105 MMOL/L (ref 98–107)
CK SERPL-CCNC: 764 U/L (ref 20–200)
CO2 SERPL-SCNC: 6 MMOL/L (ref 22–29)
COHB: 1.2 % (ref 0–1.5)
COHB: 1.3 % (ref 0–1.5)
CREAT SERPL-MCNC: 11.5 MG/DL (ref 0.7–1.2)
CRITICAL: ABNORMAL
CRITICAL: ABNORMAL
DATE ANALYZED: ABNORMAL
DATE ANALYZED: ABNORMAL
DATE OF COLLECTION: ABNORMAL
DATE OF COLLECTION: ABNORMAL
GLUCOSE SERPL-MCNC: 69 MG/DL (ref 74–99)
HCO3: 7.2 MMOL/L (ref 22–26)
HCO3: 7.6 MMOL/L (ref 22–26)
HHB: 1.9 % (ref 0–5)
HHB: 2 % (ref 0–5)
LAB: ABNORMAL
LAB: ABNORMAL
LACTATE BLDV-SCNC: 1.4 MMOL/L (ref 0.5–2.2)
LIPASE: 28 U/L (ref 13–60)
Lab: ABNORMAL
Lab: ABNORMAL
METER GLUCOSE: 105 MG/DL (ref 74–99)
METER GLUCOSE: 62 MG/DL (ref 74–99)
METHB: 0.7 % (ref 0–1.5)
METHB: 0.7 % (ref 0–1.5)
MODE: ABNORMAL
MODE: ABNORMAL
O2 CONTENT: 21.8 ML/DL
O2 CONTENT: 21.8 ML/DL
O2 SATURATION: 98 % (ref 92–98.5)
O2 SATURATION: 98.1 % (ref 92–98.5)
O2HB: 96.1 % (ref 94–97)
O2HB: 96.1 % (ref 94–97)
OPERATOR ID: ABNORMAL
OPERATOR ID: ABNORMAL
PATIENT TEMP: 37 C
PATIENT TEMP: 37 C
PCO2: 22.5 MMHG (ref 35–45)
PCO2: 24 MMHG (ref 35–45)
PH BLOOD GAS: 7.12 (ref 7.35–7.45)
PH BLOOD GAS: 7.12 (ref 7.35–7.45)
PO2: 131.4 MMHG (ref 75–100)
PO2: 132.5 MMHG (ref 75–100)
POTASSIUM SERPL-SCNC: 7.31 MMOL/L (ref 3.5–5)
POTASSIUM SERPL-SCNC: 8.7 MMOL/L (ref 3.5–5)
PROT SERPL-MCNC: 8.5 G/DL (ref 6.4–8.3)
SODIUM SERPL-SCNC: 144 MMOL/L (ref 132–146)
SOURCE, BLOOD GAS: ABNORMAL
SOURCE, BLOOD GAS: ABNORMAL
THB: 16 G/DL (ref 11.5–16.5)
THB: 16 G/DL (ref 11.5–16.5)
TIME ANALYZED: 2146
TIME ANALYZED: 2149
TROPONIN, HIGH SENSITIVITY: 753 NG/L (ref 0–11)

## 2023-05-22 PROCEDURE — 2500000003 HC RX 250 WO HCPCS

## 2023-05-22 PROCEDURE — 85025 COMPLETE CBC W/AUTO DIFF WBC: CPT

## 2023-05-22 PROCEDURE — 82805 BLOOD GASES W/O2 SATURATION: CPT

## 2023-05-22 PROCEDURE — 83690 ASSAY OF LIPASE: CPT

## 2023-05-22 PROCEDURE — 84484 ASSAY OF TROPONIN QUANT: CPT

## 2023-05-22 PROCEDURE — 93005 ELECTROCARDIOGRAM TRACING: CPT | Performed by: EMERGENCY MEDICINE

## 2023-05-22 PROCEDURE — 96375 TX/PRO/DX INJ NEW DRUG ADDON: CPT

## 2023-05-22 PROCEDURE — 83605 ASSAY OF LACTIC ACID: CPT

## 2023-05-22 PROCEDURE — 96365 THER/PROPH/DIAG IV INF INIT: CPT

## 2023-05-22 PROCEDURE — 74176 CT ABD & PELVIS W/O CONTRAST: CPT

## 2023-05-22 PROCEDURE — 82550 ASSAY OF CK (CPK): CPT

## 2023-05-22 PROCEDURE — 99285 EMERGENCY DEPT VISIT HI MDM: CPT

## 2023-05-22 PROCEDURE — 2500000003 HC RX 250 WO HCPCS: Performed by: EMERGENCY MEDICINE

## 2023-05-22 PROCEDURE — 72125 CT NECK SPINE W/O DYE: CPT

## 2023-05-22 PROCEDURE — 70450 CT HEAD/BRAIN W/O DYE: CPT

## 2023-05-22 PROCEDURE — 71045 X-RAY EXAM CHEST 1 VIEW: CPT

## 2023-05-22 PROCEDURE — 80048 BASIC METABOLIC PNL TOTAL CA: CPT

## 2023-05-22 PROCEDURE — 2580000003 HC RX 258

## 2023-05-22 PROCEDURE — 82962 GLUCOSE BLOOD TEST: CPT

## 2023-05-22 PROCEDURE — 6360000002 HC RX W HCPCS: Performed by: EMERGENCY MEDICINE

## 2023-05-22 PROCEDURE — 84132 ASSAY OF SERUM POTASSIUM: CPT

## 2023-05-22 PROCEDURE — 80053 COMPREHEN METABOLIC PANEL: CPT

## 2023-05-22 PROCEDURE — 2060000000 HC ICU INTERMEDIATE R&B

## 2023-05-22 PROCEDURE — 6370000000 HC RX 637 (ALT 250 FOR IP): Performed by: EMERGENCY MEDICINE

## 2023-05-22 RX ORDER — DEXTROSE MONOHYDRATE 25 G/50ML
25 INJECTION, SOLUTION INTRAVENOUS ONCE
Status: COMPLETED | OUTPATIENT
Start: 2023-05-22 | End: 2023-05-22

## 2023-05-22 RX ORDER — DEXTROSE MONOHYDRATE 25 G/50ML
12.5 INJECTION, SOLUTION INTRAVENOUS PRN
Status: DISCONTINUED | OUTPATIENT
Start: 2023-05-22 | End: 2023-05-30 | Stop reason: HOSPADM

## 2023-05-22 RX ORDER — IPRATROPIUM BROMIDE AND ALBUTEROL SULFATE 2.5; .5 MG/3ML; MG/3ML
1 SOLUTION RESPIRATORY (INHALATION)
Status: COMPLETED | OUTPATIENT
Start: 2023-05-22 | End: 2023-05-23

## 2023-05-22 RX ORDER — LABETALOL HYDROCHLORIDE 5 MG/ML
5 INJECTION, SOLUTION INTRAVENOUS ONCE
Status: COMPLETED | OUTPATIENT
Start: 2023-05-22 | End: 2023-05-22

## 2023-05-22 RX ORDER — DEXTROSE MONOHYDRATE 25 G/50ML
INJECTION, SOLUTION INTRAVENOUS
Status: COMPLETED
Start: 2023-05-22 | End: 2023-05-22

## 2023-05-22 RX ORDER — CALCIUM GLUCONATE 10 MG/ML
1000 INJECTION, SOLUTION INTRAVENOUS ONCE
Status: COMPLETED | OUTPATIENT
Start: 2023-05-22 | End: 2023-05-22

## 2023-05-22 RX ADMIN — DEXTROSE MONOHYDRATE 12.5 G: 25 INJECTION, SOLUTION INTRAVENOUS at 23:56

## 2023-05-22 RX ADMIN — SODIUM BICARBONATE 50 MEQ: 84 INJECTION INTRAVENOUS at 21:56

## 2023-05-22 RX ADMIN — INSULIN HUMAN 7 UNITS: 100 INJECTION, SOLUTION PARENTERAL at 22:08

## 2023-05-22 RX ADMIN — DEXTROSE MONOHYDRATE 25 G: 25 INJECTION, SOLUTION INTRAVENOUS at 22:05

## 2023-05-22 RX ADMIN — SODIUM BICARBONATE: 84 INJECTION, SOLUTION INTRAVENOUS at 23:57

## 2023-05-22 RX ADMIN — CALCIUM GLUCONATE 1000 MG: 10 INJECTION, SOLUTION INTRAVENOUS at 21:59

## 2023-05-22 RX ADMIN — LABETALOL HYDROCHLORIDE 5 MG: 5 INJECTION INTRAVENOUS at 23:54

## 2023-05-22 ASSESSMENT — PAIN - FUNCTIONAL ASSESSMENT: PAIN_FUNCTIONAL_ASSESSMENT: NONE - DENIES PAIN

## 2023-05-23 ENCOUNTER — APPOINTMENT (OUTPATIENT)
Dept: ULTRASOUND IMAGING | Age: 73
DRG: 640 | End: 2023-05-23
Payer: MEDICARE

## 2023-05-23 LAB
ALBUMIN SERPL-MCNC: 3.8 G/DL (ref 3.5–5.2)
ALP SERPL-CCNC: 94 U/L (ref 40–129)
ALT SERPL-CCNC: 14 U/L (ref 0–40)
ANION GAP SERPL CALCULATED.3IONS-SCNC: 23 MMOL/L (ref 7–16)
ANION GAP SERPL CALCULATED.3IONS-SCNC: 24 MMOL/L (ref 7–16)
ANION GAP SERPL CALCULATED.3IONS-SCNC: 31 MMOL/L (ref 7–16)
ANISOCYTOSIS: ABNORMAL
AST SERPL-CCNC: 18 U/L (ref 0–39)
BACTERIA URNS QL MICRO: ABNORMAL /HPF
BASOPHILS # BLD: 0.01 E9/L (ref 0–0.2)
BASOPHILS NFR BLD: 0.2 % (ref 0–2)
BILIRUB SERPL-MCNC: 1 MG/DL (ref 0–1.2)
BILIRUB UR QL STRIP: NEGATIVE
BUN SERPL-MCNC: 34 MG/DL (ref 6–23)
BUN SERPL-MCNC: 36 MG/DL (ref 6–23)
BUN SERPL-MCNC: 89 MG/DL (ref 6–23)
BURR CELLS: ABNORMAL
CALCIUM SERPL-MCNC: 8.4 MG/DL (ref 8.6–10.2)
CALCIUM SERPL-MCNC: 8.5 MG/DL (ref 8.6–10.2)
CALCIUM SERPL-MCNC: 8.7 MG/DL (ref 8.6–10.2)
CHLORIDE SERPL-SCNC: 106 MMOL/L (ref 98–107)
CHLORIDE SERPL-SCNC: 97 MMOL/L (ref 98–107)
CHLORIDE SERPL-SCNC: 97 MMOL/L (ref 98–107)
CLARITY UR: CLEAR
CO2 SERPL-SCNC: 19 MMOL/L (ref 22–29)
CO2 SERPL-SCNC: 20 MMOL/L (ref 22–29)
CO2 SERPL-SCNC: 8 MMOL/L (ref 22–29)
COLOR UR: YELLOW
CREAT SERPL-MCNC: 11.4 MG/DL (ref 0.7–1.2)
CREAT SERPL-MCNC: 5.9 MG/DL (ref 0.7–1.2)
CREAT SERPL-MCNC: 7 MG/DL (ref 0.7–1.2)
EKG ATRIAL RATE: 85 BPM
EKG Q-T INTERVAL: 456 MS
EKG QRS DURATION: 130 MS
EKG QTC CALCULATION (BAZETT): 538 MS
EKG R AXIS: -106 DEGREES
EKG T AXIS: 74 DEGREES
EKG VENTRICULAR RATE: 84 BPM
EOSINOPHIL # BLD: 0 E9/L (ref 0.05–0.5)
EOSINOPHIL NFR BLD: 0 % (ref 0–6)
ERYTHROCYTE [DISTWIDTH] IN BLOOD BY AUTOMATED COUNT: 17.2 FL (ref 11.5–15)
GLUCOSE SERPL-MCNC: 69 MG/DL (ref 74–99)
GLUCOSE SERPL-MCNC: 76 MG/DL (ref 74–99)
GLUCOSE SERPL-MCNC: 78 MG/DL (ref 74–99)
GLUCOSE UR STRIP-MCNC: NEGATIVE MG/DL
HBA1C MFR BLD: 4.3 % (ref 4–5.6)
HCT VFR BLD AUTO: 50.9 % (ref 37–54)
HGB BLD-MCNC: 15.7 G/DL (ref 12.5–16.5)
HGB UR QL STRIP: ABNORMAL
IMM GRANULOCYTES # BLD: 0.03 E9/L
IMM GRANULOCYTES NFR BLD: 0.6 % (ref 0–5)
KETONES UR STRIP-MCNC: 40 MG/DL
LEUKOCYTE ESTERASE UR QL STRIP: NEGATIVE
LYMPHOCYTES # BLD: 0.3 E9/L (ref 1.5–4)
LYMPHOCYTES NFR BLD: 5.6 % (ref 20–42)
MCH RBC QN AUTO: 31 PG (ref 26–35)
MCHC RBC AUTO-ENTMCNC: 30.8 % (ref 32–34.5)
MCV RBC AUTO: 100.4 FL (ref 80–99.9)
METER GLUCOSE: 103 MG/DL (ref 74–99)
METER GLUCOSE: 62 MG/DL (ref 74–99)
METER GLUCOSE: 74 MG/DL (ref 74–99)
METER GLUCOSE: 88 MG/DL (ref 74–99)
METER GLUCOSE: 94 MG/DL (ref 74–99)
MONOCYTES # BLD: 0.32 E9/L (ref 0.1–0.95)
MONOCYTES NFR BLD: 6 % (ref 2–12)
NEUTROPHILS # BLD: 4.7 E9/L (ref 1.8–7.3)
NEUTS SEG NFR BLD: 87.6 % (ref 43–80)
NITRITE UR QL STRIP: NEGATIVE
OVALOCYTES: ABNORMAL
PH UR STRIP: 6 [PH] (ref 5–9)
PLATELET # BLD AUTO: 135 E9/L (ref 130–450)
PMV BLD AUTO: 10.8 FL (ref 7–12)
POIKILOCYTES: ABNORMAL
POLYCHROMASIA: ABNORMAL
POTASSIUM SERPL-SCNC: 4.3 MMOL/L (ref 3.5–5)
POTASSIUM SERPL-SCNC: 4.7 MMOL/L (ref 3.5–5)
POTASSIUM SERPL-SCNC: 7.4 MMOL/L (ref 3.5–5)
PROT SERPL-MCNC: 7 G/DL (ref 6.4–8.3)
PROT UR STRIP-MCNC: >=300 MG/DL
RBC # BLD AUTO: 5.07 E12/L (ref 3.8–5.8)
RBC #/AREA URNS HPF: ABNORMAL /HPF (ref 0–2)
SODIUM SERPL-SCNC: 140 MMOL/L (ref 132–146)
SODIUM SERPL-SCNC: 140 MMOL/L (ref 132–146)
SODIUM SERPL-SCNC: 145 MMOL/L (ref 132–146)
SP GR UR STRIP: 1.02 (ref 1–1.03)
TEAR DROP CELLS: ABNORMAL
TSH SERPL-MCNC: 20.68 UIU/ML (ref 0.27–4.2)
UROBILINOGEN UR STRIP-ACNC: 0.2 E.U./DL
WBC # BLD: 5.4 E9/L (ref 4.5–11.5)
WBC #/AREA URNS HPF: ABNORMAL /HPF (ref 0–5)

## 2023-05-23 PROCEDURE — 80048 BASIC METABOLIC PNL TOTAL CA: CPT

## 2023-05-23 PROCEDURE — 93970 EXTREMITY STUDY: CPT

## 2023-05-23 PROCEDURE — 36415 COLL VENOUS BLD VENIPUNCTURE: CPT

## 2023-05-23 PROCEDURE — 6370000000 HC RX 637 (ALT 250 FOR IP): Performed by: INTERNAL MEDICINE

## 2023-05-23 PROCEDURE — 2580000003 HC RX 258: Performed by: NURSE PRACTITIONER

## 2023-05-23 PROCEDURE — 94664 DEMO&/EVAL PT USE INHALER: CPT

## 2023-05-23 PROCEDURE — 2500000003 HC RX 250 WO HCPCS: Performed by: INTERNAL MEDICINE

## 2023-05-23 PROCEDURE — 6360000002 HC RX W HCPCS: Performed by: INTERNAL MEDICINE

## 2023-05-23 PROCEDURE — 93010 ELECTROCARDIOGRAM REPORT: CPT | Performed by: INTERNAL MEDICINE

## 2023-05-23 PROCEDURE — 83036 HEMOGLOBIN GLYCOSYLATED A1C: CPT

## 2023-05-23 PROCEDURE — 6370000000 HC RX 637 (ALT 250 FOR IP): Performed by: EMERGENCY MEDICINE

## 2023-05-23 PROCEDURE — 80053 COMPREHEN METABOLIC PANEL: CPT

## 2023-05-23 PROCEDURE — 99223 1ST HOSP IP/OBS HIGH 75: CPT | Performed by: INTERNAL MEDICINE

## 2023-05-23 PROCEDURE — 81001 URINALYSIS AUTO W/SCOPE: CPT

## 2023-05-23 PROCEDURE — 6370000000 HC RX 637 (ALT 250 FOR IP): Performed by: LICENSED PRACTICAL NURSE

## 2023-05-23 PROCEDURE — 2580000003 HC RX 258: Performed by: INTERNAL MEDICINE

## 2023-05-23 PROCEDURE — 84443 ASSAY THYROID STIM HORMONE: CPT

## 2023-05-23 PROCEDURE — 6360000002 HC RX W HCPCS

## 2023-05-23 PROCEDURE — 82962 GLUCOSE BLOOD TEST: CPT

## 2023-05-23 PROCEDURE — 6360000002 HC RX W HCPCS: Performed by: EMERGENCY MEDICINE

## 2023-05-23 PROCEDURE — 5A1D70Z PERFORMANCE OF URINARY FILTRATION, INTERMITTENT, LESS THAN 6 HOURS PER DAY: ICD-10-PCS | Performed by: INTERNAL MEDICINE

## 2023-05-23 PROCEDURE — 90935 HEMODIALYSIS ONE EVALUATION: CPT

## 2023-05-23 PROCEDURE — 2060000000 HC ICU INTERMEDIATE R&B

## 2023-05-23 PROCEDURE — 2500000003 HC RX 250 WO HCPCS: Performed by: EMERGENCY MEDICINE

## 2023-05-23 PROCEDURE — 94640 AIRWAY INHALATION TREATMENT: CPT

## 2023-05-23 RX ORDER — CALCIUM GLUCONATE 10 MG/ML
1000 INJECTION, SOLUTION INTRAVENOUS ONCE
Status: COMPLETED | OUTPATIENT
Start: 2023-05-23 | End: 2023-05-23

## 2023-05-23 RX ORDER — ACETAMINOPHEN 325 MG/1
650 TABLET ORAL EVERY 6 HOURS PRN
Status: DISCONTINUED | OUTPATIENT
Start: 2023-05-23 | End: 2023-05-30 | Stop reason: HOSPADM

## 2023-05-23 RX ORDER — HYDRALAZINE HYDROCHLORIDE 20 MG/ML
INJECTION INTRAMUSCULAR; INTRAVENOUS
Status: COMPLETED
Start: 2023-05-23 | End: 2023-05-23

## 2023-05-23 RX ORDER — CLOTRIMAZOLE 10 MG/1
10 LOZENGE ORAL; TOPICAL
Status: DISCONTINUED | OUTPATIENT
Start: 2023-05-23 | End: 2023-05-30 | Stop reason: HOSPADM

## 2023-05-23 RX ORDER — SODIUM CHLORIDE 0.9 % (FLUSH) 0.9 %
5-40 SYRINGE (ML) INJECTION EVERY 12 HOURS SCHEDULED
Status: DISCONTINUED | OUTPATIENT
Start: 2023-05-23 | End: 2023-05-30 | Stop reason: HOSPADM

## 2023-05-23 RX ORDER — DEXTROSE MONOHYDRATE 100 MG/ML
INJECTION, SOLUTION INTRAVENOUS CONTINUOUS PRN
Status: DISCONTINUED | OUTPATIENT
Start: 2023-05-23 | End: 2023-05-30 | Stop reason: HOSPADM

## 2023-05-23 RX ORDER — BUMETANIDE 1 MG/1
2 TABLET ORAL 2 TIMES DAILY
Status: DISCONTINUED | OUTPATIENT
Start: 2023-05-23 | End: 2023-05-24

## 2023-05-23 RX ORDER — HEPARIN SODIUM 10000 [USP'U]/ML
5000 INJECTION, SOLUTION INTRAVENOUS; SUBCUTANEOUS EVERY 8 HOURS
Status: DISCONTINUED | OUTPATIENT
Start: 2023-05-23 | End: 2023-05-30 | Stop reason: HOSPADM

## 2023-05-23 RX ORDER — HYDRALAZINE HYDROCHLORIDE 20 MG/ML
20 INJECTION INTRAMUSCULAR; INTRAVENOUS EVERY 4 HOURS PRN
Status: DISCONTINUED | OUTPATIENT
Start: 2023-05-23 | End: 2023-05-30 | Stop reason: HOSPADM

## 2023-05-23 RX ORDER — BISACODYL 10 MG
10 SUPPOSITORY, RECTAL RECTAL DAILY PRN
Status: DISCONTINUED | OUTPATIENT
Start: 2023-05-23 | End: 2023-05-30 | Stop reason: HOSPADM

## 2023-05-23 RX ORDER — LABETALOL HYDROCHLORIDE 5 MG/ML
20 INJECTION, SOLUTION INTRAVENOUS EVERY 6 HOURS
Status: DISCONTINUED | OUTPATIENT
Start: 2023-05-23 | End: 2023-05-26

## 2023-05-23 RX ORDER — ACETAMINOPHEN 650 MG/1
650 SUPPOSITORY RECTAL EVERY 6 HOURS PRN
Status: DISCONTINUED | OUTPATIENT
Start: 2023-05-23 | End: 2023-05-30 | Stop reason: HOSPADM

## 2023-05-23 RX ORDER — SODIUM CHLORIDE 9 MG/ML
INJECTION, SOLUTION INTRAVENOUS PRN
Status: DISCONTINUED | OUTPATIENT
Start: 2023-05-23 | End: 2023-05-30 | Stop reason: HOSPADM

## 2023-05-23 RX ORDER — HYDRALAZINE HYDROCHLORIDE 50 MG/1
50 TABLET, FILM COATED ORAL EVERY 8 HOURS SCHEDULED
Status: DISCONTINUED | OUTPATIENT
Start: 2023-05-23 | End: 2023-05-24

## 2023-05-23 RX ORDER — AMLODIPINE BESYLATE 5 MG/1
5 TABLET ORAL NIGHTLY
Status: DISCONTINUED | OUTPATIENT
Start: 2023-05-23 | End: 2023-05-24

## 2023-05-23 RX ORDER — INSULIN LISPRO 100 [IU]/ML
0-10 INJECTION, SOLUTION INTRAVENOUS; SUBCUTANEOUS
Status: DISCONTINUED | OUTPATIENT
Start: 2023-05-23 | End: 2023-05-30 | Stop reason: HOSPADM

## 2023-05-23 RX ORDER — CARVEDILOL 25 MG/1
25 TABLET ORAL 2 TIMES DAILY WITH MEALS
Status: DISCONTINUED | OUTPATIENT
Start: 2023-05-23 | End: 2023-05-30 | Stop reason: HOSPADM

## 2023-05-23 RX ORDER — SODIUM CHLORIDE 0.9 % (FLUSH) 0.9 %
5-40 SYRINGE (ML) INJECTION PRN
Status: DISCONTINUED | OUTPATIENT
Start: 2023-05-23 | End: 2023-05-30 | Stop reason: HOSPADM

## 2023-05-23 RX ADMIN — DEXTROSE MONOHYDRATE 12.5 G: 25 INJECTION, SOLUTION INTRAVENOUS at 08:30

## 2023-05-23 RX ADMIN — HEPARIN SODIUM 5000 UNITS: 10000 INJECTION INTRAVENOUS; SUBCUTANEOUS at 17:25

## 2023-05-23 RX ADMIN — SODIUM CHLORIDE, PRESERVATIVE FREE 10 ML: 5 INJECTION INTRAVENOUS at 21:23

## 2023-05-23 RX ADMIN — LABETALOL HYDROCHLORIDE 20 MG: 5 INJECTION INTRAVENOUS at 10:37

## 2023-05-23 RX ADMIN — NITROGLYCERIN 1 INCH: 20 OINTMENT TOPICAL at 11:01

## 2023-05-23 RX ADMIN — IPRATROPIUM BROMIDE AND ALBUTEROL SULFATE 1 AMPULE: .5; 3 SOLUTION RESPIRATORY (INHALATION) at 00:35

## 2023-05-23 RX ADMIN — IPRATROPIUM BROMIDE AND ALBUTEROL SULFATE 1 AMPULE: .5; 3 SOLUTION RESPIRATORY (INHALATION) at 00:37

## 2023-05-23 RX ADMIN — AMLODIPINE BESYLATE 5 MG: 5 TABLET ORAL at 21:12

## 2023-05-23 RX ADMIN — SODIUM CHLORIDE, PRESERVATIVE FREE 10 ML: 5 INJECTION INTRAVENOUS at 08:31

## 2023-05-23 RX ADMIN — LABETALOL HYDROCHLORIDE 20 MG: 5 INJECTION INTRAVENOUS at 17:16

## 2023-05-23 RX ADMIN — LABETALOL HYDROCHLORIDE 20 MG: 5 INJECTION INTRAVENOUS at 21:14

## 2023-05-23 RX ADMIN — HYDRALAZINE HYDROCHLORIDE 20 MG: 20 INJECTION INTRAMUSCULAR; INTRAVENOUS at 08:36

## 2023-05-23 RX ADMIN — CALCIUM GLUCONATE 1000 MG: 10 INJECTION, SOLUTION INTRAVENOUS at 00:43

## 2023-05-23 RX ADMIN — HYDRALAZINE HYDROCHLORIDE 20 MG: 20 INJECTION INTRAMUSCULAR; INTRAVENOUS at 14:25

## 2023-05-23 RX ADMIN — CARVEDILOL 25 MG: 25 TABLET, FILM COATED ORAL at 17:18

## 2023-05-23 RX ADMIN — NITROGLYCERIN 1 INCH: 20 OINTMENT TOPICAL at 17:18

## 2023-05-23 RX ADMIN — IPRATROPIUM BROMIDE AND ALBUTEROL SULFATE 1 AMPULE: .5; 3 SOLUTION RESPIRATORY (INHALATION) at 00:36

## 2023-05-23 RX ADMIN — SODIUM BICARBONATE: 84 INJECTION, SOLUTION INTRAVENOUS at 20:06

## 2023-05-23 ASSESSMENT — PAIN - FUNCTIONAL ASSESSMENT
PAIN_FUNCTIONAL_ASSESSMENT: NONE - DENIES PAIN
PAIN_FUNCTIONAL_ASSESSMENT: NONE - DENIES PAIN

## 2023-05-24 LAB
AMMONIA PLAS-SCNC: 21 UMOL/L (ref 16–60)
ANION GAP SERPL CALCULATED.3IONS-SCNC: 23 MMOL/L (ref 7–16)
APAP SERPL-MCNC: <5 MCG/ML (ref 10–30)
BASOPHILS # BLD: 0.04 E9/L (ref 0–0.2)
BASOPHILS NFR BLD: 1.2 % (ref 0–2)
BUN SERPL-MCNC: 42 MG/DL (ref 6–23)
CALCIUM SERPL-MCNC: 8.2 MG/DL (ref 8.6–10.2)
CHLORIDE SERPL-SCNC: 97 MMOL/L (ref 98–107)
CO2 SERPL-SCNC: 20 MMOL/L (ref 22–29)
CREAT SERPL-MCNC: 8.2 MG/DL (ref 0.7–1.2)
EOSINOPHIL # BLD: 0.06 E9/L (ref 0.05–0.5)
EOSINOPHIL NFR BLD: 1.8 % (ref 0–6)
ERYTHROCYTE [DISTWIDTH] IN BLOOD BY AUTOMATED COUNT: 17.2 FL (ref 11.5–15)
ETHANOLAMINE SERPL-MCNC: <10 MG/DL (ref 0–0.08)
GLUCOSE SERPL-MCNC: 54 MG/DL (ref 74–99)
HCT VFR BLD AUTO: 40.4 % (ref 37–54)
HGB BLD-MCNC: 13 G/DL (ref 12.5–16.5)
IMM GRANULOCYTES # BLD: 0 E9/L
IMM GRANULOCYTES NFR BLD: 0 % (ref 0–5)
LV EF: 65 %
LVEF MODALITY: NORMAL
LYMPHOCYTES # BLD: 0.72 E9/L (ref 1.5–4)
LYMPHOCYTES NFR BLD: 21.7 % (ref 20–42)
MCH RBC QN AUTO: 30.7 PG (ref 26–35)
MCHC RBC AUTO-ENTMCNC: 32.2 % (ref 32–34.5)
MCV RBC AUTO: 95.5 FL (ref 80–99.9)
MONOCYTES # BLD: 0.61 E9/L (ref 0.1–0.95)
MONOCYTES NFR BLD: 18.4 % (ref 2–12)
NEUTROPHILS # BLD: 1.89 E9/L (ref 1.8–7.3)
NEUTS SEG NFR BLD: 56.9 % (ref 43–80)
PHOSPHATE SERPL-MCNC: 5.8 MG/DL (ref 2.5–4.5)
PLATELET # BLD AUTO: 103 E9/L (ref 130–450)
PMV BLD AUTO: 10.6 FL (ref 7–12)
POTASSIUM SERPL-SCNC: 4.6 MMOL/L (ref 3.5–5)
RBC # BLD AUTO: 4.23 E12/L (ref 3.8–5.8)
SALICYLATES SERPL-MCNC: <0.3 MG/DL (ref 0–30)
SODIUM SERPL-SCNC: 140 MMOL/L (ref 132–146)
T4 FREE SERPL-MCNC: 0.66 NG/DL (ref 0.93–1.7)
TRICYCLIC ANTIDEPRESSANTS SCREEN SERUM: NEGATIVE NG/ML
WBC # BLD: 3.3 E9/L (ref 4.5–11.5)

## 2023-05-24 PROCEDURE — 80048 BASIC METABOLIC PNL TOTAL CA: CPT

## 2023-05-24 PROCEDURE — 82140 ASSAY OF AMMONIA: CPT

## 2023-05-24 PROCEDURE — 6370000000 HC RX 637 (ALT 250 FOR IP): Performed by: INTERNAL MEDICINE

## 2023-05-24 PROCEDURE — 2580000003 HC RX 258: Performed by: NURSE PRACTITIONER

## 2023-05-24 PROCEDURE — 6370000000 HC RX 637 (ALT 250 FOR IP): Performed by: LICENSED PRACTICAL NURSE

## 2023-05-24 PROCEDURE — 2060000000 HC ICU INTERMEDIATE R&B

## 2023-05-24 PROCEDURE — 84100 ASSAY OF PHOSPHORUS: CPT

## 2023-05-24 PROCEDURE — 84439 ASSAY OF FREE THYROXINE: CPT

## 2023-05-24 PROCEDURE — 90935 HEMODIALYSIS ONE EVALUATION: CPT

## 2023-05-24 PROCEDURE — 82077 ASSAY SPEC XCP UR&BREATH IA: CPT

## 2023-05-24 PROCEDURE — 80179 DRUG ASSAY SALICYLATE: CPT

## 2023-05-24 PROCEDURE — 99233 SBSQ HOSP IP/OBS HIGH 50: CPT | Performed by: INTERNAL MEDICINE

## 2023-05-24 PROCEDURE — 80143 DRUG ASSAY ACETAMINOPHEN: CPT

## 2023-05-24 PROCEDURE — 2500000003 HC RX 250 WO HCPCS: Performed by: INTERNAL MEDICINE

## 2023-05-24 PROCEDURE — 80307 DRUG TEST PRSMV CHEM ANLYZR: CPT

## 2023-05-24 PROCEDURE — 93306 TTE W/DOPPLER COMPLETE: CPT

## 2023-05-24 PROCEDURE — 85025 COMPLETE CBC W/AUTO DIFF WBC: CPT

## 2023-05-24 PROCEDURE — 36415 COLL VENOUS BLD VENIPUNCTURE: CPT

## 2023-05-24 PROCEDURE — 6360000002 HC RX W HCPCS: Performed by: INTERNAL MEDICINE

## 2023-05-24 RX ORDER — AMLODIPINE BESYLATE 10 MG/1
10 TABLET ORAL NIGHTLY
Status: DISCONTINUED | OUTPATIENT
Start: 2023-05-24 | End: 2023-05-30 | Stop reason: HOSPADM

## 2023-05-24 RX ORDER — LORAZEPAM 2 MG/ML
1 INJECTION INTRAMUSCULAR ONCE
Status: DISCONTINUED | OUTPATIENT
Start: 2023-05-24 | End: 2023-05-30 | Stop reason: HOSPADM

## 2023-05-24 RX ADMIN — CARVEDILOL 25 MG: 25 TABLET, FILM COATED ORAL at 19:28

## 2023-05-24 RX ADMIN — HYDRALAZINE HYDROCHLORIDE 20 MG: 20 INJECTION INTRAMUSCULAR; INTRAVENOUS at 12:33

## 2023-05-24 RX ADMIN — SODIUM CHLORIDE, PRESERVATIVE FREE 10 ML: 5 INJECTION INTRAVENOUS at 08:46

## 2023-05-24 RX ADMIN — NITROGLYCERIN 1 INCH: 20 OINTMENT TOPICAL at 05:46

## 2023-05-24 RX ADMIN — AMLODIPINE BESYLATE 10 MG: 10 TABLET ORAL at 19:28

## 2023-05-24 RX ADMIN — NITROGLYCERIN 1 INCH: 20 OINTMENT TOPICAL at 12:33

## 2023-05-24 RX ADMIN — LABETALOL HYDROCHLORIDE 20 MG: 5 INJECTION INTRAVENOUS at 03:23

## 2023-05-24 RX ADMIN — SODIUM CHLORIDE, PRESERVATIVE FREE 10 ML: 5 INJECTION INTRAVENOUS at 16:52

## 2023-05-24 RX ADMIN — NITROGLYCERIN 1 INCH: 20 OINTMENT TOPICAL at 00:24

## 2023-05-24 RX ADMIN — HYDRALAZINE HYDROCHLORIDE 20 MG: 20 INJECTION INTRAMUSCULAR; INTRAVENOUS at 16:52

## 2023-05-24 RX ADMIN — LABETALOL HYDROCHLORIDE 20 MG: 5 INJECTION INTRAVENOUS at 08:45

## 2023-05-25 PROCEDURE — 6370000000 HC RX 637 (ALT 250 FOR IP): Performed by: INTERNAL MEDICINE

## 2023-05-25 PROCEDURE — 6360000002 HC RX W HCPCS: Performed by: INTERNAL MEDICINE

## 2023-05-25 PROCEDURE — 90935 HEMODIALYSIS ONE EVALUATION: CPT

## 2023-05-25 PROCEDURE — 2060000000 HC ICU INTERMEDIATE R&B

## 2023-05-25 PROCEDURE — 99232 SBSQ HOSP IP/OBS MODERATE 35: CPT

## 2023-05-25 PROCEDURE — 99223 1ST HOSP IP/OBS HIGH 75: CPT | Performed by: PSYCHIATRY & NEUROLOGY

## 2023-05-25 PROCEDURE — 6370000000 HC RX 637 (ALT 250 FOR IP): Performed by: LICENSED PRACTICAL NURSE

## 2023-05-25 PROCEDURE — 99233 SBSQ HOSP IP/OBS HIGH 50: CPT | Performed by: INTERNAL MEDICINE

## 2023-05-25 RX ORDER — CLONIDINE HYDROCHLORIDE 0.1 MG/1
0.1 TABLET ORAL 2 TIMES DAILY
Status: DISCONTINUED | OUTPATIENT
Start: 2023-05-25 | End: 2023-05-30 | Stop reason: HOSPADM

## 2023-05-25 RX ORDER — CLONIDINE HYDROCHLORIDE 0.1 MG/1
0.2 TABLET ORAL ONCE
Status: DISCONTINUED | OUTPATIENT
Start: 2023-05-25 | End: 2023-05-30 | Stop reason: HOSPADM

## 2023-05-25 RX ORDER — HALOPERIDOL 5 MG/ML
1 INJECTION INTRAMUSCULAR EVERY 4 HOURS PRN
Status: DISCONTINUED | OUTPATIENT
Start: 2023-05-25 | End: 2023-05-30 | Stop reason: HOSPADM

## 2023-05-25 RX ADMIN — CLONIDINE HYDROCHLORIDE 0.1 MG: 0.1 TABLET ORAL at 21:03

## 2023-05-25 RX ADMIN — HYDRALAZINE HYDROCHLORIDE 20 MG: 20 INJECTION INTRAMUSCULAR; INTRAVENOUS at 11:12

## 2023-05-25 RX ADMIN — NITROGLYCERIN 1 INCH: 20 OINTMENT TOPICAL at 06:09

## 2023-05-25 RX ADMIN — AMLODIPINE BESYLATE 10 MG: 10 TABLET ORAL at 21:03

## 2023-05-25 RX ADMIN — NITROGLYCERIN 1 INCH: 20 OINTMENT TOPICAL at 00:11

## 2023-05-25 ASSESSMENT — PAIN SCALES - GENERAL
PAINLEVEL_OUTOF10: 0
PAINLEVEL_OUTOF10: 0

## 2023-05-26 LAB
ANION GAP SERPL CALCULATED.3IONS-SCNC: 13 MMOL/L (ref 7–16)
BASOPHILS # BLD: 0.03 E9/L (ref 0–0.2)
BASOPHILS NFR BLD: 0.9 % (ref 0–2)
BUN SERPL-MCNC: 21 MG/DL (ref 6–23)
CALCIUM SERPL-MCNC: 8.4 MG/DL (ref 8.6–10.2)
CHLORIDE SERPL-SCNC: 99 MMOL/L (ref 98–107)
CO2 SERPL-SCNC: 23 MMOL/L (ref 22–29)
CREAT SERPL-MCNC: 5.4 MG/DL (ref 0.7–1.2)
EOSINOPHIL # BLD: 0.07 E9/L (ref 0.05–0.5)
EOSINOPHIL NFR BLD: 2.1 % (ref 0–6)
ERYTHROCYTE [DISTWIDTH] IN BLOOD BY AUTOMATED COUNT: 16.4 FL (ref 11.5–15)
GLUCOSE SERPL-MCNC: 82 MG/DL (ref 74–99)
HCT VFR BLD AUTO: 45.7 % (ref 37–54)
HGB BLD-MCNC: 14.5 G/DL (ref 12.5–16.5)
IMM GRANULOCYTES # BLD: 0.03 E9/L
IMM GRANULOCYTES NFR BLD: 0.9 % (ref 0–5)
LYMPHOCYTES # BLD: 0.77 E9/L (ref 1.5–4)
LYMPHOCYTES NFR BLD: 22.8 % (ref 20–42)
MCH RBC QN AUTO: 30.8 PG (ref 26–35)
MCHC RBC AUTO-ENTMCNC: 31.7 % (ref 32–34.5)
MCV RBC AUTO: 97 FL (ref 80–99.9)
METER GLUCOSE: 108 MG/DL (ref 74–99)
METER GLUCOSE: 175 MG/DL (ref 74–99)
MONOCYTES # BLD: 0.6 E9/L (ref 0.1–0.95)
MONOCYTES NFR BLD: 17.8 % (ref 2–12)
NEUTROPHILS # BLD: 1.88 E9/L (ref 1.8–7.3)
NEUTS SEG NFR BLD: 55.5 % (ref 43–80)
PLATELET # BLD AUTO: 101 E9/L (ref 130–450)
PMV BLD AUTO: 10 FL (ref 7–12)
POTASSIUM SERPL-SCNC: 4.6 MMOL/L (ref 3.5–5)
RBC # BLD AUTO: 4.71 E12/L (ref 3.8–5.8)
SODIUM SERPL-SCNC: 135 MMOL/L (ref 132–146)
WBC # BLD: 3.4 E9/L (ref 4.5–11.5)

## 2023-05-26 PROCEDURE — 6370000000 HC RX 637 (ALT 250 FOR IP): Performed by: NURSE PRACTITIONER

## 2023-05-26 PROCEDURE — 36415 COLL VENOUS BLD VENIPUNCTURE: CPT

## 2023-05-26 PROCEDURE — 97535 SELF CARE MNGMENT TRAINING: CPT

## 2023-05-26 PROCEDURE — 99233 SBSQ HOSP IP/OBS HIGH 50: CPT | Performed by: INTERNAL MEDICINE

## 2023-05-26 PROCEDURE — 6370000000 HC RX 637 (ALT 250 FOR IP): Performed by: LICENSED PRACTICAL NURSE

## 2023-05-26 PROCEDURE — 6370000000 HC RX 637 (ALT 250 FOR IP): Performed by: INTERNAL MEDICINE

## 2023-05-26 PROCEDURE — 85025 COMPLETE CBC W/AUTO DIFF WBC: CPT

## 2023-05-26 PROCEDURE — 97161 PT EVAL LOW COMPLEX 20 MIN: CPT

## 2023-05-26 PROCEDURE — 97165 OT EVAL LOW COMPLEX 30 MIN: CPT

## 2023-05-26 PROCEDURE — 97530 THERAPEUTIC ACTIVITIES: CPT

## 2023-05-26 PROCEDURE — 80048 BASIC METABOLIC PNL TOTAL CA: CPT

## 2023-05-26 PROCEDURE — 2060000000 HC ICU INTERMEDIATE R&B

## 2023-05-26 PROCEDURE — 82962 GLUCOSE BLOOD TEST: CPT

## 2023-05-26 RX ORDER — ISOSORBIDE DINITRATE 10 MG/1
10 TABLET ORAL 3 TIMES DAILY
Status: DISCONTINUED | OUTPATIENT
Start: 2023-05-26 | End: 2023-05-30 | Stop reason: HOSPADM

## 2023-05-26 RX ORDER — HYDRALAZINE HYDROCHLORIDE 50 MG/1
50 TABLET, FILM COATED ORAL EVERY 8 HOURS SCHEDULED
Status: DISCONTINUED | OUTPATIENT
Start: 2023-05-26 | End: 2023-05-30 | Stop reason: HOSPADM

## 2023-05-26 RX ADMIN — CARVEDILOL 25 MG: 25 TABLET, FILM COATED ORAL at 18:03

## 2023-05-26 RX ADMIN — CLONIDINE HYDROCHLORIDE 0.1 MG: 0.1 TABLET ORAL at 09:18

## 2023-05-26 RX ADMIN — ACETAMINOPHEN 650 MG: 325 TABLET ORAL at 20:38

## 2023-05-26 RX ADMIN — CARVEDILOL 25 MG: 25 TABLET, FILM COATED ORAL at 09:17

## 2023-05-26 RX ADMIN — ISOSORBIDE DINITRATE 10 MG: 10 TABLET ORAL at 15:01

## 2023-05-26 RX ADMIN — ISOSORBIDE DINITRATE 10 MG: 10 TABLET ORAL at 20:38

## 2023-05-26 RX ADMIN — AMLODIPINE BESYLATE 10 MG: 10 TABLET ORAL at 20:38

## 2023-05-26 ASSESSMENT — PAIN DESCRIPTION - DESCRIPTORS: DESCRIPTORS: ACHING;DISCOMFORT;SORE

## 2023-05-26 ASSESSMENT — PAIN SCALES - GENERAL
PAINLEVEL_OUTOF10: 0
PAINLEVEL_OUTOF10: 4
PAINLEVEL_OUTOF10: 0

## 2023-05-26 ASSESSMENT — PAIN DESCRIPTION - PAIN TYPE: TYPE: ACUTE PAIN

## 2023-05-26 ASSESSMENT — PAIN DESCRIPTION - ORIENTATION: ORIENTATION: LEFT

## 2023-05-26 ASSESSMENT — PAIN - FUNCTIONAL ASSESSMENT: PAIN_FUNCTIONAL_ASSESSMENT: PREVENTS OR INTERFERES SOME ACTIVE ACTIVITIES AND ADLS

## 2023-05-26 ASSESSMENT — PAIN DESCRIPTION - ONSET: ONSET: GRADUAL

## 2023-05-26 ASSESSMENT — PAIN DESCRIPTION - FREQUENCY: FREQUENCY: INTERMITTENT

## 2023-05-26 ASSESSMENT — PAIN DESCRIPTION - LOCATION: LOCATION: KNEE

## 2023-05-27 LAB
ANION GAP SERPL CALCULATED.3IONS-SCNC: 18 MMOL/L (ref 7–16)
BASOPHILS # BLD: 0.04 E9/L (ref 0–0.2)
BASOPHILS NFR BLD: 1.2 % (ref 0–2)
BUN SERPL-MCNC: 47 MG/DL (ref 6–23)
CALCIUM SERPL-MCNC: 7.9 MG/DL (ref 8.6–10.2)
CHLORIDE SERPL-SCNC: 97 MMOL/L (ref 98–107)
CO2 SERPL-SCNC: 20 MMOL/L (ref 22–29)
CREAT SERPL-MCNC: 7.1 MG/DL (ref 0.7–1.2)
EOSINOPHIL # BLD: 0.14 E9/L (ref 0.05–0.5)
EOSINOPHIL NFR BLD: 4.3 % (ref 0–6)
ERYTHROCYTE [DISTWIDTH] IN BLOOD BY AUTOMATED COUNT: 15.9 FL (ref 11.5–15)
GLUCOSE SERPL-MCNC: 73 MG/DL (ref 74–99)
HCT VFR BLD AUTO: 41.5 % (ref 37–54)
HGB BLD-MCNC: 13.4 G/DL (ref 12.5–16.5)
IMM GRANULOCYTES # BLD: 0.02 E9/L
IMM GRANULOCYTES NFR BLD: 0.6 % (ref 0–5)
LYMPHOCYTES # BLD: 0.66 E9/L (ref 1.5–4)
LYMPHOCYTES NFR BLD: 20.1 % (ref 20–42)
MCH RBC QN AUTO: 30.9 PG (ref 26–35)
MCHC RBC AUTO-ENTMCNC: 32.3 % (ref 32–34.5)
MCV RBC AUTO: 95.8 FL (ref 80–99.9)
MONOCYTES # BLD: 0.56 E9/L (ref 0.1–0.95)
MONOCYTES NFR BLD: 17.1 % (ref 2–12)
NEUTROPHILS # BLD: 1.86 E9/L (ref 1.8–7.3)
NEUTS SEG NFR BLD: 56.7 % (ref 43–80)
PLATELET # BLD AUTO: 96 E9/L (ref 130–450)
PLATELET CONFIRMATION: NORMAL
PMV BLD AUTO: 11.4 FL (ref 7–12)
POTASSIUM SERPL-SCNC: 4.6 MMOL/L (ref 3.5–5)
RBC # BLD AUTO: 4.33 E12/L (ref 3.8–5.8)
SODIUM SERPL-SCNC: 135 MMOL/L (ref 132–146)
WBC # BLD: 3.3 E9/L (ref 4.5–11.5)

## 2023-05-27 PROCEDURE — 6370000000 HC RX 637 (ALT 250 FOR IP): Performed by: LICENSED PRACTICAL NURSE

## 2023-05-27 PROCEDURE — 36415 COLL VENOUS BLD VENIPUNCTURE: CPT

## 2023-05-27 PROCEDURE — 85025 COMPLETE CBC W/AUTO DIFF WBC: CPT

## 2023-05-27 PROCEDURE — 2060000000 HC ICU INTERMEDIATE R&B

## 2023-05-27 PROCEDURE — 90935 HEMODIALYSIS ONE EVALUATION: CPT

## 2023-05-27 PROCEDURE — 6370000000 HC RX 637 (ALT 250 FOR IP): Performed by: INTERNAL MEDICINE

## 2023-05-27 PROCEDURE — 80048 BASIC METABOLIC PNL TOTAL CA: CPT

## 2023-05-27 RX ADMIN — CLONIDINE HYDROCHLORIDE 0.1 MG: 0.1 TABLET ORAL at 15:13

## 2023-05-27 RX ADMIN — ISOSORBIDE DINITRATE 10 MG: 10 TABLET ORAL at 15:13

## 2023-05-27 RX ADMIN — AMLODIPINE BESYLATE 10 MG: 10 TABLET ORAL at 20:47

## 2023-05-27 RX ADMIN — CARVEDILOL 25 MG: 25 TABLET, FILM COATED ORAL at 18:26

## 2023-05-27 ASSESSMENT — PAIN SCALES - GENERAL
PAINLEVEL_OUTOF10: 0

## 2023-05-28 LAB
ANION GAP SERPL CALCULATED.3IONS-SCNC: 15 MMOL/L (ref 7–16)
ANION GAP SERPL CALCULATED.3IONS-SCNC: 15 MMOL/L (ref 7–16)
BASOPHILS # BLD: 0.04 E9/L (ref 0–0.2)
BASOPHILS NFR BLD: 1.2 % (ref 0–2)
BUN SERPL-MCNC: 39 MG/DL (ref 6–23)
BUN SERPL-MCNC: 46 MG/DL (ref 6–23)
CALCIUM SERPL-MCNC: 7.9 MG/DL (ref 8.6–10.2)
CALCIUM SERPL-MCNC: 7.9 MG/DL (ref 8.6–10.2)
CHLORIDE SERPL-SCNC: 97 MMOL/L (ref 98–107)
CHLORIDE SERPL-SCNC: 98 MMOL/L (ref 98–107)
CO2 SERPL-SCNC: 19 MMOL/L (ref 22–29)
CO2 SERPL-SCNC: 23 MMOL/L (ref 22–29)
CREAT SERPL-MCNC: 5.8 MG/DL (ref 0.7–1.2)
CREAT SERPL-MCNC: 6.6 MG/DL (ref 0.7–1.2)
EOSINOPHIL # BLD: 0.11 E9/L (ref 0.05–0.5)
EOSINOPHIL NFR BLD: 3.4 % (ref 0–6)
ERYTHROCYTE [DISTWIDTH] IN BLOOD BY AUTOMATED COUNT: 15.6 FL (ref 11.5–15)
GLUCOSE SERPL-MCNC: 118 MG/DL (ref 74–99)
GLUCOSE SERPL-MCNC: 78 MG/DL (ref 74–99)
HCT VFR BLD AUTO: 43.6 % (ref 37–54)
HGB BLD-MCNC: 14.1 G/DL (ref 12.5–16.5)
IMM GRANULOCYTES # BLD: 0.01 E9/L
IMM GRANULOCYTES NFR BLD: 0.3 % (ref 0–5)
LYMPHOCYTES # BLD: 0.76 E9/L (ref 1.5–4)
LYMPHOCYTES NFR BLD: 23.6 % (ref 20–42)
MCH RBC QN AUTO: 30.9 PG (ref 26–35)
MCHC RBC AUTO-ENTMCNC: 32.3 % (ref 32–34.5)
MCV RBC AUTO: 95.6 FL (ref 80–99.9)
MONOCYTES # BLD: 0.61 E9/L (ref 0.1–0.95)
MONOCYTES NFR BLD: 18.9 % (ref 2–12)
NEUTROPHILS # BLD: 1.69 E9/L (ref 1.8–7.3)
NEUTS SEG NFR BLD: 52.6 % (ref 43–80)
PLATELET # BLD AUTO: 119 E9/L (ref 130–450)
PMV BLD AUTO: 12.4 FL (ref 7–12)
POTASSIUM SERPL-SCNC: 4.9 MMOL/L (ref 3.5–5)
POTASSIUM SERPL-SCNC: 5.2 MMOL/L (ref 3.5–5)
RBC # BLD AUTO: 4.56 E12/L (ref 3.8–5.8)
SODIUM SERPL-SCNC: 132 MMOL/L (ref 132–146)
SODIUM SERPL-SCNC: 135 MMOL/L (ref 132–146)
WBC # BLD: 3.2 E9/L (ref 4.5–11.5)

## 2023-05-28 PROCEDURE — 2500000003 HC RX 250 WO HCPCS: Performed by: INTERNAL MEDICINE

## 2023-05-28 PROCEDURE — 6370000000 HC RX 637 (ALT 250 FOR IP): Performed by: INTERNAL MEDICINE

## 2023-05-28 PROCEDURE — 2580000003 HC RX 258: Performed by: INTERNAL MEDICINE

## 2023-05-28 PROCEDURE — 6370000000 HC RX 637 (ALT 250 FOR IP): Performed by: LICENSED PRACTICAL NURSE

## 2023-05-28 PROCEDURE — 36415 COLL VENOUS BLD VENIPUNCTURE: CPT

## 2023-05-28 PROCEDURE — 80048 BASIC METABOLIC PNL TOTAL CA: CPT

## 2023-05-28 PROCEDURE — 2060000000 HC ICU INTERMEDIATE R&B

## 2023-05-28 PROCEDURE — 85025 COMPLETE CBC W/AUTO DIFF WBC: CPT

## 2023-05-28 RX ADMIN — ISOSORBIDE DINITRATE 10 MG: 10 TABLET ORAL at 21:01

## 2023-05-28 RX ADMIN — SODIUM BICARBONATE: 84 INJECTION, SOLUTION INTRAVENOUS at 13:22

## 2023-05-28 RX ADMIN — AMLODIPINE BESYLATE 10 MG: 10 TABLET ORAL at 21:04

## 2023-05-28 RX ADMIN — CARVEDILOL 25 MG: 25 TABLET, FILM COATED ORAL at 18:16

## 2023-05-28 RX ADMIN — CLONIDINE HYDROCHLORIDE 0.1 MG: 0.1 TABLET ORAL at 21:03

## 2023-05-28 RX ADMIN — ISOSORBIDE DINITRATE 10 MG: 10 TABLET ORAL at 13:25

## 2023-05-28 ASSESSMENT — PAIN SCALES - GENERAL
PAINLEVEL_OUTOF10: 0

## 2023-05-29 LAB
ANION GAP SERPL CALCULATED.3IONS-SCNC: 13 MMOL/L (ref 7–16)
BASOPHILS # BLD: 0.04 E9/L (ref 0–0.2)
BASOPHILS NFR BLD: 1.5 % (ref 0–2)
BUN SERPL-MCNC: 56 MG/DL (ref 6–23)
CALCIUM SERPL-MCNC: 7.8 MG/DL (ref 8.6–10.2)
CHLORIDE SERPL-SCNC: 96 MMOL/L (ref 98–107)
CO2 SERPL-SCNC: 25 MMOL/L (ref 22–29)
CREAT SERPL-MCNC: 7.5 MG/DL (ref 0.7–1.2)
EOSINOPHIL # BLD: 0.1 E9/L (ref 0.05–0.5)
EOSINOPHIL NFR BLD: 3.7 % (ref 0–6)
ERYTHROCYTE [DISTWIDTH] IN BLOOD BY AUTOMATED COUNT: 15.3 FL (ref 11.5–15)
GLUCOSE SERPL-MCNC: 79 MG/DL (ref 74–99)
HCT VFR BLD AUTO: 40.8 % (ref 37–54)
HGB BLD-MCNC: 13 G/DL (ref 12.5–16.5)
IMM GRANULOCYTES # BLD: 0.01 E9/L
IMM GRANULOCYTES NFR BLD: 0.4 % (ref 0–5)
LYMPHOCYTES # BLD: 0.75 E9/L (ref 1.5–4)
LYMPHOCYTES NFR BLD: 27.7 % (ref 20–42)
MCH RBC QN AUTO: 30.6 PG (ref 26–35)
MCHC RBC AUTO-ENTMCNC: 31.9 % (ref 32–34.5)
MCV RBC AUTO: 96 FL (ref 80–99.9)
METER GLUCOSE: 134 MG/DL (ref 74–99)
METER GLUCOSE: 202 MG/DL (ref 74–99)
METER GLUCOSE: 218 MG/DL (ref 74–99)
METER GLUCOSE: 86 MG/DL (ref 74–99)
MONOCYTES # BLD: 0.44 E9/L (ref 0.1–0.95)
MONOCYTES NFR BLD: 16.2 % (ref 2–12)
NEUTROPHILS # BLD: 1.37 E9/L (ref 1.8–7.3)
NEUTS SEG NFR BLD: 50.5 % (ref 43–80)
PLATELET # BLD AUTO: 131 E9/L (ref 130–450)
PMV BLD AUTO: 11.1 FL (ref 7–12)
POTASSIUM SERPL-SCNC: 5.3 MMOL/L (ref 3.5–5)
RBC # BLD AUTO: 4.25 E12/L (ref 3.8–5.8)
SODIUM SERPL-SCNC: 134 MMOL/L (ref 132–146)
WBC # BLD: 2.7 E9/L (ref 4.5–11.5)

## 2023-05-29 PROCEDURE — 82962 GLUCOSE BLOOD TEST: CPT

## 2023-05-29 PROCEDURE — 2500000003 HC RX 250 WO HCPCS: Performed by: LICENSED PRACTICAL NURSE

## 2023-05-29 PROCEDURE — 6370000000 HC RX 637 (ALT 250 FOR IP): Performed by: INTERNAL MEDICINE

## 2023-05-29 PROCEDURE — 97530 THERAPEUTIC ACTIVITIES: CPT

## 2023-05-29 PROCEDURE — 85025 COMPLETE CBC W/AUTO DIFF WBC: CPT

## 2023-05-29 PROCEDURE — 90935 HEMODIALYSIS ONE EVALUATION: CPT

## 2023-05-29 PROCEDURE — 6370000000 HC RX 637 (ALT 250 FOR IP): Performed by: LICENSED PRACTICAL NURSE

## 2023-05-29 PROCEDURE — 97535 SELF CARE MNGMENT TRAINING: CPT

## 2023-05-29 PROCEDURE — 2060000000 HC ICU INTERMEDIATE R&B

## 2023-05-29 PROCEDURE — 2580000003 HC RX 258: Performed by: LICENSED PRACTICAL NURSE

## 2023-05-29 PROCEDURE — 80048 BASIC METABOLIC PNL TOTAL CA: CPT

## 2023-05-29 PROCEDURE — 36415 COLL VENOUS BLD VENIPUNCTURE: CPT

## 2023-05-29 RX ADMIN — CARVEDILOL 25 MG: 25 TABLET, FILM COATED ORAL at 18:16

## 2023-05-29 RX ADMIN — CARVEDILOL 25 MG: 25 TABLET, FILM COATED ORAL at 10:00

## 2023-05-29 RX ADMIN — CLONIDINE HYDROCHLORIDE 0.1 MG: 0.1 TABLET ORAL at 20:16

## 2023-05-29 RX ADMIN — AMLODIPINE BESYLATE 10 MG: 10 TABLET ORAL at 20:16

## 2023-05-29 RX ADMIN — ISOSORBIDE DINITRATE 10 MG: 10 TABLET ORAL at 10:01

## 2023-05-29 RX ADMIN — ISOSORBIDE DINITRATE 10 MG: 10 TABLET ORAL at 20:16

## 2023-05-29 RX ADMIN — CLONIDINE HYDROCHLORIDE 0.1 MG: 0.1 TABLET ORAL at 10:01

## 2023-05-29 RX ADMIN — SODIUM BICARBONATE: 84 INJECTION, SOLUTION INTRAVENOUS at 14:49

## 2023-05-29 ASSESSMENT — PAIN SCALES - GENERAL
PAINLEVEL_OUTOF10: 0

## 2023-05-29 NOTE — PLAN OF CARE
Problem: Chronic Conditions and Co-morbidities  Goal: Patient's chronic conditions and co-morbidity symptoms are monitored and maintained or improved  Outcome: Progressing     Problem: Discharge Planning  Goal: Discharge to home or other facility with appropriate resources  Outcome: Progressing     Problem: Safety - Adult  Goal: Free from fall injury  Outcome: Progressing     Problem: Pain  Goal: Verbalizes/displays adequate comfort level or baseline comfort level  Outcome: Progressing     Problem: Skin/Tissue Integrity  Goal: Absence of new skin breakdown  Description: 1. Monitor for areas of redness and/or skin breakdown  2. Assess vascular access sites hourly  3. Every 4-6 hours minimum:  Change oxygen saturation probe site  4. Every 4-6 hours:  If on nasal continuous positive airway pressure, respiratory therapy assess nares and determine need for appliance change or resting period. Outcome: Progressing     Problem: Cardiovascular - Adult  Goal: Maintains optimal cardiac output and hemodynamic stability  Outcome: Progressing     Problem: Metabolic/Fluid and Electrolytes - Adult  Goal: Hemodynamic stability and optimal renal function maintained  Outcome: Progressing     Problem: Safety - Medical Restraint  Goal: Remains free of injury from restraints (Restraint for Interference with Medical Device)  Description: INTERVENTIONS:  1. Determine that other, less restrictive measures have been tried or would not be effective before applying the restraint  2. Evaluate the patient's condition at the time of restraint application  3. Inform patient/family regarding the reason for restraint  4.  Q2H: Monitor safety, psychosocial status, comfort, nutrition and hydration  Outcome: Progressing

## 2023-05-29 NOTE — PROGRESS NOTES
Hospitalist Progress Note      PCP: Edwin Lee DO    Date of Admission: 5/22/2023        Hospital Course:  75 y.o. male presented with HYPERKALEMIA, [address],   HAS altered mental status. Patient was last seen normal on Saturday. Patient reportedly was found on the ground. HE IS CONFUSED, BUT HE KNEW WHO I WAS. [POOR HISTORIAN, AND  NO FAMILY PRESENT** HIS MENTATION IMPROVED, AND LAST PM AFTER DIALYSIS, HE BECAME CONFUSED AGAIN AND COMBATIVE **SEEMS MORE CALM, EEG NEG LAST ADMISSION              Subjective:  ASLEEP           Medications:  Reviewed    Infusion Medications    sodium bicarbonate infusion 40 mL/hr at 05/29/23 1048    sodium chloride      dextrose       Scheduled Medications    hydrALAZINE  50 mg Oral 3 times per day    isosorbide dinitrate  10 mg Oral TID    cloNIDine  0.1 mg Oral BID    cloNIDine  0.2 mg Oral Once    amLODIPine  10 mg Oral Nightly    LORazepam  1 mg IntraMUSCular Once    sodium chloride flush  5-40 mL IntraVENous 2 times per day    carvedilol  25 mg Oral BID WC    insulin lispro  0-10 Units SubCUTAneous 4x Daily AC & HS    clotrimazole  10 mg Oral 5x Daily    heparin (porcine)  5,000 Units SubCUTAneous Q8H     PRN Meds: haloperidol lactate, sodium chloride flush, sodium chloride, acetaminophen **OR** acetaminophen, bisacodyl, hydrALAZINE, perflutren lipid microspheres, glucose, dextrose bolus **OR** dextrose bolus, glucagon (rDNA), dextrose, dextrose      Intake/Output Summary (Last 24 hours) at 5/29/2023 1406  Last data filed at 5/29/2023 7226  Gross per 24 hour   Intake 1262 ml   Output --   Net 1262 ml       Exam:    /65   Pulse 57   Temp 97.3 °F (36.3 °C)   Resp 18   Ht 6' 2\" (1.88 m)   Wt 142 lb 3.2 oz (64.5 kg)   SpO2 100%   BMI 18.26 kg/m²       General appearance:  No apparent distress, . HEENT:  Normal cephalic,   Neck: Supple, with full range of motion. No jugular venous distention. Trachea midline.   Respiratory:  Normal

## 2023-05-29 NOTE — PROGRESS NOTES
Physical Therapy  Physical Therapy Daily Treatment Note        Name: Law Anderson  : 1950  MRN: [id number]      Date of Service: 2023    Evaluating PT:  Tom Gonzálezpalomo Peraza PT, DPT [id number]    Room #:  8502/8502-B  Diagnosis:  Hyperkalemia [E87.5]  Hypoglycemia [E16.2]  Altered mental status, unspecified altered mental status type [R41.82]  PMHx/PSHx:   has a past medical history of Anemia, Chronic kidney disease, Depression, Diabetes mellitus ([facility] ESRD (end stage renal disease) ([facility] Hepatitis C, Hypertension, Liver disease, Moderate protein-calorie malnutrition ([facility] Muscle weakness (generalized), Thyroid disease, Unspecified diseases of blood and blood-forming organs, and Unsteadiness on feet. Procedure/Surgery:  none this admission   Precautions: Falls, alarms, cognition     SUBJECTIVE:    Pt lives alone in a 3 story home with ? stairs to enter and ? rail. Bed is on 1st floor and bath is on 1st floor. Pt ambulated with no AD and was independent PTA. Pt is a questionable historian. Equipment Owned: [facility]  Equipment Needs:  [employer]    OBJECTIVE:   Initial Evaluation  Date: 23 Treatment  23  Short Term/ Long Term   Goals   AM-PAC 6 Clicks      Was pt agreeable to Eval/treatment? Yes  Yes     Does pt have pain? Denies pain  Denies pain     Bed Mobility  Rolling: Beverly  Supine to sit: Beverly  Sit to supine: Beverly  Scooting: Beverly Rolling: NT  Supine to sit: Mod A  Sit to supine: NT  Scooting:  Mod A to EOB Rolling: supervision   Supine to sit: supervision   Sit to supine: supervision   Scooting: supervision    Transfers Sit to stand: MaxA  Stand to sit: MaxA  Stand pivot: NT Sit to stand: Min A  Stand to sit: Min A  Stand pivot: Min A Sit to stand: Beverly  Stand to sit: Beverly  Stand pivot: Beverly   Ambulation    Side steps toward HOB with MaxA and [employer]  75 feet with FWW Min A 150 feet with [employer] negotiation: ascended and descended  NT  NT   4 steps with 2 rail Beverly   ROM BUE:  Defer to

## 2023-05-29 NOTE — FLOWSHEET NOTE
05/29/23 1540   Vital Signs   /80   Temp 96.9 °F (36.1 °C)   Pulse 53   Respirations 18   Weight - Scale 141 lb 12.1 oz (64.3 kg)   Weight Method Bed scale   Percent Weight Change -0.31   Pain Assessment   Pain Assessment None - Denies Pain   Post-Hemodialysis Assessment   Post-Treatment Procedures Blood returned;Catheter capped, clamped with Citrate x 2 ports   Machine Disinfection Process Acid/Vinegar Clean;Heat Disinfect; Exterior Machine Disinfection   Rinseback Volume (ml) 300 ml   Blood Volume Processed (Liters) 42.2 l/min   Dialyzer Clearance Moderately streaked   Duration of Treatment (minutes) 120 minutes   Heparin Amount Administered During Treatment (mL) 0 mL   Hemodialysis Intake (ml) 300 ml   Hemodialysis Output (ml) 1500 ml   NET Removed (ml) 1200   Tolerated Treatment Good   Patient Response to Treatment tolerated well   Bilateral Breath Sounds Clear   Edema None   Physician Notified No   Time Off 1526   Patient Disposition Return to room

## 2023-05-29 NOTE — PROGRESS NOTES
Department of Internal Medicine  Nephrology Nurse Practitioner Progress Note      Events reviewed. SUBJECTIVE: We are following Crane Bras for ESRD on HD. Patient reports no complaints.     PHYSICAL EXAM:      Vitals:    VITALS:  /83   Pulse 57   Temp 97.9 °F (36.6 °C) (Temporal)   Resp 16   Ht 6' 2\" (1.88 m)   Wt 130 lb 1.1 oz (59 kg)   SpO2 96%   BMI 16.70 kg/m²   24HR INTAKE/OUTPUT:    Intake/Output Summary (Last 24 hours) at 5/29/2023 0839  Last data filed at 5/29/2023 1169  Gross per 24 hour   Intake 1622 ml   Output --   Net 1622 ml         Constitutional:  Awake, alert, oriented x3, in NAD  HEENT:  PERRLA, normocephalic, atraumatic  Respiratory:  CTA  Cardiovascular/Edema:  RRR, normal S1, normal S2, trace pedal edema  Gastrointestinal:  Soft, flat, non-distended, non-tender  Neurologic:  Nonfocal  Skin:  warm, dry, no rashes, no lesions  Access:  tunneled RIJ    Scheduled Meds:   hydrALAZINE  50 mg Oral 3 times per day    isosorbide dinitrate  10 mg Oral TID    cloNIDine  0.1 mg Oral BID    cloNIDine  0.2 mg Oral Once    amLODIPine  10 mg Oral Nightly    LORazepam  1 mg IntraMUSCular Once    sodium chloride flush  5-40 mL IntraVENous 2 times per day    carvedilol  25 mg Oral BID WC    insulin lispro  0-10 Units SubCUTAneous 4x Daily AC & HS    clotrimazole  10 mg Oral 5x Daily    heparin (porcine)  5,000 Units SubCUTAneous Q8H     Continuous Infusions:   sodium bicarbonate infusion 50 mL/hr at 05/28/23 1322    sodium chloride      dextrose       PRN Meds:.haloperidol lactate, sodium chloride flush, sodium chloride, acetaminophen **OR** acetaminophen, bisacodyl, hydrALAZINE, perflutren lipid microspheres, glucose, dextrose bolus **OR** dextrose bolus, glucagon (rDNA), dextrose, dextrose     DATA:    CBC with Differential:    Lab Results   Component Value Date/Time    WBC 2.7 05/29/2023 04:55 AM    RBC 4.25 05/29/2023 04:55 AM    HGB 13.0 05/29/2023 04:55 AM    HCT 40.8 05/29/2023 04:55

## 2023-05-29 NOTE — PROGRESS NOTES
Occupational Therapy  OT BEDSIDE TREATMENT NOTE   [address] [address]  [address], [place], [place]       Date:2023  Patient Name: Varsha Sharp  MRN: [id number]  : 1950  Room: [address] [place] [address]     Per OT Eval:     Evaluating OT: Reggie Ledezma OTR/L; [id number]        Referring Provider: KIANNA Corbett CNP    Specific Provider Orders/Date: OT Eval and Treat 23        Diagnosis: Hyperkalemia. Pt found on ground at home. Surgery: None this admission     Pertinent Medical History:  has a past medical history of Anemia, Chronic kidney disease, Depression, Diabetes mellitus ([address] ESRD (end stage renal disease) ([address] Hepatitis C, Hypertension, Liver disease, Moderate protein-calorie malnutrition ([address] Muscle weakness (generalized), Thyroid disease, Unspecified diseases of blood and blood-forming organs, and Unsteadiness on feet.       Recommended Adaptive Equipment: TBD pending progress      Precautions:  Fall Risk, cognition, +alarms, bedside sitter, dysphagia diet - soft & bite sized      Assessment of current deficits    [x] Functional mobility            [x]ADLs           [x] Strength                  [x]Cognition    [x] Functional transfers          [x] IADLs         [x] Safety Awareness   [x]Endurance    [x] Fine Coordination                         [x] Balance      [] Vision/perception   []Sensation      []Gross Motor Coordination             [] ROM           [] Delirium                   [] Motor Control      OT PLAN OF CARE   OT POC based on physician orders, patient diagnosis and results of clinical assessment     Frequency/Duration 1-3 days/wk for 2 weeks PRN   Specific OT Treatment Interventions to include:   * Instruction/training on adapted ADL techniques and AE recommendations to increase functional independence within precautions       * Training on energy conservation strategies, correct breathing pattern and

## 2023-05-30 VITALS
DIASTOLIC BLOOD PRESSURE: 83 MMHG | TEMPERATURE: 97.2 F | SYSTOLIC BLOOD PRESSURE: 151 MMHG | BODY MASS INDEX: 17.74 KG/M2 | RESPIRATION RATE: 16 BRPM | HEIGHT: 74 IN | WEIGHT: 138.23 LBS | HEART RATE: 55 BPM | OXYGEN SATURATION: 96 %

## 2023-05-30 LAB
ANION GAP SERPL CALCULATED.3IONS-SCNC: 15 MMOL/L (ref 7–16)
BASOPHILS # BLD: 0.04 E9/L (ref 0–0.2)
BASOPHILS NFR BLD: 1.7 % (ref 0–2)
BUN SERPL-MCNC: 43 MG/DL (ref 6–23)
CALCIUM SERPL-MCNC: 7.7 MG/DL (ref 8.6–10.2)
CHLORIDE SERPL-SCNC: 92 MMOL/L (ref 98–107)
CO2 SERPL-SCNC: 27 MMOL/L (ref 22–29)
CREAT SERPL-MCNC: 6.5 MG/DL (ref 0.7–1.2)
EOSINOPHIL # BLD: 0.1 E9/L (ref 0.05–0.5)
EOSINOPHIL NFR BLD: 4.3 % (ref 0–6)
ERYTHROCYTE [DISTWIDTH] IN BLOOD BY AUTOMATED COUNT: 14.8 FL (ref 11.5–15)
GLUCOSE SERPL-MCNC: 114 MG/DL (ref 74–99)
HCT VFR BLD AUTO: 40.5 % (ref 37–54)
HGB BLD-MCNC: 13.4 G/DL (ref 12.5–16.5)
IMM GRANULOCYTES # BLD: 0.01 E9/L
IMM GRANULOCYTES NFR BLD: 0.4 % (ref 0–5)
LYMPHOCYTES # BLD: 0.69 E9/L (ref 1.5–4)
LYMPHOCYTES NFR BLD: 29.5 % (ref 20–42)
MCH RBC QN AUTO: 30.9 PG (ref 26–35)
MCHC RBC AUTO-ENTMCNC: 33.1 % (ref 32–34.5)
MCV RBC AUTO: 93.3 FL (ref 80–99.9)
METER GLUCOSE: 80 MG/DL (ref 74–99)
METER GLUCOSE: 88 MG/DL (ref 74–99)
MONOCYTES # BLD: 0.4 E9/L (ref 0.1–0.95)
MONOCYTES NFR BLD: 17.1 % (ref 2–12)
NEUTROPHILS # BLD: 1.1 E9/L (ref 1.8–7.3)
NEUTS SEG NFR BLD: 47 % (ref 43–80)
PLATELET # BLD AUTO: 126 E9/L (ref 130–450)
PMV BLD AUTO: 11.8 FL (ref 7–12)
POTASSIUM SERPL-SCNC: 4.8 MMOL/L (ref 3.5–5)
RBC # BLD AUTO: 4.34 E12/L (ref 3.8–5.8)
SODIUM SERPL-SCNC: 134 MMOL/L (ref 132–146)
WBC # BLD: 2.3 E9/L (ref 4.5–11.5)

## 2023-05-30 PROCEDURE — 82962 GLUCOSE BLOOD TEST: CPT

## 2023-05-30 PROCEDURE — 90935 HEMODIALYSIS ONE EVALUATION: CPT

## 2023-05-30 PROCEDURE — 36415 COLL VENOUS BLD VENIPUNCTURE: CPT

## 2023-05-30 PROCEDURE — 85025 COMPLETE CBC W/AUTO DIFF WBC: CPT

## 2023-05-30 PROCEDURE — 80048 BASIC METABOLIC PNL TOTAL CA: CPT

## 2023-05-30 RX ORDER — INSULIN LISPRO 100 [IU]/ML
0-10 INJECTION, SOLUTION INTRAVENOUS; SUBCUTANEOUS
DISCHARGE
Start: 2023-05-30

## 2023-05-30 RX ORDER — AMLODIPINE BESYLATE 10 MG/1
10 TABLET ORAL NIGHTLY
Qty: 30 TABLET | Refills: 3 | DISCHARGE
Start: 2023-05-30

## 2023-05-30 RX ORDER — CLOTRIMAZOLE 10 MG/1
10 LOZENGE ORAL; TOPICAL
Qty: 50 TABLET | Refills: 0 | DISCHARGE
Start: 2023-05-30 | End: 2023-06-09

## 2023-05-30 RX ORDER — CLONIDINE HYDROCHLORIDE 0.1 MG/1
0.1 TABLET ORAL 2 TIMES DAILY
Qty: 60 TABLET | Refills: 3 | DISCHARGE
Start: 2023-05-30

## 2023-05-30 ASSESSMENT — PAIN SCALES - GENERAL
PAINLEVEL_OUTOF10: 0
PAINLEVEL_OUTOF10: 0

## 2023-05-30 NOTE — PROGRESS NOTES
Patient stated he would call and let family know that he was transferring to [facility] today.

## 2023-05-30 NOTE — FLOWSHEET NOTE
05/30/23 1015   Vital Signs   BP (!) 156/85   Temp 97.6 °F (36.4 °C)   Pulse 84   Respirations 16   Weight - Scale 138 lb 3.7 oz (62.7 kg)   Weight Method Bed scale   Percent Weight Change -2.34   Pain Assessment   Pain Assessment None - Denies Pain   Post-Hemodialysis Assessment   Post-Treatment Procedures Blood returned;Catheter capped, clamped and heparinized x 2 ports   Machine Disinfection Process Exterior Machine Disinfection   Rinseback Volume (ml) 300 ml   Blood Volume Processed (Liters) 47.1 l/min   Dialyzer Clearance Clear   Duration of Treatment (minutes) 180 minutes   Heparin Amount Administered During Treatment (mL) 0 mL   Hemodialysis Intake (ml) 300 ml   Hemodialysis Output (ml) 1800 ml   NET Removed (ml) 1500   Tolerated Treatment Good   Patient Response to Treatment Tolerated HD well, refused 4 hour HD. Would only stay 3 hours. Pt rinsed back post line care completed Ends cappped.    Bilateral Breath Sounds Diminished   Edema None   Physician Notified Yes   Time Off 1954

## 2023-05-30 NOTE — CARE COORDINATION
SOCIAL WORK/CASEMANAGEMENT TRANSITION OF CARE PLANNINGDe Melaniers [place], [address]): Renal signed off. Rn has call out to pcp for discharge order. Plan is to [facility]. Precert not needed. [facility]  for 12:30 p.m. today. Snf;loc. Called son, Belem Leiva, to inform him.  JAY Casey  5/30/2023

## 2023-05-30 NOTE — PROGRESS NOTES
Department of Internal Medicine  Nephrology Nurse Practitioner Progress Note      Events reviewed. Patient seen on HD today, tolerating well. SUBJECTIVE: We are following Malon Shone for ESRD on HD. Patient reports no complaints.     PHYSICAL EXAM:      Vitals:    VITALS:  BP (!) 141/78   Pulse 53   Temp 97.8 °F (36.6 °C)   Resp 20   Ht 6' 2\" (1.88 m)   Wt 141 lb 8.6 oz (64.2 kg)   SpO2 97%   BMI 18.17 kg/m²   24HR INTAKE/OUTPUT:    Intake/Output Summary (Last 24 hours) at 5/30/2023 0914  Last data filed at 5/30/2023 0534  Gross per 24 hour   Intake 1605.79 ml   Output 1500 ml   Net 105.79 ml         Constitutional:  Awake, alert, oriented x3, in NAD  HEENT:  PERRLA, normocephalic, atraumatic  Respiratory:  CTA  Cardiovascular/Edema:  RRR, normal S1, normal S2, trace pedal edema  Gastrointestinal:  Soft, flat, non-distended, non-tender  Neurologic:  Nonfocal  Skin:  warm, dry, no rashes, no lesions  Access:  tunneled RIJ    Scheduled Meds:   hydrALAZINE  50 mg Oral 3 times per day    isosorbide dinitrate  10 mg Oral TID    cloNIDine  0.1 mg Oral BID    cloNIDine  0.2 mg Oral Once    amLODIPine  10 mg Oral Nightly    LORazepam  1 mg IntraMUSCular Once    sodium chloride flush  5-40 mL IntraVENous 2 times per day    carvedilol  25 mg Oral BID WC    insulin lispro  0-10 Units SubCUTAneous 4x Daily AC & HS    clotrimazole  10 mg Oral 5x Daily    heparin (porcine)  5,000 Units SubCUTAneous Q8H     Continuous Infusions:   sodium bicarbonate infusion 40 mL/hr at 05/29/23 1449    sodium chloride      dextrose       PRN Meds:.haloperidol lactate, sodium chloride flush, sodium chloride, acetaminophen **OR** acetaminophen, bisacodyl, hydrALAZINE, perflutren lipid microspheres, glucose, dextrose bolus **OR** dextrose bolus, glucagon (rDNA), dextrose, dextrose     DATA:    CBC with Differential:    Lab Results   Component Value Date/Time    WBC 2.3 05/30/2023 07:20 AM    RBC 4.34 05/30/2023 07:20 AM    HGB 13.4

## 2023-05-30 NOTE — PLAN OF CARE
Problem: Chronic Conditions and Co-morbidities  Goal: Patient's chronic conditions and co-morbidity symptoms are monitored and maintained or improved  Outcome: Completed     Problem: Discharge Planning  Goal: Discharge to home or other facility with appropriate resources  Outcome: Completed     Problem: Safety - Adult  Goal: Free from fall injury  Outcome: Completed     Problem: Pain  Goal: Verbalizes/displays adequate comfort level or baseline comfort level  Outcome: Completed     Problem: Skin/Tissue Integrity  Goal: Absence of new skin breakdown  Description: 1. Monitor for areas of redness and/or skin breakdown  2. Assess vascular access sites hourly  3. Every 4-6 hours minimum:  Change oxygen saturation probe site  4. Every 4-6 hours:  If on nasal continuous positive airway pressure, respiratory therapy assess nares and determine need for appliance change or resting period. Outcome: Completed     Problem: Cardiovascular - Adult  Goal: Maintains optimal cardiac output and hemodynamic stability  Outcome: Completed     Problem: Metabolic/Fluid and Electrolytes - Adult  Goal: Hemodynamic stability and optimal renal function maintained  Outcome: Completed     Problem: Safety - Medical Restraint  Goal: Remains free of injury from restraints (Restraint for Interference with Medical Device)  Description: INTERVENTIONS:  1. Determine that other, less restrictive measures have been tried or would not be effective before applying the restraint  2. Evaluate the patient's condition at the time of restraint application  3. Inform patient/family regarding the reason for restraint  4.  Q2H: Monitor safety, psychosocial status, comfort, nutrition and hydration  Outcome: Completed

## 2023-05-30 NOTE — PROGRESS NOTES
Okay to discharge from nephrology stand point per Dr. Diya Damon. Message sent to Dr. Siddhartha Pena for discharge orders.

## 2023-05-30 NOTE — PROGRESS NOTES
Hospitalist Progress Note      PCP: Kishan Wright DO    Date of Admission: 5/22/2023        Hospital Course:  75 y.o. male presented with HYPERKALEMIA, [address],   HAS altered mental status. Patient was last seen normal on Saturday. Patient reportedly was found on the ground. HE IS CONFUSED, BUT HE KNEW WHO I WAS. [POOR HISTORIAN, AND  NO FAMILY PRESENT** HIS MENTATION IMPROVED, AND LAST PM AFTER DIALYSIS, HE BECAME CONFUSED AGAIN AND COMBATIVE **SEEMS MORE CALM, EEG NEG LAST ADMISSION              Subjective:  NO COMPLAINTS           Medications:  Reviewed    Infusion Medications   Scheduled Medications   PRN Meds:       Intake/Output Summary (Last 24 hours) at 5/30/2023 1517  Last data filed at 5/30/2023 1015  Gross per 24 hour   Intake 1785.79 ml   Output 3300 ml   Net -1514.21 ml       Exam:    BP (!) 151/83   Pulse 55   Temp 97.2 °F (36.2 °C) (Temporal)   Resp 16   Ht 6' 2\" (1.88 m)   Wt 138 lb 3.7 oz (62.7 kg)   SpO2 96%   BMI 17.75 kg/m²       General appearance:  No apparent distress, . HEENT:  Normal cephalic,   Neck: Supple, with full range of motion. No jugular venous distention. Trachea midline. Respiratory:  Normal respiratory effort. Clear to auscultation,   Cardiovascular:  RRR  Abdomen: Soft, non-tender, non-distended with normal bowel sounds. Musculoskeletal:  No clubbing, cyanosis POS edema bilaterally.     Skin: smooth and dry   Neurologic:   Cranial nerves: II-XII intact,   Psychiatric:  Alert and oriented x3                Labs:   Recent Labs     05/28/23  0504 05/29/23  0455 05/30/23  0720   WBC 3.2* 2.7* 2.3*   HGB 14.1 13.0 13.4   HCT 43.6 40.8 40.5   * 131 126*     Recent Labs     05/28/23  1535 05/29/23  0455 05/30/23  0720    134 134   K 4.9 5.3* 4.8   CL 97* 96* 92*   CO2 23 25 27   BUN 46* 56* 43*   CREATININE 6.6* 7.5* 6.5*   CALCIUM 7.9* 7.8* 7.7*     No results for input(s): AST, ALT, BILIDIR, BILITOT, ALKPHOS in the last 72

## 2023-05-31 ENCOUNTER — TELEPHONE (OUTPATIENT)
Dept: CARDIOLOGY CLINIC | Age: 73
End: 2023-05-31

## 2023-06-02 PROBLEM — R79.89 ELEVATED TROPONIN: Status: RESOLVED | Noted: 2021-11-19 | Resolved: 2023-06-02

## 2023-06-02 PROBLEM — R77.8 ELEVATED TROPONIN: Status: RESOLVED | Noted: 2021-11-19 | Resolved: 2023-06-02

## 2023-06-02 NOTE — DISCHARGE SUMMARY
Hospitalist Discharge Summary    Patient ID: Dat Schuster   Patient : 1950  Patient's PCP: Reid Hong DO    Admit Date: 2023   Admitting Physician: Cathy Garcia MD    Discharge Date:  2023   Discharge Physician: Link Linton DO   Discharge Condition: Stable  Discharge Disposition: Skilled Facility      Discharge Diagnoses: Active Hospital Problems    Diagnosis Date Noted    Hyperkalemia [E87.5] 2021     CHANGE IN MENTAL STATUS   NO COMPLIANCE   ESRD ON HD   EDEMA OF LEGS   HTN   II DM  CHRONIC LACUNAR INFARCTS   HYPOTHYROID   HCV    ENCEPHALOPATHY, METABOLIC    NEG EEG      Hospital course in brief:    75 y.o. male presented with HYPERKALEMIA, [address],   HAS altered mental status. Patient was last seen normal on Saturday. Patient reportedly was found on the ground. HE IS CONFUSED, BUT HE KNEW WHO I WAS. [POOR HISTORIAN, AND  NO FAMILY PRESENT** HIS MENTATION IMPROVED, AND LAST PM AFTER DIALYSIS, HE BECAME CONFUSED AGAIN AND COMBATIVE **SEEMS MORE CALM, EEG NEG LAST ADMISSION    PHYSICAL EXAM:    BP (!) 151/83   Pulse 55   Temp 97.2 °F (36.2 °C) (Temporal)   Resp 16   Ht 6' 2\" (1.88 m)   Wt 138 lb 3.7 oz (62.7 kg)   SpO2 96%   BMI 17.75 kg/m²     General appearance:  No apparent distress, . HEENT:  Normal cephalic,   Neck: Supple, with full range of motion. No jugular venous distention. Trachea midline. Respiratory:  Normal respiratory effort. Clear to auscultation,   Cardiovascular:  RRR  Abdomen: Soft, non-tender, non-distended with normal bowel sounds. Musculoskeletal:  No clubbing, cyanosis POS edema bilaterally. Skin: smooth and dry   Neurologic:   Cranial nerves: II-XII intact,   Psychiatric:  Alert and oriented x3             Prior to Admission medications    Medication Sig Start Date End Date Taking?  Authorizing Provider   amLODIPine (NORVASC) 10 MG tablet Take 1 tablet by mouth nightly 23  Yes Link Linton DO

## 2023-06-10 ENCOUNTER — HOSPITAL ENCOUNTER (EMERGENCY)
Age: 73
Discharge: HOME | End: 2023-06-10
Attending: STUDENT IN AN ORGANIZED HEALTH CARE EDUCATION/TRAINING PROGRAM

## 2023-06-10 VITALS
HEART RATE: 53 BPM | HEIGHT: 74 IN | WEIGHT: 150 LBS | RESPIRATION RATE: 15 BRPM | SYSTOLIC BLOOD PRESSURE: 234 MMHG | TEMPERATURE: 97.8 F | BODY MASS INDEX: 19.25 KG/M2 | OXYGEN SATURATION: 100 % | DIASTOLIC BLOOD PRESSURE: 101 MMHG

## 2023-06-10 DIAGNOSIS — I10 PRIMARY HYPERTENSION: Primary | ICD-10-CM

## 2023-06-10 DIAGNOSIS — Z87.448 HISTORY OF END STAGE RENAL DISEASE: ICD-10-CM

## 2023-06-10 ASSESSMENT — ENCOUNTER SYMPTOMS
BACK PAIN: 0
SINUS PRESSURE: 0
NAUSEA: 0
EYE REDNESS: 0
EYE DISCHARGE: 0
ABDOMINAL PAIN: 0
EYE PAIN: 0
WHEEZING: 0
SHORTNESS OF BREATH: 0
DIARRHEA: 0
VOMITING: 0
COUGH: 0
SORE THROAT: 0

## 2023-06-10 ASSESSMENT — PAIN - FUNCTIONAL ASSESSMENT: PAIN_FUNCTIONAL_ASSESSMENT: NONE - DENIES PAIN

## 2023-06-10 NOTE — ED PROVIDER NOTES
Department of Emergency Medicine   ED  Provider Note  Admit Date/RoomTime: 6/10/2023  3:31 PM  ED Room: 10/10              [facility]     Sadi Kiran is a 75 y.o. male with a PMHx significant for  ESRD on HD Tu/Thur/sat who just completed dialysis, follows with Dr. Perri Willson, CHF, HTN, DM  who presents for evaluation of elevated blood pressure, beginning prior to arrival.  The complaint has been persistent, moderate in severity, and worsened by nothing. The patient states that he was at [facility] receiving dialysis when they sent him over here secondary to elevated blood pressure. He told them he did not want to come. Patient is currently awake, alert, oriented person, place, time. He is refusing any sort of treatment at this time. He does have clinical decision-making capacity. Patient denies any dizziness, lightheadedness, chest pain, shortness of breath, nausea, vomiting, diarrhea. Review of Systems   Constitutional:  Negative for chills and fever. HENT:  Negative for ear pain, sinus pressure and sore throat. Eyes:  Negative for pain, discharge and redness. Respiratory:  Negative for cough, shortness of breath and wheezing. Cardiovascular:  Negative for chest pain. Gastrointestinal:  Negative for abdominal pain, diarrhea, nausea and vomiting. Genitourinary:  Negative for dysuria and frequency. Musculoskeletal:  Negative for arthralgias and back pain. Skin:  Negative for rash and wound. Neurological:  Negative for weakness and headaches. Hematological:  Negative for adenopathy. All other systems reviewed and are negative. Physical Exam  Vitals and nursing note reviewed. Constitutional:       General: He is not in acute distress. Appearance: Normal appearance. He is well-developed. He is not ill-appearing. HENT:      Head: Normocephalic and atraumatic. Right Ear: External ear normal.      Left Ear: External ear normal.   Eyes:      General:         Right eye: No discharge.

## 2023-06-10 NOTE — ED NOTES
Pt a/ox3. Respirs easy, non labored. Skin warm and dry. Pt decided he wants to leave AMA. Physician aware. Pt signed AMA paper. Will arrnage transport back to facility.      Maggi Schwartz RN  06/10/23 4131

## 2023-06-10 NOTE — DISCHARGE INSTRUCTIONS
Despite leaving [address] please return if symptoms worsen. Follow-up with your family physician.

## 2023-07-23 ENCOUNTER — APPOINTMENT (OUTPATIENT)
Dept: CT IMAGING | Age: 73
DRG: 064 | End: 2023-07-23
Attending: HOSPITALIST
Payer: MEDICARE

## 2023-07-23 ENCOUNTER — APPOINTMENT (OUTPATIENT)
Dept: GENERAL RADIOLOGY | Age: 73
End: 2023-07-23
Payer: MEDICARE

## 2023-07-23 ENCOUNTER — HOSPITAL ENCOUNTER (INPATIENT)
Age: 73
LOS: 4 days | Discharge: ANOTHER HOSPITAL | DRG: 064 | End: 2023-07-27
Attending: HOSPITALIST | Admitting: HOSPITALIST
Payer: MEDICARE

## 2023-07-23 ENCOUNTER — APPOINTMENT (OUTPATIENT)
Dept: CT IMAGING | Age: 73
End: 2023-07-23
Payer: MEDICARE

## 2023-07-23 ENCOUNTER — HOSPITAL ENCOUNTER (EMERGENCY)
Age: 73
Discharge: ANOTHER HOSPITAL | End: 2023-07-23
Attending: STUDENT IN AN ORGANIZED HEALTH CARE EDUCATION/TRAINING PROGRAM
Payer: MEDICARE

## 2023-07-23 VITALS
SYSTOLIC BLOOD PRESSURE: 188 MMHG | BODY MASS INDEX: 18.61 KG/M2 | HEART RATE: 94 BPM | RESPIRATION RATE: 16 BRPM | TEMPERATURE: 98.2 F | HEIGHT: 74 IN | OXYGEN SATURATION: 100 % | DIASTOLIC BLOOD PRESSURE: 97 MMHG | WEIGHT: 145 LBS

## 2023-07-23 DIAGNOSIS — I61.9 INTRAPARENCHYMAL HEMORRHAGE OF BRAIN (HCC): Primary | ICD-10-CM

## 2023-07-23 DIAGNOSIS — I16.1 HYPERTENSIVE EMERGENCY: ICD-10-CM

## 2023-07-23 DIAGNOSIS — K92.0 HEMATEMESIS WITH NAUSEA: ICD-10-CM

## 2023-07-23 LAB
ABO + RH BLD: NORMAL
ALBUMIN SERPL-MCNC: 4.3 G/DL (ref 3.5–5.2)
ALP SERPL-CCNC: 92 U/L (ref 40–129)
ALT SERPL-CCNC: 28 U/L (ref 0–40)
ANION GAP SERPL CALCULATED.3IONS-SCNC: 22 MMOL/L (ref 7–16)
ARM BAND NUMBER: NORMAL
AST SERPL-CCNC: 31 U/L (ref 0–39)
BASOPHILS # BLD: 0.02 K/UL (ref 0–0.2)
BASOPHILS # BLD: 0.04 K/UL (ref 0–0.2)
BASOPHILS NFR BLD: 0 % (ref 0–2)
BASOPHILS NFR BLD: 1 % (ref 0–2)
BILIRUB SERPL-MCNC: 1.5 MG/DL (ref 0–1.2)
BLOOD BANK SAMPLE EXPIRATION: NORMAL
BLOOD GROUP ANTIBODIES SERPL: NEGATIVE
BUN SERPL-MCNC: 25 MG/DL (ref 6–23)
CALCIUM SERPL-MCNC: 9.8 MG/DL (ref 8.6–10.2)
CHLORIDE SERPL-SCNC: 98 MMOL/L (ref 98–107)
CO2 SERPL-SCNC: 16 MMOL/L (ref 22–29)
CREAT SERPL-MCNC: 4.8 MG/DL (ref 0.7–1.2)
EKG ATRIAL RATE: 94 BPM
EKG P AXIS: 71 DEGREES
EKG P-R INTERVAL: 150 MS
EKG Q-T INTERVAL: 432 MS
EKG QRS DURATION: 86 MS
EKG QTC CALCULATION (BAZETT): 540 MS
EKG R AXIS: -77 DEGREES
EKG T AXIS: 94 DEGREES
EKG VENTRICULAR RATE: 94 BPM
EOSINOPHIL # BLD: 0.01 K/UL (ref 0.05–0.5)
EOSINOPHIL # BLD: 0.03 K/UL (ref 0.05–0.5)
EOSINOPHILS RELATIVE PERCENT: 0 % (ref 0–6)
EOSINOPHILS RELATIVE PERCENT: 1 % (ref 0–6)
ERYTHROCYTE [DISTWIDTH] IN BLOOD BY AUTOMATED COUNT: 13.5 % (ref 11.5–15)
ERYTHROCYTE [DISTWIDTH] IN BLOOD BY AUTOMATED COUNT: 13.6 % (ref 11.5–15)
GFR SERPL CREATININE-BSD FRML MDRD: 12 ML/MIN/1.73M2
GLUCOSE SERPL-MCNC: 242 MG/DL (ref 74–99)
HBA1C MFR BLD: 5.6 % (ref 4–5.6)
HCT VFR BLD AUTO: 38.4 % (ref 37–54)
HCT VFR BLD AUTO: 42.9 % (ref 37–54)
HGB BLD-MCNC: 13.5 G/DL (ref 12.5–16.5)
HGB BLD-MCNC: 15.1 G/DL (ref 12.5–16.5)
IMM GRANULOCYTES # BLD AUTO: <0.03 K/UL (ref 0–0.58)
IMM GRANULOCYTES # BLD AUTO: <0.03 K/UL (ref 0–0.58)
IMM GRANULOCYTES NFR BLD: 0 % (ref 0–5)
IMM GRANULOCYTES NFR BLD: 0 % (ref 0–5)
LACTATE BLDV-SCNC: 3 MMOL/L (ref 0.5–2.2)
LACTATE BLDV-SCNC: 3.1 MMOL/L (ref 0.5–2.2)
LACTATE BLDV-SCNC: 3.2 MMOL/L (ref 0.5–2.2)
LIPASE SERPL-CCNC: 72 U/L (ref 13–60)
LYMPHOCYTES NFR BLD: 0.57 K/UL (ref 1.5–4)
LYMPHOCYTES NFR BLD: 0.8 K/UL (ref 1.5–4)
LYMPHOCYTES RELATIVE PERCENT: 12 % (ref 20–42)
LYMPHOCYTES RELATIVE PERCENT: 9 % (ref 20–42)
MCH RBC QN AUTO: 29.6 PG (ref 26–35)
MCH RBC QN AUTO: 30 PG (ref 26–35)
MCHC RBC AUTO-ENTMCNC: 35.2 G/DL (ref 32–34.5)
MCHC RBC AUTO-ENTMCNC: 35.2 G/DL (ref 32–34.5)
MCV RBC AUTO: 84.2 FL (ref 80–99.9)
MCV RBC AUTO: 85.1 FL (ref 80–99.9)
METER GLUCOSE: 138 MG/DL (ref 74–99)
METER GLUCOSE: 173 MG/DL (ref 74–99)
MONOCYTES NFR BLD: 0.54 K/UL (ref 0.1–0.95)
MONOCYTES NFR BLD: 0.75 K/UL (ref 0.1–0.95)
MONOCYTES NFR BLD: 11 % (ref 2–12)
MONOCYTES NFR BLD: 8 % (ref 2–12)
NEUTROPHILS NFR BLD: 75 % (ref 43–80)
NEUTROPHILS NFR BLD: 82 % (ref 43–80)
NEUTS SEG NFR BLD: 4.92 K/UL (ref 1.8–7.3)
NEUTS SEG NFR BLD: 5.44 K/UL (ref 1.8–7.3)
PLATELET, FLUORESCENCE: 103 K/UL (ref 130–450)
PLATELET, FLUORESCENCE: 106 K/UL (ref 130–450)
PMV BLD AUTO: 10.1 FL (ref 7–12)
PMV BLD AUTO: 10.5 FL (ref 7–12)
POTASSIUM SERPL-SCNC: 4.1 MMOL/L (ref 3.5–5)
PROT SERPL-MCNC: 8 G/DL (ref 6.4–8.3)
RBC # BLD AUTO: 4.56 M/UL (ref 3.8–5.8)
RBC # BLD AUTO: 5.04 M/UL (ref 3.8–5.8)
RBC # BLD: ABNORMAL 10*6/UL
REASON FOR REJECTION: NORMAL
SODIUM SERPL-SCNC: 136 MMOL/L (ref 132–146)
SPECIMEN SOURCE: NORMAL
TROPONIN I SERPL HS-MCNC: 802 NG/L (ref 0–11)
TROPONIN I SERPL HS-MCNC: 844 NG/L (ref 0–11)
WBC OTHER # BLD: 6.6 K/UL (ref 4.5–11.5)
WBC OTHER # BLD: 6.6 K/UL (ref 4.5–11.5)
ZZ NTE CLEAN UP: ORDERED TEST: NORMAL

## 2023-07-23 PROCEDURE — 96365 THER/PROPH/DIAG IV INF INIT: CPT

## 2023-07-23 PROCEDURE — 96375 TX/PRO/DX INJ NEW DRUG ADDON: CPT

## 2023-07-23 PROCEDURE — 92610 EVALUATE SWALLOWING FUNCTION: CPT

## 2023-07-23 PROCEDURE — 99285 EMERGENCY DEPT VISIT HI MDM: CPT

## 2023-07-23 PROCEDURE — 86901 BLOOD TYPING SEROLOGIC RH(D): CPT

## 2023-07-23 PROCEDURE — 74176 CT ABD & PELVIS W/O CONTRAST: CPT

## 2023-07-23 PROCEDURE — 96368 THER/DIAG CONCURRENT INF: CPT

## 2023-07-23 PROCEDURE — 36556 INSERT NON-TUNNEL CV CATH: CPT

## 2023-07-23 PROCEDURE — 6360000002 HC RX W HCPCS: Performed by: STUDENT IN AN ORGANIZED HEALTH CARE EDUCATION/TRAINING PROGRAM

## 2023-07-23 PROCEDURE — 2580000003 HC RX 258: Performed by: INTERNAL MEDICINE

## 2023-07-23 PROCEDURE — 93005 ELECTROCARDIOGRAM TRACING: CPT | Performed by: STUDENT IN AN ORGANIZED HEALTH CARE EDUCATION/TRAINING PROGRAM

## 2023-07-23 PROCEDURE — 80053 COMPREHEN METABOLIC PANEL: CPT

## 2023-07-23 PROCEDURE — 84484 ASSAY OF TROPONIN QUANT: CPT

## 2023-07-23 PROCEDURE — 2500000003 HC RX 250 WO HCPCS: Performed by: INTERNAL MEDICINE

## 2023-07-23 PROCEDURE — 2500000003 HC RX 250 WO HCPCS: Performed by: STUDENT IN AN ORGANIZED HEALTH CARE EDUCATION/TRAINING PROGRAM

## 2023-07-23 PROCEDURE — 93005 ELECTROCARDIOGRAM TRACING: CPT

## 2023-07-23 PROCEDURE — C9113 INJ PANTOPRAZOLE SODIUM, VIA: HCPCS

## 2023-07-23 PROCEDURE — 86900 BLOOD TYPING SEROLOGIC ABO: CPT

## 2023-07-23 PROCEDURE — 83690 ASSAY OF LIPASE: CPT

## 2023-07-23 PROCEDURE — 83605 ASSAY OF LACTIC ACID: CPT

## 2023-07-23 PROCEDURE — 6370000000 HC RX 637 (ALT 250 FOR IP): Performed by: INTERNAL MEDICINE

## 2023-07-23 PROCEDURE — 70450 CT HEAD/BRAIN W/O DYE: CPT

## 2023-07-23 PROCEDURE — 71045 X-RAY EXAM CHEST 1 VIEW: CPT

## 2023-07-23 PROCEDURE — 83036 HEMOGLOBIN GLYCOSYLATED A1C: CPT

## 2023-07-23 PROCEDURE — 85027 COMPLETE CBC AUTOMATED: CPT

## 2023-07-23 PROCEDURE — 82947 ASSAY GLUCOSE BLOOD QUANT: CPT

## 2023-07-23 PROCEDURE — 2000000000 HC ICU R&B

## 2023-07-23 PROCEDURE — 6360000002 HC RX W HCPCS

## 2023-07-23 PROCEDURE — 2580000003 HC RX 258: Performed by: STUDENT IN AN ORGANIZED HEALTH CARE EDUCATION/TRAINING PROGRAM

## 2023-07-23 PROCEDURE — 86850 RBC ANTIBODY SCREEN: CPT

## 2023-07-23 PROCEDURE — 36592 COLLECT BLOOD FROM PICC: CPT

## 2023-07-23 PROCEDURE — 96376 TX/PRO/DX INJ SAME DRUG ADON: CPT

## 2023-07-23 PROCEDURE — 93010 ELECTROCARDIOGRAM REPORT: CPT | Performed by: INTERNAL MEDICINE

## 2023-07-23 PROCEDURE — 99291 CRITICAL CARE FIRST HOUR: CPT | Performed by: SURGERY

## 2023-07-23 RX ORDER — PANTOPRAZOLE SODIUM 40 MG/10ML
80 INJECTION, POWDER, LYOPHILIZED, FOR SOLUTION INTRAVENOUS ONCE
Status: COMPLETED | OUTPATIENT
Start: 2023-07-23 | End: 2023-07-23

## 2023-07-23 RX ORDER — LIDOCAINE HYDROCHLORIDE 20 MG/ML
JELLY TOPICAL ONCE
Status: COMPLETED | OUTPATIENT
Start: 2023-07-23 | End: 2023-07-24

## 2023-07-23 RX ORDER — OXYMETAZOLINE HYDROCHLORIDE 0.05 G/100ML
2 SPRAY NASAL ONCE
Status: COMPLETED | OUTPATIENT
Start: 2023-07-23 | End: 2023-07-24

## 2023-07-23 RX ORDER — 0.9 % SODIUM CHLORIDE 0.9 %
500 INTRAVENOUS SOLUTION INTRAVENOUS ONCE
Status: COMPLETED | OUTPATIENT
Start: 2023-07-23 | End: 2023-07-23

## 2023-07-23 RX ORDER — INSULIN LISPRO 100 [IU]/ML
0-4 INJECTION, SOLUTION INTRAVENOUS; SUBCUTANEOUS
Status: DISCONTINUED | OUTPATIENT
Start: 2023-07-23 | End: 2023-07-27 | Stop reason: HOSPADM

## 2023-07-23 RX ORDER — LABETALOL HYDROCHLORIDE 5 MG/ML
10 INJECTION, SOLUTION INTRAVENOUS ONCE
Status: COMPLETED | OUTPATIENT
Start: 2023-07-23 | End: 2023-07-23

## 2023-07-23 RX ORDER — ISOSORBIDE DINITRATE 10 MG/1
10 TABLET ORAL 3 TIMES DAILY
Status: DISCONTINUED | OUTPATIENT
Start: 2023-07-23 | End: 2023-07-24

## 2023-07-23 RX ORDER — HYDRALAZINE HYDROCHLORIDE 20 MG/ML
10 INJECTION INTRAMUSCULAR; INTRAVENOUS ONCE
Status: COMPLETED | OUTPATIENT
Start: 2023-07-23 | End: 2023-07-23

## 2023-07-23 RX ORDER — ERGOCALCIFEROL 1.25 MG/1
50000 CAPSULE ORAL
Status: DISCONTINUED | OUTPATIENT
Start: 2023-07-28 | End: 2023-07-27 | Stop reason: HOSPADM

## 2023-07-23 RX ORDER — ACETAMINOPHEN 325 MG/1
650 TABLET ORAL EVERY 6 HOURS
Status: DISCONTINUED | OUTPATIENT
Start: 2023-07-23 | End: 2023-07-27 | Stop reason: HOSPADM

## 2023-07-23 RX ORDER — LEVETIRACETAM 500 MG/5ML
1000 INJECTION, SOLUTION, CONCENTRATE INTRAVENOUS ONCE
Status: COMPLETED | OUTPATIENT
Start: 2023-07-23 | End: 2023-07-23

## 2023-07-23 RX ORDER — ONDANSETRON 2 MG/ML
4 INJECTION INTRAMUSCULAR; INTRAVENOUS ONCE
Status: DISCONTINUED | OUTPATIENT
Start: 2023-07-23 | End: 2023-07-23

## 2023-07-23 RX ORDER — CLONIDINE 0.3 MG/24H
1 PATCH, EXTENDED RELEASE TRANSDERMAL WEEKLY
Status: DISCONTINUED | OUTPATIENT
Start: 2023-07-23 | End: 2023-07-27 | Stop reason: HOSPADM

## 2023-07-23 RX ORDER — INSULIN LISPRO 100 [IU]/ML
0-4 INJECTION, SOLUTION INTRAVENOUS; SUBCUTANEOUS NIGHTLY
Status: DISCONTINUED | OUTPATIENT
Start: 2023-07-23 | End: 2023-07-27 | Stop reason: HOSPADM

## 2023-07-23 RX ORDER — ATORVASTATIN CALCIUM 40 MG/1
40 TABLET, FILM COATED ORAL DAILY
Status: DISCONTINUED | OUTPATIENT
Start: 2023-07-23 | End: 2023-07-24

## 2023-07-23 RX ORDER — LABETALOL HYDROCHLORIDE 5 MG/ML
10 INJECTION, SOLUTION INTRAVENOUS EVERY 30 MIN PRN
Status: DISCONTINUED | OUTPATIENT
Start: 2023-07-23 | End: 2023-07-24

## 2023-07-23 RX ORDER — LEVETIRACETAM 500 MG/1
500 TABLET ORAL 2 TIMES DAILY
Status: DISCONTINUED | OUTPATIENT
Start: 2023-07-23 | End: 2023-07-23

## 2023-07-23 RX ORDER — DIPHENHYDRAMINE HYDROCHLORIDE 50 MG/ML
25 INJECTION INTRAMUSCULAR; INTRAVENOUS ONCE
Status: COMPLETED | OUTPATIENT
Start: 2023-07-23 | End: 2023-07-23

## 2023-07-23 RX ORDER — LABETALOL HYDROCHLORIDE 5 MG/ML
10 INJECTION, SOLUTION INTRAVENOUS ONCE
Status: DISCONTINUED | OUTPATIENT
Start: 2023-07-23 | End: 2023-07-23

## 2023-07-23 RX ORDER — PROCHLORPERAZINE EDISYLATE 5 MG/ML
10 INJECTION INTRAMUSCULAR; INTRAVENOUS ONCE
Status: COMPLETED | OUTPATIENT
Start: 2023-07-23 | End: 2023-07-23

## 2023-07-23 RX ORDER — SODIUM CHLORIDE 9 MG/ML
INJECTION, SOLUTION INTRAVENOUS CONTINUOUS
Status: DISCONTINUED | OUTPATIENT
Start: 2023-07-23 | End: 2023-07-25

## 2023-07-23 RX ORDER — DEXTROSE MONOHYDRATE 100 MG/ML
INJECTION, SOLUTION INTRAVENOUS CONTINUOUS PRN
Status: DISCONTINUED | OUTPATIENT
Start: 2023-07-23 | End: 2023-07-27 | Stop reason: HOSPADM

## 2023-07-23 RX ORDER — LEVETIRACETAM 500 MG/5ML
500 INJECTION, SOLUTION, CONCENTRATE INTRAVENOUS 2 TIMES DAILY
Status: DISCONTINUED | OUTPATIENT
Start: 2023-07-23 | End: 2023-07-24

## 2023-07-23 RX ORDER — HYDRALAZINE HYDROCHLORIDE 20 MG/ML
10 INJECTION INTRAMUSCULAR; INTRAVENOUS ONCE
Status: DISCONTINUED | OUTPATIENT
Start: 2023-07-23 | End: 2023-07-23

## 2023-07-23 RX ORDER — HYDRALAZINE HYDROCHLORIDE 20 MG/ML
10 INJECTION INTRAMUSCULAR; INTRAVENOUS EVERY 30 MIN PRN
Status: DISCONTINUED | OUTPATIENT
Start: 2023-07-23 | End: 2023-07-24

## 2023-07-23 RX ADMIN — LABETALOL HYDROCHLORIDE 10 MG: 5 INJECTION INTRAVENOUS at 10:27

## 2023-07-23 RX ADMIN — PANTOPRAZOLE SODIUM 80 MG: 40 INJECTION, POWDER, FOR SOLUTION INTRAVENOUS at 02:52

## 2023-07-23 RX ADMIN — LABETALOL HYDROCHLORIDE 10 MG: 5 INJECTION INTRAVENOUS at 04:06

## 2023-07-23 RX ADMIN — DIPHENHYDRAMINE HYDROCHLORIDE 25 MG: 50 INJECTION, SOLUTION INTRAMUSCULAR; INTRAVENOUS at 02:57

## 2023-07-23 RX ADMIN — SODIUM CHLORIDE 15 MG/HR: 9 INJECTION, SOLUTION INTRAVENOUS at 07:36

## 2023-07-23 RX ADMIN — SODIUM BICARBONATE: 84 INJECTION, SOLUTION INTRAVENOUS at 13:13

## 2023-07-23 RX ADMIN — LABETALOL HYDROCHLORIDE 10 MG: 5 INJECTION INTRAVENOUS at 09:26

## 2023-07-23 RX ADMIN — LABETALOL HYDROCHLORIDE 10 MG: 5 INJECTION INTRAVENOUS at 22:51

## 2023-07-23 RX ADMIN — HYDRALAZINE HYDROCHLORIDE 10 MG: 20 INJECTION INTRAMUSCULAR; INTRAVENOUS at 12:53

## 2023-07-23 RX ADMIN — LABETALOL HYDROCHLORIDE 10 MG: 5 INJECTION INTRAVENOUS at 19:20

## 2023-07-23 RX ADMIN — HYDRALAZINE HYDROCHLORIDE 10 MG: 20 INJECTION INTRAMUSCULAR; INTRAVENOUS at 04:45

## 2023-07-23 RX ADMIN — SODIUM CHLORIDE: 9 INJECTION, SOLUTION INTRAVENOUS at 08:52

## 2023-07-23 RX ADMIN — SODIUM CHLORIDE: 9 INJECTION, SOLUTION INTRAVENOUS at 21:16

## 2023-07-23 RX ADMIN — HYDRALAZINE HYDROCHLORIDE 10 MG: 20 INJECTION INTRAMUSCULAR; INTRAVENOUS at 21:09

## 2023-07-23 RX ADMIN — LABETALOL HYDROCHLORIDE 10 MG: 5 INJECTION INTRAVENOUS at 15:05

## 2023-07-23 RX ADMIN — LEVETIRACETAM 500 MG: 100 INJECTION INTRAVENOUS at 21:51

## 2023-07-23 RX ADMIN — LABETALOL HYDROCHLORIDE 10 MG: 5 INJECTION INTRAVENOUS at 13:52

## 2023-07-23 RX ADMIN — SODIUM CHLORIDE 500 ML: 9 INJECTION, SOLUTION INTRAVENOUS at 06:01

## 2023-07-23 RX ADMIN — SODIUM CHLORIDE 10 MG/HR: 9 INJECTION, SOLUTION INTRAVENOUS at 06:31

## 2023-07-23 RX ADMIN — SODIUM CHLORIDE 5 MG/HR: 9 INJECTION, SOLUTION INTRAVENOUS at 05:46

## 2023-07-23 RX ADMIN — HYDRALAZINE HYDROCHLORIDE 10 MG: 20 INJECTION INTRAMUSCULAR; INTRAVENOUS at 02:43

## 2023-07-23 RX ADMIN — HYDRALAZINE HYDROCHLORIDE 10 MG: 20 INJECTION INTRAMUSCULAR; INTRAVENOUS at 15:22

## 2023-07-23 RX ADMIN — DESMOPRESSIN ACETATE 26.32 MCG: 4 INJECTION, SOLUTION INTRAVENOUS; SUBCUTANEOUS at 05:48

## 2023-07-23 RX ADMIN — PROCHLORPERAZINE EDISYLATE 10 MG: 5 INJECTION, SOLUTION INTRAMUSCULAR; INTRAVENOUS at 02:58

## 2023-07-23 RX ADMIN — LABETALOL HYDROCHLORIDE 10 MG: 5 INJECTION INTRAVENOUS at 12:23

## 2023-07-23 RX ADMIN — LEVETIRACETAM 1000 MG: 100 INJECTION, SOLUTION INTRAVENOUS at 05:52

## 2023-07-23 RX ADMIN — LABETALOL HYDROCHLORIDE 10 MG: 5 INJECTION INTRAVENOUS at 20:48

## 2023-07-23 ASSESSMENT — PAIN SCALES - GENERAL
PAINLEVEL_OUTOF10: 0

## 2023-07-23 ASSESSMENT — PAIN - FUNCTIONAL ASSESSMENT: PAIN_FUNCTIONAL_ASSESSMENT: NONE - DENIES PAIN

## 2023-07-23 NOTE — ED NOTES
Radiology Procedure Waiver   Name: Jaimie Paredes  : 1950  MRN: [id number]    Date:  23    Time: 4:06 AM EDT    Benefits of immediately proceeding with radiology exam(s) without pre-testing outweigh the risks or are not indicated as specified below and therefore the following is/are being waived:    [x] Benefits of immediate radiology exam(s) outweigh any risk. OR    Pre-exam testing is not indicated for the following reason(s):  [] Pregnancy test   [] Patients LMP on-time and regular.   [] Patient had Tubal Ligation or has other Contraception Device. [] Patient  is Menopausal or Premenarcheal.    [] Patient had Full or Partial Hysterectomy. [] Protocol for CT contrast allegry   [] Patient has tolerated well previously   [] Patient does not have a true allergy    [] MRI Questionnaire     [x] BUN/Creatinine   [] Patient age w/no hx of renal dysfunction. [x] Patient on Dialysis. [] Recent Normal Labs.   Electronically signed by Osbaldo Zavala DO on 23 at 4:06 AM EDT               Osbaldo Zavala DO  23 [phone]

## 2023-07-23 NOTE — ED PROVIDER NOTES
[facility]        Pt Name: Loan Randhawa  MRN: [id number]  [facility] 1950  Date of evaluation: 7/23/2023  Provider: Love Myers DO  PCP: Reagan Shepherd MD  Note Started: 1:14 AM EDT 7/23/23    CHIEF COMPLAINT       Chief Complaint   Patient presents with    Hypertension     Per facility non compliant with medications       HISTORY OF PRESENT ILLNESS: 1 or more Elements   History From: Patient    Limitations to history : None    Loan Randhawa is a 75 y.o. male who presents with a complaint of elevated blood pressure. Patient is staying in an assisted living [facility] and they reported his blood pressure to be elevated. Patient states that he did not take his blood pressure medications yesterday because he was running late to his dialysis appointment. Patient gets dialysis on Tuesday, Thursday, Saturday. Associated symptoms include nausea. Nursing Notes were all reviewed and agreed with or any disagreements were addressed in the HPI.     REVIEW OF SYSTEMS :      Review of Systems    POSITIVE (+): Nausea  NEGATIVE (-): Fevers, chills, vomiting, diarrhea, constipation, chest pain, shortness of breath    SURGICAL HISTORY     Past Surgical History:   Procedure Laterality Date    CATHETER INSERTION N/A 3/1/2023    TUNNELED HEMODIALYSIS CATHETER REMOVAL POSSIBLE TEMPORARY CATHETER INSERTION performed by Rosemarie Boone MD at [facility] N/A 11/21/2021    CATHETER INSERTION HEMODIALYSIS, REMOVAL OF FEMORAL CATHETER performed by Edmund Parada MD at [facility]       Previous Medications    AMLODIPINE (NORVASC) 10 MG TABLET    Take 1 tablet by mouth nightly    ASPIRIN 81 MG EC TABLET    Take 1 tablet by mouth daily    ATORVASTATIN (LIPITOR) 40 MG TABLET    Take 1 tablet by mouth daily    CARVEDILOL (COREG) 25 MG TABLET    Take 25 mg by mouth 2
Good

## 2023-07-23 NOTE — PLAN OF CARE

## 2023-07-23 NOTE — ED NOTES
Report given to [facility] for transport to [facility]. Report called to Carter KENNEDY at [facility]. All questions were answered at this time.       Mary Ashraf RN  07/23/23 2052

## 2023-07-23 NOTE — ED NOTES
This RN attempted to place and IV twice with no success.  Blood work was obtained and sent to lab and charge RN informed of need for IV access at this time     Ricky Huff, MARCIA  07/23/23 1440

## 2023-07-23 NOTE — H&P
Hospital Medicine History & Physical      PCP: Caridad Dickerson MD    Date of Admission: 7/23/2023    Date of Service: Pt seen/examined on 7/23/2023  and Admitted to Inpatient with expected LOS greater than two midnights due to medical therapy. Chief Complaint:  Headache, HTN emergency    ** History and ROS limited due to patient's mental status and majority of the information obtained from EMR. History Of Present Illness: This is a 75year old man with PMH HTN, medication non-compliance, ESRD on HD TTS, DM, HFrEF(35-40%), hypothyroidism, HCV presented to [facility] ED with headache and uncontrolled HTN, originally presented to [facility]. Apparently, he was refusing to take his medications at SNF. CT scan showed intraparenchymal bleeding therefore patient was admitted to CVICU and  started on Cardene gtt and IV Keppra. CT Head -   Neurosurgery consulted. Planning for MRI brain with contrast. Dialysis must be done 3 days in a row starting within 24 hours after scan with contrast according to MRI. Nephrology consulted for inpatient HD needs. Tentatively on Monday/Tuesday.     Past Medical History:          Diagnosis Date    Anemia     Chronic kidney disease     Depression     Diabetes mellitus (HCC)     ESRD (end stage renal disease) ([address])     Hepatitis C     Hypertension     Liver disease     Moderate protein-calorie malnutrition (HCC)     Muscle weakness (generalized)     Thyroid disease     Unspecified diseases of blood and blood-forming organs     Unsteadiness on feet        Past Surgical History:          Procedure Laterality Date    CATHETER INSERTION N/A 3/1/2023    TUNNELED HEMODIALYSIS CATHETER REMOVAL POSSIBLE TEMPORARY CATHETER INSERTION performed by Isacc Benz MD at [facility] N/A 11/21/2021    CATHETER INSERTION HEMODIALYSIS, REMOVAL OF FEMORAL CATHETER performed by Aubree Wallace MD at [facility] OR       Medications Prior to Admission:      Prior to Admission

## 2023-07-24 ENCOUNTER — APPOINTMENT (OUTPATIENT)
Dept: GENERAL RADIOLOGY | Age: 73
DRG: 064 | End: 2023-07-24
Attending: HOSPITALIST
Payer: MEDICARE

## 2023-07-24 LAB
ALBUMIN SERPL-MCNC: 3.6 G/DL (ref 3.5–5.2)
ALP SERPL-CCNC: 68 U/L (ref 40–129)
ALT SERPL-CCNC: 21 U/L (ref 0–40)
ANION GAP SERPL CALCULATED.3IONS-SCNC: 11 MMOL/L (ref 7–16)
AST SERPL-CCNC: 19 U/L (ref 0–39)
BASOPHILS # BLD: 0.03 K/UL (ref 0–0.2)
BASOPHILS NFR BLD: 1 % (ref 0–2)
BILIRUB SERPL-MCNC: 0.7 MG/DL (ref 0–1.2)
BUN SERPL-MCNC: 32 MG/DL (ref 6–23)
CA-I BLD-SCNC: 1.14 MMOL/L (ref 1.15–1.33)
CALCIUM SERPL-MCNC: 8.8 MG/DL (ref 8.6–10.2)
CHLORIDE SERPL-SCNC: 105 MMOL/L (ref 98–107)
CO2 SERPL-SCNC: 24 MMOL/L (ref 22–29)
CREAT SERPL-MCNC: 6.1 MG/DL (ref 0.7–1.2)
EOSINOPHIL # BLD: 0.01 K/UL (ref 0.05–0.5)
EOSINOPHILS RELATIVE PERCENT: 0 % (ref 0–6)
ERYTHROCYTE [DISTWIDTH] IN BLOOD BY AUTOMATED COUNT: 14.4 % (ref 11.5–15)
GFR SERPL CREATININE-BSD FRML MDRD: 9 ML/MIN/1.73M2
GLUCOSE SERPL-MCNC: 116 MG/DL (ref 74–99)
HCT VFR BLD AUTO: 28.9 % (ref 37–54)
HGB BLD-MCNC: 9.2 G/DL (ref 12.5–16.5)
HGB BLD-MCNC: 9.8 G/DL (ref 12.5–16.5)
IMM GRANULOCYTES # BLD AUTO: <0.03 K/UL (ref 0–0.58)
IMM GRANULOCYTES NFR BLD: 0 % (ref 0–5)
LACTATE BLDV-SCNC: 0.8 MMOL/L (ref 0.5–2.2)
LACTATE BLDV-SCNC: 1.1 MMOL/L (ref 0.5–2.2)
LYMPHOCYTES NFR BLD: 0.74 K/UL (ref 1.5–4)
LYMPHOCYTES RELATIVE PERCENT: 15 % (ref 20–42)
MAGNESIUM SERPL-MCNC: 1.6 MG/DL (ref 1.6–2.6)
MCH RBC QN AUTO: 30.1 PG (ref 26–35)
MCHC RBC AUTO-ENTMCNC: 33.9 G/DL (ref 32–34.5)
MCV RBC AUTO: 88.7 FL (ref 80–99.9)
METER GLUCOSE: 125 MG/DL (ref 74–99)
METER GLUCOSE: 132 MG/DL (ref 74–99)
METER GLUCOSE: 133 MG/DL (ref 74–99)
METER GLUCOSE: 141 MG/DL (ref 74–99)
MONOCYTES NFR BLD: 0.66 K/UL (ref 0.1–0.95)
MONOCYTES NFR BLD: 14 % (ref 2–12)
NEUTROPHILS NFR BLD: 70 % (ref 43–80)
NEUTS SEG NFR BLD: 3.39 K/UL (ref 1.8–7.3)
PHOSPHATE SERPL-MCNC: 4.4 MG/DL (ref 2.5–4.5)
PLATELET # BLD AUTO: 89 K/UL (ref 130–450)
PLATELET CONFIRMATION: NORMAL
PMV BLD AUTO: 9.7 FL (ref 7–12)
POTASSIUM SERPL-SCNC: 4.4 MMOL/L (ref 3.5–5)
PROT SERPL-MCNC: 6.4 G/DL (ref 6.4–8.3)
RBC # BLD AUTO: 3.26 M/UL (ref 3.8–5.8)
SODIUM SERPL-SCNC: 140 MMOL/L (ref 132–146)
WBC OTHER # BLD: 4.8 K/UL (ref 4.5–11.5)

## 2023-07-24 PROCEDURE — 6360000002 HC RX W HCPCS: Performed by: CLINICAL NURSE SPECIALIST

## 2023-07-24 PROCEDURE — 2580000003 HC RX 258: Performed by: CLINICAL NURSE SPECIALIST

## 2023-07-24 PROCEDURE — 82947 ASSAY GLUCOSE BLOOD QUANT: CPT

## 2023-07-24 PROCEDURE — 85018 HEMOGLOBIN: CPT

## 2023-07-24 PROCEDURE — 85027 COMPLETE CBC AUTOMATED: CPT

## 2023-07-24 PROCEDURE — 80053 COMPREHEN METABOLIC PANEL: CPT

## 2023-07-24 PROCEDURE — 97530 THERAPEUTIC ACTIVITIES: CPT

## 2023-07-24 PROCEDURE — 6370000000 HC RX 637 (ALT 250 FOR IP): Performed by: STUDENT IN AN ORGANIZED HEALTH CARE EDUCATION/TRAINING PROGRAM

## 2023-07-24 PROCEDURE — 6370000000 HC RX 637 (ALT 250 FOR IP): Performed by: CLINICAL NURSE SPECIALIST

## 2023-07-24 PROCEDURE — 6360000002 HC RX W HCPCS

## 2023-07-24 PROCEDURE — 83605 ASSAY OF LACTIC ACID: CPT

## 2023-07-24 PROCEDURE — 84100 ASSAY OF PHOSPHORUS: CPT

## 2023-07-24 PROCEDURE — 83735 ASSAY OF MAGNESIUM: CPT

## 2023-07-24 PROCEDURE — 74018 RADEX ABDOMEN 1 VIEW: CPT

## 2023-07-24 PROCEDURE — 6360000002 HC RX W HCPCS: Performed by: STUDENT IN AN ORGANIZED HEALTH CARE EDUCATION/TRAINING PROGRAM

## 2023-07-24 PROCEDURE — 2060000000 HC ICU INTERMEDIATE R&B

## 2023-07-24 PROCEDURE — 36592 COLLECT BLOOD FROM PICC: CPT

## 2023-07-24 PROCEDURE — 6370000000 HC RX 637 (ALT 250 FOR IP)

## 2023-07-24 PROCEDURE — 99232 SBSQ HOSP IP/OBS MODERATE 35: CPT | Performed by: SURGERY

## 2023-07-24 PROCEDURE — 97162 PT EVAL MOD COMPLEX 30 MIN: CPT

## 2023-07-24 PROCEDURE — 97166 OT EVAL MOD COMPLEX 45 MIN: CPT

## 2023-07-24 PROCEDURE — 82330 ASSAY OF CALCIUM: CPT

## 2023-07-24 RX ORDER — LABETALOL HYDROCHLORIDE 5 MG/ML
10 INJECTION, SOLUTION INTRAVENOUS EVERY 6 HOURS PRN
Status: DISCONTINUED | OUTPATIENT
Start: 2023-07-24 | End: 2023-07-27 | Stop reason: HOSPADM

## 2023-07-24 RX ORDER — ISOSORBIDE DINITRATE 10 MG/1
10 TABLET ORAL 3 TIMES DAILY
Status: DISCONTINUED | OUTPATIENT
Start: 2023-07-24 | End: 2023-07-27 | Stop reason: HOSPADM

## 2023-07-24 RX ORDER — LEVOTHYROXINE SODIUM 0.12 MG/1
125 TABLET ORAL DAILY
Status: DISCONTINUED | OUTPATIENT
Start: 2023-07-25 | End: 2023-07-27 | Stop reason: HOSPADM

## 2023-07-24 RX ORDER — AMLODIPINE BESYLATE 5 MG/1
5 TABLET ORAL NIGHTLY
Status: DISCONTINUED | OUTPATIENT
Start: 2023-07-24 | End: 2023-07-25

## 2023-07-24 RX ORDER — LEVETIRACETAM 500 MG/5ML
500 INJECTION, SOLUTION, CONCENTRATE INTRAVENOUS DAILY
Status: DISCONTINUED | OUTPATIENT
Start: 2023-07-25 | End: 2023-07-27 | Stop reason: HOSPADM

## 2023-07-24 RX ORDER — HYDRALAZINE HYDROCHLORIDE 20 MG/ML
10 INJECTION INTRAMUSCULAR; INTRAVENOUS EVERY 6 HOURS PRN
Status: DISCONTINUED | OUTPATIENT
Start: 2023-07-24 | End: 2023-07-27 | Stop reason: HOSPADM

## 2023-07-24 RX ORDER — CARVEDILOL 25 MG/1
25 TABLET ORAL 2 TIMES DAILY WITH MEALS
Status: DISCONTINUED | OUTPATIENT
Start: 2023-07-24 | End: 2023-07-27 | Stop reason: HOSPADM

## 2023-07-24 RX ORDER — SENNOSIDES A AND B 8.6 MG/1
1 TABLET, FILM COATED ORAL NIGHTLY
Status: DISCONTINUED | OUTPATIENT
Start: 2023-07-24 | End: 2023-07-27 | Stop reason: HOSPADM

## 2023-07-24 RX ORDER — HYDRALAZINE HYDROCHLORIDE 50 MG/1
50 TABLET, FILM COATED ORAL 3 TIMES DAILY
Status: DISCONTINUED | OUTPATIENT
Start: 2023-07-24 | End: 2023-07-27 | Stop reason: HOSPADM

## 2023-07-24 RX ORDER — POLYETHYLENE GLYCOL 3350 17 G/17G
17 POWDER, FOR SOLUTION ORAL DAILY PRN
Status: DISCONTINUED | OUTPATIENT
Start: 2023-07-24 | End: 2023-07-27 | Stop reason: HOSPADM

## 2023-07-24 RX ORDER — ATORVASTATIN CALCIUM 40 MG/1
40 TABLET, FILM COATED ORAL DAILY
Status: DISCONTINUED | OUTPATIENT
Start: 2023-07-24 | End: 2023-07-27 | Stop reason: HOSPADM

## 2023-07-24 RX ADMIN — CARVEDILOL 25 MG: 25 TABLET, FILM COATED ORAL at 15:26

## 2023-07-24 RX ADMIN — CARVEDILOL 25 MG: 25 TABLET, FILM COATED ORAL at 09:07

## 2023-07-24 RX ADMIN — ACETAMINOPHEN 650 MG: 325 TABLET ORAL at 09:03

## 2023-07-24 RX ADMIN — AMLODIPINE BESYLATE 5 MG: 5 TABLET ORAL at 20:19

## 2023-07-24 RX ADMIN — ISOSORBIDE DINITRATE 10 MG: 10 TABLET ORAL at 19:10

## 2023-07-24 RX ADMIN — HYDRALAZINE HYDROCHLORIDE 50 MG: 50 TABLET, FILM COATED ORAL at 14:21

## 2023-07-24 RX ADMIN — CALCIUM GLUCONATE 2000 MG: 98 INJECTION, SOLUTION INTRAVENOUS at 08:56

## 2023-07-24 RX ADMIN — ISOSORBIDE DINITRATE 10 MG: 10 TABLET ORAL at 11:49

## 2023-07-24 RX ADMIN — ACETAMINOPHEN 650 MG: 325 TABLET ORAL at 20:18

## 2023-07-24 RX ADMIN — NASAL DECONGESTANT 2 SPRAY: 0.05 SPRAY NASAL at 06:18

## 2023-07-24 RX ADMIN — LEVETIRACETAM 500 MG: 100 INJECTION INTRAVENOUS at 09:03

## 2023-07-24 RX ADMIN — LABETALOL HYDROCHLORIDE 10 MG: 5 INJECTION INTRAVENOUS at 15:18

## 2023-07-24 RX ADMIN — LABETALOL HYDROCHLORIDE 10 MG: 5 INJECTION INTRAVENOUS at 05:49

## 2023-07-24 RX ADMIN — HYDRALAZINE HYDROCHLORIDE 50 MG: 50 TABLET, FILM COATED ORAL at 09:07

## 2023-07-24 RX ADMIN — LABETALOL HYDROCHLORIDE 10 MG: 5 INJECTION INTRAVENOUS at 00:44

## 2023-07-24 RX ADMIN — ISOSORBIDE DINITRATE 10 MG: 10 TABLET ORAL at 09:07

## 2023-07-24 RX ADMIN — LIDOCAINE HYDROCHLORIDE: 20 JELLY TOPICAL at 06:20

## 2023-07-24 RX ADMIN — HYDRALAZINE HYDROCHLORIDE 10 MG: 20 INJECTION INTRAMUSCULAR; INTRAVENOUS at 04:39

## 2023-07-24 RX ADMIN — LABETALOL HYDROCHLORIDE 10 MG: 5 INJECTION INTRAVENOUS at 23:28

## 2023-07-24 RX ADMIN — LANSOPRAZOLE 15 MG: 15 TABLET, ORALLY DISINTEGRATING, DELAYED RELEASE ORAL at 09:04

## 2023-07-24 RX ADMIN — ATORVASTATIN CALCIUM 40 MG: 40 TABLET, FILM COATED ORAL at 09:07

## 2023-07-24 RX ADMIN — HYDRALAZINE HYDROCHLORIDE 10 MG: 20 INJECTION INTRAMUSCULAR; INTRAVENOUS at 06:14

## 2023-07-24 RX ADMIN — SENNOSIDES 8.6 MG: 8.6 TABLET, FILM COATED ORAL at 20:19

## 2023-07-24 RX ADMIN — LABETALOL HYDROCHLORIDE 10 MG: 5 INJECTION INTRAVENOUS at 08:56

## 2023-07-24 RX ADMIN — LABETALOL HYDROCHLORIDE 10 MG: 5 INJECTION INTRAVENOUS at 04:52

## 2023-07-24 RX ADMIN — LABETALOL HYDROCHLORIDE 10 MG: 5 INJECTION INTRAVENOUS at 07:38

## 2023-07-24 ASSESSMENT — PAIN SCALES - GENERAL
PAINLEVEL_OUTOF10: 0
PAINLEVEL_OUTOF10: 0
PAINLEVEL_OUTOF10: 5

## 2023-07-24 ASSESSMENT — PAIN DESCRIPTION - DESCRIPTORS: DESCRIPTORS: ACHING;DISCOMFORT

## 2023-07-24 ASSESSMENT — PAIN - FUNCTIONAL ASSESSMENT: PAIN_FUNCTIONAL_ASSESSMENT: ACTIVITIES ARE NOT PREVENTED

## 2023-07-24 ASSESSMENT — PAIN DESCRIPTION - LOCATION: LOCATION: HEAD

## 2023-07-24 NOTE — CARE COORDINATION
7/24 Care Coordination: Pt  presents emergency department complaining of elevated blood pressure. Per facility [facility] the patient has been noncompliant with his medications. He does have a history of end-stage renal disease and is on dialysis. He typically goes to dialysis on Tuesdays, Thursdays, and Saturdays. CT scan did show evidence of intraparenchymal hemorrhage. Patient was placed on Cardene drip along with treatment with IV Keppra. Pt was a Transfer from [facility]. Pt admit to [address] [place] [address]. Neurosurgery consulted, Nephrology consulted Cont on Cardene gtt, clonidine. Call out to Raghavendra valdez from [address] [place] [address] . Also there was a Order for PM&R. Envelope in Soft Chart. CM/SW will continue to follow for discharge planning.    Adan SUMNERN,RN-CV-BC  [phone]

## 2023-07-24 NOTE — PLAN OF CARE
Problem: Chronic Conditions and Co-morbidities  Goal: Patient's chronic conditions and co-morbidity symptoms are monitored and maintained or improved  7/24/2023 0005 by Gregorio Banda RN  Outcome: Progressing  7/23/2023 1913 by Mick Wilburn RN  Outcome: Progressing     Problem: Discharge Planning  Goal: Discharge to home or other facility with appropriate resources  7/24/2023 0005 by Gregorio Banda RN  Outcome: Progressing  7/23/2023 1913 by Mick Wilburn RN  Outcome: Progressing     Problem: Safety - Adult  Goal: Free from fall injury  7/24/2023 0005 by Gregorio Banda RN  Outcome: Progressing  7/23/2023 1913 by Mick Wilburn RN  Outcome: Progressing     Problem: Skin/Tissue Integrity  Goal: Absence of new skin breakdown  Description: 1. Monitor for areas of redness and/or skin breakdown  2. Assess vascular access sites hourly  3. Every 4-6 hours minimum:  Change oxygen saturation probe site  4. Every 4-6 hours:  If on nasal continuous positive airway pressure, respiratory therapy assess nares and determine need for appliance change or resting period.   7/24/2023 0005 by Gregorio Banda RN  Outcome: Progressing  7/23/2023 1913 by Mick Wilburn RN  Outcome: Progressing     Problem: ABCDS Injury Assessment  Goal: Absence of physical injury  7/24/2023 0005 by Gregorio Banda RN  Outcome: Progressing  7/23/2023 1913 by Mick Wilburn RN  Outcome: Progressing

## 2023-07-25 ENCOUNTER — APPOINTMENT (OUTPATIENT)
Dept: MRI IMAGING | Age: 73
DRG: 064 | End: 2023-07-25
Attending: HOSPITALIST
Payer: MEDICARE

## 2023-07-25 LAB
ALBUMIN SERPL-MCNC: 3.7 G/DL (ref 3.5–5.2)
ALP SERPL-CCNC: 68 U/L (ref 40–129)
ALT SERPL-CCNC: 18 U/L (ref 0–40)
ANION GAP SERPL CALCULATED.3IONS-SCNC: 15 MMOL/L (ref 7–16)
AST SERPL-CCNC: 20 U/L (ref 0–39)
BASOPHILS # BLD: 0.03 K/UL (ref 0–0.2)
BASOPHILS NFR BLD: 1 % (ref 0–2)
BILIRUB SERPL-MCNC: 0.6 MG/DL (ref 0–1.2)
BUN SERPL-MCNC: 38 MG/DL (ref 6–23)
CA-I BLD-SCNC: 1.16 MMOL/L (ref 1.15–1.33)
CALCIUM SERPL-MCNC: 9.3 MG/DL (ref 8.6–10.2)
CHLORIDE SERPL-SCNC: 107 MMOL/L (ref 98–107)
CO2 SERPL-SCNC: 20 MMOL/L (ref 22–29)
CREAT SERPL-MCNC: 7.3 MG/DL (ref 0.7–1.2)
EKG ATRIAL RATE: 101 BPM
EKG P AXIS: 79 DEGREES
EKG P-R INTERVAL: 148 MS
EKG Q-T INTERVAL: 402 MS
EKG QRS DURATION: 84 MS
EKG QTC CALCULATION (BAZETT): 521 MS
EKG R AXIS: -55 DEGREES
EKG T AXIS: 93 DEGREES
EKG VENTRICULAR RATE: 101 BPM
EOSINOPHIL # BLD: 0.05 K/UL (ref 0.05–0.5)
EOSINOPHILS RELATIVE PERCENT: 1 % (ref 0–6)
ERYTHROCYTE [DISTWIDTH] IN BLOOD BY AUTOMATED COUNT: 14.6 % (ref 11.5–15)
GFR SERPL CREATININE-BSD FRML MDRD: 7 ML/MIN/1.73M2
GLUCOSE SERPL-MCNC: 84 MG/DL (ref 74–99)
HCT VFR BLD AUTO: 28 % (ref 37–54)
HGB BLD-MCNC: 9.3 G/DL (ref 12.5–16.5)
IMM GRANULOCYTES # BLD AUTO: 0.04 K/UL (ref 0–0.58)
IMM GRANULOCYTES NFR BLD: 1 % (ref 0–5)
LACTATE BLDV-SCNC: 0.5 MMOL/L (ref 0.5–2.2)
LACTATE BLDV-SCNC: 0.6 MMOL/L (ref 0.5–2.2)
LACTATE BLDV-SCNC: 0.7 MMOL/L (ref 0.5–2.2)
LACTATE BLDV-SCNC: 0.8 MMOL/L (ref 0.5–2.2)
LYMPHOCYTES NFR BLD: 0.67 K/UL (ref 1.5–4)
LYMPHOCYTES RELATIVE PERCENT: 16 % (ref 20–42)
MAGNESIUM SERPL-MCNC: 1.8 MG/DL (ref 1.6–2.6)
MCH RBC QN AUTO: 30 PG (ref 26–35)
MCHC RBC AUTO-ENTMCNC: 33.2 G/DL (ref 32–34.5)
MCV RBC AUTO: 90.3 FL (ref 80–99.9)
METER GLUCOSE: 100 MG/DL (ref 74–99)
METER GLUCOSE: 79 MG/DL (ref 74–99)
METER GLUCOSE: 95 MG/DL (ref 74–99)
MONOCYTES NFR BLD: 0.44 K/UL (ref 0.1–0.95)
MONOCYTES NFR BLD: 11 % (ref 2–12)
NEUTROPHILS NFR BLD: 71 % (ref 43–80)
NEUTS SEG NFR BLD: 2.98 K/UL (ref 1.8–7.3)
PHOSPHATE SERPL-MCNC: 4.5 MG/DL (ref 2.5–4.5)
PLATELET # BLD AUTO: 85 K/UL (ref 130–450)
PMV BLD AUTO: 10.7 FL (ref 7–12)
POTASSIUM SERPL-SCNC: 4.8 MMOL/L (ref 3.5–5)
PROT SERPL-MCNC: 6.7 G/DL (ref 6.4–8.3)
RBC # BLD AUTO: 3.1 M/UL (ref 3.8–5.8)
SODIUM SERPL-SCNC: 142 MMOL/L (ref 132–146)
WBC OTHER # BLD: 4.2 K/UL (ref 4.5–11.5)

## 2023-07-25 PROCEDURE — 92523 SPEECH SOUND LANG COMPREHEN: CPT

## 2023-07-25 PROCEDURE — 6360000002 HC RX W HCPCS: Performed by: CLINICAL NURSE SPECIALIST

## 2023-07-25 PROCEDURE — 6370000000 HC RX 637 (ALT 250 FOR IP): Performed by: INTERNAL MEDICINE

## 2023-07-25 PROCEDURE — 93010 ELECTROCARDIOGRAM REPORT: CPT | Performed by: INTERNAL MEDICINE

## 2023-07-25 PROCEDURE — 85027 COMPLETE CBC AUTOMATED: CPT

## 2023-07-25 PROCEDURE — 6370000000 HC RX 637 (ALT 250 FOR IP): Performed by: CLINICAL NURSE SPECIALIST

## 2023-07-25 PROCEDURE — 82947 ASSAY GLUCOSE BLOOD QUANT: CPT

## 2023-07-25 PROCEDURE — 84100 ASSAY OF PHOSPHORUS: CPT

## 2023-07-25 PROCEDURE — 90935 HEMODIALYSIS ONE EVALUATION: CPT

## 2023-07-25 PROCEDURE — 36415 COLL VENOUS BLD VENIPUNCTURE: CPT

## 2023-07-25 PROCEDURE — 70553 MRI BRAIN STEM W/O & W/DYE: CPT

## 2023-07-25 PROCEDURE — 82330 ASSAY OF CALCIUM: CPT

## 2023-07-25 PROCEDURE — 2060000000 HC ICU INTERMEDIATE R&B

## 2023-07-25 PROCEDURE — 6360000004 HC RX CONTRAST MEDICATION: Performed by: RADIOLOGY

## 2023-07-25 PROCEDURE — 83605 ASSAY OF LACTIC ACID: CPT

## 2023-07-25 PROCEDURE — 5A1D70Z PERFORMANCE OF URINARY FILTRATION, INTERMITTENT, LESS THAN 6 HOURS PER DAY: ICD-10-PCS | Performed by: STUDENT IN AN ORGANIZED HEALTH CARE EDUCATION/TRAINING PROGRAM

## 2023-07-25 PROCEDURE — A9579 GAD-BASE MR CONTRAST NOS,1ML: HCPCS | Performed by: RADIOLOGY

## 2023-07-25 PROCEDURE — 80053 COMPREHEN METABOLIC PANEL: CPT

## 2023-07-25 PROCEDURE — 83735 ASSAY OF MAGNESIUM: CPT

## 2023-07-25 RX ORDER — AMLODIPINE BESYLATE 10 MG/1
10 TABLET ORAL NIGHTLY
Status: DISCONTINUED | OUTPATIENT
Start: 2023-07-25 | End: 2023-07-27 | Stop reason: HOSPADM

## 2023-07-25 RX ORDER — LOSARTAN POTASSIUM 50 MG/1
50 TABLET ORAL DAILY
Status: DISCONTINUED | OUTPATIENT
Start: 2023-07-25 | End: 2023-07-27

## 2023-07-25 RX ORDER — ONDANSETRON 2 MG/ML
4 INJECTION INTRAMUSCULAR; INTRAVENOUS EVERY 6 HOURS PRN
Status: DISCONTINUED | OUTPATIENT
Start: 2023-07-25 | End: 2023-07-25

## 2023-07-25 RX ORDER — PROCHLORPERAZINE MALEATE 10 MG
10 TABLET ORAL EVERY 6 HOURS PRN
Status: DISCONTINUED | OUTPATIENT
Start: 2023-07-25 | End: 2023-07-27 | Stop reason: HOSPADM

## 2023-07-25 RX ADMIN — PROCHLORPERAZINE MALEATE 10 MG: 10 TABLET ORAL at 10:35

## 2023-07-25 RX ADMIN — LEVOTHYROXINE SODIUM 125 MCG: 0.12 TABLET ORAL at 06:31

## 2023-07-25 RX ADMIN — HYDRALAZINE HYDROCHLORIDE 10 MG: 20 INJECTION INTRAMUSCULAR; INTRAVENOUS at 01:04

## 2023-07-25 RX ADMIN — ATORVASTATIN CALCIUM 40 MG: 40 TABLET, FILM COATED ORAL at 10:35

## 2023-07-25 RX ADMIN — LANSOPRAZOLE 15 MG: 15 TABLET, ORALLY DISINTEGRATING, DELAYED RELEASE ORAL at 06:31

## 2023-07-25 RX ADMIN — HYDRALAZINE HYDROCHLORIDE 50 MG: 50 TABLET, FILM COATED ORAL at 17:06

## 2023-07-25 RX ADMIN — ISOSORBIDE DINITRATE 10 MG: 10 TABLET ORAL at 10:35

## 2023-07-25 RX ADMIN — CARVEDILOL 25 MG: 25 TABLET, FILM COATED ORAL at 17:06

## 2023-07-25 RX ADMIN — SENNOSIDES 8.6 MG: 8.6 TABLET, FILM COATED ORAL at 20:18

## 2023-07-25 RX ADMIN — LOSARTAN POTASSIUM 50 MG: 50 TABLET, FILM COATED ORAL at 17:06

## 2023-07-25 RX ADMIN — CARVEDILOL 25 MG: 25 TABLET, FILM COATED ORAL at 10:35

## 2023-07-25 RX ADMIN — LABETALOL HYDROCHLORIDE 10 MG: 5 INJECTION INTRAVENOUS at 08:31

## 2023-07-25 RX ADMIN — HYDRALAZINE HYDROCHLORIDE 50 MG: 50 TABLET, FILM COATED ORAL at 20:17

## 2023-07-25 RX ADMIN — HYDRALAZINE HYDROCHLORIDE 10 MG: 20 INJECTION INTRAMUSCULAR; INTRAVENOUS at 22:19

## 2023-07-25 RX ADMIN — AMLODIPINE BESYLATE 10 MG: 10 TABLET ORAL at 20:18

## 2023-07-25 RX ADMIN — HYDRALAZINE HYDROCHLORIDE 50 MG: 50 TABLET, FILM COATED ORAL at 10:35

## 2023-07-25 RX ADMIN — GADOTERIDOL 12 ML: 279.3 INJECTION, SOLUTION INTRAVENOUS at 12:31

## 2023-07-25 RX ADMIN — ACETAMINOPHEN 650 MG: 325 TABLET ORAL at 17:05

## 2023-07-25 RX ADMIN — LEVETIRACETAM 500 MG: 100 INJECTION INTRAVENOUS at 08:31

## 2023-07-25 RX ADMIN — ISOSORBIDE DINITRATE 10 MG: 10 TABLET ORAL at 20:18

## 2023-07-25 ASSESSMENT — PAIN SCALES - GENERAL: PAINLEVEL_OUTOF10: 3

## 2023-07-25 ASSESSMENT — PAIN DESCRIPTION - LOCATION: LOCATION: HEAD

## 2023-07-25 NOTE — PROCEDURES
Patient scheduled for MRI at 11am with dialysis to follow, dialysis tech to inform nephrologist of MRI with contrast that is ordered, as it is unknown if RN had done so yet.

## 2023-07-25 NOTE — DISCHARGE INSTR - COC
Continuity of Care Form    Patient Name: Tahir Jacobsen   :  1950  MRN:  [id number]    Admit date:  2023  Discharge date:  23    Code Status Order: Prior   Advance Directives:     Admitting Physician: Tanmay Malhotra MD  PCP: Jose Negron MD    Discharging Nurse: Sushila Mirza RN  [facility] Unit/Room#: 0761/3016-K  Discharging Unit Phone Number: [phone]    Emergency Contact:   Extended Emergency Contact Information  Primary Emergency Contact: [address] [place] [address] Phone: [phone]  Mobile Phone: [phone]  Relation: Child  Secondary Emergency Contact: Shonna Villalobos  [place] Phone: [phone]  Mobile Phone: [phone]  Relation: Other  Preferred language: English   needed? No    Past Surgical History:  Past Surgical History:   Procedure Laterality Date    CATHETER INSERTION N/A 3/1/2023    TUNNELED HEMODIALYSIS CATHETER REMOVAL POSSIBLE TEMPORARY CATHETER INSERTION performed by Camiol Lopez MD at [facility] N/A 2021    CATHETER INSERTION HEMODIALYSIS, REMOVAL OF FEMORAL CATHETER performed by Lucy Brady MD at [address] [place] [address]       Immunization History: There is no immunization history on file for this patient.     Active Problems:  Patient Active Problem List   Diagnosis Code    Diabetes mellitus ([address]) S17.1    Metabolic acidosis Z08.09    Essential hypertension I10    Uncontrolled diabetes mellitus ([address]) [id number]    Hyperbilirubinemia E80.6    Thrombocytopenia ([facility]) D69.6    Hypothyroidism E03.9    ESRD needing dialysis ([address]) N18.6, Z99.2    Acute respiratory failure with hypoxia (HCC) J96.01    Volume overload E87.70    Hypertensive emergency I16.1    Hyperkalemia E87.5    Severe protein-calorie malnutrition ([address]) E43    Acute renal failure superimposed on chronic kidney disease, on chronic dialysis (HCC) N17.9, N18.9, Z99.2    Elevated serum creatinine R79.89    Lung infiltrate R91.8    Weakness R53.1    DNR (do not

## 2023-07-25 NOTE — PROCEDURES
Informed RN the IV in the upper left arm appeared to work well, with no infiltrate, however there was no contrast on the MRI after injection. Informed RN it will need to be removed, new IV started in left hand.

## 2023-07-25 NOTE — CARE COORDINATION
SOCIAL WORK/CASEMANAGEMENT TRANSITION OF CARE PLANNINGIam RAMAN, [address] [facility]): I met with pt in the room , received pt in transfer from [facility]. Pt is from [facility] and will return on discharge per pt. Precert not needed. Mri of the brain pending since 7/23, on iv keppra, fluids and sodium bicarb. Goes to [facility] TTS pta . [facility] here declined pt. Speech, Renal and neurosurgery following. All discharge paper work is in place.  JAY Smith  7/25/2023

## 2023-07-26 LAB
ALBUMIN SERPL-MCNC: 3.6 G/DL (ref 3.5–5.2)
ALP SERPL-CCNC: 72 U/L (ref 40–129)
ALT SERPL-CCNC: 14 U/L (ref 0–40)
ANION GAP SERPL CALCULATED.3IONS-SCNC: 12 MMOL/L (ref 7–16)
AST SERPL-CCNC: 16 U/L (ref 0–39)
BASOPHILS # BLD: 0.04 K/UL (ref 0–0.2)
BASOPHILS NFR BLD: 1 % (ref 0–2)
BILIRUB SERPL-MCNC: 0.7 MG/DL (ref 0–1.2)
BUN SERPL-MCNC: 15 MG/DL (ref 6–23)
CALCIUM SERPL-MCNC: 8.4 MG/DL (ref 8.6–10.2)
CHLORIDE SERPL-SCNC: 100 MMOL/L (ref 98–107)
CO2 SERPL-SCNC: 26 MMOL/L (ref 22–29)
CREAT SERPL-MCNC: 4.1 MG/DL (ref 0.7–1.2)
EOSINOPHIL # BLD: 0.08 K/UL (ref 0.05–0.5)
EOSINOPHILS RELATIVE PERCENT: 2 % (ref 0–6)
ERYTHROCYTE [DISTWIDTH] IN BLOOD BY AUTOMATED COUNT: 14.2 % (ref 11.5–15)
GFR SERPL CREATININE-BSD FRML MDRD: 14 ML/MIN/1.73M2
GLUCOSE SERPL-MCNC: 92 MG/DL (ref 74–99)
HCT VFR BLD AUTO: 28.5 % (ref 37–54)
HGB BLD-MCNC: 9.3 G/DL (ref 12.5–16.5)
IMM GRANULOCYTES # BLD AUTO: <0.03 K/UL (ref 0–0.58)
IMM GRANULOCYTES NFR BLD: 1 % (ref 0–5)
LACTATE BLDV-SCNC: 0.5 MMOL/L (ref 0.5–2.2)
LACTATE BLDV-SCNC: 1.1 MMOL/L (ref 0.5–2.2)
LYMPHOCYTES NFR BLD: 0.64 K/UL (ref 1.5–4)
LYMPHOCYTES RELATIVE PERCENT: 17 % (ref 20–42)
MCH RBC QN AUTO: 29.5 PG (ref 26–35)
MCHC RBC AUTO-ENTMCNC: 32.6 G/DL (ref 32–34.5)
MCV RBC AUTO: 90.5 FL (ref 80–99.9)
METER GLUCOSE: 101 MG/DL (ref 74–99)
METER GLUCOSE: 137 MG/DL (ref 74–99)
METER GLUCOSE: 72 MG/DL (ref 74–99)
METER GLUCOSE: 93 MG/DL (ref 74–99)
MONOCYTES NFR BLD: 0.49 K/UL (ref 0.1–0.95)
MONOCYTES NFR BLD: 13 % (ref 2–12)
NEUTROPHILS NFR BLD: 66 % (ref 43–80)
NEUTS SEG NFR BLD: 2.48 K/UL (ref 1.8–7.3)
PLATELET # BLD AUTO: 110 K/UL (ref 130–450)
PLATELET CONFIRMATION: NORMAL
PMV BLD AUTO: 10.7 FL (ref 7–12)
POTASSIUM SERPL-SCNC: 3.7 MMOL/L (ref 3.5–5)
PROT SERPL-MCNC: 6.3 G/DL (ref 6.4–8.3)
RBC # BLD AUTO: 3.15 M/UL (ref 3.8–5.8)
SODIUM SERPL-SCNC: 138 MMOL/L (ref 132–146)
WBC OTHER # BLD: 3.8 K/UL (ref 4.5–11.5)

## 2023-07-26 PROCEDURE — 85027 COMPLETE CBC AUTOMATED: CPT

## 2023-07-26 PROCEDURE — 6370000000 HC RX 637 (ALT 250 FOR IP): Performed by: CLINICAL NURSE SPECIALIST

## 2023-07-26 PROCEDURE — 6360000002 HC RX W HCPCS: Performed by: CLINICAL NURSE SPECIALIST

## 2023-07-26 PROCEDURE — 92610 EVALUATE SWALLOWING FUNCTION: CPT

## 2023-07-26 PROCEDURE — 82947 ASSAY GLUCOSE BLOOD QUANT: CPT

## 2023-07-26 PROCEDURE — 90935 HEMODIALYSIS ONE EVALUATION: CPT

## 2023-07-26 PROCEDURE — 2060000000 HC ICU INTERMEDIATE R&B

## 2023-07-26 PROCEDURE — 80053 COMPREHEN METABOLIC PANEL: CPT

## 2023-07-26 PROCEDURE — 6370000000 HC RX 637 (ALT 250 FOR IP): Performed by: INTERNAL MEDICINE

## 2023-07-26 PROCEDURE — 36415 COLL VENOUS BLD VENIPUNCTURE: CPT

## 2023-07-26 PROCEDURE — 92526 ORAL FUNCTION THERAPY: CPT

## 2023-07-26 PROCEDURE — 83605 ASSAY OF LACTIC ACID: CPT

## 2023-07-26 RX ADMIN — SENNOSIDES 8.6 MG: 8.6 TABLET, FILM COATED ORAL at 20:16

## 2023-07-26 RX ADMIN — ISOSORBIDE DINITRATE 10 MG: 10 TABLET ORAL at 11:40

## 2023-07-26 RX ADMIN — ISOSORBIDE DINITRATE 10 MG: 10 TABLET ORAL at 17:59

## 2023-07-26 RX ADMIN — HYDRALAZINE HYDROCHLORIDE 50 MG: 50 TABLET, FILM COATED ORAL at 20:16

## 2023-07-26 RX ADMIN — AMLODIPINE BESYLATE 10 MG: 10 TABLET ORAL at 20:16

## 2023-07-26 RX ADMIN — ATORVASTATIN CALCIUM 40 MG: 40 TABLET, FILM COATED ORAL at 11:40

## 2023-07-26 RX ADMIN — CARVEDILOL 25 MG: 25 TABLET, FILM COATED ORAL at 11:41

## 2023-07-26 RX ADMIN — CARVEDILOL 25 MG: 25 TABLET, FILM COATED ORAL at 17:59

## 2023-07-26 RX ADMIN — LEVETIRACETAM 500 MG: 100 INJECTION INTRAVENOUS at 11:40

## 2023-07-26 RX ADMIN — HYDRALAZINE HYDROCHLORIDE 50 MG: 50 TABLET, FILM COATED ORAL at 17:59

## 2023-07-26 RX ADMIN — LOSARTAN POTASSIUM 50 MG: 50 TABLET, FILM COATED ORAL at 11:40

## 2023-07-26 RX ADMIN — HYDRALAZINE HYDROCHLORIDE 10 MG: 20 INJECTION INTRAMUSCULAR; INTRAVENOUS at 20:17

## 2023-07-26 ASSESSMENT — PAIN SCALES - GENERAL
PAINLEVEL_OUTOF10: 0

## 2023-07-26 NOTE — CARE COORDINATION
SOCIAL WORK/CASEMANAGEMENT TRANSITION OF CARE PLANNINGCorrlisa GILLISKARLOS, [address] [facility]):  pt is from [facility] and will return with out a precert on discharge, all discharge paper work is in place. Pt is requiring 3 days in  a row of dialysis after the MRI of the brain. Which showed Stable parenchymal hematoma. Tentative discharge back to the [facility] on 7/27.  JAY Cox  7/26/2023

## 2023-07-27 VITALS
HEART RATE: 71 BPM | DIASTOLIC BLOOD PRESSURE: 82 MMHG | BODY MASS INDEX: 16.1 KG/M2 | RESPIRATION RATE: 16 BRPM | OXYGEN SATURATION: 100 % | TEMPERATURE: 96.8 F | WEIGHT: 125.44 LBS | SYSTOLIC BLOOD PRESSURE: 170 MMHG | HEIGHT: 74 IN

## 2023-07-27 LAB
ALBUMIN SERPL-MCNC: 3.6 G/DL (ref 3.5–5.2)
ALBUMIN SERPL-MCNC: 3.6 G/DL (ref 3.5–5.2)
ALP SERPL-CCNC: 75 U/L (ref 40–129)
ALP SERPL-CCNC: 76 U/L (ref 40–129)
ALT SERPL-CCNC: 13 U/L (ref 0–40)
ALT SERPL-CCNC: 13 U/L (ref 0–40)
ANION GAP SERPL CALCULATED.3IONS-SCNC: 11 MMOL/L (ref 7–16)
ANION GAP SERPL CALCULATED.3IONS-SCNC: 11 MMOL/L (ref 7–16)
AST SERPL-CCNC: 17 U/L (ref 0–39)
AST SERPL-CCNC: 18 U/L (ref 0–39)
BASOPHILS # BLD: 0.03 K/UL (ref 0–0.2)
BASOPHILS NFR BLD: 1 % (ref 0–2)
BILIRUB SERPL-MCNC: 0.7 MG/DL (ref 0–1.2)
BILIRUB SERPL-MCNC: 0.8 MG/DL (ref 0–1.2)
BUN SERPL-MCNC: 12 MG/DL (ref 6–23)
BUN SERPL-MCNC: 13 MG/DL (ref 6–23)
CA-I BLD-SCNC: 1.17 MMOL/L (ref 1.15–1.33)
CALCIUM SERPL-MCNC: 8.6 MG/DL (ref 8.6–10.2)
CALCIUM SERPL-MCNC: 8.8 MG/DL (ref 8.6–10.2)
CHLORIDE SERPL-SCNC: 100 MMOL/L (ref 98–107)
CHLORIDE SERPL-SCNC: 99 MMOL/L (ref 98–107)
CO2 SERPL-SCNC: 26 MMOL/L (ref 22–29)
CO2 SERPL-SCNC: 28 MMOL/L (ref 22–29)
CREAT SERPL-MCNC: 3.5 MG/DL (ref 0.7–1.2)
CREAT SERPL-MCNC: 3.5 MG/DL (ref 0.7–1.2)
EOSINOPHIL # BLD: 0.12 K/UL (ref 0.05–0.5)
EOSINOPHILS RELATIVE PERCENT: 4 % (ref 0–6)
ERYTHROCYTE [DISTWIDTH] IN BLOOD BY AUTOMATED COUNT: 13.8 % (ref 11.5–15)
ERYTHROCYTE [DISTWIDTH] IN BLOOD BY AUTOMATED COUNT: 13.9 % (ref 11.5–15)
GFR SERPL CREATININE-BSD FRML MDRD: 18 ML/MIN/1.73M2
GFR SERPL CREATININE-BSD FRML MDRD: 18 ML/MIN/1.73M2
GLUCOSE SERPL-MCNC: 90 MG/DL (ref 74–99)
GLUCOSE SERPL-MCNC: 94 MG/DL (ref 74–99)
HCT VFR BLD AUTO: 28.4 % (ref 37–54)
HCT VFR BLD AUTO: 28.5 % (ref 37–54)
HGB BLD-MCNC: 9.6 G/DL (ref 12.5–16.5)
HGB BLD-MCNC: 9.6 G/DL (ref 12.5–16.5)
IMM GRANULOCYTES # BLD AUTO: <0.03 K/UL (ref 0–0.58)
IMM GRANULOCYTES NFR BLD: 0 % (ref 0–5)
LYMPHOCYTES NFR BLD: 0.63 K/UL (ref 1.5–4)
LYMPHOCYTES RELATIVE PERCENT: 19 % (ref 20–42)
MAGNESIUM SERPL-MCNC: 1.8 MG/DL (ref 1.6–2.6)
MAGNESIUM SERPL-MCNC: 1.8 MG/DL (ref 1.6–2.6)
MCH RBC QN AUTO: 29.6 PG (ref 26–35)
MCH RBC QN AUTO: 29.8 PG (ref 26–35)
MCHC RBC AUTO-ENTMCNC: 33.7 G/DL (ref 32–34.5)
MCHC RBC AUTO-ENTMCNC: 33.8 G/DL (ref 32–34.5)
MCV RBC AUTO: 88 FL (ref 80–99.9)
MCV RBC AUTO: 88.2 FL (ref 80–99.9)
METER GLUCOSE: 87 MG/DL (ref 74–99)
METER GLUCOSE: 95 MG/DL (ref 74–99)
MONOCYTES NFR BLD: 0.45 K/UL (ref 0.1–0.95)
MONOCYTES NFR BLD: 14 % (ref 2–12)
NEUTROPHILS NFR BLD: 62 % (ref 43–80)
NEUTS SEG NFR BLD: 2.03 K/UL (ref 1.8–7.3)
PHOSPHATE SERPL-MCNC: 2.9 MG/DL (ref 2.5–4.5)
PHOSPHATE SERPL-MCNC: 2.9 MG/DL (ref 2.5–4.5)
PLATELET # BLD AUTO: 111 K/UL (ref 130–450)
PLATELET # BLD AUTO: 113 K/UL (ref 130–450)
PMV BLD AUTO: 10.4 FL (ref 7–12)
PMV BLD AUTO: 10.6 FL (ref 7–12)
POTASSIUM SERPL-SCNC: 3.4 MMOL/L (ref 3.5–5)
POTASSIUM SERPL-SCNC: 3.6 MMOL/L (ref 3.5–5)
PROT SERPL-MCNC: 6.6 G/DL (ref 6.4–8.3)
PROT SERPL-MCNC: 6.6 G/DL (ref 6.4–8.3)
RBC # BLD AUTO: 3.22 M/UL (ref 3.8–5.8)
RBC # BLD AUTO: 3.24 M/UL (ref 3.8–5.8)
SODIUM SERPL-SCNC: 137 MMOL/L (ref 132–146)
SODIUM SERPL-SCNC: 138 MMOL/L (ref 132–146)
WBC OTHER # BLD: 3.1 K/UL (ref 4.5–11.5)
WBC OTHER # BLD: 3.3 K/UL (ref 4.5–11.5)

## 2023-07-27 PROCEDURE — 90935 HEMODIALYSIS ONE EVALUATION: CPT

## 2023-07-27 PROCEDURE — 82330 ASSAY OF CALCIUM: CPT

## 2023-07-27 PROCEDURE — 6370000000 HC RX 637 (ALT 250 FOR IP): Performed by: CLINICAL NURSE SPECIALIST

## 2023-07-27 PROCEDURE — 6360000002 HC RX W HCPCS: Performed by: CLINICAL NURSE SPECIALIST

## 2023-07-27 PROCEDURE — 80053 COMPREHEN METABOLIC PANEL: CPT

## 2023-07-27 PROCEDURE — 85027 COMPLETE CBC AUTOMATED: CPT

## 2023-07-27 PROCEDURE — 83735 ASSAY OF MAGNESIUM: CPT

## 2023-07-27 PROCEDURE — 82947 ASSAY GLUCOSE BLOOD QUANT: CPT

## 2023-07-27 PROCEDURE — 84100 ASSAY OF PHOSPHORUS: CPT

## 2023-07-27 PROCEDURE — 36415 COLL VENOUS BLD VENIPUNCTURE: CPT

## 2023-07-27 RX ORDER — SENNOSIDES A AND B 8.6 MG/1
1 TABLET, FILM COATED ORAL NIGHTLY
Qty: 30 TABLET | Refills: 0 | Status: SHIPPED | OUTPATIENT
Start: 2023-07-27 | End: 2023-08-26

## 2023-07-27 RX ORDER — LOSARTAN POTASSIUM 100 MG/1
100 TABLET ORAL DAILY
Qty: 30 TABLET | Refills: 3 | Status: SHIPPED | OUTPATIENT
Start: 2023-07-27

## 2023-07-27 RX ORDER — LOSARTAN POTASSIUM 50 MG/1
100 TABLET ORAL DAILY
Status: DISCONTINUED | OUTPATIENT
Start: 2023-07-28 | End: 2023-07-27

## 2023-07-27 RX ORDER — POLYETHYLENE GLYCOL 3350 17 G/17G
17 POWDER, FOR SOLUTION ORAL DAILY PRN
Qty: 527 G | Refills: 0 | Status: SHIPPED | OUTPATIENT
Start: 2023-07-27 | End: 2023-08-26

## 2023-07-27 RX ORDER — LEVETIRACETAM 500 MG/1
500 TABLET ORAL 2 TIMES DAILY
Qty: 10 TABLET | Refills: 0 | Status: SHIPPED | OUTPATIENT
Start: 2023-07-27 | End: 2023-08-01

## 2023-07-27 RX ORDER — LOSARTAN POTASSIUM 50 MG/1
100 TABLET ORAL DAILY
Status: DISCONTINUED | OUTPATIENT
Start: 2023-07-27 | End: 2023-07-27 | Stop reason: HOSPADM

## 2023-07-27 RX ORDER — CLONIDINE 0.3 MG/24H
1 PATCH, EXTENDED RELEASE TRANSDERMAL WEEKLY
Qty: 4 PATCH | Refills: 0 | Status: SHIPPED | OUTPATIENT
Start: 2023-07-30

## 2023-07-27 RX ADMIN — LEVOTHYROXINE SODIUM 125 MCG: 0.12 TABLET ORAL at 05:36

## 2023-07-27 RX ADMIN — CARVEDILOL 25 MG: 25 TABLET, FILM COATED ORAL at 12:24

## 2023-07-27 RX ADMIN — ISOSORBIDE DINITRATE 10 MG: 10 TABLET ORAL at 12:23

## 2023-07-27 RX ADMIN — HYDRALAZINE HYDROCHLORIDE 50 MG: 50 TABLET, FILM COATED ORAL at 12:23

## 2023-07-27 RX ADMIN — ATORVASTATIN CALCIUM 40 MG: 40 TABLET, FILM COATED ORAL at 12:24

## 2023-07-27 RX ADMIN — LEVETIRACETAM 500 MG: 100 INJECTION INTRAVENOUS at 12:24

## 2023-07-27 RX ADMIN — LANSOPRAZOLE 15 MG: 15 TABLET, ORALLY DISINTEGRATING, DELAYED RELEASE ORAL at 05:36

## 2023-07-27 NOTE — PLAN OF CARE
Problem: Chronic Conditions and Co-morbidities  Goal: Patient's chronic conditions and co-morbidity symptoms are monitored and maintained or improved  Outcome: Completed     Problem: Discharge Planning  Goal: Discharge to home or other facility with appropriate resources  Outcome: Completed     Problem: Safety - Adult  Goal: Free from fall injury  Outcome: Completed     Problem: Skin/Tissue Integrity  Goal: Absence of new skin breakdown  Description: 1. Monitor for areas of redness and/or skin breakdown  2. Assess vascular access sites hourly  3. Every 4-6 hours minimum:  Change oxygen saturation probe site  4. Every 4-6 hours:  If on nasal continuous positive airway pressure, respiratory therapy assess nares and determine need for appliance change or resting period.   7/27/2023 1049 by Montine Pallas, RN  Outcome: Completed  7/26/2023 2159 by Danni Andrade RN  Outcome: Progressing     Problem: ABCDS Injury Assessment  Goal: Absence of physical injury  Outcome: Completed     Problem: Pain  Goal: Verbalizes/displays adequate comfort level or baseline comfort level  Outcome: Completed

## 2023-07-27 NOTE — CARE COORDINATION
SOCIAL WORK/CASEMANAGEMENT TRANSITION OF CARE PLANNINGMarryne Ramu CARLINKARLOS, [address] [facility]):  discharge back to [facility] today. Erik maninder will be here around 1:30 p.m. called son , Shelby Hernandez, and he is in agreement. All discharge paper work is in place. Snf;loc.  Marcus Hendricks, JAY  7/27/2023

## 2023-07-27 NOTE — DISCHARGE SUMMARY
images are provided for review. Automated exposure control, iterative reconstruction, and/or weight based adjustment of the mA/kV was utilized to reduce the radiation dose to as low as reasonably achievable. COMPARISON: 05/22/2023 HISTORY: ORDERING SYSTEM PROVIDED HISTORY: abd pain TECHNOLOGIST PROVIDED HISTORY: Reason for exam:->abd pain Additional Contrast?->None Decision Support Exception - unselect if not a suspected or confirmed emergency medical condition->Emergency Medical Condition (MA) FINDINGS: Lower Chest:  Visualized portion of the lower chest demonstrates no acute abnormality. Small hiatal hernia. Organs: The liver, gallbladder, spleen, pancreas, and adrenals are within normal limits. Bilateral nephrocalcinosis again noted. There is no hydronephrosis. GI/Bowel: There is no evidence of bowel obstruction. No evidence of abnormal bowel wall thickening or distension. The appendix is not confidently identified. However, there is no inflammatory change in the right lower quadrant to suggest acute appendicitis. Pelvis: The urinary bladder is distended. The prostate is enlarged. Peritoneum/Retroperitoneum: No evidence of ascites or free air. No evidence of lymphadenopathy. Aorta is normal in caliber. Bones/Soft Tissues:  No acute abnormality of the visualized osseous structures. No acute abdominopelvic abnormality. Small hiatal hernia. CT HEAD WO CONTRAST    Result Date: 7/23/2023  EXAMINATION: CT OF THE HEAD WITHOUT CONTRAST  7/23/2023 11:03 am TECHNIQUE: CT of the head was performed without the administration of intravenous contrast. Automated exposure control, iterative reconstruction, and/or weight based adjustment of the mA/kV was utilized to reduce the radiation dose to as low as reasonably achievable. COMPARISON: 07/23/2023 HISTORY: ORDERING SYSTEM PROVIDED HISTORY: [facility] TECHNOLOGIST PROVIDED HISTORY: Reason for exam:->[facility] Has a \"code stroke\" or \"stroke alert\" been called? ->No What

## 2023-08-04 ENCOUNTER — APPOINTMENT (OUTPATIENT)
Dept: CT IMAGING | Age: 73
DRG: 304 | End: 2023-08-04
Payer: MEDICARE

## 2023-08-04 ENCOUNTER — APPOINTMENT (OUTPATIENT)
Dept: GENERAL RADIOLOGY | Age: 73
DRG: 304 | End: 2023-08-04
Payer: MEDICARE

## 2023-08-04 ENCOUNTER — HOSPITAL ENCOUNTER (INPATIENT)
Age: 73
LOS: 2 days | Discharge: NURSING, REHAB OR LONG-TERM CARE | DRG: 304 | End: 2023-08-06
Attending: STUDENT IN AN ORGANIZED HEALTH CARE EDUCATION/TRAINING PROGRAM | Admitting: STUDENT IN AN ORGANIZED HEALTH CARE EDUCATION/TRAINING PROGRAM
Payer: MEDICARE

## 2023-08-04 DIAGNOSIS — I16.0 HYPERTENSIVE URGENCY: Primary | ICD-10-CM

## 2023-08-04 DIAGNOSIS — Z99.2 ESRD ON DIALYSIS (HCC): ICD-10-CM

## 2023-08-04 DIAGNOSIS — N18.6 ESRD ON DIALYSIS (HCC): ICD-10-CM

## 2023-08-04 LAB
ALBUMIN SERPL-MCNC: 3.8 G/DL (ref 3.5–5.2)
ALP SERPL-CCNC: 95 U/L (ref 40–129)
ALT SERPL-CCNC: 17 U/L (ref 0–40)
ANION GAP SERPL CALCULATED.3IONS-SCNC: 12 MMOL/L (ref 7–16)
AST SERPL-CCNC: 17 U/L (ref 0–39)
BASOPHILS # BLD: 0.04 K/UL (ref 0–0.2)
BASOPHILS NFR BLD: 1 % (ref 0–2)
BILIRUB SERPL-MCNC: 0.8 MG/DL (ref 0–1.2)
BNP SERPL-MCNC: ABNORMAL PG/ML (ref 0–450)
BUN SERPL-MCNC: 50 MG/DL (ref 6–23)
CALCIUM SERPL-MCNC: 9.8 MG/DL (ref 8.6–10.2)
CHLORIDE SERPL-SCNC: 104 MMOL/L (ref 98–107)
CO2 SERPL-SCNC: 25 MMOL/L (ref 22–29)
CREAT SERPL-MCNC: 10.3 MG/DL (ref 0.7–1.2)
EOSINOPHIL # BLD: 0.18 K/UL (ref 0.05–0.5)
EOSINOPHILS RELATIVE PERCENT: 5 % (ref 0–6)
ERYTHROCYTE [DISTWIDTH] IN BLOOD BY AUTOMATED COUNT: 14.3 % (ref 11.5–15)
GFR SERPL CREATININE-BSD FRML MDRD: 5 ML/MIN/1.73M2
GLUCOSE SERPL-MCNC: 153 MG/DL (ref 74–99)
HCT VFR BLD AUTO: 29.1 % (ref 37–54)
HGB BLD-MCNC: 10 G/DL (ref 12.5–16.5)
IMM GRANULOCYTES # BLD AUTO: <0.03 K/UL (ref 0–0.58)
IMM GRANULOCYTES NFR BLD: 1 % (ref 0–5)
LYMPHOCYTES NFR BLD: 0.85 K/UL (ref 1.5–4)
LYMPHOCYTES RELATIVE PERCENT: 21 % (ref 20–42)
MCH RBC QN AUTO: 30 PG (ref 26–35)
MCHC RBC AUTO-ENTMCNC: 34.4 G/DL (ref 32–34.5)
MCV RBC AUTO: 87.4 FL (ref 80–99.9)
MONOCYTES NFR BLD: 0.4 K/UL (ref 0.1–0.95)
MONOCYTES NFR BLD: 10 % (ref 2–12)
NEUTROPHILS NFR BLD: 63 % (ref 43–80)
NEUTS SEG NFR BLD: 2.5 K/UL (ref 1.8–7.3)
PLATELET # BLD AUTO: 158 K/UL (ref 130–450)
PMV BLD AUTO: 9.9 FL (ref 7–12)
POTASSIUM SERPL-SCNC: 5 MMOL/L (ref 3.5–5)
PROT SERPL-MCNC: 7.2 G/DL (ref 6.4–8.3)
RBC # BLD AUTO: 3.33 M/UL (ref 3.8–5.8)
SODIUM SERPL-SCNC: 141 MMOL/L (ref 132–146)
TROPONIN I SERPL HS-MCNC: 1067 NG/L (ref 0–11)
TROPONIN I SERPL HS-MCNC: 1069 NG/L (ref 0–11)
WBC OTHER # BLD: 4 K/UL (ref 4.5–11.5)

## 2023-08-04 PROCEDURE — 96376 TX/PRO/DX INJ SAME DRUG ADON: CPT

## 2023-08-04 PROCEDURE — 80053 COMPREHEN METABOLIC PANEL: CPT

## 2023-08-04 PROCEDURE — 93005 ELECTROCARDIOGRAM TRACING: CPT | Performed by: STUDENT IN AN ORGANIZED HEALTH CARE EDUCATION/TRAINING PROGRAM

## 2023-08-04 PROCEDURE — 83880 ASSAY OF NATRIURETIC PEPTIDE: CPT

## 2023-08-04 PROCEDURE — 85025 COMPLETE CBC W/AUTO DIFF WBC: CPT

## 2023-08-04 PROCEDURE — 99222 1ST HOSP IP/OBS MODERATE 55: CPT | Performed by: STUDENT IN AN ORGANIZED HEALTH CARE EDUCATION/TRAINING PROGRAM

## 2023-08-04 PROCEDURE — 6370000000 HC RX 637 (ALT 250 FOR IP): Performed by: STUDENT IN AN ORGANIZED HEALTH CARE EDUCATION/TRAINING PROGRAM

## 2023-08-04 PROCEDURE — 99285 EMERGENCY DEPT VISIT HI MDM: CPT

## 2023-08-04 PROCEDURE — 84484 ASSAY OF TROPONIN QUANT: CPT

## 2023-08-04 PROCEDURE — 96374 THER/PROPH/DIAG INJ IV PUSH: CPT

## 2023-08-04 PROCEDURE — 70450 CT HEAD/BRAIN W/O DYE: CPT

## 2023-08-04 PROCEDURE — 71045 X-RAY EXAM CHEST 1 VIEW: CPT

## 2023-08-04 PROCEDURE — 6360000002 HC RX W HCPCS: Performed by: STUDENT IN AN ORGANIZED HEALTH CARE EDUCATION/TRAINING PROGRAM

## 2023-08-04 PROCEDURE — 2060000000 HC ICU INTERMEDIATE R&B

## 2023-08-04 RX ORDER — HYDRALAZINE HYDROCHLORIDE 50 MG/1
100 TABLET, FILM COATED ORAL ONCE
Status: COMPLETED | OUTPATIENT
Start: 2023-08-04 | End: 2023-08-04

## 2023-08-04 RX ORDER — AMLODIPINE BESYLATE 5 MG/1
10 TABLET ORAL DAILY
Status: DISCONTINUED | OUTPATIENT
Start: 2023-08-05 | End: 2023-08-04

## 2023-08-04 RX ORDER — ONDANSETRON 4 MG/1
4 TABLET, ORALLY DISINTEGRATING ORAL ONCE
Status: COMPLETED | OUTPATIENT
Start: 2023-08-04 | End: 2023-08-04

## 2023-08-04 RX ORDER — HYDRALAZINE HYDROCHLORIDE 20 MG/ML
10 INJECTION INTRAMUSCULAR; INTRAVENOUS ONCE
Status: COMPLETED | OUTPATIENT
Start: 2023-08-04 | End: 2023-08-04

## 2023-08-04 RX ORDER — INSULIN LISPRO 100 [IU]/ML
0-4 INJECTION, SOLUTION INTRAVENOUS; SUBCUTANEOUS
Status: DISCONTINUED | OUTPATIENT
Start: 2023-08-05 | End: 2023-08-06 | Stop reason: HOSPADM

## 2023-08-04 RX ORDER — AMLODIPINE BESYLATE 5 MG/1
10 TABLET ORAL ONCE
Status: COMPLETED | OUTPATIENT
Start: 2023-08-04 | End: 2023-08-04

## 2023-08-04 RX ORDER — INSULIN LISPRO 100 [IU]/ML
0-4 INJECTION, SOLUTION INTRAVENOUS; SUBCUTANEOUS NIGHTLY
Status: DISCONTINUED | OUTPATIENT
Start: 2023-08-05 | End: 2023-08-06 | Stop reason: HOSPADM

## 2023-08-04 RX ORDER — DEXTROSE MONOHYDRATE 100 MG/ML
INJECTION, SOLUTION INTRAVENOUS CONTINUOUS PRN
Status: DISCONTINUED | OUTPATIENT
Start: 2023-08-04 | End: 2023-08-06 | Stop reason: HOSPADM

## 2023-08-04 RX ADMIN — HYDRALAZINE HYDROCHLORIDE 10 MG: 20 INJECTION INTRAMUSCULAR; INTRAVENOUS at 17:04

## 2023-08-04 RX ADMIN — ONDANSETRON 4 MG: 4 TABLET, ORALLY DISINTEGRATING ORAL at 21:54

## 2023-08-04 RX ADMIN — HYDRALAZINE HYDROCHLORIDE 100 MG: 50 TABLET, FILM COATED ORAL at 21:53

## 2023-08-04 RX ADMIN — AMLODIPINE BESYLATE 10 MG: 5 TABLET ORAL at 21:54

## 2023-08-04 RX ADMIN — HYDRALAZINE HYDROCHLORIDE 10 MG: 20 INJECTION INTRAMUSCULAR; INTRAVENOUS at 19:40

## 2023-08-04 ASSESSMENT — LIFESTYLE VARIABLES
HOW OFTEN DO YOU HAVE A DRINK CONTAINING ALCOHOL: NEVER
HOW MANY STANDARD DRINKS CONTAINING ALCOHOL DO YOU HAVE ON A TYPICAL DAY: PATIENT DOES NOT DRINK

## 2023-08-04 ASSESSMENT — PAIN - FUNCTIONAL ASSESSMENT: PAIN_FUNCTIONAL_ASSESSMENT: NONE - DENIES PAIN

## 2023-08-04 NOTE — ED PROVIDER NOTES
[id number]  [phone]    Follow up        DISCHARGE MEDICATIONS:  Discharge Medication List as of 8/6/2023  8:42 AM          DISCONTINUED MEDICATIONS:  Discharge Medication List as of 8/6/2023  8:42 AM                 (Please note that portions of this note were completed with a voice recognition program.  Efforts were made to edit the dictations but occasionally words are mis-transcribed.)    Uzair Myers DO (electronically signed)           Uzair Myers DO  08/06/23 8619

## 2023-08-05 LAB
ANION GAP SERPL CALCULATED.3IONS-SCNC: 12 MMOL/L (ref 7–16)
BASOPHILS # BLD: 0.05 K/UL (ref 0–0.2)
BASOPHILS NFR BLD: 1 % (ref 0–2)
BUN SERPL-MCNC: 60 MG/DL (ref 6–23)
CALCIUM SERPL-MCNC: 9.6 MG/DL (ref 8.6–10.2)
CHLORIDE SERPL-SCNC: 104 MMOL/L (ref 98–107)
CO2 SERPL-SCNC: 24 MMOL/L (ref 22–29)
CREAT SERPL-MCNC: 11.2 MG/DL (ref 0.7–1.2)
EKG ATRIAL RATE: 57 BPM
EKG P AXIS: 57 DEGREES
EKG P-R INTERVAL: 202 MS
EKG Q-T INTERVAL: 458 MS
EKG QRS DURATION: 88 MS
EKG QTC CALCULATION (BAZETT): 445 MS
EKG R AXIS: -23 DEGREES
EKG T AXIS: 95 DEGREES
EKG VENTRICULAR RATE: 57 BPM
EOSINOPHIL # BLD: 0.21 K/UL (ref 0.05–0.5)
EOSINOPHILS RELATIVE PERCENT: 4 % (ref 0–6)
ERYTHROCYTE [DISTWIDTH] IN BLOOD BY AUTOMATED COUNT: 14.7 % (ref 11.5–15)
GFR SERPL CREATININE-BSD FRML MDRD: 4 ML/MIN/1.73M2
GLUCOSE SERPL-MCNC: 115 MG/DL (ref 74–99)
HCT VFR BLD AUTO: 26.7 % (ref 37–54)
HGB BLD-MCNC: 9.1 G/DL (ref 12.5–16.5)
IMM GRANULOCYTES # BLD AUTO: <0.03 K/UL (ref 0–0.58)
IMM GRANULOCYTES NFR BLD: 0 % (ref 0–5)
LYMPHOCYTES NFR BLD: 0.86 K/UL (ref 1.5–4)
LYMPHOCYTES RELATIVE PERCENT: 18 % (ref 20–42)
MCH RBC QN AUTO: 30.2 PG (ref 26–35)
MCHC RBC AUTO-ENTMCNC: 34.1 G/DL (ref 32–34.5)
MCV RBC AUTO: 88.7 FL (ref 80–99.9)
METER GLUCOSE: 108 MG/DL (ref 74–99)
METER GLUCOSE: 111 MG/DL (ref 74–99)
METER GLUCOSE: 123 MG/DL (ref 74–99)
METER GLUCOSE: 177 MG/DL (ref 74–99)
METER GLUCOSE: 98 MG/DL (ref 74–99)
MONOCYTES NFR BLD: 0.53 K/UL (ref 0.1–0.95)
MONOCYTES NFR BLD: 11 % (ref 2–12)
NEUTROPHILS NFR BLD: 65 % (ref 43–80)
NEUTS SEG NFR BLD: 3.14 K/UL (ref 1.8–7.3)
PLATELET # BLD AUTO: 171 K/UL (ref 130–450)
PMV BLD AUTO: 10.3 FL (ref 7–12)
POTASSIUM SERPL-SCNC: 5.5 MMOL/L (ref 3.5–5)
RBC # BLD AUTO: 3.01 M/UL (ref 3.8–5.8)
SODIUM SERPL-SCNC: 140 MMOL/L (ref 132–146)
WBC OTHER # BLD: 4.8 K/UL (ref 4.5–11.5)

## 2023-08-05 PROCEDURE — 6370000000 HC RX 637 (ALT 250 FOR IP): Performed by: INTERNAL MEDICINE

## 2023-08-05 PROCEDURE — 80048 BASIC METABOLIC PNL TOTAL CA: CPT

## 2023-08-05 PROCEDURE — 2580000003 HC RX 258: Performed by: INTERNAL MEDICINE

## 2023-08-05 PROCEDURE — 5A1D70Z PERFORMANCE OF URINARY FILTRATION, INTERMITTENT, LESS THAN 6 HOURS PER DAY: ICD-10-PCS | Performed by: INTERNAL MEDICINE

## 2023-08-05 PROCEDURE — 90935 HEMODIALYSIS ONE EVALUATION: CPT

## 2023-08-05 PROCEDURE — 99233 SBSQ HOSP IP/OBS HIGH 50: CPT | Performed by: INTERNAL MEDICINE

## 2023-08-05 PROCEDURE — 93010 ELECTROCARDIOGRAM REPORT: CPT | Performed by: INTERNAL MEDICINE

## 2023-08-05 PROCEDURE — 82947 ASSAY GLUCOSE BLOOD QUANT: CPT

## 2023-08-05 PROCEDURE — 80074 ACUTE HEPATITIS PANEL: CPT

## 2023-08-05 PROCEDURE — 86317 IMMUNOASSAY INFECTIOUS AGENT: CPT

## 2023-08-05 PROCEDURE — 85025 COMPLETE CBC W/AUTO DIFF WBC: CPT

## 2023-08-05 PROCEDURE — 2580000003 HC RX 258: Performed by: STUDENT IN AN ORGANIZED HEALTH CARE EDUCATION/TRAINING PROGRAM

## 2023-08-05 PROCEDURE — 6370000000 HC RX 637 (ALT 250 FOR IP): Performed by: STUDENT IN AN ORGANIZED HEALTH CARE EDUCATION/TRAINING PROGRAM

## 2023-08-05 PROCEDURE — 2060000000 HC ICU INTERMEDIATE R&B

## 2023-08-05 RX ORDER — HYDRALAZINE HYDROCHLORIDE 50 MG/1
50 TABLET, FILM COATED ORAL 3 TIMES DAILY
Status: DISCONTINUED | OUTPATIENT
Start: 2023-08-05 | End: 2023-08-06 | Stop reason: HOSPADM

## 2023-08-05 RX ORDER — ISOSORBIDE DINITRATE 10 MG/1
10 TABLET ORAL 3 TIMES DAILY
Status: DISCONTINUED | OUTPATIENT
Start: 2023-08-05 | End: 2023-08-06 | Stop reason: HOSPADM

## 2023-08-05 RX ORDER — SODIUM CHLORIDE 0.9 % (FLUSH) 0.9 %
5-40 SYRINGE (ML) INJECTION PRN
Status: DISCONTINUED | OUTPATIENT
Start: 2023-08-05 | End: 2023-08-06 | Stop reason: HOSPADM

## 2023-08-05 RX ORDER — ONDANSETRON 4 MG/1
4 TABLET, ORALLY DISINTEGRATING ORAL EVERY 8 HOURS PRN
Status: DISCONTINUED | OUTPATIENT
Start: 2023-08-05 | End: 2023-08-06 | Stop reason: HOSPADM

## 2023-08-05 RX ORDER — SODIUM CHLORIDE 0.9 % (FLUSH) 0.9 %
5-40 SYRINGE (ML) INJECTION EVERY 12 HOURS SCHEDULED
Status: DISCONTINUED | OUTPATIENT
Start: 2023-08-05 | End: 2023-08-06 | Stop reason: HOSPADM

## 2023-08-05 RX ORDER — POLYETHYLENE GLYCOL 3350 17 G/17G
17 POWDER, FOR SOLUTION ORAL DAILY PRN
Status: DISCONTINUED | OUTPATIENT
Start: 2023-08-05 | End: 2023-08-06 | Stop reason: HOSPADM

## 2023-08-05 RX ORDER — CARVEDILOL 25 MG/1
25 TABLET ORAL 2 TIMES DAILY WITH MEALS
Status: DISCONTINUED | OUTPATIENT
Start: 2023-08-05 | End: 2023-08-06 | Stop reason: HOSPADM

## 2023-08-05 RX ORDER — ONDANSETRON 2 MG/ML
4 INJECTION INTRAMUSCULAR; INTRAVENOUS EVERY 6 HOURS PRN
Status: DISCONTINUED | OUTPATIENT
Start: 2023-08-05 | End: 2023-08-06 | Stop reason: HOSPADM

## 2023-08-05 RX ORDER — CLONIDINE 0.3 MG/24H
1 PATCH, EXTENDED RELEASE TRANSDERMAL WEEKLY
Status: DISCONTINUED | OUTPATIENT
Start: 2023-08-05 | End: 2023-08-06 | Stop reason: HOSPADM

## 2023-08-05 RX ORDER — LOSARTAN POTASSIUM 50 MG/1
100 TABLET ORAL DAILY
Status: DISCONTINUED | OUTPATIENT
Start: 2023-08-05 | End: 2023-08-06 | Stop reason: HOSPADM

## 2023-08-05 RX ORDER — AMLODIPINE BESYLATE 10 MG/1
10 TABLET ORAL NIGHTLY
Status: DISCONTINUED | OUTPATIENT
Start: 2023-08-05 | End: 2023-08-06 | Stop reason: HOSPADM

## 2023-08-05 RX ORDER — ATORVASTATIN CALCIUM 40 MG/1
40 TABLET, FILM COATED ORAL DAILY
Status: DISCONTINUED | OUTPATIENT
Start: 2023-08-05 | End: 2023-08-06 | Stop reason: HOSPADM

## 2023-08-05 RX ORDER — SENNOSIDES A AND B 8.6 MG/1
1 TABLET, FILM COATED ORAL NIGHTLY
Status: DISCONTINUED | OUTPATIENT
Start: 2023-08-05 | End: 2023-08-06 | Stop reason: HOSPADM

## 2023-08-05 RX ORDER — SODIUM CHLORIDE 9 MG/ML
INJECTION, SOLUTION INTRAVENOUS PRN
Status: DISCONTINUED | OUTPATIENT
Start: 2023-08-05 | End: 2023-08-06 | Stop reason: HOSPADM

## 2023-08-05 RX ORDER — ACETAMINOPHEN 650 MG/1
650 SUPPOSITORY RECTAL EVERY 6 HOURS PRN
Status: DISCONTINUED | OUTPATIENT
Start: 2023-08-05 | End: 2023-08-06 | Stop reason: HOSPADM

## 2023-08-05 RX ORDER — 0.9 % SODIUM CHLORIDE 0.9 %
500 INTRAVENOUS SOLUTION INTRAVENOUS ONCE
Status: COMPLETED | OUTPATIENT
Start: 2023-08-05 | End: 2023-08-05

## 2023-08-05 RX ORDER — ACETAMINOPHEN 325 MG/1
650 TABLET ORAL EVERY 6 HOURS PRN
Status: DISCONTINUED | OUTPATIENT
Start: 2023-08-05 | End: 2023-08-06 | Stop reason: HOSPADM

## 2023-08-05 RX ADMIN — Medication 10 ML: at 12:09

## 2023-08-05 RX ADMIN — AMLODIPINE BESYLATE 10 MG: 10 TABLET ORAL at 21:12

## 2023-08-05 RX ADMIN — ISOSORBIDE DINITRATE 10 MG: 10 TABLET ORAL at 21:11

## 2023-08-05 RX ADMIN — HYDRALAZINE HYDROCHLORIDE 50 MG: 50 TABLET, FILM COATED ORAL at 21:11

## 2023-08-05 RX ADMIN — SODIUM CHLORIDE 500 ML: 9 INJECTION, SOLUTION INTRAVENOUS at 16:05

## 2023-08-05 RX ADMIN — ISOSORBIDE DINITRATE 10 MG: 10 TABLET ORAL at 12:04

## 2023-08-05 RX ADMIN — LOSARTAN POTASSIUM 100 MG: 50 TABLET, FILM COATED ORAL at 12:04

## 2023-08-05 RX ADMIN — SENNOSIDES 8.6 MG: 8.6 TABLET, FILM COATED ORAL at 21:12

## 2023-08-05 RX ADMIN — ATORVASTATIN CALCIUM 40 MG: 40 TABLET, FILM COATED ORAL at 12:05

## 2023-08-05 RX ADMIN — Medication 10 ML: at 21:15

## 2023-08-05 RX ADMIN — HYDRALAZINE HYDROCHLORIDE 50 MG: 50 TABLET, FILM COATED ORAL at 12:05

## 2023-08-05 RX ADMIN — CARVEDILOL 25 MG: 25 TABLET, FILM COATED ORAL at 12:04

## 2023-08-05 RX ADMIN — SENNOSIDES 8.6 MG: 8.6 TABLET, FILM COATED ORAL at 00:33

## 2023-08-05 RX ADMIN — CARVEDILOL 25 MG: 25 TABLET, FILM COATED ORAL at 00:33

## 2023-08-05 ASSESSMENT — PAIN SCALES - GENERAL
PAINLEVEL_OUTOF10: 0

## 2023-08-05 NOTE — CONSULTS
Department of Internal Medicine  Nephrology Attending Consult Note      Reason for Consult:  End-Stage Renal Disease  Requesting Physician:  Dr Kerry Mack:  Missed dialysis treatment    History Obtained From:  patient, electronic medical record    HISTORY OF PRESENT ILLNESS:                The patient is a 75 y.o. male with significant past medical history of end stage renal disease secondary to DM and HTN, on hemodialysis [facility] via Arterial Venous Fistula, last hemodialysis treatment on feeling weak, presents with Missed multiple dialysis treatment.     Past Medical History:        Diagnosis Date    Anemia     Chronic kidney disease     Depression     Diabetes mellitus (HCC)     ESRD (end stage renal disease) ([address])     Hepatitis C     Hypertension     Liver disease     Moderate protein-calorie malnutrition (HCC)     Muscle weakness (generalized)     Thyroid disease     Unspecified diseases of blood and blood-forming organs     Unsteadiness on feet      Past Surgical History:        Procedure Laterality Date    CATHETER INSERTION N/A 3/1/2023    TUNNELED HEMODIALYSIS CATHETER REMOVAL POSSIBLE TEMPORARY CATHETER INSERTION performed by Monroe Padilla MD at [facility] N/A 11/21/2021    CATHETER INSERTION HEMODIALYSIS, REMOVAL OF FEMORAL CATHETER performed by Yash Rubio MD at [facility] OR     Current Medications:    Current Facility-Administered Medications: amLODIPine (NORVASC) tablet 10 mg, 10 mg, Oral, Nightly  atorvastatin (LIPITOR) tablet 40 mg, 40 mg, Oral, Daily  carvedilol (COREG) tablet 25 mg, 25 mg, Oral, BID WC  cloNIDine (CATAPRES) 0.3 MG/24HR 1 patch, 1 patch, TransDERmal, Weekly  hydrALAZINE (APRESOLINE) tablet 50 mg, 50 mg, Oral, TID  isosorbide dinitrate (ISORDIL) tablet 10 mg, 10 mg, Oral, TID  levothyroxine (SYNTHROID) tablet 125 mcg, 125 mcg, Oral, Daily  losartan (COZAAR) tablet 100 mg, 100 mg, Oral, Daily  polyethylene glycol

## 2023-08-05 NOTE — H&P
[facility] History and Physical      CHIEF COMPLAINT: Nausea and vomiting    History of Present Illness:    Mr. Clark Graham is a 79-year-old male with past medical history significant for ESRD on HD, hypertension, DM type II, chronic hepatitis C, hypothyroidism, depression, anemia of chronic kidney disease who presents with nausea and vomiting for the past few days. In talking with Mr. Jesusita Pelletier, he reports nausea and vomiting for the past few days. He reports he has been unable to keep down any liquids or food for the past few days. He has missed multiple doses of his blood pressure medications. Of note, he was recently hospitalized for intraparenchymal hemorrhage. Due to his persistent symptoms he decided to seek further evaluation in the [facility] ED. In the ED, vitals on presentation were temp 97.5, RR 14, HR 60, /101, O2 sat 100% on room air. Lab work was obtained which revealed serum sodium 141, potassium 5.0, bicarb 25, creatinine 10.3, blood glucose 153, proBNP 16,433, high-sensitivity troponin 1067 followed by 1069. CBC was obtained which revealed WBC 4.0, hemoglobin 10.0 with MCV 87.4, platelets 796Y. Chest x-ray was obtained which revealed no acute findings. This is followed up with CT head without contrast which revealed hyperdensity in the left cerebellum may represent persistence of a previously suspected small area of hemorrhage. Possibility of parenchymal calcification may also be considered. Previously noted hemorrhage within the fourth ventricle has resolved. No evidence of acute intracranial process. Findings of presumed small vessel ischemic deep white matter disease. Prominence of the sulci and/or CSF spaces suggestive degree of cerebral atrophy. In the ED, patient was given IV hydralazine 10 mg x 2,  hydralazine 100 mg p.o. once, amlodipine 10 mg once. He was also given IV Zofran 4 mg once.     Decision was made to hospitalize

## 2023-08-06 VITALS
TEMPERATURE: 98 F | DIASTOLIC BLOOD PRESSURE: 84 MMHG | WEIGHT: 117.73 LBS | BODY MASS INDEX: 15.11 KG/M2 | SYSTOLIC BLOOD PRESSURE: 161 MMHG | HEIGHT: 74 IN | OXYGEN SATURATION: 99 % | RESPIRATION RATE: 18 BRPM | HEART RATE: 78 BPM

## 2023-08-06 LAB — METER GLUCOSE: 105 MG/DL (ref 74–99)

## 2023-08-06 PROCEDURE — 6370000000 HC RX 637 (ALT 250 FOR IP): Performed by: INTERNAL MEDICINE

## 2023-08-06 PROCEDURE — 82947 ASSAY GLUCOSE BLOOD QUANT: CPT

## 2023-08-06 PROCEDURE — 99239 HOSP IP/OBS DSCHRG MGMT >30: CPT | Performed by: INTERNAL MEDICINE

## 2023-08-06 PROCEDURE — 6370000000 HC RX 637 (ALT 250 FOR IP): Performed by: STUDENT IN AN ORGANIZED HEALTH CARE EDUCATION/TRAINING PROGRAM

## 2023-08-06 RX ADMIN — ISOSORBIDE DINITRATE 10 MG: 10 TABLET ORAL at 08:47

## 2023-08-06 RX ADMIN — CARVEDILOL 25 MG: 25 TABLET, FILM COATED ORAL at 08:47

## 2023-08-06 RX ADMIN — ONDANSETRON 4 MG: 4 TABLET, ORALLY DISINTEGRATING ORAL at 08:47

## 2023-08-06 RX ADMIN — ATORVASTATIN CALCIUM 40 MG: 40 TABLET, FILM COATED ORAL at 08:47

## 2023-08-06 RX ADMIN — LOSARTAN POTASSIUM 100 MG: 50 TABLET, FILM COATED ORAL at 08:47

## 2023-08-06 RX ADMIN — HYDRALAZINE HYDROCHLORIDE 50 MG: 50 TABLET, FILM COATED ORAL at 08:47

## 2023-08-06 RX ADMIN — LEVOTHYROXINE SODIUM 125 MCG: 25 TABLET ORAL at 06:01

## 2023-08-06 ASSESSMENT — PAIN SCALES - GENERAL: PAINLEVEL_OUTOF10: 0

## 2023-08-06 NOTE — DISCHARGE INSTRUCTIONS
Discharge to:  [facility] [place]  Diet: regular  Activity: activity as tolerated   Exercise: As tolerated     Be compliant with medication

## 2023-08-06 NOTE — DISCHARGE SUMMARY
[facility] Physician Discharge Summary       Amanda Glynn MD  [address] [address]  [phone]    Follow up        Activity level: As tolerated     Dispo: [facility]     Condition on discharge: Stable     Patient ID:  Erika Albarran  [id number]  31 y.o.  1950    Admit date: 8/4/2023    Discharge date and time:  8/6/2023  7:18 AM    Admission Diagnoses: Principal Problem:    ESRD on dialysis [facility])  Active Problems:    Hypertensive urgency  Resolved Problems:    * No resolved hospital problems. *      Discharge Diagnoses: Principal Problem:    ESRD on dialysis [facility])  Active Problems:    Hypertensive urgency  Resolved Problems:    * No resolved hospital problems. *      Consults:  [facility] CONSULT [facility]:   Patient Erika Albarran is a 75 y.o. presented with ESRD on dialysis ([address]) [N18.6, Z99.2]  Hypertensive urgency [I16.0]    Mr. Sabra Ray is a 16-year-old male with past medical history significant for ESRD on HD, hypertension, DM type II, chronic hepatitis C, hypothyroidism, depression, anemia of chronic kidney disease who presents with nausea and vomiting for the past few days. In talking with Mr. [employer], he reports nausea and vomiting for the past few days. He reports he has been unable to keep down any liquids or food for the past few days. He has missed multiple doses of his blood pressure medications. Of note, he was recently hospitalized for intraparenchymal hemorrhage. Due to his persistent symptoms he decided to seek further evaluation in the [facility] ED. In the ED, vitals on presentation were temp 97.5, RR 14, HR 60, /101, O2 sat 100% on room air. Lab work was obtained which revealed serum sodium 141, potassium 5.0, bicarb 25, creatinine 10.3, blood glucose 153, proBNP 16,433, high-sensitivity troponin 1067 followed by 1069.   CBC was obtained which revealed WBC 4.0, hemoglobin 10.0 with

## 2023-08-06 NOTE — PROGRESS NOTES
Call to [facility] to verify if patient can return today. RN will verify and return call.
Received return call and patient may return to facility.  Call to [facility] and  scheduled for 10 am.
Report given to Niecy Miranda at [facility].
distress  Cardiovascular: normal rate, normal S1 and S2 and no carotid bruits  Abdomen: soft, non-tender, non-distended, normal bowel sounds, no masses or organomegaly  Extremities: no cyanosis, no clubbing and no edema  Neurologic: no cranial nerve deficit and speech normal        Recent Labs     08/04/23 1658 08/05/23  0741    140   K 5.0 5.5*    104   CO2 25 24   BUN 50* 60*   CREATININE 10.3* 11.2*   GLUCOSE 153* 115*   CALCIUM 9.8 9.6       Recent Labs     08/04/23  1658 08/05/23  0741   WBC 4.0* 4.8   RBC 3.33* 3.01*   HGB 10.0* 9.1*   HCT 29.1* 26.7*   MCV 87.4 88.7   MCH 30.0 30.2   MCHC 34.4 34.1   RDW 14.3 14.7    171   MPV 9.9 10.3       Radiology:        CT head 8/4/2023:     1. Hyperdensity in the left cerebellum may represent persistence of a  previously suspected small area of hemorrhage. Possibility of parenchymal  calcification may also be considered. Previously noted hemorrhage within the  4th ventricle has resolved. 2.  No evidence of acute intracranial process. 3.  Findings of presumed small vessel ischemic deep white matter disease. 4.  Prominence of the sulci and/or CSF spaces suggests a degree of cerebral  atrophy. Assessment:    Principal Problem:    ESRD on dialysis [facility])  Active Problems:    Hypertensive urgency  Resolved Problems:    * No resolved hospital problems. *      Plan:  1. Hypertensive urgency-continue home clonidine, losartan, amlodipine, hydralazine, Coreg. Trend hemodynamics. Resolved. Became hypotensive, added holding parameters. Will monitor overnight. 2.  History of recent intraparenchymal hemorrhage (7/23/2023)-CT head without contrast obtained in the ED this hospitalization revealed hyperdensity in the left cerebellum which may represent persistence of her previously suspected small area of hemorrhage. Previously noted hemorrhage within the fourth ventricle has resolved.   3.  ESRD on HD-consult nephrology for assistance in

## 2023-08-07 LAB
HAV IGM SERPL QL IA: NONREACTIVE
HAV IGM SERPL QL IA: NONREACTIVE
HBV CORE IGM SERPL QL IA: NONREACTIVE
HBV CORE IGM SERPL QL IA: NONREACTIVE
HBV SURFACE AB SERPL IA-ACNC: <3.1 MIU/ML (ref 0–9.99)
HBV SURFACE AB SERPL IA-ACNC: <3.1 MIU/ML (ref 0–9.99)
HBV SURFACE AG SERPL QL IA: NONREACTIVE
HBV SURFACE AG SERPL QL IA: NONREACTIVE
HCV AB SERPL QL IA: NONREACTIVE
HCV AB SERPL QL IA: REACTIVE

## 2023-08-31 PROBLEM — I62.9 INTRACRANIAL HEMORRHAGE (HCC): Status: ACTIVE | Noted: 2023-08-31

## 2023-09-25 ENCOUNTER — HOSPITAL ENCOUNTER (EMERGENCY)
Age: 73
Discharge: ANOTHER HOSPITAL | End: 2023-09-26
Attending: STUDENT IN AN ORGANIZED HEALTH CARE EDUCATION/TRAINING PROGRAM
Payer: MEDICARE

## 2023-09-25 DIAGNOSIS — I61.9 INTRAPARENCHYMAL HEMORRHAGE OF BRAIN (HCC): Primary | ICD-10-CM

## 2023-09-25 PROCEDURE — 96374 THER/PROPH/DIAG INJ IV PUSH: CPT

## 2023-09-25 PROCEDURE — 99285 EMERGENCY DEPT VISIT HI MDM: CPT

## 2023-09-25 PROCEDURE — 96375 TX/PRO/DX INJ NEW DRUG ADDON: CPT

## 2023-09-26 ENCOUNTER — APPOINTMENT (OUTPATIENT)
Dept: CT IMAGING | Age: 73
End: 2023-09-26
Payer: MEDICARE

## 2023-09-26 ENCOUNTER — APPOINTMENT (OUTPATIENT)
Dept: GENERAL RADIOLOGY | Age: 73
End: 2023-09-26
Payer: MEDICARE

## 2023-09-26 ENCOUNTER — APPOINTMENT (OUTPATIENT)
Dept: CT IMAGING | Age: 73
End: 2023-09-26
Attending: FAMILY MEDICINE
Payer: MEDICARE

## 2023-09-26 ENCOUNTER — HOSPITAL ENCOUNTER (INPATIENT)
Age: 73
LOS: 4 days | Discharge: NURSING, REHAB OR LONG-TERM CARE | End: 2023-09-30
Attending: FAMILY MEDICINE | Admitting: STUDENT IN AN ORGANIZED HEALTH CARE EDUCATION/TRAINING PROGRAM
Payer: MEDICARE

## 2023-09-26 VITALS
RESPIRATION RATE: 20 BRPM | BODY MASS INDEX: 15.02 KG/M2 | SYSTOLIC BLOOD PRESSURE: 156 MMHG | OXYGEN SATURATION: 100 % | HEIGHT: 74 IN | TEMPERATURE: 98 F | WEIGHT: 117 LBS | DIASTOLIC BLOOD PRESSURE: 72 MMHG | HEART RATE: 91 BPM

## 2023-09-26 DIAGNOSIS — I61.9 INTRAPARENCHYMAL HEMORRHAGE OF BRAIN (HCC): Primary | ICD-10-CM

## 2023-09-26 LAB
ALBUMIN SERPL-MCNC: 4.2 G/DL (ref 3.5–5.2)
ALP SERPL-CCNC: 116 U/L (ref 40–129)
ALT SERPL-CCNC: 17 U/L (ref 0–40)
AMPHET UR QL SCN: NEGATIVE
ANION GAP SERPL CALCULATED.3IONS-SCNC: 23 MMOL/L (ref 7–16)
AST SERPL-CCNC: 30 U/L (ref 0–39)
BARBITURATES UR QL SCN: NEGATIVE
BASOPHILS # BLD: 0.04 K/UL (ref 0–0.2)
BASOPHILS NFR BLD: 1 % (ref 0–2)
BENZODIAZ UR QL: NEGATIVE
BILIRUB SERPL-MCNC: 1 MG/DL (ref 0–1.2)
BUN SERPL-MCNC: 27 MG/DL (ref 6–23)
BUPRENORPHINE UR QL: NEGATIVE
CALCIUM SERPL-MCNC: 9.9 MG/DL (ref 8.6–10.2)
CANNABINOIDS UR QL SCN: NEGATIVE
CHLORIDE SERPL-SCNC: 97 MMOL/L (ref 98–107)
CLOT ANGLE.KAOLIN INDUCED BLD RES TEG: 69.5 DEG (ref 53–70)
CO2 SERPL-SCNC: 16 MMOL/L (ref 22–29)
COCAINE UR QL SCN: NEGATIVE
CREAT SERPL-MCNC: 5.5 MG/DL (ref 0.7–1.2)
EKG ATRIAL RATE: 92 BPM
EKG P AXIS: 62 DEGREES
EKG P-R INTERVAL: 174 MS
EKG Q-T INTERVAL: 404 MS
EKG QRS DURATION: 94 MS
EKG QTC CALCULATION (BAZETT): 499 MS
EKG R AXIS: -84 DEGREES
EKG T AXIS: 91 DEGREES
EKG VENTRICULAR RATE: 92 BPM
EOSINOPHIL # BLD: 0.03 K/UL (ref 0.05–0.5)
EOSINOPHILS RELATIVE PERCENT: 1 % (ref 0–6)
EPL-TEG: 0 % (ref 0–15)
ERYTHROCYTE [DISTWIDTH] IN BLOOD BY AUTOMATED COUNT: 13.8 % (ref 11.5–15)
ETHANOLAMINE SERPL-MCNC: <10 MG/DL
FENTANYL UR QL: NEGATIVE
G-TEG: 7.7 KDYN/CM2 (ref 4.5–11)
GFR SERPL CREATININE-BSD FRML MDRD: 10 ML/MIN/1.73M2
GLUCOSE BLD-MCNC: 119 MG/DL (ref 74–99)
GLUCOSE BLD-MCNC: 126 MG/DL (ref 74–99)
GLUCOSE BLD-MCNC: 135 MG/DL (ref 74–99)
GLUCOSE BLD-MCNC: 151 MG/DL (ref 74–99)
GLUCOSE SERPL-MCNC: 156 MG/DL (ref 74–99)
HBA1C MFR BLD: <4.2 % (ref 4–5.6)
HCT VFR BLD AUTO: 46.7 % (ref 37–54)
HGB BLD-MCNC: 16.1 G/DL (ref 12.5–16.5)
IMM GRANULOCYTES # BLD AUTO: <0.03 K/UL (ref 0–0.58)
IMM GRANULOCYTES NFR BLD: 0 % (ref 0–5)
INR PPP: 1
KINETICS TEG: 1.4 MIN (ref 1–3)
LY30 (LYSIS) TEG: 0 % (ref 0–8)
LYMPHOCYTES NFR BLD: 0.66 K/UL (ref 1.5–4)
LYMPHOCYTES RELATIVE PERCENT: 15 % (ref 20–42)
MA (MAX CLOT) TEG: 60.5 MM (ref 50–70)
MCH RBC QN AUTO: 31.4 PG (ref 26–35)
MCHC RBC AUTO-ENTMCNC: 34.5 G/DL (ref 32–34.5)
MCV RBC AUTO: 91.2 FL (ref 80–99.9)
METHADONE UR QL: NEGATIVE
MONOCYTES NFR BLD: 0.61 K/UL (ref 0.1–0.95)
MONOCYTES NFR BLD: 13 % (ref 2–12)
NEUTROPHILS NFR BLD: 70 % (ref 43–80)
NEUTS SEG NFR BLD: 3.2 K/UL (ref 1.8–7.3)
OPIATES UR QL SCN: NEGATIVE
OXYCODONE UR QL SCN: NEGATIVE
PARTIAL THROMBOPLASTIN TIME: 29.9 SEC (ref 24.5–35.1)
PCP UR QL SCN: NEGATIVE
PLATELET # BLD AUTO: 150 K/UL (ref 130–450)
PMV BLD AUTO: 11.8 FL (ref 7–12)
POTASSIUM SERPL-SCNC: 4.6 MMOL/L (ref 3.5–5)
PROT SERPL-MCNC: 8.2 G/DL (ref 6.4–8.3)
PROTHROMBIN TIME: 11.8 SEC (ref 9.3–12.4)
RBC # BLD AUTO: 5.12 M/UL (ref 3.8–5.8)
REACTION TIME TEG: 4.4 MIN (ref 5–10)
SODIUM SERPL-SCNC: 136 MMOL/L (ref 132–146)
TEST INFORMATION: NORMAL
TROPONIN I SERPL HS-MCNC: 614 NG/L (ref 0–11)
WBC OTHER # BLD: 4.6 K/UL (ref 4.5–11.5)

## 2023-09-26 PROCEDURE — 85390 FIBRINOLYSINS SCREEN I&R: CPT

## 2023-09-26 PROCEDURE — 82962 GLUCOSE BLOOD TEST: CPT

## 2023-09-26 PROCEDURE — 99222 1ST HOSP IP/OBS MODERATE 55: CPT | Performed by: EMERGENCY MEDICINE

## 2023-09-26 PROCEDURE — 96366 THER/PROPH/DIAG IV INF ADDON: CPT

## 2023-09-26 PROCEDURE — 36620 INSERTION CATHETER ARTERY: CPT

## 2023-09-26 PROCEDURE — 85347 COAGULATION TIME ACTIVATED: CPT

## 2023-09-26 PROCEDURE — 36415 COLL VENOUS BLD VENIPUNCTURE: CPT

## 2023-09-26 PROCEDURE — 93010 ELECTROCARDIOGRAM REPORT: CPT | Performed by: INTERNAL MEDICINE

## 2023-09-26 PROCEDURE — 96375 TX/PRO/DX INJ NEW DRUG ADDON: CPT

## 2023-09-26 PROCEDURE — 96365 THER/PROPH/DIAG IV INF INIT: CPT

## 2023-09-26 PROCEDURE — 85384 FIBRINOGEN ACTIVITY: CPT

## 2023-09-26 PROCEDURE — 99291 CRITICAL CARE FIRST HOUR: CPT | Performed by: SURGERY

## 2023-09-26 PROCEDURE — 85730 THROMBOPLASTIN TIME PARTIAL: CPT

## 2023-09-26 PROCEDURE — 93005 ELECTROCARDIOGRAM TRACING: CPT | Performed by: STUDENT IN AN ORGANIZED HEALTH CARE EDUCATION/TRAINING PROGRAM

## 2023-09-26 PROCEDURE — 6360000002 HC RX W HCPCS: Performed by: EMERGENCY MEDICINE

## 2023-09-26 PROCEDURE — 71045 X-RAY EXAM CHEST 1 VIEW: CPT

## 2023-09-26 PROCEDURE — 70450 CT HEAD/BRAIN W/O DYE: CPT

## 2023-09-26 PROCEDURE — 2580000003 HC RX 258

## 2023-09-26 PROCEDURE — 2580000003 HC RX 258: Performed by: INTERNAL MEDICINE

## 2023-09-26 PROCEDURE — 2580000003 HC RX 258: Performed by: STUDENT IN AN ORGANIZED HEALTH CARE EDUCATION/TRAINING PROGRAM

## 2023-09-26 PROCEDURE — 83036 HEMOGLOBIN GLYCOSYLATED A1C: CPT

## 2023-09-26 PROCEDURE — 6370000000 HC RX 637 (ALT 250 FOR IP)

## 2023-09-26 PROCEDURE — 2500000003 HC RX 250 WO HCPCS: Performed by: STUDENT IN AN ORGANIZED HEALTH CARE EDUCATION/TRAINING PROGRAM

## 2023-09-26 PROCEDURE — 85610 PROTHROMBIN TIME: CPT

## 2023-09-26 PROCEDURE — 96374 THER/PROPH/DIAG INJ IV PUSH: CPT

## 2023-09-26 PROCEDURE — 51701 INSERT BLADDER CATHETER: CPT

## 2023-09-26 PROCEDURE — 4A133B1 MONITORING OF ARTERIAL PRESSURE, PERIPHERAL, PERCUTANEOUS APPROACH: ICD-10-PCS

## 2023-09-26 PROCEDURE — APPNB45 APP NON BILLABLE 31-45 MINUTES: Performed by: NURSE PRACTITIONER

## 2023-09-26 PROCEDURE — 03HY32Z INSERTION OF MONITORING DEVICE INTO UPPER ARTERY, PERCUTANEOUS APPROACH: ICD-10-PCS

## 2023-09-26 PROCEDURE — 85576 BLOOD PLATELET AGGREGATION: CPT

## 2023-09-26 PROCEDURE — 51798 US URINE CAPACITY MEASURE: CPT

## 2023-09-26 PROCEDURE — 6370000000 HC RX 637 (ALT 250 FOR IP): Performed by: INTERNAL MEDICINE

## 2023-09-26 PROCEDURE — 80053 COMPREHEN METABOLIC PANEL: CPT

## 2023-09-26 PROCEDURE — 2000000000 HC ICU R&B

## 2023-09-26 PROCEDURE — 4A133J1 MONITORING OF ARTERIAL PULSE, PERIPHERAL, PERCUTANEOUS APPROACH: ICD-10-PCS

## 2023-09-26 PROCEDURE — 2500000003 HC RX 250 WO HCPCS: Performed by: NURSE PRACTITIONER

## 2023-09-26 PROCEDURE — 37799 UNLISTED PX VASCULAR SURGERY: CPT

## 2023-09-26 PROCEDURE — 85025 COMPLETE CBC W/AUTO DIFF WBC: CPT

## 2023-09-26 PROCEDURE — 80307 DRUG TEST PRSMV CHEM ANLYZR: CPT

## 2023-09-26 PROCEDURE — 6370000000 HC RX 637 (ALT 250 FOR IP): Performed by: NURSE PRACTITIONER

## 2023-09-26 PROCEDURE — 6360000002 HC RX W HCPCS: Performed by: STUDENT IN AN ORGANIZED HEALTH CARE EDUCATION/TRAINING PROGRAM

## 2023-09-26 PROCEDURE — 84484 ASSAY OF TROPONIN QUANT: CPT

## 2023-09-26 PROCEDURE — 2500000003 HC RX 250 WO HCPCS: Performed by: INTERNAL MEDICINE

## 2023-09-26 PROCEDURE — 2580000003 HC RX 258: Performed by: NURSE PRACTITIONER

## 2023-09-26 PROCEDURE — G0480 DRUG TEST DEF 1-7 CLASSES: HCPCS

## 2023-09-26 RX ORDER — ONDANSETRON 4 MG/1
4 TABLET, ORALLY DISINTEGRATING ORAL EVERY 8 HOURS PRN
Status: DISCONTINUED | OUTPATIENT
Start: 2023-09-26 | End: 2023-09-30 | Stop reason: HOSPADM

## 2023-09-26 RX ORDER — SODIUM CHLORIDE 9 MG/ML
INJECTION, SOLUTION INTRAVENOUS PRN
Status: DISCONTINUED | OUTPATIENT
Start: 2023-09-26 | End: 2023-09-30 | Stop reason: HOSPADM

## 2023-09-26 RX ORDER — ACETAMINOPHEN 325 MG/1
650 TABLET ORAL EVERY 6 HOURS
Status: DISCONTINUED | OUTPATIENT
Start: 2023-09-26 | End: 2023-09-30 | Stop reason: HOSPADM

## 2023-09-26 RX ORDER — ONDANSETRON 2 MG/ML
4 INJECTION INTRAMUSCULAR; INTRAVENOUS ONCE
Status: COMPLETED | OUTPATIENT
Start: 2023-09-26 | End: 2023-09-26

## 2023-09-26 RX ORDER — SODIUM CHLORIDE 9 MG/ML
INJECTION, SOLUTION INTRAVENOUS CONTINUOUS
Status: DISCONTINUED | OUTPATIENT
Start: 2023-09-26 | End: 2023-09-26

## 2023-09-26 RX ORDER — SODIUM CHLORIDE 0.9 % (FLUSH) 0.9 %
10 SYRINGE (ML) INJECTION EVERY 12 HOURS SCHEDULED
Status: DISCONTINUED | OUTPATIENT
Start: 2023-09-26 | End: 2023-09-30 | Stop reason: HOSPADM

## 2023-09-26 RX ORDER — LABETALOL HYDROCHLORIDE 5 MG/ML
10 INJECTION, SOLUTION INTRAVENOUS EVERY 10 MIN PRN
Status: DISCONTINUED | OUTPATIENT
Start: 2023-09-26 | End: 2023-09-29

## 2023-09-26 RX ORDER — POLYETHYLENE GLYCOL 3350 17 G/17G
17 POWDER, FOR SOLUTION ORAL DAILY
Status: DISCONTINUED | OUTPATIENT
Start: 2023-09-26 | End: 2023-09-30 | Stop reason: HOSPADM

## 2023-09-26 RX ORDER — LABETALOL HYDROCHLORIDE 5 MG/ML
10 INJECTION, SOLUTION INTRAVENOUS ONCE
Status: DISCONTINUED | OUTPATIENT
Start: 2023-09-26 | End: 2023-09-26 | Stop reason: HOSPADM

## 2023-09-26 RX ORDER — LABETALOL HYDROCHLORIDE 5 MG/ML
10 INJECTION, SOLUTION INTRAVENOUS ONCE
Status: DISCONTINUED | OUTPATIENT
Start: 2023-09-26 | End: 2023-09-26

## 2023-09-26 RX ORDER — ONDANSETRON 2 MG/ML
4 INJECTION INTRAMUSCULAR; INTRAVENOUS EVERY 6 HOURS PRN
Status: DISCONTINUED | OUTPATIENT
Start: 2023-09-26 | End: 2023-09-30 | Stop reason: HOSPADM

## 2023-09-26 RX ORDER — PROCHLORPERAZINE EDISYLATE 5 MG/ML
10 INJECTION INTRAMUSCULAR; INTRAVENOUS ONCE
Status: COMPLETED | OUTPATIENT
Start: 2023-09-26 | End: 2023-09-26

## 2023-09-26 RX ORDER — INSULIN LISPRO 100 [IU]/ML
0-4 INJECTION, SOLUTION INTRAVENOUS; SUBCUTANEOUS NIGHTLY
Status: DISCONTINUED | OUTPATIENT
Start: 2023-09-26 | End: 2023-09-30 | Stop reason: HOSPADM

## 2023-09-26 RX ORDER — CLONIDINE 0.3 MG/24H
1 PATCH, EXTENDED RELEASE TRANSDERMAL WEEKLY
Status: DISCONTINUED | OUTPATIENT
Start: 2023-09-26 | End: 2023-09-30 | Stop reason: HOSPADM

## 2023-09-26 RX ORDER — SENNA AND DOCUSATE SODIUM 50; 8.6 MG/1; MG/1
1 TABLET, FILM COATED ORAL 2 TIMES DAILY
Status: DISCONTINUED | OUTPATIENT
Start: 2023-09-26 | End: 2023-09-30 | Stop reason: HOSPADM

## 2023-09-26 RX ORDER — CLONIDINE HYDROCHLORIDE 0.1 MG/1
0.1 TABLET ORAL 2 TIMES DAILY
Status: DISCONTINUED | OUTPATIENT
Start: 2023-09-26 | End: 2023-09-27

## 2023-09-26 RX ORDER — HYDRALAZINE HYDROCHLORIDE 20 MG/ML
10 INJECTION INTRAMUSCULAR; INTRAVENOUS ONCE
Status: COMPLETED | OUTPATIENT
Start: 2023-09-26 | End: 2023-09-26

## 2023-09-26 RX ORDER — OXYCODONE HYDROCHLORIDE 5 MG/1
5 TABLET ORAL EVERY 4 HOURS PRN
Status: DISCONTINUED | OUTPATIENT
Start: 2023-09-26 | End: 2023-09-29

## 2023-09-26 RX ORDER — SODIUM CHLORIDE 0.9 % (FLUSH) 0.9 %
SYRINGE (ML) INJECTION
Status: COMPLETED
Start: 2023-09-26 | End: 2023-09-26

## 2023-09-26 RX ORDER — SODIUM CHLORIDE 0.9 % (FLUSH) 0.9 %
10 SYRINGE (ML) INJECTION PRN
Status: DISCONTINUED | OUTPATIENT
Start: 2023-09-26 | End: 2023-09-30 | Stop reason: HOSPADM

## 2023-09-26 RX ORDER — LABETALOL HYDROCHLORIDE 5 MG/ML
10 INJECTION, SOLUTION INTRAVENOUS EVERY 10 MIN PRN
Status: DISCONTINUED | OUTPATIENT
Start: 2023-09-26 | End: 2023-09-26

## 2023-09-26 RX ORDER — ATORVASTATIN CALCIUM 40 MG/1
40 TABLET, FILM COATED ORAL DAILY
Status: DISCONTINUED | OUTPATIENT
Start: 2023-09-26 | End: 2023-09-30 | Stop reason: HOSPADM

## 2023-09-26 RX ORDER — CARVEDILOL 25 MG/1
25 TABLET ORAL 2 TIMES DAILY WITH MEALS
Status: DISCONTINUED | OUTPATIENT
Start: 2023-09-26 | End: 2023-09-30 | Stop reason: HOSPADM

## 2023-09-26 RX ORDER — OXYCODONE HYDROCHLORIDE 10 MG/1
10 TABLET ORAL EVERY 4 HOURS PRN
Status: DISCONTINUED | OUTPATIENT
Start: 2023-09-26 | End: 2023-09-29

## 2023-09-26 RX ORDER — CLONIDINE HYDROCHLORIDE 0.1 MG/1
0.1 TABLET ORAL 3 TIMES DAILY
Status: DISCONTINUED | OUTPATIENT
Start: 2023-09-26 | End: 2023-09-26

## 2023-09-26 RX ORDER — LEVETIRACETAM 500 MG/1
500 TABLET ORAL DAILY
Status: DISCONTINUED | OUTPATIENT
Start: 2023-09-26 | End: 2023-09-30 | Stop reason: HOSPADM

## 2023-09-26 RX ORDER — LEVOTHYROXINE SODIUM 0.12 MG/1
125 TABLET ORAL DAILY
Status: DISCONTINUED | OUTPATIENT
Start: 2023-09-26 | End: 2023-09-30 | Stop reason: HOSPADM

## 2023-09-26 RX ORDER — ISOSORBIDE DINITRATE 10 MG/1
10 TABLET ORAL 3 TIMES DAILY
Status: DISCONTINUED | OUTPATIENT
Start: 2023-09-26 | End: 2023-09-30 | Stop reason: HOSPADM

## 2023-09-26 RX ORDER — AMLODIPINE BESYLATE 10 MG/1
10 TABLET ORAL NIGHTLY
Status: DISCONTINUED | OUTPATIENT
Start: 2023-09-26 | End: 2023-09-30 | Stop reason: HOSPADM

## 2023-09-26 RX ORDER — LEVETIRACETAM 500 MG/5ML
1000 INJECTION, SOLUTION, CONCENTRATE INTRAVENOUS ONCE
Status: COMPLETED | OUTPATIENT
Start: 2023-09-26 | End: 2023-09-26

## 2023-09-26 RX ORDER — HYDRALAZINE HYDROCHLORIDE 20 MG/ML
10 INJECTION INTRAMUSCULAR; INTRAVENOUS EVERY 10 MIN PRN
Status: DISCONTINUED | OUTPATIENT
Start: 2023-09-26 | End: 2023-09-29

## 2023-09-26 RX ORDER — DEXTROSE MONOHYDRATE 100 MG/ML
INJECTION, SOLUTION INTRAVENOUS CONTINUOUS PRN
Status: DISCONTINUED | OUTPATIENT
Start: 2023-09-26 | End: 2023-09-30 | Stop reason: HOSPADM

## 2023-09-26 RX ORDER — INSULIN LISPRO 100 [IU]/ML
0-8 INJECTION, SOLUTION INTRAVENOUS; SUBCUTANEOUS
Status: DISCONTINUED | OUTPATIENT
Start: 2023-09-26 | End: 2023-09-30 | Stop reason: HOSPADM

## 2023-09-26 RX ORDER — LEVETIRACETAM 500 MG/5ML
1000 INJECTION, SOLUTION, CONCENTRATE INTRAVENOUS ONCE
Status: DISCONTINUED | OUTPATIENT
Start: 2023-09-26 | End: 2023-09-26

## 2023-09-26 RX ORDER — LEVETIRACETAM 500 MG/5ML
500 INJECTION, SOLUTION, CONCENTRATE INTRAVENOUS EVERY 12 HOURS
Status: DISCONTINUED | OUTPATIENT
Start: 2023-09-26 | End: 2023-09-26

## 2023-09-26 RX ORDER — HYDRALAZINE HYDROCHLORIDE 20 MG/ML
10 INJECTION INTRAMUSCULAR; INTRAVENOUS EVERY 10 MIN PRN
Status: DISCONTINUED | OUTPATIENT
Start: 2023-09-26 | End: 2023-09-26

## 2023-09-26 RX ADMIN — SODIUM CHLORIDE 15 MG/HR: 9 INJECTION, SOLUTION INTRAVENOUS at 17:07

## 2023-09-26 RX ADMIN — CLONIDINE HYDROCHLORIDE 0.1 MG: 0.1 TABLET ORAL at 08:49

## 2023-09-26 RX ADMIN — LEVETIRACETAM 1000 MG: 100 INJECTION, SOLUTION INTRAVENOUS at 00:30

## 2023-09-26 RX ADMIN — ACETAMINOPHEN 650 MG: 325 TABLET ORAL at 23:25

## 2023-09-26 RX ADMIN — CLONIDINE HYDROCHLORIDE 0.1 MG: 0.1 TABLET ORAL at 19:57

## 2023-09-26 RX ADMIN — ISOSORBIDE DINITRATE 10 MG: 10 TABLET ORAL at 17:06

## 2023-09-26 RX ADMIN — PROCHLORPERAZINE EDISYLATE 10 MG: 5 INJECTION INTRAMUSCULAR; INTRAVENOUS at 04:26

## 2023-09-26 RX ADMIN — SODIUM CHLORIDE, PRESERVATIVE FREE 10 ML: 5 INJECTION INTRAVENOUS at 08:20

## 2023-09-26 RX ADMIN — LEVETIRACETAM 500 MG: 500 TABLET, FILM COATED ORAL at 12:30

## 2023-09-26 RX ADMIN — SENNOSIDES AND DOCUSATE SODIUM 1 TABLET: 50; 8.6 TABLET ORAL at 08:20

## 2023-09-26 RX ADMIN — HYDRALAZINE HYDROCHLORIDE 10 MG: 20 INJECTION, SOLUTION INTRAMUSCULAR; INTRAVENOUS at 00:25

## 2023-09-26 RX ADMIN — SODIUM BICARBONATE: 84 INJECTION, SOLUTION INTRAVENOUS at 12:30

## 2023-09-26 RX ADMIN — SODIUM CHLORIDE 5 MG/HR: 9 INJECTION, SOLUTION INTRAVENOUS at 13:31

## 2023-09-26 RX ADMIN — Medication: at 00:33

## 2023-09-26 RX ADMIN — ISOSORBIDE DINITRATE 10 MG: 10 TABLET ORAL at 08:20

## 2023-09-26 RX ADMIN — SODIUM CHLORIDE 15 MG/HR: 9 INJECTION, SOLUTION INTRAVENOUS at 18:38

## 2023-09-26 RX ADMIN — POLYETHYLENE GLYCOL 3350 17 G: 17 POWDER, FOR SOLUTION ORAL at 08:19

## 2023-09-26 RX ADMIN — SODIUM CHLORIDE 15 MG/HR: 9 INJECTION, SOLUTION INTRAVENOUS at 20:26

## 2023-09-26 RX ADMIN — AMLODIPINE BESYLATE 10 MG: 10 TABLET ORAL at 19:57

## 2023-09-26 RX ADMIN — SODIUM CHLORIDE 15 MG/HR: 9 INJECTION, SOLUTION INTRAVENOUS at 05:30

## 2023-09-26 RX ADMIN — CARVEDILOL 25 MG: 25 TABLET, FILM COATED ORAL at 17:06

## 2023-09-26 RX ADMIN — LEVOTHYROXINE SODIUM 125 MCG: 0.12 TABLET ORAL at 08:20

## 2023-09-26 RX ADMIN — CARVEDILOL 25 MG: 25 TABLET, FILM COATED ORAL at 08:20

## 2023-09-26 RX ADMIN — ACETAMINOPHEN 650 MG: 325 TABLET ORAL at 08:20

## 2023-09-26 RX ADMIN — ATORVASTATIN CALCIUM 40 MG: 40 TABLET, FILM COATED ORAL at 08:20

## 2023-09-26 RX ADMIN — SODIUM CHLORIDE, PRESERVATIVE FREE 10 ML: 5 INJECTION INTRAVENOUS at 19:57

## 2023-09-26 RX ADMIN — SENNOSIDES AND DOCUSATE SODIUM 1 TABLET: 50; 8.6 TABLET ORAL at 19:57

## 2023-09-26 RX ADMIN — ONDANSETRON 4 MG: 2 INJECTION INTRAMUSCULAR; INTRAVENOUS at 00:24

## 2023-09-26 RX ADMIN — SODIUM CHLORIDE 15 MG/HR: 9 INJECTION, SOLUTION INTRAVENOUS at 00:50

## 2023-09-26 RX ADMIN — ISOSORBIDE DINITRATE 10 MG: 10 TABLET ORAL at 12:30

## 2023-09-26 RX ADMIN — SODIUM CHLORIDE 10 MG/HR: 9 INJECTION, SOLUTION INTRAVENOUS at 23:06

## 2023-09-26 RX ADMIN — SODIUM CHLORIDE: 9 INJECTION, SOLUTION INTRAVENOUS at 07:54

## 2023-09-26 RX ADMIN — ACETAMINOPHEN 650 MG: 325 TABLET ORAL at 18:49

## 2023-09-26 RX ADMIN — ACETAMINOPHEN 650 MG: 325 TABLET ORAL at 12:30

## 2023-09-26 ASSESSMENT — ENCOUNTER SYMPTOMS
VOMITING: 0
CONSTIPATION: 0
ABDOMINAL PAIN: 0
DIARRHEA: 0
SHORTNESS OF BREATH: 0
ABDOMINAL DISTENTION: 0
BLOOD IN STOOL: 0
COUGH: 0
NAUSEA: 0
RECTAL PAIN: 0
ALLERGIC/IMMUNOLOGIC NEGATIVE: 1

## 2023-09-26 ASSESSMENT — PAIN SCALES - GENERAL
PAINLEVEL_OUTOF10: 0

## 2023-09-26 NOTE — PROGRESS NOTES
4 Eyes Skin Assessment     NAME:  [address] OF BIRTH:  1950  MEDICAL RECORD NUMBER:  [id number]    The patient is being assessed for  Admission    I agree that at least one RN has performed a thorough Head to Toe Skin Assessment on the patient. ALL assessment sites listed below have been assessed. Areas assessed by both nurses:    Head, Face, Ears, Shoulders, Back, Chest, Arms, Elbows, Hands, Sacrum. Buttock, Coccyx, Ischium, Legs. Feet and Heels, and Under Medical Devices         Does the Patient have a Wound?  No noted wound(s)       Kory Prevention initiated by RN: Yes  Wound Care Orders initiated by RN: Yes    Pressure Injury (Stage 3,4, Unstageable, DTI, NWPT, and Complex wounds) if present, place Wound referral order by RN under : No    New Ostomies, if present place, Ostomy referral order under : No     Nurse 1 eSignature: Electronically signed by Tova Cruz RN on 9/26/23 at 7:09 AM EDT    **SHARE this note so that the co-signing nurse can place an eSignature**    Nurse 2 eSignature: Electronically signed by Katharine Browne RN on 9/26/23 at 7:09 AM EDT Humira PA has been approved, copay is $3330. Enrolling patient in Humira copay card and will schedule delivery when pt returns call.

## 2023-09-26 NOTE — CONSULTS
Palliative Care Department  Palliative Care Initial Consult  Provider: Thelma Rivers, [address] Day: 1  Date of Initial Consult: 9/26/2023  Referring Provider: Amanda Mosquera MD   Palliative Medicine was consulted for assistance with: Assist with goals of care    Chief Complaint: Dominguez Aviles is a 75 y.o. male with chief complaint of hemorrhagic stroke. HPI:   Dominguez Aviles is a 75 y.o. male with significant medical history of end-stage renal disease on hemodialysis, anemia, depression, diabetes mellitus, hepatitis C, hypertension, liver disease, moderate protein calorie malnutrition, thyroid disease, who was admitted to the surgical intensive care unit at [facility] as a transfer from [facility] on 9/26/2023 with hemorrhagic stroke. Per chart review, it is reported the patient had an elevated blood pressure of 212/112. CT scan showed a 1.9 cm intraparenchymal hemorrhage in the medial left cerebellar hemisphere with chronic and age-related changes including age-appropriate cerebral volume loss and scattered nonspecific white matter hypodensities which are likely the sequela of small vessel ischemic disease. Notable lab revealed a CO2 16, BUN 27, creatinine 5.5, potassium 4.6, AG 23, and troponin 614. He was placed on Cardene drip, given IV Keppra and an arterial line was placed. Neurology and nephrology have been consulted. Palliative medicine was consulted to assist with goals of care. Chart review reveals recent hospitalizations in 8/04/2023 for hypertensive urgency that resolved with medication management and a hospitalization 7/23/2023 for intraparenchymal hemorrhage and hypertensive urgency and ED encounter 6/10/2023 for elevated blood pressure. Interval History: Repeat CT of the head without contrast on 9/26/2023 at 11 AM revealed expansion of the hemorrhage with mass effect.   Discussed case with neurosurgery,

## 2023-09-26 NOTE — CONSULTS
CHIEF COMPLAINT: Evaluation for cerebellar hemorrhage     HISTORY OF PRESENT ILLNESS: Mr. Jw Dwyer is a 74 y/o man with a past medical history of severe HTN, DM with ESRD, and hepatitis C who reportedly did not take his anti-hypertensive medication, similar to an episode in 7/2023. As with the episode in July the patient became more confused with dizziness. Patient taken to an OSH where a HCT revealed a cerebellar hemorrhage similar to his previous hemorrhage in July. Patient transferred to ICU for evaluation. NSG notified on transfer. Patient has otherwise been doing well with no recent fevers chills or other illnesses. No reports of staring episodes rhythmic movement or any other changes suggestive of an acute event. PAST MEDICAL HISTORY: as documented  PAST SURGICAL HISTORY:  as documented  SOCIAL HISTORY:  Patient presents today without his family. REVIEW OF SYSTEMS: As above otherwise negative      NEUROLOGICAL EXAMINATION:   Appearance                    Orbits symmetric, no rigding over sagittal, metopic, coronoal or lambdoidal suture bilaterally  Mental Status                 Awake, follows commands  Speech                           decreased, mumbling   Cranial Nerves               Pupils equal and reactive, extra-ocular muscles intact, face equal, oropharynx clear with symmetric rise, tongue midline  Motor: Moving all extremities x 4  REFLEXES:    Symmetric, +2 bilaterally  SENSORY EXAMINATION: intact to light touch C5-T1 L3-S1     XRAYS/DIAGNOSTIC STUDIES:   I reviewed the patient's  non contrast HCT which revealed a midline cerebellar hemorrhage with no evidence of IVH or mass effect or ventriculomegaly. ASSESSMENT: This is a 75 y.o man with a past medical history of HTN and DM necessitating dialysis for ESRD with a second episode of cerebellar hemorrhage likely from his underlying vascular disease.       CLINCIAL IMPRESSION AND RECOMMENDATIONS:   -MRI outpatient c/s contrast if able to

## 2023-09-26 NOTE — ED NOTES
Patient arrived to room B with c/o rash. Patient's mother stated she believes it could be hand foot and mouth due to the rash being located in those areas. Pt. Is at maximum dose of Cardene and is not reaching goal sbp.      Severo Barros RN  09/26/23 8790

## 2023-09-26 NOTE — ED NOTES
[facility] here for transport. Cardene drip almost complete, notified pharmacy and requested new bag for transfer. Tube sent.       [address], RN  09/26/23 4291

## 2023-09-26 NOTE — CONSULTS
Date    Anemia     Chronic kidney disease     Depression     Diabetes mellitus ([address])     ESRD (end stage renal disease) ([address])     Hepatitis C     Hypertension     Liver disease     Moderate protein-calorie malnutrition (HCC)     Muscle weakness (generalized)     Thyroid disease     Unspecified diseases of blood and blood-forming organs     Unsteadiness on feet        Past Surgical History:   Procedure Laterality Date    CATHETER INSERTION N/A 3/1/2023    TUNNELED HEMODIALYSIS CATHETER REMOVAL POSSIBLE TEMPORARY CATHETER INSERTION performed by William Kramer MD at [facility] N/A 11/21/2021    CATHETER INSERTION HEMODIALYSIS, REMOVAL OF FEMORAL CATHETER performed by Corinne Syed MD at [facility] OR       Medications Prior to Admission:    Prior to Admission medications    Medication Sig Start Date End Date Taking?  Authorizing Provider   cloNIDine (CATAPRES) 0.3 MG/24HR PTWK Place 1 patch onto the skin once a week 7/30/23   Josephine Perez MD   levETIRAcetam (KEPPRA) 500 MG tablet Take 1 tablet by mouth 2 times daily for 5 days 7/27/23 8/1/23  Josephine Perez MD   losartan (COZAAR) 100 MG tablet Take 1 tablet by mouth daily 7/27/23   Josephine Perez MD   amLODIPine (NORVASC) 10 MG tablet Take 1 tablet by mouth nightly 5/30/23   Robbie Ying DO   insulin lispro (HUMALOG) 100 UNIT/ML SOLN injection vial Inject 0-10 Units into the skin 4 times daily (before meals and nightly) 5/30/23   Paul Flores DO   isosorbide dinitrate (ISORDIL) 10 MG tablet Take 1 tablet by mouth 3 times daily 5/6/23   Anel Marr APRN - CNP   hydrALAZINE (APRESOLINE) 50 MG tablet Take 1 tablet by mouth 3 times daily 5/6/23   Anel Marr APRN - CNP   levothyroxine (SYNTHROID) 125 MCG tablet Take 1 tablet by mouth Daily 5/7/23   Anel Marr APRN - CNP   atorvastatin (LIPITOR) 40 MG tablet Take 1 tablet by mouth daily    Historical Provider, MD   carvedilol IJ.        LABS:  CBC  Recent Labs     09/26/23  0019   WBC 4.6   HGB 16.1   HCT 46.7        BMP  Recent Labs     09/26/23  0019      K 4.6   CL 97*   CO2 16*   BUN 27*   CREATININE 5.5*   CALCIUM 9.9     Liver Function  Recent Labs     09/26/23  0019   BILITOT 1.0   AST 30   ALT 17   ALKPHOS 116   PROT 8.2   LABALBU 4.2     No results for input(s): \"LACTATE\" in the last 72 hours. Recent Labs     09/26/23  0020   INR 1.0         RADIOLOGY  I reviewed all relevant imaging from this admission as well as the past studies available in the EMR including: CT abdomen/pelvis from ED    My assessment of the reviewed imaging is in HPI    I have personally reviewed all relevant labs and imaging. ASSESSMENT / PLAN:  75 y.o. male with hemorrhagic stroke likely secondary to hypertension. Neuro: Left cerebellar intraparenchymal hemorrhage  Loading dose of Keppra was administered Chemult. 500 mg Keppra twice daily starting tomorrow  Repeat CT scan  Neurosurgery consult  Keep blood pressure below 150. Patient on Cardene drip. Home blood pressure medications restarted  CV: Uncontrolled hypertension. On Cardene drip. Home blood pressures medications restarted. Pulm: No acute issues. Encourage IS/SMI. GI: No acute issues. Bowel regimen GlycoLax and senna. Zofran PRN. We will do bedside swallow. Patient passes can have regular diet and IVF can be discontinued. Renal: History of ESRD. Patient on dialysis through HD catheter. Nephrology consulted. Endocrine: History of diabetes. Patient started on SSI. History of hypothyroidism. Home Synthroid restarted. MSK: No acute issues. PT/OT. Heme: No acute issues. ID: No acute issues. Pain/Analgesia: Tylenol and oxy  Total fluid rate: 50 cc  Bowel regimen: GlycoLax senna  DVT proph:  SCDs  Glucose protocol: SSI  Mouth/eye care: As needed per patient  Urine:   No need for catheter  CVC sites:  None  Ancillary consults: PT OT  Family Update:  As

## 2023-09-26 NOTE — PROGRESS NOTES
Pt received to [id number] from [address] [place] [address] ED. Report received from EMS. Bp cuff placed, placed on monitor. Resident notified that patient is on 15 of cardene for bp control.

## 2023-09-26 NOTE — CONSULTS
Department of Internal Medicine  Nephrology Consult Note      Reason for Consult:  End-Stage Renal Disease on HD now with ICH 2/2 stroke  Requesting Physician:  Alvino Peguero DO    CHIEF COMPLAINT:  transfer from [address] [place] [address], intraparenchymal hemorrhage    History Obtained From:  patient, electronic medical record    HISTORY OF PRESENT ILLNESS:  Briefly, Mr. Gabriel Jacobo is a 75 y.o. male, past medical history significant for ESRD on HD 3 times weekly Tuesday, Thursday, Saturday via right IJ, HTN, type II DM, HFrEF 35 to 40% with stage I diastolic dysfunction (8/2427), hypothyroidism, HCV, who presented to the ED with altered mental status. CT scan showed intraparenchymal bleeding therefore patient was transferred from [address] [place] [address] ER to CVICU and started on a cardene drip. This consultation was requested for dialysis management.     Past Medical History:        Diagnosis Date    Anemia     Chronic kidney disease     Depression     Diabetes mellitus (HCC)     ESRD (end stage renal disease) ([address])     Hepatitis C     Hypertension     Liver disease     Moderate protein-calorie malnutrition (HCC)     Muscle weakness (generalized)     Thyroid disease     Unspecified diseases of blood and blood-forming organs     Unsteadiness on feet      Past Surgical History:        Procedure Laterality Date    CATHETER INSERTION N/A 3/1/2023    TUNNELED HEMODIALYSIS CATHETER REMOVAL POSSIBLE TEMPORARY CATHETER INSERTION performed by Gama Jackson MD at [facility] N/A 11/21/2021    CATHETER INSERTION HEMODIALYSIS, REMOVAL OF FEMORAL CATHETER performed by Yves Carlson MD at [facility] OR     Current Medications:    Current Facility-Administered Medications: sodium chloride flush 0.9 % injection 10 mL, 10 mL, IntraVENous, 2 times per day  sodium chloride flush 0.9 % injection 10 mL, 10 mL, IntraVENous, PRN  0.9 % sodium chloride infusion, , IntraVENous, PRN  acetaminophen (TYLENOL) tablet 650 mg, 650 weekly  Obtain phosphorus level tomorrow  Continue amlodipine 10 mg PO nightly, carvedilol 25 mg PO BID   Monitor labs  Monitor blood pressure    Thank you Dr. Devan Richards for allowing us to participate in the care of Rae Valles. Case and plan discussed with Dr. Terri Saldivar.      Electronically signed by KIANNA Bettencourt CNP on 9/26/2023 at 12:14 PM

## 2023-09-26 NOTE — PROGRESS NOTES
I was called to evaluate new HCT which reveals expansion of the posterior fossa cerebellar hemorrhage now with more mass effect on the outlet of the 4th ventricle. Patient may be at risk of clot expansion with obstructive HCPH. This would necessitate an emergent posterior fossa decompression with resection of clot and cerebellum with placement of EVD to avoid brainstem impingement. I discussed the findings with palliative service as well as NP covering patient so as to determine family's wishes to pursue maximal surgical intervention if needed. Given patient's extensive medical co morbidities patient would be a poor surgical candidate as he most likely would have a poor prognosis after surgical intervention especially with need for CVVHD postoperatively. Recommended lowering SBP parameters to <140 and close fluid management when CVVHD is pursued with avoidance of anti. coagulation given risks associated with CNS mortality from expansion if ICH.

## 2023-09-26 NOTE — PROGRESS NOTES
Spoke with son Storm Le over the phone in regards to most recent CT scan. He states he is the oldest sibling, he does have other half-siblings but he nor his father has talked to them in over 20 years and are unaware of where abouts or contact. He maintains a close relationship with his ex-wife Salomón Nunez as does Miguel Angel. Discussed the possibility of obstructive hydrocephalus secondary to bleed and poor surgical candidate. He would like to discuss this with Salomón Nunez first before they make a decision.

## 2023-09-26 NOTE — ED PROVIDER NOTES
[facility]        Pt Name: Alfonso Gregory  MRN: [id number]  [address] 1950  Date of evaluation: 9/25/2023  Provider: Lauryn Reyez DO  PCP: Sherman Ndiaye MD  Note Started: 11:50 PM EDT 9/25/23    CHIEF COMPLAINT       No chief complaint on file. HISTORY OF PRESENT ILLNESS: 1 or more Elements   History From: ***    {Limitations to history (Optional):93424}    Alfonso Gregory is a 75 y.o. male who presents ***    Nursing Notes were all reviewed and agreed with or any disagreements were addressed in the HPI. REVIEW OF SYSTEMS :      Review of Systems    Positives and Pertinent negatives as per HPI.      SURGICAL HISTORY     Past Surgical History:   Procedure Laterality Date    CATHETER INSERTION N/A 3/1/2023    TUNNELED HEMODIALYSIS CATHETER REMOVAL POSSIBLE TEMPORARY CATHETER INSERTION performed by Duran Banda MD at [facility] N/A 11/21/2021    CATHETER INSERTION HEMODIALYSIS, REMOVAL OF FEMORAL CATHETER performed by Deena Uribe MD at [facility]       Previous Medications    AMLODIPINE (NORVASC) 10 MG TABLET    Take 1 tablet by mouth nightly    ATORVASTATIN (LIPITOR) 40 MG TABLET    Take 1 tablet by mouth daily    CARVEDILOL (COREG) 25 MG TABLET    Take 1 tablet by mouth 2 times daily (with meals)    CLONIDINE (CATAPRES) 0.3 MG/24HR PTWK    Place 1 patch onto the skin once a week    HYDRALAZINE (APRESOLINE) 50 MG TABLET    Take 1 tablet by mouth 3 times daily    INSULIN LISPRO (HUMALOG) 100 UNIT/ML SOLN INJECTION VIAL    Inject 0-10 Units into the skin 4 times daily (before meals and nightly)    ISOSORBIDE DINITRATE (ISORDIL) 10 MG TABLET    Take 1 tablet by mouth 3 times daily    LEVETIRACETAM (KEPPRA) 500 MG TABLET    Take 1 tablet by mouth 2 times daily for 5 days    LEVOTHYROXINE (SYNTHROID) 125 MCG TABLET    Take 1 tablet

## 2023-09-26 NOTE — H&P
Hospitalist History & Physical      PCP: No primary care provider on file. Date of Admission: 9/26/2023    Date of Service: Pt seen/examined on 9/26/2023 and is admitted to Inpatient with expected LOS greater than two midnights due to medical therapy. Chief Complaint:  transfer for [address] [place] [address]  History Of Present Illness:  76 YO M with prior ICH, HTN, who initially presented to  [address] [place] [address] Emergency Department where CT head revealed ICH and elevated blood pressure to 212/112 mmHg. Patient is seen this morning in SICU at [facility]. He is resting comfortable at the time of this evaluation patient seems tired, not participating on ROS. No ER course available  Denied HA or other concerns  Patient had a prior ICH in the setting of uncontrolled HTN back on July 2023      Past Medical History:   Diagnosis Date    Anemia     Chronic kidney disease     Depression     Diabetes mellitus ([address])     ESRD (end stage renal disease) ([address])     Hepatitis C     Hypertension     Liver disease     Moderate protein-calorie malnutrition ([address])     Muscle weakness (generalized)     Thyroid disease     Unspecified diseases of blood and blood-forming organs     Unsteadiness on feet        Past Surgical History:   Procedure Laterality Date    CATHETER INSERTION N/A 3/1/2023    TUNNELED HEMODIALYSIS CATHETER REMOVAL POSSIBLE TEMPORARY CATHETER INSERTION performed by Sin Dixon MD at [facility] N/A 11/21/2021    CATHETER INSERTION HEMODIALYSIS, REMOVAL OF FEMORAL CATHETER performed by Leopoldo Emerson MD at [address] [place] [address]       Prior to Admission medications    Medication Sig Start Date End Date Taking?  Authorizing Provider   cloNIDine (CATAPRES) 0.3 MG/24HR PTWK Place 1 patch onto the skin once a week 7/30/23   Maci Watkins MD   levETIRAcetam (KEPPRA) 500 MG tablet Take 1 tablet by mouth 2 times daily for 5 days 7/27/23 8/1/23  Maci Watkins MD   losartan (COZAAR) 100 MG ambulation  Disposition: []Med/Surg [] Intermediate [] ICU/CCU  Admit status: [] Observation [] Inpatient     +++++++++++++++++++++++++++++++++++++++++++++++++  Amanda Mosquera MD  [facility] Physician - [address] [place] [address]  +++++++++++++++++++++++++++++++++++++++++++++++++  NOTE: This report was transcribed using voice recognition software. Every effort was made to ensure accuracy; however, inadvertent computerized transcription errors may be present.

## 2023-09-27 PROBLEM — Z99.2 ESRD (END STAGE RENAL DISEASE) ON DIALYSIS (HCC): Status: ACTIVE | Noted: 2023-09-27

## 2023-09-27 PROBLEM — N18.6 ESRD (END STAGE RENAL DISEASE) ON DIALYSIS (HCC): Status: ACTIVE | Noted: 2023-09-27

## 2023-09-27 LAB
ANION GAP SERPL CALCULATED.3IONS-SCNC: 15 MMOL/L (ref 7–16)
BASOPHILS # BLD: 0.03 K/UL (ref 0–0.2)
BASOPHILS NFR BLD: 0 % (ref 0–2)
BUN SERPL-MCNC: 38 MG/DL (ref 6–23)
CALCIUM SERPL-MCNC: 8.9 MG/DL (ref 8.6–10.2)
CHLORIDE SERPL-SCNC: 100 MMOL/L (ref 98–107)
CO2 SERPL-SCNC: 22 MMOL/L (ref 22–29)
CREAT SERPL-MCNC: 6.4 MG/DL (ref 0.7–1.2)
EOSINOPHIL # BLD: 0.01 K/UL (ref 0.05–0.5)
EOSINOPHILS RELATIVE PERCENT: 0 % (ref 0–6)
ERYTHROCYTE [DISTWIDTH] IN BLOOD BY AUTOMATED COUNT: 14.3 % (ref 11.5–15)
GFR SERPL CREATININE-BSD FRML MDRD: 9 ML/MIN/1.73M2
GLUCOSE BLD-MCNC: 108 MG/DL (ref 74–99)
GLUCOSE BLD-MCNC: 126 MG/DL (ref 74–99)
GLUCOSE BLD-MCNC: 133 MG/DL (ref 74–99)
GLUCOSE SERPL-MCNC: 136 MG/DL (ref 74–99)
HCT VFR BLD AUTO: 41 % (ref 37–54)
HGB BLD-MCNC: 13.7 G/DL (ref 12.5–16.5)
IMM GRANULOCYTES # BLD AUTO: 0.04 K/UL (ref 0–0.58)
IMM GRANULOCYTES NFR BLD: 1 % (ref 0–5)
LYMPHOCYTES NFR BLD: 0.65 K/UL (ref 1.5–4)
LYMPHOCYTES RELATIVE PERCENT: 8 % (ref 20–42)
MCH RBC QN AUTO: 31.4 PG (ref 26–35)
MCHC RBC AUTO-ENTMCNC: 33.4 G/DL (ref 32–34.5)
MCV RBC AUTO: 93.8 FL (ref 80–99.9)
MONOCYTES NFR BLD: 0.79 K/UL (ref 0.1–0.95)
MONOCYTES NFR BLD: 10 % (ref 2–12)
NEUTROPHILS NFR BLD: 81 % (ref 43–80)
NEUTS SEG NFR BLD: 6.58 K/UL (ref 1.8–7.3)
PLATELET, FLUORESCENCE: 119 K/UL (ref 130–450)
PMV BLD AUTO: 11.5 FL (ref 7–12)
POTASSIUM SERPL-SCNC: 4.8 MMOL/L (ref 3.5–5)
RBC # BLD AUTO: 4.37 M/UL (ref 3.8–5.8)
SODIUM SERPL-SCNC: 137 MMOL/L (ref 132–146)
WBC OTHER # BLD: 8.1 K/UL (ref 4.5–11.5)

## 2023-09-27 PROCEDURE — 37799 UNLISTED PX VASCULAR SURGERY: CPT

## 2023-09-27 PROCEDURE — 97530 THERAPEUTIC ACTIVITIES: CPT

## 2023-09-27 PROCEDURE — 99291 CRITICAL CARE FIRST HOUR: CPT | Performed by: SURGERY

## 2023-09-27 PROCEDURE — 51702 INSERT TEMP BLADDER CATH: CPT

## 2023-09-27 PROCEDURE — 6370000000 HC RX 637 (ALT 250 FOR IP): Performed by: NURSE PRACTITIONER

## 2023-09-27 PROCEDURE — 2000000000 HC ICU R&B

## 2023-09-27 PROCEDURE — 80048 BASIC METABOLIC PNL TOTAL CA: CPT

## 2023-09-27 PROCEDURE — 2580000003 HC RX 258: Performed by: INTERNAL MEDICINE

## 2023-09-27 PROCEDURE — 51798 US URINE CAPACITY MEASURE: CPT

## 2023-09-27 PROCEDURE — 2580000003 HC RX 258: Performed by: NURSE PRACTITIONER

## 2023-09-27 PROCEDURE — 85025 COMPLETE CBC W/AUTO DIFF WBC: CPT

## 2023-09-27 PROCEDURE — 82962 GLUCOSE BLOOD TEST: CPT

## 2023-09-27 PROCEDURE — 97162 PT EVAL MOD COMPLEX 30 MIN: CPT

## 2023-09-27 PROCEDURE — 6370000000 HC RX 637 (ALT 250 FOR IP)

## 2023-09-27 PROCEDURE — 92523 SPEECH SOUND LANG COMPREHEN: CPT | Performed by: SPEECH-LANGUAGE PATHOLOGIST

## 2023-09-27 PROCEDURE — 92507 TX SP LANG VOICE COMM INDIV: CPT | Performed by: SPEECH-LANGUAGE PATHOLOGIST

## 2023-09-27 PROCEDURE — 2500000003 HC RX 250 WO HCPCS: Performed by: INTERNAL MEDICINE

## 2023-09-27 PROCEDURE — 6370000000 HC RX 637 (ALT 250 FOR IP): Performed by: STUDENT IN AN ORGANIZED HEALTH CARE EDUCATION/TRAINING PROGRAM

## 2023-09-27 PROCEDURE — 2500000003 HC RX 250 WO HCPCS: Performed by: NURSE PRACTITIONER

## 2023-09-27 PROCEDURE — APPSS30 APP SPLIT SHARED TIME 16-30 MINUTES: Performed by: NURSE PRACTITIONER

## 2023-09-27 PROCEDURE — 97166 OT EVAL MOD COMPLEX 45 MIN: CPT

## 2023-09-27 PROCEDURE — 99231 SBSQ HOSP IP/OBS SF/LOW 25: CPT

## 2023-09-27 PROCEDURE — 51701 INSERT BLADDER CATHETER: CPT

## 2023-09-27 PROCEDURE — 2580000003 HC RX 258

## 2023-09-27 RX ORDER — POLYETHYLENE GLYCOL 3350 17 G/17G
17 POWDER, FOR SOLUTION ORAL ONCE
Status: COMPLETED | OUTPATIENT
Start: 2023-09-27 | End: 2023-09-27

## 2023-09-27 RX ORDER — LACTULOSE 10 G/15ML
20 SOLUTION ORAL 3 TIMES DAILY
Status: DISCONTINUED | OUTPATIENT
Start: 2023-09-27 | End: 2023-09-27

## 2023-09-27 RX ORDER — BISACODYL 10 MG
10 SUPPOSITORY, RECTAL RECTAL ONCE
Status: COMPLETED | OUTPATIENT
Start: 2023-09-27 | End: 2023-09-27

## 2023-09-27 RX ORDER — CLONIDINE HYDROCHLORIDE 0.1 MG/1
0.2 TABLET ORAL 2 TIMES DAILY
Status: DISCONTINUED | OUTPATIENT
Start: 2023-09-27 | End: 2023-09-29

## 2023-09-27 RX ADMIN — ACETAMINOPHEN 650 MG: 325 TABLET ORAL at 05:54

## 2023-09-27 RX ADMIN — AMLODIPINE BESYLATE 10 MG: 10 TABLET ORAL at 20:46

## 2023-09-27 RX ADMIN — ATORVASTATIN CALCIUM 40 MG: 40 TABLET, FILM COATED ORAL at 08:39

## 2023-09-27 RX ADMIN — CARVEDILOL 25 MG: 25 TABLET, FILM COATED ORAL at 08:39

## 2023-09-27 RX ADMIN — ACETAMINOPHEN 650 MG: 325 TABLET ORAL at 14:01

## 2023-09-27 RX ADMIN — BISACODYL 10 MG: 10 SUPPOSITORY RECTAL at 03:46

## 2023-09-27 RX ADMIN — ISOSORBIDE DINITRATE 10 MG: 10 TABLET ORAL at 14:01

## 2023-09-27 RX ADMIN — LACTULOSE 20 G: 20 SOLUTION ORAL at 08:38

## 2023-09-27 RX ADMIN — ACETAMINOPHEN 650 MG: 325 TABLET ORAL at 18:07

## 2023-09-27 RX ADMIN — LEVETIRACETAM 500 MG: 500 TABLET, FILM COATED ORAL at 08:39

## 2023-09-27 RX ADMIN — ISOSORBIDE DINITRATE 10 MG: 10 TABLET ORAL at 17:05

## 2023-09-27 RX ADMIN — CARVEDILOL 25 MG: 25 TABLET, FILM COATED ORAL at 17:05

## 2023-09-27 RX ADMIN — SODIUM BICARBONATE: 84 INJECTION, SOLUTION INTRAVENOUS at 08:42

## 2023-09-27 RX ADMIN — SODIUM CHLORIDE 10 MG/HR: 9 INJECTION, SOLUTION INTRAVENOUS at 01:50

## 2023-09-27 RX ADMIN — POLYETHYLENE GLYCOL 3350 17 G: 17 POWDER, FOR SOLUTION ORAL at 03:46

## 2023-09-27 RX ADMIN — SODIUM CHLORIDE, PRESERVATIVE FREE 10 ML: 5 INJECTION INTRAVENOUS at 08:40

## 2023-09-27 RX ADMIN — LEVOTHYROXINE SODIUM 125 MCG: 0.12 TABLET ORAL at 05:54

## 2023-09-27 RX ADMIN — SODIUM CHLORIDE 12.5 MG/HR: 9 INJECTION, SOLUTION INTRAVENOUS at 08:41

## 2023-09-27 RX ADMIN — SENNOSIDES AND DOCUSATE SODIUM 1 TABLET: 50; 8.6 TABLET ORAL at 08:39

## 2023-09-27 RX ADMIN — SODIUM CHLORIDE, PRESERVATIVE FREE 10 ML: 5 INJECTION INTRAVENOUS at 20:51

## 2023-09-27 RX ADMIN — CLONIDINE HYDROCHLORIDE 0.2 MG: 0.2 TABLET ORAL at 08:39

## 2023-09-27 RX ADMIN — SODIUM CHLORIDE 10 MG/HR: 9 INJECTION, SOLUTION INTRAVENOUS at 10:43

## 2023-09-27 RX ADMIN — SODIUM CHLORIDE 12.5 MG/HR: 9 INJECTION, SOLUTION INTRAVENOUS at 06:33

## 2023-09-27 RX ADMIN — SODIUM CHLORIDE 12.5 MG/HR: 9 INJECTION, SOLUTION INTRAVENOUS at 04:11

## 2023-09-27 RX ADMIN — POLYETHYLENE GLYCOL 3350 17 G: 17 POWDER, FOR SOLUTION ORAL at 08:40

## 2023-09-27 RX ADMIN — ISOSORBIDE DINITRATE 10 MG: 10 TABLET ORAL at 08:39

## 2023-09-27 RX ADMIN — CLONIDINE HYDROCHLORIDE 0.2 MG: 0.2 TABLET ORAL at 20:46

## 2023-09-27 ASSESSMENT — PAIN SCALES - GENERAL
PAINLEVEL_OUTOF10: 0

## 2023-09-27 NOTE — PROGRESS NOTES
Physical Therapy  Physical Therapy Initial Assessment     Name: Janett Hernandez  : 1950  MRN: [id number]      Date of Service: 2023    Evaluating PT:  Jossy Huber, PT, DPT [id number]    Room #:  5873/4381-Y  Diagnosis:  Intraparenchymal hemorrhage of brain ([address]) [I61.9]  PMHx/PSHx:    Past Medical History:   Diagnosis Date    Anemia     Chronic kidney disease     Depression     Diabetes mellitus ([address])     ESRD (end stage renal disease) ([address])     Hepatitis C     Hypertension     Liver disease     Moderate protein-calorie malnutrition ([address])     Muscle weakness (generalized)     Thyroid disease     Unspecified diseases of blood and blood-forming organs     Unsteadiness on feet    Procedure/Surgery:  None  Precautions:  Falls, SBP < 140, Hep C, bed alarm  Equipment Needs:  TBD    SUBJECTIVE:    Pt was admitted from [address] [place] [address], per pt's significant other. Pt was mostly bedbound over the last 2 weeks and primarily used wheelchair, per significant other. WW was used pt was active with PT.    OBJECTIVE:   Initial Evaluation  Date: 23 Treatment Short Term/ Long Term   Goals   AM-PAC 6 Clicks 77/75     Was pt agreeable to Eval/treatment? Yes     Does pt have pain?  No     Bed Mobility  Rolling: NT  Supine to sit: MaxA  Sit to supine: MaxA  Scooting: MaxA  Beverly   Transfers Sit to stand: MaxA  Stand to sit: MaxA  Stand pivot: MaxA  Beverly with FWW   Ambulation   A few feet to chair and then back to bed with FWW MaxA  >50 feet with FWW Beverly   Stair negotiation: ascended and descended NT  >2 steps with 1 rail ModA   ROM BUE:  Defer to OT note  BLE:  Limited with knee extension, all others WFLs     Strength BUE:  Defer to OT note  BLE:  Grossly 3+/5  Increase by 1/3 MMT grade   Balance Sitting EOB:  Beverly (Static), ModA (Dynamic)  Dynamic Standing:  MaxA with FWW  Sitting EOB:  SBA  Dynamic Standing:  Beverly with FWW     Pt is A & O x 2 - unaware of month & situation  CAM-ICU: NT  RASS: -1 to 0  Sensation: [x] Gait Training to improve gait mechanics, endurance and asses need for appropriate assistive device  [x] Stair Training in preparation for safe discharge home and/or into the community   [x] Positioning to prevent skin breakdown and contractures  [x] Safety and Education Training   [x] Patient/Caregiver Education   [x] HEP  [] Other     PT long term treatment goals are located in above grid    Frequency of treatments: 2-5x/week x 1-2 weeks. Time in  0853  Time out  0932    Total Treatment Time  24 minutes     Evaluation Time includes thorough review of current medical information, gathering information on past medical history/social history and prior level of function, completion of standardized testing/informal observation of tasks, assessment of data and education on plan of care and goals.     CPT codes:  [] Low Complexity PT evaluation 66465  [x] Moderate Complexity PT evaluation 47592  [] High Complexity PT evaluation 32174  [] PT Re-evaluation 67050  [] Gait training 96223 - minutes  [] Manual therapy 40230 - minutes  [x] Therapeutic activities 68207 24 minutes  [] Therapeutic exercises 93175 - minutes  [] Neuromuscular reeducation 73573 - minutes     Tomy Zimmerman, SPT  Jessica Chung, PT, DPT  [id number]

## 2023-09-27 NOTE — PROGRESS NOTES
SPEECH/LANGUAGE PATHOLOGY  SPEECH/LANGUAGE/COGNITIVE EVALUATION   and PLAN OF CARE      PATIENT NAME:  Phyllis Holman  (male)     MRN:  [id number]    :  1950  (75 y.o.)  STATUS:  Inpatient: Room 3815/3815-A    TODAY'S DATE:  23    SLP cognitive language evaluation  Start:  23,   End:  23,   ONE TIME,   Standing Count:  1 Occurrences,   R         Cresenciano Letters, APRN - CNS   REASON FOR REFERRAL:  Assess speech/language/cognitive functions  EVALUATING THERAPIST: IMTIAZ Griffith    ADMITTING DIAGNOSIS: Intraparenchymal hemorrhage of brain ([address]) [I61.9]    VISIT DIAGNOSIS:        SPEECH THERAPY  PLAN OF CARE   The speech therapy  POC is established based on physician order, speech pathology diagnosis and results of clinical assessment     SPEECH PATHOLOGY DIAGNOSIS:    Cognitive Linguistic Deficits, questionable Dysarthria (very soft voice, limited ability to assess) -- Pt overall very lethargic, requiring max v/c and need to redirect     Speech Pathology intervention is recommended up to 6 times per week for LOS or when goals are met with emphasis on the following:   Anticipate Patient will benefit from ongoing intensive speech therapy at discharge due to degree of deficits     Conditions Requiring Skilled Therapeutic Intervention for speech, language and/or cognition    Dysarthria   Cognitive linguistic impairment    Specific Speech Therapy Interventions to Include: Therapeutic tasks for Cognition  Therapeutic exercises for dysarthria    Specific instructions for next treatment: To initiate POC    SHORT/LONG TERM GOALS  Pt will improve orientation to spatial and temporal surroundings with use of external memory aides.   Pt will improve immediate, short term, recent memory during structured and unstructured tasks with 75% accuracy   Pt will improve problem solving/thought organization during structured and unstructured tasks with 75% accuracy   Pt will improve receptive and expressive language skills with adequate thought content, organization, and processing time to facilitate improved communication with moderate. Pt will improve speech intelligibility and increased articulatory precision via compensatory strategies and oral motor exercises     Patient goals: Patient/family involved in developing goals and treatment plan:   Treatment goals discussed with Patient    The Patient did not demonstrate complete understanding of the diagnosis, prognosis and plan of care   The patient/family Did not state,     This plan may be re-evaluated and revised as warranted. Rehabilitation Potential/Prognosis: fair                CLINICAL ASSESSMENT:  MOTOR SPEECH       Oral Peripheral Examination   Could not test    Parameters of Speech Production  Respiration:  Adequate for speech production  Articulation:  Could not test  Resonance:  Could not test  Quality:   Could not test  Pitch:    Could not test  Intensity: Quiet  Fluency:  Could not test  Prosody Could not test    Speech Intelligibility      Unable to assess fully, pt's voice weak/ mostly aphonic             RECEPTIVE LANGUAGE    Comprehension of Yes/No Questions:   Required repetition of questions/information     Process  Simple Verbal Commands:   TBA  Process Intermediate Verbal Commands:   TBA  Process Complex Verbal Commands:     TBA    Comprehension of Conversation:      Required repetition of questions/information       EXPRESSIVE LANGUAGE     Serials: To be assessed    Imitation:  Words   To be assessed   Sentences To be assessed    Naming:  (Modality used:  Verbal)  Confrontation Naming  To be assessed  Functional Description  To be assessed  Response Naming:  To be assessed    Conversation:      Unable to assess    COGNITION     Attention/Orientation  Attention: Easily Distracted  Orientation:  Oriented to Person, Place, Reason for hospitalization    Memory   Immediate Recall: TBA    Delayed Recall:

## 2023-09-27 NOTE — PROGRESS NOTES
MCV 93.8 09/27/2023 04:04 AM    MCH 31.4 09/27/2023 04:04 AM    MCHC 33.4 09/27/2023 04:04 AM    RDW 14.3 09/27/2023 04:04 AM     09/26/2023 12:19 AM    MPV 11.5 09/27/2023 04:04 AM     CMP:    Lab Results   Component Value Date/Time     09/27/2023 04:04 AM    K 4.8 09/27/2023 04:04 AM    K 4.6 05/24/2023 05:35 AM     09/27/2023 04:04 AM    CO2 22 09/27/2023 04:04 AM    BUN 38 09/27/2023 04:04 AM    CREATININE 6.4 09/27/2023 04:04 AM    GFRAA 8 09/19/2023 05:06 AM    LABGLOM 9 09/27/2023 04:04 AM    GLUCOSE 136 09/27/2023 04:04 AM    GLUCOSE 85 09/19/2023 05:06 AM    PROT 8.2 09/26/2023 12:19 AM    LABALBU 4.2 09/26/2023 12:19 AM    LABALBU 3.1 09/19/2023 05:06 AM    CALCIUM 8.9 09/27/2023 04:04 AM    BILITOT 1.0 09/26/2023 12:19 AM    ALKPHOS 116 09/26/2023 12:19 AM    AST 30 09/26/2023 12:19 AM    ALT 17 09/26/2023 12:19 AM     Magnesium:    Lab Results   Component Value Date/Time    MG 1.8 07/27/2023 07:10 AM     Phosphorus:    Lab Results   Component Value Date/Time    PHOS 2.9 07/27/2023 07:10 AM     Radiology Review:    XR CHEST PORTABLE 9/26/23  IMPRESSION:  No acute process. CT head without contrast 9/26/23  IMPRESSION:  1. 2.3 x 1.8 cm area of acute intraparenchymal hemorrhage in the inferior  medial left cerebellar hemisphere, slightly increased compared to the prior  study. There is associated mass effect and vasogenic edema with slight  impression on the left side of the 4th ventricle. No evidence of  hydrocephalus. 2. Extensive chronic white matter ischemic changes. Mild atrophy. 3. Small old right basal ganglia infarct. IMPRESSION/RECOMMENDATIONS:    ESRD on HD three times weekly T-Th-S, via RIJ. Hold HD today     2. Acute on chronic metabolic acidosis, HAGMA, bicarbonate 16 2/2 uremia, likely a component of starvation ketoacidosis, to start bicarbonate gtt     3.   Hypertensive urgency with intracranial bleed likely 2/2 medication noncompliance, improved control on darlene gutierrez -- resume home medications once patient can take p.o. medications and add clonidine patch     4.   HFrEF 35-40% with stage I DD, on carvedilol   5. MBD of CKD, not on binders, obtain phosphorus level, on ergocalciferol  6. Anemia of CKD, hold NIXON for hgb > 10 g/dL  ----------------------------------------------------------------------  7. History of seizures, on levetiracetam  8. Hypothyroidism, on levothyroxine  History of HCV  Depression, on sertraline at home  Hemorrhagic stroke, neurosurgery consulted     Plan:     HD three times weekly T-Th-S, HD , no need for HD today  Continue sodium bicarbonate gtt at 50 mL/hr  Increase  clonidine to 0.2 mg to BID  Continue clonidine tts 3 weekly  Obtain phosphorus level tomorrow  Continue amlodipine 10 mg PO nightly, carvedilol 25 mg PO BID   Monitor labs  Monitor blood pressure    Thank you Dr. Suzie Flores for allowing us to participate in the care of Debi Cordova.       Case and plan discussed with RN    Electronically signed by Kristine Ca MD on 9/27/2023 at 12:04 PM

## 2023-09-27 NOTE — PROGRESS NOTES
[address]       Date:2023                                                               Patient Name: Loan Randhawa  MRN: [id number]  : 1950  Room: [address] [place], [place] [address]    Evaluating OT: Yamilex Tatum OTR/WILBERT 5795    Referring Provider: Virginia Dates, DO   Specific Provider Orders/Date: OT eval and treat (23)        Diagnosis: Intraparenchymal hemorrhage of brain ([address]) [I61.9]       *L cerebral hemisphere  Reason for admission: 75 y.o. male who presented to the ED with altered mental status. CT scan showed intraparenchymal bleeding therefore patient was transferred from [facility] ER to CVICU and started on a cardene drip       Surgery/Procedures: none           Pertinent Medical History:    Past Medical History:   Diagnosis Date    Anemia     Chronic kidney disease     Depression     Diabetes mellitus (HCC)     ESRD (end stage renal disease) ([address])     Hepatitis C     Hypertension     Liver disease     Moderate protein-calorie malnutrition (HCC)     Muscle weakness (generalized)     Thyroid disease     Unspecified diseases of blood and blood-forming organs     Unsteadiness on feet           *Precautions:  Fall Risk, alarms, cognition, seizure, -160  Assessment of current deficits   [x] Functional mobility  [x]ADLs  [x] Strength               [x]Cognition   [x] Functional transfers   [x] IADLs         [x] Safety Awareness   [x]Endurance   [] Fine Coordination        [x] ROM     [] Vision/perception   []Sensation    []Gross Motor Coordination [x] Balance   [] Delirium                  []Motor Control     [] Communication    OT PLAN OF CARE   OT POC based on physician orders, patient diagnosis and results of clinical assessment.        Frequency/Duration: 1-3 days/wk for 1-2 weeks PRN    Specific OT Treatment to include:   ADL retraining/adapted techniques and AE Mobility Max A/ posterior LOB; [employer]                       [place]   functional/bathroom mobility using AD as needed & demonstrating G safety     Balance Sitting:     Static:  Min A    Dynamic:Mod A  Standing: Max A  SBA dynamic sitting balance; Min A dynamic standing balance  during ADL tasks & transfers   Endurance/  Activity Tolerance   fair tolerance with light activity. G   tolerance with moderate activity/self care routine   Visual/  Perceptual grossly  WFL; continue to assess during ADLs                     Vitals:   HR at rest: 72 bpm HR at end of session: 70 bpm   Spo2 at rest:100% Spo2 at end of session 98%   BP at rest:147/61 mmHg BP at end of session 134/68 mmHg       Treatment: OT treatment provided this date includes:  Bed mobility: Instruction on precautions prior to bed mobility to facilitate safe transfers and ADLS. Pt required 2 person assist for safe mobility due to complexity of medical lines and deconditioning. HOB elevated to assist. No c/o dizziness. Balance retraining: Performed sitting/standing balance ex's with instruction to facilitate righting reactions with postural changes during ADLS. Pt provided with [employer] for assist. Pt demo posterior LOB. Functional transfer training:  Instruction on postural cues, hand placement/sequencing, & walker safety  to assist with balance and fall prevention during self care routine/bathroom transfers. Delirium Prevention: Environmental and sensory modifications assessed and implemented to decrease ICU acquired delirium and to improve overall orientation, mentation and pt interaction with family/staff. Line management and environmental modifications made prior to and end of session to ensure patient safety and to increase efficiency of session. Skilled monitoring of HR, O2 saturation, blood pressure and patient's response to activity performed throughout session. Comments: OK from RN to see patient.  Upon arrival, patient resting in bed,

## 2023-09-27 NOTE — CARE COORDINATION
9/27 Care Coordination:Pt was a transfer from [place]. Admit to CVIC. He was found to have a intraparenchymal hemorrhage in left cerebral hemisphere. Pt remains on IV Cardene drip. Pt is a  resident from  [facility]. Just transferred there for In house HD. Spoke with Ariela Beltran at [facility]. Pt can return,bed hold. No precert needed. CM/SW will continue to follow for discharge planning.    Abhinav SUMNERN,RN--BC  [phone]

## 2023-09-27 NOTE — PROGRESS NOTES
[place] [facility]  SURGICAL INTENSIVE CARE UNIT (SICU)  ATTENDING PHYSICIAN CRITICAL CARE PROGRESS NOTE     I have personally examined the patient, personally reviewed the record, and personally discussed the case with the resident. I have personally reviewed all relevant labs and imaging data. I am actively managing this patient's medications. Please refer to the resident's note. I agree with the assessment and plan with the following corrections/additions. The following summarizes my clinical findings and independent assessment. CC: critical care management after [address] Course/Overnight Events:  9/26/23  -patient transferred from [place]. He was found to have a intraparenchymal hemorrhage in left cerebral hemisphere.   In the CVICU he was placed on Cardene drip and arterial line was inserted; Cardene weaned off.  9/27--back on Cardene--weaning    Medications   Scheduled Meds:    cloNIDine  0.2 mg Oral BID    sodium chloride flush  10 mL IntraVENous 2 times per day    acetaminophen  650 mg Oral Q6H    polyethylene glycol  17 g Oral Daily    sennosides-docusate sodium  1 tablet Oral BID    atorvastatin  40 mg Oral Daily    carvedilol  25 mg Oral BID WC    isosorbide dinitrate  10 mg Oral TID    levothyroxine  125 mcg Oral Daily    amLODIPine  10 mg Oral Nightly    insulin lispro  0-8 Units SubCUTAneous TID WC    insulin lispro  0-4 Units SubCUTAneous Nightly    cloNIDine  1 patch TransDERmal Weekly    levETIRAcetam  500 mg Oral Daily     Continuous Infusions:    sodium chloride      dextrose      sodium bicarbonate 150 mEq in dextrose 5 % 1,000 mL infusion 50 mL/hr at 09/27/23 0842    niCARdipine 10 mg/hr (09/27/23 1043)     PRN Meds: sodium chloride flush, sodium chloride, oxyCODONE **OR** oxyCODONE, ondansetron **OR** ondansetron, glucose, dextrose bolus **OR** dextrose bolus, glucagon (rDNA), dextrose, hydrALAZINE, labetalol    Physical Examination      Vitals:  /68   Pulse 61 in adult 08/12/2022    Hypertensive urgency 08/11/2022    Muscle weakness 08/10/2022    Non compliance w medication regimen 08/10/2022    Elevated serum creatinine 04/21/2022    Lung infiltrate 04/21/2022    Weakness 04/21/2022    DNR (do not resuscitate) discussion     Intraparenchymal hemorrhage of brain ([address]) 09/26/2023    Intracranial hemorrhage ([address]) 08/31/2023    ESRD on dialysis ([address]) 57/91/3701    Acute metabolic encephalopathy 44/63/1800    ESRD (end stage renal disease) ([address]) 05/03/2023    Altered mental state 05/02/2023    Severe protein-calorie malnutrition ([address]) 12/13/2021    Acute renal failure superimposed on chronic kidney disease, on chronic dialysis ([address]) 12/07/2021    ESRD needing dialysis ([address]) 11/19/2021    Acute respiratory failure with hypoxia ([address]) 11/19/2021    Volume overload 11/19/2021    Hypertensive emergency 11/19/2021    Hyperkalemia 11/19/2021    Hyperbilirubinemia 06/09/2013    Thrombocytopenia ([address]) 06/09/2013    Hypothyroidism 06/09/2013    Diabetes mellitus ([address]) 35/79/1314    Metabolic acidosis 39/39/5353    Essential hypertension 06/06/2013    Uncontrolled diabetes mellitus ([address]) 06/06/2013       Cerebellar hemorrhage--monitor neuro exam; keppra for seizure prophylaxis  HTN--on Cardene; cont amlodipine; coreg; clonidine; isordil  Diet as tolerated  ESRD--on dialysis  PT/OT evals  DVT risk--PCDs    I am actively managing this pt's meds and the risk for neurologic/hemodynamic/metabolic deterioration. Requires ongoing ICU care.     Montserrat Logan MD, FACS  9/27/2023  2:02 PM    Critical care time exclusive of teaching and procedures = 36 minutes

## 2023-09-27 NOTE — PLAN OF CARE
Problem: Chronic Conditions and Co-morbidities  Goal: Patient's chronic conditions and co-morbidity symptoms are monitored and maintained or improved  9/27/2023 0837 by Marie Romero RN  Outcome: Progressing  Flowsheets (Taken 9/27/2023 0800)  Care Plan - Patient's Chronic Conditions and Co-Morbidity Symptoms are Monitored and Maintained or Improved: Monitor and assess patient's chronic conditions and comorbid symptoms for stability, deterioration, or improvement  9/26/2023 2135 by Iliana Yi RN  Outcome: Progressing  Flowsheets (Taken 9/26/2023 0800 by Marie Romero RN)  Care Plan - Patient's Chronic Conditions and Co-Morbidity Symptoms are Monitored and Maintained or Improved: Monitor and assess patient's chronic conditions and comorbid symptoms for stability, deterioration, or improvement     Problem: Discharge Planning  Goal: Discharge to home or other facility with appropriate resources  9/27/2023 0837 by Marie Romero RN  Outcome: Progressing  Flowsheets (Taken 9/27/2023 0800)  Discharge to home or other facility with appropriate resources: Identify barriers to discharge with patient and caregiver  9/26/2023 2135 by Iliana Yi RN  Outcome: Progressing  Flowsheets (Taken 9/26/2023 0800 by Marie Romero RN)  Discharge to home or other facility with appropriate resources: Identify barriers to discharge with patient and caregiver     Problem: Skin/Tissue Integrity  Goal: Absence of new skin breakdown  Description: 1. Monitor for areas of redness and/or skin breakdown  2. Assess vascular access sites hourly  3. Every 4-6 hours minimum:  Change oxygen saturation probe site  4. Every 4-6 hours:  If on nasal continuous positive airway pressure, respiratory therapy assess nares and determine need for appliance change or resting period.   9/27/2023 0837 by aMrie Romero RN  Outcome: Progressing  9/26/2023 2135 by Iliana Yi RN  Outcome: Progressing     Problem: Safety -

## 2023-09-27 NOTE — PROGRESS NOTES
brain.  Presented to ED from NH for AMS and high blood pressure. CT scan showed ICH; Cardene drip  Transferred to SICU [facility]  Repeat CT scan showed progression of [address] [facility]  Neurosurgery following. Hypertensive emergency  ESRD on hemodialysis. Nephrology consulted    Palliative Care Encounter / Counseling Regarding Goals of Care:  Please see detailed goals of care discussion as below. At this time, Dominguez Aviles, Does Not have capacity for medical decision-making. Capacity is time limited and situation/question specific. During encounter patient's son, Lazaro Luis, was surrogate medical decision-maker via telephone. Outcome of goals of care meeting:  Continue current care     Code status: DNR-CCA      Surrogate/Legal NOK:   Lazaro Alert (son): [phone]     SUBJECTIVE:   Events/Discussions:  Chart reviewed. Patient seen resting in bed on RA with Cardene drip infusing in NAD. Patient is alert and following simple commands with all extremities but I'm unable to understand his speech. It is very garbled and muted. No family at bedside. SLP input noted. PT/OT to work with patient. No immediate PM needs. We will continue to follow.     OBJECTIVE:   Prognosis: Guarded    Past Medical History:   Diagnosis Date    Anemia     Chronic kidney disease     Depression     Diabetes mellitus (HCC)     ESRD (end stage renal disease) ([address])     Hepatitis C     Hypertension     Liver disease     Moderate protein-calorie malnutrition (HCC)     Muscle weakness (generalized)     Thyroid disease     Unspecified diseases of blood and blood-forming organs     Unsteadiness on feet        Past Surgical History:   Procedure Laterality Date    CATHETER INSERTION N/A 3/1/2023    TUNNELED HEMODIALYSIS CATHETER REMOVAL POSSIBLE TEMPORARY CATHETER INSERTION performed by Marty Yanez MD at [facility] N/A 11/21/2021    CATHETER INSERTION HEMODIALYSIS, REMOVAL OF FEMORAL CATHETER performed by Vanessa Yee

## 2023-09-27 NOTE — PLAN OF CARE
Problem: Chronic Conditions and Co-morbidities  Goal: Patient's chronic conditions and co-morbidity symptoms are monitored and maintained or improved  9/26/2023 2135 by Radha Wheeler RN  Outcome: Progressing  Flowsheets (Taken 9/26/2023 0800 by Piedad Cline RN)  Care Plan - Patient's Chronic Conditions and Co-Morbidity Symptoms are Monitored and Maintained or Improved: Monitor and assess patient's chronic conditions and comorbid symptoms for stability, deterioration, or improvement  9/26/2023 0740 by Piedad Cline RN  Outcome: Progressing     Problem: Discharge Planning  Goal: Discharge to home or other facility with appropriate resources  9/26/2023 2135 by Radha Wheeler RN  Outcome: Progressing  Flowsheets (Taken 9/26/2023 0800 by Piedad Cline RN)  Discharge to home or other facility with appropriate resources: Identify barriers to discharge with patient and caregiver  9/26/2023 0740 by Piedad Cline RN  Outcome: Progressing     Problem: Skin/Tissue Integrity  Goal: Absence of new skin breakdown  Description: 1. Monitor for areas of redness and/or skin breakdown  2. Assess vascular access sites hourly  3. Every 4-6 hours minimum:  Change oxygen saturation probe site  4. Every 4-6 hours:  If on nasal continuous positive airway pressure, respiratory therapy assess nares and determine need for appliance change or resting period.   9/26/2023 2135 by aRdha Wheeler RN  Outcome: Progressing  9/26/2023 0740 by Piedad Cline RN  Outcome: Progressing     Problem: Safety - Adult  Goal: Free from fall injury  9/26/2023 2135 by Radha hWeeler RN  Outcome: Progressing  9/26/2023 0740 by Piedad Cline RN  Outcome: Progressing     Problem: ABCDS Injury Assessment  Goal: Absence of physical injury  9/26/2023 2135 by Radha Wheeler RN  Outcome: Progressing  9/26/2023 0740 by Piedad Cline RN  Outcome: Progressing     Problem: Neurosensory - Adult  Goal: Achieves stable or improved

## 2023-09-27 NOTE — PROGRESS NOTES
care  Patient/Family update: Will update when available. Code status:  Full code. Disposition: Continue CVIC    Total time for caring for this patient, including direct patient contact, review of data including imaging & laboratory studies, discussions with other team members & physicians at least 30 minutes so far today. Please feel free to call with questions or concerns.       Electronically signed by Asmita Ferguson RN MSN APRN-NP [facility] NP  CCNS CCRN 9/27/2023 6:30 AM

## 2023-09-27 NOTE — PROGRESS NOTES
HD #2 for HTN complicated by cerebellar IPH. Patient remains unchanged overnight.  HCT 8pm stable with no changes noted    Mental Status                 Awake, follows commands  Speech                           decreased, mumbling   Cranial Nerves               Pupils equal and reactive, extra-ocular muscles intact, face equal, oropharynx clear with symmetric rise, tongue midline  Motor: Moving all extremities x 4      A/P:  -medical management  -no acute neurosurgical intervention as IPH stable  -continue frequent neuro checks

## 2023-09-28 PROBLEM — R33.9 URINARY RETENTION: Status: ACTIVE | Noted: 2023-09-28

## 2023-09-28 LAB
ANION GAP SERPL CALCULATED.3IONS-SCNC: 17 MMOL/L (ref 7–16)
BASOPHILS # BLD: 0.04 K/UL (ref 0–0.2)
BASOPHILS NFR BLD: 1 % (ref 0–2)
BUN SERPL-MCNC: 38 MG/DL (ref 6–23)
CA-I BLD-SCNC: 1.09 MMOL/L (ref 1.15–1.33)
CALCIUM SERPL-MCNC: 8.2 MG/DL (ref 8.6–10.2)
CHLORIDE SERPL-SCNC: 101 MMOL/L (ref 98–107)
CO2 SERPL-SCNC: 23 MMOL/L (ref 22–29)
CREAT SERPL-MCNC: 7.2 MG/DL (ref 0.7–1.2)
EOSINOPHIL # BLD: 0.05 K/UL (ref 0.05–0.5)
EOSINOPHILS RELATIVE PERCENT: 1 % (ref 0–6)
ERYTHROCYTE [DISTWIDTH] IN BLOOD BY AUTOMATED COUNT: 14.1 % (ref 11.5–15)
GFR SERPL CREATININE-BSD FRML MDRD: 7 ML/MIN/1.73M2
GLUCOSE BLD-MCNC: 106 MG/DL (ref 74–99)
GLUCOSE SERPL-MCNC: 121 MG/DL (ref 74–99)
HCT VFR BLD AUTO: 40.7 % (ref 37–54)
HGB BLD-MCNC: 13.1 G/DL (ref 12.5–16.5)
IMM GRANULOCYTES # BLD AUTO: <0.03 K/UL (ref 0–0.58)
IMM GRANULOCYTES NFR BLD: 0 % (ref 0–5)
LYMPHOCYTES NFR BLD: 0.56 K/UL (ref 1.5–4)
LYMPHOCYTES RELATIVE PERCENT: 9 % (ref 20–42)
MAGNESIUM SERPL-MCNC: 1.8 MG/DL (ref 1.6–2.6)
MCH RBC QN AUTO: 30.9 PG (ref 26–35)
MCHC RBC AUTO-ENTMCNC: 32.2 G/DL (ref 32–34.5)
MCV RBC AUTO: 96 FL (ref 80–99.9)
MONOCYTES NFR BLD: 0.55 K/UL (ref 0.1–0.95)
MONOCYTES NFR BLD: 8 % (ref 2–12)
NEUTROPHILS NFR BLD: 81 % (ref 43–80)
NEUTS SEG NFR BLD: 5.33 K/UL (ref 1.8–7.3)
PHOSPHATE SERPL-MCNC: 5.7 MG/DL (ref 2.5–4.5)
PLATELET CONFIRMATION: NORMAL
PLATELET, FLUORESCENCE: 96 K/UL (ref 130–450)
PMV BLD AUTO: 11.7 FL (ref 7–12)
POTASSIUM SERPL-SCNC: 4.2 MMOL/L (ref 3.5–5)
RBC # BLD AUTO: 4.24 M/UL (ref 3.8–5.8)
RBC # BLD: ABNORMAL 10*6/UL
SODIUM SERPL-SCNC: 141 MMOL/L (ref 132–146)
WBC OTHER # BLD: 6.6 K/UL (ref 4.5–11.5)

## 2023-09-28 PROCEDURE — 2500000003 HC RX 250 WO HCPCS: Performed by: INTERNAL MEDICINE

## 2023-09-28 PROCEDURE — 5A1D70Z PERFORMANCE OF URINARY FILTRATION, INTERMITTENT, LESS THAN 6 HOURS PER DAY: ICD-10-PCS | Performed by: INTERNAL MEDICINE

## 2023-09-28 PROCEDURE — 86317 IMMUNOASSAY INFECTIOUS AGENT: CPT

## 2023-09-28 PROCEDURE — 6370000000 HC RX 637 (ALT 250 FOR IP): Performed by: NURSE PRACTITIONER

## 2023-09-28 PROCEDURE — 99231 SBSQ HOSP IP/OBS SF/LOW 25: CPT | Performed by: NURSE PRACTITIONER

## 2023-09-28 PROCEDURE — 37799 UNLISTED PX VASCULAR SURGERY: CPT

## 2023-09-28 PROCEDURE — 2580000003 HC RX 258: Performed by: NURSE PRACTITIONER

## 2023-09-28 PROCEDURE — 82962 GLUCOSE BLOOD TEST: CPT

## 2023-09-28 PROCEDURE — 6370000000 HC RX 637 (ALT 250 FOR IP)

## 2023-09-28 PROCEDURE — 85025 COMPLETE CBC W/AUTO DIFF WBC: CPT

## 2023-09-28 PROCEDURE — 2580000003 HC RX 258

## 2023-09-28 PROCEDURE — 90935 HEMODIALYSIS ONE EVALUATION: CPT

## 2023-09-28 PROCEDURE — 2580000003 HC RX 258: Performed by: INTERNAL MEDICINE

## 2023-09-28 PROCEDURE — 84100 ASSAY OF PHOSPHORUS: CPT

## 2023-09-28 PROCEDURE — 83735 ASSAY OF MAGNESIUM: CPT

## 2023-09-28 PROCEDURE — 99231 SBSQ HOSP IP/OBS SF/LOW 25: CPT

## 2023-09-28 PROCEDURE — 82330 ASSAY OF CALCIUM: CPT

## 2023-09-28 PROCEDURE — 6360000002 HC RX W HCPCS: Performed by: NURSE PRACTITIONER

## 2023-09-28 PROCEDURE — 80074 ACUTE HEPATITIS PANEL: CPT

## 2023-09-28 PROCEDURE — 2060000000 HC ICU INTERMEDIATE R&B

## 2023-09-28 PROCEDURE — 80048 BASIC METABOLIC PNL TOTAL CA: CPT

## 2023-09-28 PROCEDURE — 97129 THER IVNTJ 1ST 15 MIN: CPT | Performed by: SPEECH-LANGUAGE PATHOLOGIST

## 2023-09-28 RX ORDER — LORAZEPAM 0.5 MG/1
0.5 TABLET ORAL ONCE
Status: DISCONTINUED | OUTPATIENT
Start: 2023-09-28 | End: 2023-09-30 | Stop reason: HOSPADM

## 2023-09-28 RX ORDER — TAMSULOSIN HYDROCHLORIDE 0.4 MG/1
0.4 CAPSULE ORAL DAILY
Status: DISCONTINUED | OUTPATIENT
Start: 2023-09-28 | End: 2023-09-30 | Stop reason: HOSPADM

## 2023-09-28 RX ORDER — MAGNESIUM SULFATE IN WATER 40 MG/ML
2000 INJECTION, SOLUTION INTRAVENOUS ONCE
Status: COMPLETED | OUTPATIENT
Start: 2023-09-28 | End: 2023-09-28

## 2023-09-28 RX ADMIN — ISOSORBIDE DINITRATE 10 MG: 10 TABLET ORAL at 08:35

## 2023-09-28 RX ADMIN — ACETAMINOPHEN 650 MG: 325 TABLET ORAL at 23:59

## 2023-09-28 RX ADMIN — HYDRALAZINE HYDROCHLORIDE 10 MG: 20 INJECTION, SOLUTION INTRAMUSCULAR; INTRAVENOUS at 01:21

## 2023-09-28 RX ADMIN — CLONIDINE HYDROCHLORIDE 0.2 MG: 0.2 TABLET ORAL at 08:35

## 2023-09-28 RX ADMIN — SODIUM BICARBONATE: 84 INJECTION, SOLUTION INTRAVENOUS at 04:47

## 2023-09-28 RX ADMIN — TAMSULOSIN HYDROCHLORIDE 0.4 MG: 0.4 CAPSULE ORAL at 17:15

## 2023-09-28 RX ADMIN — LEVOTHYROXINE SODIUM 125 MCG: 0.12 TABLET ORAL at 06:37

## 2023-09-28 RX ADMIN — CARVEDILOL 25 MG: 25 TABLET, FILM COATED ORAL at 17:16

## 2023-09-28 RX ADMIN — CLONIDINE HYDROCHLORIDE 0.2 MG: 0.2 TABLET ORAL at 23:49

## 2023-09-28 RX ADMIN — CALCIUM GLUCONATE 2000 MG: 98 INJECTION, SOLUTION INTRAVENOUS at 08:34

## 2023-09-28 RX ADMIN — HYDRALAZINE HYDROCHLORIDE 10 MG: 20 INJECTION, SOLUTION INTRAMUSCULAR; INTRAVENOUS at 06:46

## 2023-09-28 RX ADMIN — ATORVASTATIN CALCIUM 40 MG: 40 TABLET, FILM COATED ORAL at 08:35

## 2023-09-28 RX ADMIN — CARVEDILOL 25 MG: 25 TABLET, FILM COATED ORAL at 08:35

## 2023-09-28 RX ADMIN — ACETAMINOPHEN 650 MG: 325 TABLET ORAL at 06:37

## 2023-09-28 RX ADMIN — ISOSORBIDE DINITRATE 10 MG: 10 TABLET ORAL at 17:16

## 2023-09-28 RX ADMIN — MAGNESIUM SULFATE HEPTAHYDRATE 2000 MG: 40 INJECTION, SOLUTION INTRAVENOUS at 07:14

## 2023-09-28 RX ADMIN — SENNOSIDES AND DOCUSATE SODIUM 1 TABLET: 50; 8.6 TABLET ORAL at 08:35

## 2023-09-28 RX ADMIN — SODIUM CHLORIDE, PRESERVATIVE FREE 10 ML: 5 INJECTION INTRAVENOUS at 08:30

## 2023-09-28 RX ADMIN — POLYETHYLENE GLYCOL 3350 17 G: 17 POWDER, FOR SOLUTION ORAL at 08:35

## 2023-09-28 RX ADMIN — LEVETIRACETAM 500 MG: 500 TABLET, FILM COATED ORAL at 08:35

## 2023-09-28 ASSESSMENT — PAIN DESCRIPTION - DESCRIPTORS
DESCRIPTORS: ACHING
DESCRIPTORS: ACHING;DISCOMFORT

## 2023-09-28 ASSESSMENT — PAIN SCALES - GENERAL
PAINLEVEL_OUTOF10: 7
PAINLEVEL_OUTOF10: 0
PAINLEVEL_OUTOF10: 7
PAINLEVEL_OUTOF10: 0
PAINLEVEL_OUTOF10: 6
PAINLEVEL_OUTOF10: 0

## 2023-09-28 ASSESSMENT — PAIN DESCRIPTION - LOCATION
LOCATION: HEAD
LOCATION: HEAD

## 2023-09-28 ASSESSMENT — PAIN DESCRIPTION - PAIN TYPE: TYPE: ACUTE PAIN

## 2023-09-28 ASSESSMENT — PAIN DESCRIPTION - ONSET: ONSET: GRADUAL

## 2023-09-28 ASSESSMENT — PAIN DESCRIPTION - FREQUENCY: FREQUENCY: CONTINUOUS

## 2023-09-28 NOTE — PLAN OF CARE
Problem: Chronic Conditions and Co-morbidities  Goal: Patient's chronic conditions and co-morbidity symptoms are monitored and maintained or improved  9/27/2023 2212 by Markel Sierra RN  Outcome: Progressing  9/27/2023 0837 by Bethanie Saucedo RN  Outcome: Progressing  Flowsheets (Taken 9/27/2023 0800)  Care Plan - Patient's Chronic Conditions and Co-Morbidity Symptoms are Monitored and Maintained or Improved: Monitor and assess patient's chronic conditions and comorbid symptoms for stability, deterioration, or improvement     Problem: Discharge Planning  Goal: Discharge to home or other facility with appropriate resources  9/27/2023 2212 by Markel Sierra RN  Outcome: Progressing  9/27/2023 0837 by Bethanie Saucedo RN  Outcome: Progressing  Flowsheets (Taken 9/27/2023 0800)  Discharge to home or other facility with appropriate resources: Identify barriers to discharge with patient and caregiver     Problem: Skin/Tissue Integrity  Goal: Absence of new skin breakdown  Description: 1. Monitor for areas of redness and/or skin breakdown  2. Assess vascular access sites hourly  3. Every 4-6 hours minimum:  Change oxygen saturation probe site  4. Every 4-6 hours:  If on nasal continuous positive airway pressure, respiratory therapy assess nares and determine need for appliance change or resting period.   9/27/2023 2212 by Markel Sierra RN  Outcome: Progressing  9/27/2023 0837 by Bethanie Saucedo RN  Outcome: Progressing     Problem: Safety - Adult  Goal: Free from fall injury  9/27/2023 2212 by Markel Sierra RN  Outcome: Progressing  9/27/2023 0837 by Bethanie Saucedo RN  Outcome: Progressing     Problem: ABCDS Injury Assessment  Goal: Absence of physical injury  9/27/2023 2212 by Markel Sierra RN  Outcome: Progressing  9/27/2023 0837 by Bethanie Saucedo RN  Outcome: Progressing     Problem: Neurosensory - Adult  Goal: Achieves stable or improved neurological status  9/27/2023 2212 by Domenico Nieto

## 2023-09-28 NOTE — CARE COORDINATION
9/28 Care Coordination: Pt remains in [facility]. S/P intraparenchymal hemorrhage in left cerebral hemisphere. Off of IV Cardene drip. PT/OT ordered. Pt is resident of [facility]. Just transferred there for In house HD from [address] [place] [address]  Return Envelop started and in soft chart. CM/SW will continue to follow for discharge planning.    Martin MCLAIN,RN-CV-BC  [phone]

## 2023-09-28 NOTE — PROGRESS NOTES
Department of Internal Medicine  Nephrology Progress Note      Reason for Consult:  End-Stage Renal Disease on HD now with ICH 2/2 stroke  Requesting Physician:  Suzie Flores DO    CHIEF COMPLAINT:  transfer from [address] [place] [address], intraparenchymal hemorrhage    History Obtained From:  patient, electronic medical record    HISTORY OF PRESENT ILLNESS:  Briefly, Mr. Debi Cordova is a 75 y.o. male, past medical history significant for ESRD on HD 3 times weekly Tuesday, Thursday, Saturday via right IJ, HTN, type II DM, HFrEF 35 to 40% with stage I diastolic dysfunction (3/2909), hypothyroidism, HCV, who presented to the ED with altered mental status. CT scan showed intraparenchymal bleeding therefore patient was transferred from [address] [place] [address] ER to CVICU and started on a cardene drip. This consultation was requested for dialysis management.   9/27/23:  Patient is lethargic but oriented to person and place, states he is unsure about neurosurgery plans  9/28/23:  More alert and feels better    Current Medications:    Current Facility-Administered Medications: tamsulosin (FLOMAX) capsule 0.4 mg, 0.4 mg, Oral, Daily  cloNIDine (CATAPRES) tablet 0.2 mg, 0.2 mg, Oral, BID  sodium chloride flush 0.9 % injection 10 mL, 10 mL, IntraVENous, 2 times per day  sodium chloride flush 0.9 % injection 10 mL, 10 mL, IntraVENous, PRN  0.9 % sodium chloride infusion, , IntraVENous, PRN  acetaminophen (TYLENOL) tablet 650 mg, 650 mg, Oral, Q6H  oxyCODONE (ROXICODONE) immediate release tablet 5 mg, 5 mg, Oral, Q4H PRN **OR** oxyCODONE HCl (OXY-IR) immediate release tablet 10 mg, 10 mg, Oral, Q4H PRN  ondansetron (ZOFRAN-ODT) disintegrating tablet 4 mg, 4 mg, Oral, Q8H PRN **OR** ondansetron (ZOFRAN) injection 4 mg, 4 mg, IntraVENous, Q6H PRN  polyethylene glycol (GLYCOLAX) packet 17 g, 17 g, Oral, Daily  sennosides-docusate sodium (SENOKOT-S) 8.6-50 MG tablet 1 tablet, 1 tablet, Oral, BID  atorvastatin (LIPITOR) tablet 40 mg, 40 mg, Oral, 09/28/2023 04:20 AM    HCT 40.7 09/28/2023 04:20 AM    MCV 96.0 09/28/2023 04:20 AM    MCH 30.9 09/28/2023 04:20 AM    MCHC 32.2 09/28/2023 04:20 AM    RDW 14.1 09/28/2023 04:20 AM     09/26/2023 12:19 AM    MPV 11.7 09/28/2023 04:20 AM     CMP:    Lab Results   Component Value Date/Time     09/28/2023 04:20 AM    K 4.2 09/28/2023 04:20 AM    K 4.6 05/24/2023 05:35 AM     09/28/2023 04:20 AM    CO2 23 09/28/2023 04:20 AM    BUN 38 09/28/2023 04:20 AM    CREATININE 7.2 09/28/2023 04:20 AM    GFRAA 8 09/19/2023 05:06 AM    LABGLOM 7 09/28/2023 04:20 AM    GLUCOSE 121 09/28/2023 04:20 AM    GLUCOSE 85 09/19/2023 05:06 AM    PROT 8.2 09/26/2023 12:19 AM    LABALBU 4.2 09/26/2023 12:19 AM    LABALBU 3.1 09/19/2023 05:06 AM    CALCIUM 8.2 09/28/2023 04:20 AM    BILITOT 1.0 09/26/2023 12:19 AM    ALKPHOS 116 09/26/2023 12:19 AM    AST 30 09/26/2023 12:19 AM    ALT 17 09/26/2023 12:19 AM     Magnesium:    Lab Results   Component Value Date/Time    MG 1.8 09/28/2023 04:20 AM     Phosphorus:    Lab Results   Component Value Date/Time    PHOS 5.7 09/28/2023 04:20 AM     Radiology Review:    XR CHEST PORTABLE 9/26/23  IMPRESSION:  No acute process. CT head without contrast 9/26/23  IMPRESSION:  1. 2.3 x 1.8 cm area of acute intraparenchymal hemorrhage in the inferior  medial left cerebellar hemisphere, slightly increased compared to the prior  study. There is associated mass effect and vasogenic edema with slight  impression on the left side of the 4th ventricle. No evidence of  hydrocephalus. 2. Extensive chronic white matter ischemic changes. Mild atrophy. 3. Small old right basal ganglia infarct. IMPRESSION/RECOMMENDATIONS:    ESRD on HD three times weekly T-Th-S, via RIJ. Hold HD today     2. Acute on chronic metabolic acidosis, HAGMA, bicarbonate 16 2/2 uremia, likely a component of starvation ketoacidosis, to start bicarbonate gtt     3.   Hypertensive urgency with intracranial bleed

## 2023-09-28 NOTE — PLAN OF CARE
Problem: Chronic Conditions and Co-morbidities  Goal: Patient's chronic conditions and co-morbidity symptoms are monitored and maintained or improved  9/28/2023 0848 by Alondra Becerra RN  Outcome: Progressing  Flowsheets (Taken 9/28/2023 0848)  Care Plan - Patient's Chronic Conditions and Co-Morbidity Symptoms are Monitored and Maintained or Improved: Monitor and assess patient's chronic conditions and comorbid symptoms for stability, deterioration, or improvement     Problem: Skin/Tissue Integrity  Goal: Absence of new skin breakdown  Description: 1. Monitor for areas of redness and/or skin breakdown  2. Assess vascular access sites hourly  3. Every 4-6 hours minimum:  Change oxygen saturation probe site  4. Every 4-6 hours:  If on nasal continuous positive airway pressure, respiratory therapy assess nares and determine need for appliance change or resting period. 9/28/2023 0848 by Alondra Becerra RN  Outcome: Progressing     Problem: Safety - Adult  Goal: Free from fall injury  9/28/2023 0848 by Alondra Becerra RN  Outcome: Progressing  Flowsheets (Taken 9/28/2023 0848)  Free From Fall Injury: Instruct family/caregiver on patient safety     Problem: ABCDS Injury Assessment  Goal: Absence of physical injury  9/28/2023 0848 by Alondra Becerra RN  Outcome: Progressing  Flowsheets (Taken 9/28/2023 0848)  Absence of Physical Injury: Implement safety measures based on patient assessment     Problem: Neurosensory - Adult  Goal: Achieves stable or improved neurological status  9/28/2023 0848 by Alondra Becerra RN  Outcome: Progressing  Flowsheets (Taken 9/28/2023 0848)  Achieves stable or improved neurological status:   Monitor temperature, glucose, and sodium.  Initiate appropriate interventions as ordered   Assess for and report changes in neurological status   Initiate measures to prevent increased intracranial pressure     Problem: Pain  Goal: Verbalizes/displays adequate comfort level or baseline comfort level  9/28/2023 0848 by Lilo Alston RN  Outcome: Progressing  Flowsheets (Taken 9/28/2023 0848)  Verbalizes/displays adequate comfort level or baseline comfort level: Assess pain using appropriate pain scale

## 2023-09-28 NOTE — PROGRESS NOTES
Speech Language Pathology      NAME:  Kristin Patel  :  1950  DATE: 2023  ROOM:  200-A    Pt seen for cog therapy/ assessment. Pt drowsy but answering eulalio questions with significant prompts/ encouragement. Oriented to location and year, able to recall birthday however not oriented to month and unable to recall s/p questioning. Y/N questions mostly accurate however again with delay and prompts/ encouragement. Pt appears to have poor insight to need for participation in therapy and ramesh for cognitive assessment at this time despite edu by SLP and RN.      Intraparenchymal hemorrhage of brain ([address]) [I61.9]    [address]  therapeutic interventions that focus on cognitive function , initial  15 min    Antonio Rubio., [address]  [id number]  2023

## 2023-09-29 VITALS
SYSTOLIC BLOOD PRESSURE: 156 MMHG | RESPIRATION RATE: 18 BRPM | DIASTOLIC BLOOD PRESSURE: 78 MMHG | HEART RATE: 67 BPM | OXYGEN SATURATION: 98 % | TEMPERATURE: 98 F | WEIGHT: 114.86 LBS | BODY MASS INDEX: 14.75 KG/M2

## 2023-09-29 LAB
GLUCOSE BLD-MCNC: 123 MG/DL (ref 74–99)
GLUCOSE BLD-MCNC: 82 MG/DL (ref 74–99)
GLUCOSE BLD-MCNC: 93 MG/DL (ref 74–99)
HAV IGM SERPL QL IA: NONREACTIVE
HBV CORE IGM SERPL QL IA: NONREACTIVE
HBV SURFACE AB SERPL IA-ACNC: <3.1 MIU/ML (ref 0–9.99)
HBV SURFACE AG SERPL QL IA: NONREACTIVE
HCV AB SERPL QL IA: REACTIVE

## 2023-09-29 PROCEDURE — 6370000000 HC RX 637 (ALT 250 FOR IP)

## 2023-09-29 PROCEDURE — 82962 GLUCOSE BLOOD TEST: CPT

## 2023-09-29 PROCEDURE — 6370000000 HC RX 637 (ALT 250 FOR IP): Performed by: NURSE PRACTITIONER

## 2023-09-29 PROCEDURE — 6370000000 HC RX 637 (ALT 250 FOR IP): Performed by: STUDENT IN AN ORGANIZED HEALTH CARE EDUCATION/TRAINING PROGRAM

## 2023-09-29 PROCEDURE — 6360000002 HC RX W HCPCS: Performed by: NURSE PRACTITIONER

## 2023-09-29 PROCEDURE — 2500000003 HC RX 250 WO HCPCS

## 2023-09-29 PROCEDURE — 2060000000 HC ICU INTERMEDIATE R&B

## 2023-09-29 PROCEDURE — 2580000003 HC RX 258: Performed by: NURSE PRACTITIONER

## 2023-09-29 RX ORDER — TAMSULOSIN HYDROCHLORIDE 0.4 MG/1
0.4 CAPSULE ORAL DAILY
Qty: 30 CAPSULE | Refills: 3 | Status: SHIPPED | OUTPATIENT
Start: 2023-09-30 | End: 2023-10-06

## 2023-09-29 RX ORDER — LABETALOL HYDROCHLORIDE 5 MG/ML
10 INJECTION, SOLUTION INTRAVENOUS EVERY 4 HOURS PRN
Status: DISCONTINUED | OUTPATIENT
Start: 2023-09-29 | End: 2023-09-30 | Stop reason: HOSPADM

## 2023-09-29 RX ORDER — CLONIDINE HYDROCHLORIDE 0.1 MG/1
0.1 TABLET ORAL 2 TIMES DAILY
Qty: 60 TABLET | Refills: 3 | Status: ON HOLD | OUTPATIENT
Start: 2023-09-29

## 2023-09-29 RX ORDER — CALCIUM ACETATE 667 MG/1
1 CAPSULE ORAL
Status: DISCONTINUED | OUTPATIENT
Start: 2023-09-29 | End: 2023-09-30 | Stop reason: HOSPADM

## 2023-09-29 RX ORDER — HYDRALAZINE HYDROCHLORIDE 50 MG/1
25 TABLET, FILM COATED ORAL
Qty: 90 TABLET | Refills: 0 | Status: SHIPPED | OUTPATIENT
Start: 2023-09-29 | End: 2023-10-06

## 2023-09-29 RX ORDER — LEVETIRACETAM 500 MG/1
500 TABLET ORAL DAILY
Qty: 60 TABLET | Refills: 3 | Status: ON HOLD | OUTPATIENT
Start: 2023-09-30

## 2023-09-29 RX ORDER — POLYETHYLENE GLYCOL 3350 17 G/17G
17 POWDER, FOR SOLUTION ORAL DAILY
Qty: 527 G | Refills: 0 | Status: ON HOLD | OUTPATIENT
Start: 2023-09-30 | End: 2023-10-30

## 2023-09-29 RX ORDER — HYDRALAZINE HYDROCHLORIDE 20 MG/ML
10 INJECTION INTRAMUSCULAR; INTRAVENOUS EVERY 4 HOURS PRN
Status: DISCONTINUED | OUTPATIENT
Start: 2023-09-29 | End: 2023-09-30 | Stop reason: HOSPADM

## 2023-09-29 RX ORDER — CALCIUM ACETATE 667 MG/1
1 CAPSULE ORAL
Qty: 180 CAPSULE | Refills: 0 | Status: SHIPPED | OUTPATIENT
Start: 2023-09-29 | End: 2023-10-06

## 2023-09-29 RX ORDER — CLONIDINE HYDROCHLORIDE 0.1 MG/1
0.1 TABLET ORAL 2 TIMES DAILY
Status: DISCONTINUED | OUTPATIENT
Start: 2023-09-29 | End: 2023-09-30 | Stop reason: HOSPADM

## 2023-09-29 RX ADMIN — TAMSULOSIN HYDROCHLORIDE 0.4 MG: 0.4 CAPSULE ORAL at 08:32

## 2023-09-29 RX ADMIN — AMLODIPINE BESYLATE 10 MG: 10 TABLET ORAL at 00:30

## 2023-09-29 RX ADMIN — SODIUM CHLORIDE, PRESERVATIVE FREE 10 ML: 5 INJECTION INTRAVENOUS at 08:32

## 2023-09-29 RX ADMIN — LEVETIRACETAM 500 MG: 500 TABLET, FILM COATED ORAL at 08:31

## 2023-09-29 RX ADMIN — SENNOSIDES AND DOCUSATE SODIUM 1 TABLET: 50; 8.6 TABLET ORAL at 00:30

## 2023-09-29 RX ADMIN — ISOSORBIDE DINITRATE 10 MG: 10 TABLET ORAL at 08:32

## 2023-09-29 RX ADMIN — HYDRALAZINE HYDROCHLORIDE 10 MG: 20 INJECTION INTRAMUSCULAR; INTRAVENOUS at 06:10

## 2023-09-29 RX ADMIN — SENNOSIDES AND DOCUSATE SODIUM 1 TABLET: 50; 8.6 TABLET ORAL at 19:55

## 2023-09-29 RX ADMIN — SENNOSIDES AND DOCUSATE SODIUM 1 TABLET: 50; 8.6 TABLET ORAL at 08:34

## 2023-09-29 RX ADMIN — HYDRALAZINE HYDROCHLORIDE 10 MG: 20 INJECTION INTRAMUSCULAR; INTRAVENOUS at 03:17

## 2023-09-29 RX ADMIN — AMLODIPINE BESYLATE 10 MG: 10 TABLET ORAL at 19:55

## 2023-09-29 RX ADMIN — CARVEDILOL 25 MG: 25 TABLET, FILM COATED ORAL at 08:31

## 2023-09-29 RX ADMIN — POLYETHYLENE GLYCOL 3350 17 G: 17 POWDER, FOR SOLUTION ORAL at 08:33

## 2023-09-29 RX ADMIN — ACETAMINOPHEN 650 MG: 325 TABLET ORAL at 06:09

## 2023-09-29 RX ADMIN — ATORVASTATIN CALCIUM 40 MG: 40 TABLET, FILM COATED ORAL at 08:31

## 2023-09-29 RX ADMIN — LEVOTHYROXINE SODIUM 125 MCG: 0.12 TABLET ORAL at 08:32

## 2023-09-29 RX ADMIN — CLONIDINE HYDROCHLORIDE 0.2 MG: 0.2 TABLET ORAL at 08:32

## 2023-09-29 RX ADMIN — CLONIDINE HYDROCHLORIDE 0.1 MG: 0.1 TABLET ORAL at 19:54

## 2023-09-29 ASSESSMENT — PAIN SCALES - GENERAL: PAINLEVEL_OUTOF10: 2

## 2023-09-29 NOTE — DISCHARGE INSTR - COC
Continuity of Care Form    Patient Name: Devin Delvalle   :  1950  MRN:  [id number]    Admit date:  2023  Discharge date:  2023    Code Status Order: DNR-CCA   Advance Directives:     Admitting Physician:  Jacquelyn Cantrell MD  PCP: No primary care provider on file. Discharging Nurse: Oliva Marie Unit/Room#: 7723/1940-N  Discharging Unit Phone Number: [phone]    Emergency Contact:   Extended Emergency Contact Information  Primary Emergency Contact: [address] [place] [address] Phone: [phone]  Mobile Phone: [phone]  Relation: Child  Secondary Emergency Contact: Shonna Villalobos  [place] Phone: [phone]  Mobile Phone: [phone]  Relation: Other  Preferred language: English   needed? No    Past Surgical History:  Past Surgical History:   Procedure Laterality Date    CATHETER INSERTION N/A 3/1/2023    TUNNELED HEMODIALYSIS CATHETER REMOVAL POSSIBLE TEMPORARY CATHETER INSERTION performed by Otto Cary MD at [facility] N/A 2021    CATHETER INSERTION HEMODIALYSIS, REMOVAL OF FEMORAL CATHETER performed by Libra Platt MD at [address] [place] [address]       Immunization History: There is no immunization history on file for this patient.     Active Problems:  Patient Active Problem List   Diagnosis Code    Diabetes mellitus ([address]) C69.3    Metabolic acidosis W06.66    Essential hypertension I10    Uncontrolled diabetes mellitus ([address]) UNO0241    Hyperbilirubinemia E80.6    Thrombocytopenia ([facility]) D69.6    Hypothyroidism E03.9    ESRD needing dialysis ([address]) N18.6, Z99.2    Acute respiratory failure with hypoxia (HCC) J96.01    Volume overload E87.70    Hypertensive emergency I16.1    Hyperkalemia E87.5    Severe protein-calorie malnutrition ([address]) E43    Acute renal failure superimposed on chronic kidney disease, on chronic dialysis (HCC) N17.9, N18.9, Z99.2    Elevated serum creatinine R79.89    Lung infiltrate R91.8 Dependent  Dressing  Dependent  Toileting  Dependent  Feeding  Dependent  Med Admin  Dependent  Med Delivery   whole    Wound Care Documentation and Therapy:        Elimination:  Continence: Bowel: No  Bladder: No  Urinary Catheter: None   Colostomy/Ileostomy/Ileal Conduit: No       Date of Last BM: 9/28/2023    Intake/Output Summary (Last 24 hours) at 9/29/2023 1025  Last data filed at 9/28/2023 2357  Gross per 24 hour   Intake 900 ml   Output 1910 ml   Net -1010 ml     I/O last 3 completed shifts: In: 1412.6 [P.O.:120; I.V.:260.7; IV Piggyback:131.9]  Out: 2365 [Urine:465]    Safety Concerns:     None    Impairments/Disabilities:      None    Nutrition Therapy:  Current Nutrition Therapy:   - Oral Diet:  General    Routes of Feeding: Oral  Liquids: Thin Liquids  Daily Fluid Restriction: no  Last Modified Barium Swallow with Video (Video Swallowing Test): not done    Treatments at the Time of Hospital Discharge:   Respiratory Treatments: N/A  Oxygen Therapy:  is not on home oxygen therapy.   Ventilator:    - No ventilator support    Rehab Therapies: Physical Therapy and Occupational Therapy  Weight Bearing Status/Restrictions: No weight bearing restrictions  Other Medical Equipment (for information only, NOT a DME order):  wheelchair  Other Treatments: N/A    Patient's personal belongings (please select all that are sent with patient):  None    RN SIGNATURE:  Electronically signed by Sugar Lofton RN on 9/29/23 at 6:10 PM EDT    CASE MANAGEMENT/SOCIAL WORK SECTION    Inpatient Status Date: 9/26/2023    Readmission Risk Assessment Score:  Readmission Risk              Risk of Unplanned Readmission:  40           Discharging to Facility/ Agency   Name: [facility]  Address: [facility]   Phone: [phone]  Fax: [phone]    Dialysis Facility (if applicable)   Name:  Address:  Dialysis Schedule:  Phone:  Fax:    / signature: Electronically signed

## 2023-09-29 NOTE — CARE COORDINATION
CM Update: Discharge order noted. Met with patient at bedside to discuss transition of care plan. Discharge plan is [facility]. Patient is a bed hold no precert needed. [address] [facility] Ambulance will pick-up to transport at 1800. Notified floor nursing, Notified Facility, Notified Patient, Notified family. S/P intraparenchymal hemorrhage in left cerebral hemisphere. CM/SW to follow.  MT

## 2023-09-29 NOTE — PROGRESS NOTES
Message sent to Dr. Bindu Callaway to see if she would like a VT done for pt or if she would like urology to see pt for evans that was placed for retention    I spoke with Dr. Bindu Callaway today, she would like pt to work with PT and OT and remove evans later today, pt is on flomax and Dr. Bindu Callaway also stated to treat constipation if pt is having such

## 2023-09-29 NOTE — DISCHARGE SUMMARY
Hospitalist Discharge Summary    Patient ID: Janett Hernandez   Patient : 1950  Patient's PCP: No primary care provider on file. Admit Date: 2023   Admitting Physician: Cinthya Pino MD    Discharge Date:  2023   Discharge Physician: Cinthya Pino MD   Discharge Condition: Stable  Discharge Disposition: [address] in brief:  (Please refer to daily progress notes for a comprehensive review of the hospitalization by requesting medical records)  74 YO M with prior ICH, HTN, who initially presented to  [facility] where CT head revealed ICH and elevated blood pressure to 212/112 mmHg. Patient had a prior ICH in the setting of uncontrolled HTN back on 2023. On repeat CT head 0n admission day with worsening ICH and mass effect. Repeat head CT scan few hours later with stable hemorrhage. NS on board and recommended medical management as is stable with recommendation of strict blood pressure control. Nephrology was consulted to resume dialysis while he is inpatient given his ESRD. Patient was continued on Keppra with adjust his dose per neuro ICU. Blood pressure better control with current regiment recommended by nephrology. As of today patient is doing better, no headaches, no other concerns. Clear by specialty for discharge. Stable to discharge to SNF    Exam:     General appearance: No apparent distress, appears stated age and cooperative. HEENT:  Normocephalic. Conjunctivae/corneas clear. Moist mucosa   Neck: Supple. No jugular venous distention. Thyroid not visible, non tender   Respiratory: Normal respiratory effort. Clear to auscultation bilaterally. No stridor/wheezing/rhonchi/crackles   Cardiovascular: Regular heart beats, normal S1-S2.  No M/G/R  Abdomen: Non distended, soft, non tender, no visceromegaly, no mass, normal bowel sounds   Musculoskeletal: No clubbing, cyanosis, no bilateral lower as:  APRESOLINE  Take 0.5 tablets by mouth 2 times daily (before meals)  What changed:   how much to take  when to take this     levETIRAcetam 500 MG tablet  Commonly known as: KEPPRA  Take 1 tablet by mouth daily  Start taking on: September 30, 2023  What changed: when to take this            CONTINUE taking these medications      amLODIPine 10 MG tablet  Commonly known as: NORVASC  Take 1 tablet by mouth nightly     atorvastatin 40 MG tablet  Commonly known as: LIPITOR     carvedilol 25 MG tablet  Commonly known as: COREG     cloNIDine 0.3 MG/24HR Ptwk  Commonly known as: CATAPRES  Place 1 patch onto the skin once a week     insulin lispro 100 UNIT/ML Soln injection vial  Commonly known as: HUMALOG  Inject 0-10 Units into the skin 4 times daily (before meals and nightly)     isosorbide dinitrate 10 MG tablet  Commonly known as: ISORDIL  Take 1 tablet by mouth 3 times daily     levothyroxine 125 MCG tablet  Commonly known as: SYNTHROID  Take 1 tablet by mouth Daily     vitamin D 1.25 MG (02045 UT) Caps capsule  Commonly known as: ERGOCALCIFEROL            STOP taking these medications      losartan 100 MG tablet  Commonly known as: COZAAR               Where to Get Your Medications        These medications were sent to [facility] #[id number] [place], [place] - [address] [place], [place] [address] -  [phone] Licea Sourav [phone]  [address] [facility]      Phone: [phone]   calcium acetate 667 MG Caps capsule  cloNIDine 0.1 MG tablet  hydrALAZINE 50 MG tablet  levETIRAcetam 500 MG tablet  polyethylene glycol 17 g packet  tamsulosin 0.4 MG capsule         Time Spent on discharge is more than 45 minutes in the examination, evaluation, counseling and review of medications and discharge plan.    +++++++++++++++++++++++++++++++++++++++++++++++++  Roxanne Banuelos MD  [place] [facility] Physician - [address] [facility],

## 2023-10-02 NOTE — PROGRESS NOTES
Physician Progress Note      Charles Anaheim  CSN #:                  [id number]  :                       1950  ADMIT DATE:       2023 6:24 AM  [address] [place] [address] DATE:        2023 1:32 AM  RESPONDING  PROVIDER #:        Ramon Palacios MD          QUERY TEXT:    Pt admitted with ICH. Pt noted to have \"mass effect and vasogenic edema with   slight impression on the left side of the 4th ventricle\" in CT of Head, . If clinically significant, please document in progress notes and discharge   summary if you are evaluating/treating any of the following: The medical record reflects the following:  Risk Factors: ICH, HTN urgency  Clinical Indicators: admitted with ICH  - CT of Head,  \"There is associated mass effect and vasogenic edema with   slight impression on the left side of the 4th ventricle\". - IM PN,  \"ICH: CT head 1.9 cm intraparenchymal hemorrhage in the medial   left cerebellar hemisphere; On repeat CT head 0n admission day with worsening   ICH and mass effect. Repeat head CT scan few hours later with stable   hemorrhage. \"  Treatment: Neurosurgery consult, CT of Head, IV Keppra, monitoring    Thank you,    Bertha Goodman RN  CDI  Options provided:  -- Cerebral edema  -- Brain compression  -- Cerebral edema and Brain compression  -- Other - I will add my own diagnosis  -- Disagree - Not applicable / Not valid  -- Disagree - Clinically unable to determine / Unknown  -- Refer to Clinical Documentation Reviewer    PROVIDER RESPONSE TEXT:    Provider disagreed with this query.     Query created by: Bertha Goodman on 2023 10:56 AM      Electronically signed by:  Ramon Palacios MD 10/2/2023 2:35 PM

## 2023-10-03 DIAGNOSIS — I62.9 INTRACRANIAL HEMORRHAGE (HCC): Primary | ICD-10-CM

## 2023-10-06 ENCOUNTER — APPOINTMENT (OUTPATIENT)
Dept: CT IMAGING | Age: 73
DRG: 064 | End: 2023-10-06
Payer: MEDICARE

## 2023-10-06 ENCOUNTER — HOSPITAL ENCOUNTER (INPATIENT)
Age: 73
LOS: 1 days | Discharge: NURSING, REHAB OR LONG-TERM CARE | DRG: 064 | End: 2023-10-10
Attending: EMERGENCY MEDICINE | Admitting: FAMILY MEDICINE
Payer: MEDICARE

## 2023-10-06 ENCOUNTER — APPOINTMENT (OUTPATIENT)
Dept: GENERAL RADIOLOGY | Age: 73
DRG: 064 | End: 2023-10-06
Payer: MEDICARE

## 2023-10-06 DIAGNOSIS — R41.82 ALTERED MENTAL STATUS, UNSPECIFIED ALTERED MENTAL STATUS TYPE: Primary | ICD-10-CM

## 2023-10-06 LAB
ALBUMIN SERPL-MCNC: 3.3 G/DL (ref 3.5–5.2)
ALP SERPL-CCNC: 82 U/L (ref 40–129)
ALT SERPL-CCNC: 8 U/L (ref 0–40)
ANION GAP SERPL CALCULATED.3IONS-SCNC: 8 MMOL/L (ref 7–16)
AST SERPL-CCNC: 15 U/L (ref 0–39)
BASOPHILS # BLD: 0 K/UL (ref 0–0.2)
BASOPHILS NFR BLD: 0 % (ref 0–2)
BILIRUB SERPL-MCNC: 0.5 MG/DL (ref 0–1.2)
BUN SERPL-MCNC: 15 MG/DL (ref 6–23)
CALCIUM SERPL-MCNC: 9.5 MG/DL (ref 8.6–10.2)
CHLORIDE SERPL-SCNC: 97 MMOL/L (ref 98–107)
CHP ED QC CHECK: NORMAL
CO2 SERPL-SCNC: 30 MMOL/L (ref 22–29)
CREAT SERPL-MCNC: 5.3 MG/DL (ref 0.7–1.2)
EOSINOPHIL # BLD: 0.09 K/UL (ref 0.05–0.5)
EOSINOPHILS RELATIVE PERCENT: 3 % (ref 0–6)
ERYTHROCYTE [DISTWIDTH] IN BLOOD BY AUTOMATED COUNT: 13.2 % (ref 11.5–15)
GFR SERPL CREATININE-BSD FRML MDRD: 11 ML/MIN/1.73M2
GLUCOSE BLD-MCNC: 116 MG/DL
GLUCOSE BLD-MCNC: 116 MG/DL (ref 74–99)
GLUCOSE SERPL-MCNC: 122 MG/DL (ref 74–99)
HCT VFR BLD AUTO: 45.1 % (ref 37–54)
HGB BLD-MCNC: 14.8 G/DL (ref 12.5–16.5)
INFLUENZA A BY PCR: NOT DETECTED
INFLUENZA B BY PCR: NOT DETECTED
LYMPHOCYTES NFR BLD: 0.6 K/UL (ref 1.5–4)
LYMPHOCYTES RELATIVE PERCENT: 17 % (ref 20–42)
MCH RBC QN AUTO: 30.5 PG (ref 26–35)
MCHC RBC AUTO-ENTMCNC: 32.8 G/DL (ref 32–34.5)
MCV RBC AUTO: 92.8 FL (ref 80–99.9)
MONOCYTES NFR BLD: 0.35 K/UL (ref 0.1–0.95)
MONOCYTES NFR BLD: 10 % (ref 2–12)
NEUTROPHILS NFR BLD: 71 % (ref 43–80)
NEUTS SEG NFR BLD: 2.56 K/UL (ref 1.8–7.3)
NUCLEATED RED BLOOD CELLS: 1 PER 100 WBC
PLATELET # BLD AUTO: 143 K/UL (ref 130–450)
PMV BLD AUTO: 10.7 FL (ref 7–12)
POTASSIUM SERPL-SCNC: 3.7 MMOL/L (ref 3.5–5)
PROT SERPL-MCNC: 6.8 G/DL (ref 6.4–8.3)
RBC # BLD AUTO: 4.86 M/UL (ref 3.8–5.8)
RBC # BLD: ABNORMAL 10*6/UL
RBC # BLD: ABNORMAL 10*6/UL
SARS-COV-2 RDRP RESP QL NAA+PROBE: NOT DETECTED
SODIUM SERPL-SCNC: 135 MMOL/L (ref 132–146)
SPECIMEN DESCRIPTION: NORMAL
T4 FREE SERPL-MCNC: 1.2 NG/DL (ref 0.9–1.7)
TSH SERPL DL<=0.05 MIU/L-ACNC: 17.12 UIU/ML (ref 0.27–4.2)
WBC OTHER # BLD: 3.6 K/UL (ref 4.5–11.5)

## 2023-10-06 PROCEDURE — 87502 INFLUENZA DNA AMP PROBE: CPT

## 2023-10-06 PROCEDURE — 80177 DRUG SCRN QUAN LEVETIRACETAM: CPT

## 2023-10-06 PROCEDURE — 84443 ASSAY THYROID STIM HORMONE: CPT

## 2023-10-06 PROCEDURE — 85025 COMPLETE CBC W/AUTO DIFF WBC: CPT

## 2023-10-06 PROCEDURE — 96374 THER/PROPH/DIAG INJ IV PUSH: CPT

## 2023-10-06 PROCEDURE — 71045 X-RAY EXAM CHEST 1 VIEW: CPT

## 2023-10-06 PROCEDURE — 70450 CT HEAD/BRAIN W/O DYE: CPT

## 2023-10-06 PROCEDURE — 80053 COMPREHEN METABOLIC PANEL: CPT

## 2023-10-06 PROCEDURE — 99285 EMERGENCY DEPT VISIT HI MDM: CPT

## 2023-10-06 PROCEDURE — 82962 GLUCOSE BLOOD TEST: CPT

## 2023-10-06 PROCEDURE — 6360000002 HC RX W HCPCS: Performed by: FAMILY MEDICINE

## 2023-10-06 PROCEDURE — 2580000003 HC RX 258: Performed by: FAMILY MEDICINE

## 2023-10-06 PROCEDURE — 84439 ASSAY OF FREE THYROXINE: CPT

## 2023-10-06 PROCEDURE — 6370000000 HC RX 637 (ALT 250 FOR IP): Performed by: FAMILY MEDICINE

## 2023-10-06 PROCEDURE — G0378 HOSPITAL OBSERVATION PER HR: HCPCS

## 2023-10-06 RX ORDER — CLONIDINE HYDROCHLORIDE 0.1 MG/1
0.1 TABLET ORAL 2 TIMES DAILY
Status: DISCONTINUED | OUTPATIENT
Start: 2023-10-06 | End: 2023-10-10 | Stop reason: HOSPADM

## 2023-10-06 RX ORDER — ATORVASTATIN CALCIUM 40 MG/1
40 TABLET, FILM COATED ORAL DAILY
Status: DISCONTINUED | OUTPATIENT
Start: 2023-10-07 | End: 2023-10-09

## 2023-10-06 RX ORDER — SODIUM CHLORIDE 9 MG/ML
INJECTION, SOLUTION INTRAVENOUS PRN
Status: DISCONTINUED | OUTPATIENT
Start: 2023-10-06 | End: 2023-10-10 | Stop reason: HOSPADM

## 2023-10-06 RX ORDER — HYDRALAZINE HYDROCHLORIDE 50 MG/1
50 TABLET, FILM COATED ORAL 3 TIMES DAILY
COMMUNITY

## 2023-10-06 RX ORDER — AMLODIPINE BESYLATE 10 MG/1
10 TABLET ORAL NIGHTLY
Status: DISCONTINUED | OUTPATIENT
Start: 2023-10-06 | End: 2023-10-10 | Stop reason: HOSPADM

## 2023-10-06 RX ORDER — ACETAMINOPHEN 325 MG/1
650 TABLET ORAL EVERY 6 HOURS PRN
Status: DISCONTINUED | OUTPATIENT
Start: 2023-10-06 | End: 2023-10-10 | Stop reason: HOSPADM

## 2023-10-06 RX ORDER — SODIUM CHLORIDE 0.9 % (FLUSH) 0.9 %
10 SYRINGE (ML) INJECTION PRN
Status: DISCONTINUED | OUTPATIENT
Start: 2023-10-06 | End: 2023-10-10 | Stop reason: HOSPADM

## 2023-10-06 RX ORDER — LEVETIRACETAM 500 MG/1
500 TABLET ORAL DAILY
Status: DISCONTINUED | OUTPATIENT
Start: 2023-10-07 | End: 2023-10-10 | Stop reason: HOSPADM

## 2023-10-06 RX ORDER — SODIUM CHLORIDE 0.9 % (FLUSH) 0.9 %
10 SYRINGE (ML) INJECTION EVERY 12 HOURS SCHEDULED
Status: DISCONTINUED | OUTPATIENT
Start: 2023-10-06 | End: 2023-10-10 | Stop reason: HOSPADM

## 2023-10-06 RX ORDER — HYDRALAZINE HYDROCHLORIDE 50 MG/1
50 TABLET, FILM COATED ORAL 3 TIMES DAILY
Status: DISCONTINUED | OUTPATIENT
Start: 2023-10-06 | End: 2023-10-10 | Stop reason: HOSPADM

## 2023-10-06 RX ORDER — LEVOTHYROXINE SODIUM 137 UG/1
137 TABLET ORAL DAILY
COMMUNITY

## 2023-10-06 RX ORDER — POLYETHYLENE GLYCOL 3350 17 G/17G
17 POWDER, FOR SOLUTION ORAL DAILY
Status: DISCONTINUED | OUTPATIENT
Start: 2023-10-07 | End: 2023-10-10 | Stop reason: HOSPADM

## 2023-10-06 RX ORDER — LEVOTHYROXINE SODIUM 137 UG/1
137 TABLET ORAL DAILY
Status: DISCONTINUED | OUTPATIENT
Start: 2023-10-07 | End: 2023-10-08

## 2023-10-06 RX ORDER — HEPARIN SODIUM 10000 [USP'U]/ML
5000 INJECTION, SOLUTION INTRAVENOUS; SUBCUTANEOUS EVERY 8 HOURS SCHEDULED
Status: DISCONTINUED | OUTPATIENT
Start: 2023-10-06 | End: 2023-10-08

## 2023-10-06 RX ORDER — CARVEDILOL 25 MG/1
25 TABLET ORAL 2 TIMES DAILY WITH MEALS
Status: DISCONTINUED | OUTPATIENT
Start: 2023-10-07 | End: 2023-10-10 | Stop reason: HOSPADM

## 2023-10-06 RX ORDER — ONDANSETRON 2 MG/ML
4 INJECTION INTRAMUSCULAR; INTRAVENOUS EVERY 6 HOURS PRN
Status: DISCONTINUED | OUTPATIENT
Start: 2023-10-06 | End: 2023-10-10 | Stop reason: HOSPADM

## 2023-10-06 RX ORDER — POLYETHYLENE GLYCOL 3350 17 G/17G
17 POWDER, FOR SOLUTION ORAL DAILY PRN
Status: DISCONTINUED | OUTPATIENT
Start: 2023-10-06 | End: 2023-10-10 | Stop reason: HOSPADM

## 2023-10-06 RX ORDER — ISOSORBIDE DINITRATE 10 MG/1
10 TABLET ORAL 3 TIMES DAILY
Status: DISCONTINUED | OUTPATIENT
Start: 2023-10-06 | End: 2023-10-10 | Stop reason: HOSPADM

## 2023-10-06 RX ORDER — PROMETHAZINE HYDROCHLORIDE 12.5 MG/1
12.5 TABLET ORAL EVERY 6 HOURS PRN
Status: DISCONTINUED | OUTPATIENT
Start: 2023-10-06 | End: 2023-10-10 | Stop reason: HOSPADM

## 2023-10-06 RX ORDER — ACETAMINOPHEN 650 MG/1
650 SUPPOSITORY RECTAL EVERY 6 HOURS PRN
Status: DISCONTINUED | OUTPATIENT
Start: 2023-10-06 | End: 2023-10-10 | Stop reason: HOSPADM

## 2023-10-06 RX ADMIN — Medication 10 ML: at 22:19

## 2023-10-06 RX ADMIN — AMLODIPINE BESYLATE 10 MG: 10 TABLET ORAL at 22:14

## 2023-10-06 RX ADMIN — ISOSORBIDE DINITRATE 10 MG: 10 TABLET ORAL at 22:27

## 2023-10-06 RX ADMIN — ONDANSETRON 4 MG: 2 INJECTION INTRAMUSCULAR; INTRAVENOUS at 22:40

## 2023-10-06 RX ADMIN — HYDRALAZINE HYDROCHLORIDE 50 MG: 50 TABLET, FILM COATED ORAL at 22:14

## 2023-10-06 RX ADMIN — CLONIDINE HYDROCHLORIDE 0.1 MG: 0.1 TABLET ORAL at 22:15

## 2023-10-06 NOTE — H&P
Hospitalist History & Physical      PCP: No primary care provider on file. Date of Service: Pt seen/examined on 10/6/2023     Chief Complaint:  had concerns including Altered Mental Status (Sent from dialysis + ams, pt usually AO X4 now confused ). History Of Present Illness:    Mr. Devin Delvalle, a 75y.o. year old male  who  has a past medical history of Anemia, Chronic kidney disease, Depression, Diabetes mellitus ([address]), ESRD (end stage renal disease) ([address]), Hepatitis C, Hypertension, Liver disease, Moderate protein-calorie malnutrition ([address]), Muscle weakness (generalized), Thyroid disease, Unspecified diseases of blood and blood-forming organs, and Unsteadiness on feet. Patient presented to the emergency department from dialysis with altered mental status. Baseline is ANO x4. Now confused. Vital signs show the patient be bradycardic with a rate in the 40s. Hypertensive with pressure of 160/88. Patient is afebrile. Laboratory studies demonstrate creatinine 5.3, glucose 122. CT head shows redemonstration of parenchymal hematoma located in the left inferior cerebellum with surrounding edema. This hematoma has decreased in size compared with prior study from September 26.       Past Medical History:   Diagnosis Date    Anemia     Chronic kidney disease     Depression     Diabetes mellitus (HCC)     ESRD (end stage renal disease) ([address])     Hepatitis C     Hypertension     Liver disease     Moderate protein-calorie malnutrition (HCC)     Muscle weakness (generalized)     Thyroid disease     Unspecified diseases of blood and blood-forming organs     Unsteadiness on feet        Past Surgical History:   Procedure Laterality Date    CATHETER INSERTION N/A 3/1/2023    TUNNELED HEMODIALYSIS CATHETER REMOVAL POSSIBLE TEMPORARY CATHETER INSERTION performed by Otto Cary MD at [facility] N/A 11/21/2021    CATHETER INSERTION HEMODIALYSIS, REMOVAL OF FEMORAL hemispheres. Chronic lacunar area of stroke is seen involving right basal ganglia appearing similar compared to previous examination. Opacities are present in the sphenoid sinuses. Mastoid air cells are clear. 1. Redemonstration of parenchymal hematoma located in the left inferior cerebellum with surrounding edema. 2. This hematoma has decreased in size compared with prior from September 26, 2023. 3. No new intracranial hemorrhage. 4. Confluent areas of low attenuation in white matter of both hemispheres suggesting chronic microvascular ischemia or nonspecific encephalopathy. XR CHEST PORTABLE    Result Date: 10/6/2023  EXAMINATION: ONE XRAY VIEW OF THE CHEST 10/6/2023 3:19 pm COMPARISON: None. HISTORY: ORDERING SYSTEM PROVIDED HISTORY: altered mental status TECHNOLOGIST PROVIDED HISTORY: Reason for exam:->altered mental status What reading provider will be dictating this exam?->CRC FINDINGS: The lungs are without acute focal process. There is no effusion or pneumothorax. The cardiomediastinal silhouette is without acute process. The osseous structures are without acute process. Dialysis catheter in place. No acute process. Borderline cardiomegaly. CT HEAD WO CONTRAST    Result Date: 9/26/2023  EXAMINATION: CT OF THE HEAD WITHOUT CONTRAST  9/26/2023 5:27 pm TECHNIQUE: CT of the head was performed without the administration of intravenous contrast. Automated exposure control, iterative reconstruction, and/or weight based adjustment of the mA/kV was utilized to reduce the radiation dose to as low as reasonably achievable. COMPARISON: None. HISTORY: ORDERING SYSTEM PROVIDED HISTORY: ICH TECHNOLOGIST PROVIDED HISTORY: Reason for exam:->ICH Has a \"code stroke\" or \"stroke alert\" been called? ->No What reading provider will be dictating this exam?->CRC FINDINGS: BRAIN/VENTRICLES: Stable appearance of acute, rounded, intraparenchymal hemorrhage in the anteromedial left cerebellar hemisphere measuring 2.5

## 2023-10-07 LAB
ANION GAP SERPL CALCULATED.3IONS-SCNC: 15 MMOL/L (ref 7–16)
BASOPHILS # BLD: 0.05 K/UL (ref 0–0.2)
BASOPHILS NFR BLD: 1 % (ref 0–2)
BUN SERPL-MCNC: 22 MG/DL (ref 6–23)
CALCIUM SERPL-MCNC: 9.6 MG/DL (ref 8.6–10.2)
CHLORIDE SERPL-SCNC: 96 MMOL/L (ref 98–107)
CO2 SERPL-SCNC: 23 MMOL/L (ref 22–29)
CREAT SERPL-MCNC: 6.8 MG/DL (ref 0.7–1.2)
EOSINOPHIL # BLD: 0.13 K/UL (ref 0.05–0.5)
EOSINOPHILS RELATIVE PERCENT: 4 % (ref 0–6)
ERYTHROCYTE [DISTWIDTH] IN BLOOD BY AUTOMATED COUNT: 13.2 % (ref 11.5–15)
GFR SERPL CREATININE-BSD FRML MDRD: 8 ML/MIN/1.73M2
GLUCOSE SERPL-MCNC: 89 MG/DL (ref 74–99)
HCT VFR BLD AUTO: 45.2 % (ref 37–54)
HGB BLD-MCNC: 15.2 G/DL (ref 12.5–16.5)
IMM GRANULOCYTES # BLD AUTO: <0.03 K/UL (ref 0–0.58)
IMM GRANULOCYTES NFR BLD: 1 % (ref 0–5)
LYMPHOCYTES NFR BLD: 0.65 K/UL (ref 1.5–4)
LYMPHOCYTES RELATIVE PERCENT: 18 % (ref 20–42)
MCH RBC QN AUTO: 30.6 PG (ref 26–35)
MCHC RBC AUTO-ENTMCNC: 33.6 G/DL (ref 32–34.5)
MCV RBC AUTO: 90.9 FL (ref 80–99.9)
MONOCYTES NFR BLD: 0.41 K/UL (ref 0.1–0.95)
MONOCYTES NFR BLD: 12 % (ref 2–12)
NEUTROPHILS NFR BLD: 65 % (ref 43–80)
NEUTS SEG NFR BLD: 2.31 K/UL (ref 1.8–7.3)
PLATELET # BLD AUTO: 180 K/UL (ref 130–450)
PMV BLD AUTO: 10.4 FL (ref 7–12)
POTASSIUM SERPL-SCNC: 4 MMOL/L (ref 3.5–5)
RBC # BLD AUTO: 4.97 M/UL (ref 3.8–5.8)
SODIUM SERPL-SCNC: 134 MMOL/L (ref 132–146)
WBC OTHER # BLD: 3.6 K/UL (ref 4.5–11.5)

## 2023-10-07 PROCEDURE — 96372 THER/PROPH/DIAG INJ SC/IM: CPT

## 2023-10-07 PROCEDURE — 6370000000 HC RX 637 (ALT 250 FOR IP): Performed by: FAMILY MEDICINE

## 2023-10-07 PROCEDURE — 6360000002 HC RX W HCPCS: Performed by: FAMILY MEDICINE

## 2023-10-07 PROCEDURE — 80048 BASIC METABOLIC PNL TOTAL CA: CPT

## 2023-10-07 PROCEDURE — 36415 COLL VENOUS BLD VENIPUNCTURE: CPT

## 2023-10-07 PROCEDURE — G0378 HOSPITAL OBSERVATION PER HR: HCPCS

## 2023-10-07 PROCEDURE — 2580000003 HC RX 258: Performed by: FAMILY MEDICINE

## 2023-10-07 PROCEDURE — 85025 COMPLETE CBC W/AUTO DIFF WBC: CPT

## 2023-10-07 RX ADMIN — ISOSORBIDE DINITRATE 10 MG: 10 TABLET ORAL at 09:34

## 2023-10-07 RX ADMIN — ISOSORBIDE DINITRATE 10 MG: 10 TABLET ORAL at 13:28

## 2023-10-07 RX ADMIN — ATORVASTATIN CALCIUM 40 MG: 40 TABLET, FILM COATED ORAL at 09:34

## 2023-10-07 RX ADMIN — CARVEDILOL 25 MG: 25 TABLET, FILM COATED ORAL at 09:34

## 2023-10-07 RX ADMIN — CARVEDILOL 25 MG: 25 TABLET, FILM COATED ORAL at 16:55

## 2023-10-07 RX ADMIN — HYDRALAZINE HYDROCHLORIDE 50 MG: 50 TABLET, FILM COATED ORAL at 09:34

## 2023-10-07 RX ADMIN — HEPARIN SODIUM 5000 UNITS: 10000 INJECTION INTRAVENOUS; SUBCUTANEOUS at 13:29

## 2023-10-07 RX ADMIN — Medication 10 ML: at 21:17

## 2023-10-07 RX ADMIN — HYDRALAZINE HYDROCHLORIDE 50 MG: 50 TABLET, FILM COATED ORAL at 21:16

## 2023-10-07 RX ADMIN — CLONIDINE HYDROCHLORIDE 0.1 MG: 0.1 TABLET ORAL at 21:16

## 2023-10-07 RX ADMIN — CLONIDINE HYDROCHLORIDE 0.1 MG: 0.1 TABLET ORAL at 09:34

## 2023-10-07 RX ADMIN — HEPARIN SODIUM 5000 UNITS: 10000 INJECTION INTRAVENOUS; SUBCUTANEOUS at 09:37

## 2023-10-07 RX ADMIN — LEVETIRACETAM 500 MG: 500 TABLET, FILM COATED ORAL at 09:34

## 2023-10-07 RX ADMIN — HYDRALAZINE HYDROCHLORIDE 50 MG: 50 TABLET, FILM COATED ORAL at 13:28

## 2023-10-07 RX ADMIN — Medication 10 ML: at 09:48

## 2023-10-07 RX ADMIN — ISOSORBIDE DINITRATE 10 MG: 10 TABLET ORAL at 21:16

## 2023-10-07 RX ADMIN — AMLODIPINE BESYLATE 10 MG: 10 TABLET ORAL at 21:16

## 2023-10-07 RX ADMIN — HEPARIN SODIUM 5000 UNITS: 10000 INJECTION INTRAVENOUS; SUBCUTANEOUS at 21:16

## 2023-10-07 ASSESSMENT — PAIN SCALES - GENERAL: PAINLEVEL_OUTOF10: 0

## 2023-10-07 NOTE — PROGRESS NOTES
4 Eyes Skin Assessment     NAME:  [address] OF BIRTH:  1950  MEDICAL RECORD NUMBER:  [id number]    The patient is being assessed for  Admission    I agree that at least one RN has performed a thorough Head to Toe Skin Assessment on the patient. ALL assessment sites listed below have been assessed. Areas assessed by both nurses:    Head, Face, Ears, Shoulders, Back, Chest, Arms, Elbows, Hands, Sacrum. Buttock, Coccyx, Ischium, Legs. Feet and Heels, and Under Medical Devices     *Bruises on right lower arm. *Dry flaky feet        Does the Patient have a Wound?  No noted wound(s)       Kory Prevention initiated by RN: Yes  Wound Care Orders initiated by RN: No    Pressure Injury (Stage 3,4, Unstageable, DTI, NWPT, and Complex wounds) if present, place Wound referral order by RN under : No    New Ostomies, if present place, Ostomy referral order under : No     Nurse 1 eSignature: Electronically signed by Yoly Avalos RN on 10/7/23 at 4:03 PM EDT    **SHARE this note so that the co-signing nurse can place an eSignature**    Nurse 2 eSignature: Electronically signed by Jessy Ellison RN on 10/7/23 at 6:21 PM EDT

## 2023-10-07 NOTE — ED NOTES
Nurse to nurse report given to Dorys Arboleda. All questions answered. Pt placed in transport at this time.      Swapnil Gurrola RN  10/07/23 [id number]

## 2023-10-08 ENCOUNTER — APPOINTMENT (OUTPATIENT)
Dept: MRI IMAGING | Age: 73
DRG: 064 | End: 2023-10-08
Payer: MEDICARE

## 2023-10-08 LAB
ANION GAP SERPL CALCULATED.3IONS-SCNC: 18 MMOL/L (ref 7–16)
BUN SERPL-MCNC: 26 MG/DL (ref 6–23)
CALCIUM SERPL-MCNC: 9.3 MG/DL (ref 8.6–10.2)
CHLORIDE SERPL-SCNC: 99 MMOL/L (ref 98–107)
CO2 SERPL-SCNC: 23 MMOL/L (ref 22–29)
CREAT SERPL-MCNC: 7 MG/DL (ref 0.7–1.2)
ERYTHROCYTE [DISTWIDTH] IN BLOOD BY AUTOMATED COUNT: 13.3 % (ref 11.5–15)
FOLATE SERPL-MCNC: 5.6 NG/ML (ref 4.8–24.2)
GFR SERPL CREATININE-BSD FRML MDRD: 8 ML/MIN/1.73M2
GLUCOSE SERPL-MCNC: 77 MG/DL (ref 74–99)
HCT VFR BLD AUTO: 44.7 % (ref 37–54)
HGB BLD-MCNC: 15.2 G/DL (ref 12.5–16.5)
MCH RBC QN AUTO: 30.6 PG (ref 26–35)
MCHC RBC AUTO-ENTMCNC: 34 G/DL (ref 32–34.5)
MCV RBC AUTO: 89.9 FL (ref 80–99.9)
PLATELET # BLD AUTO: 196 K/UL (ref 130–450)
PMV BLD AUTO: 11.2 FL (ref 7–12)
POTASSIUM SERPL-SCNC: 4.1 MMOL/L (ref 3.5–5)
RBC # BLD AUTO: 4.97 M/UL (ref 3.8–5.8)
SODIUM SERPL-SCNC: 140 MMOL/L (ref 132–146)
T4 FREE SERPL-MCNC: 1.3 NG/DL (ref 0.9–1.7)
VIT B12 SERPL-MCNC: 1465 PG/ML (ref 211–946)
WBC OTHER # BLD: 3.7 K/UL (ref 4.5–11.5)

## 2023-10-08 PROCEDURE — 84439 ASSAY OF FREE THYROXINE: CPT

## 2023-10-08 PROCEDURE — 80048 BASIC METABOLIC PNL TOTAL CA: CPT

## 2023-10-08 PROCEDURE — 82607 VITAMIN B-12: CPT

## 2023-10-08 PROCEDURE — 36415 COLL VENOUS BLD VENIPUNCTURE: CPT

## 2023-10-08 PROCEDURE — 85027 COMPLETE CBC AUTOMATED: CPT

## 2023-10-08 PROCEDURE — 6370000000 HC RX 637 (ALT 250 FOR IP): Performed by: FAMILY MEDICINE

## 2023-10-08 PROCEDURE — 6360000002 HC RX W HCPCS: Performed by: FAMILY MEDICINE

## 2023-10-08 PROCEDURE — 96376 TX/PRO/DX INJ SAME DRUG ADON: CPT

## 2023-10-08 PROCEDURE — G0378 HOSPITAL OBSERVATION PER HR: HCPCS

## 2023-10-08 PROCEDURE — 2580000003 HC RX 258: Performed by: FAMILY MEDICINE

## 2023-10-08 PROCEDURE — 96372 THER/PROPH/DIAG INJ SC/IM: CPT

## 2023-10-08 PROCEDURE — 82746 ASSAY OF FOLIC ACID SERUM: CPT

## 2023-10-08 PROCEDURE — 70551 MRI BRAIN STEM W/O DYE: CPT

## 2023-10-08 RX ADMIN — HYDRALAZINE HYDROCHLORIDE 50 MG: 50 TABLET, FILM COATED ORAL at 08:10

## 2023-10-08 RX ADMIN — LEVETIRACETAM 500 MG: 500 TABLET, FILM COATED ORAL at 08:09

## 2023-10-08 RX ADMIN — HYDRALAZINE HYDROCHLORIDE 50 MG: 50 TABLET, FILM COATED ORAL at 13:18

## 2023-10-08 RX ADMIN — HYDRALAZINE HYDROCHLORIDE 50 MG: 50 TABLET, FILM COATED ORAL at 22:18

## 2023-10-08 RX ADMIN — ATORVASTATIN CALCIUM 40 MG: 40 TABLET, FILM COATED ORAL at 08:10

## 2023-10-08 RX ADMIN — CARVEDILOL 25 MG: 25 TABLET, FILM COATED ORAL at 17:39

## 2023-10-08 RX ADMIN — LEVOTHYROXINE SODIUM 137 MCG: 0.14 TABLET ORAL at 05:11

## 2023-10-08 RX ADMIN — AMLODIPINE BESYLATE 10 MG: 10 TABLET ORAL at 22:18

## 2023-10-08 RX ADMIN — CARVEDILOL 25 MG: 25 TABLET, FILM COATED ORAL at 08:10

## 2023-10-08 RX ADMIN — ISOSORBIDE DINITRATE 10 MG: 10 TABLET ORAL at 13:18

## 2023-10-08 RX ADMIN — ONDANSETRON 4 MG: 2 INJECTION INTRAMUSCULAR; INTRAVENOUS at 08:17

## 2023-10-08 RX ADMIN — ISOSORBIDE DINITRATE 10 MG: 10 TABLET ORAL at 08:10

## 2023-10-08 RX ADMIN — Medication 10 ML: at 08:10

## 2023-10-08 RX ADMIN — ISOSORBIDE DINITRATE 10 MG: 10 TABLET ORAL at 22:18

## 2023-10-08 RX ADMIN — CLONIDINE HYDROCHLORIDE 0.1 MG: 0.1 TABLET ORAL at 22:18

## 2023-10-08 RX ADMIN — Medication 10 ML: at 22:22

## 2023-10-08 RX ADMIN — HEPARIN SODIUM 5000 UNITS: 10000 INJECTION INTRAVENOUS; SUBCUTANEOUS at 05:12

## 2023-10-08 RX ADMIN — CLONIDINE HYDROCHLORIDE 0.1 MG: 0.1 TABLET ORAL at 08:09

## 2023-10-08 ASSESSMENT — PAIN SCALES - GENERAL: PAINLEVEL_OUTOF10: 0

## 2023-10-08 NOTE — CONSULTS
Department of Internal Medicine  Nephrology Consult Note      Reason for Consult:  End-Stage Renal Disease on HD now with ICH 2/2 stroke  Requesting Physician:  KIANNA Cai CNP    CHIEF COMPLAINT:  transfer from [address] [place] [address] Rd, intraparenchymal hemorrhage    History Obtained From:  patient, electronic medical record    HISTORY OF PRESENT ILLNESS:  Briefly, Mr. Lola Mays is a 75 y.o. male, past medical history significant for ESRD on HD 3 times weekly Tuesday, Thursday, Saturday via right IJ, HTN, type II DM, HFrEF 35 to 40% with stage I diastolic dysfunction (4/9871), hypothyroidism, HCV, recent intracerebral bleed, who was admitted on October 6, 2023 after he was brought to the ER by EMS after he was referred from his dialysis center because of altered mental status. MRI of the brain demonstrated evolving subacute parenchymal hemorrhage within the left cerebellum extending to the tonsil.     Past Medical History:        Diagnosis Date    Anemia     Chronic kidney disease     Depression     Diabetes mellitus (HCC)     ESRD (end stage renal disease) ([address])     Hepatitis C     Hypertension     Liver disease     Moderate protein-calorie malnutrition (HCC)     Muscle weakness (generalized)     Thyroid disease     Unspecified diseases of blood and blood-forming organs     Unsteadiness on feet      Past Surgical History:        Procedure Laterality Date    CATHETER INSERTION N/A 3/1/2023    TUNNELED HEMODIALYSIS CATHETER REMOVAL POSSIBLE TEMPORARY CATHETER INSERTION performed by Kaleb Montano MD at [facility] N/A 11/21/2021    CATHETER INSERTION HEMODIALYSIS, REMOVAL OF FEMORAL CATHETER performed by Jaime Camacho MD at [facility] OR     Current Medications:    Current Facility-Administered Medications: [START ON 10/9/2023] levothyroxine (SYNTHROID) tablet 150 mcg, 150 mcg, Oral, Daily  amLODIPine (NORVASC) tablet 10 mg, 10 mg, Oral, Nightly  atorvastatin (LIPITOR) tablet

## 2023-10-08 NOTE — PROGRESS NOTES
Patient did not eat breakfast and only ate a few bites of lunch, states has no appetite. Will continue to encourage nutrition.

## 2023-10-09 LAB
ANION GAP SERPL CALCULATED.3IONS-SCNC: 17 MMOL/L (ref 7–16)
BUN SERPL-MCNC: 32 MG/DL (ref 6–23)
CALCIUM SERPL-MCNC: 9.1 MG/DL (ref 8.6–10.2)
CHLORIDE SERPL-SCNC: 96 MMOL/L (ref 98–107)
CO2 SERPL-SCNC: 22 MMOL/L (ref 22–29)
CREAT SERPL-MCNC: 9.1 MG/DL (ref 0.7–1.2)
ERYTHROCYTE [DISTWIDTH] IN BLOOD BY AUTOMATED COUNT: 13.4 % (ref 11.5–15)
GFR SERPL CREATININE-BSD FRML MDRD: 6 ML/MIN/1.73M2
GLUCOSE SERPL-MCNC: 61 MG/DL (ref 74–99)
HCT VFR BLD AUTO: 39.5 % (ref 37–54)
HGB BLD-MCNC: 13.2 G/DL (ref 12.5–16.5)
MAGNESIUM SERPL-MCNC: 2.2 MG/DL (ref 1.6–2.6)
MCH RBC QN AUTO: 31 PG (ref 26–35)
MCHC RBC AUTO-ENTMCNC: 33.4 G/DL (ref 32–34.5)
MCV RBC AUTO: 92.7 FL (ref 80–99.9)
PLATELET # BLD AUTO: 180 K/UL (ref 130–450)
PMV BLD AUTO: 10.6 FL (ref 7–12)
POTASSIUM SERPL-SCNC: 4 MMOL/L (ref 3.5–5)
RBC # BLD AUTO: 4.26 M/UL (ref 3.8–5.8)
SODIUM SERPL-SCNC: 135 MMOL/L (ref 132–146)
WBC OTHER # BLD: 4.6 K/UL (ref 4.5–11.5)

## 2023-10-09 PROCEDURE — 83735 ASSAY OF MAGNESIUM: CPT

## 2023-10-09 PROCEDURE — 85027 COMPLETE CBC AUTOMATED: CPT

## 2023-10-09 PROCEDURE — 90935 HEMODIALYSIS ONE EVALUATION: CPT

## 2023-10-09 PROCEDURE — 36415 COLL VENOUS BLD VENIPUNCTURE: CPT

## 2023-10-09 PROCEDURE — 1200000000 HC SEMI PRIVATE

## 2023-10-09 PROCEDURE — P9047 ALBUMIN (HUMAN), 25%, 50ML: HCPCS

## 2023-10-09 PROCEDURE — 99232 SBSQ HOSP IP/OBS MODERATE 35: CPT | Performed by: NEUROLOGICAL SURGERY

## 2023-10-09 PROCEDURE — 6370000000 HC RX 637 (ALT 250 FOR IP): Performed by: FAMILY MEDICINE

## 2023-10-09 PROCEDURE — 6360000002 HC RX W HCPCS: Performed by: FAMILY MEDICINE

## 2023-10-09 PROCEDURE — 6360000002 HC RX W HCPCS

## 2023-10-09 PROCEDURE — 80048 BASIC METABOLIC PNL TOTAL CA: CPT

## 2023-10-09 PROCEDURE — 6370000000 HC RX 637 (ALT 250 FOR IP)

## 2023-10-09 PROCEDURE — 2580000003 HC RX 258: Performed by: FAMILY MEDICINE

## 2023-10-09 PROCEDURE — 5A1D70Z PERFORMANCE OF URINARY FILTRATION, INTERMITTENT, LESS THAN 6 HOURS PER DAY: ICD-10-PCS | Performed by: INTERNAL MEDICINE

## 2023-10-09 RX ORDER — ALBUMIN (HUMAN) 12.5 G/50ML
25 SOLUTION INTRAVENOUS EVERY 8 HOURS
Status: DISCONTINUED | OUTPATIENT
Start: 2023-10-09 | End: 2023-10-10 | Stop reason: HOSPADM

## 2023-10-09 RX ADMIN — ISOSORBIDE DINITRATE 10 MG: 10 TABLET ORAL at 21:32

## 2023-10-09 RX ADMIN — LEVOTHYROXINE SODIUM 150 MCG: 0.1 TABLET ORAL at 06:03

## 2023-10-09 RX ADMIN — ACETAMINOPHEN 650 MG: 325 TABLET ORAL at 06:36

## 2023-10-09 RX ADMIN — CLONIDINE HYDROCHLORIDE 0.1 MG: 0.1 TABLET ORAL at 13:58

## 2023-10-09 RX ADMIN — ALBUMIN (HUMAN) 25 G: 0.25 INJECTION, SOLUTION INTRAVENOUS at 22:20

## 2023-10-09 RX ADMIN — PROMETHAZINE HYDROCHLORIDE 12.5 MG: 12.5 TABLET ORAL at 12:02

## 2023-10-09 RX ADMIN — Medication 10 ML: at 11:50

## 2023-10-09 RX ADMIN — HYDRALAZINE HYDROCHLORIDE 50 MG: 50 TABLET, FILM COATED ORAL at 13:57

## 2023-10-09 RX ADMIN — CLONIDINE HYDROCHLORIDE 0.1 MG: 0.1 TABLET ORAL at 21:32

## 2023-10-09 RX ADMIN — AMLODIPINE BESYLATE 10 MG: 10 TABLET ORAL at 21:32

## 2023-10-09 RX ADMIN — HYDRALAZINE HYDROCHLORIDE 50 MG: 50 TABLET, FILM COATED ORAL at 21:32

## 2023-10-09 RX ADMIN — ACETAMINOPHEN 650 MG: 325 TABLET ORAL at 12:02

## 2023-10-09 RX ADMIN — ATORVASTATIN CALCIUM 40 MG: 40 TABLET, FILM COATED ORAL at 13:58

## 2023-10-09 RX ADMIN — Medication 10 ML: at 21:32

## 2023-10-09 RX ADMIN — CARVEDILOL 25 MG: 25 TABLET, FILM COATED ORAL at 13:58

## 2023-10-09 RX ADMIN — ISOSORBIDE DINITRATE 10 MG: 10 TABLET ORAL at 13:57

## 2023-10-09 RX ADMIN — POLYETHYLENE GLYCOL 3350 17 G: 17 POWDER, FOR SOLUTION ORAL at 14:00

## 2023-10-09 RX ADMIN — LEVETIRACETAM 500 MG: 500 TABLET, FILM COATED ORAL at 13:58

## 2023-10-09 RX ADMIN — ALBUMIN (HUMAN) 25 G: 0.25 INJECTION, SOLUTION INTRAVENOUS at 15:53

## 2023-10-09 ASSESSMENT — PAIN SCALES - GENERAL
PAINLEVEL_OUTOF10: 7
PAINLEVEL_OUTOF10: 0
PAINLEVEL_OUTOF10: 7

## 2023-10-09 ASSESSMENT — PAIN DESCRIPTION - LOCATION: LOCATION: LEG

## 2023-10-09 ASSESSMENT — PAIN DESCRIPTION - DESCRIPTORS: DESCRIPTORS: ACHING;SORE;TENDER

## 2023-10-09 ASSESSMENT — PAIN DESCRIPTION - ORIENTATION: ORIENTATION: RIGHT

## 2023-10-09 NOTE — CARE COORDINATION
SAE transition of care. Pt presented to [facility] ER from [address] [facility] with AMS. Admitted observation and upgraded to inpatient today. Recently d/c from this facility on 9/29. NS and Renal on consult. IV albumin ordered today. Spoke with Anel from [address] [facility]. Pt is there skilled and is able to return upon d/c.  SAKINA and destination updated. No pasrr required. Ambulance form with envelope placed on the soft chart. Met with pt. He plans to return to [address] [facility] at d/c.  Spoke with pt wife and confirmed plan is to return to [facility] once medically ready. CM/SW to follow. Shruthi Romero RN  [phone]    Case Management Assessment  Initial Evaluation    Date/Time of Evaluation: 10/9/2023 3:56 PM  Assessment Completed by: George Zhang RN    If patient is discharged prior to next notation, then this note serves as note for discharge by case management. Patient Name: Dominguez Aviles                   YOB: 1950  Diagnosis: Altered mental status, unspecified altered mental status type [R41.82]  AMS (altered mental status) [R41.82]  Altered mental state [R41.82]                   Date / Time: 10/6/2023  2:35 PM    Patient Admission Status: Inpatient   Readmission Risk (Low < 19, Mod (19-27), High > 27): Readmission Risk Score: 28.3    Current PCP: No primary care provider on file. PCP verified by CM? Yes    Chart Reviewed: Yes      History Provided by:    Patient Orientation: Alert and Oriented    Patient Cognition: Alert    Hospitalization in the last 30 days (Readmission):  Yes    If yes, Readmission Assessment in  Navigator will be completed.     Readmission Assessment  Number of Days since last admission?: 8-30 days  Previous Disposition: SNF  Who is being Interviewed: Patient  What was the patient's/caregiver's perception as to why they think they needed to return back to the hospital?: Other (Comment) (sent in from dialysis with AMS)  Did you visit your Primary Care Physician after you left the

## 2023-10-09 NOTE — ACP (ADVANCE CARE PLANNING)
Advance Care Planning   Healthcare Decision Maker:    Primary Decision Maker: Jayde Shabbir Spouse - [phone]    Secondary Decision Maker: Padmini Lyon - Child - [phone]    Click here to complete Healthcare Decision Makers including selection of the Healthcare Decision Maker Relationship (ie \"Primary\"). Today we documented Decision Maker(s) consistent with ACP documents on file.        If the relationship to the patient does NOT follow our state's Next of Kin hierarchy, the patient MUST complete an ACP Document to allow him/her to act on the patient's behalf. :

## 2023-10-10 VITALS
BODY MASS INDEX: 14.63 KG/M2 | DIASTOLIC BLOOD PRESSURE: 68 MMHG | RESPIRATION RATE: 17 BRPM | OXYGEN SATURATION: 97 % | WEIGHT: 114 LBS | SYSTOLIC BLOOD PRESSURE: 140 MMHG | HEART RATE: 63 BPM | HEIGHT: 74 IN | TEMPERATURE: 97.5 F

## 2023-10-10 LAB
ALBUMIN SERPL-MCNC: 4 G/DL (ref 3.5–5.2)
ALP SERPL-CCNC: 70 U/L (ref 40–129)
ALT SERPL-CCNC: <5 U/L (ref 0–40)
ANION GAP SERPL CALCULATED.3IONS-SCNC: 19 MMOL/L (ref 7–16)
AST SERPL-CCNC: 15 U/L (ref 0–39)
BILIRUB SERPL-MCNC: 0.7 MG/DL (ref 0–1.2)
BUN SERPL-MCNC: 14 MG/DL (ref 6–23)
CALCIUM SERPL-MCNC: 9.2 MG/DL (ref 8.6–10.2)
CHLORIDE SERPL-SCNC: 98 MMOL/L (ref 98–107)
CO2 SERPL-SCNC: 22 MMOL/L (ref 22–29)
CREAT SERPL-MCNC: 5.3 MG/DL (ref 0.7–1.2)
ERYTHROCYTE [DISTWIDTH] IN BLOOD BY AUTOMATED COUNT: 13.4 % (ref 11.5–15)
GFR SERPL CREATININE-BSD FRML MDRD: 11 ML/MIN/1.73M2
GLUCOSE BLD-MCNC: 126 MG/DL (ref 74–99)
GLUCOSE SERPL-MCNC: 46 MG/DL (ref 74–99)
HCT VFR BLD AUTO: 39.9 % (ref 37–54)
HGB BLD-MCNC: 12.9 G/DL (ref 12.5–16.5)
LEVETIRACETAM SERPL-MCNC: 16 UG/ML
MCH RBC QN AUTO: 30.4 PG (ref 26–35)
MCHC RBC AUTO-ENTMCNC: 32.3 G/DL (ref 32–34.5)
MCV RBC AUTO: 94.1 FL (ref 80–99.9)
PHOSPHATE SERPL-MCNC: 3.8 MG/DL (ref 2.5–4.5)
PLATELET # BLD AUTO: 168 K/UL (ref 130–450)
PMV BLD AUTO: 10.6 FL (ref 7–12)
POTASSIUM SERPL-SCNC: 4.1 MMOL/L (ref 3.5–5)
PROT SERPL-MCNC: 7.1 G/DL (ref 6.4–8.3)
RBC # BLD AUTO: 4.24 M/UL (ref 3.8–5.8)
SODIUM SERPL-SCNC: 139 MMOL/L (ref 132–146)
WBC OTHER # BLD: 6.6 K/UL (ref 4.5–11.5)

## 2023-10-10 PROCEDURE — 6370000000 HC RX 637 (ALT 250 FOR IP)

## 2023-10-10 PROCEDURE — 80053 COMPREHEN METABOLIC PANEL: CPT

## 2023-10-10 PROCEDURE — 2580000003 HC RX 258: Performed by: FAMILY MEDICINE

## 2023-10-10 PROCEDURE — 85027 COMPLETE CBC AUTOMATED: CPT

## 2023-10-10 PROCEDURE — 84100 ASSAY OF PHOSPHORUS: CPT

## 2023-10-10 PROCEDURE — P9047 ALBUMIN (HUMAN), 25%, 50ML: HCPCS

## 2023-10-10 PROCEDURE — 6370000000 HC RX 637 (ALT 250 FOR IP): Performed by: FAMILY MEDICINE

## 2023-10-10 PROCEDURE — 82962 GLUCOSE BLOOD TEST: CPT

## 2023-10-10 PROCEDURE — 6360000002 HC RX W HCPCS

## 2023-10-10 PROCEDURE — 36415 COLL VENOUS BLD VENIPUNCTURE: CPT

## 2023-10-10 RX ORDER — DEXTROSE MONOHYDRATE 100 MG/ML
INJECTION, SOLUTION INTRAVENOUS CONTINUOUS PRN
Status: DISCONTINUED | OUTPATIENT
Start: 2023-10-10 | End: 2023-10-10 | Stop reason: HOSPADM

## 2023-10-10 RX ADMIN — Medication 10 ML: at 08:22

## 2023-10-10 RX ADMIN — ALBUMIN (HUMAN) 25 G: 0.25 INJECTION, SOLUTION INTRAVENOUS at 06:22

## 2023-10-10 RX ADMIN — LEVOTHYROXINE SODIUM 150 MCG: 0.1 TABLET ORAL at 06:21

## 2023-10-10 NOTE — DISCHARGE SUMMARY
confluent foci of T2/FLAIR hyperintensity are present within supratentorial white matter which is a nonspecific finding but likely represents severe chronic microvascular ischemia. There is mild generalized volume loss. Ventricular enlargement concordant with the degree of parenchymal volume loss. The sellar/suprasellar regions appear unremarkable. The normal signal voids within the major intracranial vessels appear maintained. ORBITS: The visualized portion of the orbits demonstrate no acute abnormality. SINUSES: The visualized paranasal sinuses and mastoid air cells are well aerated. BONES/SOFT TISSUES: The bone marrow signal intensity appears normal. The soft tissues demonstrate no acute abnormality. Evolving subacute parenchymal hemorrhage within the left cerebellum extending into the tonsil. No significant mass effect or hydrocephalus. Severe chronic small vessel disease. Sequelae of prior microhemorrhage within the cerebellar hemispheres. CT HEAD WO CONTRAST    Result Date: 10/6/2023  EXAMINATION: CT OF THE HEAD WITHOUT CONTRAST  10/6/2023 5:04 pm TECHNIQUE: CT of the head was performed without the administration of intravenous contrast. Automated exposure control, iterative reconstruction, and/or weight based adjustment of the mA/kV was utilized to reduce the radiation dose to as low as reasonably achievable. COMPARISON: 09/26/2023 HISTORY: ORDERING SYSTEM PROVIDED HISTORY: ams TECHNOLOGIST PROVIDED HISTORY: Has a \"code stroke\" or \"stroke alert\" been called? ->No Reason for exam:->[facility] Decision Support Exception - unselect if not a suspected or confirmed emergency medical condition->Emergency Medical Condition (MA) What reading provider will be dictating this exam?->CRC FINDINGS: Redemonstration of a parenchymal hematoma involving left inferior cerebellar hemisphere measuring approximately 1.3 x 1.2 cm with surrounding edema. There is mild mass effect on the 4th ventricle.   No new intracranial WITHOUT CONTRAST  9/26/2023 12:00 am TECHNIQUE: CT of the head was performed without the administration of intravenous contrast. Automated exposure control, iterative reconstruction, and/or weight based adjustment of the mA/kV was utilized to reduce the radiation dose to as low as reasonably achievable. COMPARISON: Head CT from 08/04/2023 HISTORY: ORDERING SYSTEM PROVIDED HISTORY: headache TECHNOLOGIST PROVIDED HISTORY: Has a \"code stroke\" or \"stroke alert\" been called? ->No Reason for exam:->headache Decision Support Exception - unselect if not a suspected or confirmed emergency medical condition->Emergency Medical Condition (MA) FINDINGS: BRAIN/VENTRICLES: There is a new 1.9 cm intraparenchymal hemorrhage in the medial left cerebellar hemisphere. The previously noted subcentimeter hyperdense focus in the peripheral left cerebellar hemisphere is not appreciated on the current exam.  There is no mass effect or midline shift. No abnormal extra-axial fluid collection. The gray-white differentiation is maintained without evidence of an acute infarct. There is no evidence of hydrocephalus. Chronic and age related changes including age-appropriate cerebral volume loss and scattered nonspecific white matter hypodensities which are likely the sequela of chronic small vessel ischemic disease. ORBITS: The visualized portion of the orbits demonstrate no acute abnormality. SINUSES: The visualized paranasal sinuses and mastoid air cells demonstrate no acute abnormality. SOFT TISSUES/SKULL:  No acute abnormality of the visualized skull or soft tissues. 1.9 cm intraparenchymal hemorrhage in the medial left cerebellar hemisphere Chronic and age related changes including age-appropriate cerebral volume loss and scattered nonspecific white matter hypodensities which are likely the sequela of chronic small vessel ischemic disease.  RECOMMENDATIONS: Critical results were called by Dr. Jm Chowdary to Dr. Anthony Green on

## 2023-10-10 NOTE — CARE COORDINATION
CM note. D/c order noted. Pt is returning to [address]. Pt set up via stretcher with PAS. Nurse will need to call report to [phone]. Facility liaison, RN, pt, and pt wife notified of  time. Envelope with ambulance form placed on the soft chart. Ellis Denise RN [place] [phone].

## 2023-10-10 NOTE — PROGRESS NOTES
Date/Time     10/10/2023 05:02 AM    K 4.1 10/10/2023 05:02 AM    K 4.6 05/24/2023 05:35 AM    CL 98 10/10/2023 05:02 AM    CO2 22 10/10/2023 05:02 AM    BUN 14 10/10/2023 05:02 AM    CREATININE 5.3 10/10/2023 05:02 AM    GFRAA 7 10/02/2023 05:30 AM    LABGLOM 11 10/10/2023 05:02 AM    GLUCOSE 46 10/10/2023 05:02 AM    GLUCOSE 108 10/02/2023 05:30 AM    PROT 7.1 10/10/2023 05:02 AM    LABALBU 4.0 10/10/2023 05:02 AM    LABALBU 2.9 10/02/2023 05:30 AM    CALCIUM 9.2 10/10/2023 05:02 AM    BILITOT 0.7 10/10/2023 05:02 AM    ALKPHOS 70 10/10/2023 05:02 AM    AST 15 10/10/2023 05:02 AM    ALT <5 10/10/2023 05:02 AM     Magnesium:    Lab Results   Component Value Date/Time    MG 2.2 10/09/2023 04:58 AM     Phosphorus:    Lab Results   Component Value Date/Time    PHOS 3.8 10/10/2023 05:02 AM     Radiology Review:      MRI BRAIN WO CONTRAST 10/6/23      IMPRESSION:  Evolving subacute parenchymal hemorrhage within the left cerebellum extending  into the tonsil. No significant mass effect or hydrocephalus. Severe chronic small vessel disease. Sequelae of prior microhemorrhage within the cerebellar hemispheres. XR CHEST PORTABLE 10/6/23    IMPRESSION:  No acute process. Borderline cardiomegaly. BRIEF SUMMARY OF INITIAL CONSULT:    Briefly, Mr. Caffie Bumpers is a 75 y.o. male, past medical history significant for ESRD on HD 3 times weekly MWF via right IJ, HTN, type II DM, HFrEF 35 to 40% with stage I diastolic dysfunction (1/5049), hypothyroidism, HCV, recent intracerebral bleed, who was admitted on October 6, 2023 after he was brought to the ER by EMS after he was referred from his dialysis center because of altered mental status. MRI of the brain demonstrated evolving subacute parenchymal hemorrhage within the left cerebellum extending to the tonsil.     IMPRESSION/RECOMMENDATIONS:      ESRD on HD three times weekly MWF, for HD tomorrow     Resistant HTN, on amlodipine, carvedilol, clonidine, hydralazine   HFrEF 35-40% with stage I DD, on carvedilol   MBD of CKD, not on binders  Anemia of CKD, hold NIXON for hgb > 10 g/dL  ----------------------------------------------------------------------  Recent hemorrhagic stroke   History of seizures, on levetiracetam  Hypothyroidism, on levothyroxine  History of HCV  Depression, on sertraline at home       Plan:     HD three times weekly MW, for HD tomorrow  Continue amlodipine 10 mg PO nightly, carvedilol 25 mg PO BID, clonidine 0.1 mg bid, hydralazine 50 mg tid   Continue to monitor labs  Continue to monitor blood pressure  Okay to discharge from renal POV, follow up on 11/8/23 at 1:30 pm    Case and plan discussed with Dr. Nataliya Russell.        Electronically signed by KIANNA Lopez CNP on 10/10/2023 at 11:54 AM

## 2023-10-23 ENCOUNTER — OFFICE VISIT (OUTPATIENT)
Dept: NEUROSURGERY | Age: 73
End: 2023-10-23
Payer: MEDICARE

## 2023-10-23 ENCOUNTER — HOSPITAL ENCOUNTER (OUTPATIENT)
Dept: CT IMAGING | Age: 73
Discharge: HOME | End: 2023-10-25
Attending: NEUROLOGICAL SURGERY
Payer: MEDICARE

## 2023-10-23 DIAGNOSIS — I62.9 INTRACRANIAL HEMORRHAGE (HCC): ICD-10-CM

## 2023-10-23 DIAGNOSIS — I61.9 INTRAPARENCHYMAL HEMORRHAGE OF BRAIN (HCC): Primary | ICD-10-CM

## 2023-10-23 PROCEDURE — G8419 CALC BMI OUT NRM PARAM NOF/U: HCPCS | Performed by: PHYSICIAN ASSISTANT

## 2023-10-23 PROCEDURE — G8484 FLU IMMUNIZE NO ADMIN: HCPCS | Performed by: PHYSICIAN ASSISTANT

## 2023-10-23 PROCEDURE — 70450 CT HEAD/BRAIN W/O DYE: CPT

## 2023-10-23 PROCEDURE — 3017F COLORECTAL CA SCREEN DOC REV: CPT | Performed by: PHYSICIAN ASSISTANT

## 2023-10-23 PROCEDURE — 1111F DSCHRG MED/CURRENT MED MERGE: CPT | Performed by: PHYSICIAN ASSISTANT

## 2023-10-23 PROCEDURE — G8427 DOCREV CUR MEDS BY ELIG CLIN: HCPCS | Performed by: PHYSICIAN ASSISTANT

## 2023-10-23 PROCEDURE — 99212 OFFICE O/P EST SF 10 MIN: CPT

## 2023-10-23 PROCEDURE — 1123F ACP DISCUSS/DSCN MKR DOCD: CPT | Performed by: PHYSICIAN ASSISTANT

## 2023-10-23 PROCEDURE — 99213 OFFICE O/P EST LOW 20 MIN: CPT | Performed by: PHYSICIAN ASSISTANT

## 2023-10-23 PROCEDURE — 1036F TOBACCO NON-USER: CPT | Performed by: PHYSICIAN ASSISTANT

## 2023-10-23 NOTE — PROGRESS NOTES
Patient is here for follow up from a hospital consult for:  IPH. Denies headache, weakness, numbness or new concern. Physical exam  Alert and Oriented to self  PERRLA, EOMI  YARBROUGH 5/5  Sensation intact to LT and PP  Reflexes are 2+ and symmetric    A/P: patient is here for follow up for: cerebellar hemorrhage. Head CT reveals expected evolution of hemorrhage. Exam is stable. No restrictions.   F/u PRN

## 2025-07-29 NOTE — DISCHARGE SUMMARY
Is This A New Presentation, Or A Follow-Up?: [facility] Physician Discharge Summary        [address] [facility] [address]  [phone]          Activity level: As tolerated     Dispo: SNF    Condition on discharge: Stable     Patient ID:  Helga Herzog  [id number]  2 y.o.  1950    Admit date: 4/21/2022    Discharge date and time:  4/26/2022  3:32 PM    Admission Diagnoses: Principal Problem:    Elevated troponin  Active Problems:    Elevated serum creatinine    Lung infiltrate    Generalized weakness    Cough    DNR (do not resuscitate) discussion    Hypothyroidism    ESRD (end stage renal disease) on dialysis ([facility]    Volume overload    Hypertensive emergency    Severe protein-calorie malnutrition ([facility]  Resolved Problems:    * No resolved hospital problems. *      Discharge Diagnoses: Principal Problem:    Elevated troponin  Active Problems:    Elevated serum creatinine    Lung infiltrate    Generalized weakness    Cough    DNR (do not resuscitate) discussion    Hypothyroidism    ESRD (end stage renal disease) on dialysis ([facility]    Volume overload    Hypertensive emergency    Severe protein-calorie malnutrition ([facility]  Resolved Problems:    * No resolved hospital problems. *      Consults:  IP CONSULT TO NEPHROLOGY  IP CONSULT TO PALLIATIVE CARE  IP CONSULT TO CARDIOLOGY  IP CONSULT TO PSYCHIATRY  IP CONSULT TO SOCIAL WORK  IP CONSULT TO SOCIAL WORK  IP CONSULT TO ENDOCRINOLOGY    Hospital Course:   Patient Helga Herzog is a 67 y.o. presented with Essential hypertension [I10]  Encounter for dialysis ([facility] [Z99.2]  Elevated serum creatinine [R79.89]  H/O noncompliance with medical treatment, presenting hazards to health [Z91.19]  Patient was admitted and managed for Accelerated HTN, resolved - restarted on Entresto, Coreg and Bumex.  Continue same meds. Cough - CXR shows bilateral atelectasis.  Procalcitonin was last checked 4/21/2022 and was 0.29.  Incentive Spirometry.  Summerbs.  Oxygen prn.  Physical deconditioning - PT/OT ordered, for CODY. Discharge Exam:  General Appearance: alert and oriented to person, place and time and in no acute distress  Skin: warm and dry  Head: normocephalic and atraumatic  Eyes: pupils equal, round, and reactive to light, extraocular eye movements intact, conjunctivae normal  Neck: neck supple and non tender without mass   Pulmonary/Chest: clear to auscultation bilaterally- no wheezes, rales or rhonchi, normal air movement, no respiratory distress  Cardiovascular: normal rate, normal S1 and S2 and no carotid bruits  Abdomen: soft, non-tender, non-distended, normal bowel sounds, no masses or organomegaly  Extremities: no cyanosis, no clubbing and no edema  Neurologic: no cranial nerve deficit and speech normal    I/O last 3 completed shifts:  In: -   Out: 450 [Urine:450]  I/O this shift:  In: -   Out: 2500       LABS:  Recent Labs     04/24/22  1017 04/25/22  0347 04/26/22  0527   * 131* 134   K 4.1 4.6 4.8   CL 91* 90* 93*   CO2 27 26 24   BUN 32* 53* 74*   CREATININE 5.2* 6.7* 8.5*   GLUCOSE 155* 85 88   CALCIUM 7.8* 7.6* 7.0*       Recent Labs     04/24/22  1017 04/25/22  0347 04/26/22  0527   WBC 3.9* 3.5* 3.4*   RBC 3.81 3.62* 3.42*   HGB 11.4* 10.8* 10.4*   HCT 34.3* 32.6* 30.9*   MCV 90.0 90.1 90.4   MCH 29.9 29.8 30.4   MCHC 33.2 33.1 33.7   RDW 22.4* 21.9* 21.6*    138 130   MPV 10.4 10.8 10.3       No results for input(s): POCGLU in the last 72 hours.     Imaging:  Echo Complete    Result Date: 4/22/2022  Transthoracic Echocardiography Report (TTE)  Demographics   Patient Name    Samir Brar        Gender            Male                  Brian Welch  [id number]      Room Number       7900  Number   Account #       [de-identified]     Procedure Date    04/22/2022   Corporate ID                  Ordering          Rachael Hutton MD                                Physician   Accession       [id number]    Referring  Number Physician   Date of Birth   1950    Sonographer       Aleksandra Andre RDCS   Age             67 year(s)    Interpreting      [address]                                Physician         Physician Cardiology                                                  Jayro Lebron MD                                 Any Other  Procedure Type of Study   TTE procedure  Procedure Date Date: 04/22/2022 Start: 02:28 PM Study Location: Portable Technical Quality: Adequate visualization Indications:Abnormal cardiac enzymes. Patient Status: Routine Height: 74 inches Weight: 150 pounds BSA: 1.92 m^2 BMI: 19.26 kg/m^2 BP: 163/84 mmHg  Findings   Left Ventricle  Left ventricle size is normal.  Ejection fraction is visually estimated at 35-40%. Overall ejection fraction moderately decreased . No regional wall motion abnormalities seen. Concentric remodelling. Stage I diastolic dysfunction. Right Ventricle  Normal right ventricular size and function. TAPSE 18 mm. Left Atrium  Left atrium is of normal size. Right Atrium  Normal right atrium size. Mitral Valve  Mild mitral annular calcification. No evidence of mitral valve stenosis. Physiologic and/or trace mitral regurgitation is present. Tricuspid Valve  The tricuspid valve appears structurally normal.  Physiologic and/or trace tricuspid regurgitation. Unable to estimate PA systolic pressure. Aortic Valve  The aortic valve appears mildly sclerotic. The aortic valve is trileaflet. No hemodynamically significant aortic stenosis is present. Physiologic and/or trace aortic regurgitation is noted. Pulmonic Valve  The pulmonic valve was not well visualized. No evidence of any pulmonic regurgitation. No evidence of pulmonic valve stenosis. Pericardial Effusion  No evidence for hemodynamically significant pericardial effusion. Pleural Effusion  No evidence of pleural effusion. Aorta  Normal aortic root and ascending aorta.   Miscellaneous  The inferior vena cava diameter is normal with normal respiratory  variation. Conclusions   Summary  Left ventricle size is normal.  Ejection fraction is visually estimated at 35-40%. Overall ejection fraction moderately decreased . No regional wall motion abnormalities seen. Concentric remodelling. Stage I diastolic dysfunction. Normal right ventricular size and function. TAPSE 18 mm. Physiologic and/or trace mitral regurgitation is present. No hemodynamically significant aortic stenosis is present. Unable to estimate PA systolic pressure. No evidence for hemodynamically significant pericardial effusion.    Signature   ----------------------------------------------------------------  Electronically signed by Manolo George MD(Interpreting  physician) on 04/22/2022 07:21 PM  ----------------------------------------------------------------  M-Mode/2D Measurements & Calculations   LV Diastolic   LV Systolic Dimension: 3 cm  AV Cusp Separation: 1.9 cmLA  Dimension: 4   LV Volume Diastolic: 61 ml   Dimension: 3.1 cmAO Root  cm             LV Volume Systolic: 09.6 ml  Dimension: 3.2 cm  LV FS:25 %     LV EDV/LV EDV Index: 61  LV PW          ml/32 ml/m^2LV ESV/LV ESV  Diastolic: 1.2 Index: 34.1 ml/18ml/ m^2  cm             EF Calculated: 44.1 %  LV PW          LV Mass Index: 109 l/min*m^2 LA/Aorta: 1  Systolic: 1.9  LV Length: 9 cm              Ascending Aorta: 3.1 cm  cm                                          LA volume/Index: 33.7 ml  Septum         LVOT: 2 cm                   RA Area: 22.4 cm^2  Diastolic: 1.6  cm                                          IVC Expiration: 1.3 cm  Septum  Systolic: 1.9  cm   LV Mass:  208.96 g  Doppler Measurements & Calculations   MV Peak E-Wave: 0.33 AV Peak Velocity:     LVOT Peak Velocity: 0.69 m/s  m/s                  0.99 m/s              LVOT Mean Velocity: 0.51 m/s  MV Peak A-Wave: 0.82 AV Peak Gradient:     LVOT Peak Gradient: 1.9  m/s                  3.89 mmHg [address], [address] [place] [address]    Phone: [phone]   · bumetanide 2 MG tablet  · levothyroxine 100 MCG tablet  · sertraline 50 MG tablet           Note that more than 30 minutes was spent in preparing discharge papers, discussing discharge with patient, medication review, etc.    Signed:  Electronically signed by Lesley Lai MD on 4/26/2022 at 3:32 PM

## (undated) DEVICE — MARKER,SKIN,WI/RULER AND LABELS: Brand: MEDLINE

## (undated) DEVICE — TRAP,MUCUS SPECIMEN,40CC: Brand: MEDLINE

## (undated) DEVICE — SHEET, T, LAPAROTOMY, STERILE: Brand: MEDLINE

## (undated) DEVICE — ADHESIVE SKIN CLSR 0.7ML TOP DERMBND ADV

## (undated) DEVICE — GLOVE SURG SZ 75 L12IN FNGR THK94MIL TRNSLUC YEL LTX

## (undated) DEVICE — BLADE ES ELASTOMERIC COAT INSUL DURABLE BEND UPTO 90DEG

## (undated) DEVICE — PACK PROCEDURE SURG GEN CUST

## (undated) DEVICE — GAUZE,SPONGE,4"X4",8PLY,STRL,LF,10/TRAY: Brand: MEDLINE

## (undated) DEVICE — TRAY VCF/TESIO TRAY  REUSABLE

## (undated) DEVICE — DOUBLE BASIN SET: Brand: MEDLINE INDUSTRIES, INC.

## (undated) DEVICE — TOWEL,OR,DSP,ST,BLUE,STD,6/PK,12PK/CS: Brand: MEDLINE

## (undated) DEVICE — SOLUTION IV 500ML 0.9% SOD CHL PH 5 INJ USP VIAFLX PLAS

## (undated) DEVICE — NEEDLE HYPO 25GA L1.5IN BLU POLYPR HUB S STL REG BVL STR

## (undated) DEVICE — 18 GA N.G. KIT, 10 PACK: Brand: SITE-RITE

## (undated) DEVICE — SPONGE,LAP,4"X18",XR,ST,5/PK,40PK/CS: Brand: MEDLINE INDUSTRIES, INC.

## (undated) DEVICE — COVER HNDL LT DISP

## (undated) DEVICE — 4-PORT MANIFOLD: Brand: NEPTUNE 2

## (undated) DEVICE — BLADE,STAINLESS-STEEL,11,STRL,DISPOSABLE: Brand: MEDLINE

## (undated) DEVICE — SYRINGE,CONTROL,LL,FINGER,GRIP: Brand: MEDLINE INDUSTRIES, INC.

## (undated) DEVICE — ELECTRODE PT RET AD L9FT HI MOIST COND ADH HYDRGEL CORDED

## (undated) DEVICE — DRESSING COMP W4XL4IN N ADH PD W2.5XL2.5IN GZ BORDERED ADH

## (undated) DEVICE — DRAPE C ARM W41XL74IN UNIV MOB W RUBBERBAND CLP

## (undated) DEVICE — C-ARM: Brand: UNBRANDED

## (undated) DEVICE — DRAPE,CHEST,FENES,15X10,STERIL: Brand: MEDLINE

## (undated) DEVICE — 14F X 24CM SLX MAX BARRIER TRAY: Brand: SLX HEMO-CATH®

## (undated) DEVICE — GLOVE ORANGE PI 8   MSG9080

## (undated) DEVICE — BANDAGE ADH W0.75XL3IN UNIV WVN FAB NAT GEN USE STRP N ADH

## (undated) DEVICE — SYRINGE MED 10ML POLYPR LUERSLIP TIP FLAT TOP W/O SFTY DISP

## (undated) DEVICE — APPLICATOR PREP 26ML 0.7% IOD POVACRYLEX 74% ISO ALC ST

## (undated) DEVICE — DECANTER: Brand: UNBRANDED

## (undated) DEVICE — CHLORAPREP 26ML ORANGE